# Patient Record
Sex: FEMALE | Race: WHITE | NOT HISPANIC OR LATINO | Employment: FULL TIME | URBAN - METROPOLITAN AREA
[De-identification: names, ages, dates, MRNs, and addresses within clinical notes are randomized per-mention and may not be internally consistent; named-entity substitution may affect disease eponyms.]

---

## 2017-01-16 DIAGNOSIS — I10 ESSENTIAL HYPERTENSION: ICD-10-CM

## 2017-01-16 RX ORDER — HYDROCHLOROTHIAZIDE 25 MG/1
25 TABLET ORAL DAILY
Qty: 90 TABLET | Refills: 3 | Status: SHIPPED | OUTPATIENT
Start: 2017-01-16 | End: 2018-01-19 | Stop reason: SDUPTHER

## 2017-01-16 RX ORDER — VALSARTAN 160 MG/1
160 TABLET ORAL DAILY
Qty: 90 TABLET | Refills: 3 | Status: SHIPPED | OUTPATIENT
Start: 2017-01-16 | End: 2017-10-27

## 2017-03-21 ENCOUNTER — OFFICE VISIT (OUTPATIENT)
Dept: INTERNAL MEDICINE | Facility: CLINIC | Age: 47
End: 2017-03-21

## 2017-03-21 VITALS
DIASTOLIC BLOOD PRESSURE: 90 MMHG | WEIGHT: 161 LBS | HEIGHT: 66 IN | BODY MASS INDEX: 25.88 KG/M2 | SYSTOLIC BLOOD PRESSURE: 140 MMHG

## 2017-03-21 DIAGNOSIS — I10 ESSENTIAL HYPERTENSION: Primary | ICD-10-CM

## 2017-03-21 DIAGNOSIS — M76.899 TENDONITIS OF KNEE: ICD-10-CM

## 2017-03-21 PROCEDURE — 99213 OFFICE O/P EST LOW 20 MIN: CPT | Performed by: INTERNAL MEDICINE

## 2017-03-21 PROCEDURE — 73560 X-RAY EXAM OF KNEE 1 OR 2: CPT | Performed by: INTERNAL MEDICINE

## 2017-03-21 RX ORDER — MELOXICAM 15 MG/1
15 TABLET ORAL DAILY
Qty: 30 TABLET | Refills: 3 | Status: SHIPPED | OUTPATIENT
Start: 2017-03-21 | End: 2017-04-18 | Stop reason: SDUPTHER

## 2017-03-21 RX ORDER — NEBIVOLOL 10 MG/1
10 TABLET ORAL DAILY
Qty: 28 TABLET | Refills: 0 | COMMUNITY
Start: 2017-03-21 | End: 2017-10-27

## 2017-03-21 NOTE — PROGRESS NOTES
Subjective   Dasha eD Leon is a 46 y.o. female.     Hypertension   This is a chronic problem. The current episode started more than 1 year ago. Pertinent negatives include no chest pain or palpitations.   Hyperlipidemia   Pertinent negatives include no chest pain.   Knee Pain    The incident occurred more than 1 week ago.        The following portions of the patient's history were reviewed and updated as appropriate: allergies, current medications, past family history, past medical history, past social history, past surgical history and problem list.    Review of Systems   Constitutional:        Generally doing pretty well    HENT: Positive for postnasal drip and rhinorrhea.    Eyes: Negative.    Respiratory: Negative.    Cardiovascular: Negative for chest pain and palpitations.        BP has been elevated 160/94  On Valsartan 160/& HCTZ    Gastrointestinal: Negative.    Genitourinary: Negative.    Musculoskeletal: Arthralgias:  Has been having bilat knee pain since fall last november L wors than RT Has taken NSAIDs which helps some Not ready to see ortho yet.   Neurological: Negative.        Objective   Physical Exam   Constitutional: She appears well-developed.   HENT:   Head: Normocephalic.   Eyes: EOM are normal.   Neck: Neck supple.   Cardiovascular: Normal rate, regular rhythm and normal heart sounds.    Repeat 130/100   Pulmonary/Chest: Effort normal and breath sounds normal.   Musculoskeletal:   Knee exam benign bilat   Vitals reviewed.      Assessment/Plan   Dasha was seen today for hypertension, hyperlipidemia and knee pain.    Diagnoses and all orders for this visit:    Essential hypertension  -     nebivolol (BYSTOLIC) 10 MG tablet; Take 1 tablet by mouth Daily.    Tendonitis of knee  -     XR Knee AP & Lateral  -     meloxicam (MOBIC) 15 MG tablet; Take 1 tablet by mouth Daily.

## 2017-03-22 ENCOUNTER — APPOINTMENT (OUTPATIENT)
Dept: WOMENS IMAGING | Facility: HOSPITAL | Age: 47
End: 2017-03-22

## 2017-03-22 PROCEDURE — 73560 X-RAY EXAM OF KNEE 1 OR 2: CPT | Performed by: RADIOLOGY

## 2017-03-31 DIAGNOSIS — E78.5 HYPERLIPIDEMIA, UNSPECIFIED HYPERLIPIDEMIA TYPE: ICD-10-CM

## 2017-03-31 RX ORDER — ATORVASTATIN CALCIUM 20 MG/1
20 TABLET, FILM COATED ORAL DAILY
Qty: 90 TABLET | Refills: 3 | Status: SHIPPED | OUTPATIENT
Start: 2017-03-31 | End: 2018-04-06 | Stop reason: SDUPTHER

## 2017-04-04 ENCOUNTER — OFFICE VISIT (OUTPATIENT)
Dept: INTERNAL MEDICINE | Facility: CLINIC | Age: 47
End: 2017-04-04

## 2017-04-04 ENCOUNTER — HOSPITAL ENCOUNTER (OUTPATIENT)
Dept: GENERAL RADIOLOGY | Facility: HOSPITAL | Age: 47
Discharge: HOME | End: 2017-04-04
Admitting: NURSE PRACTITIONER

## 2017-04-04 VITALS
OXYGEN SATURATION: 98 % | HEART RATE: 70 BPM | SYSTOLIC BLOOD PRESSURE: 120 MMHG | DIASTOLIC BLOOD PRESSURE: 86 MMHG | WEIGHT: 160 LBS | BODY MASS INDEX: 25.82 KG/M2

## 2017-04-04 DIAGNOSIS — M54.41 ACUTE RIGHT-SIDED LOW BACK PAIN WITH RIGHT-SIDED SCIATICA: Primary | ICD-10-CM

## 2017-04-04 PROCEDURE — 72110 X-RAY EXAM L-2 SPINE 4/>VWS: CPT

## 2017-04-04 PROCEDURE — 99213 OFFICE O/P EST LOW 20 MIN: CPT | Performed by: NURSE PRACTITIONER

## 2017-04-04 RX ORDER — HYDROCODONE BITARTRATE AND ACETAMINOPHEN 5; 325 MG/1; MG/1
1 TABLET ORAL EVERY 8 HOURS PRN
Qty: 30 TABLET | Refills: 0 | Status: SHIPPED | OUTPATIENT
Start: 2017-04-04 | End: 2017-07-21 | Stop reason: SDUPTHER

## 2017-04-04 RX ORDER — METHYLPREDNISOLONE 4 MG/1
TABLET ORAL
Qty: 21 TABLET | Refills: 0 | Status: SHIPPED | OUTPATIENT
Start: 2017-04-04 | End: 2017-04-18

## 2017-04-04 RX ORDER — CYCLOBENZAPRINE HCL 10 MG
1 TABLET ORAL AS NEEDED
Refills: 0 | COMMUNITY
Start: 2017-04-03 | End: 2017-08-04 | Stop reason: SDUPTHER

## 2017-04-04 NOTE — PATIENT INSTRUCTIONS
Back Pain, Adult  Back pain is very common in adults. The cause of back pain is rarely dangerous and the pain often gets better over time. The cause of your back pain may not be known. Some common causes of back pain include:  · Strain of the muscles or ligaments supporting the spine.  · Wear and tear (degeneration) of the spinal disks.  · Arthritis.  · Direct injury to the back.  For many people, back pain may return. Since back pain is rarely dangerous, most people can learn to manage this condition on their own.  HOME CARE INSTRUCTIONS  Watch your back pain for any changes. The following actions may help to lessen any discomfort you are feeling:  · Remain active. It is stressful on your back to sit or  one place for long periods of time. Do not sit, drive, or  one place for more than 30 minutes at a time. Take short walks on even surfaces as soon as you are able. Try to increase the length of time you walk each day.  · Exercise regularly as directed by your health care provider. Exercise helps your back heal faster. It also helps avoid future injury by keeping your muscles strong and flexible.  · Do not stay in bed. Resting more than 1-2 days can delay your recovery.  · Pay attention to your body when you bend and lift. The most comfortable positions are those that put less stress on your recovering back. Always use proper lifting techniques, including:    Bending your knees.    Keeping the load close to your body.    Avoiding twisting.  · Find a comfortable position to sleep. Use a firm mattress and lie on your side with your knees slightly bent. If you lie on your back, put a pillow under your knees.  · Avoid feeling anxious or stressed. Stress increases muscle tension and can worsen back pain. It is important to recognize when you are anxious or stressed and learn ways to manage it, such as with exercise.  · Take medicines only as directed by your health care provider. Over-the-counter  medicines to reduce pain and inflammation are often the most helpful. Your health care provider may prescribe muscle relaxant drugs. These medicines help dull your pain so you can more quickly return to your normal activities and healthy exercise.  · Apply ice to the injured area:    Put ice in a plastic bag.    Place a towel between your skin and the bag.    Leave the ice on for 20 minutes, 2-3 times a day for the first 2-3 days. After that, ice and heat may be alternated to reduce pain and spasms.  · Maintain a healthy weight. Excess weight puts extra stress on your back and makes it difficult to maintain good posture.  SEEK MEDICAL CARE IF:  · You have pain that is not relieved with rest or medicine.  · You have increasing pain going down into the legs or buttocks.  · You have pain that does not improve in one week.  · You have night pain.  · You lose weight.  · You have a fever or chills.  SEEK IMMEDIATE MEDICAL CARE IF:   · You develop new bowel or bladder control problems.  · You have unusual weakness or numbness in your arms or legs.  · You develop nausea or vomiting.  · You develop abdominal pain.  · You feel faint.     This information is not intended to replace advice given to you by your health care provider. Make sure you discuss any questions you have with your health care provider.     Document Released: 12/18/2006 Document Revised: 01/08/2016 Document Reviewed: 04/21/2015  [employer] Interactive Patient Education ©2016 [employer]

## 2017-04-04 NOTE — PROGRESS NOTES
Subjective   Dasha De Leon is a 46 y.o. female.     HPI Comments: No history of kidney stones. She reports she was lifting groceries ( to include 30 lb cat litter bag) this weekend shortly started afterwards.      Back Pain   This is a new problem. Episode onset: 4 days ago  The problem occurs constantly. The problem has been gradually worsening since onset. The pain is present in the lumbar spine. The quality of the pain is described as shooting and stabbing. The pain radiates to the right thigh. The pain is at a severity of 6/10. The pain is moderate. The pain is worse during the night. The symptoms are aggravated by standing, sitting and coughing. Stiffness is present at night. Associated symptoms include numbness (right leg, anterior and posterior ). Pertinent negatives include no abdominal pain, bladder incontinence, bowel incontinence, chest pain, dysuria or fever. She has tried muscle relaxant, ice, heat and NSAIDs (meloxicam, hydrocodone (old prescription) ) for the symptoms. The treatment provided moderate relief.        The following portions of the patient's history were reviewed and updated as appropriate: allergies, current medications and problem list.    Review of Systems   Constitutional: Negative for activity change, appetite change, fatigue and fever.   Respiratory: Negative for cough, shortness of breath and wheezing.    Cardiovascular: Negative for chest pain, palpitations and leg swelling.   Gastrointestinal: Negative for abdominal pain and bowel incontinence.   Genitourinary: Negative for bladder incontinence and dysuria.   Musculoskeletal: Positive for back pain (right side, radiating down right leg to tow ).   Neurological: Positive for numbness (right leg, anterior and posterior ).       Objective   Physical Exam   Constitutional: She is oriented to person, place, and time. She appears well-developed and well-nourished.   HENT:   Head: Normocephalic.   Nose: Nose normal.   Cardiovascular:  Regular rhythm and normal heart sounds.  Exam reveals no S3 and no S4.    No murmur heard.  Pulmonary/Chest: Effort normal and breath sounds normal. She has no decreased breath sounds. She has no wheezes. She has no rhonchi. She has no rales.   Musculoskeletal: She exhibits no edema.        Lumbar back: She exhibits decreased range of motion, tenderness, pain and spasm. She exhibits no swelling.        Back:    Unable to perform due to pain    Neurological: She is alert and oriented to person, place, and time. Gait normal.   Reflex Scores:       Patellar reflexes are 2+ on the right side and 2+ on the left side.  Skin: Skin is warm and dry.   Psychiatric: She has a normal mood and affect.       Assessment/Plan   Dasha was seen today for back pain.    Diagnoses and all orders for this visit:    Acute right-sided low back pain with right-sided sciatica  Comments:  continue ice and/or heat, back stretches (handout provided); she will hold on PT   Orders:  -     XR Spine Lumbar 4+ View  -     HYDROcodone-acetaminophen (NORCO) 5-325 MG per tablet; Take 1 tablet by mouth Every 8 (Eight) Hours As Needed for Severe Pain (7-10).  -     MethylPREDNISolone (MEDROL) 4 MG tablet; follow package directions      She would like to hold on PT therapy at this time. She has been told to hold on meloxicam while taking oral prednisone.  Controlled substance agreement form obtained.  ORI query complete. Treatment plan to include limited course of prescribed controlled substance. Risks including addiction, benefits, and alternatives presented to patient.

## 2017-04-18 ENCOUNTER — OFFICE VISIT (OUTPATIENT)
Dept: INTERNAL MEDICINE | Facility: CLINIC | Age: 47
End: 2017-04-18

## 2017-04-18 VITALS
HEIGHT: 66 IN | DIASTOLIC BLOOD PRESSURE: 90 MMHG | SYSTOLIC BLOOD PRESSURE: 110 MMHG | BODY MASS INDEX: 26.2 KG/M2 | HEART RATE: 75 BPM | WEIGHT: 163 LBS | OXYGEN SATURATION: 98 %

## 2017-04-18 DIAGNOSIS — J30.1 SEASONAL ALLERGIC RHINITIS DUE TO POLLEN: ICD-10-CM

## 2017-04-18 DIAGNOSIS — S39.012D BACK STRAIN, SUBSEQUENT ENCOUNTER: ICD-10-CM

## 2017-04-18 DIAGNOSIS — I10 ESSENTIAL HYPERTENSION: Primary | ICD-10-CM

## 2017-04-18 DIAGNOSIS — N95.1 PERI-MENOPAUSE: ICD-10-CM

## 2017-04-18 DIAGNOSIS — M76.899 TENDONITIS OF KNEE: ICD-10-CM

## 2017-04-18 DIAGNOSIS — E78.2 MIXED HYPERLIPIDEMIA: ICD-10-CM

## 2017-04-18 LAB
ALBUMIN SERPL-MCNC: 3.94 G/DL (ref 3.4–4.6)
ALBUMIN/GLOB SERPL: 1.2 G/DL
ALP SERPL-CCNC: 60 U/L (ref 46–116)
ALT SERPL W P-5'-P-CCNC: 26 U/L (ref 14–59)
ANION GAP SERPL CALCULATED.3IONS-SCNC: 9 MMOL/L
AST SERPL-CCNC: 24 U/L (ref 7–37)
BASOPHILS # BLD AUTO: 0.03 10*3/MM3 (ref 0–0.2)
BASOPHILS NFR BLD AUTO: 0.3 % (ref 0–2)
BILIRUB SERPL-MCNC: 0.3 MG/DL (ref 0.2–1)
BUN BLD-MCNC: 22 MG/DL (ref 6–22)
BUN/CREAT SERPL: 29.7 (ref 7–25)
CALCIUM SPEC-SCNC: 8.8 MG/DL (ref 8.6–10.5)
CHLORIDE SERPL-SCNC: 103 MMOL/L (ref 95–107)
CHOLEST SERPL-MCNC: 214 MG/DL (ref 0–200)
CO2 SERPL-SCNC: 29 MMOL/L (ref 23–32)
CREAT BLD-MCNC: 0.74 MG/DL (ref 0.55–1.02)
DEPRECATED RDW RBC AUTO: 40.7 FL (ref 37–54)
EOSINOPHIL # BLD AUTO: 0.25 10*3/MM3 (ref 0–0.7)
EOSINOPHIL NFR BLD AUTO: 2.6 % (ref 0–5)
ERYTHROCYTE [DISTWIDTH] IN BLOOD BY AUTOMATED COUNT: 12.3 % (ref 11.5–15)
GFR SERPL CREATININE-BSD FRML MDRD: 84 ML/MIN/1.73
GLOBULIN UR ELPH-MCNC: 3.4 GM/DL
GLUCOSE BLD-MCNC: 91 MG/DL (ref 70–100)
HCT VFR BLD AUTO: 39.7 % (ref 34.1–44.9)
HDLC SERPL-MCNC: 56 MG/DL (ref 40–81)
HGB BLD-MCNC: 12.7 G/DL (ref 11.2–15.7)
LDLC SERPL CALC-MCNC: 135 MG/DL (ref 0–100)
LDLC/HDLC SERPL: 2.42 {RATIO}
LYMPHOCYTES # BLD AUTO: 1.92 10*3/MM3 (ref 0.8–7)
LYMPHOCYTES NFR BLD AUTO: 20.3 % (ref 10–60)
MCH RBC QN AUTO: 29.4 PG (ref 26–34)
MCHC RBC AUTO-ENTMCNC: 32 G/DL (ref 31–37)
MCV RBC AUTO: 91.9 FL (ref 80–100)
MONOCYTES # BLD AUTO: 1.2 10*3/MM3 (ref 0–1)
MONOCYTES NFR BLD AUTO: 12.7 % (ref 0–13)
NEUTROPHILS # BLD AUTO: 6.06 10*3/MM3 (ref 1–11)
NEUTROPHILS NFR BLD AUTO: 64.1 % (ref 30–85)
PLATELET # BLD AUTO: 290 10*3/MM3 (ref 150–450)
PMV BLD AUTO: 10 FL (ref 6–12)
POTASSIUM BLD-SCNC: 4.4 MMOL/L (ref 3.3–5.3)
PROT SERPL-MCNC: 7.3 G/DL (ref 6.3–8.4)
RBC # BLD AUTO: 4.32 10*6/MM3 (ref 3.93–5.22)
SODIUM BLD-SCNC: 141 MMOL/L (ref 136–145)
T4 FREE SERPL-MCNC: 0.89 NG/DL (ref 0.93–1.7)
TRIGL SERPL-MCNC: 113 MG/DL (ref 0–150)
TSH SERPL DL<=0.05 MIU/L-ACNC: 1.15 MIU/ML (ref 0.4–4.2)
VLDLC SERPL-MCNC: 22.6 MG/DL
WBC NRBC COR # BLD: 9.46 10*3/MM3 (ref 5–10)

## 2017-04-18 PROCEDURE — 99214 OFFICE O/P EST MOD 30 MIN: CPT | Performed by: INTERNAL MEDICINE

## 2017-04-18 PROCEDURE — 36415 COLL VENOUS BLD VENIPUNCTURE: CPT | Performed by: INTERNAL MEDICINE

## 2017-04-18 PROCEDURE — 80061 LIPID PANEL: CPT | Performed by: INTERNAL MEDICINE

## 2017-04-18 PROCEDURE — 85025 COMPLETE CBC W/AUTO DIFF WBC: CPT | Performed by: INTERNAL MEDICINE

## 2017-04-18 PROCEDURE — 84443 ASSAY THYROID STIM HORMONE: CPT | Performed by: INTERNAL MEDICINE

## 2017-04-18 PROCEDURE — 80053 COMPREHEN METABOLIC PANEL: CPT | Performed by: INTERNAL MEDICINE

## 2017-04-18 RX ORDER — MELOXICAM 15 MG/1
15 TABLET ORAL DAILY
Qty: 90 TABLET | Refills: 3 | Status: SHIPPED | OUTPATIENT
Start: 2017-04-18 | End: 2018-01-19 | Stop reason: SDUPTHER

## 2017-04-18 NOTE — PROGRESS NOTES
Subjective   Dasha De Leon is a 46 y.o. female.     Hypertension   This is a chronic problem. The current episode started more than 1 year ago.   Back Pain   This is a chronic problem. The current episode started 1 to 4 weeks ago.        The following portions of the patient's history were reviewed and updated as appropriate: allergies, current medications, past family history, past medical history, past social history, past surgical history and problem list.    Review of Systems   Constitutional:        Doing fairly well   HENT: Positive for postnasal drip (Allergies are driving her crazy wants to see allergist).    Respiratory: Positive for choking (From her allergies).    Cardiovascular:        BP checks have been better since starting beta blocker    Musculoskeletal: Positive for arthralgias ( Knees are doing better on meloxicam) and back pain ( Still having some back pain from back injury a few weeks ago ).       Objective   Physical Exam   Constitutional: She appears well-developed.   HENT:   Head: Normocephalic.   Eyes: EOM are normal.   Neck: Neck supple.   Cardiovascular: Normal rate, regular rhythm and normal heart sounds.    Repeat 130/80   Pulmonary/Chest: Effort normal and breath sounds normal.   Musculoskeletal: Normal range of motion.   Neurological: She is alert.   Vitals reviewed.      Assessment/Plan   Dasha was seen today for hypertension and back pain.    Diagnoses and all orders for this visit:    Essential hypertension  -     CBC Auto Differential; Future  -     Comprehensive Metabolic Panel; Future  -     TSH; Future  -     T4, Free; Future    Tendonitis of knee  -     meloxicam (MOBIC) 15 MG tablet; Take 1 tablet by mouth Daily.    Seasonal allergic rhinitis due to pollen  -     Ambulatory Referral to Allergy    Back strain, subsequent encounter    Mixed hyperlipidemia  -     Lipid Panel; Future    Marni-menopause  -     FSH & LH; Future

## 2017-04-19 LAB
FSH SERPL-ACNC: 17 MIU/ML
LH SERPL-ACNC: 14.2 MIU/ML

## 2017-07-21 ENCOUNTER — OFFICE VISIT (OUTPATIENT)
Dept: INTERNAL MEDICINE | Facility: CLINIC | Age: 47
End: 2017-07-21

## 2017-07-21 VITALS
BODY MASS INDEX: 27 KG/M2 | DIASTOLIC BLOOD PRESSURE: 86 MMHG | HEIGHT: 66 IN | WEIGHT: 168 LBS | SYSTOLIC BLOOD PRESSURE: 130 MMHG

## 2017-07-21 DIAGNOSIS — I10 ESSENTIAL HYPERTENSION: Primary | ICD-10-CM

## 2017-07-21 DIAGNOSIS — J45.20 MILD INTERMITTENT ASTHMA WITHOUT COMPLICATION: ICD-10-CM

## 2017-07-21 DIAGNOSIS — M54.41 ACUTE RIGHT-SIDED LOW BACK PAIN WITH RIGHT-SIDED SCIATICA: ICD-10-CM

## 2017-07-21 PROCEDURE — 99213 OFFICE O/P EST LOW 20 MIN: CPT | Performed by: INTERNAL MEDICINE

## 2017-07-21 RX ORDER — HYDROCODONE BITARTRATE AND ACETAMINOPHEN 5; 325 MG/1; MG/1
1 TABLET ORAL EVERY 8 HOURS PRN
Qty: 30 TABLET | Refills: 0 | Status: SHIPPED | OUTPATIENT
Start: 2017-07-21 | End: 2018-11-05 | Stop reason: SDUPTHER

## 2017-07-21 RX ORDER — LEVOCETIRIZINE DIHYDROCHLORIDE 5 MG/1
TABLET, FILM COATED ORAL
Refills: 11 | COMMUNITY
Start: 2017-07-08 | End: 2018-01-19 | Stop reason: SDUPTHER

## 2017-07-21 RX ORDER — BUDESONIDE AND FORMOTEROL FUMARATE DIHYDRATE 160; 4.5 UG/1; UG/1
2 AEROSOL RESPIRATORY (INHALATION)
Qty: 1 INHALER | Refills: 3 | Status: SHIPPED | OUTPATIENT
Start: 2017-07-21 | End: 2017-10-27

## 2017-07-21 NOTE — PROGRESS NOTES
Subjective   Dasha De Leon is a 46 y.o. female.     Hypertension   This is a chronic problem. The current episode started more than 1 year ago. Pertinent negatives include no chest pain or palpitations.   Hyperlipidemia   This is a chronic problem. The current episode started more than 1 year ago. Pertinent negatives include no chest pain.   Back Pain   The current episode started more than 1 year ago. Pertinent negatives include no chest pain.        The following portions of the patient's history were reviewed and updated as appropriate: allergies, current medications, past family history, past medical history, past social history, past surgical history and problem list.    Review of Systems   Constitutional: Positive for fatigue (Chronically fatigued).        Here for F/U Generally doing pretty well   HENT: Negative.    Eyes: Negative.    Respiratory: Positive for cough (Allergies Saw Dr Hernandez for her allergies Has her on shots & Xyzal Needs refill on inhaler).    Cardiovascular: Negative for chest pain and palpitations.   Gastrointestinal: Negative.    Genitourinary: Negative.    Musculoskeletal: Positive for back pain ( Has been having back pain for a long time worse when she has to stand very long or doing yard work  Sometimes will go down her legs).   Neurological: Negative.        Objective   Physical Exam   Constitutional: She appears well-developed.   HENT:   Head: Normocephalic.   Eyes: EOM are normal.   Neck: Neck supple.   Cardiovascular: Normal rate, regular rhythm and normal heart sounds.    Re peat 130/80   Pulmonary/Chest: Effort normal and breath sounds normal.   Musculoskeletal: Normal range of motion.   Tend  Er over  umbar spine     Vitals reviewed.      Assessment/Plan   Dasha was seen today for hypertension, hyperlipidemia and back pain.    Diagnoses and all orders for this visit:    Essential hypertension    Mild intermittent asthma without complication  -     budesonide-formoterol (SYMBICORT)  160-4.5 MCG/ACT inhaler; Inhale 2 puffs 2 (Two) Times a Day.    Acute right-sided low back pain with right-sided sciatica  Comments:  continue ice and/or heat, back stretches (handout provided); she will hold on PT   Orders:  -     HYDROcodone-acetaminophen (NORCO) 5-325 MG per tablet; Take 1 tablet by mouth Every 8 (Eight) Hours As Needed for Severe Pain  (7-10).

## 2017-08-04 RX ORDER — CYCLOBENZAPRINE HCL 10 MG
10 TABLET ORAL 3 TIMES DAILY PRN
Qty: 30 TABLET | Refills: 1 | Status: SHIPPED | OUTPATIENT
Start: 2017-08-04 | End: 2017-08-04 | Stop reason: SDUPTHER

## 2017-08-04 RX ORDER — CYCLOBENZAPRINE HCL 10 MG
10 TABLET ORAL 3 TIMES DAILY PRN
Qty: 30 TABLET | Refills: 1 | Status: SHIPPED | OUTPATIENT
Start: 2017-08-04 | End: 2018-10-08 | Stop reason: SDUPTHER

## 2017-10-27 ENCOUNTER — OFFICE VISIT (OUTPATIENT)
Dept: INTERNAL MEDICINE | Facility: CLINIC | Age: 47
End: 2017-10-27

## 2017-10-27 VITALS
BODY MASS INDEX: 27.48 KG/M2 | WEIGHT: 171 LBS | SYSTOLIC BLOOD PRESSURE: 130 MMHG | DIASTOLIC BLOOD PRESSURE: 84 MMHG | HEIGHT: 66 IN

## 2017-10-27 DIAGNOSIS — F39 MOOD DISORDER (HCC): ICD-10-CM

## 2017-10-27 DIAGNOSIS — I10 ESSENTIAL HYPERTENSION: Primary | ICD-10-CM

## 2017-10-27 DIAGNOSIS — J30.9 CHRONIC ALLERGIC RHINITIS, UNSPECIFIED SEASONALITY, UNSPECIFIED TRIGGER: ICD-10-CM

## 2017-10-27 PROCEDURE — 99213 OFFICE O/P EST LOW 20 MIN: CPT | Performed by: INTERNAL MEDICINE

## 2017-10-27 RX ORDER — FLUTICASONE PROPIONATE 50 MCG
2 SPRAY, SUSPENSION (ML) NASAL DAILY
COMMUNITY
End: 2018-01-19

## 2017-10-27 RX ORDER — OLMESARTAN MEDOXOMIL AND HYDROCHLOROTHIAZIDE 40/12.5 40; 12.5 MG/1; MG/1
1 TABLET ORAL DAILY
COMMUNITY
End: 2018-04-12 | Stop reason: DRUGHIGH

## 2017-10-27 NOTE — PROGRESS NOTES
Subjective   Dasha De Leon is a 46 y.o. female.     Hypertension   This is a chronic problem. The current episode started more than 1 year ago. Pertinent negatives include no chest pain or palpitations.        The following portions of the patient's history were reviewed and updated as appropriate: allergies, current medications, past family history, past medical history, past social history, past surgical history and problem list.    Review of Systems   Constitutional:        Doing well No new problems   HENT: Positive for rhinorrhea (Allergies have beenwell controlled Taking injection Using xyzal & nasal spray).    Respiratory: Negative for wheezing.    Cardiovascular: Negative for chest pain and palpitations.        BP checks have been good   Gastrointestinal: Negative.    Genitourinary: Negative.    Musculoskeletal: Back pain:  has been under pretyy good control  Doing back exercises.   Neurological: Negative.        Objective   Physical Exam   Constitutional: She is oriented to person, place, and time. She appears well-developed.   HENT:   Head: Normocephalic.   Eyes: EOM are normal.   Neck: Neck supple.   Cardiovascular: Normal rate, regular rhythm and normal heart sounds.    Repeat 140/84   Pulmonary/Chest: Effort normal and breath sounds normal.   Musculoskeletal: Normal range of motion.   Neurological: She is alert and oriented to person, place, and time.   Skin: Skin is warm and dry.   Vitals reviewed.      Assessment/Plan   Dasha was seen today for hypertension.    Diagnoses and all orders for this visit:    Essential hypertension  -     CBC Auto Differential; Future  -     Comprehensive Metabolic Panel; Future  -     PSA Screen; Future  -     T4, Free; Future    Chronic allergic rhinitis, unspecified seasonality, unspecified trigger    Mood disorder

## 2017-10-30 RX ORDER — AZITHROMYCIN 250 MG/1
TABLET, FILM COATED ORAL
Qty: 6 TABLET | Refills: 2 | Status: SHIPPED | OUTPATIENT
Start: 2017-10-30 | End: 2018-02-23

## 2017-10-31 ENCOUNTER — TELEPHONE (OUTPATIENT)
Dept: INTERNAL MEDICINE | Facility: CLINIC | Age: 47
End: 2017-10-31

## 2017-10-31 NOTE — TELEPHONE ENCOUNTER
----- Message from Dasha De Leon sent at 10/30/2017  1:35 PM EDT -----  Regarding: Visit Follow-Up Question  Contact: [phone]  We discussed getting lab work done when I was in on Friday. I noticed that you ordered a PSA on me, that obviously had to be an error since I'm female. I'm not sure if you were going for a TSH when you ordered that, I know Epic sometimes has a mind of it's own. I just wanted to bring that to your attention. Have a great day.

## 2017-11-03 ENCOUNTER — LAB (OUTPATIENT)
Dept: INTERNAL MEDICINE | Facility: CLINIC | Age: 47
End: 2017-11-03

## 2017-11-03 DIAGNOSIS — I10 ESSENTIAL HYPERTENSION: ICD-10-CM

## 2017-11-03 LAB
ALBUMIN SERPL-MCNC: 4.5 G/DL (ref 3.5–5.2)
ALBUMIN/GLOB SERPL: 1.5 G/DL
ALP SERPL-CCNC: 62 U/L (ref 39–117)
ALT SERPL W P-5'-P-CCNC: 18 U/L (ref 1–33)
ANION GAP SERPL CALCULATED.3IONS-SCNC: 14.2 MMOL/L
AST SERPL-CCNC: 21 U/L (ref 1–32)
BASOPHILS # BLD AUTO: 0.04 10*3/MM3 (ref 0–0.2)
BASOPHILS NFR BLD AUTO: 0.4 % (ref 0–2)
BILIRUB SERPL-MCNC: 0.2 MG/DL (ref 0.1–1.2)
BUN BLD-MCNC: 19 MG/DL (ref 6–20)
BUN/CREAT SERPL: 23.2 (ref 7–25)
CALCIUM SPEC-SCNC: 9.1 MG/DL (ref 8.6–10.5)
CHLORIDE SERPL-SCNC: 99 MMOL/L (ref 98–107)
CO2 SERPL-SCNC: 24.8 MMOL/L (ref 22–29)
CREAT BLD-MCNC: 0.82 MG/DL (ref 0.57–1)
DEPRECATED RDW RBC AUTO: 41.3 FL (ref 37–54)
EOSINOPHIL # BLD AUTO: 0.2 10*3/MM3 (ref 0–0.7)
EOSINOPHIL NFR BLD AUTO: 2.1 % (ref 0–5)
ERYTHROCYTE [DISTWIDTH] IN BLOOD BY AUTOMATED COUNT: 12.5 % (ref 11.5–15)
GFR SERPL CREATININE-BSD FRML MDRD: 75 ML/MIN/1.73
GLOBULIN UR ELPH-MCNC: 3.1 GM/DL
GLUCOSE BLD-MCNC: 90 MG/DL (ref 65–99)
HCT VFR BLD AUTO: 37 % (ref 34.1–44.9)
HGB BLD-MCNC: 11.9 G/DL (ref 11.2–15.7)
LYMPHOCYTES # BLD AUTO: 2.29 10*3/MM3 (ref 0.8–7)
LYMPHOCYTES NFR BLD AUTO: 23.8 % (ref 10–60)
MCH RBC QN AUTO: 30 PG (ref 26–34)
MCHC RBC AUTO-ENTMCNC: 32.2 G/DL (ref 31–37)
MCV RBC AUTO: 93.2 FL (ref 80–100)
MONOCYTES # BLD AUTO: 1.2 10*3/MM3 (ref 0–1)
MONOCYTES NFR BLD AUTO: 12.5 % (ref 0–13)
NEUTROPHILS # BLD AUTO: 5.89 10*3/MM3 (ref 1–11)
NEUTROPHILS NFR BLD AUTO: 61.2 % (ref 30–85)
PLATELET # BLD AUTO: 300 10*3/MM3 (ref 150–450)
PMV BLD AUTO: 9.6 FL (ref 6–12)
POTASSIUM BLD-SCNC: 3.8 MMOL/L (ref 3.5–5.2)
PROT SERPL-MCNC: 7.6 G/DL (ref 6–8.5)
RBC # BLD AUTO: 3.97 10*6/MM3 (ref 3.93–5.22)
SODIUM BLD-SCNC: 138 MMOL/L (ref 136–145)
T4 FREE SERPL-MCNC: 0.85 NG/DL (ref 0.93–1.7)
TSH SERPL-ACNC: 1.86 MIU/ML (ref 0.27–4.2)
WBC NRBC COR # BLD: 9.62 10*3/MM3 (ref 5–10)

## 2017-11-03 PROCEDURE — 85025 COMPLETE CBC W/AUTO DIFF WBC: CPT | Performed by: INTERNAL MEDICINE

## 2017-11-03 PROCEDURE — 80053 COMPREHEN METABOLIC PANEL: CPT | Performed by: INTERNAL MEDICINE

## 2017-12-21 RX ORDER — ESCITALOPRAM OXALATE 10 MG/1
10 TABLET ORAL DAILY
Qty: 30 TABLET | Refills: 0 | Status: SHIPPED | OUTPATIENT
Start: 2017-12-21 | End: 2018-01-19 | Stop reason: SDUPTHER

## 2018-01-19 DIAGNOSIS — M76.899 TENDONITIS OF KNEE: ICD-10-CM

## 2018-01-19 RX ORDER — ESCITALOPRAM OXALATE 10 MG/1
10 TABLET ORAL DAILY
Qty: 90 TABLET | Refills: 3 | Status: SHIPPED | OUTPATIENT
Start: 2018-01-19 | End: 2018-04-12 | Stop reason: SDUPTHER

## 2018-01-19 RX ORDER — LEVOCETIRIZINE DIHYDROCHLORIDE 5 MG/1
5 TABLET, FILM COATED ORAL DAILY
Qty: 90 TABLET | Refills: 3 | Status: SHIPPED | OUTPATIENT
Start: 2018-01-19 | End: 2019-01-13 | Stop reason: SDUPTHER

## 2018-01-19 RX ORDER — HYDROCHLOROTHIAZIDE 25 MG/1
25 TABLET ORAL DAILY
Qty: 90 TABLET | Refills: 3 | Status: SHIPPED | OUTPATIENT
Start: 2018-01-19 | End: 2018-04-12 | Stop reason: SDUPTHER

## 2018-01-19 RX ORDER — FLUTICASONE PROPIONATE 50 MCG
2 SPRAY, SUSPENSION (ML) NASAL DAILY
Qty: 1 BOTTLE | Refills: 3 | Status: SHIPPED | OUTPATIENT
Start: 2018-01-19 | End: 2018-07-31

## 2018-01-19 RX ORDER — MELOXICAM 15 MG/1
15 TABLET ORAL DAILY
Qty: 90 TABLET | Refills: 3 | Status: SHIPPED | OUTPATIENT
Start: 2018-01-19 | End: 2019-01-14 | Stop reason: SDUPTHER

## 2018-02-23 ENCOUNTER — OFFICE VISIT (OUTPATIENT)
Dept: OBSTETRICS AND GYNECOLOGY | Age: 48
End: 2018-02-23

## 2018-02-23 VITALS
WEIGHT: 170 LBS | SYSTOLIC BLOOD PRESSURE: 124 MMHG | DIASTOLIC BLOOD PRESSURE: 80 MMHG | BODY MASS INDEX: 27.32 KG/M2 | HEIGHT: 66 IN

## 2018-02-23 DIAGNOSIS — Z11.51 SPECIAL SCREENING EXAMINATION FOR HUMAN PAPILLOMAVIRUS (HPV): ICD-10-CM

## 2018-02-23 DIAGNOSIS — Z12.4 ROUTINE CERVICAL SMEAR: ICD-10-CM

## 2018-02-23 DIAGNOSIS — Z01.419 ENCOUNTER FOR GYNECOLOGICAL EXAMINATION WITHOUT ABNORMAL FINDING: ICD-10-CM

## 2018-02-23 DIAGNOSIS — Z97.5 IUD (INTRAUTERINE DEVICE) IN PLACE: Primary | ICD-10-CM

## 2018-02-23 PROCEDURE — 99396 PREV VISIT EST AGE 40-64: CPT | Performed by: OBSTETRICS & GYNECOLOGY

## 2018-02-23 NOTE — PROGRESS NOTES
"Subjective     Chief Complaint   Patient presents with   • Gynecologic Exam     AC       History of Present Illness    Dasha De Leon is a 47 y.o.  who presents for annual exam. Mood better on lexapro.  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  She is not having time to exercise.  Her menses are rare, lasting 0-3 days, dysmenorrhea none   Obstetric History:  OB History      Para Term  AB Living    0 0 0 0 0 0    SAB TAB Ectopic Multiple Live Births    0 0 0 0          Menstrual History:     No LMP recorded. Patient has had an implant.         Current contraception: IUD 2012 - pt is not SA and wants to keep IUD   History of abnormal Pap smear: yes - cryo 97  Received Gardasil immunization: no  Perform regular self breast exam: no  Family history of uterine or ovarian cancer: yes - uterine mat aunt 60   Family History of colon cancer: yes - pat uncle 58  Family history of breast cancer: no    Mammogram: done today.  Colonoscopy: not indicated. Start 48  DEXA: not indicated.    Exercise: not active  Calcium/Vitamin D: adequate intake    The following portions of the patient's history were reviewed and updated as appropriate: allergies, current medications, past family history, past medical history, past social history, past surgical history and problem list.    Review of Systems    Review of Systems   Constitutional: Negative for fatigue.   Respiratory: Negative for shortness of breath.    Gastrointestinal: Negative for abdominal pain.   Genitourinary: Negative for dysuria.   Neurological: Negative for headaches.   Psychiatric/Behavioral: Negative for dysphoric mood.         Objective   Physical Exam    /80  Ht 167.6 cm (66\")  Wt 77.1 kg (170 lb)  BMI 27.44 kg/m2    General:   alert, appears stated age and cooperative   Neck: thyroid normal to palpation   Heart: regular rate and rhythm   Lungs: clear to auscultation bilaterally   Abdomen: soft, non-tender, without " masses or organomegaly   Breast: inspection negative, no nipple discharge or bleeding, no masses or nodularity palpable   Vulva: normal, Bartholin's, Urethra, McNeil's normal   Vagina: normal mucosa, normal discharge   Cervix: no cervical motion tenderness   Uterus: mobile, non-tender, normal shape and consistency   Adnexa: no mass, fullness, tenderness   Rectal: not indicated     Assessment/Plan   Dasha was seen today for gynecologic exam.    Diagnoses and all orders for this visit:    IUD (intrauterine device) in place    Encounter for gynecological examination without abnormal finding  -     IGP, Aptima HPV, Rfx 16 / 18,45    Routine cervical smear  -     IGP, Aptima HPV, Rfx 16 / 18,45    Special screening examination for human papillomavirus (HPV)  -     IGP, Aptima HPV, Rfx 16 / 18,45    Patient will continue on her Lexapro.  I encouraged exercise and decrease alcohol consumption.  All questions answered.  Breast self exam technique reviewed and patient encouraged to perform self-exam monthly.  Discussed healthy lifestyle modifications.  Recommended 30 minutes of aerobic exercise five times per week.  Discussed calcium needs to prevent osteoporosis.

## 2018-02-28 ENCOUNTER — TELEPHONE (OUTPATIENT)
Dept: OBSTETRICS AND GYNECOLOGY | Age: 48
End: 2018-02-28

## 2018-02-28 LAB
CYTOLOGIST CVX/VAG CYTO: NORMAL
CYTOLOGY CVX/VAG DOC THIN PREP: NORMAL
DX ICD CODE: NORMAL
DX ICD CODE: NORMAL
HIV 1 & 2 AB SER-IMP: NORMAL
HPV I/H RISK 4 DNA CVX QL PROBE+SIG AMP: NEGATIVE
OTHER STN SPEC: NORMAL
PATH REPORT.FINAL DX SPEC: NORMAL
STAT OF ADQ CVX/VAG CYTO-IMP: NORMAL

## 2018-03-02 ENCOUNTER — OFFICE VISIT (OUTPATIENT)
Dept: INTERNAL MEDICINE | Facility: CLINIC | Age: 48
End: 2018-03-02

## 2018-03-02 VITALS
HEIGHT: 66 IN | SYSTOLIC BLOOD PRESSURE: 130 MMHG | DIASTOLIC BLOOD PRESSURE: 90 MMHG | BODY MASS INDEX: 27.32 KG/M2 | WEIGHT: 170 LBS

## 2018-03-02 DIAGNOSIS — R00.2 HEART PALPITATIONS: ICD-10-CM

## 2018-03-02 DIAGNOSIS — J30.9 ACUTE ALLERGIC RHINITIS, UNSPECIFIED SEASONALITY, UNSPECIFIED TRIGGER: ICD-10-CM

## 2018-03-02 DIAGNOSIS — I10 ESSENTIAL HYPERTENSION: Primary | ICD-10-CM

## 2018-03-02 PROCEDURE — 99214 OFFICE O/P EST MOD 30 MIN: CPT | Performed by: INTERNAL MEDICINE

## 2018-03-02 NOTE — PROGRESS NOTES
Subjective   Dasha De Leon is a 47 y.o. female.     Hypertension   This is a chronic problem. The current episode started more than 1 year ago. Associated symptoms include palpitations (Had episode of tachypalpitations @ work yesterday Felt weak like she would pass out & was dizzy as well Lasted about 45 minutes Had EKG & rate was 135BPM).        The following portions of the patient's history were reviewed and updated as appropriate: allergies, current medications, past family history, past medical history, past social history, past surgical history and problem list.    Review of Systems   Constitutional:        Here for F/U    Cardiovascular: Positive for palpitations (Had episode of tachypalpitations @ work yesterday Felt weak like she would pass out & was dizzy as well Lasted about 45 minutes Had EKG & rate was 135BPM).   Musculoskeletal: Positive for back pain (Still has issues W/ back pain).       Objective   Physical Exam   Constitutional: She is oriented to person, place, and time. She appears well-developed.   HENT:   Head: Normocephalic.   Eyes: EOM are normal.   Neck: Neck supple.   Cardiovascular: Normal rate, regular rhythm and normal heart sounds.    Repeat 120/86   Pulmonary/Chest: Breath sounds normal.   Musculoskeletal: Normal range of motion.   Neurological: She is alert and oriented to person, place, and time.   Skin: Skin is warm and dry.   Vitals reviewed.      Assessment/Plan   Dasha was seen today for irregular heart beat.    Diagnoses and all orders for this visit:    Essential hypertension  -     CBC Auto Differential; Future  -     Comprehensive Metabolic Panel; Future    Acute allergic rhinitis, unspecified seasonality, unspecified trigger    Heart palpitations  -     TSH; Future  -     T3, Free; Future  -     T4, Free; Future

## 2018-03-05 ENCOUNTER — LAB (OUTPATIENT)
Dept: INTERNAL MEDICINE | Facility: CLINIC | Age: 48
End: 2018-03-05

## 2018-03-05 DIAGNOSIS — R00.2 HEART PALPITATIONS: ICD-10-CM

## 2018-03-05 DIAGNOSIS — I10 ESSENTIAL HYPERTENSION: ICD-10-CM

## 2018-03-05 LAB
ALBUMIN SERPL-MCNC: 4.6 G/DL (ref 3.5–5.2)
ALBUMIN/GLOB SERPL: 1.6 G/DL
ALP SERPL-CCNC: 67 U/L (ref 39–117)
ALT SERPL W P-5'-P-CCNC: 21 U/L (ref 1–33)
ANION GAP SERPL CALCULATED.3IONS-SCNC: 12.8 MMOL/L
AST SERPL-CCNC: 22 U/L (ref 1–32)
BASOPHILS # BLD AUTO: 0.03 10*3/MM3 (ref 0–0.2)
BASOPHILS NFR BLD AUTO: 0.3 % (ref 0–2)
BILIRUB SERPL-MCNC: 0.2 MG/DL (ref 0.1–1.2)
BUN BLD-MCNC: 20 MG/DL (ref 6–20)
BUN/CREAT SERPL: 22.7 (ref 7–25)
CALCIUM SPEC-SCNC: 9.5 MG/DL (ref 8.6–10.5)
CHLORIDE SERPL-SCNC: 100 MMOL/L (ref 98–107)
CO2 SERPL-SCNC: 26.2 MMOL/L (ref 22–29)
CREAT BLD-MCNC: 0.88 MG/DL (ref 0.57–1)
DEPRECATED RDW RBC AUTO: 42.9 FL (ref 37–54)
EOSINOPHIL # BLD AUTO: 0.17 10*3/MM3 (ref 0–0.7)
EOSINOPHIL NFR BLD AUTO: 1.8 % (ref 0–5)
ERYTHROCYTE [DISTWIDTH] IN BLOOD BY AUTOMATED COUNT: 13 % (ref 11.5–15)
GFR SERPL CREATININE-BSD FRML MDRD: 69 ML/MIN/1.73
GLOBULIN UR ELPH-MCNC: 2.9 GM/DL
GLUCOSE BLD-MCNC: 94 MG/DL (ref 65–99)
HCT VFR BLD AUTO: 38 % (ref 34.1–44.9)
HGB BLD-MCNC: 12.1 G/DL (ref 11.2–15.7)
LYMPHOCYTES # BLD AUTO: 2.25 10*3/MM3 (ref 0.8–7)
LYMPHOCYTES NFR BLD AUTO: 23.3 % (ref 10–60)
MCH RBC QN AUTO: 29.5 PG (ref 26–34)
MCHC RBC AUTO-ENTMCNC: 31.8 G/DL (ref 31–37)
MCV RBC AUTO: 92.7 FL (ref 80–100)
MONOCYTES # BLD AUTO: 1.28 10*3/MM3 (ref 0–1)
MONOCYTES NFR BLD AUTO: 13.3 % (ref 0–13)
NEUTROPHILS # BLD AUTO: 5.93 10*3/MM3 (ref 1–11)
NEUTROPHILS NFR BLD AUTO: 61.3 % (ref 30–85)
PLATELET # BLD AUTO: 331 10*3/MM3 (ref 150–450)
PMV BLD AUTO: 10.3 FL (ref 6–12)
POTASSIUM BLD-SCNC: 4.1 MMOL/L (ref 3.5–5.2)
PROT SERPL-MCNC: 7.5 G/DL (ref 6–8.5)
RBC # BLD AUTO: 4.1 10*6/MM3 (ref 3.93–5.22)
SODIUM BLD-SCNC: 139 MMOL/L (ref 136–145)
T4 FREE SERPL-MCNC: 0.79 NG/DL (ref 0.93–1.7)
TSH SERPL-ACNC: 2.58 MIU/ML (ref 0.27–4.2)
WBC NRBC COR # BLD: 9.66 10*3/MM3 (ref 5–10)

## 2018-03-05 PROCEDURE — 80053 COMPREHEN METABOLIC PANEL: CPT | Performed by: INTERNAL MEDICINE

## 2018-03-05 PROCEDURE — 85025 COMPLETE CBC W/AUTO DIFF WBC: CPT | Performed by: INTERNAL MEDICINE

## 2018-03-06 LAB — T3FREE SERPL-MCNC: 2.9 PG/ML (ref 2–4.4)

## 2018-03-16 NOTE — TELEPHONE ENCOUNTER
Mazin anderson. She declined medication at this time. She said she is not having any symptoms of BV

## 2018-04-06 DIAGNOSIS — E78.5 HYPERLIPIDEMIA, UNSPECIFIED HYPERLIPIDEMIA TYPE: ICD-10-CM

## 2018-04-09 RX ORDER — ATORVASTATIN CALCIUM 20 MG/1
TABLET, FILM COATED ORAL
Qty: 90 TABLET | Refills: 2 | Status: SHIPPED | OUTPATIENT
Start: 2018-04-09 | End: 2019-01-13 | Stop reason: SDUPTHER

## 2018-04-12 ENCOUNTER — TELEPHONE (OUTPATIENT)
Dept: INTERNAL MEDICINE | Facility: CLINIC | Age: 48
End: 2018-04-12

## 2018-04-12 RX ORDER — ESCITALOPRAM OXALATE 10 MG/1
10 TABLET ORAL DAILY
Qty: 90 TABLET | Refills: 1 | Status: SHIPPED | OUTPATIENT
Start: 2018-04-12 | End: 2018-10-17 | Stop reason: SDUPTHER

## 2018-04-12 RX ORDER — OLMESARTAN MEDOXOMIL 20 MG/1
20 TABLET ORAL DAILY
Qty: 90 TABLET | Refills: 1 | Status: SHIPPED | OUTPATIENT
Start: 2018-04-12 | End: 2018-10-17 | Stop reason: SDUPTHER

## 2018-04-12 RX ORDER — HYDROCHLOROTHIAZIDE 25 MG/1
25 TABLET ORAL DAILY
Qty: 90 TABLET | Refills: 1 | Status: SHIPPED | OUTPATIENT
Start: 2018-04-12 | End: 2018-10-17 | Stop reason: SDUPTHER

## 2018-04-12 NOTE — TELEPHONE ENCOUNTER
----- Message from Dasha De Leon sent at 4/12/2018  8:38 AM EDT -----  Regarding: Prescription Question  Contact: [phone]  Can you send in 90 day prescriptions to [facility] for Olmesartan 20 mg, HCTZ 25 mg & Escitalopram 10 mg all once a day. They are covered at a $0 copay through our pharmacy. Thanks.

## 2018-06-15 ENCOUNTER — OFFICE VISIT (OUTPATIENT)
Dept: INTERNAL MEDICINE | Facility: CLINIC | Age: 48
End: 2018-06-15

## 2018-06-15 VITALS
SYSTOLIC BLOOD PRESSURE: 152 MMHG | HEIGHT: 66 IN | WEIGHT: 168 LBS | DIASTOLIC BLOOD PRESSURE: 84 MMHG | BODY MASS INDEX: 27 KG/M2

## 2018-06-15 DIAGNOSIS — E78.2 MIXED HYPERLIPIDEMIA: ICD-10-CM

## 2018-06-15 DIAGNOSIS — M77.8 TENDONITIS OF SHOULDER, RIGHT: ICD-10-CM

## 2018-06-15 DIAGNOSIS — I10 ESSENTIAL HYPERTENSION: Primary | ICD-10-CM

## 2018-06-15 PROCEDURE — 99213 OFFICE O/P EST LOW 20 MIN: CPT | Performed by: INTERNAL MEDICINE

## 2018-06-15 NOTE — PROGRESS NOTES
Subjective   Dasha De Leon is a 47 y.o. female.     Hypertension   This is a chronic problem. The current episode started more than 1 year ago.        The following portions of the patient's history were reviewed and updated as appropriate: allergies, current medications, past family history, past medical history, past social history, past surgical history and problem list.    Review of Systems   Constitutional:        Here for F/U Generally doing well   HENT: Negative.    Respiratory: Negative.    Cardiovascular:        BP checks have been good   Gastrointestinal: Negative.    Genitourinary: Negative.    Musculoskeletal: Positive for arthralgias (Having Rt shoulder pain for about a month Hurts more W/ weather change).   Neurological: Negative.        Objective   Physical Exam   Constitutional: She appears well-developed.   HENT:   Head: Normocephalic.   Eyes: EOM are normal.   Neck: Neck supple.   Cardiovascular: Normal rate, regular rhythm and normal heart sounds.    Repeat 124/80   Pulmonary/Chest: Effort normal and breath sounds normal.   Musculoskeletal: She exhibits tenderness (Tender to palpation anterior aspect Rt shoulder ).       Assessment/Plan   Dasha was seen today for hypertension and shoulder pain.    Diagnoses and all orders for this visit:    Essential hypertension    Tendonitis of shoulder, right    Mixed hyperlipidemia      Resume Mobic  , heat & exercises

## 2018-07-31 ENCOUNTER — OFFICE VISIT (OUTPATIENT)
Dept: INTERNAL MEDICINE | Facility: CLINIC | Age: 48
End: 2018-07-31

## 2018-07-31 VITALS
BODY MASS INDEX: 27.16 KG/M2 | SYSTOLIC BLOOD PRESSURE: 132 MMHG | WEIGHT: 169 LBS | OXYGEN SATURATION: 98 % | HEIGHT: 66 IN | DIASTOLIC BLOOD PRESSURE: 88 MMHG | HEART RATE: 111 BPM

## 2018-07-31 DIAGNOSIS — M77.8 TENDONITIS OF SHOULDER, RIGHT: Primary | ICD-10-CM

## 2018-07-31 DIAGNOSIS — I10 ESSENTIAL HYPERTENSION: ICD-10-CM

## 2018-07-31 DIAGNOSIS — E78.2 MIXED HYPERLIPIDEMIA: ICD-10-CM

## 2018-07-31 LAB
ALBUMIN SERPL-MCNC: 4.9 G/DL (ref 3.5–5.2)
ALBUMIN/GLOB SERPL: 2 G/DL
ALP SERPL-CCNC: 80 U/L (ref 39–117)
ALT SERPL-CCNC: 22 U/L (ref 1–33)
AST SERPL-CCNC: 24 U/L (ref 1–32)
BASOPHILS # BLD AUTO: 0.05 10*3/MM3 (ref 0–0.2)
BASOPHILS NFR BLD AUTO: 0.6 % (ref 0–2)
BILIRUB SERPL-MCNC: 0.4 MG/DL (ref 0.1–1.2)
BUN SERPL-MCNC: 16 MG/DL (ref 6–20)
BUN/CREAT SERPL: 21.3 (ref 7–25)
CALCIUM SERPL-MCNC: 9.9 MG/DL (ref 8.6–10.5)
CHLORIDE SERPL-SCNC: 99 MMOL/L (ref 98–107)
CHOLEST SERPL-MCNC: 207 MG/DL (ref 0–200)
CO2 SERPL-SCNC: 26.6 MMOL/L (ref 22–29)
CREAT SERPL-MCNC: 0.75 MG/DL (ref 0.57–1)
DEPRECATED RDW RBC AUTO: 41.3 FL (ref 37–54)
EOSINOPHIL # BLD AUTO: 0.28 10*3/MM3 (ref 0–0.7)
EOSINOPHIL NFR BLD AUTO: 3.2 % (ref 0–5)
ERYTHROCYTE [DISTWIDTH] IN BLOOD BY AUTOMATED COUNT: 12.6 % (ref 11.5–15)
GLOBULIN SER CALC-MCNC: 2.4 GM/DL
GLUCOSE SERPL-MCNC: 99 MG/DL (ref 65–99)
HCT VFR BLD AUTO: 40.2 % (ref 34.1–44.9)
HDLC SERPL-MCNC: 58 MG/DL (ref 40–60)
HGB BLD-MCNC: 13.1 G/DL (ref 11.2–15.7)
LDLC SERPL CALC-MCNC: 131 MG/DL (ref 0–100)
LYMPHOCYTES # BLD AUTO: 1.73 10*3/MM3 (ref 0.8–7)
LYMPHOCYTES NFR BLD AUTO: 19.7 % (ref 10–60)
MCH RBC QN AUTO: 29.6 PG (ref 26–34)
MCHC RBC AUTO-ENTMCNC: 32.6 G/DL (ref 31–37)
MCV RBC AUTO: 91 FL (ref 80–100)
MONOCYTES # BLD AUTO: 1.04 10*3/MM3 (ref 0–1)
MONOCYTES NFR BLD AUTO: 11.9 % (ref 0–13)
NEUTROPHILS # BLD AUTO: 5.66 10*3/MM3 (ref 1–11)
NEUTROPHILS NFR BLD AUTO: 64.6 % (ref 30–85)
PLATELET # BLD AUTO: 308 10*3/MM3 (ref 150–450)
PMV BLD AUTO: 9.4 FL (ref 6–12)
POTASSIUM SERPL-SCNC: 4.6 MMOL/L (ref 3.5–5.2)
PROT SERPL-MCNC: 7.3 G/DL (ref 6–8.5)
RBC # BLD AUTO: 4.42 10*6/MM3 (ref 3.93–5.22)
SODIUM SERPL-SCNC: 140 MMOL/L (ref 136–145)
T4 FREE SERPL-MCNC: 0.91 NG/DL (ref 0.93–1.7)
TRIGL SERPL-MCNC: 92 MG/DL (ref 0–150)
TSH SERPL-ACNC: 1.93 MIU/ML (ref 0.27–4.2)
VLDLC SERPL-MCNC: 18.4 MG/DL (ref 5–40)
WBC NRBC COR # BLD: 8.76 10*3/MM3 (ref 5–10)

## 2018-07-31 PROCEDURE — 85025 COMPLETE CBC W/AUTO DIFF WBC: CPT | Performed by: INTERNAL MEDICINE

## 2018-07-31 PROCEDURE — 99213 OFFICE O/P EST LOW 20 MIN: CPT | Performed by: INTERNAL MEDICINE

## 2018-07-31 NOTE — PROGRESS NOTES
Subjective   Dasha De Leon is a 47 y.o. female.     Rt shoulder  pain         The following portions of the patient's history were reviewed and updated as appropriate: allergies, current medications, past family history, past medical history, past social history, past surgical history and problem list.    Review of Systems   Constitutional:        Here for F/U    Musculoskeletal: Positive for arthralgias (Shoulder doinf better on Meloxicam).       Objective   Physical Exam   Constitutional: She is oriented to person, place, and time. She appears well-developed.   HENT:   Head: Normocephalic.   Eyes: EOM are normal.   Neck: Neck supple.   Cardiovascular: Normal rate, regular rhythm and normal heart sounds.    Repeat 130/90   Pulmonary/Chest: Effort normal and breath sounds normal.   Musculoskeletal: Normal range of motion. She exhibits tenderness (Minimal tenderness anterior aspect Rt shoulder Nl ROM).   Neurological: She is alert and oriented to person, place, and time.       Assessment/Plan   Dasha was seen today for shoulder pain.    Diagnoses and all orders for this visit:    Tendonitis of shoulder, right    Essential hypertension  -     CBC Auto Differential; Future  -     Comprehensive Metabolic Panel; Future  -     TSH; Future  -     T4, free; Future  -     CBC Auto Differential  -     Comprehensive Metabolic Panel  -     TSH  -     T4, free    Mixed hyperlipidemia  -     Lipid Panel; Future  -     Lipid Panel

## 2018-10-08 RX ORDER — CYCLOBENZAPRINE HCL 10 MG
10 TABLET ORAL 3 TIMES DAILY PRN
Qty: 90 TABLET | Refills: 1 | Status: SHIPPED | OUTPATIENT
Start: 2018-10-08 | End: 2018-12-21 | Stop reason: SDUPTHER

## 2018-10-17 RX ORDER — OLMESARTAN MEDOXOMIL 20 MG/1
20 TABLET ORAL DAILY
Qty: 90 TABLET | Refills: 1 | Status: SHIPPED | OUTPATIENT
Start: 2018-10-17 | End: 2019-04-01 | Stop reason: SDUPTHER

## 2018-10-17 RX ORDER — HYDROCHLOROTHIAZIDE 25 MG/1
25 TABLET ORAL DAILY
Qty: 90 TABLET | Refills: 1 | Status: SHIPPED | OUTPATIENT
Start: 2018-10-17 | End: 2019-04-01 | Stop reason: SDUPTHER

## 2018-10-17 RX ORDER — ESCITALOPRAM OXALATE 10 MG/1
10 TABLET ORAL DAILY
Qty: 90 TABLET | Refills: 1 | Status: SHIPPED | OUTPATIENT
Start: 2018-10-17 | End: 2018-12-13

## 2018-11-05 ENCOUNTER — OFFICE VISIT (OUTPATIENT)
Dept: INTERNAL MEDICINE | Facility: CLINIC | Age: 48
End: 2018-11-05

## 2018-11-05 VITALS
WEIGHT: 168 LBS | HEART RATE: 100 BPM | DIASTOLIC BLOOD PRESSURE: 84 MMHG | HEIGHT: 66 IN | OXYGEN SATURATION: 99 % | BODY MASS INDEX: 27 KG/M2 | SYSTOLIC BLOOD PRESSURE: 124 MMHG

## 2018-11-05 DIAGNOSIS — E78.2 MIXED HYPERLIPIDEMIA: ICD-10-CM

## 2018-11-05 DIAGNOSIS — I10 ESSENTIAL HYPERTENSION: Primary | ICD-10-CM

## 2018-11-05 DIAGNOSIS — E78.5 HYPERLIPIDEMIA, UNSPECIFIED HYPERLIPIDEMIA TYPE: ICD-10-CM

## 2018-11-05 DIAGNOSIS — M77.8 TENDONITIS OF SHOULDER, RIGHT: ICD-10-CM

## 2018-11-05 DIAGNOSIS — F39 MOOD DISORDER (HCC): ICD-10-CM

## 2018-11-05 PROCEDURE — 99214 OFFICE O/P EST MOD 30 MIN: CPT | Performed by: NURSE PRACTITIONER

## 2018-11-05 RX ORDER — HYDROCODONE BITARTRATE AND ACETAMINOPHEN 5; 325 MG/1; MG/1
1 TABLET ORAL EVERY 8 HOURS PRN
Qty: 12 TABLET | Refills: 0 | Status: SHIPPED | OUTPATIENT
Start: 2018-11-05 | End: 2020-02-28

## 2018-11-05 NOTE — PROGRESS NOTES
Ricardo De Leon is a 47 y.o. female.     She checks her blood pressure intermittently. She is not exercising much but has got recent gym membership.       Hypertension   This is a chronic problem. The current episode started more than 1 year ago. Associated symptoms include anxiety and blurred vision (wears glasses, up to date, feb 2018 ). Pertinent negatives include no chest pain, headaches, orthopnea, palpitations, peripheral edema, PND or shortness of breath.        The following portions of the patient's history were reviewed and updated as appropriate: allergies, current medications, past family history, past medical history, past social history, past surgical history and problem list.    Review of Systems   Constitutional: Positive for appetite change. Negative for fever.   Eyes: Positive for blurred vision (wears glasses, up to date, feb 2018 ) and visual disturbance.   Respiratory: Negative for cough and shortness of breath. Apnea: wears glasses     Cardiovascular: Negative for chest pain, palpitations, orthopnea and PND.   Musculoskeletal: Positive for arthralgias (chronic).   Neurological: Negative for dizziness, speech difficulty, numbness and headaches.       Objective   Physical Exam   Constitutional: She is oriented to person, place, and time. She appears well-developed and well-nourished.   HENT:   Head: Normocephalic.   Nose: Nose normal.   Neck: Carotid bruit is not present. No thyroid mass and no thyromegaly present.   Cardiovascular: Regular rhythm and normal heart sounds.  Exam reveals no S3 and no S4.    No murmur heard.  Repeat bp left arm 124/76  No pedal edema    Pulmonary/Chest: Effort normal and breath sounds normal. She has no decreased breath sounds. She has no wheezes. She has no rhonchi. She has no rales.   Musculoskeletal: She exhibits no edema.   Neurological: She is alert and oriented to person, place, and time. Gait normal.   Skin: Skin is warm and dry.   Psychiatric: She  has a normal mood and affect.       Assessment/Plan   Dasha was seen today for hypertension.    Diagnoses and all orders for this visit:    Essential hypertension  -     Comprehensive Metabolic Panel; Future  -     CBC (No Diff); Future  -     TSH Rfx On Abnormal To Free T4; Future    Mixed hyperlipidemia  -     Lipid Panel; Future    Hyperlipidemia, unspecified hyperlipidemia type    Mood disorder (CMS/HCC)  Comments:  stable     Tendonitis of shoulder, right  -     HYDROcodone-acetaminophen (NORCO) 5-325 MG per tablet; Take 1 tablet by mouth Every 8 (Eight) Hours As Needed for Severe Pain .    ORI query complete. Treatment plan to include limited course of prescribedcontrolled substance. Risks including addiction, benefits, and alternatives presented to patient.   She will return for lab work only.

## 2018-11-16 RX ORDER — AZITHROMYCIN 250 MG/1
TABLET, FILM COATED ORAL
Qty: 6 TABLET | Refills: 2 | Status: SHIPPED | OUTPATIENT
Start: 2018-11-16 | End: 2019-05-29

## 2018-12-13 DIAGNOSIS — F39 MOOD DISORDER (HCC): Primary | ICD-10-CM

## 2018-12-13 RX ORDER — ESCITALOPRAM OXALATE 20 MG/1
20 TABLET ORAL DAILY
Qty: 90 TABLET | Refills: 1 | Status: SHIPPED | OUTPATIENT
Start: 2018-12-13 | End: 2019-04-01 | Stop reason: SDUPTHER

## 2018-12-21 RX ORDER — CYCLOBENZAPRINE HCL 10 MG
10 TABLET ORAL 3 TIMES DAILY PRN
Qty: 90 TABLET | Refills: 2 | Status: SHIPPED | OUTPATIENT
Start: 2018-12-21 | End: 2019-04-01 | Stop reason: SDUPTHER

## 2019-01-03 ENCOUNTER — LAB (OUTPATIENT)
Dept: LAB | Facility: HOSPITAL | Age: 49
End: 2019-01-03

## 2019-01-03 DIAGNOSIS — I10 ESSENTIAL HYPERTENSION: ICD-10-CM

## 2019-01-03 DIAGNOSIS — E78.2 MIXED HYPERLIPIDEMIA: ICD-10-CM

## 2019-01-03 LAB
ALBUMIN SERPL-MCNC: 4.7 G/DL (ref 3.5–5.2)
ALBUMIN/GLOB SERPL: 1.6 G/DL
ALP SERPL-CCNC: 78 U/L (ref 39–117)
ALT SERPL W P-5'-P-CCNC: 22 U/L (ref 1–33)
ANION GAP SERPL CALCULATED.3IONS-SCNC: 11.5 MMOL/L
AST SERPL-CCNC: 24 U/L (ref 1–32)
BILIRUB SERPL-MCNC: 0.3 MG/DL (ref 0.1–1.2)
BUN BLD-MCNC: 13 MG/DL (ref 6–20)
BUN/CREAT SERPL: 16.5 (ref 7–25)
CALCIUM SPEC-SCNC: 9.8 MG/DL (ref 8.6–10.5)
CHLORIDE SERPL-SCNC: 101 MMOL/L (ref 98–107)
CHOLEST SERPL-MCNC: 215 MG/DL (ref 0–200)
CO2 SERPL-SCNC: 28.5 MMOL/L (ref 22–29)
CREAT BLD-MCNC: 0.79 MG/DL (ref 0.57–1)
DEPRECATED RDW RBC AUTO: 42.3 FL (ref 37–54)
ERYTHROCYTE [DISTWIDTH] IN BLOOD BY AUTOMATED COUNT: 12.9 % (ref 11.7–13)
GFR SERPL CREATININE-BSD FRML MDRD: 78 ML/MIN/1.73
GLOBULIN UR ELPH-MCNC: 2.9 GM/DL
GLUCOSE BLD-MCNC: 105 MG/DL (ref 65–99)
HCT VFR BLD AUTO: 40.3 % (ref 35.6–45.5)
HDLC SERPL-MCNC: 58 MG/DL (ref 40–60)
HGB BLD-MCNC: 13.1 G/DL (ref 11.9–15.5)
LDLC SERPL CALC-MCNC: 143 MG/DL (ref 0–100)
LDLC/HDLC SERPL: 2.46 {RATIO}
MCH RBC QN AUTO: 29.2 PG (ref 26.9–32)
MCHC RBC AUTO-ENTMCNC: 32.5 G/DL (ref 32.4–36.3)
MCV RBC AUTO: 89.8 FL (ref 80.5–98.2)
PLATELET # BLD AUTO: 328 10*3/MM3 (ref 140–500)
PMV BLD AUTO: 9.9 FL (ref 6–12)
POTASSIUM BLD-SCNC: 3.7 MMOL/L (ref 3.5–5.2)
PROT SERPL-MCNC: 7.6 G/DL (ref 6–8.5)
RBC # BLD AUTO: 4.49 10*6/MM3 (ref 3.9–5.2)
SODIUM BLD-SCNC: 141 MMOL/L (ref 136–145)
TRIGL SERPL-MCNC: 71 MG/DL (ref 0–150)
TSH SERPL DL<=0.05 MIU/L-ACNC: 1.21 MIU/ML (ref 0.27–4.2)
VLDLC SERPL-MCNC: 14.2 MG/DL (ref 5–40)
WBC NRBC COR # BLD: 7.86 10*3/MM3 (ref 4.5–10.7)

## 2019-01-03 PROCEDURE — 80061 LIPID PANEL: CPT

## 2019-01-03 PROCEDURE — 84443 ASSAY THYROID STIM HORMONE: CPT

## 2019-01-03 PROCEDURE — 36415 COLL VENOUS BLD VENIPUNCTURE: CPT

## 2019-01-03 PROCEDURE — 80053 COMPREHEN METABOLIC PANEL: CPT

## 2019-01-03 PROCEDURE — 85027 COMPLETE CBC AUTOMATED: CPT

## 2019-01-13 DIAGNOSIS — E78.5 HYPERLIPIDEMIA, UNSPECIFIED HYPERLIPIDEMIA TYPE: ICD-10-CM

## 2019-01-14 DIAGNOSIS — M76.899 TENDONITIS OF KNEE: ICD-10-CM

## 2019-01-14 RX ORDER — ATORVASTATIN CALCIUM 20 MG/1
TABLET, FILM COATED ORAL
Qty: 90 TABLET | Refills: 1 | Status: SHIPPED | OUTPATIENT
Start: 2019-01-14 | End: 2019-07-17 | Stop reason: SDUPTHER

## 2019-01-14 RX ORDER — MELOXICAM 15 MG/1
15 TABLET ORAL DAILY
Qty: 90 TABLET | Refills: 1 | Status: SHIPPED | OUTPATIENT
Start: 2019-01-14 | End: 2019-04-01 | Stop reason: SDUPTHER

## 2019-01-14 RX ORDER — LEVOCETIRIZINE DIHYDROCHLORIDE 5 MG/1
TABLET, FILM COATED ORAL
Qty: 90 TABLET | Refills: 1 | Status: SHIPPED | OUTPATIENT
Start: 2019-01-14 | End: 2019-04-01 | Stop reason: SDUPTHER

## 2019-04-01 DIAGNOSIS — F39 MOOD DISORDER (HCC): ICD-10-CM

## 2019-04-01 DIAGNOSIS — M76.899 TENDONITIS OF KNEE: ICD-10-CM

## 2019-04-01 RX ORDER — OLMESARTAN MEDOXOMIL 20 MG/1
20 TABLET ORAL DAILY
Qty: 90 TABLET | Refills: 1 | Status: SHIPPED | OUTPATIENT
Start: 2019-04-01 | End: 2019-07-10 | Stop reason: SDUPTHER

## 2019-04-01 RX ORDER — LEVOCETIRIZINE DIHYDROCHLORIDE 5 MG/1
5 TABLET, FILM COATED ORAL DAILY
Qty: 90 TABLET | Refills: 1 | Status: SHIPPED | OUTPATIENT
Start: 2019-04-01 | End: 2019-10-29 | Stop reason: SDUPTHER

## 2019-04-01 RX ORDER — MELOXICAM 15 MG/1
15 TABLET ORAL DAILY
Qty: 90 TABLET | Refills: 1 | Status: SHIPPED | OUTPATIENT
Start: 2019-04-01 | End: 2019-10-29 | Stop reason: SDUPTHER

## 2019-04-01 RX ORDER — ESCITALOPRAM OXALATE 20 MG/1
20 TABLET ORAL DAILY
Qty: 90 TABLET | Refills: 1 | Status: SHIPPED | OUTPATIENT
Start: 2019-04-01 | End: 2019-07-10 | Stop reason: SDUPTHER

## 2019-04-01 RX ORDER — CYCLOBENZAPRINE HCL 10 MG
10 TABLET ORAL 3 TIMES DAILY PRN
Qty: 90 TABLET | Refills: 2 | Status: SHIPPED | OUTPATIENT
Start: 2019-04-01 | End: 2020-06-04 | Stop reason: SDUPTHER

## 2019-04-01 RX ORDER — HYDROCHLOROTHIAZIDE 25 MG/1
25 TABLET ORAL DAILY
Qty: 90 TABLET | Refills: 1 | Status: SHIPPED | OUTPATIENT
Start: 2019-04-01 | End: 2019-07-10 | Stop reason: SDUPTHER

## 2019-05-29 ENCOUNTER — OFFICE VISIT (OUTPATIENT)
Dept: INTERNAL MEDICINE | Facility: CLINIC | Age: 49
End: 2019-05-29

## 2019-05-29 VITALS
HEIGHT: 66 IN | BODY MASS INDEX: 28.93 KG/M2 | WEIGHT: 180 LBS | OXYGEN SATURATION: 99 % | SYSTOLIC BLOOD PRESSURE: 140 MMHG | DIASTOLIC BLOOD PRESSURE: 90 MMHG | HEART RATE: 82 BPM

## 2019-05-29 DIAGNOSIS — E78.5 HYPERLIPIDEMIA, UNSPECIFIED HYPERLIPIDEMIA TYPE: ICD-10-CM

## 2019-05-29 DIAGNOSIS — F39 MOOD DISORDER (HCC): ICD-10-CM

## 2019-05-29 DIAGNOSIS — I10 ESSENTIAL HYPERTENSION: Primary | ICD-10-CM

## 2019-05-29 DIAGNOSIS — M25.561 ACUTE PAIN OF RIGHT KNEE: ICD-10-CM

## 2019-05-29 PROCEDURE — 99214 OFFICE O/P EST MOD 30 MIN: CPT | Performed by: NURSE PRACTITIONER

## 2019-05-29 NOTE — PROGRESS NOTES
Subjective   Dasha De Leon is a 48 y.o. female.     She was helping her mother with house work and aggravated her right knee about one month ago. She then laid some dale and it seemed to aggravated. She monitors blood pressure periodically. She is not exercising much. She has a lot of stressors with new job location and responsibilities.       Hypertension   This is a chronic problem. The current episode started more than 1 year ago. Pertinent negatives include no anxiety, blurred vision, chest pain, headaches, neck pain, orthopnea, palpitations, peripheral edema, PND or shortness of breath. Current antihypertension treatment includes diuretics and angiotensin blockers. The current treatment provides significant improvement.   Knee Pain    The incident occurred more than 1 week ago. There was no injury mechanism. The pain is present in the right knee. Quality: intermittent sharp with extension knee  The pain is at a severity of 4/10 (worst 6/10). The pain is mild. The pain has been fluctuating since onset. Pertinent negatives include no inability to bear weight, loss of motion, numbness or tingling. Associated symptoms comments: Swelling . Treatments tried: meloxicam, ice, rest, elevation  The treatment provided moderate relief.        The following portions of the patient's history were reviewed and updated as appropriate: allergies, current medications, past family history, past medical history, past social history, past surgical history and problem list.    Review of Systems   Constitutional: Negative for activity change, appetite change, fatigue and fever.   Eyes: Negative for blurred vision.   Respiratory: Negative for cough, shortness of breath and wheezing.    Cardiovascular: Negative for chest pain, palpitations, orthopnea, leg swelling and PND.   Musculoskeletal: Positive for arthralgias (right knee ) and joint swelling (right knee ). Negative for neck pain.   Neurological: Negative for tingling, numbness  and headaches.   Psychiatric/Behavioral: The patient is nervous/anxious (increased work stressors).        Objective   Physical Exam   Constitutional: She is oriented to person, place, and time. She appears well-developed and well-nourished.   HENT:   Head: Normocephalic.   Nose: Nose normal.   Neck: Carotid bruit is not present. No thyroid mass and no thyromegaly present.   Cardiovascular: Regular rhythm and normal heart sounds. Exam reveals no S3 and no S4.   No murmur heard.  Repeat bp left arm 112/68  No pedal edema    Pulmonary/Chest: Effort normal and breath sounds normal. She has no decreased breath sounds. She has no wheezes. She has no rhonchi. She has no rales.   Musculoskeletal: She exhibits no edema.        Right knee: She exhibits normal range of motion, no swelling, no ecchymosis and no erythema. Tenderness found. Medial joint line tenderness noted. No lateral joint line tenderness noted.        Left knee: She exhibits normal range of motion, no swelling and no effusion. No tenderness found. No medial joint line and no lateral joint line tenderness noted.   Neurological: She is alert and oriented to person, place, and time. Gait normal.   Skin: Skin is warm and dry.   Psychiatric: She has a normal mood and affect. Her speech is normal and behavior is normal. Judgment and thought content normal. Cognition and memory are normal.       Assessment/Plan   Dasha was seen today for hypertension and knee pain.    Diagnoses and all orders for this visit:    Essential hypertension  Comments:  controlled   Orders:  -     Comprehensive Metabolic Panel; Future  -     TSH Rfx On Abnormal To Free T4; Future  -     CBC No Differential; Future    Hyperlipidemia, unspecified hyperlipidemia type  Comments:  stable; tolerating statin therapy  Orders:  -     Lipid Panel; Future    Mood disorder (CMS/[facility])  Comments:  stable with lexapro     Acute pain of right knee  Comments:  will refer to ortho; ? steroid injection; use  ice intermittently; apply knee brace  Orders:  -     Ambulatory Referral to Orthopedic Surgery    We discussed need for weight loss. She will get labs at hospital.

## 2019-06-14 ENCOUNTER — LAB (OUTPATIENT)
Dept: LAB | Facility: HOSPITAL | Age: 49
End: 2019-06-14

## 2019-06-14 DIAGNOSIS — I10 ESSENTIAL HYPERTENSION: ICD-10-CM

## 2019-06-14 DIAGNOSIS — E78.5 HYPERLIPIDEMIA, UNSPECIFIED HYPERLIPIDEMIA TYPE: ICD-10-CM

## 2019-06-14 LAB
ALBUMIN SERPL-MCNC: 4.5 G/DL (ref 3.5–5.2)
ALBUMIN/GLOB SERPL: 1.7 G/DL
ALP SERPL-CCNC: 66 U/L (ref 39–117)
ALT SERPL W P-5'-P-CCNC: 29 U/L (ref 1–33)
ANION GAP SERPL CALCULATED.3IONS-SCNC: 12.7 MMOL/L
AST SERPL-CCNC: 26 U/L (ref 1–32)
BILIRUB SERPL-MCNC: 0.3 MG/DL (ref 0.2–1.2)
BUN BLD-MCNC: 16 MG/DL (ref 6–20)
BUN/CREAT SERPL: 22.5 (ref 7–25)
CALCIUM SPEC-SCNC: 9.7 MG/DL (ref 8.6–10.5)
CHLORIDE SERPL-SCNC: 98 MMOL/L (ref 98–107)
CHOLEST SERPL-MCNC: 193 MG/DL (ref 0–200)
CO2 SERPL-SCNC: 28.3 MMOL/L (ref 22–29)
CREAT BLD-MCNC: 0.71 MG/DL (ref 0.57–1)
DEPRECATED RDW RBC AUTO: 42.9 FL (ref 37–54)
ERYTHROCYTE [DISTWIDTH] IN BLOOD BY AUTOMATED COUNT: 12.7 % (ref 12.3–15.4)
GFR SERPL CREATININE-BSD FRML MDRD: 88 ML/MIN/1.73
GLOBULIN UR ELPH-MCNC: 2.7 GM/DL
GLUCOSE BLD-MCNC: 106 MG/DL (ref 65–99)
HCT VFR BLD AUTO: 40.6 % (ref 34–46.6)
HDLC SERPL-MCNC: 49 MG/DL (ref 40–60)
HGB BLD-MCNC: 12.9 G/DL (ref 12–15.9)
LDLC SERPL CALC-MCNC: 128 MG/DL (ref 0–100)
LDLC/HDLC SERPL: 2.61 {RATIO}
MCH RBC QN AUTO: 29.3 PG (ref 26.6–33)
MCHC RBC AUTO-ENTMCNC: 31.8 G/DL (ref 31.5–35.7)
MCV RBC AUTO: 92.3 FL (ref 79–97)
PLATELET # BLD AUTO: 306 10*3/MM3 (ref 140–450)
PMV BLD AUTO: 10.1 FL (ref 6–12)
POTASSIUM BLD-SCNC: 4.1 MMOL/L (ref 3.5–5.2)
PROT SERPL-MCNC: 7.2 G/DL (ref 6–8.5)
RBC # BLD AUTO: 4.4 10*6/MM3 (ref 3.77–5.28)
SODIUM BLD-SCNC: 139 MMOL/L (ref 136–145)
TRIGL SERPL-MCNC: 81 MG/DL (ref 0–150)
TSH SERPL DL<=0.05 MIU/L-ACNC: 1.8 MIU/ML (ref 0.27–4.2)
VLDLC SERPL-MCNC: 16.2 MG/DL (ref 5–40)
WBC NRBC COR # BLD: 8.51 10*3/MM3 (ref 3.4–10.8)

## 2019-06-14 PROCEDURE — 80061 LIPID PANEL: CPT

## 2019-06-14 PROCEDURE — 84443 ASSAY THYROID STIM HORMONE: CPT

## 2019-06-14 PROCEDURE — 80053 COMPREHEN METABOLIC PANEL: CPT

## 2019-06-14 PROCEDURE — 85027 COMPLETE CBC AUTOMATED: CPT

## 2019-06-14 PROCEDURE — 36415 COLL VENOUS BLD VENIPUNCTURE: CPT

## 2019-07-10 ENCOUNTER — OFFICE VISIT (OUTPATIENT)
Dept: ORTHOPEDIC SURGERY | Facility: CLINIC | Age: 49
End: 2019-07-10

## 2019-07-10 VITALS — HEIGHT: 66 IN | BODY MASS INDEX: 28.93 KG/M2 | WEIGHT: 180 LBS | TEMPERATURE: 98.1 F

## 2019-07-10 DIAGNOSIS — M25.561 ACUTE PAIN OF RIGHT KNEE: Primary | ICD-10-CM

## 2019-07-10 DIAGNOSIS — F39 MOOD DISORDER (HCC): ICD-10-CM

## 2019-07-10 PROCEDURE — 99243 OFF/OP CNSLTJ NEW/EST LOW 30: CPT | Performed by: ORTHOPAEDIC SURGERY

## 2019-07-10 PROCEDURE — 73562 X-RAY EXAM OF KNEE 3: CPT | Performed by: ORTHOPAEDIC SURGERY

## 2019-07-10 PROCEDURE — 20610 DRAIN/INJ JOINT/BURSA W/O US: CPT | Performed by: ORTHOPAEDIC SURGERY

## 2019-07-10 RX ORDER — ESCITALOPRAM OXALATE 20 MG/1
20 TABLET ORAL DAILY
Qty: 90 TABLET | Refills: 1 | Status: SHIPPED | OUTPATIENT
Start: 2019-07-10 | End: 2020-01-08 | Stop reason: SDUPTHER

## 2019-07-10 RX ORDER — METHYLPREDNISOLONE ACETATE 80 MG/ML
80 INJECTION, SUSPENSION INTRA-ARTICULAR; INTRALESIONAL; INTRAMUSCULAR; SOFT TISSUE
Status: COMPLETED | OUTPATIENT
Start: 2019-07-10 | End: 2019-07-10

## 2019-07-10 RX ORDER — HYDROCHLOROTHIAZIDE 25 MG/1
25 TABLET ORAL DAILY
Qty: 90 TABLET | Refills: 1 | Status: SHIPPED | OUTPATIENT
Start: 2019-07-10 | End: 2020-01-08 | Stop reason: SDUPTHER

## 2019-07-10 RX ORDER — OLMESARTAN MEDOXOMIL 40 MG/1
20 TABLET ORAL DAILY
Qty: 45 TABLET | Refills: 1 | Status: SHIPPED | OUTPATIENT
Start: 2019-07-10 | End: 2020-01-08 | Stop reason: SDUPTHER

## 2019-07-10 RX ADMIN — METHYLPREDNISOLONE ACETATE 80 MG: 80 INJECTION, SUSPENSION INTRA-ARTICULAR; INTRALESIONAL; INTRAMUSCULAR; SOFT TISSUE at 15:45

## 2019-07-10 NOTE — PROGRESS NOTES
"Answers for HPI/ROS submitted by the patient on 7/10/2019   Lower extremity pain  Incident occurred: more than 1 week ago  Incident location: at home  Injury mechanism: unknown  Pain location: right knee  Pain quality: aching, burning, cramping  Pain - numeric: 4/10  Pain course: fluctuating  tingling: Yes  inability to bear weight: Yes  loss of motion: Yes  loss of sensation: No  muscle weakness: Yes  Foreign body present: no foreign bodies  Aggravated by: weight bearing  Patient Name: Dasha De Leon   YOB: 1970  Referring Primary Care Physician: Jamar Kramer MD  BMI: Body mass index is 29.07 kg/m².    Chief Complaint:    Chief Complaint   Patient presents with   • Right Knee - Establish Care, Pain        HPI:     Dasha De Leon is a 48 y.o. female who presents today for evaluation of   Chief Complaint   Patient presents with   • Right Knee - Establish Care, Pain   .  Patient is seen today complaining of right knee pain.  She works in [employer] office.  They were in transition moving to [facility] and she had 3 weeks off when she helped her mother do \"spring cleaning things washing baseboards etc. she developed stiffness burning aching wrapping in her right knee.  Also felt stiff bothers her with prolonged weightbearing got better with rest as it pops as well she is been taken some Mobic    This problem is new to this examiner.     Subjective   Medications:   Home Medications:  Current Outpatient Medications on File Prior to Visit   Medication Sig   • ALLERGY SERUM INJECTION Inject  under the skin 1 (One) Time.   • atorvastatin (LIPITOR) 20 MG tablet TAKE 1 TABLET BY MOUTH ONE TIME A DAY    • cyclobenzaprine (FLEXERIL) 10 MG tablet Take 1 tablet by mouth 3 (Three) Times a Day As Needed (1 TABLET TID PRN).   • HYDROcodone-acetaminophen (NORCO) 5-325 MG per tablet Take 1 tablet by mouth Every 8 (Eight) Hours As Needed for Severe Pain .   • levocetirizine (XYZAL) 5 MG tablet Take 1 tablet by mouth " Daily.   • levonorgestrel (MIRENA, 52 MG,) 20 MCG/24HR IUD 1 each by Intrauterine route 1 (one) time.   • meloxicam (MOBIC) 15 MG tablet Take 1 tablet by mouth Daily.   • triamcinolone (KENALOG) 0.1 % cream Apply a thin film to dry area on scalp once or twice daily as needed.   • [DISCONTINUED] escitalopram (LEXAPRO) 20 MG tablet Take 1 tablet by mouth Daily.   • [DISCONTINUED] hydrochlorothiazide (HYDRODIURIL) 25 MG tablet Take 1 tablet by mouth Daily.   • [DISCONTINUED] olmesartan (BENICAR) 20 MG tablet Take 1 tablet by mouth Daily.     No current facility-administered medications on file prior to visit.      Current Medications:  Scheduled Meds:  Continuous Infusions:  No current facility-administered medications for this visit.   PRN Meds:.    I have reviewed the patient's medical history in detail and updated the computerized patient record.  Review and summarization of old records includes:    Past Medical History:   Diagnosis Date   • Abnormal Pap smear of cervix    • Bursitis    • CTS (carpal tunnel syndrome)     currently resolved   • H/O colposcopy with cervical biopsy     unknown pap result, biopsy was neg per pt, 2013 [facility]   • HPV (human papilloma virus) infection    • Hyperlipemia    • Hypertension    • Osteoarthritis    • Scoliosis    • Seasonal allergies         Past Surgical History:   Procedure Laterality Date   • AUGMENTATION MAMMAPLASTY     • BREAST AUGMENTATION     • CARPAL TUNNEL RELEASE     • CRYOTHERAPY         • HAND SURGERY      Pisiformectomy   • MAMMO BILATERAL     • PAP SMEAR     • SHOULDER ARTHROSCOPY  ,   • SHOULDER SURGERY     • WRIST SURGERY          Social History     Occupational History   • Occupation: MA   Tobacco Use   • Smoking status: Former Smoker     Packs/day: 1.00     Years: 20.00     Pack years: 20.00     Types: Cigarettes     Last attempt to quit: 2006     Years since quittin.5   • Smokeless tobacco: Never Used   Substance and  Sexual Activity   • Alcohol use: Yes     Alcohol/week: 4.2 oz     Types: 7 Glasses of wine per week     Comment: OCC   • Drug use: No   • Sexual activity: Not Currently     Birth control/protection: IUD    Social History     Social History Narrative   • Not on file        Family History   Problem Relation Age of Onset   • Hypertension Mother    • Hyperlipidemia Mother    • Diverticulitis Mother    • Pancreatitis Mother    • Kidney disease Father    • Skin cancer Father    • Hypertension Father    • Hyperlipidemia Father    • Stroke Father    • Atrial fibrillation Father    • Depression Brother    • Liver disease Maternal Grandmother    • Heart disease Maternal Grandfather    • Cancer Maternal Grandfather    • Heart attack Paternal Grandmother    • No Known Problems Paternal Grandfather    • Lung cancer Maternal Uncle    • Heart attack Maternal Uncle    • Uterine cancer Maternal Aunt 58   • Heart disease Maternal Aunt    • Colon cancer Paternal Uncle 65   • No Known Problems Sister    • No Known Problems Daughter    • No Known Problems Son    • No Known Problems Paternal Aunt    • BRCA 1/2 Neg Hx    • Breast cancer Neg Hx    • Endometrial cancer Neg Hx    • Ovarian cancer Neg Hx        ROS: 14 point review of systems was performed and all other systems were reviewed and are negative except for documented findings in HPI and today's encounter.     Allergies:   Allergies   Allergen Reactions   • Penicillins      Constitutional:  Denies fever, shaking or chills   Eyes:  Denies change in visual acuity   HENT:  Denies nasal congestion or sore throat   Respiratory:  Denies cough or shortness of breath   Cardiovascular:  Denies chest pain or severe LE edema   GI:  Denies abdominal pain, nausea, vomiting, bloody stools or diarrhea   Musculoskeletal:  Numbness, tingling, pain, or loss of motor function only as noted above in history of present illness.  : Denies painful urination or hematuria  Integument:  Denies rash,  "lesion or ulceration   Neurologic:  Denies headache or focal weakness  Endocrine:  Denies lymphadenopathy  Psych:  Denies confusion or change in mental status   Hem:  Denies active bleeding    OBJECTIVE:  Physical Exam:   Temp 98.1 °F (36.7 °C) (Temporal)   Ht 167.6 cm (65.98\")   Wt 81.6 kg (180 lb)   BMI 29.07 kg/m²     General Appearance:    Alert, cooperative, in no acute distress                  Eyes: conjunctiva clear  ENT: external ears and nose atraumatic  CV: no peripheral edema  Resp: normal respiratory effort  Skin: no rashes or wounds; normal turgor  Psych: mood and affect appropriate  Lymph: no nodes appreciated  Neuro: gross sensation intact  Vascular:  Palpable peripheral pulse in noted extremity  Musculoskeletal Extremities: Knee has medial joint line tenderness Sheba's is negative today and she has good stability she does have some synovitis and Baker's cyst is noted not give a history of locking    Radiology:     AP lateral 40 degree PA right knee taken the office today shows evidence of moderate arthritic change.  There are no comparison views available  Assessment:     ICD-10-CM ICD-9-CM   1. Acute pain of right knee M25.561 719.46        Large Joint Arthrocentesis: R knee  Date/Time: 7/10/2019 3:45 PM  Consent given by: patient  Site marked: site marked  Timeout: Immediately prior to procedure a time out was called to verify the correct patient, procedure, equipment, support staff and site/side marked as required   Supporting Documentation  Indications: pain and joint swelling   Procedure Details  Location: knee - R knee  Preparation: Patient was prepped and draped in the usual sterile fashion  Needle size: 22 G  Approach: anterolateral  Medications administered: 80 mg methylPREDNISolone acetate 80 MG/ML; 4 mL lidocaine (cardiac)  Patient tolerance: patient tolerated the procedure well with no immediate complications             Plan: Biomechanics of pertinent body area discussed.  Risks, " benefits, alternatives, comparisons, and complications of accepted medicines, injections, recommendations, surgical procedures, and therapies explained and education provided in laymen's terms. Natural history and expected course of this patient's diagnosis discussed along with evaluation of therapies. Questions answered. When appropriate I also discussed proper use of cane, walker, trekking poles.   BMI:  The concept of BMI body mass index and its importance and implications discussed.  BMI suggested to be < 40 or as low as possible. Lifestyle measures for weight loss and how this affects orthopedic condition.  EXERCISES:  Advice on benefits of, and types of regular/moderate exercise including biomechanical forces involved as it pertains to this complaint.  MEDICATIONS:  Prescription, OTC and Monitoring of Medications per orders to address ortho complaints; Evaluation and discussion of safety, precautions, side effects, and warnings given especially of long term NSAID or steroid therapy.    RICE: Rest, ice, compression, and elevation therapy, Cryotherapy/brachy therapy, and or OTC linaments as indicated with instructions.   Cortisone Injection. See procedure note.ahead and inject her knee and see if this gets a better by treating the inflammation symptoms.  She is Narciso taken some Mobic and she can do icing etc.  If she does not get better she should call to get an MRI to make sure she does not have underlying meniscal pathology.  I could not clearly demonstrated on exam today however with the way her plain x-ray looks and the symptoms she is having this may be very well with causing it.      7/10/2019    Much of this encounter note is an electronic transcription/translation of spoken language to printed text. The electronic translation of spoken language may permit erroneous, or at times, nonsensical words or phrases to be inadvertently transcribed; Although I have reviewed the note for such errors, some may still  exist

## 2019-07-17 DIAGNOSIS — E78.5 HYPERLIPIDEMIA, UNSPECIFIED HYPERLIPIDEMIA TYPE: ICD-10-CM

## 2019-07-17 RX ORDER — ATORVASTATIN CALCIUM 20 MG/1
20 TABLET, FILM COATED ORAL DAILY
Qty: 90 TABLET | Refills: 1 | Status: SHIPPED | OUTPATIENT
Start: 2019-07-17 | End: 2020-01-17 | Stop reason: SDUPTHER

## 2019-08-01 ENCOUNTER — APPOINTMENT (OUTPATIENT)
Dept: WOMENS IMAGING | Facility: HOSPITAL | Age: 49
End: 2019-08-01

## 2019-08-01 ENCOUNTER — OFFICE VISIT (OUTPATIENT)
Dept: OBSTETRICS AND GYNECOLOGY | Age: 49
End: 2019-08-01

## 2019-08-01 ENCOUNTER — PROCEDURE VISIT (OUTPATIENT)
Dept: OBSTETRICS AND GYNECOLOGY | Age: 49
End: 2019-08-01

## 2019-08-01 VITALS
HEIGHT: 66 IN | WEIGHT: 174 LBS | BODY MASS INDEX: 27.97 KG/M2 | SYSTOLIC BLOOD PRESSURE: 122 MMHG | DIASTOLIC BLOOD PRESSURE: 80 MMHG

## 2019-08-01 DIAGNOSIS — Z01.419 ENCOUNTER FOR GYNECOLOGICAL EXAMINATION WITHOUT ABNORMAL FINDING: Primary | ICD-10-CM

## 2019-08-01 DIAGNOSIS — Z11.51 SPECIAL SCREENING EXAMINATION FOR HUMAN PAPILLOMAVIRUS (HPV): ICD-10-CM

## 2019-08-01 DIAGNOSIS — Z12.31 VISIT FOR SCREENING MAMMOGRAM: Primary | ICD-10-CM

## 2019-08-01 DIAGNOSIS — Z12.4 ROUTINE CERVICAL SMEAR: ICD-10-CM

## 2019-08-01 PROCEDURE — 77067 SCR MAMMO BI INCL CAD: CPT | Performed by: RADIOLOGY

## 2019-08-01 PROCEDURE — 77067 SCR MAMMO BI INCL CAD: CPT | Performed by: OBSTETRICS & GYNECOLOGY

## 2019-08-01 PROCEDURE — 99396 PREV VISIT EST AGE 40-64: CPT | Performed by: OBSTETRICS & GYNECOLOGY

## 2019-08-01 NOTE — PROGRESS NOTES
"Subjective     Chief Complaint   Patient presents with   • Gynecologic Exam     AC       History of Present Illness    Dasha De Leon is a 48 y.o.  who presents for annual exam.  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  She has had an IUD for many years.  She would like to keep it.  She has rare bleeding.  Her menses are rare, lasting 0-3 days, dysmenorrhea none   Obstetric History:  OB History      Para Term  AB Living    0 0 0 0 0 0    SAB TAB Ectopic Molar Multiple Live Births    0 0 0   0           Menstrual History:     No LMP recorded. Patient has had an implant.         Current contraception: IUD 2012  History of abnormal Pap smear: Cryotherapy   Received Gardasil immunization: no  Perform regular self breast exam: yes  Family history of uterine or ovarian cancer: mat aunt 58   Family History of colon cancer: yes - pat uncle 60   Family history of breast cancer: no    Mammogram: done today.  Colonoscopy: recommended.  DEXA: not indicated.    Exercise: not active bakers cyst on knee  Calcium/Vitamin D: adequate intake    The following portions of the patient's history were reviewed and updated as appropriate: allergies, current medications, past family history, past medical history, past social history, past surgical history and problem list.    Review of Systems    Review of Systems   Constitutional: Negative for fatigue.   Respiratory: Negative for shortness of breath.    Gastrointestinal: Negative for abdominal pain.   Genitourinary: Negative for dysuria.   Neurological: Negative for headaches.   Psychiatric/Behavioral: Negative for dysphoric mood.         Objective   Physical Exam    /80   Ht 167.6 cm (66\")   Wt 78.9 kg (174 lb)   BMI 28.08 kg/m²     General:   alert, appears stated age and cooperative   Neck: no adenopathy and thyroid normal to palpation   Heart: regular rate and rhythm, S1, S2 normal, no murmur, click, rub or gallop   Lungs: " clear to auscultation bilaterally   Abdomen: soft, non-tender, without masses or organomegaly   Breast: inspection negative, no nipple discharge or bleeding, no masses or nodularity palpable   Vulva: normal   Vagina: normal mucosa, normal discharge   Cervix: no cervical motion tenderness and no lesions   Uterus: mobile, non-tender, normal shape and consistency   Adnexa: no mass, fullness, tenderness   Rectal: normal rectal, no masses     Assessment/Plan   Dasha was seen today for gynecologic exam.    Diagnoses and all orders for this visit:    Encounter for gynecological examination without abnormal finding    Other orders  -     Ambulatory Referral For Screening Colonoscopy        All questions answered.  Breast self exam technique reviewed and patient encouraged to perform self-exam monthly.  Discussed healthy lifestyle modifications.  Recommended 30 minutes of aerobic exercise five times per week.  Discussed calcium needs to prevent osteoporosis.

## 2019-08-06 ENCOUNTER — TELEPHONE (OUTPATIENT)
Dept: OBSTETRICS AND GYNECOLOGY | Age: 49
End: 2019-08-06

## 2019-08-06 LAB
CYTOLOGIST CVX/VAG CYTO: NORMAL
CYTOLOGY CVX/VAG DOC CYTO: NORMAL
CYTOLOGY CVX/VAG DOC THIN PREP: NORMAL
DX ICD CODE: NORMAL
HIV 1 & 2 AB SER-IMP: NORMAL
HPV I/H RISK 4 DNA CVX QL PROBE+SIG AMP: NEGATIVE
OTHER STN SPEC: NORMAL
STAT OF ADQ CVX/VAG CYTO-IMP: NORMAL

## 2019-08-06 NOTE — TELEPHONE ENCOUNTER
----- Message from Darrick Dowling MD sent at 8/6/2019  8:47 AM EDT -----  Please notify pap is normal.

## 2019-10-29 ENCOUNTER — OFFICE VISIT (OUTPATIENT)
Dept: INTERNAL MEDICINE | Facility: CLINIC | Age: 49
End: 2019-10-29

## 2019-10-29 VITALS
DIASTOLIC BLOOD PRESSURE: 86 MMHG | WEIGHT: 178 LBS | HEIGHT: 66 IN | BODY MASS INDEX: 28.61 KG/M2 | SYSTOLIC BLOOD PRESSURE: 118 MMHG

## 2019-10-29 DIAGNOSIS — E78.5 HYPERLIPIDEMIA, UNSPECIFIED HYPERLIPIDEMIA TYPE: ICD-10-CM

## 2019-10-29 DIAGNOSIS — M76.899 TENDONITIS OF KNEE: ICD-10-CM

## 2019-10-29 DIAGNOSIS — R73.01 ELEVATED FASTING GLUCOSE: ICD-10-CM

## 2019-10-29 DIAGNOSIS — J30.9 ALLERGIC RHINITIS, UNSPECIFIED SEASONALITY, UNSPECIFIED TRIGGER: ICD-10-CM

## 2019-10-29 DIAGNOSIS — I10 ESSENTIAL HYPERTENSION: Primary | ICD-10-CM

## 2019-10-29 LAB — HBA1C MFR BLD: 5.9 % (ref 4.8–5.6)

## 2019-10-29 PROCEDURE — 99213 OFFICE O/P EST LOW 20 MIN: CPT | Performed by: NURSE PRACTITIONER

## 2019-10-29 RX ORDER — MELOXICAM 15 MG/1
15 TABLET ORAL DAILY
Qty: 90 TABLET | Refills: 1 | Status: SHIPPED | OUTPATIENT
Start: 2019-10-29 | End: 2020-06-04 | Stop reason: SDUPTHER

## 2019-10-29 RX ORDER — LEVOCETIRIZINE DIHYDROCHLORIDE 5 MG/1
5 TABLET, FILM COATED ORAL DAILY
Qty: 90 TABLET | Refills: 1 | Status: SHIPPED | OUTPATIENT
Start: 2019-10-29 | End: 2019-10-29 | Stop reason: SDUPTHER

## 2019-10-29 RX ORDER — MELOXICAM 15 MG/1
15 TABLET ORAL DAILY
Qty: 90 TABLET | Refills: 1 | Status: SHIPPED | OUTPATIENT
Start: 2019-10-29 | End: 2019-10-29 | Stop reason: SDUPTHER

## 2019-10-29 RX ORDER — LEVOCETIRIZINE DIHYDROCHLORIDE 5 MG/1
5 TABLET, FILM COATED ORAL DAILY
Qty: 90 TABLET | Refills: 1 | Status: SHIPPED | OUTPATIENT
Start: 2019-10-29 | End: 2020-06-04 | Stop reason: SDUPTHER

## 2019-10-29 NOTE — PROGRESS NOTES
Subjective   Dasha De Leon is a 48 y.o. female.     She presents for 4 month f/u for HTN, HLD, and R knee pain. She reports resolution of R knee pain after seeing ortho and having a knee injection. She feels well overall today with no complaints.       Hypertension   This is a chronic problem. The current episode started more than 1 year ago. The problem is unchanged. The problem is controlled. Pertinent negatives include no anxiety, blurred vision, chest pain, malaise/fatigue, palpitations, peripheral edema or shortness of breath. Current antihypertension treatment includes angiotensin blockers and diuretics. The current treatment provides moderate improvement. There is no history of CAD/MI, heart failure or retinopathy.   Hyperlipidemia   This is a chronic problem. The current episode started more than 1 year ago. The problem is controlled. Pertinent negatives include no chest pain, focal sensory loss, focal weakness, leg pain, myalgias or shortness of breath. Current antihyperlipidemic treatment includes statins. Risk factors for coronary artery disease include dyslipidemia and hypertension.        The following portions of the patient's history were reviewed and updated as appropriate: allergies, current medications, past family history, past medical history, past social history, past surgical history and problem list.    Review of Systems   Constitutional: Negative for fatigue and malaise/fatigue.   Eyes: Negative for blurred vision.   Respiratory: Negative for shortness of breath.    Cardiovascular: Negative for chest pain and palpitations.   Musculoskeletal: Negative for myalgias.   Neurological: Negative for focal weakness.   Psychiatric/Behavioral: Negative for dysphoric mood and sleep disturbance. The patient is not nervous/anxious.        Objective   Physical Exam   Constitutional: She appears well-developed and well-nourished.   HENT:   Head: Normocephalic and atraumatic.   Cardiovascular: Normal rate,  regular rhythm and normal heart sounds.   No murmur heard.  Pulmonary/Chest: Effort normal and breath sounds normal. No respiratory distress.   Musculoskeletal: Normal range of motion.   Neurological: She is alert.   Skin: Skin is warm and dry.   Psychiatric: She has a normal mood and affect. Her behavior is normal. Judgment and thought content normal.   Vitals reviewed.      Assessment/Plan   Dasha was seen today for hyperlipidemia, hypertension and knee pain.    Diagnoses and all orders for this visit:    Essential hypertension  Comments:  well controlled, continue same medications    Tendonitis of knee  -     meloxicam (MOBIC) 15 MG tablet; Take 1 tablet by mouth Daily.    Allergic rhinitis, unspecified seasonality, unspecified trigger  -     levocetirizine (XYZAL) 5 MG tablet; Take 1 tablet by mouth Daily.    Elevated fasting glucose  -     Hemoglobin A1c    Hyperlipidemia, unspecified hyperlipidemia type  Comments:  tolerating statin well, continue same dose, watch diet and increase exercise     Refill requested for xyzal and mobic.    Labs from 4 months ago were normal or stable, no need for recheck at this appt but will check A1c due to elevated fasting glucose at last visit.    Encouraged healthy diet, regular exercise, maintenance of healthy weight, good sleep habits, avoidance of tobacco products and excessive alcohol. Limit salt intake even though BP was great today.    F/u in 4 months.

## 2019-11-25 RX ORDER — CYCLOBENZAPRINE HCL 10 MG
TABLET ORAL
Qty: 90 TABLET | Refills: 1 | Status: SHIPPED | OUTPATIENT
Start: 2019-11-25 | End: 2020-02-28

## 2020-01-08 DIAGNOSIS — F39 MOOD DISORDER (HCC): ICD-10-CM

## 2020-01-08 RX ORDER — HYDROCHLOROTHIAZIDE 25 MG/1
25 TABLET ORAL DAILY
Qty: 90 TABLET | Refills: 1 | Status: SHIPPED | OUTPATIENT
Start: 2020-01-08 | End: 2020-11-12 | Stop reason: SDUPTHER

## 2020-01-08 RX ORDER — OLMESARTAN MEDOXOMIL 40 MG/1
20 TABLET ORAL DAILY
Qty: 45 TABLET | Refills: 1 | Status: SHIPPED | OUTPATIENT
Start: 2020-01-08 | End: 2020-11-12 | Stop reason: SDUPTHER

## 2020-01-08 RX ORDER — ESCITALOPRAM OXALATE 20 MG/1
20 TABLET ORAL DAILY
Qty: 90 TABLET | Refills: 1 | Status: SHIPPED | OUTPATIENT
Start: 2020-01-08 | End: 2020-05-11 | Stop reason: SDUPTHER

## 2020-01-17 DIAGNOSIS — E78.5 HYPERLIPIDEMIA, UNSPECIFIED HYPERLIPIDEMIA TYPE: ICD-10-CM

## 2020-01-17 RX ORDER — ATORVASTATIN CALCIUM 20 MG/1
20 TABLET, FILM COATED ORAL DAILY
Qty: 90 TABLET | Refills: 1 | Status: SHIPPED | OUTPATIENT
Start: 2020-01-17 | End: 2020-03-04

## 2020-02-28 ENCOUNTER — OFFICE VISIT (OUTPATIENT)
Dept: INTERNAL MEDICINE | Facility: CLINIC | Age: 50
End: 2020-02-28

## 2020-02-28 VITALS
WEIGHT: 175.6 LBS | DIASTOLIC BLOOD PRESSURE: 90 MMHG | BODY MASS INDEX: 28.22 KG/M2 | HEIGHT: 66 IN | SYSTOLIC BLOOD PRESSURE: 142 MMHG

## 2020-02-28 DIAGNOSIS — M54.42 CHRONIC BILATERAL LOW BACK PAIN WITH BILATERAL SCIATICA: ICD-10-CM

## 2020-02-28 DIAGNOSIS — I10 ESSENTIAL HYPERTENSION: Primary | ICD-10-CM

## 2020-02-28 DIAGNOSIS — E78.5 HYPERLIPIDEMIA, UNSPECIFIED HYPERLIPIDEMIA TYPE: ICD-10-CM

## 2020-02-28 DIAGNOSIS — F39 MOOD DISORDER (HCC): ICD-10-CM

## 2020-02-28 DIAGNOSIS — M54.41 CHRONIC BILATERAL LOW BACK PAIN WITH BILATERAL SCIATICA: ICD-10-CM

## 2020-02-28 DIAGNOSIS — G89.29 CHRONIC BILATERAL LOW BACK PAIN WITH BILATERAL SCIATICA: ICD-10-CM

## 2020-02-28 PROBLEM — M25.561 KNEE PAIN, RIGHT: Status: ACTIVE | Noted: 2019-04-24

## 2020-02-28 LAB
ALBUMIN SERPL-MCNC: 4.9 G/DL (ref 3.5–5.2)
ALBUMIN/GLOB SERPL: 2 G/DL
ALP SERPL-CCNC: 78 U/L (ref 39–117)
ALT SERPL-CCNC: 26 U/L (ref 1–33)
AST SERPL-CCNC: 22 U/L (ref 1–32)
BASOPHILS # BLD AUTO: 0.07 10*3/MM3 (ref 0–0.2)
BASOPHILS NFR BLD AUTO: 0.7 % (ref 0–1.5)
BILIRUB SERPL-MCNC: 0.3 MG/DL (ref 0.2–1.2)
BUN SERPL-MCNC: 14 MG/DL (ref 6–20)
BUN/CREAT SERPL: 18.9 (ref 7–25)
CALCIUM SERPL-MCNC: 9.9 MG/DL (ref 8.6–10.5)
CHLORIDE SERPL-SCNC: 101 MMOL/L (ref 98–107)
CHOLEST SERPL-MCNC: 209 MG/DL (ref 0–200)
CO2 SERPL-SCNC: 27.1 MMOL/L (ref 22–29)
CREAT SERPL-MCNC: 0.74 MG/DL (ref 0.57–1)
EOSINOPHIL # BLD AUTO: 0.28 10*3/MM3 (ref 0–0.4)
EOSINOPHIL NFR BLD AUTO: 2.9 % (ref 0.3–6.2)
ERYTHROCYTE [DISTWIDTH] IN BLOOD BY AUTOMATED COUNT: 12.5 % (ref 12.3–15.4)
GLOBULIN SER CALC-MCNC: 2.5 GM/DL
GLUCOSE SERPL-MCNC: 103 MG/DL (ref 65–99)
HCT VFR BLD AUTO: 40.1 % (ref 34–46.6)
HDLC SERPL-MCNC: 47 MG/DL (ref 40–60)
HGB BLD-MCNC: 13.1 G/DL (ref 12–15.9)
IMM GRANULOCYTES # BLD AUTO: 0.09 10*3/MM3 (ref 0–0.05)
IMM GRANULOCYTES NFR BLD AUTO: 0.9 % (ref 0–0.5)
LDLC SERPL CALC-MCNC: 144 MG/DL (ref 0–100)
LYMPHOCYTES # BLD AUTO: 1.88 10*3/MM3 (ref 0.7–3.1)
LYMPHOCYTES NFR BLD AUTO: 19.8 % (ref 19.6–45.3)
MCH RBC QN AUTO: 29.1 PG (ref 26.6–33)
MCHC RBC AUTO-ENTMCNC: 32.7 G/DL (ref 31.5–35.7)
MCV RBC AUTO: 89.1 FL (ref 79–97)
MONOCYTES # BLD AUTO: 1.03 10*3/MM3 (ref 0.1–0.9)
MONOCYTES NFR BLD AUTO: 10.8 % (ref 5–12)
NEUTROPHILS # BLD AUTO: 6.16 10*3/MM3 (ref 1.7–7)
NEUTROPHILS NFR BLD AUTO: 64.9 % (ref 42.7–76)
NRBC BLD AUTO-RTO: 0 /100 WBC (ref 0–0.2)
PLATELET # BLD AUTO: 352 10*3/MM3 (ref 140–450)
POTASSIUM SERPL-SCNC: 5.5 MMOL/L (ref 3.5–5.2)
PROT SERPL-MCNC: 7.4 G/DL (ref 6–8.5)
RBC # BLD AUTO: 4.5 10*6/MM3 (ref 3.77–5.28)
SODIUM SERPL-SCNC: 141 MMOL/L (ref 136–145)
TRIGL SERPL-MCNC: 91 MG/DL (ref 0–150)
TSH SERPL DL<=0.005 MIU/L-ACNC: 1.36 UIU/ML (ref 0.27–4.2)
VLDLC SERPL CALC-MCNC: 18.2 MG/DL
WBC # BLD AUTO: 9.51 10*3/MM3 (ref 3.4–10.8)

## 2020-02-28 PROCEDURE — 99214 OFFICE O/P EST MOD 30 MIN: CPT | Performed by: NURSE PRACTITIONER

## 2020-02-28 RX ORDER — HYDROCODONE BITARTRATE AND ACETAMINOPHEN 5; 325 MG/1; MG/1
1 TABLET ORAL EVERY 6 HOURS PRN
Qty: 12 TABLET | Refills: 0 | Status: SHIPPED | OUTPATIENT
Start: 2020-02-28 | End: 2020-03-02

## 2020-03-03 DIAGNOSIS — E87.5 HYPERKALEMIA: Primary | ICD-10-CM

## 2020-03-04 DIAGNOSIS — E78.5 HYPERLIPIDEMIA, UNSPECIFIED HYPERLIPIDEMIA TYPE: ICD-10-CM

## 2020-03-04 DIAGNOSIS — E87.5 HYPERKALEMIA: Primary | ICD-10-CM

## 2020-03-04 RX ORDER — ATORVASTATIN CALCIUM 40 MG/1
40 TABLET, FILM COATED ORAL DAILY
Qty: 90 TABLET | Refills: 1 | Status: SHIPPED | OUTPATIENT
Start: 2020-03-04 | End: 2020-03-06 | Stop reason: SDUPTHER

## 2020-03-06 ENCOUNTER — LAB (OUTPATIENT)
Dept: LAB | Facility: HOSPITAL | Age: 50
End: 2020-03-06

## 2020-03-06 DIAGNOSIS — E78.5 HYPERLIPIDEMIA, UNSPECIFIED HYPERLIPIDEMIA TYPE: ICD-10-CM

## 2020-03-06 DIAGNOSIS — E87.5 HYPERKALEMIA: ICD-10-CM

## 2020-03-06 LAB
ANION GAP SERPL CALCULATED.3IONS-SCNC: 14.8 MMOL/L (ref 5–15)
BUN BLD-MCNC: 13 MG/DL (ref 6–20)
BUN/CREAT SERPL: 18.1 (ref 7–25)
CALCIUM SPEC-SCNC: 9.7 MG/DL (ref 8.6–10.5)
CHLORIDE SERPL-SCNC: 100 MMOL/L (ref 98–107)
CO2 SERPL-SCNC: 25.2 MMOL/L (ref 22–29)
CREAT BLD-MCNC: 0.72 MG/DL (ref 0.57–1)
GFR SERPL CREATININE-BSD FRML MDRD: 86 ML/MIN/1.73
GLUCOSE BLD-MCNC: 109 MG/DL (ref 65–99)
POTASSIUM BLD-SCNC: 4 MMOL/L (ref 3.5–5.2)
SODIUM BLD-SCNC: 140 MMOL/L (ref 136–145)

## 2020-03-06 PROCEDURE — 36415 COLL VENOUS BLD VENIPUNCTURE: CPT

## 2020-03-06 PROCEDURE — 80048 BASIC METABOLIC PNL TOTAL CA: CPT

## 2020-03-06 RX ORDER — ATORVASTATIN CALCIUM 40 MG/1
40 TABLET, FILM COATED ORAL DAILY
Qty: 90 TABLET | Refills: 1 | Status: SHIPPED | OUTPATIENT
Start: 2020-03-06 | End: 2020-09-30 | Stop reason: SDUPTHER

## 2020-03-08 ENCOUNTER — HOSPITAL ENCOUNTER (OUTPATIENT)
Dept: MRI IMAGING | Facility: HOSPITAL | Age: 50
Discharge: HOME | End: 2020-03-08
Admitting: NURSE PRACTITIONER

## 2020-03-08 DIAGNOSIS — M54.42 CHRONIC BILATERAL LOW BACK PAIN WITH BILATERAL SCIATICA: ICD-10-CM

## 2020-03-08 DIAGNOSIS — G89.29 CHRONIC BILATERAL LOW BACK PAIN WITH BILATERAL SCIATICA: ICD-10-CM

## 2020-03-08 DIAGNOSIS — M54.41 CHRONIC BILATERAL LOW BACK PAIN WITH BILATERAL SCIATICA: ICD-10-CM

## 2020-03-08 PROCEDURE — 72148 MRI LUMBAR SPINE W/O DYE: CPT

## 2020-03-10 DIAGNOSIS — M48.07 NEURAL FORAMINAL STENOSIS OF LUMBOSACRAL SPINE: Primary | ICD-10-CM

## 2020-05-11 DIAGNOSIS — F39 MOOD DISORDER (HCC): ICD-10-CM

## 2020-05-11 RX ORDER — ESCITALOPRAM OXALATE 20 MG/1
20 TABLET ORAL DAILY
Qty: 90 TABLET | Refills: 1 | Status: SHIPPED | OUTPATIENT
Start: 2020-05-11 | End: 2020-11-12 | Stop reason: SDUPTHER

## 2020-05-27 NOTE — PROGRESS NOTES
Subjective   History of Present Illness: Dasha De Leon is a 49 y.o. female is being seen for consultation today at the request of CARITO Camarena for back pain that radiates into bilateral lower extremity's with numbness and tingling.   She has not had any conservative treatment besides MDP, Mobic and Flexeril.    Patient is wearing a mask in our office today.    History of Present Illness    This patient has been having pain in her back with radiation into her legs.  This is been going on for a couple of years but getting steadily worse.  She had been doing massage therapy which was helping but only at the time.  She has taken some anti-inflammatory medications and had to stop massage therapy 2 months ago and the pain is gotten a lot worse.  It is located primarily on the left side and radiates into the left buttock down the posterior lateral thigh posterior lateral calf and into the lateral aspect of the left foot.  She has similar symptoms on the right side but it is much worse on the left.  She has no difficulty with bowel bladder control or other associated symptoms.  Any kind of activity makes the symptoms worse.    The following portions of the patient's history were reviewed and updated as appropriate: allergies, current medications, past family history, past medical history, past social history, past surgical history and problem list.    Review of Systems   Constitutional: Negative for fever.   HENT: Negative.    Eyes: Negative.    Respiratory: Negative for chest tightness.    Cardiovascular: Negative for chest pain.   Gastrointestinal: Negative.    Endocrine: Negative.    Genitourinary: Positive for pelvic pain. Negative for dysuria.   Musculoskeletal: Positive for back pain.   Skin: Negative.    Allergic/Immunologic: Negative.    Neurological: Positive for weakness and numbness. Negative for headaches.   Hematological: Negative.    Psychiatric/Behavioral: Negative.    All other systems reviewed and  "are negative.      Objective     Vitals:    05/28/20 1059   BP: 123/81   Pulse: 110   Temp: 97.2 °F (36.2 °C)   Weight: 79.4 kg (175 lb)   Height: 168.9 cm (66.5\")     Body mass index is 27.82 kg/m².      Physical Exam   Constitutional: She is oriented to person, place, and time. She appears well-developed and well-nourished.   HENT:   Head: Normocephalic and atraumatic.   Eyes: Pupils are equal, round, and reactive to light. Conjunctivae and EOM are normal.   Fundoscopic exam:       The right eye shows no papilledema. The right eye shows venous pulsations.        The left eye shows no papilledema. The left eye shows venous pulsations.   Neck: Carotid bruit is not present.   Neurological: She is oriented to person, place, and time. She has a normal Finger-Nose-Finger Test and a normal Heel to Shin Test. Gait normal.   Reflex Scores:       Tricep reflexes are 2+ on the right side and 2+ on the left side.       Bicep reflexes are 2+ on the right side and 2+ on the left side.       Brachioradialis reflexes are 2+ on the right side and 2+ on the left side.       Patellar reflexes are 2+ on the right side and 2+ on the left side.       Achilles reflexes are 2+ on the right side and 2+ on the left side.  Psychiatric: Her speech is normal.     Neurologic Exam     Mental Status   Oriented to person, place, and time.   Registration of memory: Good recent and remote memory.   Attention: normal. Concentration: normal.   Speech: speech is normal   Level of consciousness: alert  Knowledge: consistent with education.     Cranial Nerves     CN II   Visual fields full to confrontation.   Visual acuity: normal    CN III, IV, VI   Pupils are equal, round, and reactive to light.  Extraocular motions are normal.     CN V   Facial sensation intact.   Right corneal reflex: normal  Left corneal reflex: normal    CN VII   Facial expression full, symmetric.   Right facial weakness: none  Left facial weakness: none    CN VIII   Hearing: " intact    CN IX, X   Palate: symmetric    CN XI   Right sternocleidomastoid strength: normal  Left sternocleidomastoid strength: normal    CN XII   Tongue: not atrophic  Tongue deviation: none    Motor Exam   Muscle bulk: normal  Right arm tone: normal  Left arm tone: normal  Right leg tone: normal  Left leg tone: normal    Strength   Strength 5/5 except as noted.     Sensory Exam   Light touch normal.     Gait, Coordination, and Reflexes     Gait  Gait: normal    Coordination   Finger to nose coordination: normal  Heel to shin coordination: normal    Reflexes   Right brachioradialis: 2+  Left brachioradialis: 2+  Right biceps: 2+  Left biceps: 2+  Right triceps: 2+  Left triceps: 2+  Right patellar: 2+  Left patellar: 2+  Right achilles: 2+  Left achilles: 2+  Right : 2+  Left : 2+          Assessment/Plan   Independent Review of Radiographic Studies:      I personally reviewed the images from the following studies.    I reviewed her MRI of the lumbar spine.  This does show fairly severe lateral recess stenosis on the left side at L3-4 and some at L4-5.  There is a little foraminal stenosis bilaterally at all of the other levels.    Medical Decision Making:      I told the patient there is nothing terribly severe on her MRI.  Consequently I would recommend trying a bit more nonsurgical treatment before we got too aggressive.  She is fine with that.  I recommended some lumbar epidural blocks and some lumbar physical therapy.  I will have her call me when that has been done and if she is no better we will get her back in the office to discuss a myelogram.  Diagnoses and all orders for this visit:    Lumbar radiculopathy  -     Ambulatory Referral to Physical Therapy  -     Epidural Block      Return for Recheck and call after treatment or consultation.

## 2020-05-28 ENCOUNTER — OFFICE VISIT (OUTPATIENT)
Dept: NEUROSURGERY | Facility: CLINIC | Age: 50
End: 2020-05-28

## 2020-05-28 VITALS
TEMPERATURE: 97.2 F | BODY MASS INDEX: 27.47 KG/M2 | SYSTOLIC BLOOD PRESSURE: 123 MMHG | WEIGHT: 175 LBS | HEART RATE: 110 BPM | HEIGHT: 67 IN | DIASTOLIC BLOOD PRESSURE: 81 MMHG

## 2020-05-28 DIAGNOSIS — M54.16 LUMBAR RADICULOPATHY: Primary | ICD-10-CM

## 2020-05-28 PROCEDURE — 99243 OFF/OP CNSLTJ NEW/EST LOW 30: CPT | Performed by: NEUROLOGICAL SURGERY

## 2020-06-04 DIAGNOSIS — M76.899 TENDONITIS OF KNEE: ICD-10-CM

## 2020-06-04 DIAGNOSIS — J30.9 ALLERGIC RHINITIS, UNSPECIFIED SEASONALITY, UNSPECIFIED TRIGGER: ICD-10-CM

## 2020-06-04 RX ORDER — MELOXICAM 15 MG/1
15 TABLET ORAL DAILY
Qty: 90 TABLET | Refills: 1 | Status: SHIPPED | OUTPATIENT
Start: 2020-06-04 | End: 2020-12-16 | Stop reason: SDUPTHER

## 2020-06-04 RX ORDER — LEVOCETIRIZINE DIHYDROCHLORIDE 5 MG/1
5 TABLET, FILM COATED ORAL DAILY
Qty: 90 TABLET | Refills: 1 | Status: SHIPPED | OUTPATIENT
Start: 2020-06-04 | End: 2021-01-12 | Stop reason: SDUPTHER

## 2020-06-04 RX ORDER — CYCLOBENZAPRINE HCL 10 MG
10 TABLET ORAL 3 TIMES DAILY PRN
Qty: 90 TABLET | Refills: 2 | Status: SHIPPED | OUTPATIENT
Start: 2020-06-04 | End: 2020-09-30 | Stop reason: SDUPTHER

## 2020-07-02 ENCOUNTER — ANESTHESIA (OUTPATIENT)
Dept: PAIN MEDICINE | Facility: HOSPITAL | Age: 50
End: 2020-07-02

## 2020-07-02 ENCOUNTER — HOSPITAL ENCOUNTER (OUTPATIENT)
Dept: GENERAL RADIOLOGY | Facility: HOSPITAL | Age: 50
Discharge: HOME | End: 2020-07-02

## 2020-07-02 ENCOUNTER — HOSPITAL ENCOUNTER (OUTPATIENT)
Dept: PAIN MEDICINE | Facility: HOSPITAL | Age: 50
Discharge: HOME | End: 2020-07-02
Admitting: ANESTHESIOLOGY

## 2020-07-02 ENCOUNTER — ANESTHESIA EVENT (OUTPATIENT)
Dept: PAIN MEDICINE | Facility: HOSPITAL | Age: 50
End: 2020-07-02

## 2020-07-02 VITALS
TEMPERATURE: 97.8 F | SYSTOLIC BLOOD PRESSURE: 113 MMHG | DIASTOLIC BLOOD PRESSURE: 63 MMHG | HEIGHT: 66 IN | HEART RATE: 84 BPM | OXYGEN SATURATION: 95 % | WEIGHT: 172 LBS | BODY MASS INDEX: 27.64 KG/M2 | RESPIRATION RATE: 16 BRPM

## 2020-07-02 DIAGNOSIS — M54.16 LUMBAR RADICULOPATHY: ICD-10-CM

## 2020-07-02 DIAGNOSIS — R52 PAIN: ICD-10-CM

## 2020-07-02 PROCEDURE — 25010000002 METHYLPREDNISOLONE PER 80 MG: Performed by: ANESTHESIOLOGY

## 2020-07-02 PROCEDURE — C1755 CATHETER, INTRASPINAL: HCPCS

## 2020-07-02 PROCEDURE — 77003 FLUOROGUIDE FOR SPINE INJECT: CPT

## 2020-07-02 PROCEDURE — 25010000002 MIDAZOLAM PER 1 MG: Performed by: ANESTHESIOLOGY

## 2020-07-02 RX ORDER — LIDOCAINE HYDROCHLORIDE 10 MG/ML
0.5 INJECTION, SOLUTION INFILTRATION; PERINEURAL ONCE AS NEEDED
Status: DISCONTINUED | OUTPATIENT
Start: 2020-07-02 | End: 2020-07-03 | Stop reason: HOSPADM

## 2020-07-02 RX ORDER — MIDAZOLAM HYDROCHLORIDE 1 MG/ML
1 INJECTION INTRAMUSCULAR; INTRAVENOUS AS NEEDED
Status: DISCONTINUED | OUTPATIENT
Start: 2020-07-02 | End: 2020-07-03 | Stop reason: HOSPADM

## 2020-07-02 RX ORDER — SODIUM CHLORIDE 0.9 % (FLUSH) 0.9 %
3-10 SYRINGE (ML) INJECTION AS NEEDED
Status: DISCONTINUED | OUTPATIENT
Start: 2020-07-02 | End: 2020-07-03 | Stop reason: HOSPADM

## 2020-07-02 RX ORDER — SODIUM CHLORIDE 0.9 % (FLUSH) 0.9 %
1-10 SYRINGE (ML) INJECTION AS NEEDED
Status: DISCONTINUED | OUTPATIENT
Start: 2020-07-02 | End: 2020-07-03 | Stop reason: HOSPADM

## 2020-07-02 RX ORDER — SODIUM CHLORIDE 0.9 % (FLUSH) 0.9 %
3 SYRINGE (ML) INJECTION EVERY 12 HOURS SCHEDULED
Status: DISCONTINUED | OUTPATIENT
Start: 2020-07-02 | End: 2020-07-03 | Stop reason: HOSPADM

## 2020-07-02 RX ORDER — METHYLPREDNISOLONE ACETATE 80 MG/ML
80 INJECTION, SUSPENSION INTRA-ARTICULAR; INTRALESIONAL; INTRAMUSCULAR; SOFT TISSUE ONCE
Status: COMPLETED | OUTPATIENT
Start: 2020-07-02 | End: 2020-07-02

## 2020-07-02 RX ORDER — FENTANYL CITRATE 50 UG/ML
50 INJECTION, SOLUTION INTRAMUSCULAR; INTRAVENOUS AS NEEDED
Status: DISCONTINUED | OUTPATIENT
Start: 2020-07-02 | End: 2020-07-03 | Stop reason: HOSPADM

## 2020-07-02 RX ORDER — LIDOCAINE HYDROCHLORIDE 10 MG/ML
1 INJECTION, SOLUTION INFILTRATION; PERINEURAL ONCE AS NEEDED
Status: DISCONTINUED | OUTPATIENT
Start: 2020-07-02 | End: 2020-07-03 | Stop reason: HOSPADM

## 2020-07-02 RX ADMIN — MIDAZOLAM 2 MG: 1 INJECTION INTRAMUSCULAR; INTRAVENOUS at 09:16

## 2020-07-02 RX ADMIN — METHYLPREDNISOLONE ACETATE 80 MG: 80 INJECTION, SUSPENSION INTRA-ARTICULAR; INTRALESIONAL; INTRAMUSCULAR; SOFT TISSUE at 09:18

## 2020-07-02 NOTE — ANESTHESIA PROCEDURE NOTES
PAIN Epidural block      Patient reassessed immediately prior to procedure    Patient location during procedure: pain clinic  Start Time: 7/2/2020 9:12 AM  Stop Time: 7/2/2020 9:28 AM  Indication:procedure for pain  Performed By  Anesthesiologist: Prince Vidal MD  Preanesthetic Checklist  Completed: patient identified and risks and benefits discussed  Additional Notes  Diagnosis:     Post-Op Diagnosis Codes:     * Displacement of lumbar disc with radiculopathy (M51.16)    Sedation: Versed 2 mg    A lumbar epidural steroid injection with fluoroscopic guidance was performed.  Under fluoroscopic guidance, the epidural space was identified and accessed, confirmed by loss of resistance to saline.  The above medications were injected uneventfully.    Prep:  Pt Position:prone  Sterile Tech:cap, gloves, mask and sterile barrier  Prep:chlorhexidine gluconate and isopropyl alcohol  Monitoring:blood pressure monitoring, continuous pulse oximetry and EKG  Procedure:Sedation: yes     Approach:left paramedian  Guidance: fluoroscopy  Location:lumbar  Level:4-5  Needle Type:Tuohy  Needle Gauge:20  Aspiration:negative  Medications:  Depomedrol:80  Preservative Free Saline:1mL    Post Assessment:  Pt Tolerance:patient tolerated the procedure well with no apparent complications  Complications:no

## 2020-07-02 NOTE — H&P
CHIEF COMPLAINT: Back and bilateral lower extremity pain      HISTORY OF PRESENT ILLNESS:  She has had over 4 years of complaints of low lumbar back pain which radiates laterally down the bilateral lower extremities to the foot.  Between the 3 and 7 out of 10 on the pain scale.  Intermittent timing, occurring when sitting standing straining or twisting.  Relieved when she lays down.  Aching sharp burning stabbing in nature.  Relieved by massage rest and pain medications.  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  For over 6 weeks she is tried rest ice heat Flexeril hydrocodone meloxicam without relief.  Her MRI shows disc displacement    PAST MEDICAL HISTORY:  Current Outpatient Medications on File Prior to Encounter   Medication Sig Dispense Refill   • ALLERGY SERUM INJECTION Inject  under the skin 1 (One) Time.     • atorvastatin (LIPITOR) 40 MG tablet Take 1 tablet by mouth Daily. 90 tablet 1   • cyclobenzaprine (FLEXERIL) 10 MG tablet Take 1 tablet by mouth 3 (Three) Times a Day As Needed (1 TABLET TID PRN). 90 tablet 2   • escitalopram (LEXAPRO) 20 MG tablet Take 1 tablet by mouth Daily. 90 tablet 1   • hydroCHLOROthiazide (HYDRODIURIL) 25 MG tablet Take 1 tablet by mouth Daily. 90 tablet 1   • levocetirizine (XYZAL) 5 MG tablet Take 1 tablet by mouth Daily. 90 tablet 1   • levonorgestrel (MIRENA, 52 MG,) 20 MCG/24HR IUD 1 each by Intrauterine route 1 (one) time.     • meloxicam (MOBIC) 15 MG tablet Take 1 tablet by mouth Daily. 90 tablet 1   • olmesartan (BENICAR) 40 MG tablet Take 0.5 tablets by mouth Daily. 45 tablet 1   • triamcinolone (KENALOG) 0.1 % cream Apply a thin film to dry area on scalp once or twice daily as needed. 15 g 5     No current facility-administered medications on file prior to encounter.        Past Medical History:   Diagnosis Date   • Abnormal Pap smear of cervix    • Baker's cyst    • Bursitis    • CTS (carpal tunnel syndrome)     currently resolved   • H/O  colposcopy with cervical biopsy     unknown pap result, biopsy was neg per pt, 2013 [facility]   • HPV (human papilloma virus) infection    • Hyperlipemia    • Hypertension    • Osteoarthritis    • Scoliosis    • Seasonal allergies          SOCIAL HISTORY:  No tobacco    REVIEW OF SYSTEMS:  No hematologic infectious or constitutional symptoms  Other review of systems non-contributory  Negative screen for SANDEEP      PHYSICAL EXAM:  There were no vitals taken for this visit.  Well-developed well-nourished no acute distress  Extra ocular movements intact  Mallampati class 2 airway  Cardiac:  Regular rate and rhythm  Lungs:  Clear to auscultation bilaterally with good effort  Alert and oriented ×3  Deep tendon reflexes normal in the bilateral patella  Negative straight leg raise bilaterally  5 out of 5 strength bilateral upper and lower extremities  Lumbar spine without obvious deformities ecchymoses  Lumbar spine nontender to palpation    DIAGNOSIS:  Post-Op Diagnosis Codes:     * Displacement of lumbar disc with radiculopathy [M51.16]    PLAN:  1.  Lumbar 4 epidural steroid injections, up to 3, spaced 1-2 weeks apart.  If pain control is acceptable after 1 or 2 injections, it was discussed with the patient that they may return for the subsequent injections if and when their pain returns.  The risks were discussed with the patient including failure of relief, worsening pain, Headache (post dural puncture headache), bleeding (epidural hematoma) and infection (epidural abscess or skin infection).  2.  Physical therapy exercises at home as prescribed by physical therapy or from the pain clinic handout (given to the patient).  Continuation of these exercises every day, or multiple times per week, even when the patient has good pain relief, was stressed to the patient as a preventative measure to decrease the frequency and severity of future pain episodes.  3.  Continue pain medicines as already prescribed.  If patient not  currently taking any, it is recommended to begin Acetaminophen 1000 mg po q 8 hours.  If other medicines containing Acetaminophen are currently prescribed, maintain daily dose at 3000 mg.    4.  If they can tolerate NSAIDS, it is recommended to take Ibuprofen 600 mg po q 6 hours for 7 days during pain exacerbations.  Alternatively, they may substitute an NSAID of their choice (e.g. Aleve).  This may be taken at the same time as Acetaminophen.  5.  Heat and ice to the affected area as tolerated for pain control.  It was discussed that heating pads can cause burns.  6.  Daily low impact exercise such as walking or water exercise was recommended to maintain overall health and aid in weight control.   7.  Follow up as needed for subsequent injections.  8.  Patient was counseled to abstain from tobacco products.

## 2020-07-17 ENCOUNTER — OFFICE VISIT (OUTPATIENT)
Dept: INTERNAL MEDICINE | Facility: CLINIC | Age: 50
End: 2020-07-17

## 2020-07-17 VITALS
OXYGEN SATURATION: 99 % | SYSTOLIC BLOOD PRESSURE: 138 MMHG | WEIGHT: 172 LBS | HEART RATE: 104 BPM | HEIGHT: 66 IN | DIASTOLIC BLOOD PRESSURE: 88 MMHG | BODY MASS INDEX: 27.64 KG/M2

## 2020-07-17 DIAGNOSIS — Z00.00 ANNUAL PHYSICAL EXAM: Primary | ICD-10-CM

## 2020-07-17 DIAGNOSIS — Z11.59 SCREENING FOR VIRAL DISEASE: ICD-10-CM

## 2020-07-17 DIAGNOSIS — G89.29 CHRONIC BILATERAL LOW BACK PAIN WITH BILATERAL SCIATICA: ICD-10-CM

## 2020-07-17 DIAGNOSIS — M54.42 CHRONIC BILATERAL LOW BACK PAIN WITH BILATERAL SCIATICA: ICD-10-CM

## 2020-07-17 DIAGNOSIS — M54.41 CHRONIC BILATERAL LOW BACK PAIN WITH BILATERAL SCIATICA: ICD-10-CM

## 2020-07-17 DIAGNOSIS — Z12.11 COLON CANCER SCREENING: ICD-10-CM

## 2020-07-17 PROCEDURE — 99396 PREV VISIT EST AGE 40-64: CPT | Performed by: NURSE PRACTITIONER

## 2020-07-17 RX ORDER — HYDROCODONE BITARTRATE AND ACETAMINOPHEN 5; 325 MG/1; MG/1
1 TABLET ORAL EVERY 6 HOURS PRN
Qty: 12 TABLET | Refills: 0 | Status: SHIPPED | OUTPATIENT
Start: 2020-07-17 | End: 2021-01-26 | Stop reason: SDUPTHER

## 2020-07-17 NOTE — PROGRESS NOTES
Subjective   Dasha De Leon is a 49 y.o. female.     .Well Adult Physical   Patient here for a comprehensive physical exam.The patient reports problems - back pain.          History:  LMP: No LMP recorded. Patient has had an implant.  Last pap date: 8/1/2019  Abnormal pap? no      She is working full time. She is not exercising much. Her diet has improved.She is up to date with dental and vision exam.  She is scheduled for pap/mammogram on 8/7/2020.     She reports not much change in chronic low back pain with radiation intermittently down bilateral legs. She saw Dr Botello 5/28/2020 and had epidural 7/2/2020 without much relief. She continues with daily stretching, daily meloxicam and intermittent hydrocodone. She will schedule follow up with neurosurgery.        The following portions of the patient's history were reviewed and updated as appropriate: allergies, current medications, past family history, past medical history, past social history, past surgical history and problem list.    Review of Systems   Constitutional: Negative for appetite change, chills, fatigue and fever.   HENT: Positive for postnasal drip. Negative for congestion, ear pain, hearing loss, mouth sores, nosebleeds, rhinorrhea, sinus pressure, sneezing, sore throat, tinnitus, trouble swallowing and voice change.    Eyes: Negative for visual disturbance.   Respiratory: Positive for cough. Negative for chest tightness, shortness of breath and wheezing (r/t allergies).    Cardiovascular: Negative for chest pain, palpitations and leg swelling.   Gastrointestinal: Negative for abdominal pain, anal bleeding, blood in stool, constipation, diarrhea, nausea and vomiting.   Endocrine: Negative for cold intolerance, heat intolerance, polydipsia, polyphagia and polyuria.   Genitourinary: Negative for dysuria, frequency, hematuria and urgency.        Incontinence-chronic    Musculoskeletal: Positive for back pain (chronic, worst on left side, but both right  side ). Negative for arthralgias, gait problem, joint swelling, myalgias, neck pain and neck stiffness.   Skin: Negative for color change and rash.        NEGATIVE BREAST MASS, BREAST PAIN, NIPPLE DISCHARGE, SKIN CHANGES, OR LUMP IN ARMPIT   Allergic/Immunologic: Positive for environmental allergies.   Neurological: Positive for numbness (bilateral legs to bilateral toes ). Negative for dizziness, tremors, seizures, syncope, speech difficulty, weakness and headaches.   Hematological: Negative for adenopathy. Does not bruise/bleed easily.   Psychiatric/Behavioral: Negative for behavioral problems, confusion, decreased concentration, sleep disturbance and suicidal ideas. The patient is not nervous/anxious.        Objective   Physical Exam   Constitutional: She appears well-developed and well-nourished.   HENT:   Right Ear: Hearing, tympanic membrane, external ear and ear canal normal.   Left Ear: Hearing, tympanic membrane, external ear and ear canal normal.   Nose: Right sinus exhibits no maxillary sinus tenderness and no frontal sinus tenderness. Left sinus exhibits no maxillary sinus tenderness and no frontal sinus tenderness.   Eyes: Pupils are equal, round, and reactive to light. Conjunctivae, EOM and lids are normal.   Neck: Trachea normal. Neck supple. No JVD present. Carotid bruit is not present. No tracheal deviation present. No thyroid mass and no thyromegaly present.   Cardiovascular: Normal rate, regular rhythm, S1 normal and S2 normal. Exam reveals no gallop and no friction rub.   No murmur heard.  Pulses:       Carotid pulses are 2+ on the right side, and 2+ on the left side.       Radial pulses are 2+ on the right side, and 2+ on the left side.        Dorsalis pedis pulses are 2+ on the right side, and 2+ on the left side.        Posterior tibial pulses are 2+ on the right side, and 2+ on the left side.   Repeat bp left arm 122/78  No pedal edema    Pulmonary/Chest: Effort normal and breath sounds  normal. Chest wall is not dull to percussion. Right breast exhibits no inverted nipple, no mass, no nipple discharge, no skin change and no tenderness. Left breast exhibits no inverted nipple, no mass, no nipple discharge, no skin change and no tenderness.   Abdominal: Soft. Normal aorta and bowel sounds are normal. She exhibits no abdominal bruit. There is no hepatosplenomegaly. There is no tenderness. There is no rebound and no guarding. No hernia.   Genitourinary: Rectum normal, vagina normal and uterus normal. Rectal exam shows guaiac negative stool. No labial fusion. There is no rash, tenderness, lesion or injury on the right labia. There is no rash, tenderness, lesion or injury on the left labia. Cervix exhibits no discharge. Right adnexum displays no mass, no tenderness and no fullness. Left adnexum displays no mass, no tenderness and no fullness.   Musculoskeletal: Normal range of motion. She exhibits no edema.   Lymphadenopathy:     She has no cervical adenopathy.     She has no axillary adenopathy.        Right: No supraclavicular adenopathy present.        Left: No supraclavicular adenopathy present.   Neurological: She is alert. She has normal strength. No cranial nerve deficit or sensory deficit. She displays a negative Romberg sign.   Reflex Scores:       Patellar reflexes are 2+ on the right side and 2+ on the left side.  Skin: Skin is warm and dry.   Psychiatric: She has a normal mood and affect. Her speech is normal and behavior is normal. Judgment and thought content normal. Cognition and memory are normal.   Nursing note and vitals reviewed.      Assessment/Plan   Dasha was seen today for annual exam.    Diagnoses and all orders for this visit:    Annual physical exam  -     Comprehensive Metabolic Panel; Future  -     Lipid Panel; Future  -     TSH Rfx On Abnormal To Free T4; Future  -     Urinalysis With Microscopic If Indicated (No Culture) - Urine, Clean Catch; Future  -     CBC & Differential;  Future    Colon cancer screening  -     Ambulatory Referral to General Surgery    Screening for viral disease  -     Hepatitis C Antibody; Future    Chronic bilateral low back pain with bilateral sciatica  Comments:  due to follow up with Dr Botello  Orders:  -     HYDROcodone-acetaminophen (NORCO) 5-325 MG per tablet; Take 1 tablet by mouth Every 6 (Six) Hours As Needed for Severe Pain .    ORI query complete. Treatment plan to include limited course of prescribed controlled substance. Risks including addiction, benefits, and alternatives presented to patient.     She will have labs at [facility].

## 2020-08-07 ENCOUNTER — OFFICE VISIT (OUTPATIENT)
Dept: OBSTETRICS AND GYNECOLOGY | Age: 50
End: 2020-08-07

## 2020-08-07 ENCOUNTER — PROCEDURE VISIT (OUTPATIENT)
Dept: OBSTETRICS AND GYNECOLOGY | Age: 50
End: 2020-08-07

## 2020-08-07 ENCOUNTER — APPOINTMENT (OUTPATIENT)
Dept: WOMENS IMAGING | Facility: HOSPITAL | Age: 50
End: 2020-08-07

## 2020-08-07 VITALS
SYSTOLIC BLOOD PRESSURE: 108 MMHG | WEIGHT: 174 LBS | HEIGHT: 66 IN | DIASTOLIC BLOOD PRESSURE: 78 MMHG | BODY MASS INDEX: 27.97 KG/M2

## 2020-08-07 DIAGNOSIS — Z12.4 SCREENING FOR MALIGNANT NEOPLASM OF THE CERVIX: ICD-10-CM

## 2020-08-07 DIAGNOSIS — Z12.31 VISIT FOR SCREENING MAMMOGRAM: Primary | ICD-10-CM

## 2020-08-07 DIAGNOSIS — Z11.51 SPECIAL SCREENING EXAMINATION FOR HUMAN PAPILLOMAVIRUS (HPV): ICD-10-CM

## 2020-08-07 DIAGNOSIS — Z01.419 ENCOUNTER FOR GYNECOLOGICAL EXAMINATION WITHOUT ABNORMAL FINDING: Primary | ICD-10-CM

## 2020-08-07 PROCEDURE — 77067 SCR MAMMO BI INCL CAD: CPT | Performed by: RADIOLOGY

## 2020-08-07 PROCEDURE — 99396 PREV VISIT EST AGE 40-64: CPT | Performed by: OBSTETRICS & GYNECOLOGY

## 2020-08-07 PROCEDURE — 77067 SCR MAMMO BI INCL CAD: CPT | Performed by: OBSTETRICS & GYNECOLOGY

## 2020-08-07 NOTE — PROGRESS NOTES
Subjective     Chief Complaint   Patient presents with   • Gynecologic Exam     AC. Last pap 19 normal. Pt had MG today.        History of Present Illness    Dasha De Leon is a 49 y.o.  who presents for annual exam.  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  She has an IUD which she has had for 8 years.  Even though it is  she thinks it is still helping with her cycles and declines removal.  Patient does have some hot flashes.  Patient's main complaint is of some lumbar bulging disc which she is getting epidurals for.  They are causing her a lot of pain.  She is too tired after work to exercise.  Her menses are rare, lasting 0-3 days, dysmenorrhea none     Obstetric History:  OB History        0    Para   0    Term   0       0    AB   0    Living   0       SAB   0    TAB   0    Ectopic   0    Molar        Multiple   0    Live Births                   Menstrual History:     No LMP recorded. Patient has had an implant.         Current contraception: abstinence   History of abnormal Pap smear: yes - cryo 90   Received Gardasil immunization: no  Perform regular self breast exam : yes  Family history of uterine or ovarian cancer: yes - uterine 58   Family History of colon cancer: yes - pat uncle 60  Family history of breast cancer: no    Mammogram: done today.  Colonoscopy: recommended.  DEXA: not indicated.    Exercise: no   Calcium/Vitamin D: inadequate intake    The following portions of the patient's history were reviewed and updated as appropriate: allergies, current medications, past family history, past medical history, past social history, past surgical history and problem list.    Review of Systems    Review of Systems   Constitutional: Negative for fatigue.   Respiratory: Negative for shortness of breath.    Gastrointestinal: Negative for abdominal pain.   Genitourinary: Negative for dysuria.   Neurological: Negative for headaches.   Psychiatric/Behavioral:  "Negative for dysphoric mood.         Objective   Physical Exam    /78   Ht 167.6 cm (66\")   Wt 78.9 kg (174 lb)   Breastfeeding No   BMI 28.08 kg/m²     General:   alert, appears stated age and cooperative     Neck: thyroid normal to palpation   Heart: regular rate and rhythm   Lungs: clear to auscultation bilaterally   Abdomen: soft, non-tender, without masses or organomegaly   Breast: inspection negative, no nipple discharge or bleeding, no masses or nodularity palpable   Vulva: normal, Bartholin's, Urethra, [place]'s normal   Vagina: normal mucosa, normal discharge   Cervix: no cervical motion tenderness and no lesions   Uterus: non-tender, normal shape and consistency   Adnexa: no mass, fullness, tenderness   Rectal: not indicated     Assessment/Plan   Dasha was seen today for gynecologic exam.    Diagnoses and all orders for this visit:    Encounter for gynecological examination without abnormal finding  -     IGP, Aptima HPV, Rfx 16 / 18,45    Screening for malignant neoplasm of the cervix  -     IGP, Aptima HPV, Rfx 16 / 18,45    Special screening examination for human papillomavirus (HPV)  -     IGP, Aptima HPV, Rfx 16 / 18,45      We discussed the IUD.  I do think the patient could have it removed but she is hesitant to do that.  We did discuss if she has had full year of no periods or is reached age of 51 or 52 would recommend removal at that time.    Patient is having some back pain with bulging disc.  I recommend she see the orthopedic physical therapist.  Name was given.  Also recommend yoga stretching and exercise.  All questions answered.  Breast self exam technique reviewed and patient encouraged to perform self-exam monthly.  Discussed healthy lifestyle modifications.  Recommended 30 minutes of aerobic exercise five times per week.  Discussed calcium needs to prevent osteoporosis.                 "

## 2020-08-12 LAB
CYTOLOGIST CVX/VAG CYTO: NORMAL
CYTOLOGY CVX/VAG DOC CYTO: NORMAL
CYTOLOGY CVX/VAG DOC THIN PREP: NORMAL
DX ICD CODE: NORMAL
HIV 1 & 2 AB SER-IMP: NORMAL
HPV I/H RISK 4 DNA CVX QL PROBE+SIG AMP: NEGATIVE
Lab: NORMAL
OTHER STN SPEC: NORMAL
STAT OF ADQ CVX/VAG CYTO-IMP: NORMAL

## 2020-08-13 ENCOUNTER — TELEPHONE (OUTPATIENT)
Dept: OBSTETRICS AND GYNECOLOGY | Age: 50
End: 2020-08-13

## 2020-08-13 NOTE — TELEPHONE ENCOUNTER
----- Message from Darrick Dowling MD sent at 8/12/2020  4:04 PM EDT -----  Please notify pap is normal.

## 2020-08-17 ENCOUNTER — ANESTHESIA (OUTPATIENT)
Dept: PAIN MEDICINE | Facility: HOSPITAL | Age: 50
End: 2020-08-17

## 2020-08-17 ENCOUNTER — HOSPITAL ENCOUNTER (OUTPATIENT)
Dept: GENERAL RADIOLOGY | Facility: HOSPITAL | Age: 50
Discharge: HOME | End: 2020-08-17

## 2020-08-17 ENCOUNTER — LAB (OUTPATIENT)
Dept: LAB | Facility: HOSPITAL | Age: 50
End: 2020-08-17

## 2020-08-17 ENCOUNTER — HOSPITAL ENCOUNTER (OUTPATIENT)
Dept: PAIN MEDICINE | Facility: HOSPITAL | Age: 50
Discharge: HOME | End: 2020-08-17
Admitting: ANESTHESIOLOGY

## 2020-08-17 ENCOUNTER — ANESTHESIA EVENT (OUTPATIENT)
Dept: PAIN MEDICINE | Facility: HOSPITAL | Age: 50
End: 2020-08-17

## 2020-08-17 VITALS
TEMPERATURE: 98 F | SYSTOLIC BLOOD PRESSURE: 127 MMHG | DIASTOLIC BLOOD PRESSURE: 71 MMHG | HEART RATE: 98 BPM | OXYGEN SATURATION: 97 % | RESPIRATION RATE: 16 BRPM

## 2020-08-17 DIAGNOSIS — Z00.00 ANNUAL PHYSICAL EXAM: ICD-10-CM

## 2020-08-17 DIAGNOSIS — M51.26 DISPLACEMENT OF LUMBAR INTERVERTEBRAL DISC WITHOUT MYELOPATHY: Primary | ICD-10-CM

## 2020-08-17 DIAGNOSIS — Z11.59 SCREENING FOR VIRAL DISEASE: ICD-10-CM

## 2020-08-17 DIAGNOSIS — R52 PAIN: ICD-10-CM

## 2020-08-17 LAB
ALBUMIN SERPL-MCNC: 4.7 G/DL (ref 3.5–5.2)
ALBUMIN/GLOB SERPL: 1.7 G/DL
ALP SERPL-CCNC: 77 U/L (ref 39–117)
ALT SERPL W P-5'-P-CCNC: 36 U/L (ref 1–33)
ANION GAP SERPL CALCULATED.3IONS-SCNC: 10.1 MMOL/L (ref 5–15)
AST SERPL-CCNC: 33 U/L (ref 1–32)
BACTERIA UR QL AUTO: ABNORMAL /HPF
BASOPHILS # BLD AUTO: 0.04 10*3/MM3 (ref 0–0.2)
BASOPHILS NFR BLD AUTO: 0.5 % (ref 0–1.5)
BILIRUB SERPL-MCNC: 0.4 MG/DL (ref 0–1.2)
BILIRUB UR QL STRIP: NEGATIVE
BUN SERPL-MCNC: 11 MG/DL (ref 6–20)
BUN/CREAT SERPL: 14.3 (ref 7–25)
CALCIUM SPEC-SCNC: 9.8 MG/DL (ref 8.6–10.5)
CHLORIDE SERPL-SCNC: 99 MMOL/L (ref 98–107)
CHOLEST SERPL-MCNC: 185 MG/DL (ref 0–200)
CLARITY UR: CLEAR
CO2 SERPL-SCNC: 27.9 MMOL/L (ref 22–29)
COLOR UR: YELLOW
CREAT SERPL-MCNC: 0.77 MG/DL (ref 0.57–1)
DEPRECATED RDW RBC AUTO: 40.3 FL (ref 37–54)
EOSINOPHIL # BLD AUTO: 0.16 10*3/MM3 (ref 0–0.4)
EOSINOPHIL NFR BLD AUTO: 1.8 % (ref 0.3–6.2)
ERYTHROCYTE [DISTWIDTH] IN BLOOD BY AUTOMATED COUNT: 12.8 % (ref 12.3–15.4)
GFR SERPL CREATININE-BSD FRML MDRD: 80 ML/MIN/1.73
GLOBULIN UR ELPH-MCNC: 2.7 GM/DL
GLUCOSE SERPL-MCNC: 97 MG/DL (ref 65–99)
GLUCOSE UR STRIP-MCNC: NEGATIVE MG/DL
HCT VFR BLD AUTO: 38 % (ref 34–46.6)
HCV AB SER DONR QL: NORMAL
HDLC SERPL-MCNC: 53 MG/DL (ref 40–60)
HGB BLD-MCNC: 12.7 G/DL (ref 12–15.9)
HGB UR QL STRIP.AUTO: NEGATIVE
HYALINE CASTS UR QL AUTO: ABNORMAL /LPF
IMM GRANULOCYTES # BLD AUTO: 0.08 10*3/MM3 (ref 0–0.05)
IMM GRANULOCYTES NFR BLD AUTO: 0.9 % (ref 0–0.5)
KETONES UR QL STRIP: NEGATIVE
LDLC SERPL CALC-MCNC: 115 MG/DL (ref 0–100)
LDLC/HDLC SERPL: 2.17 {RATIO}
LEUKOCYTE ESTERASE UR QL STRIP.AUTO: ABNORMAL
LYMPHOCYTES # BLD AUTO: 1.99 10*3/MM3 (ref 0.7–3.1)
LYMPHOCYTES NFR BLD AUTO: 23 % (ref 19.6–45.3)
MCH RBC QN AUTO: 29.2 PG (ref 26.6–33)
MCHC RBC AUTO-ENTMCNC: 33.4 G/DL (ref 31.5–35.7)
MCV RBC AUTO: 87.4 FL (ref 79–97)
MONOCYTES # BLD AUTO: 0.92 10*3/MM3 (ref 0.1–0.9)
MONOCYTES NFR BLD AUTO: 10.6 % (ref 5–12)
NEUTROPHILS NFR BLD AUTO: 5.47 10*3/MM3 (ref 1.7–7)
NEUTROPHILS NFR BLD AUTO: 63.2 % (ref 42.7–76)
NITRITE UR QL STRIP: NEGATIVE
NRBC BLD AUTO-RTO: 0 /100 WBC (ref 0–0.2)
PH UR STRIP.AUTO: 5.5 [PH] (ref 5–8)
PLATELET # BLD AUTO: 330 10*3/MM3 (ref 140–450)
PMV BLD AUTO: 9.7 FL (ref 6–12)
POTASSIUM SERPL-SCNC: 4 MMOL/L (ref 3.5–5.2)
PROT SERPL-MCNC: 7.4 G/DL (ref 6–8.5)
PROT UR QL STRIP: NEGATIVE
RBC # BLD AUTO: 4.35 10*6/MM3 (ref 3.77–5.28)
RBC # UR: ABNORMAL /HPF
REF LAB TEST METHOD: ABNORMAL
SODIUM SERPL-SCNC: 137 MMOL/L (ref 136–145)
SP GR UR STRIP: 1.01 (ref 1–1.03)
SQUAMOUS #/AREA URNS HPF: ABNORMAL /HPF
TRIGL SERPL-MCNC: 86 MG/DL (ref 0–150)
TSH SERPL DL<=0.05 MIU/L-ACNC: 2.05 UIU/ML (ref 0.27–4.2)
UROBILINOGEN UR QL STRIP: ABNORMAL
VLDLC SERPL-MCNC: 17.2 MG/DL (ref 5–40)
WBC # BLD AUTO: 8.66 10*3/MM3 (ref 3.4–10.8)
WBC UR QL AUTO: ABNORMAL /HPF

## 2020-08-17 PROCEDURE — 81001 URINALYSIS AUTO W/SCOPE: CPT

## 2020-08-17 PROCEDURE — 86803 HEPATITIS C AB TEST: CPT

## 2020-08-17 PROCEDURE — 80053 COMPREHEN METABOLIC PANEL: CPT

## 2020-08-17 PROCEDURE — 77003 FLUOROGUIDE FOR SPINE INJECT: CPT

## 2020-08-17 PROCEDURE — 84443 ASSAY THYROID STIM HORMONE: CPT

## 2020-08-17 PROCEDURE — 36415 COLL VENOUS BLD VENIPUNCTURE: CPT

## 2020-08-17 PROCEDURE — C1755 CATHETER, INTRASPINAL: HCPCS

## 2020-08-17 PROCEDURE — 25010000002 METHYLPREDNISOLONE PER 80 MG: Performed by: ANESTHESIOLOGY

## 2020-08-17 PROCEDURE — 80061 LIPID PANEL: CPT

## 2020-08-17 PROCEDURE — 85025 COMPLETE CBC W/AUTO DIFF WBC: CPT

## 2020-08-17 RX ORDER — MIDAZOLAM HYDROCHLORIDE 1 MG/ML
1 INJECTION INTRAMUSCULAR; INTRAVENOUS
Status: DISCONTINUED | OUTPATIENT
Start: 2020-08-17 | End: 2020-08-18 | Stop reason: HOSPADM

## 2020-08-17 RX ORDER — METHYLPREDNISOLONE ACETATE 80 MG/ML
80 INJECTION, SUSPENSION INTRA-ARTICULAR; INTRALESIONAL; INTRAMUSCULAR; SOFT TISSUE ONCE
Status: COMPLETED | OUTPATIENT
Start: 2020-08-17 | End: 2020-08-17

## 2020-08-17 RX ORDER — FENTANYL CITRATE 50 UG/ML
50 INJECTION, SOLUTION INTRAMUSCULAR; INTRAVENOUS AS NEEDED
Status: DISCONTINUED | OUTPATIENT
Start: 2020-08-17 | End: 2020-08-18 | Stop reason: HOSPADM

## 2020-08-17 RX ORDER — LIDOCAINE HYDROCHLORIDE 10 MG/ML
1 INJECTION, SOLUTION INFILTRATION; PERINEURAL ONCE
Status: DISCONTINUED | OUTPATIENT
Start: 2020-08-17 | End: 2020-08-18 | Stop reason: HOSPADM

## 2020-08-17 RX ADMIN — METHYLPREDNISOLONE ACETATE 80 MG: 80 INJECTION, SUSPENSION INTRA-ARTICULAR; INTRALESIONAL; INTRAMUSCULAR; SOFT TISSUE at 14:12

## 2020-08-17 NOTE — H&P
INTERVAL HISTORY:    The patient returns for another Lumbar epidural steroid injection 2 today.  They have received 20 % improvement since their last injection with a pain level of 2-5  /10 at its worst recently.    Conservative measures tried in the interim. Daily activities are still affected by the pain.    Radiology reports and/or previous notes have been reviewed and are consistent with their diagnosis of Post-Op Diagnosis Codes:     * Lumbar disc displacement without myelopathy [M51.26]    Alert and oriented  MP - 2  Lungs - clear  CV - rrr    Diagnosis:  Post-Op Diagnosis Codes:     * Lumbar disc displacement without myelopathy [M51.26]      Plan:  Lumbar epidural steroid injection under fluoroscopic guidance        Target : L4-5    I have encouraged them to continue:  1.  Physical therapy exercises at home as prescribed by physical therapy or from the pain clinic handout (given to the patient).  Continuation of these exercises every day, or multiple times per week, even when the patient has good pain relief, was stressed to the patient as a preventative measure to decrease the frequency and severity of future pain episodes.  2.  Continue pain medicines as already prescribed.  If patient not currently taking any, it is recommended to begin Acetaminophen 1000 mg po q 8 hours.  If other medicines containing Acetaminophen are currently prescribed, maintain daily dose at 3000mg.    3.  If they can tolerate NSAIDS, it is recommended to take Ibuprofen 600 mg po q 6 hours for 7 days during pain exacerbations.   Alternatively, they may substitute an NSAID of their choice (e.g. Aleve)  4.  Heat and ice to the affected area as tolerated for pain control.  It was discussed that heating pads can cause burns.  5.  Low impact exercise such as walking or water exercise was recommended to maintain overall health and aid in weight control.   6.  Follow up as needed for subsequent injections.  7.  Patient was counseled to  abstain from tobacco products.

## 2020-08-17 NOTE — ANESTHESIA PROCEDURE NOTES
PAIN Epidural block    Pre-sedation assessment completed: 8/17/2020 2:07 PM    Patient reassessed immediately prior to procedure    Patient location during procedure: pain clinic  Start Time: 8/17/2020 2:07 PM  Stop Time: 8/17/2020 2:14 PM  Indication:at surgeon's request and procedure for pain  Performed By  Anesthesiologist: Remi Do MD  Preanesthetic Checklist  Completed: patient identified, site marked, surgical consent, pre-op evaluation, timeout performed, IV checked, risks and benefits discussed and monitors and equipment checked  Additional Notes  Dx:  Post-Op Diagnosis Codes:     * Lumbar disc displacement without myelopathy (M51.26)  80 mg depomedrol in epid    Plan : return to clinic as needed  Prep:  Pt Position:prone (prone)  Sterile Tech:cap, gloves, mask and sterile barrier  Prep:chlorhexidine gluconate and isopropyl alcohol  Monitoring:blood pressure monitoring, EKG and continuous pulse oximetry  Procedure:Sedation: no     Approach:left paramedian  Guidance: fluoroscopy and c arm pa and lat and loss of resistance  Location:lumbar  Level:L5-S1 (interlaminar)  Needle Type:en-Gaugepamela  Needle Gauge:20  Aspiration:negative  Medications:  Depomedrol:80 mg  Preservative Free Saline:3mL    Post Assessment:  Post-procedure: bandaid.  Pt Tolerance:patient tolerated the procedure well with no apparent complications  Complications:no

## 2020-09-30 ENCOUNTER — PATIENT MESSAGE (OUTPATIENT)
Dept: INTERNAL MEDICINE | Facility: CLINIC | Age: 50
End: 2020-09-30

## 2020-09-30 DIAGNOSIS — E78.5 HYPERLIPIDEMIA, UNSPECIFIED HYPERLIPIDEMIA TYPE: ICD-10-CM

## 2020-09-30 DIAGNOSIS — M62.838 MUSCLE SPASM: Primary | ICD-10-CM

## 2020-09-30 RX ORDER — ATORVASTATIN CALCIUM 40 MG/1
40 TABLET, FILM COATED ORAL DAILY
Qty: 90 TABLET | Refills: 0 | Status: SHIPPED | OUTPATIENT
Start: 2020-09-30 | End: 2020-12-16 | Stop reason: SDUPTHER

## 2020-09-30 RX ORDER — CYCLOBENZAPRINE HCL 10 MG
10 TABLET ORAL 3 TIMES DAILY PRN
Qty: 90 TABLET | Refills: 1 | Status: SHIPPED | OUTPATIENT
Start: 2020-09-30 | End: 2020-11-12 | Stop reason: SDUPTHER

## 2020-09-30 NOTE — TELEPHONE ENCOUNTER
From: Dasha De Leon  To: CARITO Camarena  Sent: 9/30/2020 9:45 AM EDT  Subject: Prescription Question    Can I get rxs sent in for Atorvastatin and cyclobenzaprine 90 days to the [facility] pharmacy please.

## 2020-11-05 ENCOUNTER — FLU SHOT (OUTPATIENT)
Dept: FAMILY MEDICINE CLINIC | Facility: CLINIC | Age: 50
End: 2020-11-05

## 2020-11-05 DIAGNOSIS — Z23 NEED FOR INFLUENZA VACCINATION: ICD-10-CM

## 2020-11-05 PROCEDURE — 90471 IMMUNIZATION ADMIN: CPT | Performed by: NURSE PRACTITIONER

## 2020-11-05 PROCEDURE — 90686 IIV4 VACC NO PRSV 0.5 ML IM: CPT | Performed by: NURSE PRACTITIONER

## 2020-11-12 DIAGNOSIS — F39 MOOD DISORDER (HCC): ICD-10-CM

## 2020-11-12 DIAGNOSIS — M62.838 MUSCLE SPASM: ICD-10-CM

## 2020-11-12 RX ORDER — CYCLOBENZAPRINE HCL 10 MG
10 TABLET ORAL 3 TIMES DAILY PRN
Qty: 90 TABLET | Refills: 0 | Status: SHIPPED | OUTPATIENT
Start: 2020-11-12 | End: 2021-02-12 | Stop reason: SDUPTHER

## 2020-11-12 RX ORDER — OLMESARTAN MEDOXOMIL 40 MG/1
20 TABLET ORAL DAILY
Qty: 45 TABLET | Refills: 0 | Status: SHIPPED | OUTPATIENT
Start: 2020-11-12 | End: 2021-02-12 | Stop reason: SDUPTHER

## 2020-11-12 RX ORDER — HYDROCHLOROTHIAZIDE 25 MG/1
25 TABLET ORAL DAILY
Qty: 90 TABLET | Refills: 0 | Status: SHIPPED | OUTPATIENT
Start: 2020-11-12 | End: 2021-02-12 | Stop reason: SDUPTHER

## 2020-11-12 RX ORDER — ESCITALOPRAM OXALATE 20 MG/1
20 TABLET ORAL DAILY
Qty: 90 TABLET | Refills: 0 | Status: SHIPPED | OUTPATIENT
Start: 2020-11-12 | End: 2021-02-12 | Stop reason: SDUPTHER

## 2020-12-15 ENCOUNTER — PATIENT MESSAGE (OUTPATIENT)
Dept: INTERNAL MEDICINE | Facility: CLINIC | Age: 50
End: 2020-12-15

## 2020-12-15 DIAGNOSIS — E78.5 HYPERLIPIDEMIA, UNSPECIFIED HYPERLIPIDEMIA TYPE: ICD-10-CM

## 2020-12-15 DIAGNOSIS — M76.899 TENDONITIS OF KNEE: ICD-10-CM

## 2020-12-16 RX ORDER — ATORVASTATIN CALCIUM 40 MG/1
40 TABLET, FILM COATED ORAL DAILY
Qty: 90 TABLET | Refills: 0 | Status: SHIPPED | OUTPATIENT
Start: 2020-12-16 | End: 2021-03-30 | Stop reason: SDUPTHER

## 2020-12-16 RX ORDER — MELOXICAM 15 MG/1
15 TABLET ORAL DAILY
Qty: 90 TABLET | Refills: 0 | Status: SHIPPED | OUTPATIENT
Start: 2020-12-16 | End: 2021-03-22 | Stop reason: SDUPTHER

## 2020-12-16 NOTE — TELEPHONE ENCOUNTER
From: Dasha De Leon  To: Kyara CARITO Henderson  Sent: 12/15/2020 9:54 PM EST  Subject: Prescription Question    I am needing a refill on my Meloxicam & Atorvastatin 90 days to [facility] pharmacy. I also need to reschedule my appointment from November. I was diagnosed with COVID the day before my appt so I had to cancel. I can wait until Kyara gets back from maternity leave if that is acceptable. I would prefer a Friday earliest appt possible. Thanks

## 2021-01-12 DIAGNOSIS — R21 RASH: Primary | ICD-10-CM

## 2021-01-12 DIAGNOSIS — J30.9 ALLERGIC RHINITIS, UNSPECIFIED SEASONALITY, UNSPECIFIED TRIGGER: ICD-10-CM

## 2021-01-12 RX ORDER — PREDNISONE 1 MG/1
TABLET ORAL
Qty: 15 TABLET | Refills: 0 | Status: SHIPPED | OUTPATIENT
Start: 2021-01-12 | End: 2021-01-26

## 2021-01-12 RX ORDER — LEVOCETIRIZINE DIHYDROCHLORIDE 5 MG/1
5 TABLET, FILM COATED ORAL DAILY
Qty: 90 TABLET | Refills: 1 | Status: SHIPPED | OUTPATIENT
Start: 2021-01-12 | End: 2021-07-20 | Stop reason: SDUPTHER

## 2021-01-12 NOTE — TELEPHONE ENCOUNTER
----- Message from Dasha De Leon sent at 1/12/2021  9:26 AM EST -----  Regarding: Prescription Question  Contact: [phone]  I need a refill on my generic Xyzal 90 days sent to [facility] [place]. Thank you in advance

## 2021-01-26 ENCOUNTER — OFFICE VISIT (OUTPATIENT)
Dept: INTERNAL MEDICINE | Facility: CLINIC | Age: 51
End: 2021-01-26

## 2021-01-26 VITALS
OXYGEN SATURATION: 100 % | DIASTOLIC BLOOD PRESSURE: 84 MMHG | HEART RATE: 108 BPM | TEMPERATURE: 98 F | BODY MASS INDEX: 28.88 KG/M2 | SYSTOLIC BLOOD PRESSURE: 138 MMHG | WEIGHT: 184 LBS | HEIGHT: 67 IN

## 2021-01-26 DIAGNOSIS — G89.29 CHRONIC BILATERAL LOW BACK PAIN WITH BILATERAL SCIATICA: ICD-10-CM

## 2021-01-26 DIAGNOSIS — M54.41 CHRONIC BILATERAL LOW BACK PAIN WITH BILATERAL SCIATICA: ICD-10-CM

## 2021-01-26 DIAGNOSIS — M54.42 CHRONIC BILATERAL LOW BACK PAIN WITH BILATERAL SCIATICA: ICD-10-CM

## 2021-01-26 DIAGNOSIS — R21 RASH: Primary | ICD-10-CM

## 2021-01-26 PROCEDURE — 99213 OFFICE O/P EST LOW 20 MIN: CPT | Performed by: NURSE PRACTITIONER

## 2021-01-26 PROCEDURE — 96372 THER/PROPH/DIAG INJ SC/IM: CPT | Performed by: NURSE PRACTITIONER

## 2021-01-26 RX ORDER — TRIAMCINOLONE ACETONIDE 40 MG/ML
40 INJECTION, SUSPENSION INTRA-ARTICULAR; INTRAMUSCULAR ONCE
Status: DISCONTINUED | OUTPATIENT
Start: 2021-01-26 | End: 2021-01-26

## 2021-01-26 RX ORDER — HYDROCODONE BITARTRATE AND ACETAMINOPHEN 5; 325 MG/1; MG/1
1 TABLET ORAL EVERY 6 HOURS PRN
Qty: 12 TABLET | Refills: 0 | OUTPATIENT
Start: 2021-01-26 | End: 2021-04-07

## 2021-01-26 RX ORDER — TRIAMCINOLONE ACETONIDE 40 MG/ML
40 INJECTION, SUSPENSION INTRA-ARTICULAR; INTRAMUSCULAR ONCE
Status: COMPLETED | OUTPATIENT
Start: 2021-01-26 | End: 2021-01-26

## 2021-01-26 RX ORDER — HYDROXYZINE PAMOATE 25 MG/1
25 CAPSULE ORAL 3 TIMES DAILY PRN
Qty: 30 CAPSULE | Refills: 0 | Status: SHIPPED | OUTPATIENT
Start: 2021-01-26 | End: 2021-05-25

## 2021-01-26 RX ADMIN — TRIAMCINOLONE ACETONIDE 40 MG: 40 INJECTION, SUSPENSION INTRA-ARTICULAR; INTRAMUSCULAR at 15:40

## 2021-01-26 NOTE — PROGRESS NOTES
Subjective   Dasha De Leon is a 50 y.o. female.     She reports she started with rash to right lower anterior leg. It has spread to left lower leg. She has not seen a specialist. She did take five days of steroids about two weeks ago (was prescribed by doctor at her office). It helped. She has not seen allergist.  She denies any changes in medication, fragrance, diet or detergents. No new pets, plants.      Rash  This is a new problem. The current episode started more than 1 month ago. The problem has been gradually worsening since onset. Location: right leg, left leg, hands, buttock,  The rash is characterized by redness and itchiness. She was exposed to nothing. Associated symptoms include rhinorrhea (improved ). Pertinent negatives include no cough, diarrhea, fatigue, fever or shortness of breath. Treatments tried: kenalog, clobetasol, oral prednisone, hot  The treatment provided mild relief.        The following portions of the patient's history were reviewed and updated as appropriate: allergies, current medications, past family history, past medical history, past social history, past surgical history and problem list.    Review of Systems   Constitutional: Negative for activity change, appetite change, fatigue and fever.   HENT: Positive for rhinorrhea (improved ).    Respiratory: Negative for cough, shortness of breath and wheezing.    Cardiovascular: Negative for chest pain, palpitations and leg swelling.   Gastrointestinal: Negative for diarrhea.   Musculoskeletal: Positive for back pain (chronic low back ).   Skin: Positive for rash.       Objective   Physical Exam  Constitutional:       Appearance: She is well-developed.   HENT:      Head: Normocephalic.      Nose: Nose normal.   Neck:      Thyroid: No thyroid mass or thyromegaly.      Vascular: No carotid bruit.   Cardiovascular:      Rate and Rhythm: Regular rhythm.      Heart sounds: Normal heart sounds. No murmur. No S3 or S4 sounds.    Pulmonary:       Effort: Pulmonary effort is normal.      Breath sounds: Normal breath sounds. No decreased breath sounds, wheezing, rhonchi or rales.   Skin:     General: Skin is warm and dry.      Findings: Rash present. Rash is macular.      Comments: Erythema macular rash to bilateral forearms/hands; left and right upper buttock and bilateral lower anterior legs, no pus noted  Some scabs noted    Neurological:      Mental Status: She is alert and oriented to person, place, and time.      Gait: Gait normal.         Assessment/Plan   Diagnoses and all orders for this visit:    1. Rash (Primary)  -     hydrOXYzine pamoate (Vistaril) 25 MG capsule; Take 1 capsule by mouth 3 (Three) Times a Day As Needed for Itching.  Dispense: 30 capsule; Refill: 0  -     TSH  -     Sedimentation Rate  -     CBC & Differential  -     triamcinolone acetonide (KENALOG-40) injection 40 mg  -     Discontinue: triamcinolone acetonide (KENALOG-40) injection 40 mg    2. Chronic bilateral low back pain with bilateral sciatica  Comments:  due to follow up with Dr Botello  Orders:  -     HYDROcodone-acetaminophen (NORCO) 5-325 MG per tablet; Take 1 tablet by mouth Every 6 (Six) Hours As Needed for Severe Pain .  Dispense: 12 tablet; Refill: 0    Rash: unclear of etiology; will start kenalog and hydroxizine; will check labs; if no improvement she will schedule with allergist for further evaluation   Chronic back pain: she is taking hydrocodone sparingly. She had epidural 8/2020; yayo up to date.

## 2021-01-27 LAB
BASOPHILS # BLD AUTO: 0.05 10*3/MM3 (ref 0–0.2)
BASOPHILS NFR BLD AUTO: 0.6 % (ref 0–1.5)
EOSINOPHIL # BLD AUTO: 0.25 10*3/MM3 (ref 0–0.4)
EOSINOPHIL NFR BLD AUTO: 2.9 % (ref 0.3–6.2)
ERYTHROCYTE [DISTWIDTH] IN BLOOD BY AUTOMATED COUNT: 12 % (ref 12.3–15.4)
ERYTHROCYTE [SEDIMENTATION RATE] IN BLOOD BY WESTERGREN METHOD: 8 MM/HR (ref 0–20)
HCT VFR BLD AUTO: 38.5 % (ref 34–46.6)
HGB BLD-MCNC: 12.2 G/DL (ref 12–15.9)
IMM GRANULOCYTES # BLD AUTO: 0.07 10*3/MM3 (ref 0–0.05)
IMM GRANULOCYTES NFR BLD AUTO: 0.8 % (ref 0–0.5)
LYMPHOCYTES # BLD AUTO: 1.88 10*3/MM3 (ref 0.7–3.1)
LYMPHOCYTES NFR BLD AUTO: 21.5 % (ref 19.6–45.3)
MCH RBC QN AUTO: 28 PG (ref 26.6–33)
MCHC RBC AUTO-ENTMCNC: 31.7 G/DL (ref 31.5–35.7)
MCV RBC AUTO: 88.3 FL (ref 79–97)
MONOCYTES # BLD AUTO: 0.97 10*3/MM3 (ref 0.1–0.9)
MONOCYTES NFR BLD AUTO: 11.1 % (ref 5–12)
NEUTROPHILS # BLD AUTO: 5.53 10*3/MM3 (ref 1.7–7)
NEUTROPHILS NFR BLD AUTO: 63.1 % (ref 42.7–76)
NRBC BLD AUTO-RTO: 0 /100 WBC (ref 0–0.2)
PLATELET # BLD AUTO: 364 10*3/MM3 (ref 140–450)
RBC # BLD AUTO: 4.36 10*6/MM3 (ref 3.77–5.28)
TSH SERPL DL<=0.005 MIU/L-ACNC: 2.09 UIU/ML (ref 0.27–4.2)
WBC # BLD AUTO: 8.75 10*3/MM3 (ref 3.4–10.8)

## 2021-02-12 DIAGNOSIS — F39 MOOD DISORDER (HCC): ICD-10-CM

## 2021-02-12 DIAGNOSIS — I10 ESSENTIAL HYPERTENSION: Primary | ICD-10-CM

## 2021-02-12 DIAGNOSIS — M62.838 MUSCLE SPASM: ICD-10-CM

## 2021-02-12 NOTE — TELEPHONE ENCOUNTER
----- Message from Dasha De Leon sent at 2/12/2021 11:52 AM EST -----  Regarding: Prescription Question  Contact: [phone]  I need 90 days rxs sent to [facility] pharmacy for cyclobenzaprine, escitalopram, hctz & olmesartan. Thank you, have a great weekend!

## 2021-02-15 ENCOUNTER — APPOINTMENT (OUTPATIENT)
Dept: VACCINE CLINIC | Facility: HOSPITAL | Age: 51
End: 2021-02-15

## 2021-02-16 RX ORDER — ESCITALOPRAM OXALATE 20 MG/1
20 TABLET ORAL DAILY
Qty: 90 TABLET | Refills: 1 | Status: SHIPPED | OUTPATIENT
Start: 2021-02-16 | End: 2021-07-20 | Stop reason: SDUPTHER

## 2021-02-16 RX ORDER — OLMESARTAN MEDOXOMIL 40 MG/1
20 TABLET ORAL DAILY
Qty: 45 TABLET | Refills: 1 | Status: SHIPPED | OUTPATIENT
Start: 2021-02-16 | End: 2021-07-20 | Stop reason: SDUPTHER

## 2021-02-16 RX ORDER — HYDROCHLOROTHIAZIDE 25 MG/1
25 TABLET ORAL DAILY
Qty: 90 TABLET | Refills: 1 | Status: SHIPPED | OUTPATIENT
Start: 2021-02-16 | End: 2021-07-20 | Stop reason: SDUPTHER

## 2021-02-16 RX ORDER — CYCLOBENZAPRINE HCL 10 MG
10 TABLET ORAL 3 TIMES DAILY PRN
Qty: 90 TABLET | Refills: 1 | Status: SHIPPED | OUTPATIENT
Start: 2021-02-16 | End: 2021-07-20 | Stop reason: SDUPTHER

## 2021-02-23 ENCOUNTER — IMMUNIZATION (OUTPATIENT)
Dept: VACCINE CLINIC | Facility: HOSPITAL | Age: 51
End: 2021-02-23

## 2021-02-23 PROCEDURE — 91300 HC SARSCOV02 VAC 30MCG/0.3ML IM: CPT | Performed by: INTERNAL MEDICINE

## 2021-02-23 PROCEDURE — 0001A: CPT | Performed by: INTERNAL MEDICINE

## 2021-03-16 ENCOUNTER — IMMUNIZATION (OUTPATIENT)
Dept: VACCINE CLINIC | Facility: HOSPITAL | Age: 51
End: 2021-03-16

## 2021-03-16 PROCEDURE — 91300 HC SARSCOV02 VAC 30MCG/0.3ML IM: CPT | Performed by: INTERNAL MEDICINE

## 2021-03-16 PROCEDURE — 0002A: CPT | Performed by: INTERNAL MEDICINE

## 2021-03-22 DIAGNOSIS — M76.899 TENDONITIS OF KNEE: ICD-10-CM

## 2021-03-22 RX ORDER — MELOXICAM 15 MG/1
15 TABLET ORAL DAILY
Qty: 90 TABLET | Refills: 0 | Status: SHIPPED | OUTPATIENT
Start: 2021-03-22 | End: 2021-06-29 | Stop reason: SDUPTHER

## 2021-03-30 DIAGNOSIS — E78.5 HYPERLIPIDEMIA, UNSPECIFIED HYPERLIPIDEMIA TYPE: ICD-10-CM

## 2021-03-30 RX ORDER — ATORVASTATIN CALCIUM 40 MG/1
40 TABLET, FILM COATED ORAL DAILY
Qty: 90 TABLET | Refills: 0 | Status: SHIPPED | OUTPATIENT
Start: 2021-03-30 | End: 2021-06-29 | Stop reason: SDUPTHER

## 2021-04-07 ENCOUNTER — APPOINTMENT (OUTPATIENT)
Dept: CT IMAGING | Facility: HOSPITAL | Age: 51
End: 2021-04-07

## 2021-04-07 ENCOUNTER — HOSPITAL ENCOUNTER (EMERGENCY)
Facility: HOSPITAL | Age: 51
Discharge: HOME | End: 2021-04-07
Attending: EMERGENCY MEDICINE | Admitting: EMERGENCY MEDICINE

## 2021-04-07 VITALS
BODY MASS INDEX: 28.61 KG/M2 | WEIGHT: 178 LBS | DIASTOLIC BLOOD PRESSURE: 88 MMHG | HEIGHT: 66 IN | RESPIRATION RATE: 16 BRPM | SYSTOLIC BLOOD PRESSURE: 144 MMHG | TEMPERATURE: 96.2 F | HEART RATE: 86 BPM | OXYGEN SATURATION: 99 %

## 2021-04-07 DIAGNOSIS — R10.9 ACUTE LEFT FLANK PAIN: ICD-10-CM

## 2021-04-07 DIAGNOSIS — N39.0 ACUTE UTI: Primary | ICD-10-CM

## 2021-04-07 LAB
ALBUMIN SERPL-MCNC: 4.3 G/DL (ref 3.5–5.2)
ALBUMIN/GLOB SERPL: 1.5 G/DL
ALP SERPL-CCNC: 80 U/L (ref 39–117)
ALT SERPL W P-5'-P-CCNC: 22 U/L (ref 1–33)
ANION GAP SERPL CALCULATED.3IONS-SCNC: 9.3 MMOL/L (ref 5–15)
AST SERPL-CCNC: 25 U/L (ref 1–32)
BACTERIA UR QL AUTO: ABNORMAL /HPF
BASOPHILS # BLD AUTO: 0.05 10*3/MM3 (ref 0–0.2)
BASOPHILS NFR BLD AUTO: 0.5 % (ref 0–1.5)
BILIRUB SERPL-MCNC: 0.2 MG/DL (ref 0–1.2)
BILIRUB UR QL STRIP: NEGATIVE
BUN SERPL-MCNC: 15 MG/DL (ref 6–20)
BUN/CREAT SERPL: 16.7 (ref 7–25)
CALCIUM SPEC-SCNC: 9.6 MG/DL (ref 8.6–10.5)
CHLORIDE SERPL-SCNC: 101 MMOL/L (ref 98–107)
CLARITY UR: ABNORMAL
CO2 SERPL-SCNC: 27.7 MMOL/L (ref 22–29)
COLOR UR: YELLOW
CREAT SERPL-MCNC: 0.9 MG/DL (ref 0.57–1)
DEPRECATED RDW RBC AUTO: 41.8 FL (ref 37–54)
EOSINOPHIL # BLD AUTO: 0.14 10*3/MM3 (ref 0–0.4)
EOSINOPHIL NFR BLD AUTO: 1.5 % (ref 0.3–6.2)
ERYTHROCYTE [DISTWIDTH] IN BLOOD BY AUTOMATED COUNT: 13.1 % (ref 12.3–15.4)
GFR SERPL CREATININE-BSD FRML MDRD: 66 ML/MIN/1.73
GLOBULIN UR ELPH-MCNC: 2.9 GM/DL
GLUCOSE SERPL-MCNC: 94 MG/DL (ref 65–99)
GLUCOSE UR STRIP-MCNC: NEGATIVE MG/DL
HCG INTACT+B SERPL-ACNC: 9.04 MIU/ML
HCG SERPL QL: POSITIVE
HCT VFR BLD AUTO: 38 % (ref 34–46.6)
HGB BLD-MCNC: 12.2 G/DL (ref 12–15.9)
HGB UR QL STRIP.AUTO: NEGATIVE
HOLD SPECIMEN: NORMAL
HYALINE CASTS UR QL AUTO: ABNORMAL /LPF
IMM GRANULOCYTES # BLD AUTO: 0.15 10*3/MM3 (ref 0–0.05)
IMM GRANULOCYTES NFR BLD AUTO: 1.6 % (ref 0–0.5)
KETONES UR QL STRIP: NEGATIVE
LEUKOCYTE ESTERASE UR QL STRIP.AUTO: ABNORMAL
LIPASE SERPL-CCNC: 26 U/L (ref 13–60)
LYMPHOCYTES # BLD AUTO: 1.89 10*3/MM3 (ref 0.7–3.1)
LYMPHOCYTES NFR BLD AUTO: 20.1 % (ref 19.6–45.3)
MCH RBC QN AUTO: 28.3 PG (ref 26.6–33)
MCHC RBC AUTO-ENTMCNC: 32.1 G/DL (ref 31.5–35.7)
MCV RBC AUTO: 88.2 FL (ref 79–97)
MONOCYTES # BLD AUTO: 1.02 10*3/MM3 (ref 0.1–0.9)
MONOCYTES NFR BLD AUTO: 10.8 % (ref 5–12)
NEUTROPHILS NFR BLD AUTO: 6.16 10*3/MM3 (ref 1.7–7)
NEUTROPHILS NFR BLD AUTO: 65.5 % (ref 42.7–76)
NITRITE UR QL STRIP: POSITIVE
NRBC BLD AUTO-RTO: 0 /100 WBC (ref 0–0.2)
PH UR STRIP.AUTO: 6 [PH] (ref 5–8)
PLATELET # BLD AUTO: 332 10*3/MM3 (ref 140–450)
PMV BLD AUTO: 9.5 FL (ref 6–12)
POTASSIUM SERPL-SCNC: 3.9 MMOL/L (ref 3.5–5.2)
PROT SERPL-MCNC: 7.2 G/DL (ref 6–8.5)
PROT UR QL STRIP: NEGATIVE
RBC # BLD AUTO: 4.31 10*6/MM3 (ref 3.77–5.28)
RBC # UR: ABNORMAL /HPF
REF LAB TEST METHOD: ABNORMAL
SODIUM SERPL-SCNC: 138 MMOL/L (ref 136–145)
SP GR UR STRIP: 1.01 (ref 1–1.03)
SQUAMOUS #/AREA URNS HPF: ABNORMAL /HPF
UROBILINOGEN UR QL STRIP: ABNORMAL
WBC # BLD AUTO: 9.41 10*3/MM3 (ref 3.4–10.8)
WBC UR QL AUTO: ABNORMAL /HPF
WHOLE BLOOD HOLD SPECIMEN: NORMAL
WHOLE BLOOD HOLD SPECIMEN: NORMAL

## 2021-04-07 PROCEDURE — 25010000002 ONDANSETRON PER 1 MG: Performed by: EMERGENCY MEDICINE

## 2021-04-07 PROCEDURE — 85025 COMPLETE CBC W/AUTO DIFF WBC: CPT | Performed by: EMERGENCY MEDICINE

## 2021-04-07 PROCEDURE — 96374 THER/PROPH/DIAG INJ IV PUSH: CPT

## 2021-04-07 PROCEDURE — 25010000002 MORPHINE PER 10 MG: Performed by: EMERGENCY MEDICINE

## 2021-04-07 PROCEDURE — 96375 TX/PRO/DX INJ NEW DRUG ADDON: CPT

## 2021-04-07 PROCEDURE — 25010000002 KETOROLAC TROMETHAMINE PER 15 MG: Performed by: EMERGENCY MEDICINE

## 2021-04-07 PROCEDURE — 83690 ASSAY OF LIPASE: CPT | Performed by: EMERGENCY MEDICINE

## 2021-04-07 PROCEDURE — 81001 URINALYSIS AUTO W/SCOPE: CPT | Performed by: EMERGENCY MEDICINE

## 2021-04-07 PROCEDURE — 84702 CHORIONIC GONADOTROPIN TEST: CPT | Performed by: EMERGENCY MEDICINE

## 2021-04-07 PROCEDURE — 80053 COMPREHEN METABOLIC PANEL: CPT | Performed by: EMERGENCY MEDICINE

## 2021-04-07 PROCEDURE — 36415 COLL VENOUS BLD VENIPUNCTURE: CPT

## 2021-04-07 PROCEDURE — 74176 CT ABD & PELVIS W/O CONTRAST: CPT

## 2021-04-07 PROCEDURE — 84703 CHORIONIC GONADOTROPIN ASSAY: CPT | Performed by: EMERGENCY MEDICINE

## 2021-04-07 PROCEDURE — 99283 EMERGENCY DEPT VISIT LOW MDM: CPT

## 2021-04-07 RX ORDER — MORPHINE SULFATE 2 MG/ML
4 INJECTION, SOLUTION INTRAMUSCULAR; INTRAVENOUS ONCE
Status: COMPLETED | OUTPATIENT
Start: 2021-04-07 | End: 2021-04-07

## 2021-04-07 RX ORDER — ONDANSETRON 2 MG/ML
4 INJECTION INTRAMUSCULAR; INTRAVENOUS ONCE
Status: COMPLETED | OUTPATIENT
Start: 2021-04-07 | End: 2021-04-07

## 2021-04-07 RX ORDER — SODIUM CHLORIDE 0.9 % (FLUSH) 0.9 %
10 SYRINGE (ML) INJECTION AS NEEDED
Status: DISCONTINUED | OUTPATIENT
Start: 2021-04-07 | End: 2021-04-07 | Stop reason: HOSPADM

## 2021-04-07 RX ORDER — NITROFURANTOIN 25; 75 MG/1; MG/1
100 CAPSULE ORAL ONCE
Status: COMPLETED | OUTPATIENT
Start: 2021-04-07 | End: 2021-04-07

## 2021-04-07 RX ORDER — HYDROCODONE BITARTRATE AND ACETAMINOPHEN 5; 325 MG/1; MG/1
1 TABLET ORAL EVERY 6 HOURS PRN
Qty: 8 TABLET | Refills: 0 | Status: SHIPPED | OUTPATIENT
Start: 2021-04-07 | End: 2021-05-25

## 2021-04-07 RX ORDER — NITROFURANTOIN 25; 75 MG/1; MG/1
100 CAPSULE ORAL 2 TIMES DAILY
Qty: 14 CAPSULE | Refills: 0 | Status: SHIPPED | OUTPATIENT
Start: 2021-04-07 | End: 2021-05-25

## 2021-04-07 RX ORDER — KETOROLAC TROMETHAMINE 15 MG/ML
15 INJECTION, SOLUTION INTRAMUSCULAR; INTRAVENOUS ONCE
Status: COMPLETED | OUTPATIENT
Start: 2021-04-07 | End: 2021-04-07

## 2021-04-07 RX ADMIN — KETOROLAC TROMETHAMINE 15 MG: 15 INJECTION, SOLUTION INTRAMUSCULAR; INTRAVENOUS at 13:16

## 2021-04-07 RX ADMIN — NITROFURANTOIN MONOHYDRATE/MACROCRYSTALLINE 100 MG: 25; 75 CAPSULE ORAL at 15:12

## 2021-04-07 RX ADMIN — ONDANSETRON 4 MG: 2 INJECTION INTRAMUSCULAR; INTRAVENOUS at 15:32

## 2021-04-07 RX ADMIN — MORPHINE SULFATE 4 MG: 2 INJECTION, SOLUTION INTRAMUSCULAR; INTRAVENOUS at 15:31

## 2021-04-07 NOTE — DISCHARGE INSTRUCTIONS
Take medications as prescribed.  Drink plenty of fluids.  Return to the emergency department for worsening pain, vomiting, fever, difficulty urinating, or other concern.  Follow-up with your primary care provider in the next several days if symptoms are not improving.

## 2021-04-07 NOTE — ED TRIAGE NOTES
Left flank pain started last night.  No urinary symptoms    Patient was placed in face mask during first look triage.  Patient was wearing a face mask throughout encounter.  I wore personal protective equipment throughout the encounter.  Hand hygiene was performed before and after patient encounter.

## 2021-04-08 ENCOUNTER — EPISODE CHANGES (OUTPATIENT)
Dept: CASE MANAGEMENT | Facility: OTHER | Age: 51
End: 2021-04-08

## 2021-05-10 RX ORDER — PREDNISONE 1 MG/1
TABLET ORAL
Qty: 15 TABLET | Refills: 1 | Status: SHIPPED | OUTPATIENT
Start: 2021-05-10 | End: 2021-05-20

## 2021-05-25 ENCOUNTER — OFFICE VISIT (OUTPATIENT)
Dept: INTERNAL MEDICINE | Facility: CLINIC | Age: 51
End: 2021-05-25

## 2021-05-25 DIAGNOSIS — L30.9 ECZEMA, UNSPECIFIED TYPE: ICD-10-CM

## 2021-05-25 DIAGNOSIS — I10 ESSENTIAL HYPERTENSION: ICD-10-CM

## 2021-05-25 DIAGNOSIS — F39 MOOD DISORDER (HCC): Primary | ICD-10-CM

## 2021-05-25 PROCEDURE — 99214 OFFICE O/P EST MOD 30 MIN: CPT | Performed by: PHYSICIAN ASSISTANT

## 2021-05-25 RX ORDER — BUPROPION HYDROCHLORIDE 150 MG/1
150 TABLET ORAL DAILY
Qty: 90 TABLET | Refills: 1 | Status: SHIPPED | OUTPATIENT
Start: 2021-05-25 | End: 2021-07-09

## 2021-05-25 RX ORDER — TRIAMCINOLONE ACETONIDE 5 MG/G
CREAM TOPICAL 3 TIMES DAILY
Qty: 15 G | Refills: 5 | Status: SHIPPED | OUTPATIENT
Start: 2021-05-25 | End: 2022-04-18 | Stop reason: SDUPTHER

## 2021-05-25 NOTE — PROGRESS NOTES
Subjective   Chief Complaint   Patient presents with   • Rash     NP     History of Present Illness      Pt is here today to establish care as a new pt. She has a hx of chronic low back pain, HLD, HTN, allergies rhinitis and anxiety. She sees GYN, has Mirena. She has had increased stress at work and does not feel her Lexapro alone is adequate any further. She denies CP, SOA and palpitations. She does have a rash that is recurrent on her lower extremities. She has been treated with a few medrol dose paks but keeps returning.     Patient Active Problem List   Diagnosis   • IUD (intrauterine device) in place   • Essential hypertension   • Mood disorder (CMS/[facility])   • Allergic rhinitis   • Hyperlipidemia   • Knee pain, right   • Lumbar radiculopathy       Allergies   Allergen Reactions   • Penicillins Anaphylaxis   • Lisinopril Cough       Current Outpatient Medications on File Prior to Visit   Medication Sig Dispense Refill   • ALLERGY SERUM INJECTION Inject  under the skin 1 (One) Time.     • atorvastatin (LIPITOR) 40 MG tablet Take 1 tablet by mouth Daily. 90 tablet 0   • cyclobenzaprine (FLEXERIL) 10 MG tablet Take 1 tablet by mouth 3 (Three) Times a Day As Needed for Muscle Spasms. 90 tablet 1   • escitalopram (LEXAPRO) 20 MG tablet Take 1 tablet by mouth Daily. 90 tablet 1   • hydroCHLOROthiazide (HYDRODIURIL) 25 MG tablet Take 1 tablet by mouth Daily. 90 tablet 1   • levocetirizine (XYZAL) 5 MG tablet Take 1 tablet by mouth Daily. 90 tablet 1   • levonorgestrel (MIRENA, 52 MG,) 20 MCG/24HR IUD 1 each by Intrauterine route 1 (one) time.     • meloxicam (MOBIC) 15 MG tablet Take 1 tablet by mouth Daily. 90 tablet 0   • olmesartan (BENICAR) 40 MG tablet Take 0.5 tablets by mouth Daily. 45 tablet 1   • triamcinolone (KENALOG) 0.1 % cream Apply a thin film to dry area on scalp once or twice daily as needed. 15 g 5     No current facility-administered medications on file prior to visit.       Past Medical History:    Diagnosis Date   • Abnormal Pap smear of cervix    • Baker's cyst    • Bulging lumbar disc    • Bursitis    • CTS (carpal tunnel syndrome)     currently resolved   • H/O colposcopy with cervical biopsy     unknown pap result, biopsy was neg per pt, 2013 [facility]   • HPV (human papilloma virus) infection    • Hyperlipemia    • Hypertension    • Low back pain    • Osteoarthritis    • Scoliosis    • Seasonal allergies        Family History   Problem Relation Age of Onset   • Hypertension Mother    • Hyperlipidemia Mother    • Diverticulitis Mother    • Pancreatitis Mother    • Kidney disease Father    • Skin cancer Father    • Hypertension Father    • Hyperlipidemia Father    • Stroke Father    • Atrial fibrillation Father    • Transient ischemic attack Father    • Depression Brother    • Liver disease Maternal Grandmother    • Heart disease Maternal Grandfather    • Cancer Maternal Grandfather    • Heart attack Paternal Grandmother    • No Known Problems Paternal Grandfather    • Lung cancer Maternal Uncle    • Heart attack Maternal Uncle    • Uterine cancer Maternal Aunt 58   • Heart disease Maternal Aunt    • Colon cancer Paternal Uncle 65   • No Known Problems Sister    • No Known Problems Daughter    • No Known Problems Son    • No Known Problems Paternal Aunt    • BRCA 1/2 Neg Hx    • Breast cancer Neg Hx    • Endometrial cancer Neg Hx    • Ovarian cancer Neg Hx        Social History     Socioeconomic History   • Marital status: Single     Spouse name: Not on file   • Number of children: Not on file   • Years of education: Not on file   • Highest education level: Not on file   Tobacco Use   • Smoking status: Former Smoker     Packs/day: 1.00     Years: 20.00     Pack years: 20.00     Types: Cigarettes     Quit date: 2006     Years since quitting: 15.4   • Smokeless tobacco: Never Used   Vaping Use   • Vaping Use: Never used   Substance and Sexual Activity   • Alcohol use: Yes     Alcohol/week: 7.0 standard  "drinks     Types: 7 Glasses of wine per week     Comment: OCC   • Drug use: No   • Sexual activity: Not Currently     Birth control/protection: I.U.D.       Past Surgical History:   Procedure Laterality Date   • AUGMENTATION MAMMAPLASTY     • BREAST AUGMENTATION     • CARPAL TUNNEL RELEASE  2004   • CRYOTHERAPY      1997   • EPIDURAL BLOCK     • HAND SURGERY  2004    Pisiformectomy   • MAMMO BILATERAL  2014   • PAP SMEAR  20116   • SHOULDER ARTHROSCOPY  2002,2003   • SHOULDER SURGERY     • WRIST SURGERY           The following portions of the patient's history were reviewed and updated as appropriate: problem list, allergies, current medications, past medical history, past family history, past social history and past surgical history.    Review of Systems   Cardiovascular: Negative for chest pain and dyspnea on exertion.   Skin: Positive for rash.   Psychiatric/Behavioral: The patient is nervous/anxious.        Immunization History   Administered Date(s) Administered   • COVID-19 (PFIZER) 02/23/2021, 03/16/2021   • Flu Vaccine Quad PF >18YRS 10/29/2018, 10/28/2019   • Flulaval/Fluarix/Fluzone Quad 11/05/2020       Objective   Vitals:    05/25/21 1418 05/28/21 0950   BP:  122/74   Temp: 98.1 °F (36.7 °C)    Weight: 83 kg (183 lb)    Height: 167.6 cm (66\")      Body mass index is 29.54 kg/m².  Physical Exam  Vitals reviewed.   Constitutional:       Appearance: She is well-developed.   HENT:      Head: Normocephalic and atraumatic.   Cardiovascular:      Rate and Rhythm: Normal rate and regular rhythm.      Heart sounds: Normal heart sounds, S1 normal and S2 normal.   Pulmonary:      Effort: Pulmonary effort is normal.      Breath sounds: Normal breath sounds.   Skin:     General: Skin is warm.      Comments: Scattered erythematous macular lesions on anterior lower extremities. Skin is dry   Neurological:      General: No focal deficit present.      Mental Status: She is alert and oriented to person, place, and time. "   Psychiatric:         Mood and Affect: Mood normal.         Behavior: Behavior normal.         Thought Content: Thought content normal.         Judgment: Judgment normal.       Assessment/Plan   Diagnoses and all orders for this visit:    1. Mood disorder (CMS/HCC) (Primary)  -     buPROPion XL (Wellbutrin XL) 150 MG 24 hr tablet; Take 1 tablet by mouth Daily.  Dispense: 90 tablet; Refill: 1    2. Eczema, unspecified type  -     triamcinolone (KENALOG) 0.5 % cream; Apply  topically to the appropriate area as directed 3 (Three) Times a Day.  Dispense: 15 g; Refill: 5    3. Essential hypertension    Use triamcinolone cream for her eczema. Add Wellbutrin to her Lexapro 20 mg daily for anxiety and fup in 6 weeks.     BP is well controlled.     Return in about 6 weeks (around 7/6/2021).

## 2021-05-28 VITALS
BODY MASS INDEX: 29.41 KG/M2 | SYSTOLIC BLOOD PRESSURE: 122 MMHG | TEMPERATURE: 98.1 F | WEIGHT: 183 LBS | DIASTOLIC BLOOD PRESSURE: 74 MMHG | HEIGHT: 66 IN

## 2021-06-23 NOTE — ED PROVIDER NOTES
EMERGENCY DEPARTMENT ENCOUNTER    Room Number:  40/40  Date of encounter:  4/7/2021  PCP: Kyara Henderson APRN  Historian: Patient     I used full protective equipment while examining this patient.  This includes face mask, gloves and protective eyewear.  I washed my hands before entering the room and immediately upon leaving the room.  Patient was wearing a surgical mask.      HPI:  Chief Complaint: Left flank pain  A complete HPI/ROS/PMH/PSH/SH/FH are unobtainable due to: None    Context: Dasha De Leon is a 50 y.o. female who presents to the ED c/o sudden onset of left flank pain.  Pain began last night.  Pain is constant but is waxing and waning.  It is worse with movement.  Pain is described as sharp.  It radiates into the left mid abdomen.  Pain is 8/10 at worst.  Pain is currently 2/10.  Denies nausea, vomiting, fever, chills, chest pain, dysuria, or hematuria.  LMP was approximately 1 year ago.  Denies history of kidney stones.  She has a history of chronic low back pain but states this pain is different.      PAST MEDICAL HISTORY  Active Ambulatory Problems     Diagnosis Date Noted   • IUD (intrauterine device) in place 09/22/2016   • Essential hypertension 12/27/2016   • Mood disorder (CMS/[facility]) 12/27/2016   • Allergic rhinitis 12/27/2016   • Hyperlipidemia 10/29/2019   • Knee pain, right 04/24/2019   • Lumbar radiculopathy 05/28/2020     Resolved Ambulatory Problems     Diagnosis Date Noted   • No Resolved Ambulatory Problems     Past Medical History:   Diagnosis Date   • Abnormal Pap smear of cervix    • Baker's cyst    • Bulging lumbar disc    • Bursitis    • CTS (carpal tunnel syndrome)    • H/O colposcopy with cervical biopsy    • HPV (human papilloma virus) infection    • Hyperlipemia    • Hypertension    • Low back pain    • Osteoarthritis    • Scoliosis    • Seasonal allergies          PAST SURGICAL HISTORY  Past Surgical History:   Procedure Laterality Date   • AUGMENTATION MAMMAPLASTY     •  His symptoms are controlled on omeprazole 40 mg daily    Advised to watch diet and GE reflux precautions BREAST AUGMENTATION     • CARPAL TUNNEL RELEASE  2004   • CRYOTHERAPY      1997   • EPIDURAL BLOCK     • HAND SURGERY  2004    Pisiformectomy   • MAMMO BILATERAL  2014   • PAP SMEAR  20116   • SHOULDER ARTHROSCOPY  2002,2003   • SHOULDER SURGERY     • WRIST SURGERY           FAMILY HISTORY  Family History   Problem Relation Age of Onset   • Hypertension Mother    • Hyperlipidemia Mother    • Diverticulitis Mother    • Pancreatitis Mother    • Kidney disease Father    • Skin cancer Father    • Hypertension Father    • Hyperlipidemia Father    • Stroke Father    • Atrial fibrillation Father    • Transient ischemic attack Father    • Depression Brother    • Liver disease Maternal Grandmother    • Heart disease Maternal Grandfather    • Cancer Maternal Grandfather    • Heart attack Paternal Grandmother    • No Known Problems Paternal Grandfather    • Lung cancer Maternal Uncle    • Heart attack Maternal Uncle    • Uterine cancer Maternal Aunt 58   • Heart disease Maternal Aunt    • Colon cancer Paternal Uncle 65   • No Known Problems Sister    • No Known Problems Daughter    • No Known Problems Son    • No Known Problems Paternal Aunt    • BRCA 1/2 Neg Hx    • Breast cancer Neg Hx    • Endometrial cancer Neg Hx    • Ovarian cancer Neg Hx          SOCIAL HISTORY  Social History     Socioeconomic History   • Marital status: Single     Spouse name: Not on file   • Number of children: Not on file   • Years of education: Not on file   • Highest education level: Not on file   Tobacco Use   • Smoking status: Former Smoker     Packs/day: 1.00     Years: 20.00     Pack years: 20.00     Types: Cigarettes     Quit date: 2006     Years since quitting: 15.2   • Smokeless tobacco: Never Used   Substance and Sexual Activity   • Alcohol use: Yes     Alcohol/week: 7.0 standard drinks     Types: 7 Glasses of wine per week     Comment: OCC   • Drug use: No   • Sexual activity: Not Currently     Birth control/protection: I.U.D.          ALLERGIES  Penicillins       REVIEW OF SYSTEMS  Review of Systems      All systems have been reviewed and are negative except as as discussed in the HPI    PHYSICAL EXAM    I have reviewed the triage vital signs and nursing notes.    ED Triage Vitals   Temp Heart Rate Resp BP SpO2   04/07/21 1138 04/07/21 1138 04/07/21 1138 04/07/21 1228 04/07/21 1138   96.2 °F (35.7 °C) 114 16 140/88 95 %      Temp src Heart Rate Source Patient Position BP Location FiO2 (%)   04/07/21 1138 04/07/21 1138 -- -- --   Tympanic Monitor          Physical Exam  GENERAL: Awake, alert, resting comfortably  HENT: NCAT, nares patent, moist mucous membranes  NECK: supple  EYES: Extraocular muscles intact, no scleral icterus  CV: regular rhythm, regular rate, equal radial pulses bilaterally RESPIRATORY: normal effort, clear to auscultation bilaterally  ABDOMEN: soft, nontender, mild left mid abdominal tenderness without rebound or guarding, no CVA tenderness  MUSCULOSKELETAL: Extremities are nontender and without obvious deformity.  There is normal range of motion in all extremities.  No T or L-spine tenderness  NEURO: Strength, sensation, and coordination are grossly intact.  Speech and mentation are unremarkable.  No facial droop.  SKIN: warm, dry, no rash  PSYCH: Normal mood and affect      LAB RESULTS  Recent Results (from the past 24 hour(s))   Comprehensive Metabolic Panel    Collection Time: 04/07/21 12:23 PM    Specimen: Blood   Result Value Ref Range    Glucose 94 65 - 99 mg/dL    BUN 15 6 - 20 mg/dL    Creatinine 0.90 0.57 - 1.00 mg/dL    Sodium 138 136 - 145 mmol/L    Potassium 3.9 3.5 - 5.2 mmol/L    Chloride 101 98 - 107 mmol/L    CO2 27.7 22.0 - 29.0 mmol/L    Calcium 9.6 8.6 - 10.5 mg/dL    Total Protein 7.2 6.0 - 8.5 g/dL    Albumin 4.30 3.50 - 5.20 g/dL    ALT (SGPT) 22 1 - 33 U/L    AST (SGOT) 25 1 - 32 U/L    Alkaline Phosphatase 80 39 - 117 U/L    Total Bilirubin 0.2 0.0 - 1.2 mg/dL    eGFR Non African Amer 66 >60  mL/min/1.73    Globulin 2.9 gm/dL    A/G Ratio 1.5 g/dL    BUN/Creatinine Ratio 16.7 7.0 - 25.0    Anion Gap 9.3 5.0 - 15.0 mmol/L   Lipase    Collection Time: 04/07/21 12:23 PM    Specimen: Blood   Result Value Ref Range    Lipase 26 13 - 60 U/L   hCG, Serum, Qualitative    Collection Time: 04/07/21 12:23 PM    Specimen: Blood   Result Value Ref Range    HCG Qualitative Positive (A) Negative   Light Blue Top    Collection Time: 04/07/21 12:23 PM   Result Value Ref Range    Extra Tube hold for add-on    Green Top (Gel)    Collection Time: 04/07/21 12:23 PM   Result Value Ref Range    Extra Tube Hold for add-ons.    Lavender Top    Collection Time: 04/07/21 12:23 PM   Result Value Ref Range    Extra Tube hold for add-on    CBC Auto Differential    Collection Time: 04/07/21 12:23 PM    Specimen: Blood   Result Value Ref Range    WBC 9.41 3.40 - 10.80 10*3/mm3    RBC 4.31 3.77 - 5.28 10*6/mm3    Hemoglobin 12.2 12.0 - 15.9 g/dL    Hematocrit 38.0 34.0 - 46.6 %    MCV 88.2 79.0 - 97.0 fL    MCH 28.3 26.6 - 33.0 pg    MCHC 32.1 31.5 - 35.7 g/dL    RDW 13.1 12.3 - 15.4 %    RDW-SD 41.8 37.0 - 54.0 fl    MPV 9.5 6.0 - 12.0 fL    Platelets 332 140 - 450 10*3/mm3    Neutrophil % 65.5 42.7 - 76.0 %    Lymphocyte % 20.1 19.6 - 45.3 %    Monocyte % 10.8 5.0 - 12.0 %    Eosinophil % 1.5 0.3 - 6.2 %    Basophil % 0.5 0.0 - 1.5 %    Immature Grans % 1.6 (H) 0.0 - 0.5 %    Neutrophils, Absolute 6.16 1.70 - 7.00 10*3/mm3    Lymphocytes, Absolute 1.89 0.70 - 3.10 10*3/mm3    Monocytes, Absolute 1.02 (H) 0.10 - 0.90 10*3/mm3    Eosinophils, Absolute 0.14 0.00 - 0.40 10*3/mm3    Basophils, Absolute 0.05 0.00 - 0.20 10*3/mm3    Immature Grans, Absolute 0.15 (H) 0.00 - 0.05 10*3/mm3    nRBC 0.0 0.0 - 0.2 /100 WBC   hCG, Quantitative, Pregnancy    Collection Time: 04/07/21 12:23 PM    Specimen: Blood   Result Value Ref Range    HCG Quantitative 9.04 mIU/mL   Urinalysis With Microscopic If Indicated (No Culture) - Urine, Clean Catch     Collection Time: 04/07/21  2:13 PM    Specimen: Urine, Clean Catch   Result Value Ref Range    Color, UA Yellow Yellow, Straw    Appearance, UA Cloudy (A) Clear    pH, UA 6.0 5.0 - 8.0    Specific Gravity, UA 1.010 1.005 - 1.030    Glucose, UA Negative Negative    Ketones, UA Negative Negative    Bilirubin, UA Negative Negative    Blood, UA Negative Negative    Protein, UA Negative Negative    Leuk Esterase, UA Moderate (2+) (A) Negative    Nitrite, UA Positive (A) Negative    Urobilinogen, UA 0.2 E.U./dL 0.2 - 1.0 E.U./dL   Urinalysis, Microscopic Only - Urine, Clean Catch    Collection Time: 04/07/21  2:13 PM    Specimen: Urine, Clean Catch   Result Value Ref Range    RBC, UA 0-2 None Seen, 0-2 /HPF    WBC, UA 31-50 (A) None Seen, 0-2 /HPF    Bacteria, UA 4+ (A) None Seen /HPF    Squamous Epithelial Cells, UA 3-6 (A) None Seen, 0-2 /HPF    Hyaline Casts, UA 7-12 None Seen /LPF    Methodology Automated Microscopy        Ordered the above labs and independently reviewed the results.      RADIOLOGY  CT Abdomen Pelvis Without Contrast    Result Date: 4/7/2021  CT ABDOMEN AND PELVIS WITHOUT CONTRAST  Radiation dose reduction techniques were utilized, including automated exposure control and exposure modulation based on body size.  CLINICAL INFORMATION: Left flank pain.  FINDINGS: The kidneys are symmetric in size and shape. No calculus or obstructive uropathy, the ureters are normal in course and caliber to the urinary bladder which is typical in appearance. No bladder filling defect.  IUD is demonstrated within the uterus. No uterine enlargement, both ovaries are symmetric and normal.  There is diverticulosis of the colon without diverticulitis. The small bowel is normal. Tiny appendicoliths demonstrated within an otherwise normal appendix. No indication of appendicitis. Atherosclerotic calcification of the aorta, diameter within normal limits. The liver, gallbladder, pancreas, spleen, adrenal glands and stomach are  within normal limits. No free air nor free intraperitoneal fluid nor adenopathy seen. Limited images of the lung bases demonstrate bilateral breast implants in position.  CONCLUSION: 1. Kidneys within normal limits allowing for the lack of intravenous contrast, no calculus or obstructive uropathy. The bladder is normal. 2. IUD in satisfactory position. 3. Diverticulosis of the colon, no indication of acute diverticulitis. 4. Appendicoliths within an otherwise normal-appearing appendix.  Findings of this report called to Dr. Mancia in the emergency room at the time of completion 2:15 PM.   This report was finalized on 4/7/2021 2:37 PM by Dr. Joni Miranda M.D.        I ordered the above noted radiological studies. Reviewed by me and discussed with radiologist.  See dictation for official radiology interpretation.      PROCEDURES  Procedures      MEDICATIONS GIVEN IN ER    Medications   sodium chloride 0.9 % flush 10 mL (has no administration in time range)   sodium chloride 0.9 % flush 10 mL (has no administration in time range)   ketorolac (TORADOL) injection 15 mg (15 mg Intravenous Given 4/7/21 1316)   nitrofurantoin (macrocrystal-monohydrate) (MACROBID) capsule 100 mg (100 mg Oral Given 4/7/21 1512)   morphine injection 4 mg (4 mg Intravenous Given 4/7/21 1531)   ondansetron (ZOFRAN) injection 4 mg (4 mg Intravenous Given 4/7/21 1532)         PROGRESS, DATA ANALYSIS, CONSULTS, AND MEDICAL DECISION MAKING    All labs have been independently reviewed by me.  All radiology studies have been reviewed by me and discussed with radiologist dictating the report.   EKG's independently viewed and interpreted by me.  I have reviewed the nurse's notes, vital signs, past medical history, and medication list.  Discussion below represents my analysis of pertinent findings related to patient's condition, differential diagnosis, treatment plan and final disposition.    Differential diagnosis includes but is not limited to:  -  hepatobiliary pathology such as cholecystitis, cholangitis, and symptomatic cholelithiasis  - Pancreatitis  - Dyspepsia  - Small bowel obstruction  - Appendicitis  - Diverticulitis  - UTI including pyelonephritis  - Ureteral stone  - Zoster  - Colitis, including infectious and ischemic  - Atypical ACS        ED Course as of Apr 07 1537 Wed Apr 07, 2021   1245 Old records reviewed.  Patient was last seen here in the ED in November 2016 after falling.    [WH]   1414 Results of the CT abdomen/pelvis discussed with Dr. Miranda.  Images independently viewed by me.  CT is negative acute.    [WH]   1456 Test results discussed with the patient.  She states her pain is unchanged.  Work-up is consistent with UTI.  Patient will be started on antibiotics.  Return precautions were discussed.    [WH]   1536 Patient presented to the ED complaining of sudden onset of left flank pain.  UA was consistent with a urinary tract infection.  CT scan was unremarkable.  Patient was given IV medications.  She will be discharged with prescriptions for Macrobid and a 2-day supply of [place].    [WH]      ED Course User Index  [WH] Salinas Mancia MD       AS OF 15:37 EDT VITALS:    BP - 143/83  HR - 89  TEMP - 96.2 °F (35.7 °C) (Tympanic)  O2 SATS - 99%      DIAGNOSIS  Final diagnoses:   Acute UTI   Acute left flank pain  Left-sided abdominal pain         DISPOSITION  Discharge    DISCHARGE    Patient discharged in stable condition.    Reviewed implications of results, diagnosis, meds, responsibility to follow up, warning signs and symptoms of possible worsening, potential complications and reasons to return to ER, including worsening pain, nausea, vomiting, fever, chills, trouble urinating, or other concern.    Patient/Family voiced understanding of above instructions.    Discussed plan for discharge, as there is no emergent indication for admission. Patient referred to primary care provider for BP management due to today's BP. Pt/family  is agreeable and understands need for follow up and repeat testing.  Pt is aware that discharge does not mean that nothing is wrong but it indicates no emergency is present that requires admission and they must continue care with follow-up as given below or physician of their choice.     FOLLOW-UP  Kyara Henderson, [facility]  [address] [address]  [phone]    Schedule an appointment as soon as possible for a visit   If symptoms persist         Medication List      New Prescriptions    nitrofurantoin (macrocrystal-monohydrate) 100 MG capsule  Commonly known as: MACROBID  Take 1 capsule by mouth 2 (Two) Times a Day.        Changed    HYDROcodone-acetaminophen 5-325 MG per tablet  Commonly known as: NORCO  Take 1 tablet by mouth Every 6 (Six) Hours As Needed for Moderate Pain .  What changed: reasons to take this           Where to Get Your Medications      These medications were sent to [facility]  [address] [address]    Hours: 7:00AM-6PM Mon-Fri Phone: [phone]   · HYDROcodone-acetaminophen 5-325 MG per tablet  · nitrofurantoin (macrocrystal-monohydrate) 100 MG capsule           Dictated utilizing Dragon dictation:  Much of this encounter note is an electronic transcription/translation of spoken language to printed text. The electronic translation of spoken language may permit erroneous, or at times, nonsensical words or phrases to be inadvertently transcribed; Although I have reviewed the note for such errors, some may still exist.     Salinas Mancia MD  04/07/21 4148

## 2021-06-29 DIAGNOSIS — E78.5 HYPERLIPIDEMIA, UNSPECIFIED HYPERLIPIDEMIA TYPE: ICD-10-CM

## 2021-06-29 DIAGNOSIS — M76.899 TENDONITIS OF KNEE: ICD-10-CM

## 2021-06-29 RX ORDER — MELOXICAM 15 MG/1
15 TABLET ORAL DAILY
Qty: 90 TABLET | Refills: 0 | Status: SHIPPED | OUTPATIENT
Start: 2021-06-29 | End: 2021-09-28 | Stop reason: SDUPTHER

## 2021-06-29 RX ORDER — ATORVASTATIN CALCIUM 40 MG/1
40 TABLET, FILM COATED ORAL DAILY
Qty: 90 TABLET | Refills: 0 | Status: SHIPPED | OUTPATIENT
Start: 2021-06-29 | End: 2021-09-28 | Stop reason: SDUPTHER

## 2021-07-09 ENCOUNTER — OFFICE VISIT (OUTPATIENT)
Dept: INTERNAL MEDICINE | Facility: CLINIC | Age: 51
End: 2021-07-09

## 2021-07-09 VITALS
BODY MASS INDEX: 29.18 KG/M2 | DIASTOLIC BLOOD PRESSURE: 70 MMHG | HEIGHT: 66 IN | HEART RATE: 85 BPM | TEMPERATURE: 97.3 F | SYSTOLIC BLOOD PRESSURE: 116 MMHG | OXYGEN SATURATION: 100 % | WEIGHT: 181.6 LBS | RESPIRATION RATE: 16 BRPM

## 2021-07-09 DIAGNOSIS — Z00.00 HEALTHCARE MAINTENANCE: ICD-10-CM

## 2021-07-09 DIAGNOSIS — F39 MOOD DISORDER (HCC): Primary | ICD-10-CM

## 2021-07-09 PROCEDURE — 99213 OFFICE O/P EST LOW 20 MIN: CPT | Performed by: PHYSICIAN ASSISTANT

## 2021-07-09 RX ORDER — BUPROPION HYDROCHLORIDE 300 MG/1
300 TABLET ORAL DAILY
Qty: 90 TABLET | Refills: 1 | Status: SHIPPED | OUTPATIENT
Start: 2021-07-09 | End: 2022-01-19 | Stop reason: SDUPTHER

## 2021-07-09 NOTE — PROGRESS NOTES
Subjective   Chief Complaint   Patient presents with   • mood disorder     6 week f/u        History of Present Illness   Pt is here today for fup on anxiety. Wellbutrin has helped a little, no major side effects. Would like to increase the dose. Rash has resolved with use of triamcinolone cream.      Patient Active Problem List   Diagnosis   • IUD (intrauterine device) in place   • Essential hypertension   • Mood disorder (CMS/HCC)   • Allergic rhinitis   • Hyperlipidemia   • Knee pain, right   • Lumbar radiculopathy       Allergies   Allergen Reactions   • Penicillins Anaphylaxis   • Lisinopril Cough       Current Outpatient Medications on File Prior to Visit   Medication Sig Dispense Refill   • ALLERGY SERUM INJECTION Inject  under the skin 1 (One) Time.     • atorvastatin (LIPITOR) 40 MG tablet Take 1 tablet by mouth Daily. 90 tablet 0   • cyclobenzaprine (FLEXERIL) 10 MG tablet Take 1 tablet by mouth 3 (Three) Times a Day As Needed for Muscle Spasms. 90 tablet 1   • escitalopram (LEXAPRO) 20 MG tablet Take 1 tablet by mouth Daily. 90 tablet 1   • hydroCHLOROthiazide (HYDRODIURIL) 25 MG tablet Take 1 tablet by mouth Daily. 90 tablet 1   • levocetirizine (XYZAL) 5 MG tablet Take 1 tablet by mouth Daily. 90 tablet 1   • levonorgestrel (MIRENA, 52 MG,) 20 MCG/24HR IUD 1 each by Intrauterine route 1 (one) time.     • meloxicam (MOBIC) 15 MG tablet Take 1 tablet by mouth Daily. 90 tablet 0   • olmesartan (BENICAR) 40 MG tablet Take 0.5 tablets by mouth Daily. 45 tablet 1   • triamcinolone (KENALOG) 0.1 % cream Apply a thin film to dry area on scalp once or twice daily as needed. 15 g 5   • triamcinolone (KENALOG) 0.5 % cream Apply  topically to the appropriate area as directed 3 (Three) Times a Day. 15 g 5   • [DISCONTINUED] buPROPion XL (Wellbutrin XL) 150 MG 24 hr tablet Take 1 tablet by mouth Daily. 90 tablet 1     No current facility-administered medications on file prior to visit.       Past Medical History:    Diagnosis Date   • Abnormal Pap smear of cervix    • Baker's cyst    • Bulging lumbar disc    • Bursitis    • CTS (carpal tunnel syndrome)     currently resolved   • H/O colposcopy with cervical biopsy     unknown pap result, biopsy was neg per pt, 2013 [facility]   • HPV (human papilloma virus) infection    • Hyperlipemia    • Hypertension    • Low back pain    • Osteoarthritis    • Scoliosis    • Seasonal allergies        Family History   Problem Relation Age of Onset   • Hypertension Mother    • Hyperlipidemia Mother    • Diverticulitis Mother    • Pancreatitis Mother    • Kidney disease Father    • Skin cancer Father    • Hypertension Father    • Hyperlipidemia Father    • Stroke Father    • Atrial fibrillation Father    • Transient ischemic attack Father    • Depression Brother    • Liver disease Maternal Grandmother    • Heart disease Maternal Grandfather    • Cancer Maternal Grandfather    • Heart attack Paternal Grandmother    • No Known Problems Paternal Grandfather    • Lung cancer Maternal Uncle    • Heart attack Maternal Uncle    • Uterine cancer Maternal Aunt 58   • Heart disease Maternal Aunt    • Colon cancer Paternal Uncle 65   • No Known Problems Sister    • No Known Problems Daughter    • No Known Problems Son    • No Known Problems Paternal Aunt    • BRCA 1/2 Neg Hx    • Breast cancer Neg Hx    • Endometrial cancer Neg Hx    • Ovarian cancer Neg Hx        Social History     Socioeconomic History   • Marital status: Single     Spouse name: Not on file   • Number of children: Not on file   • Years of education: Not on file   • Highest education level: Not on file   Tobacco Use   • Smoking status: Former Smoker     Packs/day: 1.00     Years: 20.00     Pack years: 20.00     Types: Cigarettes     Quit date: 2006     Years since quitting: 15.5   • Smokeless tobacco: Never Used   Vaping Use   • Vaping Use: Never used   Substance and Sexual Activity   • Alcohol use: Yes     Alcohol/week: 7.0 standard  "drinks     Types: 7 Glasses of wine per week     Comment: OCC   • Drug use: No   • Sexual activity: Not Currently     Birth control/protection: I.U.D.       Past Surgical History:   Procedure Laterality Date   • AUGMENTATION MAMMAPLASTY     • BREAST AUGMENTATION     • CARPAL TUNNEL RELEASE  2004   • CRYOTHERAPY      1997   • EPIDURAL BLOCK     • HAND SURGERY  2004    Pisiformectomy   • MAMMO BILATERAL  2014   • PAP SMEAR  20116   • SHOULDER ARTHROSCOPY  2002,2003   • SHOULDER SURGERY     • WRIST SURGERY           The following portions of the patient's history were reviewed and updated as appropriate: problem list, allergies, current medications, past medical history, past family history, past social history and past surgical history.    Review of Systems   Skin: Negative for rash.   Psychiatric/Behavioral: The patient is nervous/anxious.        Immunization History   Administered Date(s) Administered   • COVID-19 (PFIZER) 02/23/2021, 03/16/2021   • Flu Vaccine Quad PF >18YRS 10/29/2018, 10/28/2019   • Flulaval/Fluarix/Fluzone Quad 11/05/2020       Objective   Vitals:    07/09/21 0907 07/09/21 0927 07/09/21 0929   BP:   116/70   Pulse: (!) 126 85    Resp: 16     Temp: 97.3 °F (36.3 °C)     TempSrc: Infrared     SpO2: 100%     Weight: 82.4 kg (181 lb 9.6 oz)     Height: 167.6 cm (65.98\")       Body mass index is 29.33 kg/m².  Physical Exam  Vitals reviewed.   Constitutional:       Appearance: She is well-developed.   HENT:      Head: Normocephalic and atraumatic.   Cardiovascular:      Rate and Rhythm: Normal rate and regular rhythm.      Heart sounds: Normal heart sounds, S1 normal and S2 normal.   Skin:     General: Skin is warm.   Neurological:      Mental Status: She is alert.   Psychiatric:         Behavior: Behavior normal.           Assessment/Plan   Diagnoses and all orders for this visit:    1. Mood disorder (CMS/HCC) (Primary)  -     buPROPion XL (Wellbutrin XL) 300 MG 24 hr tablet; Take 1 tablet by mouth " Daily.  Dispense: 90 tablet; Refill: 1    Increase Wellbutrin from 150 mg to 300 mg daily. FUP in 3 mo for annual with lab prior.     Return in about 3 months (around 10/9/2021) for Annual physical, Lab Appt Before FUP.

## 2021-07-20 DIAGNOSIS — I10 ESSENTIAL HYPERTENSION: ICD-10-CM

## 2021-07-20 DIAGNOSIS — J30.9 ALLERGIC RHINITIS, UNSPECIFIED SEASONALITY, UNSPECIFIED TRIGGER: ICD-10-CM

## 2021-07-20 DIAGNOSIS — M62.838 MUSCLE SPASM: ICD-10-CM

## 2021-07-20 DIAGNOSIS — F39 MOOD DISORDER (HCC): ICD-10-CM

## 2021-07-20 RX ORDER — CYCLOBENZAPRINE HCL 10 MG
10 TABLET ORAL 3 TIMES DAILY PRN
Qty: 90 TABLET | Refills: 1 | Status: SHIPPED | OUTPATIENT
Start: 2021-07-20 | End: 2022-01-19 | Stop reason: SDUPTHER

## 2021-07-20 RX ORDER — ESCITALOPRAM OXALATE 20 MG/1
20 TABLET ORAL DAILY
Qty: 90 TABLET | Refills: 1 | Status: SHIPPED | OUTPATIENT
Start: 2021-07-20 | End: 2022-01-19 | Stop reason: SDUPTHER

## 2021-07-20 RX ORDER — OLMESARTAN MEDOXOMIL 40 MG/1
20 TABLET ORAL DAILY
Qty: 45 TABLET | Refills: 1 | Status: SHIPPED | OUTPATIENT
Start: 2021-07-20 | End: 2022-01-19 | Stop reason: SDUPTHER

## 2021-07-20 RX ORDER — HYDROCHLOROTHIAZIDE 25 MG/1
25 TABLET ORAL DAILY
Qty: 90 TABLET | Refills: 1 | Status: SHIPPED | OUTPATIENT
Start: 2021-07-20 | End: 2022-01-19 | Stop reason: SDUPTHER

## 2021-07-20 RX ORDER — LEVOCETIRIZINE DIHYDROCHLORIDE 5 MG/1
5 TABLET, FILM COATED ORAL DAILY
Qty: 90 TABLET | Refills: 1 | Status: SHIPPED | OUTPATIENT
Start: 2021-07-20 | End: 2022-01-27 | Stop reason: SDUPTHER

## 2021-09-09 ENCOUNTER — LAB (OUTPATIENT)
Dept: FAMILY MEDICINE CLINIC | Facility: CLINIC | Age: 51
End: 2021-09-09

## 2021-09-09 DIAGNOSIS — Z00.00 HEALTHCARE MAINTENANCE: ICD-10-CM

## 2021-09-09 LAB
25(OH)D3+25(OH)D2 SERPL-MCNC: 24.3 NG/ML (ref 30–100)
ALBUMIN SERPL-MCNC: 4.9 G/DL (ref 3.5–5.2)
ALBUMIN/GLOB SERPL: 2.2 G/DL
ALP SERPL-CCNC: 85 U/L (ref 39–117)
ALT SERPL-CCNC: 32 U/L (ref 1–33)
AST SERPL-CCNC: 29 U/L (ref 1–32)
BILIRUB SERPL-MCNC: 0.2 MG/DL (ref 0–1.2)
BUN SERPL-MCNC: 15 MG/DL (ref 6–20)
BUN/CREAT SERPL: 21.1 (ref 7–25)
CALCIUM SERPL-MCNC: 9.7 MG/DL (ref 8.6–10.5)
CHLORIDE SERPL-SCNC: 100 MMOL/L (ref 98–107)
CHOLEST SERPL-MCNC: 193 MG/DL (ref 0–200)
CHOLEST/HDLC SERPL: 3.94 {RATIO}
CO2 SERPL-SCNC: 25.2 MMOL/L (ref 22–29)
CREAT SERPL-MCNC: 0.71 MG/DL (ref 0.57–1)
GLOBULIN SER CALC-MCNC: 2.2 GM/DL
GLUCOSE SERPL-MCNC: 99 MG/DL (ref 65–99)
HDLC SERPL-MCNC: 49 MG/DL (ref 40–60)
LDLC SERPL CALC-MCNC: 125 MG/DL (ref 0–100)
POTASSIUM SERPL-SCNC: 4.4 MMOL/L (ref 3.5–5.2)
PROT SERPL-MCNC: 7.1 G/DL (ref 6–8.5)
SODIUM SERPL-SCNC: 138 MMOL/L (ref 136–145)
TRIGL SERPL-MCNC: 104 MG/DL (ref 0–150)
VLDLC SERPL CALC-MCNC: 19 MG/DL (ref 5–40)

## 2021-09-13 ENCOUNTER — OFFICE VISIT (OUTPATIENT)
Dept: INTERNAL MEDICINE | Facility: CLINIC | Age: 51
End: 2021-09-13

## 2021-09-13 VITALS
WEIGHT: 180 LBS | SYSTOLIC BLOOD PRESSURE: 116 MMHG | DIASTOLIC BLOOD PRESSURE: 68 MMHG | HEIGHT: 66 IN | TEMPERATURE: 97.3 F | BODY MASS INDEX: 28.93 KG/M2

## 2021-09-13 DIAGNOSIS — I10 ESSENTIAL HYPERTENSION: ICD-10-CM

## 2021-09-13 DIAGNOSIS — Z00.00 HEALTHCARE MAINTENANCE: ICD-10-CM

## 2021-09-13 DIAGNOSIS — E78.5 HYPERLIPIDEMIA, UNSPECIFIED HYPERLIPIDEMIA TYPE: ICD-10-CM

## 2021-09-13 DIAGNOSIS — Z12.11 ENCOUNTER FOR SCREENING COLONOSCOPY: Primary | ICD-10-CM

## 2021-09-13 DIAGNOSIS — E55.9 VITAMIN D DEFICIENCY: ICD-10-CM

## 2021-09-13 PROCEDURE — 90715 TDAP VACCINE 7 YRS/> IM: CPT | Performed by: PHYSICIAN ASSISTANT

## 2021-09-13 PROCEDURE — 99396 PREV VISIT EST AGE 40-64: CPT | Performed by: PHYSICIAN ASSISTANT

## 2021-09-13 PROCEDURE — 90471 IMMUNIZATION ADMIN: CPT | Performed by: PHYSICIAN ASSISTANT

## 2021-09-13 RX ORDER — HYDROCODONE BITARTRATE AND ACETAMINOPHEN 5; 325 MG/1; MG/1
1 TABLET ORAL EVERY 6 HOURS PRN
COMMUNITY

## 2021-09-13 NOTE — PROGRESS NOTES
Subjective   Chief Complaint   Patient presents with   • Annual Exam       History of Present Illness     Pt is here today for CPE. Anxiety is doing ok, has quite a bit of stress at work. Has no CP, SOA or palpitations. Sleep is good. Has not yet had a screening colonoscopy. Annual with GYN in the fall.      Patient Active Problem List   Diagnosis   • IUD (intrauterine device) in place   • Essential hypertension   • Mood disorder (CMS/HCC)   • Allergic rhinitis   • Hyperlipidemia   • Knee pain, right   • Lumbar radiculopathy   • Vitamin D deficiency       Allergies   Allergen Reactions   • Penicillins Anaphylaxis   • Lisinopril Cough       Current Outpatient Medications on File Prior to Visit   Medication Sig Dispense Refill   • ALLERGY SERUM INJECTION Inject  under the skin 1 (One) Time.     • atorvastatin (LIPITOR) 40 MG tablet Take 1 tablet by mouth Daily. 90 tablet 0   • buPROPion XL (Wellbutrin XL) 300 MG 24 hr tablet Take 1 tablet by mouth Daily. 90 tablet 1   • cyclobenzaprine (FLEXERIL) 10 MG tablet Take 1 tablet by mouth 3 (Three) Times a Day As Needed for Muscle Spasms. 90 tablet 1   • escitalopram (LEXAPRO) 20 MG tablet Take 1 tablet by mouth Daily. 90 tablet 1   • hydroCHLOROthiazide (HYDRODIURIL) 25 MG tablet Take 1 tablet by mouth Daily. 90 tablet 1   • HYDROcodone-acetaminophen (NORCO) 5-325 MG per tablet Take 1 tablet by mouth Every 6 (Six) Hours As Needed.     • levocetirizine (XYZAL) 5 MG tablet Take 1 tablet by mouth Daily. 90 tablet 1   • levonorgestrel (MIRENA, 52 MG,) 20 MCG/24HR IUD 1 each by Intrauterine route 1 (one) time.     • meloxicam (MOBIC) 15 MG tablet Take 1 tablet by mouth Daily. 90 tablet 0   • olmesartan (BENICAR) 40 MG tablet Take 0.5 tablets by mouth Daily. 45 tablet 1   • triamcinolone (KENALOG) 0.1 % cream Apply a thin film to dry area on scalp once or twice daily as needed. 15 g 5   • triamcinolone (KENALOG) 0.5 % cream Apply  topically to the appropriate area as directed 3  (Three) Times a Day. 15 g 5     No current facility-administered medications on file prior to visit.       Past Medical History:   Diagnosis Date   • Abnormal Pap smear of cervix    • Baker's cyst    • Bulging lumbar disc    • Bursitis    • CTS (carpal tunnel syndrome)     currently resolved   • H/O colposcopy with cervical biopsy     unknown pap result, biopsy was neg per pt, 2013 [facility]   • HPV (human papilloma virus) infection    • Hyperlipemia    • Hypertension    • Low back pain    • Osteoarthritis    • Scoliosis    • Seasonal allergies        Family History   Problem Relation Age of Onset   • Hypertension Mother    • Hyperlipidemia Mother    • Diverticulitis Mother    • Pancreatitis Mother    • Kidney disease Father    • Skin cancer Father    • Hypertension Father    • Hyperlipidemia Father    • Stroke Father    • Atrial fibrillation Father    • Transient ischemic attack Father    • Depression Brother    • Liver disease Maternal Grandmother    • Heart disease Maternal Grandfather    • Cancer Maternal Grandfather    • Heart attack Paternal Grandmother    • No Known Problems Paternal Grandfather    • Lung cancer Maternal Uncle    • Heart attack Maternal Uncle    • Uterine cancer Maternal Aunt 58   • Heart disease Maternal Aunt    • Colon cancer Paternal Uncle 65   • No Known Problems Sister    • No Known Problems Daughter    • No Known Problems Son    • No Known Problems Paternal Aunt    • BRCA 1/2 Neg Hx    • Breast cancer Neg Hx    • Endometrial cancer Neg Hx    • Ovarian cancer Neg Hx        Social History     Socioeconomic History   • Marital status: Single     Spouse name: Not on file   • Number of children: Not on file   • Years of education: Not on file   • Highest education level: Not on file   Tobacco Use   • Smoking status: Former Smoker     Packs/day: 1.00     Years: 20.00     Pack years: 20.00     Types: Cigarettes     Quit date: 2006     Years since quitting: 15.7   • Smokeless tobacco: Never  Used   Vaping Use   • Vaping Use: Never used   Substance and Sexual Activity   • Alcohol use: Yes     Alcohol/week: 7.0 standard drinks     Types: 7 Glasses of wine per week     Comment: OCC   • Drug use: No   • Sexual activity: Not Currently     Birth control/protection: I.U.D.       Past Surgical History:   Procedure Laterality Date   • AUGMENTATION MAMMAPLASTY     • BREAST AUGMENTATION     • CARPAL TUNNEL RELEASE  2004   • CRYOTHERAPY      1997   • EPIDURAL BLOCK     • HAND SURGERY  2004    Pisiformectomy   • MAMMO BILATERAL  2014   • PAP SMEAR  20116   • SHOULDER ARTHROSCOPY  2002,2003   • SHOULDER SURGERY     • WRIST SURGERY           The following portions of the patient's history were reviewed and updated as appropriate: problem list, allergies, current medications, past medical history, past family history, past social history and past surgical history.    Review of Systems   Constitutional: Negative for chills, decreased appetite, fever, weight gain and weight loss.   HENT: Negative for congestion, ear pain, hearing loss, hoarse voice and sore throat.    Eyes: Negative for blurred vision and double vision.   Cardiovascular: Negative for chest pain, dyspnea on exertion, irregular heartbeat, leg swelling and palpitations.   Respiratory: Negative for cough, shortness of breath, snoring and wheezing.    Endocrine: Negative for polydipsia and polyuria.   Skin: Negative for rash and suspicious lesions.   Musculoskeletal: Negative for joint pain, joint swelling, muscle cramps and stiffness.   Gastrointestinal: Negative for abdominal pain, change in bowel habit, constipation, diarrhea, heartburn, hematochezia, melena, nausea and vomiting.   Genitourinary: Negative for bladder incontinence, non-menstrual bleeding and pelvic pain.   Neurological: Negative for dizziness, headaches, light-headedness, numbness, paresthesias and tremors.   Psychiatric/Behavioral: Negative for depression, memory loss and suicidal ideas.  "The patient is nervous/anxious. The patient does not have insomnia.        Immunization History   Administered Date(s) Administered   • COVID-19 (PFIZER) 02/23/2021, 03/16/2021   • Flu Vaccine Quad PF >18YRS 10/29/2018, 10/28/2019   • FluLaval >6 Months 11/05/2020   • Tdap 09/13/2021       Objective   Vitals:    09/13/21 1456 09/13/21 1518   BP:  116/68   Temp: 97.3 °F (36.3 °C)    Weight: 81.6 kg (180 lb)    Height: 167.6 cm (65.98\")      Body mass index is 29.07 kg/m².  Physical Exam  Vitals reviewed.   Constitutional:       Appearance: She is well-developed.   HENT:      Head: Normocephalic and atraumatic.      Mouth/Throat:      Mouth: Mucous membranes are moist.      Pharynx: Oropharynx is clear.   Eyes:      Extraocular Movements: Extraocular movements intact.      Conjunctiva/sclera: Conjunctivae normal.      Pupils: Pupils are equal, round, and reactive to light.   Neck:      Thyroid: No thyromegaly.      Vascular: No carotid bruit.   Cardiovascular:      Rate and Rhythm: Normal rate and regular rhythm.      Heart sounds: Normal heart sounds. No murmur heard.     Pulmonary:      Effort: Pulmonary effort is normal.      Breath sounds: Normal breath sounds.   Abdominal:      General: There is no distension.      Palpations: Abdomen is soft. There is no mass.      Tenderness: There is no abdominal tenderness. There is no rebound.   Musculoskeletal:      Cervical back: Neck supple.   Lymphadenopathy:      Cervical: No cervical adenopathy.   Skin:     General: Skin is warm.   Neurological:      Mental Status: She is alert.   Psychiatric:         Behavior: Behavior normal.           Assessment/Plan   Diagnoses and all orders for this visit:    1. Encounter for screening colonoscopy (Primary)  -     Ambulatory Referral to Gastroenterology    2. Essential hypertension  Comments:  Controlled  Orders:  -     Comprehensive Metabolic Panel; Future    3. Hyperlipidemia, unspecified hyperlipidemia " type  Comments:  Controlled    4. Healthcare maintenance  Comments:  Referral for screening cscope. Updated tdap today. Sees GYN    5. Vitamin D deficiency  Comments:  Start D3, 2000 IU daily    Other orders  -     Tdap Vaccine Greater Than or Equal To 6yo IM    Recommended healthy diet, and 150 min of aerobic exercise per week      Return in about 6 months (around 3/13/2022) for Lab Appt Before FUP.

## 2021-09-28 DIAGNOSIS — E78.5 HYPERLIPIDEMIA, UNSPECIFIED HYPERLIPIDEMIA TYPE: ICD-10-CM

## 2021-09-28 DIAGNOSIS — M76.899 TENDONITIS OF KNEE: ICD-10-CM

## 2021-09-28 RX ORDER — MELOXICAM 15 MG/1
15 TABLET ORAL DAILY
Qty: 90 TABLET | Refills: 0 | Status: SHIPPED | OUTPATIENT
Start: 2021-09-28 | End: 2021-12-27 | Stop reason: SDUPTHER

## 2021-09-28 RX ORDER — ATORVASTATIN CALCIUM 40 MG/1
40 TABLET, FILM COATED ORAL DAILY
Qty: 90 TABLET | Refills: 0 | Status: SHIPPED | OUTPATIENT
Start: 2021-09-28 | End: 2021-12-27 | Stop reason: SDUPTHER

## 2021-10-28 ENCOUNTER — FLU SHOT (OUTPATIENT)
Dept: FAMILY MEDICINE CLINIC | Facility: CLINIC | Age: 51
End: 2021-10-28

## 2021-10-28 DIAGNOSIS — Z23 NEED FOR INFLUENZA VACCINATION: Primary | ICD-10-CM

## 2021-10-28 PROCEDURE — 90471 IMMUNIZATION ADMIN: CPT | Performed by: INTERNAL MEDICINE

## 2021-10-28 PROCEDURE — 90686 IIV4 VACC NO PRSV 0.5 ML IM: CPT | Performed by: INTERNAL MEDICINE

## 2021-11-18 ENCOUNTER — APPOINTMENT (OUTPATIENT)
Dept: WOMENS IMAGING | Facility: HOSPITAL | Age: 51
End: 2021-11-18

## 2021-11-18 ENCOUNTER — PROCEDURE VISIT (OUTPATIENT)
Dept: OBSTETRICS AND GYNECOLOGY | Age: 51
End: 2021-11-18

## 2021-11-18 ENCOUNTER — OFFICE VISIT (OUTPATIENT)
Dept: OBSTETRICS AND GYNECOLOGY | Age: 51
End: 2021-11-18

## 2021-11-18 VITALS
HEIGHT: 66 IN | SYSTOLIC BLOOD PRESSURE: 120 MMHG | BODY MASS INDEX: 27.16 KG/M2 | WEIGHT: 169 LBS | DIASTOLIC BLOOD PRESSURE: 78 MMHG

## 2021-11-18 DIAGNOSIS — Z01.419 ENCOUNTER FOR GYNECOLOGICAL EXAMINATION WITHOUT ABNORMAL FINDING: Primary | ICD-10-CM

## 2021-11-18 DIAGNOSIS — Z12.11 COLON CANCER SCREENING: ICD-10-CM

## 2021-11-18 DIAGNOSIS — Z12.31 VISIT FOR SCREENING MAMMOGRAM: Primary | ICD-10-CM

## 2021-11-18 DIAGNOSIS — Z97.5 IUD (INTRAUTERINE DEVICE) IN PLACE: ICD-10-CM

## 2021-11-18 PROCEDURE — 99396 PREV VISIT EST AGE 40-64: CPT | Performed by: OBSTETRICS & GYNECOLOGY

## 2021-11-18 PROCEDURE — 77067 SCR MAMMO BI INCL CAD: CPT | Performed by: OBSTETRICS & GYNECOLOGY

## 2021-11-18 PROCEDURE — 77067 SCR MAMMO BI INCL CAD: CPT | Performed by: RADIOLOGY

## 2021-11-18 PROCEDURE — 77063 BREAST TOMOSYNTHESIS BI: CPT | Performed by: OBSTETRICS & GYNECOLOGY

## 2021-11-18 PROCEDURE — 77063 BREAST TOMOSYNTHESIS BI: CPT | Performed by: RADIOLOGY

## 2021-11-18 RX ORDER — ERGOCALCIFEROL 1.25 MG/1
50000 CAPSULE ORAL WEEKLY
COMMUNITY
End: 2022-03-18

## 2021-11-18 NOTE — PROGRESS NOTES
Subjective     Chief Complaint   Patient presents with   • Gynecologic Exam     AC. Pt had MG today.       History of Present Illness    Dasha De Leon is a 50 y.o.  who presents for annual exam.  Patient has not had periods since .  She does have an IUD she has had for 9 years.  It is a Mirena IUD.  She would like it removed today.  She has been exercising and dieting and has lost 13 pounds.  She has no vaginal bleeding or pelvic pain.  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  She does have a lot of stress but her Lexapro and Wellbutrin are controlling that well.   Obstetric History:  OB History        0    Para   0    Term   0       0    AB   0    Living   0       SAB   0    IAB   0    Ectopic   0    Molar        Multiple   0    Live Births                   Menstrual History:     No LMP recorded (lmp unknown). Patient has had an implant.         Current contraception: abstinence  History of abnormal Pap smear: in  cyro   Received Gardasil immunization: no  Perform regular self breast exam : yes  Family history of uterine or ovarian cancer: yes - MA uterine 58   Family History of colon cancer: PU 65  Family history of breast cancer: no    Mammogram: done today.  Colonoscopy: recommended.  DEXA: not indicated.    Exercise: exercises 3 times a week 30 mincardio and weight s  Calcium/Vitamin D: adequate intake    The following portions of the patient's history were reviewed and updated as appropriate: allergies, current medications, past family history, past medical history, past social history, past surgical history and problem list.    Review of Systems    Review of Systems   Constitutional: Negative for fatigue.   Respiratory: Negative for shortness of breath.    Gastrointestinal: Negative for abdominal pain.   Genitourinary: Negative for dysuria.   Neurological: Negative for headaches.   Psychiatric/Behavioral: Negative for dysphoric mood.     Objective   Physical  "Exam    /78   Ht 167.6 cm (66\")   Wt 76.7 kg (169 lb)   LMP  (LMP Unknown)   Breastfeeding No   BMI 27.28 kg/m²     General:   alert, appears stated age and cooperative   Neck: thyroid normal to palpation   Heart: regular rate and rhythm   Lungs: clear to auscultation bilaterally   Abdomen: soft, non-tender, without masses or organomegaly   Breast: inspection negative, no nipple discharge or bleeding, no masses or nodularity palpable   Vulva: normal, Bartholin's, Urethra, [place]'s normal   Vagina: normal mucosa, normal discharge   Cervix: no cervical motion tenderness and no lesions   Uterus: non-tender, normal shape and consistency   Adnexa: no mass, fullness, tenderness   Rectal: normal rectal, no masses   Procedure note-after verbal informed consent attempted to remove the IUD by grasping the strings with a ring forcep.  IUD did not come out and patient felt pain and requested the procedure to stop.  After stopping the patient is not having any pain.    Assessment/Plan   Diagnoses and all orders for this visit:    1. Encounter for gynecological examination without abnormal finding (Primary)    2. Colon cancer screening  -     Ambulatory Referral For Screening Colonoscopy    3. IUD (intrauterine device) in place    IUD feels like it may be embedded.  We discussed options of leaving the IUD in, removal with numbing and dilating the cervix here in the office orders removal in the operating room.  Patient requests return for removal here in the office.  We will place lidocaine shots in the cervix I will dilate the cervix and we will have instruments to remove if needed.  If unsuccessful will plan for hysteroscopy.    Colonoscopy is ordered.    Pap is up-to-date    All questions answered.  Breast self exam technique reviewed and patient encouraged to perform self-exam monthly.  Discussed healthy lifestyle modifications.  Recommended 30 minutes of aerobic exercise five times per week.  Discussed calcium needs " to prevent osteoporosis.

## 2021-12-27 DIAGNOSIS — M76.899 TENDONITIS OF KNEE: ICD-10-CM

## 2021-12-27 DIAGNOSIS — E78.5 HYPERLIPIDEMIA, UNSPECIFIED HYPERLIPIDEMIA TYPE: ICD-10-CM

## 2021-12-27 DIAGNOSIS — F39 MOOD DISORDER (HCC): ICD-10-CM

## 2021-12-27 RX ORDER — ATORVASTATIN CALCIUM 40 MG/1
40 TABLET, FILM COATED ORAL DAILY
Qty: 90 TABLET | Refills: 1 | Status: SHIPPED | OUTPATIENT
Start: 2021-12-27 | End: 2022-07-16 | Stop reason: SDUPTHER

## 2021-12-27 RX ORDER — MELOXICAM 15 MG/1
15 TABLET ORAL DAILY
Qty: 90 TABLET | Refills: 1 | Status: SHIPPED | OUTPATIENT
Start: 2021-12-27 | End: 2022-07-16 | Stop reason: SDUPTHER

## 2022-01-19 DIAGNOSIS — I10 ESSENTIAL HYPERTENSION: ICD-10-CM

## 2022-01-19 DIAGNOSIS — M62.838 MUSCLE SPASM: ICD-10-CM

## 2022-01-19 DIAGNOSIS — F39 MOOD DISORDER: ICD-10-CM

## 2022-01-19 RX ORDER — ESCITALOPRAM OXALATE 20 MG/1
20 TABLET ORAL DAILY
Qty: 90 TABLET | Refills: 1 | Status: SHIPPED | OUTPATIENT
Start: 2022-01-19 | End: 2022-07-16 | Stop reason: SDUPTHER

## 2022-01-19 RX ORDER — OLMESARTAN MEDOXOMIL 40 MG/1
20 TABLET ORAL DAILY
Qty: 45 TABLET | Refills: 1 | Status: SHIPPED | OUTPATIENT
Start: 2022-01-19 | End: 2022-10-18 | Stop reason: SDUPTHER

## 2022-01-19 RX ORDER — BUPROPION HYDROCHLORIDE 300 MG/1
300 TABLET ORAL DAILY
Qty: 90 TABLET | Refills: 1 | Status: SHIPPED | OUTPATIENT
Start: 2022-01-19 | End: 2022-07-16 | Stop reason: SDUPTHER

## 2022-01-19 RX ORDER — CYCLOBENZAPRINE HCL 10 MG
10 TABLET ORAL 3 TIMES DAILY PRN
Qty: 90 TABLET | Refills: 1 | Status: SHIPPED | OUTPATIENT
Start: 2022-01-19 | End: 2022-07-16 | Stop reason: SDUPTHER

## 2022-01-19 RX ORDER — HYDROCHLOROTHIAZIDE 25 MG/1
25 TABLET ORAL DAILY
Qty: 90 TABLET | Refills: 1 | Status: SHIPPED | OUTPATIENT
Start: 2022-01-19 | End: 2022-07-16 | Stop reason: SDUPTHER

## 2022-01-27 DIAGNOSIS — J30.9 ALLERGIC RHINITIS, UNSPECIFIED SEASONALITY, UNSPECIFIED TRIGGER: ICD-10-CM

## 2022-01-27 RX ORDER — LEVOCETIRIZINE DIHYDROCHLORIDE 5 MG/1
5 TABLET, FILM COATED ORAL DAILY
Qty: 90 TABLET | Refills: 1 | Status: SHIPPED | OUTPATIENT
Start: 2022-01-27 | End: 2022-05-03 | Stop reason: SDUPTHER

## 2022-03-09 DIAGNOSIS — I10 ESSENTIAL HYPERTENSION: Primary | ICD-10-CM

## 2022-03-18 ENCOUNTER — OFFICE VISIT (OUTPATIENT)
Dept: INTERNAL MEDICINE | Facility: CLINIC | Age: 52
End: 2022-03-18

## 2022-03-18 VITALS
SYSTOLIC BLOOD PRESSURE: 122 MMHG | WEIGHT: 154.2 LBS | DIASTOLIC BLOOD PRESSURE: 74 MMHG | HEIGHT: 66 IN | BODY MASS INDEX: 24.78 KG/M2 | TEMPERATURE: 97.1 F

## 2022-03-18 DIAGNOSIS — E78.5 HYPERLIPIDEMIA, UNSPECIFIED HYPERLIPIDEMIA TYPE: ICD-10-CM

## 2022-03-18 DIAGNOSIS — I10 ESSENTIAL HYPERTENSION: ICD-10-CM

## 2022-03-18 DIAGNOSIS — E55.9 VITAMIN D DEFICIENCY: Primary | ICD-10-CM

## 2022-03-18 DIAGNOSIS — F39 MOOD DISORDER: ICD-10-CM

## 2022-03-18 DIAGNOSIS — Z00.00 HEALTHCARE MAINTENANCE: ICD-10-CM

## 2022-03-18 PROCEDURE — 99214 OFFICE O/P EST MOD 30 MIN: CPT | Performed by: PHYSICIAN ASSISTANT

## 2022-03-18 RX ORDER — DIPHENOXYLATE HYDROCHLORIDE AND ATROPINE SULFATE 2.5; .025 MG/1; MG/1
TABLET ORAL DAILY
COMMUNITY

## 2022-03-18 NOTE — PROGRESS NOTES
Subjective   Chief Complaint   Patient presents with   • Hypertension       History of Present Illness     She has lost 28 lbs doing a KETO diet. Has no dizziness or lightheadedness. Joint pain and back pain is better. Anxiety sx are okay, work has continued to be really stressful.      Patient Active Problem List   Diagnosis   • IUD (intrauterine device) in place   • Essential hypertension   • Mood disorder (HCC)   • Allergic rhinitis   • Hyperlipidemia   • Knee pain, right   • Lumbar radiculopathy   • Vitamin D deficiency       Allergies   Allergen Reactions   • Penicillins Anaphylaxis   • Lisinopril Cough       Current Outpatient Medications on File Prior to Visit   Medication Sig Dispense Refill   • atorvastatin (LIPITOR) 40 MG tablet Take 1 tablet by mouth Daily. 90 tablet 1   • buPROPion XL (Wellbutrin XL) 300 MG 24 hr tablet Take 1 tablet by mouth Daily. 90 tablet 1   • Cholecalciferol (Vitamin D) 50 MCG (2000 UT) tablet Take 10,000 Units by mouth Daily.     • cyclobenzaprine (FLEXERIL) 10 MG tablet Take 1 tablet by mouth 3 (Three) Times a Day As Needed for Muscle Spasms. 90 tablet 1   • escitalopram (LEXAPRO) 20 MG tablet Take 1 tablet by mouth Daily. 90 tablet 1   • hydroCHLOROthiazide (HYDRODIURIL) 25 MG tablet Take 1 tablet by mouth Daily. 90 tablet 1   • HYDROcodone-acetaminophen (NORCO) 5-325 MG per tablet Take 1 tablet by mouth Every 6 (Six) Hours As Needed.     • levocetirizine (XYZAL) 5 MG tablet Take 1 tablet by mouth Daily. 90 tablet 1   • levonorgestrel (MIRENA) 20 MCG/24HR IUD 1 each by Intrauterine route 1 (one) time.     • meloxicam (MOBIC) 15 MG tablet Take 1 tablet by mouth Daily. 90 tablet 1   • multivitamin (MULTI-VITAMIN DAILY PO) Take  by mouth Daily.     • olmesartan (BENICAR) 40 MG tablet Take 0.5 tablets by mouth Daily. 45 tablet 1   • triamcinolone (KENALOG) 0.5 % cream Apply  topically to the appropriate area as directed 3 (Three) Times a Day. 15 g 5   • [DISCONTINUED] vitamin D  (ERGOCALCIFEROL) 1.25 MG (88687 UT) capsule capsule Take 50,000 Units by mouth 1 (One) Time Per Week.       No current facility-administered medications on file prior to visit.       Past Medical History:   Diagnosis Date   • Abnormal Pap smear of cervix    • Baker's cyst    • Bulging lumbar disc    • Bursitis    • CTS (carpal tunnel syndrome)     currently resolved   • H/O colposcopy with cervical biopsy     unknown pap result, biopsy was neg per 2013 [facility]   • HPV (human papilloma virus) infection    • Hyperlipemia    • Hypertension    • Low back pain    • Osteoarthritis    • Scoliosis    • Seasonal allergies        Family History   Problem Relation Age of Onset   • Hypertension Mother    • Hyperlipidemia Mother    • Diverticulitis Mother    • Pancreatitis Mother    • Kidney disease Father    • Skin cancer Father    • Hypertension Father    • Hyperlipidemia Father    • Stroke Father    • Atrial fibrillation Father    • Transient ischemic attack Father    • Depression Brother    • Liver disease Maternal Grandmother    • Heart disease Maternal Grandfather    • Cancer Maternal Grandfather    • Heart attack Paternal Grandmother    • No Known Problems Paternal Grandfather    • Lung cancer Maternal Uncle    • Heart attack Maternal Uncle    • Uterine cancer Maternal Aunt 58   • Heart disease Maternal Aunt    • Colon cancer Paternal Uncle 65   • No Known Problems Sister    • No Known Problems Daughter    • No Known Problems Son    • No Known Problems Paternal Aunt    • BRCA 1/2 Neg Hx    • Breast cancer Neg Hx    • Endometrial cancer Neg Hx    • Ovarian cancer Neg Hx        Social History     Socioeconomic History   • Marital status: Single   Tobacco Use   • Smoking status: Former Smoker     Packs/day: 1.00     Years: 20.00     Pack years: 20.00     Types: Cigarettes     Quit date:      Years since quittin.2   • Smokeless tobacco: Never Used   Vaping Use   • Vaping Use: Never used   Substance and Sexual  "Activity   • Alcohol use: Yes     Alcohol/week: 7.0 standard drinks     Types: 7 Glasses of wine per week     Comment: OCC   • Drug use: No   • Sexual activity: Not Currently     Birth control/protection: I.U.D.       Past Surgical History:   Procedure Laterality Date   • AUGMENTATION MAMMAPLASTY     • BREAST AUGMENTATION     • CARPAL TUNNEL RELEASE  2004   • CRYOTHERAPY      1997   • EPIDURAL BLOCK     • HAND SURGERY  2004    Pisiformectomy   • MAMMO BILATERAL  2014   • PAP SMEAR  20116   • SHOULDER ARTHROSCOPY  2002,2003   • SHOULDER SURGERY     • WRIST SURGERY           The following portions of the patient's history were reviewed and updated as appropriate: problem list, allergies, current medications, past medical history, past family history, past social history and past surgical history.    Review of Systems   Constitutional: Positive for weight loss.   Cardiovascular: Negative for chest pain.   Musculoskeletal: Positive for arthritis.   Neurological: Negative for dizziness and light-headedness.       Immunization History   Administered Date(s) Administered   • COVID-19 (PFIZER) PURPLE CAP 02/23/2021, 03/16/2021   • Flu Vaccine Quad PF >18YRS 10/29/2018, 10/28/2019   • FluLaval/Fluarix/Fluzone >6 11/05/2020, 10/28/2021   • Tdap 09/13/2021       Objective   Vitals:    03/18/22 0839 03/18/22 0852   BP:  122/74   Temp: 97.1 °F (36.2 °C)    Weight: 69.9 kg (154 lb 3.2 oz)    Height: 167.6 cm (65.98\")      Body mass index is 24.9 kg/m².  Physical Exam  Vitals reviewed.   Constitutional:       Appearance: She is well-developed.   HENT:      Head: Normocephalic and atraumatic.   Cardiovascular:      Rate and Rhythm: Normal rate and regular rhythm.      Heart sounds: Normal heart sounds, S1 normal and S2 normal.   Pulmonary:      Effort: Pulmonary effort is normal.      Breath sounds: Normal breath sounds.   Skin:     General: Skin is warm.   Neurological:      Mental Status: She is alert.   Psychiatric:         " Behavior: Behavior normal.           Assessment/Plan   Diagnoses and all orders for this visit:    1. Vitamin D deficiency (Primary)    2. Mood disorder (HCC)    3. Hyperlipidemia, unspecified hyperlipidemia type    4. Essential hypertension    BP is well controlled, she has done great with weight loss. Will get labs today.     Return in about 6 months (around 9/18/2022) for Lab Appt Before FUP, Lab Today, Annual physical.

## 2022-03-21 DIAGNOSIS — R79.89 ABNORMAL LFTS: Primary | ICD-10-CM

## 2022-04-18 DIAGNOSIS — L50.9 URTICARIA: Primary | ICD-10-CM

## 2022-04-18 DIAGNOSIS — L30.9 ECZEMA, UNSPECIFIED TYPE: ICD-10-CM

## 2022-04-18 RX ORDER — TRIAMCINOLONE ACETONIDE 5 MG/G
CREAM TOPICAL 3 TIMES DAILY
Qty: 454 G | Refills: 1 | Status: SHIPPED | OUTPATIENT
Start: 2022-04-18

## 2022-04-18 RX ORDER — PREDNISONE 10 MG/1
TABLET ORAL
Qty: 9 TABLET | Refills: 0 | Status: SHIPPED | OUTPATIENT
Start: 2022-04-18 | End: 2022-04-24

## 2022-04-26 ENCOUNTER — OFFICE VISIT (OUTPATIENT)
Dept: INTERNAL MEDICINE | Facility: CLINIC | Age: 52
End: 2022-04-26

## 2022-04-26 VITALS — BODY MASS INDEX: 24.27 KG/M2 | WEIGHT: 151 LBS | HEIGHT: 66 IN | TEMPERATURE: 97.2 F

## 2022-04-26 DIAGNOSIS — L50.9 URTICARIA: Primary | ICD-10-CM

## 2022-04-26 PROCEDURE — 99213 OFFICE O/P EST LOW 20 MIN: CPT | Performed by: NURSE PRACTITIONER

## 2022-04-26 RX ORDER — PREDNISONE 10 MG/1
TABLET ORAL
Qty: 11 TABLET | Refills: 0 | Status: SHIPPED | OUTPATIENT
Start: 2022-04-26 | End: 2022-10-28

## 2022-04-26 NOTE — PROGRESS NOTES
"Subjective   Chief Complaint   Patient presents with   • Rash       History of Present Illness   51 y.o. female presents with cc rash ongoing times 2.5 weeks; followed by PCP Ariana Coffman.     Reported breaking out in hives on her arms and legs for a week on 04/17/2022. Triamcinolone cream refill and advised to use tid and and Prednisone (10 mg bid x3d then 10 mg daily for 3d) taper prescribed.     Completed Prednisone taper a couple of days ago and lesions improving but not resolved. Prednisone helped hives some--not \"as red.\" Burn more than itch. This morning at work they were actually worse. Hives on arms resolved from this morning. Still has several though on her left knee with a few scattered on her right leg.  Taking Pepcid 20 mg once daily as well as Xyzal and Claritin.     Again denies changes in personal hygiene products, detergents or medications. She has cats. No outdoor exposures.     She has brought in pictures from earlier this morning and last week.      Patient Active Problem List   Diagnosis   • IUD (intrauterine device) in place   • Essential hypertension   • Mood disorder (HCC)   • Allergic rhinitis   • Hyperlipidemia   • Knee pain, right   • Lumbar radiculopathy   • Vitamin D deficiency       Allergies   Allergen Reactions   • Penicillins Anaphylaxis   • Lisinopril Cough       Current Outpatient Medications on File Prior to Visit   Medication Sig Dispense Refill   • atorvastatin (LIPITOR) 40 MG tablet Take 1 tablet by mouth Daily. 90 tablet 1   • buPROPion XL (Wellbutrin XL) 300 MG 24 hr tablet Take 1 tablet by mouth Daily. 90 tablet 1   • cyclobenzaprine (FLEXERIL) 10 MG tablet Take 1 tablet by mouth 3 (Three) Times a Day As Needed for Muscle Spasms. 90 tablet 1   • escitalopram (LEXAPRO) 20 MG tablet Take 1 tablet by mouth Daily. 90 tablet 1   • hydroCHLOROthiazide (HYDRODIURIL) 25 MG tablet Take 1 tablet by mouth Daily. 90 tablet 1   • HYDROcodone-acetaminophen (NORCO) 5-325 MG per tablet Take 1 " tablet by mouth Every 6 (Six) Hours As Needed.     • levocetirizine (XYZAL) 5 MG tablet Take 1 tablet by mouth Daily. 90 tablet 1   • levonorgestrel (MIRENA) 20 MCG/24HR IUD 1 each by Intrauterine route 1 (one) time.     • meloxicam (MOBIC) 15 MG tablet Take 1 tablet by mouth Daily. 90 tablet 1   • multivitamin (THERAGRAN) tablet tablet Take  by mouth Daily.     • olmesartan (BENICAR) 40 MG tablet Take 0.5 tablets by mouth Daily. 45 tablet 1   • triamcinolone (KENALOG) 0.5 % cream Apply  topically to the appropriate area as directed 3 (Three) Times a Day. 454 g 1   • vitamin D3 125 MCG (5000 UT) capsule capsule Take 50,000 Units by mouth Daily.       No current facility-administered medications on file prior to visit.       Past Medical History:   Diagnosis Date   • Abnormal Pap smear of cervix    • Baker's cyst    • Bulging lumbar disc    • Bursitis    • CTS (carpal tunnel syndrome)     currently resolved   • H/O colposcopy with cervical biopsy     unknown pap result, biopsy was neg per pt, 2013 [facility]   • HPV (human papilloma virus) infection    • Hyperlipemia    • Hypertension    • Low back pain    • Osteoarthritis    • Scoliosis    • Seasonal allergies        Family History   Problem Relation Age of Onset   • Hypertension Mother    • Hyperlipidemia Mother    • Diverticulitis Mother    • Pancreatitis Mother    • Kidney disease Father    • Skin cancer Father    • Hypertension Father    • Hyperlipidemia Father    • Stroke Father    • Atrial fibrillation Father    • Transient ischemic attack Father    • Depression Brother    • Liver disease Maternal Grandmother    • Heart disease Maternal Grandfather    • Cancer Maternal Grandfather    • Heart attack Paternal Grandmother    • No Known Problems Paternal Grandfather    • Lung cancer Maternal Uncle    • Heart attack Maternal Uncle    • Uterine cancer Maternal Aunt 58   • Heart disease Maternal Aunt    • Colon cancer Paternal Uncle 65   • No Known Problems Sister   "  • No Known Problems Daughter    • No Known Problems Son    • No Known Problems Paternal Aunt    • BRCA 1/2 Neg Hx    • Breast cancer Neg Hx    • Endometrial cancer Neg Hx    • Ovarian cancer Neg Hx        Social History     Socioeconomic History   • Marital status: Single   Tobacco Use   • Smoking status: Former Smoker     Packs/day: 1.00     Years: 20.00     Pack years: 20.00     Types: Cigarettes     Quit date:      Years since quittin.3   • Smokeless tobacco: Never Used   Vaping Use   • Vaping Use: Never used   Substance and Sexual Activity   • Alcohol use: Yes     Alcohol/week: 7.0 standard drinks     Types: 7 Glasses of wine per week     Comment: OCC   • Drug use: No   • Sexual activity: Not Currently     Birth control/protection: I.U.D.       Past Surgical History:   Procedure Laterality Date   • AUGMENTATION MAMMAPLASTY     • BREAST AUGMENTATION     • CARPAL TUNNEL RELEASE     • CRYOTHERAPY         • EPIDURAL BLOCK     • HAND SURGERY      Pisiformectomy   • MAMMO BILATERAL     • PAP SMEAR     • SHOULDER ARTHROSCOPY  ,   • SHOULDER SURGERY     • WRIST SURGERY         The following portions of the patient's history were reviewed and updated as appropriate: problem list, allergies, current medications, past medical history and past social history.    Review of Systems    Immunization History   Administered Date(s) Administered   • COVID-19 (PFIZER) PURPLE CAP 2021, 2021   • FluLaval/Fluarix/Fluzone >6 2020, 10/28/2021   • Fluzone Split Quad (Multi-dose) 10/29/2018, 10/28/2019   • Tdap 2021       Objective   Vitals:    22 1332   Temp: 97.2 °F (36.2 °C)   Weight: 68.5 kg (151 lb)   Height: 167.6 cm (66\")     Body mass index is 24.37 kg/m².  Physical Exam  Constitutional:       Appearance: Normal appearance.   HENT:      Head: Normocephalic and atraumatic.   Pulmonary:      Effort: Pulmonary effort is normal.   Skin:     General: Skin is warm and " dry.      Comments: Lesions on arms resolved. Remainder scattered on right leg and left knee are pink maculopapular lesions with mild underlying swelling. Pictures reviewed from earlier this morning are urticaria diffusely present on arms and legs.    Neurological:      Mental Status: She is alert.   Psychiatric:         Mood and Affect: Mood normal.         Behavior: Behavior normal.         Procedures    Assessment/Plan   Diagnoses and all orders for this visit:    1. Urticaria (Primary)  Comments:  Lesions on exam today look like atopic dermatitis but pictures she has brought in look like urticaria. Predinose taper as below. Scheduled with her allergist Dr. Tai next week. Pepcid 20 mg bid and daily Xyzal advised (unable to tolerate bid dosing).   Orders:  -     predniSONE (DELTASONE) 10 MG tablet; Take 1 tablet by mouth four times daily for 1 day, then 1 tablet three times daily for 1 day, then 1 tablet twice daily for 1 day, then 1 tablet once a day for 1 day, then one-half tablet daily for 2 days  Dispense: 11 tablet; Refill: 0    Return for Next scheduled follow up.

## 2022-05-25 DIAGNOSIS — F41.9 ANXIETY: Primary | ICD-10-CM

## 2022-05-25 RX ORDER — ALPRAZOLAM 0.25 MG/1
0.25 TABLET ORAL 2 TIMES DAILY PRN
Qty: 30 TABLET | Refills: 0 | Status: SHIPPED | OUTPATIENT
Start: 2022-05-25 | End: 2022-07-16 | Stop reason: SDUPTHER

## 2022-07-16 DIAGNOSIS — M76.899 TENDONITIS OF KNEE: ICD-10-CM

## 2022-07-16 DIAGNOSIS — E78.5 HYPERLIPIDEMIA, UNSPECIFIED HYPERLIPIDEMIA TYPE: ICD-10-CM

## 2022-07-16 DIAGNOSIS — M62.838 MUSCLE SPASM: ICD-10-CM

## 2022-07-16 DIAGNOSIS — I10 ESSENTIAL HYPERTENSION: ICD-10-CM

## 2022-07-16 DIAGNOSIS — F41.9 ANXIETY: ICD-10-CM

## 2022-07-16 DIAGNOSIS — F39 MOOD DISORDER: ICD-10-CM

## 2022-07-18 RX ORDER — CYCLOBENZAPRINE HCL 10 MG
10 TABLET ORAL 3 TIMES DAILY PRN
Qty: 90 TABLET | Refills: 1 | Status: SHIPPED | OUTPATIENT
Start: 2022-07-18 | End: 2022-10-18 | Stop reason: SDUPTHER

## 2022-07-18 RX ORDER — ESCITALOPRAM OXALATE 20 MG/1
20 TABLET ORAL DAILY
Qty: 90 TABLET | Refills: 1 | Status: SHIPPED | OUTPATIENT
Start: 2022-07-18 | End: 2023-01-17 | Stop reason: SDUPTHER

## 2022-07-18 RX ORDER — ALPRAZOLAM 0.25 MG/1
0.25 TABLET ORAL 2 TIMES DAILY PRN
Qty: 30 TABLET | Refills: 0 | Status: SHIPPED | OUTPATIENT
Start: 2022-07-18 | End: 2022-08-17 | Stop reason: SDUPTHER

## 2022-07-18 RX ORDER — MELOXICAM 15 MG/1
15 TABLET ORAL DAILY
Qty: 90 TABLET | Refills: 1 | Status: SHIPPED | OUTPATIENT
Start: 2022-07-18 | End: 2023-01-17 | Stop reason: SDUPTHER

## 2022-07-18 RX ORDER — HYDROCHLOROTHIAZIDE 25 MG/1
25 TABLET ORAL DAILY
Qty: 90 TABLET | Refills: 1 | Status: SHIPPED | OUTPATIENT
Start: 2022-07-18 | End: 2023-01-17 | Stop reason: SDUPTHER

## 2022-07-18 RX ORDER — BUPROPION HYDROCHLORIDE 300 MG/1
300 TABLET ORAL DAILY
Qty: 90 TABLET | Refills: 1 | Status: SHIPPED | OUTPATIENT
Start: 2022-07-18 | End: 2023-01-17 | Stop reason: SDUPTHER

## 2022-07-18 RX ORDER — ATORVASTATIN CALCIUM 40 MG/1
40 TABLET, FILM COATED ORAL DAILY
Qty: 90 TABLET | Refills: 1 | Status: SHIPPED | OUTPATIENT
Start: 2022-07-18 | End: 2023-01-17 | Stop reason: SDUPTHER

## 2022-08-17 DIAGNOSIS — F41.9 ANXIETY: ICD-10-CM

## 2022-08-17 RX ORDER — ALPRAZOLAM 0.25 MG/1
0.25 TABLET ORAL 2 TIMES DAILY PRN
Qty: 30 TABLET | Refills: 0 | Status: CANCELLED | OUTPATIENT
Start: 2022-08-17

## 2022-08-18 DIAGNOSIS — F41.9 ANXIETY: ICD-10-CM

## 2022-08-19 RX ORDER — ALPRAZOLAM 0.25 MG/1
0.25 TABLET ORAL 2 TIMES DAILY PRN
Qty: 30 TABLET | Refills: 0 | Status: SHIPPED | OUTPATIENT
Start: 2022-08-19 | End: 2022-10-18 | Stop reason: SDUPTHER

## 2022-10-18 DIAGNOSIS — F41.9 ANXIETY: ICD-10-CM

## 2022-10-18 DIAGNOSIS — I10 ESSENTIAL HYPERTENSION: ICD-10-CM

## 2022-10-18 DIAGNOSIS — M62.838 MUSCLE SPASM: ICD-10-CM

## 2022-10-18 RX ORDER — ALPRAZOLAM 0.25 MG/1
0.25 TABLET ORAL 2 TIMES DAILY PRN
Qty: 30 TABLET | Refills: 0 | Status: CANCELLED | OUTPATIENT
Start: 2022-10-18

## 2022-10-18 RX ORDER — OLMESARTAN MEDOXOMIL 40 MG/1
20 TABLET ORAL DAILY
Qty: 45 TABLET | Refills: 1 | Status: SHIPPED | OUTPATIENT
Start: 2022-10-18

## 2022-10-18 RX ORDER — CYCLOBENZAPRINE HCL 10 MG
10 TABLET ORAL 3 TIMES DAILY PRN
Qty: 90 TABLET | Refills: 1 | Status: SHIPPED | OUTPATIENT
Start: 2022-10-18

## 2022-10-19 DIAGNOSIS — F41.9 ANXIETY: ICD-10-CM

## 2022-10-19 RX ORDER — ALPRAZOLAM 0.25 MG/1
0.25 TABLET ORAL 2 TIMES DAILY PRN
Qty: 30 TABLET | Refills: 0 | Status: SHIPPED | OUTPATIENT
Start: 2022-10-19 | End: 2023-01-17 | Stop reason: SDUPTHER

## 2022-10-28 ENCOUNTER — OFFICE VISIT (OUTPATIENT)
Dept: INTERNAL MEDICINE | Facility: CLINIC | Age: 52
End: 2022-10-28

## 2022-10-28 VITALS
BODY MASS INDEX: 23.95 KG/M2 | DIASTOLIC BLOOD PRESSURE: 80 MMHG | HEIGHT: 66 IN | SYSTOLIC BLOOD PRESSURE: 130 MMHG | TEMPERATURE: 97.6 F | WEIGHT: 149 LBS

## 2022-10-28 DIAGNOSIS — E55.9 VITAMIN D DEFICIENCY: ICD-10-CM

## 2022-10-28 DIAGNOSIS — I10 ESSENTIAL HYPERTENSION: ICD-10-CM

## 2022-10-28 DIAGNOSIS — M19.90 ARTHRITIS: Primary | ICD-10-CM

## 2022-10-28 DIAGNOSIS — F39 MOOD DISORDER: ICD-10-CM

## 2022-10-28 DIAGNOSIS — Z79.899 HIGH RISK MEDICATION USE: ICD-10-CM

## 2022-10-28 DIAGNOSIS — E78.5 HYPERLIPIDEMIA, UNSPECIFIED HYPERLIPIDEMIA TYPE: ICD-10-CM

## 2022-10-28 PROCEDURE — 99214 OFFICE O/P EST MOD 30 MIN: CPT | Performed by: PHYSICIAN ASSISTANT

## 2022-10-28 NOTE — PROGRESS NOTES
Subjective   Chief Complaint   Patient presents with   • Hypertension   • Hyperlipidemia       History of Present Illness     Pt is here today for fup on her HTN, HLD and anxiety. Has maintained her weight loss. Joint pain and back pain are stable. Anxiety is well controlled. Taking Xanax 2-3 times per week, does not feel it needs to be increased at this time. Has no CP or SOA. Having quite a bit of joint pain in her thumbs bilaterally.      Patient Active Problem List   Diagnosis   • IUD (intrauterine device) in place   • Essential hypertension   • Mood disorder (HCC)   • Allergic rhinitis   • Hyperlipidemia   • Knee pain, right   • Lumbar radiculopathy   • Vitamin D deficiency       Allergies   Allergen Reactions   • Penicillins Anaphylaxis   • Lisinopril Cough       Current Outpatient Medications on File Prior to Visit   Medication Sig Dispense Refill   • ALPRAZolam (Xanax) 0.25 MG tablet Take 1 tablet by mouth 2 (Two) Times a Day As Needed for Anxiety. 30 tablet 0   • atorvastatin (LIPITOR) 40 MG tablet Take 1 tablet by mouth Daily. 90 tablet 1   • buPROPion XL (Wellbutrin XL) 300 MG 24 hr tablet Take 1 tablet by mouth Daily. 90 tablet 1   • cyclobenzaprine (FLEXERIL) 10 MG tablet Take 1 tablet by mouth 3 (Three) Times a Day As Needed for Muscle Spasms. 90 tablet 1   • escitalopram (LEXAPRO) 20 MG tablet Take 1 tablet by mouth Daily. 90 tablet 1   • famotidine (PEPCID) 20 MG tablet Take 1 tablet by mouth 2 (Two) Times a Day. 60 tablet 11   • hydroCHLOROthiazide (HYDRODIURIL) 25 MG tablet Take 1 tablet by mouth Daily. 90 tablet 1   • HYDROcodone-acetaminophen (NORCO) 5-325 MG per tablet Take 1 tablet by mouth Every 6 (Six) Hours As Needed.     • levocetirizine (XYZAL) 5 MG tablet Take one tablet by mouth twice daily for urticaria. 60 tablet 11   • levonorgestrel (MIRENA) 20 MCG/24HR IUD 1 each by Intrauterine route 1 (one) time.     • meloxicam (MOBIC) 15 MG tablet Take 1 tablet by mouth Daily. 90 tablet 1   •  multivitamin (THERAGRAN) tablet tablet Take  by mouth Daily.     • olmesartan (BENICAR) 40 MG tablet Take 0.5 tablets by mouth Daily. 45 tablet 1   • triamcinolone (KENALOG) 0.5 % cream Apply  topically to the appropriate area as directed 3 (Three) Times a Day. 454 g 1   • vitamin D3 125 MCG (5000 UT) capsule capsule Take 50,000 Units by mouth Daily.       No current facility-administered medications on file prior to visit.       Past Medical History:   Diagnosis Date   • Abnormal Pap smear of cervix    • Baker's cyst    • Bulging lumbar disc    • Bursitis    • CTS (carpal tunnel syndrome)     currently resolved   • H/O colposcopy with cervical biopsy     unknown pap result, biopsy was neg per pt, 2013 [facility]   • HPV (human papilloma virus) infection    • Hyperlipemia    • Hypertension    • Low back pain    • Osteoarthritis    • Scoliosis    • Seasonal allergies        Family History   Problem Relation Age of Onset   • Hypertension Mother    • Hyperlipidemia Mother    • Diverticulitis Mother    • Pancreatitis Mother    • Kidney disease Father    • Skin cancer Father    • Hypertension Father    • Hyperlipidemia Father    • Stroke Father    • Atrial fibrillation Father    • Transient ischemic attack Father    • Depression Brother    • Liver disease Maternal Grandmother    • Heart disease Maternal Grandfather    • Cancer Maternal Grandfather    • Heart attack Paternal Grandmother    • No Known Problems Paternal Grandfather    • Lung cancer Maternal Uncle    • Heart attack Maternal Uncle    • Uterine cancer Maternal Aunt 58   • Heart disease Maternal Aunt    • Colon cancer Paternal Uncle 65   • No Known Problems Sister    • No Known Problems Daughter    • No Known Problems Son    • No Known Problems Paternal Aunt    • BRCA 1/2 Neg Hx    • Breast cancer Neg Hx    • Endometrial cancer Neg Hx    • Ovarian cancer Neg Hx        Social History     Socioeconomic History   • Marital status: Single   Tobacco Use   • Smoking  "status: Former     Packs/day: 1.00     Years: 20.00     Pack years: 20.00     Types: Cigarettes     Quit date:      Years since quittin.8   • Smokeless tobacco: Never   Vaping Use   • Vaping Use: Never used   Substance and Sexual Activity   • Alcohol use: Yes     Alcohol/week: 7.0 standard drinks     Types: 7 Glasses of wine per week     Comment: OCC   • Drug use: No   • Sexual activity: Not Currently     Birth control/protection: I.U.D.       Past Surgical History:   Procedure Laterality Date   • AUGMENTATION MAMMAPLASTY     • BREAST AUGMENTATION     • CARPAL TUNNEL RELEASE     • CRYOTHERAPY         • EPIDURAL BLOCK     • HAND SURGERY      Pisiformectomy   • MAMMO BILATERAL     • PAP SMEAR     • SHOULDER ARTHROSCOPY  ,   • SHOULDER SURGERY     • WRIST SURGERY           The following portions of the patient's history were reviewed and updated as appropriate: problem list, allergies, current medications, past medical history, past family history, past social history and past surgical history.    ROS   See hpi    Immunization History   Administered Date(s) Administered   • COVID-19 (PFIZER) PURPLE CAP 2021, 2021   • FluLaval/Fluzone >6mos 2020, 10/28/2021   • Fluzone Quad >6mos (Multi-dose) 10/29/2018, 10/28/2019   • Tdap 2021       Objective   Vitals:    10/28/22 1602 10/28/22 1633   BP:  130/80   Temp: 97.6 °F (36.4 °C)    Weight: 67.6 kg (149 lb)    Height: 167.6 cm (65.98\")      Body mass index is 24.06 kg/m².  Physical Exam  Vitals reviewed.   Constitutional:       Appearance: She is well-developed.   HENT:      Head: Normocephalic and atraumatic.   Cardiovascular:      Rate and Rhythm: Normal rate and regular rhythm.      Heart sounds: Normal heart sounds, S1 normal and S2 normal.   Pulmonary:      Effort: Pulmonary effort is normal.      Breath sounds: Normal breath sounds.   Skin:     General: Skin is warm.   Neurological:      Mental Status: She is " alert.   Psychiatric:         Behavior: Behavior normal.           Assessment & Plan   Diagnoses and all orders for this visit:    1. Arthritis (Primary)  -     Ambulatory Referral to Hand Surgery    2. Essential hypertension  Comments:  BP controlled  Orders:  -     Comprehensive Metabolic Panel; Future    3. Mood disorder (HCC)  Comments:  Anxiety sx are stable. Appropriate use of Xanax. Estrada reviewed. Drug contract updated as well.     4. Hyperlipidemia, unspecified hyperlipidemia type  Comments:  Will get fasting labs next week  Orders:  -     Lipid Panel With / Chol / HDL Ratio; Future    5. High risk medication use  -     Compliance Drug Analysis, Ur - Urine, Clean Catch; Future    6. Vitamin D deficiency  -     Vitamin D,25-Hydroxy; Future        Return in about 6 months (around 4/28/2023) for Annual physical, Lab Appt Before FUP.

## 2022-12-01 ENCOUNTER — LAB (OUTPATIENT)
Dept: LAB | Facility: HOSPITAL | Age: 52
End: 2022-12-01

## 2022-12-01 DIAGNOSIS — E55.9 VITAMIN D DEFICIENCY: ICD-10-CM

## 2022-12-01 PROCEDURE — 80053 COMPREHEN METABOLIC PANEL: CPT | Performed by: PHYSICIAN ASSISTANT

## 2022-12-01 PROCEDURE — 82306 VITAMIN D 25 HYDROXY: CPT | Performed by: PHYSICIAN ASSISTANT

## 2022-12-01 PROCEDURE — 80061 LIPID PANEL: CPT | Performed by: PHYSICIAN ASSISTANT

## 2023-01-13 ENCOUNTER — OFFICE VISIT (OUTPATIENT)
Dept: INTERNAL MEDICINE | Facility: CLINIC | Age: 53
End: 2023-01-13
Payer: COMMERCIAL

## 2023-01-13 VITALS
HEIGHT: 66 IN | SYSTOLIC BLOOD PRESSURE: 122 MMHG | WEIGHT: 148 LBS | BODY MASS INDEX: 23.78 KG/M2 | DIASTOLIC BLOOD PRESSURE: 70 MMHG | TEMPERATURE: 97.5 F

## 2023-01-13 DIAGNOSIS — M54.32 LEFT SIDED SCIATICA: Primary | ICD-10-CM

## 2023-01-13 PROCEDURE — 99213 OFFICE O/P EST LOW 20 MIN: CPT | Performed by: PHYSICIAN ASSISTANT

## 2023-01-13 RX ORDER — PREDNISONE 10 MG/1
TABLET ORAL
Qty: 9 TABLET | Refills: 0 | Status: SHIPPED | OUTPATIENT
Start: 2023-01-13 | End: 2023-01-19

## 2023-01-13 NOTE — PROGRESS NOTES
Subjective   Chief Complaint   Patient presents with   • Hip Pain       History of Present Illness     Pt has had worsening sciatica on the left for a few weeks, is contact. Unable to  one position without worsening sx. Has radiating pain into her lower left leg. No weakness. No loss of bowel or bladder function.      Patient Active Problem List   Diagnosis   • IUD (intrauterine device) in place   • Essential hypertension   • Mood disorder (HCC)   • Allergic rhinitis   • Hyperlipidemia   • Knee pain, right   • Lumbar radiculopathy   • Vitamin D deficiency       Allergies   Allergen Reactions   • Penicillins Anaphylaxis   • Lisinopril Cough       Current Outpatient Medications on File Prior to Visit   Medication Sig Dispense Refill   • ALPRAZolam (Xanax) 0.25 MG tablet Take 1 tablet by mouth 2 (Two) Times a Day As Needed for Anxiety. 30 tablet 0   • atorvastatin (LIPITOR) 40 MG tablet Take 1 tablet by mouth Daily. 90 tablet 1   • buPROPion XL (Wellbutrin XL) 300 MG 24 hr tablet Take 1 tablet by mouth Daily. 90 tablet 1   • cyclobenzaprine (FLEXERIL) 10 MG tablet Take 1 tablet by mouth 3 (Three) Times a Day As Needed for Muscle Spasms. 90 tablet 1   • escitalopram (LEXAPRO) 20 MG tablet Take 1 tablet by mouth Daily. 90 tablet 1   • famotidine (PEPCID) 20 MG tablet Take 1 tablet by mouth 2 (Two) Times a Day. 60 tablet 11   • hydroCHLOROthiazide (HYDRODIURIL) 25 MG tablet Take 1 tablet by mouth Daily. 90 tablet 1   • HYDROcodone-acetaminophen (NORCO) 5-325 MG per tablet Take 1 tablet by mouth Every 6 (Six) Hours As Needed.     • levocetirizine (XYZAL) 5 MG tablet Take one tablet by mouth twice daily for urticaria. 60 tablet 11   • levonorgestrel (MIRENA) 20 MCG/24HR IUD 1 each by Intrauterine route 1 (one) time.     • meloxicam (MOBIC) 15 MG tablet Take 1 tablet by mouth Daily. 90 tablet 1   • multivitamin (THERAGRAN) tablet tablet Take  by mouth Daily.     • olmesartan (BENICAR) 40 MG tablet Take 0.5 tablets by  mouth Daily. 45 tablet 1   • triamcinolone (KENALOG) 0.5 % cream Apply  topically to the appropriate area as directed 3 (Three) Times a Day. 454 g 1   • vitamin D3 125 MCG (5000 UT) capsule capsule Take 50,000 Units by mouth Daily.       No current facility-administered medications on file prior to visit.       Past Medical History:   Diagnosis Date   • Abnormal Pap smear of cervix    • Baker's cyst    • Bulging lumbar disc    • Bursitis    • CTS (carpal tunnel syndrome)     currently resolved   • H/O colposcopy with cervical biopsy     unknown pap result, biopsy was neg per pt, 2013 [facility]   • HPV (human papilloma virus) infection    • Hyperlipemia    • Hypertension    • Low back pain    • Osteoarthritis    • Scoliosis    • Seasonal allergies        Family History   Problem Relation Age of Onset   • Hypertension Mother    • Hyperlipidemia Mother    • Diverticulitis Mother    • Pancreatitis Mother    • Kidney disease Father    • Skin cancer Father    • Hypertension Father    • Hyperlipidemia Father    • Stroke Father    • Atrial fibrillation Father    • Transient ischemic attack Father    • Depression Brother    • Liver disease Maternal Grandmother    • Heart disease Maternal Grandfather    • Cancer Maternal Grandfather    • Heart attack Paternal Grandmother    • No Known Problems Paternal Grandfather    • Lung cancer Maternal Uncle    • Heart attack Maternal Uncle    • Uterine cancer Maternal Aunt 58   • Heart disease Maternal Aunt    • Colon cancer Paternal Uncle 65   • No Known Problems Sister    • No Known Problems Daughter    • No Known Problems Son    • No Known Problems Paternal Aunt    • BRCA 1/2 Neg Hx    • Breast cancer Neg Hx    • Endometrial cancer Neg Hx    • Ovarian cancer Neg Hx        Social History     Socioeconomic History   • Marital status: Single   Tobacco Use   • Smoking status: Former     Packs/day: 1.00     Years: 20.00     Pack years: 20.00     Types: Cigarettes     Quit date: 2006      "Years since quittin.0   • Smokeless tobacco: Never   Vaping Use   • Vaping Use: Never used   Substance and Sexual Activity   • Alcohol use: Yes     Alcohol/week: 7.0 standard drinks     Types: 7 Glasses of wine per week     Comment: OCC   • Drug use: No   • Sexual activity: Not Currently     Birth control/protection: I.U.D.       Past Surgical History:   Procedure Laterality Date   • AUGMENTATION MAMMAPLASTY     • BREAST AUGMENTATION     • CARPAL TUNNEL RELEASE     • CRYOTHERAPY         • EPIDURAL BLOCK     • HAND SURGERY      Pisiformectomy   • MAMMO BILATERAL     • PAP SMEAR     • SHOULDER ARTHROSCOPY  ,   • SHOULDER SURGERY     • WRIST SURGERY           The following portions of the patient's history were reviewed and updated as appropriate: problem list, allergies, current medications, past medical history, past family history, past social history and past surgical history.    ROS     See HPI    Immunization History   Administered Date(s) Administered   • COVID-19 (PFIZER) PURPLE CAP 2021, 2021   • FluLaval/Fluzone >6mos 2020, 10/28/2021   • Fluzone Quad >6mos (Multi-dose) 10/29/2018, 10/28/2019   • Tdap 2021       Objective   Vitals:    23 1036 23 1049   BP:  122/70   Temp: 97.5 °F (36.4 °C)    Weight: 67.1 kg (148 lb)    Height: 167.6 cm (65.98\")      Body mass index is 23.9 kg/m².  Physical Exam  Vitals reviewed.   Constitutional:       Appearance: Normal appearance.   HENT:      Head: Normocephalic and atraumatic.   Neurological:      Mental Status: She is alert.      Motor: No weakness.      Deep Tendon Reflexes: Reflexes normal.         Assessment & Plan   Diagnoses and all orders for this visit:    1. Left sided sciatica (Primary)  -     predniSONE (DELTASONE) 10 MG tablet; Take 2 tablets by mouth Daily for 3 days, THEN 1 tablet Daily for 3 days.  Dispense: 9 tablet; Refill: 0             "

## 2023-01-17 DIAGNOSIS — E78.5 HYPERLIPIDEMIA, UNSPECIFIED HYPERLIPIDEMIA TYPE: ICD-10-CM

## 2023-01-17 DIAGNOSIS — F39 MOOD DISORDER: ICD-10-CM

## 2023-01-17 DIAGNOSIS — M76.899 TENDONITIS OF KNEE: ICD-10-CM

## 2023-01-17 DIAGNOSIS — F41.9 ANXIETY: ICD-10-CM

## 2023-01-17 DIAGNOSIS — I10 ESSENTIAL HYPERTENSION: ICD-10-CM

## 2023-01-17 RX ORDER — BUPROPION HYDROCHLORIDE 300 MG/1
300 TABLET ORAL DAILY
Qty: 90 TABLET | Refills: 1 | Status: SHIPPED | OUTPATIENT
Start: 2023-01-17

## 2023-01-17 RX ORDER — MELOXICAM 15 MG/1
15 TABLET ORAL DAILY
Qty: 90 TABLET | Refills: 1 | Status: SHIPPED | OUTPATIENT
Start: 2023-01-17

## 2023-01-17 RX ORDER — HYDROCHLOROTHIAZIDE 25 MG/1
25 TABLET ORAL DAILY
Qty: 90 TABLET | Refills: 1 | Status: SHIPPED | OUTPATIENT
Start: 2023-01-17

## 2023-01-17 RX ORDER — ESCITALOPRAM OXALATE 20 MG/1
20 TABLET ORAL DAILY
Qty: 90 TABLET | Refills: 1 | Status: SHIPPED | OUTPATIENT
Start: 2023-01-17

## 2023-01-17 RX ORDER — ALPRAZOLAM 0.25 MG/1
0.25 TABLET ORAL 2 TIMES DAILY PRN
Qty: 30 TABLET | Refills: 0 | Status: CANCELLED | OUTPATIENT
Start: 2023-01-17

## 2023-01-17 RX ORDER — ATORVASTATIN CALCIUM 40 MG/1
40 TABLET, FILM COATED ORAL DAILY
Qty: 90 TABLET | Refills: 1 | Status: SHIPPED | OUTPATIENT
Start: 2023-01-17

## 2023-01-18 DIAGNOSIS — F41.9 ANXIETY: ICD-10-CM

## 2023-01-18 RX ORDER — ALPRAZOLAM 0.25 MG/1
0.25 TABLET ORAL 2 TIMES DAILY PRN
Qty: 30 TABLET | Refills: 0 | Status: SHIPPED | OUTPATIENT
Start: 2023-01-18

## 2023-04-17 DIAGNOSIS — I10 ESSENTIAL HYPERTENSION: ICD-10-CM

## 2023-04-17 DIAGNOSIS — F41.9 ANXIETY: ICD-10-CM

## 2023-04-17 DIAGNOSIS — M62.838 MUSCLE SPASM: ICD-10-CM

## 2023-04-17 RX ORDER — OLMESARTAN MEDOXOMIL 40 MG/1
20 TABLET ORAL DAILY
Qty: 45 TABLET | Refills: 1 | Status: CANCELLED | OUTPATIENT
Start: 2023-04-17

## 2023-04-17 RX ORDER — CYCLOBENZAPRINE HCL 10 MG
10 TABLET ORAL 3 TIMES DAILY PRN
Qty: 90 TABLET | Refills: 1 | Status: CANCELLED | OUTPATIENT
Start: 2023-04-17

## 2023-04-17 RX ORDER — ALPRAZOLAM 0.25 MG/1
0.25 TABLET ORAL 2 TIMES DAILY PRN
Qty: 30 TABLET | Refills: 0 | Status: CANCELLED | OUTPATIENT
Start: 2023-04-17

## 2023-04-18 DIAGNOSIS — F41.9 ANXIETY: ICD-10-CM

## 2023-04-18 DIAGNOSIS — M62.838 MUSCLE SPASM: ICD-10-CM

## 2023-04-18 DIAGNOSIS — I10 ESSENTIAL HYPERTENSION: ICD-10-CM

## 2023-04-18 RX ORDER — CYCLOBENZAPRINE HCL 10 MG
10 TABLET ORAL 3 TIMES DAILY PRN
Qty: 90 TABLET | Refills: 1 | Status: SHIPPED | OUTPATIENT
Start: 2023-04-18

## 2023-04-18 RX ORDER — OLMESARTAN MEDOXOMIL 40 MG/1
20 TABLET ORAL DAILY
Qty: 45 TABLET | Refills: 1 | Status: SHIPPED | OUTPATIENT
Start: 2023-04-18

## 2023-04-18 RX ORDER — ALPRAZOLAM 0.25 MG/1
0.25 TABLET ORAL 2 TIMES DAILY PRN
Qty: 30 TABLET | Refills: 0 | Status: SHIPPED | OUTPATIENT
Start: 2023-04-18

## 2023-04-19 ENCOUNTER — PROCEDURE VISIT (OUTPATIENT)
Dept: OBSTETRICS AND GYNECOLOGY | Age: 53
End: 2023-04-19
Payer: COMMERCIAL

## 2023-04-19 ENCOUNTER — OFFICE VISIT (OUTPATIENT)
Dept: OBSTETRICS AND GYNECOLOGY | Age: 53
End: 2023-04-19
Payer: COMMERCIAL

## 2023-04-19 VITALS
WEIGHT: 149 LBS | HEIGHT: 66 IN | SYSTOLIC BLOOD PRESSURE: 124 MMHG | DIASTOLIC BLOOD PRESSURE: 72 MMHG | BODY MASS INDEX: 23.95 KG/M2

## 2023-04-19 DIAGNOSIS — Z78.0 POST-MENOPAUSAL: ICD-10-CM

## 2023-04-19 DIAGNOSIS — Z12.31 VISIT FOR SCREENING MAMMOGRAM: Primary | ICD-10-CM

## 2023-04-19 DIAGNOSIS — Z11.51 SCREENING FOR HPV (HUMAN PAPILLOMAVIRUS): ICD-10-CM

## 2023-04-19 DIAGNOSIS — Z97.5 IUD (INTRAUTERINE DEVICE) IN PLACE: ICD-10-CM

## 2023-04-19 DIAGNOSIS — Z12.4 ENCOUNTER FOR PAPANICOLAOU SMEAR FOR CERVICAL CANCER SCREENING: ICD-10-CM

## 2023-04-19 DIAGNOSIS — Z12.11 COLON CANCER SCREENING: ICD-10-CM

## 2023-04-19 DIAGNOSIS — Z01.419 WELL WOMAN EXAM WITH ROUTINE GYNECOLOGICAL EXAM: Primary | ICD-10-CM

## 2023-04-19 NOTE — PROGRESS NOTES
"Subjective     Chief Complaint   Patient presents with   • Gynecologic Exam     annual exam last pap 2020 neg/neg, m/g done after appointment, [facility] never       History of Present Illness    Dasha De Leon is a 52 y.o.  who presents for annual exam.      She is doing well    Has lost 30 lbs exercising and eating right    Is taking calcium supplement    Sees IKER Patel, for routine checks  She manages her medicines    Remote h/o abnormal pap  Due for one today    Needs CSC  Has had HM labs  Mammogram today    Obstetric History:  OB History        0    Para   0    Term   0       0    AB   0    Living   0       SAB   0    IAB   0    Ectopic   0    Molar        Multiple   0    Live Births                   Menstrual History:     No LMP recorded (lmp unknown). Patient has had an implant.         Current contraception: post menopausal status  History of abnormal Pap smear: yes - years ago  Received Gardasil immunization: no  Perform regular self breast exam: yes - occl  Family history of uterine or ovarian cancer: no  Family History of colon cancer: no  Family history of breast cancer: no    Mammogram: done today.  Colonoscopy: recommended.  DEXA: not indicated.    Exercise: moderately active  Calcium/Vitamin D: adequate intake and uses supplement    The following portions of the patient's history were reviewed and updated as appropriate: allergies, current medications, past family history, past medical history, past social history, past surgical history and problem list.    Review of Systems   All other systems reviewed and are negative.      Review of Systems   Constitutional: Negative for fatigue.   Respiratory: Negative for shortness of breath.    Gastrointestinal: Negative for abdominal pain.   Genitourinary: Negative for dysuria.   Neurological: Negative for headaches.   Psychiatric/Behavioral: Negative for dysphoric mood.         Objective   Physical Exam    /72   Ht 167.6 cm (66\")  "  Wt 67.6 kg (149 lb)   LMP  (LMP Unknown)   BMI 24.05 kg/m²   General:   alert, comfortable and no distress   Heart: regular rate and rhythm   Lungs: clear to auscultation bilaterally   Breast: normal appearance, no masses or tenderness, Inspection negative, No nipple retraction or dimpling, No nipple discharge or bleeding, No axillary or supraclavicular adenopathy, Normal to palpation without dominant masses   Neck: no adenopathy and no carotid bruit   Abdomen: normal findings: soft, non-tender   CVA: Not performed today   Pelvis: External genitalia: normal general appearance  Vaginal: normal mucosa without prolapse or lesions  Cervix: normal appearance, thin prep PAP obtained and IUD string visualized  Adnexa: normal bimanual exam  Uterus: normal single, nontender   Extremities: Not performed today   Neurologic: negative   Psychiatric: Normal affect, judgement, and mood     Assessment & Plan   Diagnoses and all orders for this visit:    1. Well woman exam with routine gynecological exam (Primary)    2. IUD (intrauterine device) in place    3. Post-menopausal    4. Encounter for Papanicolaou smear for cervical cancer screening  -     IGP, Aptima HPV, Rfx 16 / 18,45    5. Screening for HPV (human papillomavirus)  -     IGP, Aptima HPV, Rfx 16 / 18,45    6. Colon cancer screening  -     Ambulatory Referral For Screening Colonoscopy        All questions answered.  Breast self exam technique reviewed and patient encouraged to perform self-exam monthly.  Discussed healthy lifestyle modifications.  Recommended 30 minutes of aerobic exercise five times per week.  Discussed calcium needs to prevent osteoporosis.      Pap done   iud still in place, declines to have it removed. Aware of options for removal  Mammogram today  Referral for CSC

## 2023-04-25 LAB
CYTOLOGIST CVX/VAG CYTO: ABNORMAL
CYTOLOGY CVX/VAG DOC CYTO: ABNORMAL
CYTOLOGY CVX/VAG DOC THIN PREP: ABNORMAL
DX ICD CODE: ABNORMAL
DX ICD CODE: ABNORMAL
HIV 1 & 2 AB SER-IMP: ABNORMAL
HPV GENOTYPE REFLEX: ABNORMAL
HPV I/H RISK 4 DNA CVX QL PROBE+SIG AMP: NEGATIVE
OTHER STN SPEC: ABNORMAL
PATHOLOGIST CVX/VAG CYTO: ABNORMAL
RECOM F/U CVX/VAG CYTO: ABNORMAL
STAT OF ADQ CVX/VAG CYTO-IMP: ABNORMAL

## 2023-07-20 DIAGNOSIS — M76.899 TENDONITIS OF KNEE: ICD-10-CM

## 2023-07-20 DIAGNOSIS — F41.9 ANXIETY: ICD-10-CM

## 2023-07-24 DIAGNOSIS — M76.899 TENDONITIS OF KNEE: ICD-10-CM

## 2023-07-24 DIAGNOSIS — F41.9 ANXIETY: ICD-10-CM

## 2023-07-24 RX ORDER — MELOXICAM 15 MG/1
15 TABLET ORAL DAILY
Qty: 90 TABLET | Refills: 1 | OUTPATIENT
Start: 2023-07-24

## 2023-07-24 RX ORDER — ALPRAZOLAM 0.25 MG/1
0.25 TABLET ORAL 2 TIMES DAILY PRN
Qty: 30 TABLET | Refills: 0 | OUTPATIENT
Start: 2023-07-24

## 2023-07-24 RX ORDER — ALPRAZOLAM 0.25 MG/1
0.25 TABLET ORAL 2 TIMES DAILY PRN
Qty: 30 TABLET | Refills: 0 | Status: SHIPPED | OUTPATIENT
Start: 2023-07-24

## 2023-07-24 RX ORDER — MELOXICAM 15 MG/1
15 TABLET ORAL DAILY
Qty: 90 TABLET | Refills: 1 | Status: SHIPPED | OUTPATIENT
Start: 2023-07-24

## 2023-08-06 ENCOUNTER — HOSPITAL ENCOUNTER (OUTPATIENT)
Dept: MRI IMAGING | Facility: HOSPITAL | Age: 53
Discharge: HOME | End: 2023-08-06
Admitting: PHYSICIAN ASSISTANT
Payer: COMMERCIAL

## 2023-08-06 DIAGNOSIS — M51.26 LUMBAR HERNIATED DISC: ICD-10-CM

## 2023-08-06 PROCEDURE — 72148 MRI LUMBAR SPINE W/O DYE: CPT

## 2023-08-11 DIAGNOSIS — M51.26 LUMBAR HERNIATED DISC: Primary | ICD-10-CM

## 2023-09-19 ENCOUNTER — PATIENT ROUNDING (BHMG ONLY) (OUTPATIENT)
Dept: NEUROSURGERY | Facility: CLINIC | Age: 53
End: 2023-09-19

## 2023-09-20 NOTE — H&P (VIEW-ONLY)
"Subjective   Patient ID: Dasha De Leon is a 52 y.o. female is being seen for consultation today at the request of Ariana Coffman PA-C for lumbar herniated disc. Patient had a MRI L-spine on 08/06/2023 @ [facility]    Treatment: No recent treatment    Today patient states that she has low back pain that radiates to the L buttock down to her L leg, along with burning, and N/T in the L leg. Patient denies B/B incontinence    History of Present Illness    This patient was here about 3 years ago.  At that time she was having pain in her back with radiation into her left leg.  She had a couple of epidural blocks at that time which did not help.  Subsequent to that the pain is gotten worse over the last 6 months or so to the point where there is pain now every day.  He used to be only flareup every once in a while.  The pain is located in the lower part of the back.  It radiates down the posterior lateral thigh posterior lateral calf and into the outer aspect of her left foot.  She has no difficulty with bowel bladder control or other associated symptoms.  She has been treated with the epidural blocks but no other treatment so far.    The following portions of the patient's history were reviewed and updated as appropriate: allergies, current medications, past family history, past medical history, past social history, past surgical history, and problem list.    Review of Systems   Constitutional:  Negative for chills and fever.   HENT:  Negative for congestion.    Genitourinary:  Negative for difficulty urinating and dysuria.   Musculoskeletal:  Positive for back pain, gait problem and myalgias.   Neurological:  Positive for weakness and numbness.     I reviewed the review of systems listed by the patient and discussed by my MA    Objective     Vitals:    09/21/23 1004   BP: 119/77   Cuff Size: Adult   Pulse: 117   Temp: 96.9 øF (36.1 øC)   SpO2: 97%   Weight: 68.9 kg (152 lb)   Height: 167.6 cm (65.98\")     Body mass index " is 24.55 kg/mý.    Tobacco Use: Medium Risk    Smoking Tobacco Use: Former    Smokeless Tobacco Use: Never    Passive Exposure: Never          Physical Exam  Constitutional:       Appearance: She is well-developed.   HENT:      Head: Normocephalic and atraumatic.   Eyes:      Extraocular Movements: EOM normal.      Conjunctiva/sclera: Conjunctivae normal.      Pupils: Pupils are equal, round, and reactive to light.   Neck:      Vascular: No carotid bruit.   Neurological:      Mental Status: She is oriented to person, place, and time.      Coordination: Finger-Nose-Finger Test and Heel to Shin Test normal.      Gait: Gait is intact.      Deep Tendon Reflexes:      Reflex Scores:       Tricep reflexes are 2+ on the right side and 2+ on the left side.       Bicep reflexes are 2+ on the right side and 2+ on the left side.       Brachioradialis reflexes are 2+ on the right side and 2+ on the left side.       Patellar reflexes are 2+ on the right side and 2+ on the left side.       Achilles reflexes are 2+ on the right side and 0 on the left side.  Psychiatric:         Speech: Speech normal.     Neurologic Exam     Mental Status   Oriented to person, place, and time.   Registration of memory: Good recent and remote memory.   Attention: normal. Concentration: normal.   Speech: speech is normal   Level of consciousness: alert  Knowledge: consistent with education.     Cranial Nerves     CN II   Visual fields full to confrontation.   Visual acuity: normal    CN III, IV, VI   Pupils are equal, round, and reactive to light.  Extraocular motions are normal.     CN V   Facial sensation intact.   Right corneal reflex: normal  Left corneal reflex: normal    CN VII   Facial expression full, symmetric.   Right facial weakness: none  Left facial weakness: none    CN VIII   Hearing: intact    CN IX, X   Palate: symmetric    CN XI   Right sternocleidomastoid strength: normal  Left sternocleidomastoid strength: normal    CN XII    Tongue: not atrophic  Tongue deviation: none    Motor Exam   Muscle bulk: normal  Right arm tone: normal  Left arm tone: normal  Right leg tone: normal  Left leg tone: normal    Strength   Strength 5/5 except as noted.   Left gastroc: 4/5    Sensory Exam   Light touch normal.     Gait, Coordination, and Reflexes     Gait  Gait: normal    Coordination   Finger to nose coordination: normal  Heel to shin coordination: normal    Reflexes   Right brachioradialis: 2+  Left brachioradialis: 2+  Right biceps: 2+  Left biceps: 2+  Right triceps: 2+  Left triceps: 2+  Right patellar: 2+  Left patellar: 2+  Right achilles: 2+  Left achilles: 0  Right : 2+  Left : 2+        Assessment & Plan   Independent Review of Radiographic Studies:      I personally reviewed the images from the following studies.    I reviewed an MRI of the lumbar spine done on 6 August of this year.  This shows some disc bulging on the sagittal images at L2-3 and a grade 1 spondylolisthesis of L4 on L5.  On the axial images L1-2 is fairly open.  L2-3 does show some disc bulging both centrally and into the neuroforamina which could be causing some compression of the nerves.  L3-4 shows some left-sided far lateral disc bulging.  L4-5 shows bilateral foraminal stenosis as well as lateral recess and central stenosis and L5-S1 mostly looks okay.    Medical Decision Making:      I told the patient about the imaging.  I told her that I thought her problem was coming from the L4-5 stenosis.  She does have a neurologic deficit although not severe.  I recommended that we do a lumbar myelogram.  I told the patient what a myelogram involves.  I explained that there is a 50% chance of developing a bad headache and nausea as a result of the test.  I explained that there is also a very small chance of infection, seizures, and bleeding.  I explained how we would treat a post myelogram headache including bedrest, caffeinated fluids, steroids, and blood patch.   The patient does ask to proceed.    Diagnoses and all orders for this visit:    1. Lumbar radiculopathy (Primary)  -     Obtain Informed Consent; Standing  -     IR Myelogram Lumbar Spine; Future  -     CT Lumbar Spine With Intrathecal Contrast; Future  -     XR Spine Lumbar Complete With Flex & Ext; Future  -     No Lab Testing Needed; Standing      Return for After radiology test.

## 2023-09-20 NOTE — PROGRESS NOTES
"Subjective   Patient ID: Dasha De Leon is a 52 y.o. female is being seen for consultation today at the request of Ariana Coffman PA-C for lumbar herniated disc. Patient had a MRI L-spine on 08/06/2023 @ [facility]    Treatment: No recent treatment    Today patient states that she has low back pain that radiates to the L buttock down to her L leg, along with burning, and N/T in the L leg. Patient denies B/B incontinence    History of Present Illness    This patient was here about 3 years ago.  At that time she was having pain in her back with radiation into her left leg.  She had a couple of epidural blocks at that time which did not help.  Subsequent to that the pain is gotten worse over the last 6 months or so to the point where there is pain now every day.  He used to be only flareup every once in a while.  The pain is located in the lower part of the back.  It radiates down the posterior lateral thigh posterior lateral calf and into the outer aspect of her left foot.  She has no difficulty with bowel bladder control or other associated symptoms.  She has been treated with the epidural blocks but no other treatment so far.    The following portions of the patient's history were reviewed and updated as appropriate: allergies, current medications, past family history, past medical history, past social history, past surgical history, and problem list.    Review of Systems   Constitutional:  Negative for chills and fever.   HENT:  Negative for congestion.    Genitourinary:  Negative for difficulty urinating and dysuria.   Musculoskeletal:  Positive for back pain, gait problem and myalgias.   Neurological:  Positive for weakness and numbness.     I reviewed the review of systems listed by the patient and discussed by my MA    Objective     Vitals:    09/21/23 1004   BP: 119/77   Cuff Size: Adult   Pulse: 117   Temp: 96.9 °F (36.1 °C)   SpO2: 97%   Weight: 68.9 kg (152 lb)   Height: 167.6 cm (65.98\")     Body mass index " is 24.55 kg/m².    Tobacco Use: Medium Risk    Smoking Tobacco Use: Former    Smokeless Tobacco Use: Never    Passive Exposure: Never          Physical Exam  Constitutional:       Appearance: She is well-developed.   HENT:      Head: Normocephalic and atraumatic.   Eyes:      Extraocular Movements: EOM normal.      Conjunctiva/sclera: Conjunctivae normal.      Pupils: Pupils are equal, round, and reactive to light.   Neck:      Vascular: No carotid bruit.   Neurological:      Mental Status: She is oriented to person, place, and time.      Coordination: Finger-Nose-Finger Test and Heel to Shin Test normal.      Gait: Gait is intact.      Deep Tendon Reflexes:      Reflex Scores:       Tricep reflexes are 2+ on the right side and 2+ on the left side.       Bicep reflexes are 2+ on the right side and 2+ on the left side.       Brachioradialis reflexes are 2+ on the right side and 2+ on the left side.       Patellar reflexes are 2+ on the right side and 2+ on the left side.       Achilles reflexes are 2+ on the right side and 0 on the left side.  Psychiatric:         Speech: Speech normal.     Neurologic Exam     Mental Status   Oriented to person, place, and time.   Registration of memory: Good recent and remote memory.   Attention: normal. Concentration: normal.   Speech: speech is normal   Level of consciousness: alert  Knowledge: consistent with education.     Cranial Nerves     CN II   Visual fields full to confrontation.   Visual acuity: normal    CN III, IV, VI   Pupils are equal, round, and reactive to light.  Extraocular motions are normal.     CN V   Facial sensation intact.   Right corneal reflex: normal  Left corneal reflex: normal    CN VII   Facial expression full, symmetric.   Right facial weakness: none  Left facial weakness: none    CN VIII   Hearing: intact    CN IX, X   Palate: symmetric    CN XI   Right sternocleidomastoid strength: normal  Left sternocleidomastoid strength: normal    CN XII    Tongue: not atrophic  Tongue deviation: none    Motor Exam   Muscle bulk: normal  Right arm tone: normal  Left arm tone: normal  Right leg tone: normal  Left leg tone: normal    Strength   Strength 5/5 except as noted.   Left gastroc: 4/5    Sensory Exam   Light touch normal.     Gait, Coordination, and Reflexes     Gait  Gait: normal    Coordination   Finger to nose coordination: normal  Heel to shin coordination: normal    Reflexes   Right brachioradialis: 2+  Left brachioradialis: 2+  Right biceps: 2+  Left biceps: 2+  Right triceps: 2+  Left triceps: 2+  Right patellar: 2+  Left patellar: 2+  Right achilles: 2+  Left achilles: 0  Right : 2+  Left : 2+        Assessment & Plan   Independent Review of Radiographic Studies:      I personally reviewed the images from the following studies.    I reviewed an MRI of the lumbar spine done on 6 August of this year.  This shows some disc bulging on the sagittal images at L2-3 and a grade 1 spondylolisthesis of L4 on L5.  On the axial images L1-2 is fairly open.  L2-3 does show some disc bulging both centrally and into the neuroforamina which could be causing some compression of the nerves.  L3-4 shows some left-sided far lateral disc bulging.  L4-5 shows bilateral foraminal stenosis as well as lateral recess and central stenosis and L5-S1 mostly looks okay.    Medical Decision Making:      I told the patient about the imaging.  I told her that I thought her problem was coming from the L4-5 stenosis.  She does have a neurologic deficit although not severe.  I recommended that we do a lumbar myelogram.  I told the patient what a myelogram involves.  I explained that there is a 50% chance of developing a bad headache and nausea as a result of the test.  I explained that there is also a very small chance of infection, seizures, and bleeding.  I explained how we would treat a post myelogram headache including bedrest, caffeinated fluids, steroids, and blood patch.   The patient does ask to proceed.    Diagnoses and all orders for this visit:    1. Lumbar radiculopathy (Primary)  -     Obtain Informed Consent; Standing  -     IR Myelogram Lumbar Spine; Future  -     CT Lumbar Spine With Intrathecal Contrast; Future  -     XR Spine Lumbar Complete With Flex & Ext; Future  -     No Lab Testing Needed; Standing      Return for After radiology test.

## 2023-09-21 ENCOUNTER — OFFICE VISIT (OUTPATIENT)
Dept: NEUROSURGERY | Facility: CLINIC | Age: 53
End: 2023-09-21
Payer: COMMERCIAL

## 2023-09-21 VITALS
DIASTOLIC BLOOD PRESSURE: 77 MMHG | HEART RATE: 117 BPM | HEIGHT: 66 IN | BODY MASS INDEX: 24.43 KG/M2 | OXYGEN SATURATION: 97 % | WEIGHT: 152 LBS | TEMPERATURE: 96.9 F | SYSTOLIC BLOOD PRESSURE: 119 MMHG

## 2023-09-21 DIAGNOSIS — M54.16 LUMBAR RADICULOPATHY: Primary | ICD-10-CM

## 2023-09-21 PROCEDURE — 99204 OFFICE O/P NEW MOD 45 MIN: CPT | Performed by: NEUROLOGICAL SURGERY

## 2023-10-01 NOTE — PROGRESS NOTES
10/12/23 0001   Pre-Procedure Phone Call   Procedure Time Verified Yes   Arrival Time 0600   Procedure Location Verified Yes   Medical History Reviewed No   NPO Status Reinforced Yes   Ride and Caregiver Arranged Yes   Patient Knows to Bring Current Medications   (No changes in medications since last reviewed. Patient to call office for decadron RX.)   Bring Outside Films Requested   (Dr Botello patient)

## 2023-10-10 RX ORDER — DEXAMETHASONE 4 MG/1
TABLET ORAL
Qty: 2 TABLET | Refills: 0 | Status: SHIPPED | OUTPATIENT
Start: 2023-10-10 | End: 2023-10-12 | Stop reason: HOSPADM

## 2023-10-12 ENCOUNTER — HOSPITAL ENCOUNTER (OUTPATIENT)
Dept: GENERAL RADIOLOGY | Facility: HOSPITAL | Age: 53
Discharge: HOME | End: 2023-10-12
Payer: COMMERCIAL

## 2023-10-12 ENCOUNTER — HOSPITAL ENCOUNTER (OUTPATIENT)
Dept: CT IMAGING | Facility: HOSPITAL | Age: 53
Discharge: HOME | End: 2023-10-12
Payer: COMMERCIAL

## 2023-10-12 VITALS
RESPIRATION RATE: 14 BRPM | WEIGHT: 155 LBS | DIASTOLIC BLOOD PRESSURE: 77 MMHG | HEART RATE: 82 BPM | HEIGHT: 66 IN | TEMPERATURE: 98.4 F | BODY MASS INDEX: 24.91 KG/M2 | SYSTOLIC BLOOD PRESSURE: 127 MMHG | OXYGEN SATURATION: 96 %

## 2023-10-12 DIAGNOSIS — M54.16 LUMBAR RADICULOPATHY: ICD-10-CM

## 2023-10-12 PROCEDURE — 62284 INJECTION FOR MYELOGRAM: CPT | Performed by: NEUROLOGICAL SURGERY

## 2023-10-12 PROCEDURE — 25010000002 LIDOCAINE 1 % SOLUTION: Performed by: NEUROLOGICAL SURGERY

## 2023-10-12 PROCEDURE — 72114 X-RAY EXAM L-S SPINE BENDING: CPT

## 2023-10-12 PROCEDURE — 72132 CT LUMBAR SPINE W/DYE: CPT

## 2023-10-12 PROCEDURE — 25510000001 IOPAMIDOL 41 % SOLUTION: Performed by: NEUROLOGICAL SURGERY

## 2023-10-12 PROCEDURE — 72240 MYELOGRAPHY NECK SPINE: CPT

## 2023-10-12 PROCEDURE — 62304 MYELOGRAPHY LUMBAR INJECTION: CPT

## 2023-10-12 RX ORDER — IOPAMIDOL 408 MG/ML
20 INJECTION, SOLUTION INTRATHECAL
Status: COMPLETED | OUTPATIENT
Start: 2023-10-12 | End: 2023-10-12

## 2023-10-12 RX ORDER — ACETAMINOPHEN 325 MG/1
650 TABLET ORAL EVERY 4 HOURS PRN
Status: DISCONTINUED | OUTPATIENT
Start: 2023-10-12 | End: 2023-10-13 | Stop reason: HOSPADM

## 2023-10-12 RX ORDER — HYDROCODONE BITARTRATE AND ACETAMINOPHEN 5; 325 MG/1; MG/1
1 TABLET ORAL EVERY 4 HOURS PRN
Status: DISCONTINUED | OUTPATIENT
Start: 2023-10-12 | End: 2023-10-13 | Stop reason: HOSPADM

## 2023-10-12 RX ORDER — LIDOCAINE HYDROCHLORIDE 10 MG/ML
10 INJECTION, SOLUTION INFILTRATION; PERINEURAL
Status: COMPLETED | OUTPATIENT
Start: 2023-10-12 | End: 2023-10-12

## 2023-10-12 RX ADMIN — IOPAMIDOL 18 ML: 408 INJECTION, SOLUTION INTRATHECAL at 07:10

## 2023-10-12 RX ADMIN — HYDROCODONE BITARTRATE AND ACETAMINOPHEN 1 TABLET: 5; 325 TABLET ORAL at 08:33

## 2023-10-12 RX ADMIN — LIDOCAINE HYDROCHLORIDE 4 ML: 10 INJECTION, SOLUTION INFILTRATION; PERINEURAL at 07:05

## 2023-10-12 NOTE — POST-PROCEDURE NOTE
Preop diagnosis:  Lumbar radiculopathy  Postop diagnosis:  same  LP at L12  15 cc of 200M Isovue  Complications: none  EBL: 0 cc  Specimens: none

## 2023-10-12 NOTE — DISCHARGE INSTRUCTIONS
EDUCATION /DISCHARGE INSTRUCTIONS:    A myelogram is a special radiology procedure of the spinal cord, spinal nerves and other related structures.  You will be awake during the examination.  An area of your lower back will be cleansed with an antiseptic solution.  The physician will inject a numbing medication in your lower back.  While your back is numb, a needle will be placed in the lower back area.  A small amount of spinal fluid may be withdrawn and sent to the lab if ordered by your physician. While the needle is in the back, an injection of a contrast material (xray dye) will be given through the needle.  The contrast material will allow the physician to see the spinal cord and spinal nerves.  Once injected, the needle will be removed and a band aid will be placed over the injection site.  The table will be tilted during the process to allow the contrast material to flow to particular areas in the spine.  Following the injection and xrays, you will be taken to the CT scanner where more pictures will be taken. After the procedure is finished, the contrast material will be absorbed by your body and eliminated through your kidneys.  The radiologist will study and interpret your myelogram and send the results to your physician.  Procedure risks of a myelogram include, but are not limited to:  *  Bleeding   *  Seizure  *  Infection   *  Headache, possibly severe requiring a blood patch  *  Contrast reaction  *  Nerve or cord injury  *  Paralysis and death    Benefits of the procedure:  *  Best examination for delineating pathology related to spinal cord compression from a disc and/or nerve root compression  Alternatives to the procedure:  MRI - a non invasive procedure requiring intravenous contrast injection.  Cannot be done on patients with certain pacemakers or metal in the body.  MRI risks include possible reaction to the contrast material, movement of metal located in the body.Benefit to MRI:  Non-invasive  and usually painless procedure.    24 hour rest period ends __Friday, October 13 at 10:00 AM__________________.    Important information following your myelogram:  * ACTIVITY:   *  You may sit up in the car to go home.  *  When you get home, remain on bed rest (flat on your back or on your side) for 24 hours. You may place a rolled up towel under your neck for support  * You may get up to the bathroom and sit up to eat and drink then lie back down  * Drink additional fluids for 24 hours after the myelogram.   * Continue to drink additional fluids for the next 2-3 days. Water and caffeinated beverages are encouraged.  * Remain less active for the next two to three days.  * Do not drive for 24 hours following a myelogram.  * You may remove the bandage and shower in the morning.    CALL YOUR PHYSICIAN FOR THE FOLLOWING:  * Pain at the injection site  * Redness, swelling, bruising or drainage at the injection site.  * A fever by mouth of 101.0 or any new symptoms  Headaches are a common side effect after a myelogram.  If you get a headache, you should stay flat in bed and drink plenty of fluids. If the headache persists and does not go away with rest/medication, CALL Dr. Botello at [phone].

## 2023-10-13 ENCOUNTER — TELEPHONE (OUTPATIENT)
Dept: INTERVENTIONAL RADIOLOGY/VASCULAR | Facility: HOSPITAL | Age: 53
End: 2023-10-13
Payer: COMMERCIAL

## 2023-10-19 ENCOUNTER — OFFICE VISIT (OUTPATIENT)
Dept: NEUROSURGERY | Facility: CLINIC | Age: 53
End: 2023-10-19
Payer: COMMERCIAL

## 2023-10-19 DIAGNOSIS — M54.16 LUMBAR RADICULOPATHY: Primary | ICD-10-CM

## 2023-10-19 PROCEDURE — 99214 OFFICE O/P EST MOD 30 MIN: CPT | Performed by: NEUROLOGICAL SURGERY

## 2023-10-19 NOTE — PROGRESS NOTES
Subjective   Patient ID: Dasha De Leon is a 52 y.o. female is here today for follow-up with a new Lumbar Myelogram done on 10/12/2023 for back and leg pain.    Today patient's symptoms are unchanged, patient is having low back pain that radiates to the L buttock down to her L leg along with Burning, N/T    History of Present Illness    This patient returns today.  She continues with pain in the lower part of her back.  Radiates down the posterior lateral thigh and posterolateral aspect of the calf and her left foot.  This is all on the left.  She has had epidural blocks which did not help.    The following portions of the patient's history were reviewed and updated as appropriate: allergies, current medications, past family history, past medical history, past social history, past surgical history, and problem list.    Review of Systems   Constitutional:  Negative for chills and fever.   HENT:  Negative for congestion.    Musculoskeletal:  Positive for back pain and myalgias.   Neurological:  Positive for weakness and numbness.       I reviewed the review of systems listed by the patient and discussed by my MA    Objective     There were no vitals filed for this visit.  There is no height or weight on file to calculate BMI.    Tobacco Use: Medium Risk (10/19/2023)    Patient History     Smoking Tobacco Use: Former     Smokeless Tobacco Use: Never     Passive Exposure: Never          Physical Exam  Neurological:      Mental Status: She is alert and oriented to person, place, and time.       Neurologic Exam     Mental Status   Oriented to person, place, and time.           Assessment & Plan   Independent Review of Radiographic Studies:      I personally reviewed the images from the following studies.    I reviewed her plain films, myelogram, and CT scan myself.  The plain film show lumbar scoliosis.  There is marked degenerative change and a grade 1 spondylolisthesis of L4 on L5.  There is no evidence of abnormal  movement on flexion and extension.  On the myelogram itself there is some stenosis at L2-3 and L3-4.  There is also pretty severe stenosis at L4-5.  L5-S1 actually fills out pretty well.  On the post myelographic CT scan the lower thoracic spine down to L2 looks okay.  At L2-3 there is some disc bulging more on the right than the left causing some central stenosis but only moderate.  L3-4 shows more significant central narrowing and I would call his severe.  The radiologist called and mild.  L4-5 shows a similar amount of narrowing.  L5-S1 mostly looks okay.  She has had previous surgery on the left at L5-S1.    Medical Decision Making:      Long discussion with the patient about her situation.  I told her that from my point of view there is nothing here so severe that we have to do surgery but she is very unlikely to get better without it.  If she wishes to proceed she would require a two-level decompression and fusion.  I told the patient about the risks, complications and expected outcome of the lumbar surgery.  I explained that there was an 80% chance of getting rid of the pain in the leg.  I explained that there would still be back pain after the surgery.  Initially this will be quite severe but will improve over time.  There is a 2 or 3% chance of infection, bleeding, CSF leak, damage to the nerve as a result of surgery, paralysis, as well as anesthetic risk.  There is a 10% chance of recurrent problems.  There is a 10% chance of nonunion or failure of the instrumentation.  We discussed the postoperative hospital and home course.  The patient does ask to think about it and will call if she wishes to proceed.    If she calls she will need to be scheduled for a: Lumbar 3 to lumbar 4 and lumbar 4 to lumbar 5 laminectomy with fusion and instrumentation    Diagnoses and all orders for this visit:    1. Lumbar radiculopathy (Primary)      Return for 2-3 week post op.

## 2023-10-23 DIAGNOSIS — F39 MOOD DISORDER: ICD-10-CM

## 2023-10-23 DIAGNOSIS — M62.838 MUSCLE SPASM: ICD-10-CM

## 2023-10-23 RX ORDER — CYCLOBENZAPRINE HCL 10 MG
10 TABLET ORAL 3 TIMES DAILY PRN
Qty: 90 TABLET | Refills: 1 | Status: SHIPPED | OUTPATIENT
Start: 2023-10-23

## 2023-10-23 RX ORDER — ESCITALOPRAM OXALATE 20 MG/1
20 TABLET ORAL DAILY
Qty: 90 TABLET | Refills: 0 | Status: SHIPPED | OUTPATIENT
Start: 2023-10-23

## 2023-11-06 ENCOUNTER — TELEPHONE (OUTPATIENT)
Dept: NEUROSURGERY | Facility: CLINIC | Age: 53
End: 2023-11-06

## 2023-11-06 NOTE — TELEPHONE ENCOUNTER
PATIENT CALLED IN AND WANTS TO PROCEED WITH SURGERY - ATTEMPTED WT AND WAS TOLD TO SEND T/E    PLEASE CALL PATIENT TO GET SURGERY SCHEDULED.    THANK YOU!

## 2023-11-27 ENCOUNTER — PREP FOR SURGERY (OUTPATIENT)
Dept: OTHER | Facility: HOSPITAL | Age: 53
End: 2023-11-27
Payer: COMMERCIAL

## 2023-11-27 DIAGNOSIS — M54.16 LUMBAR RADICULOPATHY: Primary | ICD-10-CM

## 2023-11-27 RX ORDER — CEFAZOLIN SODIUM 2 G/100ML
2 INJECTION, SOLUTION INTRAVENOUS ONCE
OUTPATIENT
Start: 2023-11-27 | End: 2023-11-27

## 2023-12-12 ENCOUNTER — OFFICE VISIT (OUTPATIENT)
Dept: NEUROSURGERY | Facility: CLINIC | Age: 53
End: 2023-12-12
Payer: COMMERCIAL

## 2023-12-12 ENCOUNTER — OFFICE VISIT (OUTPATIENT)
Dept: INTERNAL MEDICINE | Facility: CLINIC | Age: 53
End: 2023-12-12
Payer: COMMERCIAL

## 2023-12-12 VITALS
SYSTOLIC BLOOD PRESSURE: 126 MMHG | DIASTOLIC BLOOD PRESSURE: 80 MMHG | WEIGHT: 163 LBS | TEMPERATURE: 98.2 F | BODY MASS INDEX: 26.31 KG/M2

## 2023-12-12 DIAGNOSIS — M76.899 TENDONITIS OF KNEE: ICD-10-CM

## 2023-12-12 DIAGNOSIS — I10 ESSENTIAL HYPERTENSION: Primary | ICD-10-CM

## 2023-12-12 DIAGNOSIS — I10 ESSENTIAL HYPERTENSION: ICD-10-CM

## 2023-12-12 DIAGNOSIS — F41.9 ANXIETY: ICD-10-CM

## 2023-12-12 DIAGNOSIS — M54.16 LUMBAR RADICULOPATHY: Primary | ICD-10-CM

## 2023-12-12 DIAGNOSIS — Z00.00 ANNUAL PHYSICAL EXAM: ICD-10-CM

## 2023-12-12 PROCEDURE — 99214 OFFICE O/P EST MOD 30 MIN: CPT | Performed by: NEUROLOGICAL SURGERY

## 2023-12-12 RX ORDER — FAMOTIDINE 20 MG/1
20 TABLET, FILM COATED ORAL 2 TIMES DAILY
Qty: 60 TABLET | Refills: 11 | Status: SHIPPED | OUTPATIENT
Start: 2023-12-12

## 2023-12-12 RX ORDER — LEVOCETIRIZINE DIHYDROCHLORIDE 5 MG/1
TABLET, FILM COATED ORAL
Qty: 60 TABLET | Refills: 11 | Status: SHIPPED | OUTPATIENT
Start: 2023-12-12

## 2023-12-12 RX ORDER — ROSUVASTATIN CALCIUM 40 MG/1
40 TABLET, COATED ORAL DAILY
Qty: 90 TABLET | Refills: 3 | Status: SHIPPED | OUTPATIENT
Start: 2023-12-12

## 2023-12-12 RX ORDER — FUROSEMIDE 20 MG/1
20 TABLET ORAL 2 TIMES DAILY
Qty: 90 TABLET | Refills: 1 | Status: SHIPPED | OUTPATIENT
Start: 2023-12-12

## 2023-12-12 RX ORDER — MELOXICAM 15 MG/1
15 TABLET ORAL DAILY
Qty: 90 TABLET | Refills: 1 | Status: SHIPPED | OUTPATIENT
Start: 2023-12-12

## 2023-12-12 RX ORDER — OLMESARTAN MEDOXOMIL 40 MG/1
20 TABLET ORAL DAILY
Qty: 45 TABLET | Refills: 1 | Status: SHIPPED | OUTPATIENT
Start: 2023-12-12

## 2023-12-12 NOTE — PROGRESS NOTES
Subjective   Patient ID: Dasha De Leon is a 52 y.o. female is here today for PRE-OP follow-up. Patient is considering having a Lumbar 3 to lumbar 4 and lumbar 4 to lumbar 5 laminectomy with fusion and instrumentation on 01/05/2024     Today patient states that her symptoms are unchanged     History of Present Illness    This patient returns today.  She was last here in October.  At that time she was having pain in her lower back.  Radiated down her posterior lateral thigh posterior lateral aspect of her calf and into her foot.  This is all on the left.  She has had epidural blocks and done physical therapy none of which helped.    The following portions of the patient's history were reviewed and updated as appropriate: allergies, current medications, past family history, past medical history, past social history, past surgical history, and problem list.    Review of Systems   Constitutional:  Negative for chills and fever.   HENT:  Negative for congestion.    Genitourinary:  Negative for difficulty urinating and dyspareunia.   Musculoskeletal:  Positive for back pain and myalgias. Negative for gait problem.   Neurological:  Positive for weakness and numbness.       I reviewed the review of systems listed by the patient and discussed by my MA    Objective     There were no vitals filed for this visit.  There is no height or weight on file to calculate BMI.    Tobacco Use: Medium Risk (12/12/2023)    Patient History     Smoking Tobacco Use: Former     Smokeless Tobacco Use: Never     Passive Exposure: Never          Physical Exam  Neurological:      Mental Status: She is alert and oriented to person, place, and time.       Neurologic Exam     Mental Status   Oriented to person, place, and time.           Assessment & Plan   Independent Review of Radiographic Studies:      I personally reviewed the images from the following studies.    I again reviewed her plain films, myelogram, and CT scan myself.  The plain films show  a grade 1 spondylolisthesis of L4 on L5.  On the myelogram there is stenosis at L2-3 and L3-4 but severe stenosis at L4-5.  On the post myelographic CT scan there was just a little bit of bulging of the disc at L2-3 but more significant narrowing at L3-4 and L4-5.  We have previously discussed an L3-L5 laminectomy and fusion.    Medical Decision Making:      I long discussion with patient about situation.  With her pain just being on the left I think there is some opportunity to think about just a left-sided decompression but I told her I am very concerned that since that stenosis is so severe on both sides that leaving the right side alone could cause it to flareup and then we would have to go back and do something further.  I think the better thing to do here is to do L3-4 and L4-5 both.  I told the patient about the risks, complications and expected outcome of the lumbar surgery.  I explained that there was an 80% chance of getting rid of the pain in the leg.  I explained that there would still be back pain after the surgery.  Initially this will be quite severe but will improve over time.  There is a 2 or 3% chance of infection, bleeding, CSF leak, damage to the nerve as a result of surgery, paralysis, as well as anesthetic risk.  There is a 10% chance of recurrent problems.  There is a 10% chance of nonunion or failure of the instrumentation.  We discussed the postoperative hospital and home course.  The patient does ask to proceed.    She will need to be scheduled for a:  Lumbar 3 to lumbar 4 and lumbar 4 to lumbar 5 laminectomy with fusion and instrumentation     Diagnoses and all orders for this visit:    1. Lumbar radiculopathy (Primary)      Return for 2-3 week post op.

## 2023-12-12 NOTE — PROGRESS NOTES
Subjective   Chief Complaint   Patient presents with    Annual Exam     Annual Physical       History of Present Illness     Pt is here today for CPE. She saw Dr. Rees this morning is going to have surgery on January 5th. Anxiety has been a little high with surgery and pain, does need a refill on her Xanax. Has not needed the Ambien for sleep. No Cp or SOA. Would like to switch her HCTZ to Furosemide, does not feel like it is helping. Had some plaques on her MRI.      Patient Active Problem List   Diagnosis    IUD (intrauterine device) in place    Essential hypertension    Mood disorder    Allergic rhinitis    Hyperlipidemia    Knee pain, right    Lumbar radiculopathy    Vitamin D deficiency       Allergies   Allergen Reactions    Penicillins Anaphylaxis    Lisinopril Cough       Current Outpatient Medications on File Prior to Visit   Medication Sig Dispense Refill    ALPRAZolam (Xanax) 0.25 MG tablet Take 1 tablet by mouth 2 (Two) Times a Day As Needed for Anxiety. 30 tablet 0    buPROPion XL (Wellbutrin XL) 300 MG 24 hr tablet Take 1 tablet by mouth Daily. 90 tablet 1    cyclobenzaprine (FLEXERIL) 10 MG tablet Take 1 tablet by mouth 3 (Three) Times a Day As Needed for Muscle Spasms. 90 tablet 1    escitalopram (LEXAPRO) 20 MG tablet Take 1 tablet by mouth Daily. 90 tablet 0    levonorgestrel (MIRENA) 20 MCG/24HR IUD 1 each by Intrauterine route 1 (One) Time.      multivitamin (THERAGRAN) tablet tablet Take  by mouth Daily.      [DISCONTINUED] famotidine (PEPCID) 20 MG tablet Take 1 tablet by mouth 2 (Two) Times a Day. 60 tablet 11    [DISCONTINUED] levocetirizine (XYZAL) 5 MG tablet Take one tablet by mouth twice daily for urticaria. 60 tablet 11    [DISCONTINUED] meloxicam (MOBIC) 15 MG tablet Take 1 tablet by mouth Daily. 90 tablet 1    [DISCONTINUED] olmesartan (BENICAR) 40 MG tablet Take 0.5 tablets by mouth Daily. 45 tablet 1    zolpidem (AMBIEN) 10 MG tablet Take 1 tablet by mouth At Night As Needed for  Sleep. (Patient not taking: Reported on 12/12/2023) 15 tablet 0    [DISCONTINUED] acetaminophen-codeine (TYLENOL with CODEINE #3) 300-30 MG per tablet Take 1 tablet by mouth Every 4 (Four) Hours As Needed for Moderate Pain. (Patient not taking: Reported on 12/12/2023) 12 tablet 0    [DISCONTINUED] atorvastatin (LIPITOR) 40 MG tablet Take 1 tablet by mouth Daily. 90 tablet 0    [DISCONTINUED] hydroCHLOROthiazide (HYDRODIURIL) 25 MG tablet Take 1 tablet by mouth Daily. 90 tablet 0     No current facility-administered medications on file prior to visit.       Past Medical History:   Diagnosis Date    Abnormal Pap smear of cervix     Baker's cyst     Bulging lumbar disc     Bursitis     CTS (carpal tunnel syndrome)     currently resolved    H/O colposcopy with cervical biopsy     unknown pap result, biopsy was neg per pt, 2013 [facility]    HPV (human papilloma virus) infection     Hyperlipemia     Hypertension     Low back pain     Osteoarthritis     Scoliosis     Seasonal allergies        Family History   Problem Relation Age of Onset    Hypertension Mother     Hyperlipidemia Mother     Diverticulitis Mother     Pancreatitis Mother     Kidney disease Father     Skin cancer Father     Hypertension Father     Hyperlipidemia Father     Stroke Father     Atrial fibrillation Father     Transient ischemic attack Father     Depression Brother     Liver disease Maternal Grandmother     Heart disease Maternal Grandfather     Cancer Maternal Grandfather     Heart attack Paternal Grandmother     No Known Problems Paternal Grandfather     Lung cancer Maternal Uncle     Heart attack Maternal Uncle     Uterine cancer Maternal Aunt 58    Heart disease Maternal Aunt     Colon cancer Paternal Uncle 65    No Known Problems Sister     No Known Problems Daughter     No Known Problems Son     No Known Problems Paternal Aunt     BRCA 1/2 Neg Hx     Breast cancer Neg Hx     Endometrial cancer Neg Hx     Ovarian cancer Neg Hx        Social  History     Socioeconomic History    Marital status: Single   Tobacco Use    Smoking status: Former     Packs/day: 1.00     Years: 20.00     Additional pack years: 0.00     Total pack years: 20.00     Types: Cigarettes     Quit date:      Years since quittin.9     Passive exposure: Never    Smokeless tobacco: Never   Vaping Use    Vaping Use: Never used   Substance and Sexual Activity    Alcohol use: Yes     Alcohol/week: 7.0 standard drinks of alcohol     Types: 7 Glasses of wine per week     Comment: OCC    Drug use: No    Sexual activity: Not Currently     Birth control/protection: I.U.D.       Past Surgical History:   Procedure Laterality Date    AUGMENTATION MAMMAPLASTY      BREAST AUGMENTATION      CARPAL TUNNEL RELEASE      CRYOTHERAPY          EPIDURAL BLOCK      HAND SURGERY      Pisiformectomy    MAMMO BILATERAL  2014    PAP SMEAR      SHOULDER ARTHROSCOPY  ,    SHOULDER SURGERY      WRIST SURGERY           The following portions of the patient's history were reviewed and updated as appropriate: problem list, allergies, current medications, past medical history, past family history, past social history, and past surgical history.    ROS    See HPI    Immunization History   Administered Date(s) Administered    COVID-19 (PFIZER) Purple Cap Monovalent 2021, 2021    Fluzone (or Fluarix & Flulaval for VFC) >6mos 2020, 10/28/2021    Fluzone Quad >6mos (Multi-dose) 10/29/2018, 10/28/2019    Tdap 2021       Objective   Vitals:    23 0922   BP: 126/80   Temp: 98.2 °F (36.8 °C)   Weight: 73.9 kg (163 lb)     Body mass index is 26.31 kg/m².  Physical Exam  Vitals and nursing note reviewed.   Constitutional:       Appearance: Normal appearance.   HENT:      Head: Normocephalic and atraumatic.      Nose: Nose normal.   Eyes:      Extraocular Movements: Extraocular movements intact.      Conjunctiva/sclera: Conjunctivae normal.      Pupils: Pupils are  equal, round, and reactive to light.   Neck:      Thyroid: No thyromegaly.      Vascular: No carotid bruit.   Cardiovascular:      Rate and Rhythm: Normal rate and regular rhythm.      Heart sounds: Normal heart sounds. No murmur heard.  Pulmonary:      Effort: Pulmonary effort is normal.      Breath sounds: Normal breath sounds.   Abdominal:      Palpations: There is no hepatomegaly or splenomegaly.   Musculoskeletal:      Cervical back: Neck supple.   Lymphadenopathy:      Cervical: No cervical adenopathy.   Skin:     General: Skin is warm and dry.   Neurological:      General: No focal deficit present.      Mental Status: She is alert and oriented to person, place, and time. Mental status is at baseline.      Gait: Gait normal.   Psychiatric:         Mood and Affect: Mood normal.         Behavior: Behavior normal.         Thought Content: Thought content normal.         Judgment: Judgment normal.           Assessment & Plan   Diagnoses and all orders for this visit:    1. Essential hypertension (Primary)  -     Basic metabolic panel; Future    2. Tendonitis of knee  -     meloxicam (MOBIC) 15 MG tablet; Take 1 tablet by mouth Daily.  Dispense: 90 tablet; Refill: 1    3. Annual physical exam    Other orders  -     furosemide (Lasix) 20 MG tablet; Take 1 tablet by mouth 2 (Two) Times a Day.  Dispense: 90 tablet; Refill: 1  -     rosuvastatin (Crestor) 40 MG tablet; Take 1 tablet by mouth Daily.  Dispense: 90 tablet; Refill: 3  -     levocetirizine (XYZAL) 5 MG tablet; Take one tablet by mouth twice daily for urticaria.  Dispense: 60 tablet; Refill: 11  -     famotidine (PEPCID) 20 MG tablet; Take 1 tablet by mouth 2 (Two) Times a Day.  Dispense: 60 tablet; Refill: 11     Reviewed records. Will switch her Atorvastatin to Crestor due to calcifications and LDL not to goal. HTN controlled, will switch her HCTZ to Furosemide and she will have a BMP next week.     Return in about 6 months (around 6/12/2024) for Lab Appt  Before FUP.

## 2023-12-13 DIAGNOSIS — F41.9 ANXIETY: ICD-10-CM

## 2023-12-13 RX ORDER — ALPRAZOLAM 0.25 MG/1
0.25 TABLET ORAL 2 TIMES DAILY PRN
Qty: 30 TABLET | Refills: 0 | Status: SHIPPED | OUTPATIENT
Start: 2023-12-13

## 2023-12-27 ENCOUNTER — PRE-ADMISSION TESTING (OUTPATIENT)
Dept: PREADMISSION TESTING | Facility: HOSPITAL | Age: 53
End: 2023-12-27
Payer: COMMERCIAL

## 2023-12-27 VITALS
HEART RATE: 120 BPM | WEIGHT: 166.1 LBS | SYSTOLIC BLOOD PRESSURE: 122 MMHG | RESPIRATION RATE: 16 BRPM | TEMPERATURE: 98.4 F | DIASTOLIC BLOOD PRESSURE: 77 MMHG | HEIGHT: 65 IN | BODY MASS INDEX: 27.67 KG/M2 | OXYGEN SATURATION: 97 %

## 2023-12-27 LAB
ANION GAP SERPL CALCULATED.3IONS-SCNC: 12 MMOL/L (ref 5–15)
BUN SERPL-MCNC: 16 MG/DL (ref 6–20)
BUN/CREAT SERPL: 19.8 (ref 7–25)
CALCIUM SPEC-SCNC: 9.4 MG/DL (ref 8.6–10.5)
CHLORIDE SERPL-SCNC: 103 MMOL/L (ref 98–107)
CO2 SERPL-SCNC: 26 MMOL/L (ref 22–29)
CREAT SERPL-MCNC: 0.81 MG/DL (ref 0.57–1)
DEPRECATED RDW RBC AUTO: 37.8 FL (ref 37–54)
EGFRCR SERPLBLD CKD-EPI 2021: 87.5 ML/MIN/1.73
ERYTHROCYTE [DISTWIDTH] IN BLOOD BY AUTOMATED COUNT: 12.1 % (ref 12.3–15.4)
GLUCOSE SERPL-MCNC: 104 MG/DL (ref 65–99)
HCT VFR BLD AUTO: 38.2 % (ref 34–46.6)
HGB BLD-MCNC: 12.3 G/DL (ref 12–15.9)
MCH RBC QN AUTO: 27.7 PG (ref 26.6–33)
MCHC RBC AUTO-ENTMCNC: 32.2 G/DL (ref 31.5–35.7)
MCV RBC AUTO: 86 FL (ref 79–97)
PLATELET # BLD AUTO: 297 10*3/MM3 (ref 140–450)
PMV BLD AUTO: 9.3 FL (ref 6–12)
POTASSIUM SERPL-SCNC: 3.6 MMOL/L (ref 3.5–5.2)
QT INTERVAL: 356 MS
QTC INTERVAL: 429 MS
RBC # BLD AUTO: 4.44 10*6/MM3 (ref 3.77–5.28)
SODIUM SERPL-SCNC: 141 MMOL/L (ref 136–145)
WBC NRBC COR # BLD AUTO: 5.97 10*3/MM3 (ref 3.4–10.8)

## 2023-12-27 PROCEDURE — 93005 ELECTROCARDIOGRAM TRACING: CPT

## 2023-12-27 PROCEDURE — 80048 BASIC METABOLIC PNL TOTAL CA: CPT

## 2023-12-27 PROCEDURE — 36415 COLL VENOUS BLD VENIPUNCTURE: CPT

## 2023-12-27 PROCEDURE — 85027 COMPLETE CBC AUTOMATED: CPT

## 2023-12-27 NOTE — DISCHARGE INSTRUCTIONS
Take the following medications the morning of surgery: ALPRAZOLAM IF NEEDED    ARRIVE TO ENTRANCE C.      If you are on prescription narcotic pain medication to control your pain you may also take that medication the morning of surgery.    General Instructions:  Do not eat solid food after midnight the night before surgery.  CUTOFF TIME IS 6:30 AM.  You may drink clear liquids day of surgery but must stop at least one hour before your hospital arrival time.  It is beneficial for you to have a clear drink that contains carbohydrates the day of surgery.  We suggest a 12 to 20 ounce bottle of Gatorade or Powerade for non-diabetic patients or a 12 to 20 ounce bottle of G2 or Powerade Zero for diabetic patients. (Pediatric patients, are not advised to drink a 12 to 20 ounce carbohydrate drink)    Clear liquids are liquids you can see through.  Nothing red in color.     Plain water                               Sports drinks  Sodas                                   Gelatin (Jell-O)  Fruit juices without pulp such as white grape juice and apple juice  Popsicles that contain no fruit or yogurt  Tea or coffee (no cream or milk added)  Gatorade / Powerade  G2 / Powerade Zero    Patients who avoid smoking, chewing tobacco and alcohol for 4 weeks prior to surgery have a reduced risk of post-operative complications.  Quit smoking as many days before surgery as you can.  Do not smoke, use chewing tobacco or drink alcohol the day of surgery.   If applicable bring your C-PAP/ BI-PAP machine in with you to preop day of surgery.  Bring any papers given to you in the doctor’s office.  Wear clean comfortable clothes.  Do not wear contact lenses, false eyelashes or make-up.  Bring a case for your glasses.   Bring crutches or walker if applicable.  Remove all piercings.  Leave jewelry and any other valuables at home.  Hair extensions with metal clips must be removed prior to surgery.  The Pre-Admission Testing nurse will instruct you to  bring medications if unable to obtain an accurate list in Pre-Admission Testing.        Preventing a Surgical Site Infection:  For 2 to 3 days before surgery, avoid shaving with a razor because the razor can irritate skin and make it easier to develop an infection.    Any areas of open skin can increase the risk of a post-operative wound infection by allowing bacteria to enter and travel throughout the body.  Notify your surgeon if you have any skin wounds / rashes even if it is not near the expected surgical site.  The area will need assessed to determine if surgery should be delayed until it is healed.  The night prior to surgery shower using a fresh bar of anti-bacterial soap (such as Dial) and clean washcloth.  Sleep in a clean bed with clean clothing.  Do not allow pets to sleep with you.  Shower on the morning of surgery using a fresh bar of anti-bacterial soap (such as Dial) and clean washcloth.  Dry with a clean towel and dress in clean clothing.    CHLORHEXIDINE CLOTH INSTRUCTIONS  The morning of surgery follow these instructions using the Chlorhexidine cloths you've been given.  These steps reduce bacteria on the body.  Do not use the cloths near your eyes, ears mouth, genitalia or on open wounds.  Throw the cloths away after use but do not try to flush them down a toilet.      Open and remove one cloth at a time from the package.    Leave the cloth unfolded and begin the bathing.  Massage the skin with the cloths using gentle pressure to remove bacteria.  Do not scrub harshly.   Follow the steps below with one 2% CHG cloth per area (6 total cloths).  One cloth for neck, shoulders and chest.  One cloth for both arms, hands, fingers and underarms (do underarms last).  One cloth for the abdomen followed by groin.  One cloth for right leg and foot including between the toes.  One cloth for left leg and foot including between the toes.  The last cloth is to be used for the back of the neck, back and  buttocks.    Allow the CHG to air dry 3 minutes on the skin which will give it time to work and decrease the chance of irritation.  The skin may feel sticky until it is dry.  Do not rinse with water or any other liquid or you will lose the beneficial effects of the CHG.  If mild skin irritation occurs, do rinse the skin to remove the CHG.  Report this to the nurse at time of admission.  Do not apply lotions, creams, ointments, deodorants or perfumes after using the cloths. Dress in clean clothes before coming to the hospital.  Ask your surgeon if you will be receiving antibiotics prior to surgery.  Make sure you, your family, and all healthcare providers clean their hands with soap and water or an alcohol based hand  before caring for you or your wound.    Day of surgery:  Your arrival time is approximately two hours before your scheduled surgery time.  Upon arrival, a Pre-op nurse and Anesthesiologist will review your health history, obtain vital signs, and answer questions you may have.  The only belongings needed at this time will be a list of your home medications and if applicable your C-PAP/BI-PAP machine.  A Pre-op nurse will start an IV and you may receive medication in preparation for surgery, including something to help you relax.     Please be aware that surgery does come with discomfort.  We want to make every effort to control your discomfort so please discuss any uncontrolled symptoms with your nurse.   Your doctor will most likely have prescribed pain medications.      If you are going home after surgery you will receive individualized written care instructions before being discharged.  A responsible adult must drive you to and from the hospital on the day of your surgery and stay with you for 24 hours.  Discharge prescriptions can be filled by the hospital pharmacy during regular pharmacy hours.  If you are having surgery late in the day/evening your prescription may be e-prescribed to your  pharmacy.  Please verify your pharmacy hours or chose a 24 hour pharmacy to avoid not having access to your prescription because your pharmacy has closed for the day.    If you are staying overnight following surgery, you will be transported to your hospital room following the recovery period.  [facility] has all private rooms.    If you have any questions please call Pre-Admission Testing at [phone].  Deductibles and co-payments are collected on the day of service. Please be prepared to pay the required co-pay, deductible or deposit on the day of service as defined by your plan.    Call your surgeon immediately if you experience any of the following symptoms:  Sore Throat  Shortness of Breath or difficulty breathing  Cough  Chills  Body soreness or muscle pain  Headache  Fever  New loss of taste or smell  Do not arrive for your surgery ill.  Your procedure will need to be rescheduled to another time.  You will need to call your physician before the day of surgery to avoid any unnecessary exposure to hospital staff as well as other patients.

## 2023-12-28 ENCOUNTER — OFFICE VISIT (OUTPATIENT)
Dept: CARDIOLOGY | Facility: CLINIC | Age: 53
End: 2023-12-28
Payer: COMMERCIAL

## 2023-12-28 VITALS
SYSTOLIC BLOOD PRESSURE: 122 MMHG | DIASTOLIC BLOOD PRESSURE: 77 MMHG | HEIGHT: 66 IN | BODY MASS INDEX: 26.52 KG/M2 | HEART RATE: 91 BPM | OXYGEN SATURATION: 97 % | WEIGHT: 165 LBS

## 2023-12-28 DIAGNOSIS — I10 ESSENTIAL HYPERTENSION: ICD-10-CM

## 2023-12-28 DIAGNOSIS — Z01.810 PREOPERATIVE CARDIOVASCULAR EXAMINATION: Primary | ICD-10-CM

## 2023-12-28 DIAGNOSIS — E78.2 MIXED HYPERLIPIDEMIA: ICD-10-CM

## 2023-12-28 NOTE — PROGRESS NOTES
Encounter Date:12/28/2023      Patient ID: Dasha De Leon is a 52 y.o. female.    Chief Complaint   Patient presents with    Cardiac Clearance/New Patient          History of Present Illness    52-year-old with history of hypertension and hyperlipidemia presents as a new patient consult for preop consultation for back surgery.  She had an EKG done for preop workup which showed anteroseptal Q waves.  Compared to her old EKGs she has always had delayed R wave progression in the anterior leads.  No previous cardiac history.  Denies any chest pain or shortness of breath.  Blood pressure is well-controlled.  No history of heart failure, CVA, CKD, diabetes.  Excellent functional capacity.     The following portions of the patient's history were reviewed and updated as appropriate: allergies, current medications, past family history, past medical history, past social history, past surgical history, and problem list.    Review of Systems   Constitutional: Negative for malaise/fatigue.   Cardiovascular:  Negative for chest pain, dyspnea on exertion, leg swelling and palpitations.   Respiratory:  Negative for cough and shortness of breath.    Gastrointestinal:  Negative for abdominal pain, nausea and vomiting.   Neurological:  Positive for numbness. Negative for dizziness, focal weakness, headaches and light-headedness.   All other systems reviewed and are negative.        Current Outpatient Medications:     ALPRAZolam (Xanax) 0.25 MG tablet, Take 1 tablet by mouth 2 (Two) Times a Day As Needed for Anxiety., Disp: 30 tablet, Rfl: 0    buPROPion XL (Wellbutrin XL) 300 MG 24 hr tablet, Take 1 tablet by mouth Daily., Disp: 90 tablet, Rfl: 1    cyclobenzaprine (FLEXERIL) 10 MG tablet, Take 1 tablet by mouth 3 (Three) Times a Day As Needed for Muscle Spasms., Disp: 90 tablet, Rfl: 1    escitalopram (LEXAPRO) 20 MG tablet, Take 1 tablet by mouth Daily., Disp: 90 tablet, Rfl: 0    famotidine (PEPCID) 20 MG tablet, Take 1 tablet by  mouth 2 (Two) Times a Day., Disp: 60 tablet, Rfl: 11    furosemide (Lasix) 20 MG tablet, Take 1 tablet by mouth 2 (Two) Times a Day., Disp: 90 tablet, Rfl: 1    levocetirizine (XYZAL) 5 MG tablet, Take one tablet by mouth twice daily for urticaria., Disp: 60 tablet, Rfl: 11    levonorgestrel (MIRENA) 20 MCG/24HR IUD, 1 each by Intrauterine route 1 (One) Time., Disp: , Rfl:     meloxicam (MOBIC) 15 MG tablet, Take 1 tablet by mouth Daily. (Patient taking differently: Take 1 tablet by mouth Daily. Patient to hold 7 days before surgery), Disp: 90 tablet, Rfl: 1    olmesartan (BENICAR) 40 MG tablet, Take 0.5 tablets by mouth Daily. (Patient taking differently: Take 0.5 tablets by mouth Daily. Hold 24 hours prior to surgery), Disp: 45 tablet, Rfl: 1    rosuvastatin (Crestor) 40 MG tablet, Take 1 tablet by mouth Daily., Disp: 90 tablet, Rfl: 3    Allergies   Allergen Reactions    Penicillins Anaphylaxis    Lisinopril Cough       Family History   Problem Relation Age of Onset    Hypertension Mother     Hyperlipidemia Mother     Diverticulitis Mother     Pancreatitis Mother     Kidney disease Father     Skin cancer Father     Hypertension Father     Hyperlipidemia Father     Stroke Father     Atrial fibrillation Father     Transient ischemic attack Father     Depression Brother     Liver disease Maternal Grandmother     Heart disease Maternal Grandfather     Cancer Maternal Grandfather     Heart attack Paternal Grandmother     No Known Problems Paternal Grandfather     Lung cancer Maternal Uncle     Heart attack Maternal Uncle     Uterine cancer Maternal Aunt 58    Heart disease Maternal Aunt     Colon cancer Paternal Uncle 65    No Known Problems Sister     No Known Problems Daughter     No Known Problems Son     No Known Problems Paternal Aunt     BRCA 1/2 Neg Hx     Breast cancer Neg Hx     Endometrial cancer Neg Hx     Ovarian cancer Neg Hx        Past Surgical History:   Procedure Laterality Date    AUGMENTATION  "MAMMAPLASTY      BREAST AUGMENTATION      CARPAL TUNNEL RELEASE  2004    CRYOTHERAPY      1997    EPIDURAL BLOCK      HAND SURGERY  2004    Pisiformectomy    MAMMO BILATERAL  2014    PAP SMEAR      SHOULDER ARTHROSCOPY  ,    SHOULDER SURGERY      WRIST SURGERY         Past Medical History:   Diagnosis Date    Abnormal Pap smear of cervix     Baker's cyst     Bulging lumbar disc     Bursitis     Depression     GERD (gastroesophageal reflux disease)     H/O colposcopy with cervical biopsy     unknown pap result, biopsy was neg per pt, 2013 [facility]    HPV (human papilloma virus) infection     Hyperlipemia     Hypertension     Low back pain     Lumbar radiculopathy     Osteoarthritis     Scoliosis     Seasonal allergies        Social History     Socioeconomic History    Marital status: Single   Tobacco Use    Smoking status: Former     Packs/day: 1.00     Years: 20.00     Additional pack years: 0.00     Total pack years: 20.00     Types: Cigarettes     Quit date:      Years since quittin.0     Passive exposure: Past    Smokeless tobacco: Never   Vaping Use    Vaping Use: Never used   Substance and Sexual Activity    Alcohol use: Yes     Alcohol/week: 7.0 standard drinks of alcohol     Types: 7 Glasses of wine per week     Comment: weekly    Drug use: No    Sexual activity: Not Currently     Birth control/protection: I.U.D.         Procedures      Objective:       Physical Exam    /77 (BP Location: Left arm, Patient Position: Sitting, Cuff Size: Adult)   Pulse 91   Ht 167.6 cm (66\")   Wt 74.8 kg (165 lb)   SpO2 97%   BMI 26.63 kg/m²   The patient is alert, oriented and in no distress.    Vital signs as noted above.    Head and neck revealed no carotid bruits or jugular venous distension.  No thyromegaly or lymphadenopathy is present.    Lungs clear.  No wheezing.  Breath sounds are normal bilaterally.    Heart normal first and second heart sounds.  No murmur..  No pericardial rub is " present.  No gallop is present.    Abdomen soft and nontender.  No organomegaly is present.    Extremities revealed good peripheral pulses without any pedal edema.    Skin warm and dry.    Musculoskeletal system is grossly normal.    CNS grossly normal.           Diagnosis Plan   1. Preoperative cardiovascular examination        2. Essential hypertension        3. Mixed hyperlipidemia        LAB RESULTS (LAST 7 DAYS)    CBC  Results from last 7 days   Lab Units 12/27/23  0827   WBC 10*3/mm3 5.97   RBC 10*6/mm3 4.44   HEMOGLOBIN g/dL 12.3   HEMATOCRIT % 38.2   MCV fL 86.0   PLATELETS 10*3/mm3 297       BMP  Results from last 7 days   Lab Units 12/27/23  0827   SODIUM mmol/L 141   POTASSIUM mmol/L 3.6   CHLORIDE mmol/L 103   CO2 mmol/L 26.0   BUN mg/dL 16   CREATININE mg/dL 0.81   GLUCOSE mg/dL 104*       CMP   Results from last 7 days   Lab Units 12/27/23  0827   SODIUM mmol/L 141   POTASSIUM mmol/L 3.6   CHLORIDE mmol/L 103   CO2 mmol/L 26.0   BUN mg/dL 16   CREATININE mg/dL 0.81   GLUCOSE mg/dL 104*         BNP        TROPONIN        CoAg        Creatinine Clearance  Estimated Creatinine Clearance: 84 mL/min (by C-G formula based on SCr of 0.81 mg/dL).    ABG        Radiology  No radiology results for the last day         Assessment and Plan       Diagnoses and all orders for this visit:    1. Preoperative cardiovascular examination (Primary)    2. Essential hypertension    3. Mixed hyperlipidemia          Preoperative cardiovascular risk assessment     Francesco Perioperative Risk for Myocardial Infarction or Cardiac Arrest (JULIA):  0.0% Risk of myocardial infarction or cardiac arrest, intraoperatively or up to 30 days post-op     Revised Cardiac Risk Index for Pre-Operative Risk:  3.9% 30-day risk of death, MI, or cardiac arrest     No cardiac history and she has excellent functional capacity with no active cardiac complaints  Proceed with surgery with no further cardiac workup.      Hypertension  Well-controlled  Continue with olmesartan      Hyperlipidemia  Continue with rosuvastatin  Recent lipid panel reviewed    Lily Olivia MD

## 2023-12-29 ENCOUNTER — PATIENT ROUNDING (BHMG ONLY) (OUTPATIENT)
Dept: CARDIOLOGY | Facility: CLINIC | Age: 53
End: 2023-12-29
Payer: COMMERCIAL

## 2023-12-29 NOTE — PROGRESS NOTES
A My-Chart message has been sent to the patient for [place] [facility]

## 2024-01-02 ENCOUNTER — PATIENT OUTREACH (OUTPATIENT)
Dept: CASE MANAGEMENT | Facility: OTHER | Age: 54
End: 2024-01-02
Payer: COMMERCIAL

## 2024-01-02 NOTE — OUTREACH NOTE
Adult Patient Profile  Questions/Answers      Flowsheet Row Most Recent Value   Symptoms/Conditions Managed at Home cardiovascular, musculoskeletal   Barriers to Managing Health none   Cardiovascular Symptoms/Conditions high blood cholesterol, hypertension   Cardiovascular Management Strategies diet modification, medication therapy, routine screening   Cardiovascular Self-Management Outcome 3 (uncertain)   Musculoskeletal Symptoms/Conditions back pain   Musculoskeletal Management Strategies medication therapy   Musculoskeletal Self-Management Outcome 3 (uncertain)   Missed Doses of Prescribed Medications During Past Week no   Taken Prescribed Medications at Different Time or Schedule During Past Week no   Taken More or Less Medication Than Prescribed no   Barriers to Taking Medication as Prescribed none   How to be Addressed Dasha   How Well Do You Speak English? very well   Source of Information patient        Adult Wellness Assessment  Questions/Answers      Flowsheet Row Most Recent Value   How often do you have someone help you read facts about your health? never   How often do you have trouble learning about your health because you don't know what the written words mean? never   How confident are you filling out forms by yourself? always        Send Education  Questions/Answers      Flowsheet Row Most Recent Value   Other Patient Education/Resources  24/7 [facility] Nurse Call Line   24/7 Nurse Call Line Education Method Verbal        AMBULATORY CASE MANAGEMENT NOTE    Name and Relationship of Patient/Support Person: Dsaha De Leon - Self  Self      Education Documentation  pain management, taught by Latonia Burciaga, RN at 1/2/2024  2:11 PM.  Learner: Patient  Readiness: Acceptance  Method: Explanation  Response: Verbalizes Understanding    Blood Pressure Monitoring, taught by Latonia Burciaga, RN at 1/2/2024  2:11 PM.  Learner: Patient  Readiness: Acceptance  Method: Explanation  Response: Verbalizes  Understanding      Patient Outreach    Call to patient regarding ECM and explained program. Patient currently treated for HTN and hyperlipidemia. She states they are well controlled with medications. She is scheduled for Lumbar laminectomy with fusion on Friday of this week, Currently pain has increased d/t  antiinflammatory medications being held prior to surgery. Talked with patient about BP management and sending [place] information for her to review. Will continue to f/u with patient after surgery. No SDOH deficits noted at this time.     SHIV PENA  Ambulatory Case Management    1/2/2024, 14:12 EST

## 2024-01-05 ENCOUNTER — ANESTHESIA (OUTPATIENT)
Dept: PERIOP | Facility: HOSPITAL | Age: 54
End: 2024-01-05
Payer: COMMERCIAL

## 2024-01-05 ENCOUNTER — ANESTHESIA EVENT (OUTPATIENT)
Dept: PERIOP | Facility: HOSPITAL | Age: 54
End: 2024-01-05
Payer: COMMERCIAL

## 2024-01-05 ENCOUNTER — APPOINTMENT (OUTPATIENT)
Dept: GENERAL RADIOLOGY | Facility: HOSPITAL | Age: 54
End: 2024-01-05
Payer: COMMERCIAL

## 2024-01-05 ENCOUNTER — HOSPITAL ENCOUNTER (INPATIENT)
Facility: HOSPITAL | Age: 54
LOS: 3 days | Discharge: HOME | End: 2024-01-08
Attending: NEUROLOGICAL SURGERY | Admitting: NEUROLOGICAL SURGERY
Payer: COMMERCIAL

## 2024-01-05 DIAGNOSIS — M54.16 LUMBAR RADICULOPATHY: ICD-10-CM

## 2024-01-05 DIAGNOSIS — M48.062 SPINAL STENOSIS OF LUMBAR REGION WITH NEUROGENIC CLAUDICATION: Primary | ICD-10-CM

## 2024-01-05 DIAGNOSIS — M62.838 MUSCLE SPASM: ICD-10-CM

## 2024-01-05 PROBLEM — M48.061 LUMBAR SPINAL STENOSIS: Status: ACTIVE | Noted: 2024-01-05

## 2024-01-05 LAB
B-HCG UR QL: NEGATIVE
EXPIRATION DATE: NORMAL
INTERNAL NEGATIVE CONTROL: NEGATIVE
INTERNAL POSITIVE CONTROL: POSITIVE
Lab: NORMAL

## 2024-01-05 PROCEDURE — 25010000002 FENTANYL CITRATE (PF) 50 MCG/ML SOLUTION: Performed by: NURSE ANESTHETIST, CERTIFIED REGISTERED

## 2024-01-05 PROCEDURE — 25010000002 PROPOFOL 200 MG/20ML EMULSION: Performed by: NURSE ANESTHETIST, CERTIFIED REGISTERED

## 2024-01-05 PROCEDURE — 01NB0ZZ RELEASE LUMBAR NERVE, OPEN APPROACH: ICD-10-PCS | Performed by: NEUROLOGICAL SURGERY

## 2024-01-05 PROCEDURE — 81025 URINE PREGNANCY TEST: CPT | Performed by: ANESTHESIOLOGY

## 2024-01-05 PROCEDURE — 0SG10AJ FUSION OF 2 OR MORE LUMBAR VERTEBRAL JOINTS WITH INTERBODY FUSION DEVICE, POSTERIOR APPROACH, ANTERIOR COLUMN, OPEN APPROACH: ICD-10-PCS | Performed by: NEUROLOGICAL SURGERY

## 2024-01-05 PROCEDURE — 22853 INSJ BIOMECHANICAL DEVICE: CPT | Performed by: NEUROLOGICAL SURGERY

## 2024-01-05 PROCEDURE — 25010000002 ONDANSETRON PER 1 MG: Performed by: ANESTHESIOLOGY

## 2024-01-05 PROCEDURE — C1713 ANCHOR/SCREW BN/BN,TIS/BN: HCPCS | Performed by: NEUROLOGICAL SURGERY

## 2024-01-05 PROCEDURE — 25010000002 SODIUM CHLORIDE 0.9 % WITH KCL 20 MEQ 20-0.9 MEQ/L-% SOLUTION: Performed by: NEUROLOGICAL SURGERY

## 2024-01-05 PROCEDURE — 22630 ARTHRD PST TQ 1NTRSPC LUM: CPT | Performed by: NEUROLOGICAL SURGERY

## 2024-01-05 PROCEDURE — 20939 BONE MARROW ASPIR BONE GRFG: CPT | Performed by: NEUROLOGICAL SURGERY

## 2024-01-05 PROCEDURE — 25010000002 HYDROMORPHONE 1 MG/ML SOLUTION: Performed by: ANESTHESIOLOGY

## 2024-01-05 PROCEDURE — 22853 INSJ BIOMECHANICAL DEVICE: CPT

## 2024-01-05 PROCEDURE — 25010000002 VANCOMYCIN 1 G RECONSTITUTED SOLUTION 1 EACH VIAL: Performed by: NEUROLOGICAL SURGERY

## 2024-01-05 PROCEDURE — 07DR3ZZ EXTRACTION OF ILIAC BONE MARROW, PERCUTANEOUS APPROACH: ICD-10-PCS | Performed by: NEUROLOGICAL SURGERY

## 2024-01-05 PROCEDURE — 63053 LAM FACTC/FRMT ARTHRD LUM EA: CPT

## 2024-01-05 PROCEDURE — 25810000003 LACTATED RINGERS PER 1000 ML: Performed by: ANESTHESIOLOGY

## 2024-01-05 PROCEDURE — 25010000002 DROPERIDOL PER 5 MG: Performed by: NURSE ANESTHETIST, CERTIFIED REGISTERED

## 2024-01-05 PROCEDURE — 25010000002 VANCOMYCIN PER 500 MG: Performed by: NEUROLOGICAL SURGERY

## 2024-01-05 PROCEDURE — 25810000003 SODIUM CHLORIDE 0.9 % SOLUTION

## 2024-01-05 PROCEDURE — 63052 LAM FACETC/FRMT ARTHRD LUM 1: CPT | Performed by: NEUROLOGICAL SURGERY

## 2024-01-05 PROCEDURE — 25010000002 HYDROMORPHONE PER 4 MG: Performed by: NURSE ANESTHETIST, CERTIFIED REGISTERED

## 2024-01-05 PROCEDURE — 63053 LAM FACTC/FRMT ARTHRD LUM EA: CPT | Performed by: NEUROLOGICAL SURGERY

## 2024-01-05 PROCEDURE — 25010000002 MIDAZOLAM PER 1 MG: Performed by: ANESTHESIOLOGY

## 2024-01-05 PROCEDURE — 0ST20ZZ RESECTION OF LUMBAR VERTEBRAL DISC, OPEN APPROACH: ICD-10-PCS | Performed by: NEUROLOGICAL SURGERY

## 2024-01-05 PROCEDURE — 25010000002 DEXAMETHASONE PER 1 MG: Performed by: NURSE ANESTHETIST, CERTIFIED REGISTERED

## 2024-01-05 PROCEDURE — 63052 LAM FACETC/FRMT ARTHRD LUM 1: CPT

## 2024-01-05 PROCEDURE — 76000 FLUOROSCOPY <1 HR PHYS/QHP: CPT

## 2024-01-05 PROCEDURE — 22842 INSERT SPINE FIXATION DEVICE: CPT | Performed by: NEUROLOGICAL SURGERY

## 2024-01-05 PROCEDURE — 22632 ARTHRD PST TQ 1NTRSPC LM EA: CPT

## 2024-01-05 PROCEDURE — 25010000002 MORPHINE PER 10 MG: Performed by: NEUROLOGICAL SURGERY

## 2024-01-05 PROCEDURE — 22632 ARTHRD PST TQ 1NTRSPC LM EA: CPT | Performed by: NEUROLOGICAL SURGERY

## 2024-01-05 PROCEDURE — 61783 SCAN PROC SPINAL: CPT | Performed by: NEUROLOGICAL SURGERY

## 2024-01-05 PROCEDURE — 22630 ARTHRD PST TQ 1NTRSPC LUM: CPT

## 2024-01-05 PROCEDURE — 22842 INSERT SPINE FIXATION DEVICE: CPT

## 2024-01-05 PROCEDURE — 25010000002 METHOCARBAMOL 1000 MG/10ML SOLUTION: Performed by: NEUROLOGICAL SURGERY

## 2024-01-05 PROCEDURE — 25010000002 SUGAMMADEX 200 MG/2ML SOLUTION: Performed by: ANESTHESIOLOGY

## 2024-01-05 DEVICE — SET SCREW 5440030 4.75 TI NS BRK OFF
Type: IMPLANTABLE DEVICE | Site: SPINE LUMBAR | Status: FUNCTIONAL
Brand: CD HORIZON® SPINAL SYSTEM

## 2024-01-05 DEVICE — SPACER 6068093 CATALYFT PL SHORT 9MM
Type: IMPLANTABLE DEVICE | Site: SPINE LUMBAR | Status: FUNCTIONAL
Brand: CATALYFT PL EXPANDABLE INTERBODY SYSTEM

## 2024-01-05 DEVICE — HEMOST ABS SURGIFOAM SZ100 8X12 10MM: Type: IMPLANTABLE DEVICE | Site: SPINE LUMBAR | Status: FUNCTIONAL

## 2024-01-05 DEVICE — SSC BONE WAX
Type: IMPLANTABLE DEVICE | Site: SPINE LUMBAR | Status: FUNCTIONAL
Brand: SSC BONE WAX

## 2024-01-05 DEVICE — PUTTY DBM GRAFTON 6CC: Type: IMPLANTABLE DEVICE | Site: SPINE LUMBAR | Status: FUNCTIONAL

## 2024-01-05 DEVICE — FLOSEAL WITH RECOTHROM - 5ML
Type: IMPLANTABLE DEVICE | Site: SPINE LUMBAR | Status: FUNCTIONAL
Brand: FLOSEAL HEMOSTATIC MATRIX

## 2024-01-05 RX ORDER — EPHEDRINE SULFATE 50 MG/ML
5 INJECTION, SOLUTION INTRAVENOUS ONCE AS NEEDED
Status: DISCONTINUED | OUTPATIENT
Start: 2024-01-05 | End: 2024-01-05 | Stop reason: HOSPADM

## 2024-01-05 RX ORDER — FLUMAZENIL 0.1 MG/ML
0.2 INJECTION INTRAVENOUS AS NEEDED
Status: DISCONTINUED | OUTPATIENT
Start: 2024-01-05 | End: 2024-01-05 | Stop reason: HOSPADM

## 2024-01-05 RX ORDER — LIDOCAINE HYDROCHLORIDE 20 MG/ML
INJECTION, SOLUTION INFILTRATION; PERINEURAL AS NEEDED
Status: DISCONTINUED | OUTPATIENT
Start: 2024-01-05 | End: 2024-01-05 | Stop reason: SURG

## 2024-01-05 RX ORDER — ROCURONIUM BROMIDE 10 MG/ML
INJECTION, SOLUTION INTRAVENOUS AS NEEDED
Status: DISCONTINUED | OUTPATIENT
Start: 2024-01-05 | End: 2024-01-05 | Stop reason: SURG

## 2024-01-05 RX ORDER — DROPERIDOL 2.5 MG/ML
0.62 INJECTION, SOLUTION INTRAMUSCULAR; INTRAVENOUS
Status: DISCONTINUED | OUTPATIENT
Start: 2024-01-05 | End: 2024-01-05 | Stop reason: HOSPADM

## 2024-01-05 RX ORDER — SODIUM CHLORIDE 0.9 % (FLUSH) 0.9 %
3 SYRINGE (ML) INJECTION EVERY 12 HOURS SCHEDULED
Status: DISCONTINUED | OUTPATIENT
Start: 2024-01-05 | End: 2024-01-05 | Stop reason: HOSPADM

## 2024-01-05 RX ORDER — ONDANSETRON 2 MG/ML
4 INJECTION INTRAMUSCULAR; INTRAVENOUS EVERY 6 HOURS PRN
Status: DISCONTINUED | OUTPATIENT
Start: 2024-01-05 | End: 2024-01-08 | Stop reason: HOSPADM

## 2024-01-05 RX ORDER — SODIUM CHLORIDE 0.9 % (FLUSH) 0.9 %
10 SYRINGE (ML) INJECTION AS NEEDED
Status: DISCONTINUED | OUTPATIENT
Start: 2024-01-05 | End: 2024-01-08 | Stop reason: HOSPADM

## 2024-01-05 RX ORDER — FENTANYL CITRATE 50 UG/ML
INJECTION, SOLUTION INTRAMUSCULAR; INTRAVENOUS AS NEEDED
Status: DISCONTINUED | OUTPATIENT
Start: 2024-01-05 | End: 2024-01-05 | Stop reason: SURG

## 2024-01-05 RX ORDER — SODIUM CHLORIDE 0.9 % (FLUSH) 0.9 %
3-10 SYRINGE (ML) INJECTION AS NEEDED
Status: DISCONTINUED | OUTPATIENT
Start: 2024-01-05 | End: 2024-01-05 | Stop reason: HOSPADM

## 2024-01-05 RX ORDER — CYCLOBENZAPRINE HCL 10 MG
10 TABLET ORAL 3 TIMES DAILY PRN
Status: DISCONTINUED | OUTPATIENT
Start: 2024-01-05 | End: 2024-01-08 | Stop reason: HOSPADM

## 2024-01-05 RX ORDER — PHENYLEPHRINE HCL IN 0.9% NACL 1 MG/10 ML
SYRINGE (ML) INTRAVENOUS AS NEEDED
Status: DISCONTINUED | OUTPATIENT
Start: 2024-01-05 | End: 2024-01-05 | Stop reason: SURG

## 2024-01-05 RX ORDER — HYDROCODONE BITARTRATE AND ACETAMINOPHEN 5; 325 MG/1; MG/1
1 TABLET ORAL EVERY 4 HOURS PRN
Status: DISCONTINUED | OUTPATIENT
Start: 2024-01-05 | End: 2024-01-08 | Stop reason: HOSPADM

## 2024-01-05 RX ORDER — DROPERIDOL 2.5 MG/ML
INJECTION, SOLUTION INTRAMUSCULAR; INTRAVENOUS AS NEEDED
Status: DISCONTINUED | OUTPATIENT
Start: 2024-01-05 | End: 2024-01-05 | Stop reason: SURG

## 2024-01-05 RX ORDER — FENTANYL CITRATE 50 UG/ML
50 INJECTION, SOLUTION INTRAMUSCULAR; INTRAVENOUS
Status: DISCONTINUED | OUTPATIENT
Start: 2024-01-05 | End: 2024-01-05 | Stop reason: HOSPADM

## 2024-01-05 RX ORDER — CEFAZOLIN SODIUM 2 G/100ML
2 INJECTION, SOLUTION INTRAVENOUS ONCE
Status: DISCONTINUED | OUTPATIENT
Start: 2024-01-05 | End: 2024-01-05

## 2024-01-05 RX ORDER — FUROSEMIDE 20 MG/1
20 TABLET ORAL 2 TIMES DAILY
Status: DISCONTINUED | OUTPATIENT
Start: 2024-01-05 | End: 2024-01-08 | Stop reason: HOSPADM

## 2024-01-05 RX ORDER — ROSUVASTATIN CALCIUM 40 MG/1
40 TABLET, COATED ORAL DAILY
Status: DISCONTINUED | OUTPATIENT
Start: 2024-01-05 | End: 2024-01-08 | Stop reason: HOSPADM

## 2024-01-05 RX ORDER — SODIUM CHLORIDE 9 MG/ML
40 INJECTION, SOLUTION INTRAVENOUS AS NEEDED
Status: DISCONTINUED | OUTPATIENT
Start: 2024-01-05 | End: 2024-01-08 | Stop reason: HOSPADM

## 2024-01-05 RX ORDER — BUPROPION HYDROCHLORIDE 300 MG/1
300 TABLET ORAL DAILY
Status: DISCONTINUED | OUTPATIENT
Start: 2024-01-05 | End: 2024-01-08 | Stop reason: HOSPADM

## 2024-01-05 RX ORDER — LABETALOL HYDROCHLORIDE 5 MG/ML
5 INJECTION, SOLUTION INTRAVENOUS
Status: DISCONTINUED | OUTPATIENT
Start: 2024-01-05 | End: 2024-01-05 | Stop reason: HOSPADM

## 2024-01-05 RX ORDER — FENTANYL CITRATE 50 UG/ML
50 INJECTION, SOLUTION INTRAMUSCULAR; INTRAVENOUS ONCE AS NEEDED
Status: DISCONTINUED | OUTPATIENT
Start: 2024-01-05 | End: 2024-01-05 | Stop reason: HOSPADM

## 2024-01-05 RX ORDER — HYDRALAZINE HYDROCHLORIDE 20 MG/ML
5 INJECTION INTRAMUSCULAR; INTRAVENOUS
Status: DISCONTINUED | OUTPATIENT
Start: 2024-01-05 | End: 2024-01-05 | Stop reason: HOSPADM

## 2024-01-05 RX ORDER — IPRATROPIUM BROMIDE AND ALBUTEROL SULFATE 2.5; .5 MG/3ML; MG/3ML
3 SOLUTION RESPIRATORY (INHALATION) ONCE AS NEEDED
Status: DISCONTINUED | OUTPATIENT
Start: 2024-01-05 | End: 2024-01-05 | Stop reason: HOSPADM

## 2024-01-05 RX ORDER — KETAMINE HCL IN NACL, ISO-OSM 100MG/10ML
SYRINGE (ML) INJECTION AS NEEDED
Status: DISCONTINUED | OUTPATIENT
Start: 2024-01-05 | End: 2024-01-05 | Stop reason: SURG

## 2024-01-05 RX ORDER — HYDROMORPHONE HYDROCHLORIDE 1 MG/ML
0.5 INJECTION, SOLUTION INTRAMUSCULAR; INTRAVENOUS; SUBCUTANEOUS
Status: DISCONTINUED | OUTPATIENT
Start: 2024-01-05 | End: 2024-01-05 | Stop reason: HOSPADM

## 2024-01-05 RX ORDER — ONDANSETRON 2 MG/ML
4 INJECTION INTRAMUSCULAR; INTRAVENOUS ONCE AS NEEDED
Status: DISCONTINUED | OUTPATIENT
Start: 2024-01-05 | End: 2024-01-05 | Stop reason: HOSPADM

## 2024-01-05 RX ORDER — DEXAMETHASONE SODIUM PHOSPHATE 4 MG/ML
INJECTION, SOLUTION INTRA-ARTICULAR; INTRALESIONAL; INTRAMUSCULAR; INTRAVENOUS; SOFT TISSUE AS NEEDED
Status: DISCONTINUED | OUTPATIENT
Start: 2024-01-05 | End: 2024-01-05 | Stop reason: SURG

## 2024-01-05 RX ORDER — LIDOCAINE HYDROCHLORIDE 10 MG/ML
0.5 INJECTION, SOLUTION INFILTRATION; PERINEURAL ONCE AS NEEDED
Status: DISCONTINUED | OUTPATIENT
Start: 2024-01-05 | End: 2024-01-05 | Stop reason: HOSPADM

## 2024-01-05 RX ORDER — LOSARTAN POTASSIUM 50 MG/1
50 TABLET ORAL
Status: DISCONTINUED | OUTPATIENT
Start: 2024-01-05 | End: 2024-01-08 | Stop reason: HOSPADM

## 2024-01-05 RX ORDER — HYDROCODONE BITARTRATE AND ACETAMINOPHEN 5; 325 MG/1; MG/1
1 TABLET ORAL ONCE AS NEEDED
Status: DISCONTINUED | OUTPATIENT
Start: 2024-01-05 | End: 2024-01-05 | Stop reason: HOSPADM

## 2024-01-05 RX ORDER — SODIUM CHLORIDE, SODIUM LACTATE, POTASSIUM CHLORIDE, CALCIUM CHLORIDE 600; 310; 30; 20 MG/100ML; MG/100ML; MG/100ML; MG/100ML
9 INJECTION, SOLUTION INTRAVENOUS CONTINUOUS
Status: DISCONTINUED | OUTPATIENT
Start: 2024-01-05 | End: 2024-01-05

## 2024-01-05 RX ORDER — MIDAZOLAM HYDROCHLORIDE 1 MG/ML
1 INJECTION INTRAMUSCULAR; INTRAVENOUS
Status: DISCONTINUED | OUTPATIENT
Start: 2024-01-05 | End: 2024-01-05 | Stop reason: HOSPADM

## 2024-01-05 RX ORDER — VANCOMYCIN HYDROCHLORIDE 1 G/200ML
1000 INJECTION, SOLUTION INTRAVENOUS ONCE
Status: COMPLETED | OUTPATIENT
Start: 2024-01-05 | End: 2024-01-05

## 2024-01-05 RX ORDER — MORPHINE SULFATE 4 MG/ML
2 INJECTION, SOLUTION INTRAMUSCULAR; INTRAVENOUS EVERY 4 HOURS PRN
Status: DISCONTINUED | OUTPATIENT
Start: 2024-01-05 | End: 2024-01-08 | Stop reason: HOSPADM

## 2024-01-05 RX ORDER — ONDANSETRON 4 MG/1
4 TABLET, ORALLY DISINTEGRATING ORAL EVERY 6 HOURS PRN
Status: DISCONTINUED | OUTPATIENT
Start: 2024-01-05 | End: 2024-01-08 | Stop reason: HOSPADM

## 2024-01-05 RX ORDER — NALOXONE HCL 0.4 MG/ML
0.2 VIAL (ML) INJECTION AS NEEDED
Status: DISCONTINUED | OUTPATIENT
Start: 2024-01-05 | End: 2024-01-05 | Stop reason: HOSPADM

## 2024-01-05 RX ORDER — PROPOFOL 10 MG/ML
INJECTION, EMULSION INTRAVENOUS AS NEEDED
Status: DISCONTINUED | OUTPATIENT
Start: 2024-01-05 | End: 2024-01-05 | Stop reason: SURG

## 2024-01-05 RX ORDER — ESCITALOPRAM OXALATE 20 MG/1
20 TABLET ORAL DAILY
Status: DISCONTINUED | OUTPATIENT
Start: 2024-01-05 | End: 2024-01-08 | Stop reason: HOSPADM

## 2024-01-05 RX ORDER — PROMETHAZINE HYDROCHLORIDE 25 MG/1
25 SUPPOSITORY RECTAL ONCE AS NEEDED
Status: DISCONTINUED | OUTPATIENT
Start: 2024-01-05 | End: 2024-01-05 | Stop reason: HOSPADM

## 2024-01-05 RX ORDER — SODIUM CHLORIDE 0.9 % (FLUSH) 0.9 %
3 SYRINGE (ML) INJECTION EVERY 12 HOURS SCHEDULED
Status: DISCONTINUED | OUTPATIENT
Start: 2024-01-05 | End: 2024-01-08 | Stop reason: HOSPADM

## 2024-01-05 RX ORDER — ONDANSETRON 2 MG/ML
INJECTION INTRAMUSCULAR; INTRAVENOUS AS NEEDED
Status: DISCONTINUED | OUTPATIENT
Start: 2024-01-05 | End: 2024-01-05 | Stop reason: SURG

## 2024-01-05 RX ORDER — SODIUM CHLORIDE AND POTASSIUM CHLORIDE 150; 900 MG/100ML; MG/100ML
100 INJECTION, SOLUTION INTRAVENOUS CONTINUOUS
Status: DISCONTINUED | OUTPATIENT
Start: 2024-01-05 | End: 2024-01-08

## 2024-01-05 RX ORDER — OXYCODONE AND ACETAMINOPHEN 7.5; 325 MG/1; MG/1
1 TABLET ORAL EVERY 4 HOURS PRN
Status: DISCONTINUED | OUTPATIENT
Start: 2024-01-05 | End: 2024-01-05 | Stop reason: HOSPADM

## 2024-01-05 RX ORDER — FAMOTIDINE 20 MG/1
20 TABLET, FILM COATED ORAL 2 TIMES DAILY
Status: DISCONTINUED | OUTPATIENT
Start: 2024-01-05 | End: 2024-01-08 | Stop reason: HOSPADM

## 2024-01-05 RX ORDER — ALPRAZOLAM 0.25 MG/1
0.25 TABLET ORAL 2 TIMES DAILY PRN
Status: DISCONTINUED | OUTPATIENT
Start: 2024-01-05 | End: 2024-01-08 | Stop reason: HOSPADM

## 2024-01-05 RX ORDER — NALOXONE HCL 0.4 MG/ML
0.4 VIAL (ML) INJECTION
Status: DISCONTINUED | OUTPATIENT
Start: 2024-01-05 | End: 2024-01-08 | Stop reason: HOSPADM

## 2024-01-05 RX ORDER — DIPHENHYDRAMINE HYDROCHLORIDE 50 MG/ML
12.5 INJECTION INTRAMUSCULAR; INTRAVENOUS
Status: DISCONTINUED | OUTPATIENT
Start: 2024-01-05 | End: 2024-01-05 | Stop reason: HOSPADM

## 2024-01-05 RX ORDER — PROMETHAZINE HYDROCHLORIDE 25 MG/1
25 TABLET ORAL ONCE AS NEEDED
Status: DISCONTINUED | OUTPATIENT
Start: 2024-01-05 | End: 2024-01-05 | Stop reason: HOSPADM

## 2024-01-05 RX ORDER — METHOCARBAMOL 100 MG/ML
1000 INJECTION, SOLUTION INTRAMUSCULAR; INTRAVENOUS ONCE
Status: COMPLETED | OUTPATIENT
Start: 2024-01-05 | End: 2024-01-05

## 2024-01-05 RX ORDER — FAMOTIDINE 10 MG/ML
20 INJECTION, SOLUTION INTRAVENOUS ONCE
Status: COMPLETED | OUTPATIENT
Start: 2024-01-05 | End: 2024-01-05

## 2024-01-05 RX ORDER — VANCOMYCIN HYDROCHLORIDE 1 G/200ML
1000 INJECTION, SOLUTION INTRAVENOUS EVERY 12 HOURS
Status: COMPLETED | OUTPATIENT
Start: 2024-01-06 | End: 2024-01-07

## 2024-01-05 RX ADMIN — HYDROMORPHONE HYDROCHLORIDE 0.5 MG: 1 INJECTION, SOLUTION INTRAMUSCULAR; INTRAVENOUS; SUBCUTANEOUS at 17:12

## 2024-01-05 RX ADMIN — ROCURONIUM BROMIDE 50 MG: 10 INJECTION, SOLUTION INTRAVENOUS at 14:50

## 2024-01-05 RX ADMIN — Medication 100 MCG: at 16:02

## 2024-01-05 RX ADMIN — ONDANSETRON 4 MG: 2 INJECTION INTRAMUSCULAR; INTRAVENOUS at 16:09

## 2024-01-05 RX ADMIN — Medication 25 MG: at 15:36

## 2024-01-05 RX ADMIN — FENTANYL CITRATE 50 MCG: 50 INJECTION, SOLUTION INTRAMUSCULAR; INTRAVENOUS at 13:51

## 2024-01-05 RX ADMIN — FENTANYL CITRATE 50 MCG: 50 INJECTION, SOLUTION INTRAMUSCULAR; INTRAVENOUS at 17:21

## 2024-01-05 RX ADMIN — ESCITALOPRAM 20 MG: 20 TABLET, FILM COATED ORAL at 21:52

## 2024-01-05 RX ADMIN — HYDROMORPHONE HYDROCHLORIDE 0.5 MG: 1 INJECTION, SOLUTION INTRAMUSCULAR; INTRAVENOUS; SUBCUTANEOUS at 17:32

## 2024-01-05 RX ADMIN — HYDROMORPHONE HYDROCHLORIDE 0.5 MG: 1 INJECTION, SOLUTION INTRAMUSCULAR; INTRAVENOUS; SUBCUTANEOUS at 16:09

## 2024-01-05 RX ADMIN — FUROSEMIDE 20 MG: 20 TABLET ORAL at 21:52

## 2024-01-05 RX ADMIN — FENTANYL CITRATE 50 MCG: 50 INJECTION, SOLUTION INTRAMUSCULAR; INTRAVENOUS at 16:53

## 2024-01-05 RX ADMIN — LOSARTAN POTASSIUM 50 MG: 50 TABLET, FILM COATED ORAL at 21:52

## 2024-01-05 RX ADMIN — FENTANYL CITRATE 50 MCG: 50 INJECTION, SOLUTION INTRAMUSCULAR; INTRAVENOUS at 17:03

## 2024-01-05 RX ADMIN — DEXAMETHASONE SODIUM PHOSPHATE 8 MG: 4 INJECTION, SOLUTION INTRA-ARTICULAR; INTRALESIONAL; INTRAMUSCULAR; INTRAVENOUS; SOFT TISSUE at 13:38

## 2024-01-05 RX ADMIN — SUGAMMADEX 200 MG: 100 INJECTION, SOLUTION INTRAVENOUS at 16:18

## 2024-01-05 RX ADMIN — ROCURONIUM BROMIDE 20 MG: 10 INJECTION, SOLUTION INTRAVENOUS at 15:39

## 2024-01-05 RX ADMIN — FAMOTIDINE 20 MG: 20 TABLET, FILM COATED ORAL at 21:52

## 2024-01-05 RX ADMIN — MIDAZOLAM 1 MG: 1 INJECTION INTRAMUSCULAR; INTRAVENOUS at 11:59

## 2024-01-05 RX ADMIN — FENTANYL CITRATE 50 MCG: 50 INJECTION, SOLUTION INTRAMUSCULAR; INTRAVENOUS at 13:15

## 2024-01-05 RX ADMIN — HYDROCODONE BITARTRATE AND ACETAMINOPHEN 1 TABLET: 5; 325 TABLET ORAL at 22:40

## 2024-01-05 RX ADMIN — ROSUVASTATIN CALCIUM 40 MG: 40 TABLET, FILM COATED ORAL at 21:52

## 2024-01-05 RX ADMIN — Medication 200 MCG: at 14:03

## 2024-01-05 RX ADMIN — BUPROPION HYDROCHLORIDE 300 MG: 300 TABLET, EXTENDED RELEASE ORAL at 21:52

## 2024-01-05 RX ADMIN — FAMOTIDINE 20 MG: 10 INJECTION INTRAVENOUS at 11:12

## 2024-01-05 RX ADMIN — OXYCODONE AND ACETAMINOPHEN 1 TABLET: 7.5; 325 TABLET ORAL at 18:34

## 2024-01-05 RX ADMIN — SODIUM CHLORIDE, POTASSIUM CHLORIDE, SODIUM LACTATE AND CALCIUM CHLORIDE: 600; 310; 30; 20 INJECTION, SOLUTION INTRAVENOUS at 14:40

## 2024-01-05 RX ADMIN — METHOCARBAMOL 1000 MG: 100 INJECTION INTRAMUSCULAR; INTRAVENOUS at 17:37

## 2024-01-05 RX ADMIN — VANCOMYCIN HYDROCHLORIDE 1000 MG: 1 INJECTION, SOLUTION INTRAVENOUS at 13:06

## 2024-01-05 RX ADMIN — Medication 3 ML: at 21:57

## 2024-01-05 RX ADMIN — MORPHINE SULFATE 2 MG: 4 INJECTION, SOLUTION INTRAMUSCULAR; INTRAVENOUS at 20:01

## 2024-01-05 RX ADMIN — LIDOCAINE HYDROCHLORIDE 100 MG: 20 INJECTION, SOLUTION INFILTRATION; PERINEURAL at 13:15

## 2024-01-05 RX ADMIN — DROPERIDOL 0.62 MG: 2.5 INJECTION, SOLUTION INTRAMUSCULAR; INTRAVENOUS at 13:53

## 2024-01-05 RX ADMIN — Medication 200 MCG: at 15:08

## 2024-01-05 RX ADMIN — ROCURONIUM BROMIDE 50 MG: 10 INJECTION, SOLUTION INTRAVENOUS at 13:15

## 2024-01-05 RX ADMIN — POTASSIUM CHLORIDE AND SODIUM CHLORIDE 100 ML/HR: 900; 150 INJECTION, SOLUTION INTRAVENOUS at 21:52

## 2024-01-05 RX ADMIN — HYDROMORPHONE HYDROCHLORIDE 0.5 MG: 1 INJECTION, SOLUTION INTRAMUSCULAR; INTRAVENOUS; SUBCUTANEOUS at 17:00

## 2024-01-05 RX ADMIN — SODIUM CHLORIDE, POTASSIUM CHLORIDE, SODIUM LACTATE AND CALCIUM CHLORIDE 9 ML/HR: 600; 310; 30; 20 INJECTION, SOLUTION INTRAVENOUS at 11:12

## 2024-01-05 RX ADMIN — CYCLOBENZAPRINE 10 MG: 10 TABLET, FILM COATED ORAL at 21:53

## 2024-01-05 RX ADMIN — Medication 25 MG: at 15:58

## 2024-01-05 RX ADMIN — PROPOFOL 160 MG: 10 INJECTION, EMULSION INTRAVENOUS at 13:15

## 2024-01-05 NOTE — NURSING NOTE
Awaiting Vancomycin  from [facility]; KALLIE Douglas at bedside and states he will start  Vancomycin in the O.R. CALEB Douglas. here to take patient to the O.R.

## 2024-01-05 NOTE — ANESTHESIA PROCEDURE NOTES
Airway  Urgency: elective    Date/Time: 1/5/2024 1:19 PM  Airway not difficult    General Information and Staff    Patient location during procedure: OR  CRNA/CAA: Heidy Mahan CRNA    Indications and Patient Condition  Indications for airway management: airway protection    Preoxygenated: yes  MILS not maintained throughout  Mask difficulty assessment: 1 - vent by mask    Final Airway Details  Final airway type: endotracheal airway      Successful airway: ETT  Cuffed: yes   Successful intubation technique: direct laryngoscopy  Facilitating devices/methods: cricoid pressure  Endotracheal tube insertion site: oral  Blade: [place]  Blade size: 3  ETT size (mm): 7.0  Cormack-Lehane Classification: grade IIa - partial view of glottis  Placement verified by: chest auscultation and capnometry   Measured from: lips  ETT/EBT  to lips (cm): 21  Number of attempts at approach: 1  Assessment: lips, teeth, and gum same as pre-op and atraumatic intubation    Additional Comments  Dentition intact and unchanged. CBEBS.  +ETCO2.

## 2024-01-05 NOTE — ANESTHESIA POSTPROCEDURE EVALUATION
Patient: Dasha De Leon    Procedure Summary       Date: 01/05/24 Room / Location: [facility] OR  / [facility] MAIN OR    Anesthesia Start: 1312 Anesthesia Stop: 1649    Procedure: Lumbar 3 to lumbar 4 and lumbar 4 to lumbar 5 laminectomy with fusion and instrumentation (Spine Lumbar) Diagnosis:       Lumbar radiculopathy      (Lumbar radiculopathy [M54.16])    Surgeons: Rigoberto Botello MD Provider: Sorin Aguirre MD    Anesthesia Type: general ASA Status: 2            Anesthesia Type: general    Vitals  Vitals Value Taken Time   /75 01/05/24 1806   Temp 37.3 °C (99.2 °F) 01/05/24 1652   Pulse 112 01/05/24 1813   Resp     SpO2 92 % 01/05/24 1813   Vitals shown include unfiled device data.        Post Anesthesia Care and Evaluation    Patient location during evaluation: PACU  Patient participation: complete - patient participated  Level of consciousness: awake and alert  Pain management: adequate    Airway patency: patent  Anesthetic complications: No anesthetic complications  PONV Status: none  Cardiovascular status: acceptable, hemodynamically stable and tachycardic  Respiratory status: acceptable, nonlabored ventilation and spontaneous ventilation  Hydration status: acceptable

## 2024-01-05 NOTE — OP NOTE
Preoperative diagnosis: Severe lumbar stenosis with lumbar radiculopathy L3-4 and L4-5 and neurogenic claudication    Postoperative diagnosis: same    Procedure performed: Bilateral L3, L4 and L5 laminectomy with medial facetectomy and foraminotomies and a posterior lumbar interbody fusion and instrumentation using microsurgical technique microsurgical instrumentation    Spinal Surgery Levels Completed:2 Levels     Surgeon: Rigoberto Botello M.D.    Assistant: Ava Turner CFA who was instrumental in helping with hemostasis, visualization of neural structures, and retraction of neural structures.  Her skilled assistance was necessary for the success of this case    Indications for procedure: This patient has been having severe pain in the legs and the back.  Imaging shows severe stenosis with a grade 1 spondylolisthesis at L4-5.  There was also stenosis at L3-4 and I felt that we had to fuse that level if we were going to fuse L4-5 which I think needed to be fused as explained to my preoperative note.  There has been no improvement with nonsurgical treatment and the patient elected to proceed with surgery.    Operative summary: After induction of general anesthesia with intravenous agents patient was intubated and placed on the operating table in the prone position.  All pressure points were padded including peripheral points of entrapment.  The back was then prepped with ChloraPrep.  After a 3 minute drying time the back was draped with Ioban, towels, half sheets and a split sheet.    An incision was made in the posterior iliac crest ridge and a Jamshidi needle was placed into the marrow cavity and 15 mL of marrow were aspirated and placed over Mastergraft.  Following this a pin was placed into the iliac crest to anchor the Mazor robot.    An incision was then made in the skin in the midline.  The dissection was carried down to the thoracolumbar fascia which was opened in the midline. The muscles were stripped  off the L3 and L4 laminar arch and spinous process and the L5 laminar arch and spinous process.  Following this the entire spinous process of L3, L4 were removed as well as the superior spinous process of L5.  Next the Mazor robot was referenced to the case using C arm and the previously obtained CT scan.  We had previously planned placement of the screws and then 6.5 mm by 40 mm screws were placed into the lateral mass of L3 and L4 bilaterally and 6.5 mm by 40 mm screws were placed bilaterally at L5.   Following this the laminar arch of L3 and L4 were removed by thinning it down with the Midas Giovani drill and cracking it directly back off the dura.  The superior spinous process and laminar arch of L5 were also removed.  Once the dura was exposed the remainder of the operation was done under the high power magnification of the operating microscope using microsurgical technique and microsurgical instrumentation.  The Penfield 4 was used to dissect the dura and the nerve root off of the medial pedicle of L3, L4 and L5 bilaterally.  A combination of the Kerrison's and the Midas Giovani were used to open the lateral recess out to the level of the medial pedicle.  This was both at the top and the lower decompressed level.  Once this was done the medial aspect of the facet was removed in order to open the superior foramen.  Following this each of the 4 nerve roots were checked to be sure they were decompressed.  Once the nerve roots were thoroughly decompressed the dural sac was then mobilized medially and the disc was exposed.  The disc was incised and disc material was removed from the disc space using the down-biting curettes and the pituitary ronguers.  Once the disc was emptied as much as possible shahrzad were inserted into the disc space to clear the cartilaginous endplate completely and make a space for the cages.  I was then able to take 2 Medtronic catalyft cages measuring 23 mm in length and place them into the disc  space.  The Mastergraft and marrow as well as locally harvested bone were also placed into the disc space.  The cages were then expanded until they were tight in the disc space and had produced a lordosis.  Once this was done at L3-4 same thing was done at L4-5.  Those cages were also expanded.  They were filled master graft and marrow.    Finally intraoperative x-ray confirmed good placement of the hardware and the screws were then connected with a roverto.  Following this the area was copiously irrigated with antibiotic solution and then another dose of Kefzol was given prior to closure.  The paraspinous musculature was injected with 100 cc of 1/8% Marcaine with 1:200,000 epinephrine solution.  A drain was placed in the epidural space and tunneled subcutaneously.  The incision was then closed in layers dressed, and the patient was taken to the recovery room in stable condition.  There were no apparent complications and sponge instrument and needle counts were correct at the end of the procedure.

## 2024-01-05 NOTE — NURSING NOTE
"Dr Brown asked to evaluate tachycardia sinus rhythm was 80-90 preop pt reports her heart rate is normally \"fast\"MD ok with current reading as long as bp is good and pt not co any symptoms .   "

## 2024-01-05 NOTE — ANESTHESIA PREPROCEDURE EVALUATION
Anesthesia Evaluation     Patient summary reviewed and Nursing notes reviewed   NPO Solid Status: > 8 hours  NPO Liquid Status: > 4 hours           Airway   Mallampati: II  TM distance: >3 FB  Neck ROM: full  No difficulty expected  Dental - normal exam     Pulmonary - normal exam    breath sounds clear to auscultation  (+) a smoker Former,  Cardiovascular - normal exam    ECG reviewed  Rhythm: regular  Rate: normal    (+) hypertension 2 medications or greater, hyperlipidemia      Neuro/Psych  (+) numbness, psychiatric history Depression    ROS Comment: Mood disorder  GI/Hepatic/Renal/Endo    (+) GERD    Musculoskeletal     (+) back pain      ROS comment: Lumbar DDD with herniated lumbar disc  Abdominal  - normal exam   Substance History      OB/GYN          Other   arthritis,                   Anesthesia Plan    ASA 2     general     intravenous induction     Anesthetic plan, risks, benefits, and alternatives have been provided, discussed and informed consent has been obtained with: patient.    CODE STATUS:

## 2024-01-05 NOTE — BRIEF OP NOTE
LUMBAR FUSION POSTERIOR AND INSTRUMENTATION WITH NEURO ROBOT  Progress Note    Dasha De Leon  1/5/2024    Pre-op Diagnosis:   Lumbar radiculopathy [M54.16]       Post-Op Diagnosis Codes:     * Lumbar radiculopathy [M54.16]    Procedure/CPT® Codes:        Procedure(s):  Lumbar 3 to lumbar 4 and lumbar 4 to lumbar 5 laminectomy with fusion and instrumentation              Surgeon(s):  Rigoberto Botello MD    Anesthesia: General    Staff:   Cell Saver : Palma Ly  Circulator: Jessie Mejia RN; Misael May RN  Scrub Person: Tiki Dacosta  Vendor Representative: Jagdish Trimble  Assistant: Jennie Turner CSA  Assistant: Jennie Turner CSA      Estimated Blood Loss: 270 mL    Urine Voided: 975 mL    Specimens:                None          Drains:   Closed/Suction Drain Midline Back Bulb 10 Fr. (Active)       Urethral Catheter Straight-tip;Silicone 16 Fr. (Active)       Findings: Severe stenosis        Complications: None        Rigoberto Botello MD     Date: 1/5/2024  Time: 16:19 EST

## 2024-01-06 ENCOUNTER — APPOINTMENT (OUTPATIENT)
Dept: GENERAL RADIOLOGY | Facility: HOSPITAL | Age: 54
End: 2024-01-06
Payer: COMMERCIAL

## 2024-01-06 LAB
BASOPHILS # BLD AUTO: 0.02 10*3/MM3 (ref 0–0.2)
BASOPHILS NFR BLD AUTO: 0.2 % (ref 0–1.5)
DEPRECATED RDW RBC AUTO: 38.5 FL (ref 37–54)
EOSINOPHIL # BLD AUTO: 0 10*3/MM3 (ref 0–0.4)
EOSINOPHIL NFR BLD AUTO: 0 % (ref 0.3–6.2)
ERYTHROCYTE [DISTWIDTH] IN BLOOD BY AUTOMATED COUNT: 12.1 % (ref 12.3–15.4)
HCT VFR BLD AUTO: 30.3 % (ref 34–46.6)
HGB BLD-MCNC: 10.2 G/DL (ref 12–15.9)
IMM GRANULOCYTES # BLD AUTO: 0.07 10*3/MM3 (ref 0–0.05)
IMM GRANULOCYTES NFR BLD AUTO: 0.6 % (ref 0–0.5)
LYMPHOCYTES # BLD AUTO: 0.81 10*3/MM3 (ref 0.7–3.1)
LYMPHOCYTES NFR BLD AUTO: 7.4 % (ref 19.6–45.3)
MCH RBC QN AUTO: 29.9 PG (ref 26.6–33)
MCHC RBC AUTO-ENTMCNC: 33.7 G/DL (ref 31.5–35.7)
MCV RBC AUTO: 88.9 FL (ref 79–97)
MONOCYTES # BLD AUTO: 1.35 10*3/MM3 (ref 0.1–0.9)
MONOCYTES NFR BLD AUTO: 12.3 % (ref 5–12)
NEUTROPHILS NFR BLD AUTO: 79.5 % (ref 42.7–76)
NEUTROPHILS NFR BLD AUTO: 8.75 10*3/MM3 (ref 1.7–7)
NRBC BLD AUTO-RTO: 0 /100 WBC (ref 0–0.2)
PLATELET # BLD AUTO: 241 10*3/MM3 (ref 140–450)
PMV BLD AUTO: 9.6 FL (ref 6–12)
RBC # BLD AUTO: 3.41 10*6/MM3 (ref 3.77–5.28)
WBC NRBC COR # BLD AUTO: 11 10*3/MM3 (ref 3.4–10.8)

## 2024-01-06 PROCEDURE — 85025 COMPLETE CBC W/AUTO DIFF WBC: CPT | Performed by: NEUROLOGICAL SURGERY

## 2024-01-06 PROCEDURE — G0378 HOSPITAL OBSERVATION PER HR: HCPCS

## 2024-01-06 PROCEDURE — 97162 PT EVAL MOD COMPLEX 30 MIN: CPT

## 2024-01-06 PROCEDURE — 99024 POSTOP FOLLOW-UP VISIT: CPT | Performed by: NEUROLOGICAL SURGERY

## 2024-01-06 PROCEDURE — 25010000002 MORPHINE PER 10 MG: Performed by: NEUROLOGICAL SURGERY

## 2024-01-06 PROCEDURE — 97530 THERAPEUTIC ACTIVITIES: CPT

## 2024-01-06 PROCEDURE — 25010000002 VANCOMYCIN PER 500 MG: Performed by: NEUROLOGICAL SURGERY

## 2024-01-06 PROCEDURE — 72100 X-RAY EXAM L-S SPINE 2/3 VWS: CPT

## 2024-01-06 RX ADMIN — FAMOTIDINE 20 MG: 20 TABLET, FILM COATED ORAL at 09:32

## 2024-01-06 RX ADMIN — Medication 3 ML: at 20:54

## 2024-01-06 RX ADMIN — HYDROCODONE BITARTRATE AND ACETAMINOPHEN 1 TABLET: 5; 325 TABLET ORAL at 22:33

## 2024-01-06 RX ADMIN — VANCOMYCIN HYDROCHLORIDE 1000 MG: 1 INJECTION, SOLUTION INTRAVENOUS at 01:22

## 2024-01-06 RX ADMIN — FAMOTIDINE 20 MG: 20 TABLET, FILM COATED ORAL at 20:54

## 2024-01-06 RX ADMIN — HYDROCODONE BITARTRATE AND ACETAMINOPHEN 1 TABLET: 5; 325 TABLET ORAL at 03:46

## 2024-01-06 RX ADMIN — FUROSEMIDE 20 MG: 20 TABLET ORAL at 09:32

## 2024-01-06 RX ADMIN — MORPHINE SULFATE 2 MG: 4 INJECTION, SOLUTION INTRAMUSCULAR; INTRAVENOUS at 11:18

## 2024-01-06 RX ADMIN — CYCLOBENZAPRINE 10 MG: 10 TABLET, FILM COATED ORAL at 20:54

## 2024-01-06 RX ADMIN — VANCOMYCIN HYDROCHLORIDE 1000 MG: 1 INJECTION, SOLUTION INTRAVENOUS at 12:54

## 2024-01-06 RX ADMIN — LOSARTAN POTASSIUM 50 MG: 50 TABLET, FILM COATED ORAL at 09:32

## 2024-01-06 RX ADMIN — ALPRAZOLAM 0.25 MG: 0.25 TABLET ORAL at 20:54

## 2024-01-06 RX ADMIN — FUROSEMIDE 20 MG: 20 TABLET ORAL at 20:54

## 2024-01-06 RX ADMIN — MORPHINE SULFATE 2 MG: 4 INJECTION, SOLUTION INTRAMUSCULAR; INTRAVENOUS at 04:45

## 2024-01-06 RX ADMIN — HYDROCODONE BITARTRATE AND ACETAMINOPHEN 1 TABLET: 5; 325 TABLET ORAL at 13:39

## 2024-01-06 RX ADMIN — CYCLOBENZAPRINE 10 MG: 10 TABLET, FILM COATED ORAL at 12:54

## 2024-01-06 RX ADMIN — Medication 3 ML: at 09:32

## 2024-01-06 RX ADMIN — HYDROCODONE BITARTRATE AND ACETAMINOPHEN 1 TABLET: 5; 325 TABLET ORAL at 09:32

## 2024-01-06 RX ADMIN — MORPHINE SULFATE 2 MG: 4 INJECTION, SOLUTION INTRAMUSCULAR; INTRAVENOUS at 20:54

## 2024-01-06 RX ADMIN — HYDROCODONE BITARTRATE AND ACETAMINOPHEN 1 TABLET: 5; 325 TABLET ORAL at 18:10

## 2024-01-06 RX ADMIN — MORPHINE SULFATE 2 MG: 4 INJECTION, SOLUTION INTRAMUSCULAR; INTRAVENOUS at 16:15

## 2024-01-06 NOTE — PLAN OF CARE
Goal Outcome Evaluation:      Patient is POD #0 for a lumbar 3 to lumbar 4 and lumbar 4 to lumbar 5 laminectomy with fusion and instrumentation.  Patient is alert x4.  Patient is tachycardic. Dressing is dry and intact.  Shankar drain is operating properly.  Shankar drain was emptied.  Chirinos catheter to bedside drainage.  Chirinos is draining clear, yellow urine.  Patient was able to ambulate from the stretcher to the bed.  Patient reported dizziness with ambulation.  Norco, and morphine were ordered for pain management.  Will continue to monitor, and update accordingl.

## 2024-01-06 NOTE — THERAPY EVALUATION
Patient Name: Dasha De Leon  : 1970    MRN: [id number]                              Today's Date: 2024       Admit Date: 2024    Visit Dx:     ICD-10-CM ICD-9-CM   1. Lumbar radiculopathy  M54.16 724.4     Patient Active Problem List   Diagnosis    IUD (intrauterine device) in place    Essential hypertension    Mood disorder    Allergic rhinitis    Hyperlipidemia    Knee pain, right    Lumbar radiculopathy    Vitamin D deficiency    Lumbar spinal stenosis     Past Medical History:   Diagnosis Date    Abnormal Pap smear of cervix     Baker's cyst     Bulging lumbar disc     Bursitis     Depression     GERD (gastroesophageal reflux disease)     H/O colposcopy with cervical biopsy     unknown pap result, biopsy was neg per pt, 2013 [facility]    HPV (human papilloma virus) infection     Hyperlipemia     Hypertension     Low back pain     Lumbar radiculopathy     Osteoarthritis     Scoliosis     Seasonal allergies      Past Surgical History:   Procedure Laterality Date    AUGMENTATION MAMMAPLASTY      BREAST AUGMENTATION      CARPAL TUNNEL RELEASE      CRYOTHERAPY          EPIDURAL BLOCK      HAND SURGERY      Pisiformectomy    MAMMO BILATERAL  2014    PAP SMEAR      SHOULDER ARTHROSCOPY  ,    SHOULDER SURGERY      WRIST SURGERY        General Information       Row Name 24 1215          Physical Therapy Time and Intention    Document Type evaluation  -     Mode of Treatment individual therapy;physical therapy  -DR Galindo Name 24 1215          General Information    Patient Profile Reviewed yes  -     Prior Level of Function independent:;all household mobility;gait;community mobility;ADL's  -     Existing Precautions/Restrictions fall  -     Barriers to Rehab none identified  -DR Galindo Name 24 1215          Living Environment    People in Home parent(s)  -DR Galindo Name 24 1215          Home Main Entrance    Number of Stairs, Main  Entrance four  -DR     Stair Railings, Main Entrance railings on both sides of stairs  -DR       Row Name 01/06/24 1215          Cognition    Orientation Status (Cognition) oriented x 4  -DR       Row Name 01/06/24 1215          Safety Issues, Functional Mobility    Impairments Affecting Function (Mobility) endurance/activity tolerance;pain;range of motion (ROM);strength  -DR               User Key  (r) = Recorded By, (t) = Taken By, (c) = Cosigned By      Initials Name Provider Type    Moisés Bruno, PT Physical Therapist                   Mobility       Row Name 01/06/24 1215          Bed Mobility    Bed Mobility sit-supine;rolling left  -DR     Rolling Left [place] (Bed Mobility) contact guard;1 person assist  used railing  -DR     Sit-Supine [place] (Bed Mobility) minimum assist (75% patient effort);contact guard  -DR     Assistive Device (Bed Mobility) bed rails  -     Comment, (Bed Mobility) HOB flat  -DR       Row Name 01/06/24 1215          Sit-Stand Transfer    Sit-Stand [place] (Transfers) contact guard  -DR     Assistive Device (Sit-Stand Transfers) walker, front-wheeled  -DR       Row Name 01/06/24 1215          Gait/Stairs (Locomotion)    [place] Level (Gait) contact guard;1 person assist  -DR     Assistive Device (Gait) walker, front-wheeled  -DR     Patient was able to Ambulate yes  -DR     Distance in Feet (Gait) 5  -DR     Deviations/Abnormal Patterns (Gait) bilateral deviations;antalgic;base of support, narrow;gait speed decreased  -DR     Bilateral Gait Deviations forward flexed posture;heel strike decreased  -DR     [place] Level (Stairs) not tested  -     Comment, (Gait/Stairs) Pt ambulated 5ft from recliner to bed, 3 side steps to the R to get to HOB. CGAx1  -DR       Row Name 01/06/24 1215          Mobility    Extremity Weight-bearing Status right lower extremity;left lower extremity  -DR     Left Lower Extremity (Weight-bearing Status) weight-bearing as  tolerated (WBAT)  -     Right Lower Extremity (Weight-bearing Status) weight-bearing as tolerated (WBAT)  -               User Key  (r) = Recorded By, (t) = Taken By, (c) = Cosigned By      Initials Name Provider Type    Moisés Bruno, PT Physical Therapist                   Obj/Interventions       Row Name 01/06/24 1218          Range of Motion Comprehensive    General Range of Motion bilateral lower extremity ROM WFL  -       Row Name 01/06/24 1218          Strength Comprehensive (MMT)    General Manual Muscle Testing (MMT) Assessment lower extremity strength deficits identified  -DR Jules, General Manual Muscle Testing (MMT) Assessment generalized p/o weakness 4/5 B LE  -       Row Name 01/06/24 1218          Motor Skills    Therapeutic Exercise hip  hip ther ex x10- morteza BANUELOS AP  -       Row Name 01/06/24 1218          Hip (Therapeutic Exercise)    Hip (Therapeutic Exercise) AROM (active range of motion)  -       Row Name 01/06/24 1218          Balance    Balance Assessment sitting static balance;standing static balance;standing dynamic balance  -     Static Sitting Balance standby assist  -DR     Position, Sitting Balance unsupported;sitting in chair  -DR     Static Standing Balance contact guard;standby assist  -     Dynamic Standing Balance contact guard  -DR     Position/Device Used, Standing Balance supported;walker, front-wheeled  -               User Key  (r) = Recorded By, (t) = Taken By, (c) = Cosigned By      Initials Name Provider Type    Moisés Bruno, PT Physical Therapist                   Goals/Plan       Row Name 01/06/24 1226          Bed Mobility Goal 1 (PT)    Activity/Assistive Device (Bed Mobility Goal 1, PT) bed mobility activities, all  -DR     [place] Level/Cues Needed (Bed Mobility Goal 1, PT) modified independence  -DR     Time Frame (Bed Mobility Goal 1, PT) long term goal (LTG);2 weeks  -       Row Name 01/06/24 1226          Transfer  Goal 1 (PT)    Activity/Assistive Device (Transfer Goal 1, PT) transfers, all  -DR     [place] Level/Cues Needed (Transfer Goal 1, PT) standby assist  -DR     Time Frame (Transfer Goal 1, PT) long term goal (LTG);1 week  -       Row Name 01/06/24 1226          Gait Training Goal 1 (PT)    Activity/Assistive Device (Gait Training Goal 1, PT) gait (walking locomotion);assistive device use;walker, rolling  -DR     [place] Level (Gait Training Goal 1, PT) standby assist  -DR     Distance (Gait Training Goal 1, PT) 150  -DR     Time Frame (Gait Training Goal 1, PT) long term goal (LTG);2 weeks  -DR               User Key  (r) = Recorded By, (t) = Taken By, (c) = Cosigned By      Initials Name Provider Type    Moisés Bruno, PT Physical Therapist                   Clinical Impression       Row Name 01/06/24 1219          Pain    Pretreatment Pain Rating 9/10  -DR     Posttreatment Pain Rating 10/10  -DR     Pain Location incisional  -DR     Pain Location - back  -DR     Pain Intervention(s) Nursing Notified;Ambulation/increased activity;Rest;Repositioned  -       Row Name 01/06/24 1219          Plan of Care Review    Plan of Care Reviewed With patient  -DR     Progress no change  -DR     Outcome Evaluation Dasha De Leon is a 54 y/o F POD 1 L3-5 laminectomy with fusion. PMHx includes HTN, narrowing of spinal canal, HLD. Pt UIC upon arrival, requesting to get back to bed. Pt pain 9/10 at beginning, nsg notified for meds. Pt was living at home prior to surgery, independent and no AD used at baseline. Pt completed STS with CGA-SBAx1 from recliner, ambulated 5' to bed with 3 side steps to the R to HOB using rwx. Pt completed log roll technique with CGAx1 and multiple VC for proper positioning. Pt had increased verbalizations of pain, stating it was 10/10 by end of visit. Pt returned to HOB flat, supine in bed, all needs met and call light in reach. Recommend IRF at time of d/c depending on pain control and  functional mobility. Pt will benefit from skilled PT for increasing pt ambulation/endurance tolerance and returning to PLOF.  -       Row Name 01/06/24 1219          Therapy Assessment/Plan (PT)    Rehab Potential (PT) good, to achieve stated therapy goals  -     Criteria for Skilled Interventions Met (PT) yes  -     Therapy Frequency (PT) daily  -       Row Name 01/06/24 1219          Positioning and Restraints    Pre-Treatment Position sitting in chair/recliner  -DR     Post Treatment Position bed  -DR     In Bed notified nsg;supine;call light within reach;encouraged to call for assist;exit alarm on;with other staff  -               User Key  (r) = Recorded By, (t) = Taken By, (c) = Cosigned By      Initials Name Provider Type    Moisés Bruno, PT Physical Therapist                   Outcome Measures       Row Name 01/06/24 1226 01/06/24 0810       How much help from another person do you currently need...    Turning from your back to your side while in flat bed without using bedrails? 3  -DR 4  -PP    Moving from lying on back to sitting on the side of a flat bed without bedrails? 3  -DR 3  -PP    Moving to and from a bed to a chair (including a wheelchair)? 3  -DR 3  -PP    Standing up from a chair using your arms (e.g., wheelchair, bedside chair)? 3  -DR 3  -PP    Climbing 3-5 steps with a railing? 2  -DR 3  -PP    To walk in hospital room? 3  -DR 4  -PP    AM-PAC 6 Clicks Score (PT) 17  -DR 20  -PP    Highest Level of Mobility Goal 5 --> Static standing  -DR 6 --> Walk 10 steps or more  -PP      Row Name 01/06/24 1226          Functional Assessment    Outcome Measure Options AM-PAC 6 Clicks Basic Mobility (PT)  -DR               User Key  (r) = Recorded By, (t) = Taken By, (c) = Cosigned By      Initials Name Provider Type    Corie De La Cruz, RN Registered Nurse    Moisés Bruno, PT Physical Therapist                                 Physical Therapy Education       Title: PT OT SLP  Therapies (Done)       Topic: Physical Therapy (Done)       Point: Mobility training (Done)       Learning Progress Summary             Patient Acceptance, E,TB, VU by  at 1/6/2024 1227                         Point: Home exercise program (Done)       Learning Progress Summary             Patient Acceptance, E,TB, VU by  at 1/6/2024 1227                         Point: Body mechanics (Done)       Learning Progress Summary             Patient Acceptance, E,TB, VU by  at 1/6/2024 1227                         Point: Precautions (Done)       Learning Progress Summary             Patient Acceptance, E,TB, VU by  at 1/6/2024 1227                                         User Key       Initials Effective Dates Name Provider Type Discipline     05/24/23 -  Moisés Rosa, PT Physical Therapist PT                  PT Recommendation and Plan     Plan of Care Reviewed With: patient  Progress: no change  Outcome Evaluation: Dasha De Leon is a 54 y/o F POD 1 L3-5 laminectomy with fusion. PMHx includes HTN, narrowing of spinal canal, HLD. Pt UIC upon arrival, requesting to get back to bed. Pt pain 9/10 at beginning, nsg notified for meds. Pt was living at home prior to surgery, independent and no AD used at baseline. Pt completed STS with CGA-SBAx1 from recliner, ambulated 5' to bed with 3 side steps to the R to HOB using rwx. Pt completed log roll technique with CGAx1 and multiple VC for proper positioning. Pt had increased verbalizations of pain, stating it was 10/10 by end of visit. Pt returned to HOB flat, supine in bed, all needs met and call light in reach. Recommend IRF at time of d/c depending on pain control and functional mobility. Pt will benefit from skilled PT for increasing pt ambulation/endurance tolerance and returning to PLOF.     Time Calculation:         PT Charges       Row Name 01/06/24 1228             Time Calculation    Start Time 0915  -DR      Stop Time 0926  -DR      Time Calculation (min) 11  min  -DR      PT Received On 01/06/24  -      PT - Next Appointment 01/07/24  -      PT Goal Re-Cert Due Date 01/20/24  -DR         Time Calculation- PT    Total Timed Code Minutes- PT 8 minute(s)  -DR         Timed Charges    96922 - PT Therapeutic Activity Minutes 8  -DR         Untimed Charges    PT Eval/Re-eval Minutes 3  -DR         Total Minutes    Timed Charges Total Minutes 8  -DR      Untimed Charges Total Minutes 3  -DR       Total Minutes 11  -DR                User Key  (r) = Recorded By, (t) = Taken By, (c) = Cosigned By      Initials Name Provider Type    Moisés Bruno, PT Physical Therapist                  Therapy Charges for Today       Code Description Service Date Service Provider Modifiers Qty    42657003052 HC PT THERAPEUTIC ACT EA 15 MIN 1/6/2024 Moisés Rosa, PT GP 1    [id number] HC PT EVAL MOD COMPLEXITY 3 1/6/2024 Moisés Rosa, PT GP 1            PT G-Codes  Outcome Measure Options: AM-PAC 6 Clicks Basic Mobility (PT)  AM-PAC 6 Clicks Score (PT): 17  PT Discharge Summary  Anticipated Discharge Disposition (PT): inpatient rehabilitation facility    Moisés Rosa, SUPA  1/6/2024

## 2024-01-06 NOTE — PROGRESS NOTES
LOS: 1 day   Patient Care Team:  Ariana Coffman PA-C as PCP - General (Physician Assistant)  Darrick Dowling MD as Consulting Physician (Obstetrics and Gynecology)  Zay Hernandez MD as Consulting Physician (Allergy and Immunology)  Latonia Burciaga, KAREN as Ambulatory  ([facility])    Chief Complaint: Postop lumbar fusion    Subjective     The patient is feeling pretty good.  She has a lot of pain in her back and stiffness but her legs feel better.  She is otherwise doing well.    Interval History:     History taken from: patient chart    Objective      She has good movement of both legs    Vital Signs  Temp:  [97.8 °F (36.6 °C)-99.2 °F (37.3 °C)] 98.3 °F (36.8 °C)  Heart Rate:  [] 99  Resp:  [16] 16  BP: ()/() 128/73       Results Review:     I reviewed the patient's new clinical results.  She is afebrile.  She has had only 60 cc out of her drain.  Her white count is 4.0 and her hemoglobin 10.2.  Her x-rays look good.      Assessment & Plan       Lumbar radiculopathy    Lumbar spinal stenosis      I told the patient that from my point of view we will get her up walking more today.  Will leave her drain in 1 more day and then decide whether to take it out and let her go home tomorrow.  She agrees with that plan.      Rigoberto Botello MD  01/06/24  13:42 EST

## 2024-01-06 NOTE — PLAN OF CARE
Goal Outcome Evaluation:  Plan of Care Reviewed With: patient        Progress: no change  Outcome Evaluation: Dasha De Leon is a 52 y/o F POD 1 L3-5 laminectomy with fusion. PMHx includes HTN, narrowing of spinal canal, HLD. Pt UIC upon arrival, requesting to get back to bed. Pt pain 9/10 at beginning, nsg notified for meds. Pt was living at home prior to surgery, independent and no AD used at baseline. Pt completed STS with CGA-SBAx1 from recliner, ambulated 5' to bed with 3 side steps to the R to HOB using rwx. Pt completed log roll technique with CGAx1 and multiple VC for proper positioning. Pt had increased verbalizations of pain, stating it was 10/10 by end of visit. Pt returned to HOB flat, supine in bed, all needs met and call light in reach. Recommend IRF at time of d/c depending on pain control and functional mobility. Pt will benefit from skilled PT for increasing pt ambulation/endurance tolerance and returning to PLOF.      Anticipated Discharge Disposition (PT): inpatient rehabilitation facility

## 2024-01-06 NOTE — PLAN OF CARE
Problem: Adult Inpatient Plan of Care  Goal: Plan of Care Review  Outcome: Ongoing, Progressing  Flowsheets  Taken 1/6/2024 1507 by Corie Martinez, RN  Outcome Evaluation: Alert and oriented x 4. Dressing to lumbar spine CDI. Rate pain 7-10/10 this shift. Medicated with PRN's, ice application. Taking PO well. Up to chair for meals. No s/sx of distress. Monitoring.  Taken 1/6/2024 1219 by Moisés Rosa, SUPA  Plan of Care Reviewed With: patient  Goal: Patient-Specific Goal (Individualized)  Outcome: Ongoing, Progressing  Goal: Absence of Hospital-Acquired Illness or Injury  Outcome: Ongoing, Progressing  Intervention: Identify and Manage Fall Risk  Description: Perform standard risk assessment on admission using a validated tool or comprehensive approach appropriate to the patient; reassess fall risk frequently, with change in status or transfer to another level of care.  Communicate fall injury risk to interprofessional healthcare team.  Determine need for increased observation, equipment and environmental modification, such as low bed, signage and supportive, nonskid footwear.  Adjust safety measures to individual developmental age, stage and identified risk factors.  Reinforce the importance of safety and physical activity with patient and family.  Perform regular intentional rounding to assess need for position change, pain assessment and personal needs, including assistance with toileting.  Recent Flowsheet Documentation  Taken 1/6/2024 1339 by Corie Martinez, RN  Safety Promotion/Fall Prevention:   activity supervised   assistive device/personal items within reach   clutter free environment maintained   fall prevention program maintained   nonskid shoes/slippers when out of bed   room organization consistent   safety round/check completed  Taken 1/6/2024 1118 by Corie Martinez, RN  Safety Promotion/Fall Prevention:   assistive device/personal items within reach   clutter free environment maintained   fall  prevention program maintained   nonskid shoes/slippers when out of bed   room organization consistent   safety round/check completed  Taken 1/6/2024 0932 by Corie Martinez RN  Safety Promotion/Fall Prevention:   activity supervised   assistive device/personal items within reach   clutter free environment maintained   fall prevention program maintained   nonskid shoes/slippers when out of bed   safety round/check completed   room organization consistent  Taken 1/6/2024 0810 by Corie Martinez RN  Safety Promotion/Fall Prevention:   activity supervised   assistive device/personal items within reach   fall prevention program maintained   clutter free environment maintained   nonskid shoes/slippers when out of bed   safety round/check completed   lighting adjusted   room organization consistent  Intervention: Prevent Skin Injury  Description: Perform a screening for skin injury risk, such as pressure or moisture associated skin damage on admission and at regular intervals throughout hospital stay.  Keep all areas of skin (especially folds) clean and dry.  Maintain adequate skin hydration.  Relieve and redistribute pressure and protect bony prominences; implement measures based on patient-specific risk factors.  Match turning and repositioning schedule to clinical condition.  Encourage weight shift frequently; assist with reposition if unable to complete independently.  Float heels off bed; avoid pressure on the Achilles tendon.  Keep skin free from extended contact with medical devices.  Encourage functional activity and mobility, as early as tolerated.  Use aids (e.g., slide boards, mechanical lift) during transfer.  Recent Flowsheet Documentation  Taken 1/6/2024 1339 by Corie Martinez, RN  Body Position:   left   side-lying   position changed independently   neutral body alignment   neutral head position  Taken 1/6/2024 1118 by Corie Martinez RN  Body Position:   supine   neutral body alignment   neutral head  position   position changed independently  Taken 1/6/2024 0932 by Corie Martinez RN  Body Position:   neutral body alignment   neutral head position   position changed independently   left   side-lying  Taken 1/6/2024 0810 by Corie Martinez RN  Body Position:   neutral body alignment   neutral head position   weight shifting  Skin Protection:   adhesive use limited   tubing/devices free from skin contact  Intervention: Prevent and Manage VTE (Venous Thromboembolism) Risk  Description: Assess for VTE (venous thromboembolism) risk.  Encourage and assist with early ambulation.  Initiate and maintain compression or other therapy, as indicated, based on identified risk in accordance with organizational protocol and provider order.  Encourage both active and passive leg exercises while in bed, if unable to ambulate.  Recent Flowsheet Documentation  Taken 1/6/2024 1339 by Corie Martinez RN  Activity Management:   up in chair   back to bed  Taken 1/6/2024 0932 by Corie Martinez RN  Activity Management: back to bed  Taken 1/6/2024 0810 by Corie Martinez RN  Activity Management: up in chair  Intervention: Prevent Infection  Description: Maintain skin and mucous membrane integrity; promote hand, oral and pulmonary hygiene.  Optimize fluid balance, nutrition, sleep and glycemic control to maximize infection resistance.  Identify potential sources of infection early to prevent or mitigate progression of infection (e.g., wound, lines, devices).  Evaluate ongoing need for invasive devices; remove promptly when no longer indicated.  Recent Flowsheet Documentation  Taken 1/6/2024 1339 by Corie Martinez RN  Infection Prevention:   environmental surveillance performed   equipment surfaces disinfected   hand hygiene promoted   single patient room provided  Taken 1/6/2024 1118 by Corie Martinez RN  Infection Prevention:   environmental surveillance performed   equipment surfaces disinfected   hand hygiene promoted   single  patient room provided  Taken 1/6/2024 0932 by Corie Martinez RN  Infection Prevention:   environmental surveillance performed   equipment surfaces disinfected   hand hygiene promoted   single patient room provided  Taken 1/6/2024 0810 by Corie Martinez RN  Infection Prevention:   equipment surfaces disinfected   hand hygiene promoted   single patient room provided  Goal: Optimal Comfort and Wellbeing  Outcome: Ongoing, Progressing  Intervention: Monitor Pain and Promote Comfort  Description: Assess pain level, treatment efficacy and patient response at regular intervals using a consistent pain scale.  Consider the presence and impact of preexisting chronic pain.  Encourage patient and caregiver involvement in pain assessment, interventions and safety measures.  Recent Flowsheet Documentation  Taken 1/6/2024 1339 by Corie Martinez RN  Pain Management Interventions:   care clustered   cold applied   diversional activity provided   pain management plan reviewed with patient/caregiver   pillow support provided   position adjusted   see MAR  Taken 1/6/2024 1118 by Corie Martinez RN  Pain Management Interventions:   care clustered   cold applied   diversional activity provided   pain management plan reviewed with patient/caregiver   position adjusted   pillow support provided   see MAR  Taken 1/6/2024 0932 by Corie Martinez RN  Pain Management Interventions:   care clustered   cold applied   diversional activity provided   position adjusted   pillow support provided   see MAR   quiet environment facilitated   pain management plan reviewed with patient/caregiver  Intervention: Provide Person-Centered Care  Description: Use a family-focused approach to care.  Develop trust and rapport by proactively providing information, encouraging questions, addressing concerns and offering reassurance.  Acknowledge emotional response to hospitalization.  Recognize and utilize personal coping strategies.  Honor spiritual and  cultural preferences.  Recent Flowsheet Documentation  Taken 1/6/2024 0810 by Corie Martinez, RN  Trust Relationship/Rapport:   care explained   choices provided   emotional support provided   empathic listening provided   questions answered   questions encouraged   reassurance provided   thoughts/feelings acknowledged  Goal: Readiness for Transition of Care  Outcome: Ongoing, Progressing     Problem: Pain Acute  Goal: Acceptable Pain Control and Functional Ability  Outcome: Ongoing, Progressing  Intervention: Prevent or Manage Pain  Description: Evaluate pain level, effect of treatment and patient response at regular intervals.  Minimize painful stimuli; coordinate care and adjust environment (e.g., light, noise, unnecessary movement); promote sleep/rest.  Match pharmacologic analgesia to severity and type of pain mechanism (e.g., neuropathic, muscle, inflammatory); consider multimodal approach (e.g., nonopioid, opioid, adjuvant).  Provide medication at regular intervals; titrate to patient response; premedicate for painful procedures.  Manage breakthrough pain with additional doses; consider rotation or switching medication.  Monitor for signs of substance tolerance (increased dose to reach desired effect, decreased effect with same dose).  Manage medication-induced effects, such as constipation, nausea, pruritus, urinary retention, somnolence and dizziness.  Provide multimodal interventions, such as as physical activity, therapeutic exercise, yoga, TENS (transcutaneous electrical nerve stimulation) and manual therapy.  Train in functional activity modifications, such as body mechanics, posture, ergonomics, energy conservation and activity pacing.  Consider addition of complementary or alternative therapy, such as acupuncture, hypnosis or therapeutic touch.  Recent Flowsheet Documentation  Taken 1/6/2024 1339 by Corie Martinez, RN  Medication Review/Management: medications reviewed  Taken 1/6/2024 1118 by Michelle  KAREN Fontenot  Medication Review/Management: medications reviewed  Taken 1/6/2024 0932 by Corie Martinez RN  Medication Review/Management: medications reviewed  Taken 1/6/2024 0810 by Corie Martinez RN  Medication Review/Management: medications reviewed  Intervention: Develop Pain Management Plan  Description: Acknowledge patient as the expert in pain self-management.  Use a consistent, validated tool for pain assessment; include function and quality of life.  Evaluate risk for opioid use and dependence.  Set pain management goals; determine acceptable level of discomfort to allow for maximal functioning.  Determine [id number] pain management plan, including both pharmacologic and nonpharmacologic measures; integrate management of chronic (persistent) pain.  Identify and integrate past successful treatment measures, if able.  Encourage patient and caregiver involvement in pain assessment, interventions and safety measures.  Re-evaluate plan regularly.  Recent Flowsheet Documentation  Taken 1/6/2024 1339 by Corie Martinez, RN  Pain Management Interventions:   care clustered   cold applied   diversional activity provided   pain management plan reviewed with patient/caregiver   pillow support provided   position adjusted   see MAR  Taken 1/6/2024 1118 by Corie Martinez, RN  Pain Management Interventions:   care clustered   cold applied   diversional activity provided   pain management plan reviewed with patient/caregiver   position adjusted   pillow support provided   see MAR  Taken 1/6/2024 0932 by Corie Martinez, RN  Pain Management Interventions:   care clustered   cold applied   diversional activity provided   position adjusted   pillow support provided   see MAR   quiet environment facilitated   pain management plan reviewed with patient/caregiver  Intervention: Optimize Psychosocial Wellbeing  Description: Facilitate patient’s self-control over pain by providing pain information and allowing choices in  treatment.  Consider and address emotional response to pain.  Explore and promote use of coping strategies; address barriers to successful coping.  Evaluate and assist with psychosocial, cultural and spiritual factors impacting pain.  Modify pain perception using techniques, such as distraction, mindfulness, guided imagery, meditation or music.  Assess for risk factors for developing chronic pain, such as depression, fear, pain avoidance and pain catastrophizing.  Consider referral for ongoing coping support, such as education, relaxation training and role of thoughts.  Recent Flowsheet Documentation  Taken 1/6/2024 0810 by Corie Martinez RN  Supportive Measures:   active listening utilized   positive reinforcement provided   verbalization of feelings encouraged  Diversional Activities:   smartphone   television  Spiritual Activities Assistance: affirmation provided     Problem: Fall Injury Risk  Goal: Absence of Fall and Fall-Related Injury  Outcome: Ongoing, Progressing  Intervention: Identify and Manage Contributors  Description: Develop a fall prevention plan with the patient and caregiver/family.  Provide reorientation, appropriate sensory stimulation and routines with changes in mental status to decrease risk of fall.  Promote use of personal vision and auditory aids.  Assess assistance level required for safe and effective self-care; provide support as needed, such as toileting, mobilization. For age 65 and older, implement timed toileting with assistance.  Encourage physical activity, such as performance of mobility and self-care at highest level of patient ability, multicomponent exercise program and provision of appropriate assistive devices.  If fall occurs, assess the severity of injury; implement fall injury protocol. Determine the cause and revise fall injury prevention plan.  Regularly review medication contribution to fall risk; adjust medication administration times to minimize risk of  falling.  Consider risk related to polypharmacy and age.  Balance adequate pain management with potential for oversedation.  Recent Flowsheet Documentation  Taken 1/6/2024 1339 by Corie Martinez RN  Medication Review/Management: medications reviewed  Taken 1/6/2024 1118 by Corie Martinez RN  Medication Review/Management: medications reviewed  Taken 1/6/2024 0932 by Corie Martinez RN  Medication Review/Management: medications reviewed  Taken 1/6/2024 0810 by Corie Martinez RN  Medication Review/Management: medications reviewed  Intervention: Promote Injury-Free Environment  Description: Provide a safe, barrier-free environment that encourages independent activity.  Keep care area uncluttered and well-lighted.  Determine need for increased observation or monitoring.  Avoid use of devices that minimize mobility, such as restraints or indwelling urinary catheter.  Recent Flowsheet Documentation  Taken 1/6/2024 1339 by Corie Martinez RN  Safety Promotion/Fall Prevention:   activity supervised   assistive device/personal items within reach   clutter free environment maintained   fall prevention program maintained   nonskid shoes/slippers when out of bed   room organization consistent   safety round/check completed  Taken 1/6/2024 1118 by Corie Martinez RN  Safety Promotion/Fall Prevention:   assistive device/personal items within reach   clutter free environment maintained   fall prevention program maintained   nonskid shoes/slippers when out of bed   room organization consistent   safety round/check completed  Taken 1/6/2024 0932 by Corie Martinez RN  Safety Promotion/Fall Prevention:   activity supervised   assistive device/personal items within reach   clutter free environment maintained   fall prevention program maintained   nonskid shoes/slippers when out of bed   safety round/check completed   room organization consistent  Taken 1/6/2024 0810 by Corie Martinez RN  Safety Promotion/Fall Prevention:   activity  supervised   assistive device/personal items within reach   fall prevention program maintained   clutter free environment maintained   nonskid shoes/slippers when out of bed   safety round/check completed   lighting adjusted   room organization consistent   Goal Outcome Evaluation:              Outcome Evaluation: Alert and oriented x 4. Dressing to lumbar spine CDI. Rate pain 7-10/10 this shift. Medicated with PRN's, ice application. Taking PO well. Up to chair for meals. No s/sx of distress. Monitoring.

## 2024-01-06 NOTE — PLAN OF CARE
Goal Outcome Evaluation:   POD 1 L3-5 laminectomy with fusion. VSS despite being slightly tachycardic. Dressing clean dry and intact with GT drain in place. Chirinos cath in place, urinating without difficulty. Pain controlled with PO and IV medications. HOB flat per order.

## 2024-01-07 LAB
BASOPHILS # BLD AUTO: 0.03 10*3/MM3 (ref 0–0.2)
BASOPHILS NFR BLD AUTO: 0.3 % (ref 0–1.5)
DEPRECATED RDW RBC AUTO: 37.9 FL (ref 37–54)
EOSINOPHIL # BLD AUTO: 0.1 10*3/MM3 (ref 0–0.4)
EOSINOPHIL NFR BLD AUTO: 1 % (ref 0.3–6.2)
ERYTHROCYTE [DISTWIDTH] IN BLOOD BY AUTOMATED COUNT: 11.8 % (ref 12.3–15.4)
HCT VFR BLD AUTO: 29.4 % (ref 34–46.6)
HGB BLD-MCNC: 9.7 G/DL (ref 12–15.9)
IMM GRANULOCYTES # BLD AUTO: 0.04 10*3/MM3 (ref 0–0.05)
IMM GRANULOCYTES NFR BLD AUTO: 0.4 % (ref 0–0.5)
LYMPHOCYTES # BLD AUTO: 1.82 10*3/MM3 (ref 0.7–3.1)
LYMPHOCYTES NFR BLD AUTO: 18.3 % (ref 19.6–45.3)
MCH RBC QN AUTO: 29.1 PG (ref 26.6–33)
MCHC RBC AUTO-ENTMCNC: 33 G/DL (ref 31.5–35.7)
MCV RBC AUTO: 88.3 FL (ref 79–97)
MONOCYTES # BLD AUTO: 1.49 10*3/MM3 (ref 0.1–0.9)
MONOCYTES NFR BLD AUTO: 15 % (ref 5–12)
NEUTROPHILS NFR BLD AUTO: 6.47 10*3/MM3 (ref 1.7–7)
NEUTROPHILS NFR BLD AUTO: 65 % (ref 42.7–76)
NRBC BLD AUTO-RTO: 0 /100 WBC (ref 0–0.2)
PLATELET # BLD AUTO: 234 10*3/MM3 (ref 140–450)
PMV BLD AUTO: 9.3 FL (ref 6–12)
RBC # BLD AUTO: 3.33 10*6/MM3 (ref 3.77–5.28)
WBC NRBC COR # BLD AUTO: 9.95 10*3/MM3 (ref 3.4–10.8)

## 2024-01-07 PROCEDURE — 85025 COMPLETE CBC W/AUTO DIFF WBC: CPT | Performed by: NEUROLOGICAL SURGERY

## 2024-01-07 PROCEDURE — 97116 GAIT TRAINING THERAPY: CPT

## 2024-01-07 PROCEDURE — 99024 POSTOP FOLLOW-UP VISIT: CPT | Performed by: NEUROLOGICAL SURGERY

## 2024-01-07 PROCEDURE — 25010000002 VANCOMYCIN PER 500 MG: Performed by: NEUROLOGICAL SURGERY

## 2024-01-07 RX ADMIN — LOSARTAN POTASSIUM 50 MG: 50 TABLET, FILM COATED ORAL at 08:37

## 2024-01-07 RX ADMIN — HYDROCODONE BITARTRATE AND ACETAMINOPHEN 1 TABLET: 5; 325 TABLET ORAL at 03:15

## 2024-01-07 RX ADMIN — HYDROCODONE BITARTRATE AND ACETAMINOPHEN 1 TABLET: 5; 325 TABLET ORAL at 16:13

## 2024-01-07 RX ADMIN — FUROSEMIDE 20 MG: 20 TABLET ORAL at 20:30

## 2024-01-07 RX ADMIN — FAMOTIDINE 20 MG: 20 TABLET, FILM COATED ORAL at 08:38

## 2024-01-07 RX ADMIN — BUPROPION HYDROCHLORIDE 300 MG: 300 TABLET, EXTENDED RELEASE ORAL at 08:38

## 2024-01-07 RX ADMIN — HYDROCODONE BITARTRATE AND ACETAMINOPHEN 1 TABLET: 5; 325 TABLET ORAL at 07:29

## 2024-01-07 RX ADMIN — HYDROCODONE BITARTRATE AND ACETAMINOPHEN 1 TABLET: 5; 325 TABLET ORAL at 20:30

## 2024-01-07 RX ADMIN — Medication 3 ML: at 20:31

## 2024-01-07 RX ADMIN — FUROSEMIDE 20 MG: 20 TABLET ORAL at 08:37

## 2024-01-07 RX ADMIN — HYDROCODONE BITARTRATE AND ACETAMINOPHEN 1 TABLET: 5; 325 TABLET ORAL at 12:12

## 2024-01-07 RX ADMIN — FAMOTIDINE 20 MG: 20 TABLET, FILM COATED ORAL at 20:30

## 2024-01-07 RX ADMIN — Medication 3 ML: at 08:38

## 2024-01-07 RX ADMIN — ROSUVASTATIN CALCIUM 40 MG: 40 TABLET, FILM COATED ORAL at 08:37

## 2024-01-07 RX ADMIN — CYCLOBENZAPRINE 10 MG: 10 TABLET, FILM COATED ORAL at 07:29

## 2024-01-07 RX ADMIN — CYCLOBENZAPRINE 10 MG: 10 TABLET, FILM COATED ORAL at 16:13

## 2024-01-07 RX ADMIN — VANCOMYCIN HYDROCHLORIDE 1000 MG: 1 INJECTION, SOLUTION INTRAVENOUS at 12:12

## 2024-01-07 RX ADMIN — VANCOMYCIN HYDROCHLORIDE 1000 MG: 1 INJECTION, SOLUTION INTRAVENOUS at 01:04

## 2024-01-07 RX ADMIN — ESCITALOPRAM 20 MG: 20 TABLET, FILM COATED ORAL at 08:38

## 2024-01-07 NOTE — PROGRESS NOTES
LOS: 1 day   Patient Care Team:  Ariana Coffman PA-C as PCP - General (Physician Assistant)  Darrick Dowling MD as Consulting Physician (Obstetrics and Gynecology)  Zay Hernandez MD as Consulting Physician (Allergy and Immunology)  Latonia Burciaga RN as Ambulatory  ([facility])    Chief Complaint: Postop lumbar fusion    Subjective     The patient is feeling pretty good.  She has some back pain but no severe pain.  She has no leg pain.    Interval History:     History taken from: patient chart    Objective      She has good movement of both legs    Vital Signs  Temp:  [98.1 °F (36.7 °C)-99 °F (37.2 °C)] 98.1 °F (36.7 °C)  Heart Rate:  [] 94  Resp:  [16] 16  BP: (122-138)/(53-73) 122/63       Results Review:     I reviewed the patient's new clinical results.  She is afebrile.  She has had 90 cc out of her drain since 7:00 this morning.  Her hemoglobin is 9.7 and her white count is 9.9.      Assessment & Plan       Lumbar radiculopathy    Lumbar spinal stenosis      I told the patient I would like her to stay 1 more day just to see what the drainage does.  If it does not increase and continues to go down she can have her drain removed in the morning and go home.      Rigoberto Botello MD  01/07/24  11:38 EST

## 2024-01-07 NOTE — THERAPY TREATMENT NOTE
Patient Name: Dasha De Leon  : 1970    MRN: [id number]                              Today's Date: 2024       Admit Date: 2024    Visit Dx:     ICD-10-CM ICD-9-CM   1. Lumbar radiculopathy  M54.16 724.4     Patient Active Problem List   Diagnosis    IUD (intrauterine device) in place    Essential hypertension    Mood disorder    Allergic rhinitis    Hyperlipidemia    Knee pain, right    Lumbar radiculopathy    Vitamin D deficiency    Lumbar spinal stenosis     Past Medical History:   Diagnosis Date    Abnormal Pap smear of cervix     Baker's cyst     Bulging lumbar disc     Bursitis     Depression     GERD (gastroesophageal reflux disease)     H/O colposcopy with cervical biopsy     unknown pap result, biopsy was neg per pt, 2013 [facility]    HPV (human papilloma virus) infection     Hyperlipemia     Hypertension     Low back pain     Lumbar radiculopathy     Osteoarthritis     Scoliosis     Seasonal allergies      Past Surgical History:   Procedure Laterality Date    AUGMENTATION MAMMAPLASTY      BREAST AUGMENTATION      CARPAL TUNNEL RELEASE      CRYOTHERAPY          EPIDURAL BLOCK      HAND SURGERY  2004    Pisiformectomy    MAMMO BILATERAL  2014    PAP SMEAR      SHOULDER ARTHROSCOPY  ,    SHOULDER SURGERY      WRIST SURGERY        General Information       Row Name 24 1318          Physical Therapy Time and Intention    Document Type therapy note (daily note)  -     Mode of Treatment individual therapy;physical therapy  -       Row Name 24 1318          General Information    Patient Profile Reviewed yes  -     Existing Precautions/Restrictions fall  -       Row Name 24 1318          Cognition    Orientation Status (Cognition) oriented x 4  -       Row Name 24 1318          Safety Issues, Functional Mobility    Impairments Affecting Function (Mobility) endurance/activity tolerance;pain;range of motion (ROM);strength  -               User  Key  (r) = Recorded By, (t) = Taken By, (c) = Cosigned By      Initials Name Provider Type    Moisés Bruno, PT Physical Therapist                   Mobility       Row Name 01/07/24 1318          Bed Mobility    Bed Mobility sit-supine;rolling left  -     Sit-Supine [place] (Bed Mobility) contact guard;1 person assist  -     Assistive Device (Bed Mobility) bed rails  -       Row Name 01/07/24 1318          Sit-Stand Transfer    Sit-Stand [place] (Transfers) standby assist;1 person to manage equipment  -     Assistive Device (Sit-Stand Transfers) walker, standard  -DR       Row Name 01/07/24 1318          Gait/Stairs (Locomotion)    [place] Level (Gait) standby assist;1 person to manage equipment  -     Assistive Device (Gait) walker, standard  -     Patient was able to Ambulate yes  -DR     Distance in Feet (Gait) 75  -DR     Deviations/Abnormal Patterns (Gait) bilateral deviations;antalgic;base of support, narrow;gait speed decreased  -     [place] Level (Stairs) not tested  -       Row Name 01/07/24 1318          Mobility    Extremity Weight-bearing Status right lower extremity;left lower extremity  -     Left Lower Extremity (Weight-bearing Status) weight-bearing as tolerated (WBAT)  -     Right Lower Extremity (Weight-bearing Status) weight-bearing as tolerated (WBAT)  -               User Key  (r) = Recorded By, (t) = Taken By, (c) = Cosigned By      Initials Name Provider Type    Moisés Bruno, PT Physical Therapist                   Obj/Interventions       Row Name 01/07/24 1319          Range of Motion Comprehensive    General Range of Motion bilateral lower extremity ROM WFL  -       Row Name 01/07/24 1319          Strength Comprehensive (MMT)    General Manual Muscle Testing (MMT) Assessment lower extremity strength deficits identified  -DR Jules, General Manual Muscle Testing (MMT) Assessment mild generalized p/o weakness 4+/5  -DR Galindo  Name 01/07/24 1319          Motor Skills    Therapeutic Exercise knee;hip  ther ex seated EOB x10 reps- LAQ, marches, AP BLE  -       Row Name 01/07/24 1319          Hip (Therapeutic Exercise)    Hip (Therapeutic Exercise) AROM (active range of motion)  -DR       Row Name 01/07/24 1319          Knee (Therapeutic Exercise)    Knee (Therapeutic Exercise) AROM (active range of motion)  -DR       Row Name 01/07/24 1319          Balance    Balance Assessment sitting static balance  -DR     Static Sitting Balance supervision  -DR     Position, Sitting Balance supported;sitting edge of bed  -DR     Static Standing Balance standby assist  -DR     Dynamic Standing Balance standby assist  -DR     Position/Device Used, Standing Balance supported;walker, standard  -DR               User Key  (r) = Recorded By, (t) = Taken By, (c) = Cosigned By      Initials Name Provider Type    Moisés Bruno, PT Physical Therapist                   Goals/Plan       Row Name 01/07/24 1325          Bed Mobility Goal 1 (PT)    Activity/Assistive Device (Bed Mobility Goal 1, PT) bed mobility activities, all  -DR     [place] Level/Cues Needed (Bed Mobility Goal 1, PT) modified independence  -DR     Time Frame (Bed Mobility Goal 1, PT) long term goal (LTG);2 weeks  -DR     Progress/Outcomes (Bed Mobility Goal 1, PT) good progress toward goal  -DR       Row Name 01/07/24 1325          Transfer Goal 1 (PT)    Activity/Assistive Device (Transfer Goal 1, PT) transfers, all  -DR     [place] Level/Cues Needed (Transfer Goal 1, PT) standby assist  -DR     Time Frame (Transfer Goal 1, PT) long term goal (LTG);1 week  -DR     Progress/Outcome (Transfer Goal 1, PT) good progress toward goal  -DR       Row Name 01/07/24 1325          Gait Training Goal 1 (PT)    Activity/Assistive Device (Gait Training Goal 1, PT) gait (walking locomotion);assistive device use;walker, rolling  -DR     [place] Level (Gait Training Goal 1, PT) standby  assist  -DR     Distance (Gait Training Goal 1, PT) 150  -DR     Time Frame (Gait Training Goal 1, PT) long term goal (LTG);2 weeks  -DR     Progress/Outcome (Gait Training Goal 1, PT) good progress toward goal  -DR               User Key  (r) = Recorded By, (t) = Taken By, (c) = Cosigned By      Initials Name Provider Type    Moisés Bruno, PT Physical Therapist                   Clinical Impression       Row Name 01/07/24 1321          Pain    Pretreatment Pain Rating 7/10  -DR     Posttreatment Pain Rating 7/10  -DR     Pain Location incisional  -DR     Pain Location - back  -DR     Pain Intervention(s) Repositioned;Ambulation/increased activity;Rest  -       Row Name 01/07/24 1321          Plan of Care Review    Plan of Care Reviewed With patient  -DR     Progress improving  -DR     Outcome Evaluation Pt agreeable to PT treatment this PM. Approached while supine in bed, rating pain 7/10 in back. Pt completed bed mobility with CGAx1 using bed rail to assist. Pt completed STS at SBA as well as ambulating using standard walker for 75' at SBA, requiring one person for managing equipment. She returned to supine in bed post tx, with VC required for proper log roll technique, but able to adjust independently in bed. At this time, recommend home with HHPT and OPPT at time of d/c. Pt will benefit from skilled PT to continue working towards functional endurance and mobility to return home independently.  -       Row Name 01/07/24 1321          Therapy Assessment/Plan (PT)    Rehab Potential (PT) good, to achieve stated therapy goals  -DR     Criteria for Skilled Interventions Met (PT) yes  -DR     Therapy Frequency (PT) 5 times/wk  -       Row Name 01/07/24 1321          Positioning and Restraints    Pre-Treatment Position in bed  -DR     Post Treatment Position bed  -DR     In Bed supine;fowlers;call light within reach;encouraged to call for assist  -DR               User Key  (r) = Recorded By, (t) = Taken  By, (c) = Cosigned By      Initials Name Provider Type    Moisés Bruno, PT Physical Therapist                   Outcome Measures       Row Name 01/07/24 1325 01/07/24 0838       How much help from another person do you currently need...    Turning from your back to your side while in flat bed without using bedrails? 4  -DR 4  -TUNDE    Moving from lying on back to sitting on the side of a flat bed without bedrails? 4  -DR 4  -TUNDE    Moving to and from a bed to a chair (including a wheelchair)? 4  -DR 3  -TUNDE    Standing up from a chair using your arms (e.g., wheelchair, bedside chair)? 4  -DR 3  -TUNDE    Climbing 3-5 steps with a railing? 3  -DR 2  -TUNDE    To walk in hospital room? 3  -DR 3  -TUNDE    AM-PAC 6 Clicks Score (PT) 22  -DR 19  -TUNDE    Highest Level of Mobility Goal 7 --> Walk 25 feet or more  -DR 6 --> Walk 10 steps or more  -TUNDE      Row Name 01/07/24 1325          Functional Assessment    Outcome Measure Options AM-PAC 6 Clicks Basic Mobility (PT)  -               User Key  (r) = Recorded By, (t) = Taken By, (c) = Cosigned By      Initials Name Provider Type    Nghia Garnett, RN Registered Nurse    Moisés Bruno, PT Physical Therapist                                 Physical Therapy Education       Title: PT OT SLP Therapies (Done)       Topic: Physical Therapy (Done)       Point: Mobility training (Done)       Learning Progress Summary             Patient Acceptance, E,TB, VU by  at 1/7/2024 1326    Acceptance, E,TB, VU by  at 1/6/2024 1227                         Point: Home exercise program (Done)       Learning Progress Summary             Patient Acceptance, E,TB, VU by  at 1/7/2024 1326    Acceptance, E,TB, VU by  at 1/6/2024 1227                         Point: Body mechanics (Done)       Learning Progress Summary             Patient Acceptance, E,TB, VU by  at 1/7/2024 1326    Acceptance, E,TB, VU by  at 1/6/2024 1227                         Point: Precautions (Done)        Learning Progress Summary             Patient Acceptance, E,TB, VU by  at 1/7/2024 1326    Acceptance, E,TB, VU by  at 1/6/2024 1227                                         User Key       Initials Effective Dates Name Provider Type Discipline     05/24/23 -  Moisés Rosa, PT Physical Therapist PT                  PT Recommendation and Plan     Plan of Care Reviewed With: patient  Progress: improving  Outcome Evaluation: Pt agreeable to PT treatment this PM. Approached while supine in bed, rating pain 7/10 in back. Pt completed bed mobility with CGAx1 using bed rail to assist. Pt completed STS at S[facility] as well as ambulating using standard walker for 75' at SBA, requiring one person for managing equipment. She returned to supine in bed post tx, with VC required for proper log roll technique, but able to adjust independently in bed. At this time, recommend home with HHPT and OPPT at time of d/c. Pt will benefit from skilled PT to continue working towards functional endurance and mobility to return home independently.     Time Calculation:         PT Charges       Row Name 01/07/24 1326             Time Calculation    Start Time 1259  -DR      Stop Time 1313  -DR      Time Calculation (min) 14 min  -DR      PT Received On 01/07/24  -      PT - Next Appointment 01/07/25  -      PT Goal Re-Cert Due Date 01/14/24  -DR         Time Calculation- PT    Total Timed Code Minutes- PT 14 minute(s)  -DR         Timed Charges    30321 - Gait Training Minutes  8  -DR      57751 - PT Therapeutic Activity Minutes 6  -DR         Total Minutes    Timed Charges Total Minutes 14  -DR       Total Minutes 14  -DR                User Key  (r) = Recorded By, (t) = Taken By, (c) = Cosigned By      Initials Name Provider Type    Moisés Bruno, PT Physical Therapist                  Therapy Charges for Today       Code Description Service Date Service Provider Modifiers Qty    91175936930  PT THERAPEUTIC ACT EA 15 MIN  1/6/2024 Moisés Rosa, PT GP 1    94855165045 HC PT EVAL MOD COMPLEXITY 3 1/6/2024 Moisés Rosa, PT GP 1    42404641583 HC GAIT TRAINING EA 15 MIN 1/7/2024 Moisés Rosa, PT GP 1            PT G-Codes  Outcome Measure Options: AM-PAC 6 Clicks Basic Mobility (PT)  AM-PAC 6 Clicks Score (PT): 22  PT Discharge Summary  Anticipated Discharge Disposition (PT): home with home health, home with outpatient therapy services, home with assist    Moisés Rosa, PT  1/7/2024

## 2024-01-07 NOTE — PLAN OF CARE
A/Ox4. Restless in bed, unable to find comfortable position. No s/s distress. POD#2 lumbar fusion, w/ 1 GT drain, 135ml drained this shift, serosanguinous fluid. On IV Vanc q12H for 2 days. Voiding per Chirinos. Last BM on 01/04/24. Ambulated this evening w/ staff on the hallway. PRN Norco, Flexeril, Morphine for pain. PRN Xanax for anxiety given. Plan to d/c in-pt rehab.

## 2024-01-07 NOTE — PLAN OF CARE
Goal Outcome Evaluation:  Plan of Care Reviewed With: patient        Progress: improving  Outcome Evaluation: Pt agreeable to PT treatment this PM. Approached while supine in bed, rating pain 7/10 in back. Pt completed bed mobility with CGAx1 using bed rail to assist. Pt completed STS at S[facility] as well as ambulating using standard walker for 75' at SBA, requiring one person for managing equipment. She returned to supine in bed post tx, with VC required for proper log roll technique, but able to adjust independently in bed. At this time, recommend home with HHPT and OPPT at time of d/c. Pt will benefit from skilled PT to continue working towards functional endurance and mobility to return home independently.      Anticipated Discharge Disposition (PT): home with home health, home with outpatient therapy services, home with assist

## 2024-01-07 NOTE — PLAN OF CARE
Goal Outcome Evaluation:   Patient is POD 2. Aox4. VSS on RA. Ambulates assist x1 with a walker. GT drain remains. Chirinos catheter removed today per protocol, currently DTV. Possible discharge home tomorrow depending on GT drain output. All needs currently met, will continue to monitor.

## 2024-01-08 ENCOUNTER — READMISSION MANAGEMENT (OUTPATIENT)
Dept: CALL CENTER | Facility: HOSPITAL | Age: 54
End: 2024-01-08
Payer: COMMERCIAL

## 2024-01-08 VITALS
WEIGHT: 167 LBS | TEMPERATURE: 98.3 F | DIASTOLIC BLOOD PRESSURE: 69 MMHG | OXYGEN SATURATION: 96 % | HEIGHT: 66 IN | HEART RATE: 91 BPM | RESPIRATION RATE: 16 BRPM | BODY MASS INDEX: 26.84 KG/M2 | SYSTOLIC BLOOD PRESSURE: 112 MMHG

## 2024-01-08 LAB
BASOPHILS # BLD AUTO: 0.05 10*3/MM3 (ref 0–0.2)
BASOPHILS NFR BLD AUTO: 0.5 % (ref 0–1.5)
DEPRECATED RDW RBC AUTO: 38.4 FL (ref 37–54)
EOSINOPHIL # BLD AUTO: 0.21 10*3/MM3 (ref 0–0.4)
EOSINOPHIL NFR BLD AUTO: 2.2 % (ref 0.3–6.2)
ERYTHROCYTE [DISTWIDTH] IN BLOOD BY AUTOMATED COUNT: 11.8 % (ref 12.3–15.4)
HCT VFR BLD AUTO: 31.1 % (ref 34–46.6)
HGB BLD-MCNC: 10.2 G/DL (ref 12–15.9)
IMM GRANULOCYTES # BLD AUTO: 0.05 10*3/MM3 (ref 0–0.05)
IMM GRANULOCYTES NFR BLD AUTO: 0.5 % (ref 0–0.5)
LYMPHOCYTES # BLD AUTO: 1.95 10*3/MM3 (ref 0.7–3.1)
LYMPHOCYTES NFR BLD AUTO: 20.7 % (ref 19.6–45.3)
MCH RBC QN AUTO: 28.8 PG (ref 26.6–33)
MCHC RBC AUTO-ENTMCNC: 32.8 G/DL (ref 31.5–35.7)
MCV RBC AUTO: 87.9 FL (ref 79–97)
MONOCYTES # BLD AUTO: 1.5 10*3/MM3 (ref 0.1–0.9)
MONOCYTES NFR BLD AUTO: 15.9 % (ref 5–12)
NEUTROPHILS NFR BLD AUTO: 5.67 10*3/MM3 (ref 1.7–7)
NEUTROPHILS NFR BLD AUTO: 60.2 % (ref 42.7–76)
NRBC BLD AUTO-RTO: 0 /100 WBC (ref 0–0.2)
PLATELET # BLD AUTO: 273 10*3/MM3 (ref 140–450)
PMV BLD AUTO: 9.4 FL (ref 6–12)
RBC # BLD AUTO: 3.54 10*6/MM3 (ref 3.77–5.28)
WBC NRBC COR # BLD AUTO: 9.43 10*3/MM3 (ref 3.4–10.8)

## 2024-01-08 PROCEDURE — 99024 POSTOP FOLLOW-UP VISIT: CPT | Performed by: NURSE PRACTITIONER

## 2024-01-08 PROCEDURE — 85025 COMPLETE CBC W/AUTO DIFF WBC: CPT | Performed by: NEUROLOGICAL SURGERY

## 2024-01-08 PROCEDURE — 97530 THERAPEUTIC ACTIVITIES: CPT

## 2024-01-08 RX ORDER — AMOXICILLIN 250 MG
2 CAPSULE ORAL NIGHTLY
COMMUNITY
Start: 2024-01-08

## 2024-01-08 RX ORDER — PSEUDOEPHEDRINE HCL 30 MG
100 TABLET ORAL DAILY
COMMUNITY
Start: 2024-01-09

## 2024-01-08 RX ORDER — HYDROCODONE BITARTRATE AND ACETAMINOPHEN 5; 325 MG/1; MG/1
1 TABLET ORAL EVERY 4 HOURS PRN
Qty: 25 TABLET | Refills: 0 | Status: SHIPPED | OUTPATIENT
Start: 2024-01-08 | End: 2024-01-13

## 2024-01-08 RX ORDER — DOCUSATE SODIUM 100 MG/1
100 CAPSULE, LIQUID FILLED ORAL DAILY
Status: DISCONTINUED | OUTPATIENT
Start: 2024-01-08 | End: 2024-01-08 | Stop reason: HOSPADM

## 2024-01-08 RX ORDER — CYCLOBENZAPRINE HCL 10 MG
10 TABLET ORAL 3 TIMES DAILY PRN
Qty: 30 TABLET | Refills: 0 | Status: SHIPPED | OUTPATIENT
Start: 2024-01-08

## 2024-01-08 RX ORDER — AMOXICILLIN 250 MG
2 CAPSULE ORAL NIGHTLY
Status: DISCONTINUED | OUTPATIENT
Start: 2024-01-08 | End: 2024-01-08 | Stop reason: HOSPADM

## 2024-01-08 RX ADMIN — HYDROCODONE BITARTRATE AND ACETAMINOPHEN 1 TABLET: 5; 325 TABLET ORAL at 01:22

## 2024-01-08 RX ADMIN — ROSUVASTATIN CALCIUM 40 MG: 40 TABLET, FILM COATED ORAL at 09:43

## 2024-01-08 RX ADMIN — Medication 3 ML: at 09:43

## 2024-01-08 RX ADMIN — FUROSEMIDE 20 MG: 20 TABLET ORAL at 09:43

## 2024-01-08 RX ADMIN — HYDROCODONE BITARTRATE AND ACETAMINOPHEN 1 TABLET: 5; 325 TABLET ORAL at 07:43

## 2024-01-08 RX ADMIN — FAMOTIDINE 20 MG: 20 TABLET, FILM COATED ORAL at 09:43

## 2024-01-08 RX ADMIN — BUPROPION HYDROCHLORIDE 300 MG: 300 TABLET, EXTENDED RELEASE ORAL at 09:43

## 2024-01-08 RX ADMIN — CYCLOBENZAPRINE 10 MG: 10 TABLET, FILM COATED ORAL at 01:22

## 2024-01-08 RX ADMIN — ESCITALOPRAM 20 MG: 20 TABLET, FILM COATED ORAL at 09:43

## 2024-01-08 RX ADMIN — DOCUSATE SODIUM 100 MG: 100 CAPSULE, LIQUID FILLED ORAL at 09:43

## 2024-01-08 RX ADMIN — LOSARTAN POTASSIUM 50 MG: 50 TABLET, FILM COATED ORAL at 09:43

## 2024-01-08 NOTE — THERAPY DISCHARGE NOTE
Patient Name: Dasha De Leon  : 1970    MRN: [id number]                              Today's Date: 2024       Admit Date: 2024    Visit Dx:     ICD-10-CM ICD-9-CM   1. Lumbar radiculopathy  M54.16 724.4     Patient Active Problem List   Diagnosis    IUD (intrauterine device) in place    Essential hypertension    Mood disorder    Allergic rhinitis    Hyperlipidemia    Knee pain, right    Lumbar radiculopathy    Vitamin D deficiency    Lumbar spinal stenosis     Past Medical History:   Diagnosis Date    Abnormal Pap smear of cervix     Baker's cyst     Bulging lumbar disc     Bursitis     Depression     GERD (gastroesophageal reflux disease)     H/O colposcopy with cervical biopsy     unknown pap result, biopsy was neg per pt, 2013 [facility]    HPV (human papilloma virus) infection     Hyperlipemia     Hypertension     Low back pain     Lumbar radiculopathy     Osteoarthritis     Scoliosis     Seasonal allergies      Past Surgical History:   Procedure Laterality Date    AUGMENTATION MAMMAPLASTY      BREAST AUGMENTATION      CARPAL TUNNEL RELEASE      CRYOTHERAPY          EPIDURAL BLOCK      HAND SURGERY      Pisiformectomy    LUMBAR DISCECTOMY FUSION INSTRUMENTATION N/A 2024    Procedure: Lumbar 3 to lumbar 4 and lumbar 4 to lumbar 5 laminectomy with fusion and instrumentation;  Surgeon: Rigoberto Botello MD;  Location: [facility];  Service: Robotics - Neuro;  Laterality: N/A;    MAMMO BILATERAL      PAP SMEAR      SHOULDER ARTHROSCOPY  ,    SHOULDER SURGERY      WRIST SURGERY        General Information       Row Name 24 0928          Physical Therapy Time and Intention    Document Type discharge treatment  -CS     Mode of Treatment individual therapy;physical therapy  -CS       Row Name 24 0928          General Information    Patient Profile Reviewed yes  -CS     Existing Precautions/Restrictions spinal  -CS       Row Name 24 0928           Cognition    Orientation Status (Cognition) oriented x 4  -CS       Row Name 01/08/24 0928          Safety Issues, Functional Mobility    Impairments Affecting Function (Mobility) pain;strength  -CS               User Key  (r) = Recorded By, (t) = Taken By, (c) = Cosigned By      Initials Name Provider Type    CS Idalmis Carson, PT Physical Therapist                   Mobility       Row Name 01/08/24 0928          Bed Mobility    Bed Mobility supine-sit;sit-supine  -CS     Supine-Sit [place] (Bed Mobility) modified independence  -CS     Sit-Supine [place] (Bed Mobility) modified independence  -CS     Assistive Device (Bed Mobility) bed rails  -CS     Comment, (Bed Mobility) log roll technqiue  -CS       Row Name 01/08/24 0928          Sit-Stand Transfer    Sit-Stand [place] (Transfers) modified independence  -CS     Assistive Device (Sit-Stand Transfers) walker, front-wheeled  -CS       Row Name 01/08/24 0928          Gait/Stairs (Locomotion)    [place] Level (Gait) supervision  -CS     Assistive Device (Gait) walker, front-wheeled  -CS     Distance in Feet (Gait) 400'  -CS     Deviations/Abnormal Patterns (Gait) ira decreased;stride length decreased  -CS     Comment, (Gait/Stairs) slow pace; no LOB  -CS               User Key  (r) = Recorded By, (t) = Taken By, (c) = Cosigned By      Initials Name Provider Type    CS Idalmis Carson, PT Physical Therapist                   Obj/Interventions       Row Name 01/08/24 0929          Motor Skills    Therapeutic Exercise other (see comments)  10 reps B LE AP & LAQ  -CS       Row Name 01/08/24 0929          Balance    Balance Assessment sitting static balance;sitting dynamic balance;standing static balance;standing dynamic balance  -CS     Static Sitting Balance independent  -CS     Dynamic Sitting Balance independent  -CS     Position, Sitting Balance sitting edge of bed  -CS     Static Standing Balance modified independence  -CS     Dynamic  Standing Balance supervision  -CS     Position/Device Used, Standing Balance supported;walker, front-wheeled  -CS               User Key  (r) = Recorded By, (t) = Taken By, (c) = Cosigned By      Initials Name Provider Type    CS Idalmis Carson PT Physical Therapist                   Goals/Plan       Row Name 01/08/24 0931          Bed Mobility Goal 1 (PT)    Activity/Assistive Device (Bed Mobility Goal 1, PT) bed mobility activities, all  -CS     [place] Level/Cues Needed (Bed Mobility Goal 1, PT) modified independence  -CS     Time Frame (Bed Mobility Goal 1, PT) long term goal (LTG);2 weeks  -CS     Progress/Outcomes (Bed Mobility Goal 1, PT) goal met  -CS       Row Name 01/08/24 0931          Transfer Goal 1 (PT)    Activity/Assistive Device (Transfer Goal 1, PT) transfers, all  -CS     [place] Level/Cues Needed (Transfer Goal 1, PT) standby assist  -CS     Time Frame (Transfer Goal 1, PT) long term goal (LTG);1 week  -CS     Progress/Outcome (Transfer Goal 1, PT) goal met  -CS       Row Name 01/08/24 0931          Gait Training Goal 1 (PT)    Activity/Assistive Device (Gait Training Goal 1, PT) gait (walking locomotion);assistive device use;walker, rolling  -CS     [place] Level (Gait Training Goal 1, PT) standby assist  -CS     Distance (Gait Training Goal 1, PT) 150  -CS     Time Frame (Gait Training Goal 1, PT) long term goal (LTG);2 weeks  -CS     Progress/Outcome (Gait Training Goal 1, PT) goal met  -CS               User Key  (r) = Recorded By, (t) = Taken By, (c) = Cosigned By      Initials Name Provider Type    Idalmis Casey PT Physical Therapist                   Clinical Impression       Row Name 01/08/24 0929          Pain    Pretreatment Pain Rating 6/10  -CS     Posttreatment Pain Rating 7/10  -CS     Pain Location - back  -CS     Pain Intervention(s) Ambulation/increased activity;Rest;Repositioned  -CS       Row Name 01/08/24 0929          Plan of Care Review    Plan of Care  Reviewed With patient  -CS     Progress improving  -CS     Outcome Evaluation Pt received in bed upon arrival and agreeable to PT. Pt completed bed mobility mod (I). Pt completed B LE ther-ex prior to mobility. Pt stood and ambulated 400' c RW requiring SPV. Pt demo's a slow pace but no LOB. PT reviewed spinal precautions and encouraged ambulation. Pt is safe to D/C home and f/u with outpatient PT to address strength.  -CS       Row Name 01/08/24 0929          Therapy Assessment/Plan (PT)    Criteria for Skilled Interventions Met (PT) yes;meets criteria  -CS     Therapy Frequency (PT) 5 times/wk  -CS       Row Name 01/08/24 0929          Positioning and Restraints    Pre-Treatment Position in bed  -CS     Post Treatment Position bed  -CS     In Bed supine;call light within reach;encouraged to call for assist  no alarm upon arrival  -CS               User Key  (r) = Recorded By, (t) = Taken By, (c) = Cosigned By      Initials Name Provider Type    Idalmis Casey, PT Physical Therapist                   Outcome Measures       Row Name 01/08/24 0932          How much help from another person do you currently need...    Turning from your back to your side while in flat bed without using bedrails? 4  -CS     Moving from lying on back to sitting on the side of a flat bed without bedrails? 3  -CS     Moving to and from a bed to a chair (including a wheelchair)? 4  -CS     Standing up from a chair using your arms (e.g., wheelchair, bedside chair)? 4  -CS     Climbing 3-5 steps with a railing? 3  -CS     To walk in hospital room? 4  -CS     AM-PAC 6 Clicks Score (PT) 22  -CS     Highest Level of Mobility Goal 7 --> Walk 25 feet or more  -CS       Row Name 01/08/24 0932          Functional Assessment    Outcome Measure Options AM-PAC 6 Clicks Basic Mobility (PT)  -CS               User Key  (r) = Recorded By, (t) = Taken By, (c) = Cosigned By      Initials Name Provider Type    Idalmis Casey, PT Physical Therapist                   Physical Therapy Education       Title: PT OT SLP Therapies (Done)       Topic: Physical Therapy (Done)       Point: Mobility training (Done)       Learning Progress Summary             Patient Acceptance, E,TB, VU,DU by SADAF at 1/8/2024 0932    Acceptance, E,TB, VU by  at 1/7/2024 1326    Acceptance, E,TB, VU by  at 1/6/2024 1227                         Point: Home exercise program (Done)       Learning Progress Summary             Patient Acceptance, E,TB, VU,DU by SADAF at 1/8/2024 0932    Acceptance, E,TB, VU by  at 1/7/2024 1326    Acceptance, E,TB, VU by DR at 1/6/2024 1227                         Point: Body mechanics (Done)       Learning Progress Summary             Patient Acceptance, E,TB, VU,DU by SADAF at 1/8/2024 0932    Acceptance, E,TB, VU by  at 1/7/2024 1326    Acceptance, E,TB, VU by DR at 1/6/2024 1227                         Point: Precautions (Done)       Learning Progress Summary             Patient Acceptance, E,TB, VU,DU by SADAF at 1/8/2024 0932    Acceptance, E,TB, VU by  at 1/7/2024 1326    Acceptance, E,TB, VU by  at 1/6/2024 1227                                         User Key       Initials Effective Dates Name Provider Type Discipline    SADAF 09/22/22 -  Idalmis Carson, PT Physical Therapist PT     05/24/23 -  Moisés Rosa, PT Physical Therapist PT                  PT Recommendation and Plan     Plan of Care Reviewed With: patient  Progress: improving  Outcome Evaluation: Pt received in bed upon arrival and agreeable to PT. Pt completed bed mobility mod (I). Pt completed B LE ther-ex prior to mobility. Pt stood and ambulated 400' c RW requiring SPV. Pt demo's a slow pace but no LOB. PT reviewed spinal precautions and encouraged ambulation. Pt is safe to D/C home and f/u with outpatient PT to address strength.     Time Calculation:         PT Charges       Row Name 01/08/24 0932             Time Calculation    Start Time 0840  -      Stop Time 0851  -       Time Calculation (min) 11 min  -CS      PT Received On 01/08/24  -CS         Time Calculation- PT    Total Timed Code Minutes- PT 10 minute(s)  -CS         Timed Charges    42006 - PT Therapeutic Activity Minutes 10  -CS         Total Minutes    Timed Charges Total Minutes 10  -CS       Total Minutes 10  -CS                User Key  (r) = Recorded By, (t) = Taken By, (c) = Cosigned By      Initials Name Provider Type    CS Idalmis Carson, PT Physical Therapist                  Therapy Charges for Today       Code Description Service Date Service Provider Modifiers Qty    [id number]  PT THERAPEUTIC ACT EA 15 MIN 1/8/2024 Idalmis Carson, PT GP 1            PT G-Codes  Outcome Measure Options: AM-PAC 6 Clicks Basic Mobility (PT)  AM-PAC 6 Clicks Score (PT): 22    PT Discharge Summary  Anticipated Discharge Disposition (PT): home with assist, home with outpatient therapy services    Idalmis Carson PT  1/8/2024

## 2024-01-08 NOTE — PLAN OF CARE
A/Ox4. No s/s distress. POD#3 lumbar fusion, w/ 1 GT drain, 25ml drained this shift, serosanguinous fluid. Assist x1 w/ walker. Voiding per BRP. Last BM on 01/04/24. PRN Norco, Flexeril pain. Plan to d/c in-pt rehab.

## 2024-01-08 NOTE — OUTREACH NOTE
Prep Survey      Flowsheet Row Responses   [facility] facility patient discharged from? [facility]   Is LACE score < 7 ? Yes   Eligibility [facility]   Date of Admission 01/05/24   Date of Discharge 01/08/24   Discharge Disposition Home or Self Care   Discharge diagnosis Lumbar 3 to lumbar 4 and lumbar 4 to lumbar 5 laminectomy with fusion and instrumentation   Does the patient have one of the following disease processes/diagnoses(primary or secondary)? General Surgery   Does the patient have Home health ordered? No   Is there a DME ordered? No   Prep survey completed? Yes            Monica Mederos Registered Nurse

## 2024-01-08 NOTE — PLAN OF CARE
Goal Outcome Evaluation:  Plan of Care Reviewed With: patient        Progress: improving  Outcome Evaluation: Pt received in bed upon arrival and agreeable to PT. Pt completed bed mobility mod (I). Pt completed B LE ther-ex prior to mobility. Pt stood and ambulated 400' c RW requiring SPV. Pt demo's a slow pace but no LOB. PT reviewed spinal precautions and encouraged ambulation. Pt is safe to D/C home and f/u with outpatient PT to address strength.      Anticipated Discharge Disposition (PT): home with assist, home with outpatient therapy services

## 2024-01-08 NOTE — DISCHARGE SUMMARY
Dasha De Leon  1970    Patient Care Team:  Ariana Coffman PA-C as PCP - General (Physician Assistant)  Darrick Dowling MD as Consulting Physician (Obstetrics and Gynecology)  Zay Hernandez MD as Consulting Physician (Allergy and Immunology)  Latonia Burciaga, KAREN as Ambulatory  ([facility])    Date of Admit: 1/5/2024    Date of Discharge:  1/8/2024    Discharge Diagnosis:  Lumbar radiculopathy    Lumbar spinal stenosis      Procedures Performed  Procedure(s):  Lumbar 3 to lumbar 4 and lumbar 4 to lumbar 5 laminectomy with fusion and instrumentation       Complications: none    Consultants:   Consults       No orders found from 12/7/2023 to 1/6/2024.            Condition on Discharge: stable    Discharge disposition: home    Pertinent Test Results: radiology: X-Ray: Lumbar x-rays reveal postoperative change L3-5 with instrumentation in good position.  There is mild retrolisthesis of L2 on 3 as present preoperatively.  There is resolution of anterolisthesis of L4 on 5.    Brief HPI: Patient evaluated in office for complaints of back and left posterior lateral thigh and calf pain. Imaging revealed plain films show a grade 1 spondylolisthesis of L4 on L5.  On the myelogram there is stenosis at L2-3 and L3-4 but severe stenosis at L4-5.  On the post myelographic CT scan there was just a little bit of bulging of the disc at L2-3 but more significant narrowing at L3-4 and L4-5.  We have previously discussed an L3-L5 laminectomy and fusion. . RBAs of treatment were discussed including the above procedure. Patient consented to above procedure.    Hospital Course: Patient admitted for above procedure. The procedure itself was without complication. The patient was transferred to [facility] following recovery.  She has done well following surgery.  No leg pain but has some achiness in her hips when first getting out of bed.  Back pain present but controlled with oral medications.  Voiding, ambulating, tolerating  diet.  Passing flatus.  Did well with PT today.  Feels ready for discharge.  GT output waned overnight and will be removed prior to discharge.    Discharge Physical Exam:    Temp:  [98.2 °F (36.8 °C)-99 °F (37.2 °C)] 98.3 °F (36.8 °C)  Heart Rate:  [91-98] 91  Resp:  [16] 16  BP: (100-114)/(63-69) 112/69    Current labs:  Lab Results (last 24 hours)       Procedure Component Value Units Date/Time    CBC & Differential [[id number]]  (Abnormal) Collected: 01/08/24 0508    Specimen: Blood Updated: 01/08/24 0545    Narrative:      The following orders were created for panel order CBC & Differential.  Procedure                               Abnormality         Status                     ---------                               -----------         ------                     CBC Auto Differential[[id number]]        Abnormal            Final result                 Please view results for these tests on the individual orders.    CBC Auto Differential [[id number]]  (Abnormal) Collected: 01/08/24 0508    Specimen: Blood Updated: 01/08/24 0545     WBC 9.43 10*3/mm3      RBC 3.54 10*6/mm3      Hemoglobin 10.2 g/dL      Hematocrit 31.1 %      MCV 87.9 fL      MCH 28.8 pg      MCHC 32.8 g/dL      RDW 11.8 %      RDW-SD 38.4 fl      MPV 9.4 fL      Platelets 273 10*3/mm3      Neutrophil % 60.2 %      Lymphocyte % 20.7 %      Monocyte % 15.9 %      Eosinophil % 2.2 %      Basophil % 0.5 %      Immature Grans % 0.5 %      Neutrophils, Absolute 5.67 10*3/mm3      Lymphocytes, Absolute 1.95 10*3/mm3      Monocytes, Absolute 1.50 10*3/mm3      Eosinophils, Absolute 0.21 10*3/mm3      Basophils, Absolute 0.05 10*3/mm3      Immature Grans, Absolute 0.05 10*3/mm3      nRBC 0.0 /100 WBC               General Appearance No acute distress.  Resting in bed   Neurological Awake, Alert, and oriented x 3   Motor Strength normal bilateral lower extremity, tone normal   Sensory Intact to light touch bilateral lower extremity   Gait and station  Ambulating per PT note 1/8-stood and ambulated 400' c RW requiring SPV. Pt demo's a slow pace but no LOB.    Back Midline incision well-approximated with no redness drainage swelling.  GT to bulb suction with minimal serosanguineous output.  200 cc x 24 hours, 25 cc times last 12 hours   Extremities Moves all extremities well, no edema, no cyanosis, no redness         Discharge Medications  ORI has been reviewed and narcotic consent is on file in the patient's chart.     Your medication list        START taking these medications        Instructions Last Dose Given Next Dose Due   docusate sodium 100 MG capsule  Start taking on: January 9, 2024      Take 1 capsule by mouth Daily.       HYDROcodone-acetaminophen 5-325 MG per tablet  Commonly known as: NORCO      Take 1 tablet by mouth Every 4 (Four) Hours As Needed for Moderate Pain for up to 4 days.       sennosides-docusate 8.6-50 MG per tablet  Commonly known as: PERICOLACE      Take 2 tablets by mouth Every Night.              CONTINUE taking these medications        Instructions Last Dose Given Next Dose Due   ALPRAZolam 0.25 MG tablet  Commonly known as: Xanax      Take 1 tablet by mouth 2 (Two) Times a Day As Needed for Anxiety.       buPROPion  MG 24 hr tablet  Commonly known as: Wellbutrin XL      Take 1 tablet by mouth Daily.       cyclobenzaprine 10 MG tablet  Commonly known as: FLEXERIL      Take 1 tablet by mouth 3 (Three) Times a Day As Needed for Muscle Spasms.       escitalopram 20 MG tablet  Commonly known as: LEXAPRO      Take 1 tablet by mouth Daily.       famotidine 20 MG tablet  Commonly known as: PEPCID      Take 1 tablet by mouth 2 (Two) Times a Day.       furosemide 20 MG tablet  Commonly known as: Lasix      Take 1 tablet by mouth 2 (Two) Times a Day.       levocetirizine 5 MG tablet  Commonly known as: XYZAL      Take one tablet by mouth twice daily for urticaria.       levonorgestrel 20 MCG/24HR IUD  Commonly known as: MIRENA      1  each by Intrauterine route 1 (One) Time.       olmesartan 40 MG tablet  Commonly known as: BENICAR      Take 0.5 tablets by mouth Daily.       rosuvastatin 40 MG tablet  Commonly known as: Crestor      Take 1 tablet by mouth Daily.              STOP taking these medications      meloxicam 15 MG tablet  Commonly known as: MOBIC                  Where to Get Your Medications        These medications were sent to [place] [id number]      Hours: Monday to Friday 7 AM to 6 PM, Saturday & Sunday 8 AM to 4:30 PM (Closed 12 PM to 12:30 PM) Phone: [phone]   cyclobenzaprine 10 MG tablet  HYDROcodone-acetaminophen 5-325 MG per tablet       You can get these medications from any pharmacy    You don't need a prescription for these medications  docusate sodium 100 MG capsule  sennosides-docusate 8.6-50 MG per tablet         Discharge Diet:   Diet Instructions       Diet: Regular/House Diet; Thin (IDDSI 0)      Discharge Diet: Regular/House Diet    Fluid Consistency: Thin (IDDSI 0)          Diet Order   Procedures    Diet: Regular/House Diet; Fluid Consistency: Thin (IDDSI 0)       Activity at Discharge:   Activity Instructions       Discharge Activity      1) No driving until cleared by us and no longer taking narcotics.   2) Off work/school until cleared by us.  3) May shower 1/9 but no submerging wound in tub, pool, etc.  4) Do not lift / push / pull more then 5 lbs.  5.) Wear brace when out of bed except showers  6.) No overhead activity/ No bending/twisting/impact activity    Other Restrictions (Specify)      Pelvic Rest              Call for: questions or concerns    Follow-up Appointments  Future Appointments   Date Time Provider Department Center   1/18/2024 10:00 AM Rigoberto Botello MD [facility]   4/29/2024  8:00 AM [facility] None   4/29/2024  9:00 AM Radha Castillo PA [facility]  [facility]   6/10/2024  8:45 AM [facility]  "[facility]   6/17/2024  8:30 AM Ariana Coffman PA-C K  [facility]      Follow-up Information       Ariana Coffman PA-C .    Specialties: Physician Assistant, Internal Medicine, Family Medicine  Contact information:  [address]  [place] [id number] [phone]                           Additional Instructions for the Follow-ups that You Need to Schedule       Notify Physician or Go To The ED For the Following Conditions   As directed      Including but not limited to fever >100.5, chills, wound concerns (redness, swelling, drainage), new symptoms of numbness, tingling, weakness; new or uncontrolled pain despite using prescribed medications    Order Comments: Including but not limited to fever >100.5, chills, wound concerns (redness, swelling, drainage), new symptoms of numbness, tingling, weakness; new or uncontrolled pain despite using prescribed medications                 Test Results Pending at Discharge     None    I discussed the discharge instructions with patient    CARITO Bowen  01/08/24  11:26 EST    30 min spent in reviewing records, discussion and examination of the patient and discussion with other members of the patient's medical team.    \"Dictated utilizing Dragon dictation\".      "

## 2024-01-08 NOTE — PAYOR COMM NOTE
"Dasha De Leon (53 y.o. Female)      PATIENT DISCHARGED 01/08/23:  REF# [id number]     PLEASE REPLY WITH ALL DAYS APPROVED    [facility] DEPT: [phone]    [facility]: NPI [id number] TIN# [id number]    THOMAS AMADOR RN, [facility]     Date of Birth   1970    Social Security Number       Address   [address] [place] [address]    Home Phone   [phone]    MRN   [id number]       [facility]    Marital Status   Single                            Admission Date   1/5/24    Admission Type   Elective    Admitting Provider   Rigoberto Botello MD    Attending Provider       Department, Room/Bed   [address], P783/1       Discharge Date   1/8/2024    Discharge Disposition   Home or Self Care    Discharge Destination                                 Attending Provider: (none)   Allergies: Penicillins, Lisinopril    Isolation: None   Infection: None   Code Status: CPR    Ht: 167.6 cm (66\")   Wt: 75.8 kg (167 lb)    Admission Cmt: None   Principal Problem: Lumbar radiculopathy [M54.16]                   Active Insurance as of 1/5/2024       Primary Coverage       Payor Plan Insurance Group Employer/Plan Group    [employer] [id number]       Payor Plan Address Payor Plan Phone Number Payor Plan Fax Number Effective Dates    [address] [phone]  1/1/2018 - None Entered    [place] [address]         Subscriber Name Subscriber Birth Date Member ID       DASHA DE LEON 1970 [id number]                     Emergency Contacts        (Rel.) Home Phone Work Phone Mobile Phone    Paula De Leon (Mother) [phone] -- --              Vital Signs (last 2 days) before discharge       Date/Time Temp Temp src Pulse Resp BP Patient Position SpO2    01/08/24 1000 98.3 (36.8) Oral 91 16 112/69 Lying 96    01/08/24 0532 98.4 (36.9) Oral 94 16 113/65 Lying 94    01/08/24 0121 99 (37.2) Oral 96 16 114/63 Lying 94 "    01/07/24 2102 98.4 (36.9) Oral 95 16 100/65 Lying 97    01/07/24 1800 98.4 (36.9) Oral 96 16 110/65 Lying 96    01/07/24 1424 98.2 (36.8) Oral 98 16 109/63 Lying 92    01/07/24 1008 98.1 (36.7) Oral 94 16 122/63 Lying 96    01/07/24 0540 98.4 (36.9) Oral 100 16 126/68 Lying 90    01/07/24 0117 99 (37.2) Oral 101 16 125/72 Lying 91    01/06/24 2104 98.5 (36.9) Oral 93 16 136/73 Lying 97    01/06/24 1800 98.8 (37.1) Oral 105 16 129/65 Lying 93    01/06/24 1405 98.7 (37.1) Oral 101 16 138/53 Lying 96    01/06/24 1024 98.3 (36.8) Oral 99 16 128/73 Lying 96    01/06/24 0535 97.8 (36.6) Oral 91 16 144/78 Lying 98    01/06/24 0122 97.8 (36.6) Oral 104 16 134/82 Lying 97          Oxygen Therapy (last 2 days) before discharge       Date/Time SpO2 Device (Oxygen Therapy) Flow (L/min) Oxygen Concentration (%) ETCO2 (mmHg)    01/08/24 1000 96 room air -- -- --    01/08/24 0930 -- room air -- -- --    01/08/24 0532 94 room air -- -- --    01/08/24 0121 94 room air -- -- --    01/07/24 2102 97 room air -- -- --    01/07/24 2000 -- room air -- -- --    01/07/24 1800 96 room air -- -- --    01/07/24 1424 92 room air -- -- --    01/07/24 1008 96 room air -- -- --    01/07/24 0838 -- room air -- -- --    01/07/24 0540 90 room air -- -- --    01/07/24 0117 91 room air -- -- --    01/06/24 2104 97 room air -- -- --    01/06/24 2000 -- room air -- -- --    01/06/24 1800 93 room air -- -- --    01/06/24 1405 96 room air -- -- --    01/06/24 1024 96 room air -- -- --    01/06/24 0810 -- room air -- -- --    01/06/24 0535 98 nasal cannula 2 -- --    01/06/24 0122 97 nasal cannula 2 -- --          Intake & Output (last 2 days)         01/06 0701 01/07 0700 01/07 0701 01/08 0700 01/08 0701 01/09 0700    P.O. 840 780 240    I.V. (mL/kg)       Blood       Total Intake(mL/kg) 840 (11.1) 780 (10.3) 240 (3.2)    Urine (mL/kg/hr) 2200 (1.2) 1500 (0.8)     Drains 285 200     Blood       Total Output 4322 1700     Net -4669 -911 +240            Urine Unmeasured Occurrence  1 x           Medication Administration Report for Dasha De Leon as of 01/08/24 1511     Legend:    Given Hold Not Given Due Canceled Entry Other Actions    Time Time (Time) Time Time-Action         Discontinued     Completed     Future     MAR Hold     Linked             Medications 01/07/24 01/08/24      ALPRAZolam (XANAX) tablet 0.25 mg  Dose: 0.25 mg  Freq: 2 Times Daily PRN Route: PO  PRN Reason: Anxiety  Start: 01/05/24 1851   Admin Instructions:      Caution: Look alike/sound alike drug alert.   Avoid grapefruit juice         buPROPion XL (WELLBUTRIN XL) 24 hr tablet 300 mg  Dose: 300 mg  Freq: Daily Route: PO  Start: 01/05/24 2100   Admin Instructions:   Do not crush or chew the capsules or tablets. The drug may not work as designed if the capsule or tablet is crushed or chewed. Swallow whole.  Caution: Look alike/sound alike drug alert. Swallow whole.  Do not crush, chew, or split tablet.    0838-Given            0943-Given               cyclobenzaprine (FLEXERIL) tablet 10 mg  Dose: 10 mg  Freq: 3 Times Daily PRN Route: PO  PRN Reason: Muscle Spasms  Start: 01/05/24 1717    0729-Given     1613-Given           0122-Given               docusate sodium (COLACE) capsule 100 mg  Dose: 100 mg  Freq: Daily Route: PO  Start: 01/08/24 0900   Admin Instructions:   Swallow whole.  Do not open, crush, or chew capsule.     0943-Given               escitalopram (LEXAPRO) tablet 20 mg  Dose: 20 mg  Freq: Daily Route: PO  Start: 01/05/24 2100   Admin Instructions:   Caution: Look alike/sound alike drug alert.    0838-Given            0943-Given               famotidine (PEPCID) tablet 20 mg  Dose: 20 mg  Freq: 2 Times Daily Route: PO  Start: 01/05/24 2100 0838-Given     2030-Given           0943-Given     2100              furosemide (LASIX) tablet 20 mg  Dose: 20 mg  Freq: 2 Times Daily Route: PO  Start: 01/05/24 2100   Admin Instructions:   Hold for SBP less than 100, DBP less than 60     0837-Given     2030-Given           0943-Given     2100              HYDROcodone-acetaminophen (NORCO) 5-325 MG per tablet 1 tablet  Dose: 1 tablet  Freq: Every 4 Hours PRN Route: PO  PRN Reason: Moderate Pain  Start: 01/05/24 1851   End: 01/12/24 1850   Admin Instructions:   [VIJI]    Do not exceed 4 grams of acetaminophen in a 24 hr period. Max dose of 2gm for AST/ALT greater than 120 units/L        If given for pain, use the following pain scale:   Mild Pain = Pain Score of 1-3, CPOT 1-2  Moderate Pain = Pain Score of 4-6, CPOT 3-4  Severe Pain = Pain Score of 7-10, CPOT 5-8    0315-Given     0729-Given     1212-Given     1613-Given     2030-Given        0122-Given     0743-Given              losartan (COZAAR) tablet 50 mg  Dose: 50 mg  Freq: Every 24 Hours Scheduled Route: PO  Start: 01/05/24 2000    0837-Given            0943-Given               Morphine sulfate (PF) injection 2 mg  Dose: 2 mg  Freq: Every 4 Hours PRN Route: IV  PRN Reason: Severe Pain  Start: 01/05/24 1851   End: 01/12/24 1850   Admin Instructions:         Caution: Look alike/sound alike drug alert    If given for pain, use the following pain scale:  Mild Pain = Pain Score of 1-3, CPOT 1-2  Moderate Pain = Pain Score of 4-6, CPOT 3-4  Severe Pain = Pain Score of 7-10, CPOT 5-8        And  naloxone (NARCAN) injection 0.4 mg  Dose: 0.4 mg  Freq: Every 5 Minutes PRN Route: IV  PRN Reason: Respiratory Depression  Start: 01/05/24 1851   Admin Instructions:   If respiratory rate is less than 8 breaths/minute or patient is difficult to arouse stop any narcotics and contact physician.   Administer slow IV push. Repeat as ordered until patient's respiratory rate is greater than 12 breaths/minute.         ondansetron ODT (ZOFRAN-ODT) disintegrating tablet 4 mg  Dose: 4 mg  Freq: Every 6 Hours PRN Route: PO  PRN Reasons: Nausea,Vomiting  Start: 01/05/24 1851   Admin Instructions:   If BOTH ondansetron (ZOFRAN) and promethazine (PHENERGAN) are ordered use  ondansetron first and THEN promethazine IF ondansetron is ineffective.  Place on tongue and allow to dissolve.        Or  ondansetron (ZOFRAN) injection 4 mg  Dose: 4 mg  Freq: Every 6 Hours PRN Route: IV  PRN Reasons: Nausea,Vomiting  Start: 01/05/24 1851   Admin Instructions:   If BOTH ondansetron (ZOFRAN) and promethazine (PHENERGAN) are ordered use ondansetron first and THEN promethazine IF ondansetron is ineffective.         rosuvastatin (CRESTOR) tablet 40 mg  Dose: 40 mg  Freq: Daily Route: PO  Start: 01/05/24 2100    0837-Given            0943-Given               sodium chloride 0.9 % flush 10 mL  Dose: 10 mL  Freq: As Needed Route: IV  PRN Reason: Line Care  Start: 01/05/24 1851         sodium chloride 0.9 % flush 3 mL  Dose: 3 mL  Freq: Every 12 Hours Scheduled Route: IV  Start: 01/05/24 2100    0838-Given     2031-Given           0943-Given     2100              sodium chloride 0.9 % infusion 40 mL  Dose: 40 mL  Freq: As Needed Route: IV  PRN Reason: Line Care  Start: 01/05/24 1851   Admin Instructions:   Following administration of an IV intermittent medication, flush line with 40mL NS at 100mL/hr.        Future Medications  Medications 01/07/24 01/08/24       sennosides-docusate (PERICOLACE) 8.6-50 MG per tablet 2 tablet  Dose: 2 tablet  Freq: Nightly Route: PO  Start: 01/08/24 2100     2100              Completed Medications  Medications 01/07/24 01/08/24       famotidine (PEPCID) injection 20 mg  Dose: 20 mg  Freq: Once Route: IV  Start: 01/05/24 1055   End: 01/05/24 1112   Admin Instructions:   Give IV push over 2 minutes.         Methocarbamol (ROBAXIN) injection 1,000 mg  Dose: 1,000 mg  Freq: Once Route: IV  Start: 01/05/24 1731   End: 01/05/24 1737   Admin Instructions:   1. For IV Admin: Give while recumbent maintain position for 15-30 min. Give slow IV push over 5 min not to exceed 3mL/min 2. For IM Admin: a max of 5mL can be administered into each gluteal region.         vancomycin (VANCOCIN)  1000 mg/200 mL dextrose 5% IVPB  Dose: 1,000 mg  Freq: Every 12 Hours Route: IV  Indications of Use: PERIOPERATIVE PHARMACOPROPHYLAXIS  Start: 01/06/24 0100   End: 01/07/24 1312   Admin Instructions:   Time dose from pre-op dose.      0104-New Bag     0319-Stopped     1212-New Bag              vancomycin (VANCOCIN) 1000 mg/200 mL dextrose 5% IVPB  Dose: 1,000 mg  Freq: Once Route: IV  Indications of Use: PERIOPERATIVE PHARMACOPROPHYLAXIS  Start: 01/05/24 1252   End: 01/05/24 1406        Discontinued Medications  Medications 01/07/24 01/08/24       ceFAZolin in dextrose (ANCEF) IVPB solution 2 g  Dose: 2 g  Freq: Once Route: IV  Indications of Use: PERIOPERATIVE PHARMACOPROPHYLAXIS  Start: 01/05/24 1252   End: 01/05/24 1251   Admin Instructions:   Administer within 1 hour of surgical incision. Redose 4 hours from pre-op dose if procedure ongoing or >1.5 L blood loss.  Caution: Look alike/sound alike drug alert         diphenhydrAMINE (BENADRYL) injection 12.5 mg  Dose: 12.5 mg  Freq: Every 15 Minutes PRN Route: IV  PRN Reason: Itching  PRN Comment: May repeat x 1  Start: 01/05/24 1644   End: 01/05/24 1849   Admin Instructions:   Caution: Look alike/sound alike drug alert. This med may be ordered in other forms and routes. Before giving verify the last time the drug was given by any route/form.         droperidol (INAPSINE) injection 0.625 mg  Dose: 0.625 mg  Freq: Every 20 Minutes PRN Route: IV  PRN Reasons: Nausea,Vomiting  Indications of Use: NAUSEA AND VOMITING  Start: 01/05/24 1644   End: 01/05/24 1849   Admin Instructions:   Use ondansetron first; proceed to droperidol for nausea refractory to ondansetron.        Or  droperidol (INAPSINE) injection 0.625 mg  Dose: 0.625 mg  Freq: Every 20 Minutes PRN Route: IM  PRN Reasons: Nausea,Vomiting  Start: 01/05/24 1644   End: 01/05/24 1849   Admin Instructions:   Use ondansetron first; proceed to droperidol for nausea refractory to ondansetron.         ePHEDrine  injection 5 mg  Dose: 5 mg  Freq: Once As Needed Route: IV  PRN Comment: symptomatic hypotension - Notify attending anesthesiologist if this needs to be given  Start: 01/05/24 1644   End: 01/05/24 1849   Admin Instructions:   Caution: Look alike/sound alike drug alert   Dilute with NS to 5-10 mg/mL.  Central line preferred, if unavailable use large bore IV access with frequent nurse monitoring of IV site.         fentaNYL citrate (PF) (SUBLIMAZE) injection 50 mcg  Dose: 50 mcg  Freq: Every 5 Minutes PRN Route: IV  PRN Reason: Severe Pain  Start: 01/05/24 1644   End: 01/05/24 1849   Admin Instructions:   Maximum total dose of fentanyl is 200 mcg.  If given for pain, use the following pain scale:  Mild Pain = Pain Score of 1-3, CPOT 1-2  Moderate Pain = Pain Score of 4-6, CPOT 3-4  Severe Pain = Pain Score of 7-10, CPOT 5-8         fentaNYL citrate (PF) (SUBLIMAZE) injection 50 mcg  Dose: 50 mcg  Freq: Once As Needed Route: IV  PRN Reason: Severe Pain  Start: 01/05/24 1053   End: 01/05/24 1654   Admin Instructions:   Maximum total dose of fentanyl is 50 mcg without additional orders from physician. May be used for preoperative pain or procedural sedation.  If given for pain, use the following pain scale:  Mild Pain = Pain Score of 1-3, CPOT 1-2  Moderate Pain = Pain Score of 4-6, CPOT 3-4  Severe Pain = Pain Score of 7-10, CPOT 5-8         flumazenil (ROMAZICON) injection 0.2 mg  Dose: 0.2 mg  Freq: As Needed Route: IV  PRN Comment: for benzodiazepine induced unresponsiveness or sedation  Indications of Use: BENZODIAZEPINE-INDUCED SEDATION  Start: 01/05/24 1644   End: 01/05/24 1849   Admin Instructions:   Notify Anesthesia if given  ** give IV over 15-30 seconds **         hydrALAZINE (APRESOLINE) injection 5 mg  Dose: 5 mg  Freq: Every 10 Minutes PRN Route: IV  PRN Reason: High Blood Pressure  PRN Comment: for systolic blood pressure greater than 180 mmHg or diastolic blood pressure greater than 105 mmHg  Start:  01/05/24 1644   End: 01/05/24 1849   Admin Instructions:   Up to 20 mg. If labetalol and hydralazine are both ordered, use labetalol first.  Caution: Look alike/sound alike drug alert         HYDROcodone-acetaminophen (NORCO) 5-325 MG per tablet 1 tablet  Dose: 1 tablet  Freq: Once As Needed Route: PO  PRN Reason: Moderate Pain  Start: 01/05/24 1644   End: 01/05/24 1849   Admin Instructions:   Based on patient request - if ordered for moderate or severe pain, provider allows for administration of a medication prescribed for a lower pain scale.  [VIJI]    Do not exceed 4 grams of acetaminophen in a 24 hr period. Max dose of 2gm for AST/ALT greater than 120 units/L        If given for pain, use the following pain scale:   Mild Pain = Pain Score of 1-3, CPOT 1-2  Moderate Pain = Pain Score of 4-6, CPOT 3-4  Severe Pain = Pain Score of 7-10, CPOT 5-8         HYDROmorphone (DILAUDID) injection 0.5 mg  Dose: 0.5 mg  Freq: Every 5 Minutes PRN Route: IV  PRN Reason: Moderate Pain  Start: 01/05/24 1644   End: 01/05/24 1849   Admin Instructions:   Maximum total dose of hydromorphone is 2 mg.  If given for pain, use the following pain scale:  Mild Pain = Pain Score of 1-3, CPOT 1-2  Moderate Pain = Pain Score of 4-6, CPOT 3-4  Severe Pain = Pain Score of 7-10, CPOT 5-8         ipratropium-albuterol (DUO-NEB) nebulizer solution 3 mL  Dose: 3 mL  Freq: Once As Needed Route: NEBULIZATION  PRN Reasons: Wheezing,Shortness of Air  PRN Comment: bronchospasm  Start: 01/05/24 1644   End: 01/05/24 1849   Admin Instructions:   Notify Anesthesia if minineb is given         labetalol (NORMODYNE,TRANDATE) injection 5 mg  Dose: 5 mg  Freq: Every 5 Minutes PRN Route: IV  PRN Reason: High Blood Pressure  PRN Comment: for systolic blood pressure greater than 180 mmHg or diastolic blood pressure greater than 105 mmHg  Start: 01/05/24 1644   End: 01/05/24 1849   Admin Instructions:   Hold for heart rate less than 60.    If labetalol and  hydralazine are both ordered, use labetalol first. Maximum dose of labetalol is 20mg IV while in PACU, notify anesthesiologist for further orders if needed.  Give IV Push over 2 minutes.         lactated ringers infusion  Rate: 9 mL/hr Dose: 9 mL/hr  Freq: Continuous Route: IV  Start: 01/05/24 1055   End: 01/05/24 1851         lidocaine (XYLOCAINE) 1 % injection 0.5 mL  Dose: 0.5 mL  Freq: Once As Needed Route: ID  PRN Comment: IV Start  Start: 01/05/24 1053   End: 01/05/24 1654         midazolam (VERSED) injection 1 mg  Dose: 1 mg  Freq: Every 5 Minutes PRN Route: IV  PRN Comment: Anxiety prophylaxis, Pre-op comfort  Start: 01/05/24 1053   End: 01/05/24 1654   Admin Instructions:   May repeat dose in 5 minutes one time then contact provider for additional orders.             naloxone (NARCAN) injection 0.2 mg  Dose: 0.2 mg  Freq: As Needed Route: IV  PRN Reasons: Opioid Reversal,Respiratory Depression  PRN Comment: unresponsiveness, decrease oxygen saturation  Indications of Use: ACUTE RESPIRATORY FAILURE,OPIOID-INDUCED RESPIRATORY DEPRESSION  Start: 01/05/24 1644   End: 01/05/24 1849   Admin Instructions:   Notify Anesthesia if given         ondansetron (ZOFRAN) injection 4 mg  Dose: 4 mg  Freq: Once As Needed Route: IV  PRN Reasons: Nausea,Vomiting  Indications of Use: POSTOPERATIVE NAUSEA AND VOMITING  Start: 01/05/24 1644   End: 01/05/24 1849   Admin Instructions:   If BOTH ondansetron (ZOFRAN) and promethazine (PHENERGAN) are ordered use ondansetron first and THEN promethazine IF ondansetron is ineffective.         oxyCODONE-acetaminophen (PERCOCET) 7.5-325 MG per tablet 1 tablet  Dose: 1 tablet  Freq: Every 4 Hours PRN Route: PO  PRN Reason: Severe Pain  Start: 01/05/24 1644   End: 01/05/24 1849   Admin Instructions:   [VIJI]    Do not exceed 4 grams of acetaminophen in a 24 hr period. Max dose of 2gm for AST/ALT greater than 120 units/L        If given for pain, use the following pain scale:   Mild Pain =  Pain Score of 1-3, CPOT 1-2  Moderate Pain = Pain Score of 4-6, CPOT 3-4  Severe Pain = Pain Score of 7-10, CPOT 5-8         promethazine (PHENERGAN) suppository 25 mg  Dose: 25 mg  Freq: Once As Needed Route: RE  PRN Reasons: Nausea,Vomiting  Start: 01/05/24 1644   End: 01/05/24 1849   Admin Instructions:   If BOTH ondansetron (ZOFRAN) and promethazine (PHENERGAN) are ordered use ondansetron first and THEN promethazine IF ondansetron is ineffective.        Or  promethazine (PHENERGAN) tablet 25 mg  Dose: 25 mg  Freq: Once As Needed Route: PO  PRN Reasons: Nausea,Vomiting  Start: 01/05/24 1644   End: 01/05/24 1849   Admin Instructions:   If BOTH ondansetron (ZOFRAN) and promethazine (PHENERGAN) are ordered use ondansetron first and THEN promethazine IF ondansetron is ineffective.             sodium chloride 0.9 % flush 3 mL  Dose: 3 mL  Freq: Every 12 Hours Scheduled Route: IV  Start: 01/05/24 1055   End: 01/05/24 1654         sodium chloride 0.9 % flush 3-10 mL  Dose: 3-10 mL  Freq: As Needed Route: IV  PRN Reason: Line Care  Start: 01/05/24 1053   End: 01/05/24 1654         sodium chloride 0.9 % with KCl 20 mEq/L infusion  Rate: 100 mL/hr Dose: 100 mL/hr  Freq: Continuous Route: IV  Start: 01/05/24 1945   End: 01/08/24 1059         sodium chloride 1,000 mL with vancomycin 1,000 mg irrigation  Freq: As Needed  Start: 01/05/24 1343   End: 01/05/24 1644         sodium chloride 50 mL, bupivacaine-EPINEPHrine PF 50 mL mixture  Freq: As Needed  Start: 01/05/24 1343   End: 01/05/24 1644         thrombin (THROMBIN-JMI) spray kit  Freq: As Needed  Start: 01/05/24 1343   End: 01/05/24 1644                      Lab Results (last 48 hours)       Procedure Component Value Units Date/Time    CBC & Differential [[id number]]  (Abnormal) Collected: 01/08/24 0508    Specimen: Blood Updated: 01/08/24 0589    Narrative:      The following orders were created for panel order CBC & Differential.  Procedure                                Abnormality         Status                     ---------                               -----------         ------                     CBC Auto Differential[[id number]]        Abnormal            Final result                 Please view results for these tests on the individual orders.    CBC Auto Differential [[id number]]  (Abnormal) Collected: 01/08/24 0508    Specimen: Blood Updated: 01/08/24 0545     WBC 9.43 10*3/mm3      RBC 3.54 10*6/mm3      Hemoglobin 10.2 g/dL      Hematocrit 31.1 %      MCV 87.9 fL      MCH 28.8 pg      MCHC 32.8 g/dL      RDW 11.8 %      RDW-SD 38.4 fl      MPV 9.4 fL      Platelets 273 10*3/mm3      Neutrophil % 60.2 %      Lymphocyte % 20.7 %      Monocyte % 15.9 %      Eosinophil % 2.2 %      Basophil % 0.5 %      Immature Grans % 0.5 %      Neutrophils, Absolute 5.67 10*3/mm3      Lymphocytes, Absolute 1.95 10*3/mm3      Monocytes, Absolute 1.50 10*3/mm3      Eosinophils, Absolute 0.21 10*3/mm3      Basophils, Absolute 0.05 10*3/mm3      Immature Grans, Absolute 0.05 10*3/mm3      nRBC 0.0 /100 WBC     CBC & Differential [[id number]]  (Abnormal) Collected: 01/07/24 0349    Specimen: Blood Updated: 01/07/24 0411    Narrative:      The following orders were created for panel order CBC & Differential.  Procedure                               Abnormality         Status                     ---------                               -----------         ------                     CBC Auto Differential[[id number]]        Abnormal            Final result                 Please view results for these tests on the individual orders.    CBC Auto Differential [[id number]]  (Abnormal) Collected: 01/07/24 0349    Specimen: Blood Updated: 01/07/24 0411     WBC 9.95 10*3/mm3      RBC 3.33 10*6/mm3      Hemoglobin 9.7 g/dL      Hematocrit 29.4 %      MCV 88.3 fL      MCH 29.1 pg      MCHC 33.0 g/dL      RDW 11.8 %      RDW-SD 37.9 fl      MPV 9.3 fL      Platelets 234 10*3/mm3      Neutrophil % 65.0 %       Lymphocyte % 18.3 %      Monocyte % 15.0 %      Eosinophil % 1.0 %      Basophil % 0.3 %      Immature Grans % 0.4 %      Neutrophils, Absolute 6.47 10*3/mm3      Lymphocytes, Absolute 1.82 10*3/mm3      Monocytes, Absolute 1.49 10*3/mm3      Eosinophils, Absolute 0.10 10*3/mm3      Basophils, Absolute 0.03 10*3/mm3      Immature Grans, Absolute 0.04 10*3/mm3      nRBC 0.0 /100 WBC           Imaging Results (Last 48 Hours)       ** No results found for the last 48 hours. **          ECG/EMG Results (last 48 hours)       ** No results found for the last 48 hours. **             Physician Progress Notes (last 48 hours)        Rigoberto Botello MD at 01/07/24 1138               LOS: 1 day   Patient Care Team:  Ariana Coffman PA-C as PCP - General (Physician Assistant)  Darrick Dowling MD as Consulting Physician (Obstetrics and Gynecology)  Zay Hernandez MD as Consulting Physician (Allergy and Immunology)  Latonia Burciaga RN as Ambulatory  ([facility])    Chief Complaint: Postop lumbar fusion    Subjective     The patient is feeling pretty good.  She has some back pain but no severe pain.  She has no leg pain.    Interval History:     History taken from: patient chart    Objective      She has good movement of both legs    Vital Signs  Temp:  [98.1 °F (36.7 °C)-99 °F (37.2 °C)] 98.1 °F (36.7 °C)  Heart Rate:  [] 94  Resp:  [16] 16  BP: (122-138)/(53-73) 122/63       Results Review:     I reviewed the patient's new clinical results.  She is afebrile.  She has had 90 cc out of her drain since 7:00 this morning.  Her hemoglobin is 9.7 and her white count is 9.9.      Assessment & Plan       Lumbar radiculopathy    Lumbar spinal stenosis      I told the patient I would like her to stay 1 more day just to see what the drainage does.  If it does not increase and continues to go down she can have her drain removed in the morning and go home.      Rigoberto Botello MD  01/07/24  11:38  EST          Electronically signed by Rigoberto Botello MD at 01/07/24 1140       Consult Notes (last 48 hours)  Notes from 01/06/24 1512 through 01/08/24 1512   No notes of this type exist for this encounter.          Discharge Summary        Luba SchwabCARITO at 01/08/24 1121       Attestation signed by Rigoberto Botello MD at 01/08/24 1411    I have reviewed this documentation and agree.                  Dasha De Leon  1970    Patient Care Team:  Ariana Coffman PA-C as PCP - General (Physician Assistant)  Darrick Dowling MD as Consulting Physician (Obstetrics and Gynecology)  Zay Hernandez MD as Consulting Physician (Allergy and Immunology)  Latonia Burciaga RN as Ambulatory  ([facility])    Date of Admit: 1/5/2024    Date of Discharge:  1/8/2024    Discharge Diagnosis:  Lumbar radiculopathy    Lumbar spinal stenosis      Procedures Performed  Procedure(s):  Lumbar 3 to lumbar 4 and lumbar 4 to lumbar 5 laminectomy with fusion and instrumentation       Complications: none    Consultants:   Consults       No orders found from 12/7/2023 to 1/6/2024.            Condition on Discharge: stable    Discharge disposition: home    Pertinent Test Results: radiology: X-Ray: Lumbar x-rays reveal postoperative change L3-5 with instrumentation in good position.  There is mild retrolisthesis of L2 on 3 as present preoperatively.  There is resolution of anterolisthesis of L4 on 5.    Brief HPI: Patient evaluated in office for complaints of back and left posterior lateral thigh and calf pain. Imaging revealed plain films show a grade 1 spondylolisthesis of L4 on L5.  On the myelogram there is stenosis at L2-3 and L3-4 but severe stenosis at L4-5.  On the post myelographic CT scan there was just a little bit of bulging of the disc at L2-3 but more significant narrowing at L3-4 and L4-5.  We have previously discussed an L3-L5 laminectomy and fusion. . RBAs of treatment were discussed including the above  procedure. Patient consented to above procedure.    Hospital Course: Patient admitted for above procedure. The procedure itself was without complication. The patient was transferred to [facility] following recovery.  She has done well following surgery.  No leg pain but has some achiness in her hips when first getting out of bed.  Back pain present but controlled with oral medications.  Voiding, ambulating, tolerating diet.  Passing flatus.  Did well with PT today.  Feels ready for discharge.  GT output waned overnight and will be removed prior to discharge.    Discharge Physical Exam:    Temp:  [98.2 °F (36.8 °C)-99 °F (37.2 °C)] 98.3 °F (36.8 °C)  Heart Rate:  [91-98] 91  Resp:  [16] 16  BP: (100-114)/(63-69) 112/69    Current labs:  Lab Results (last 24 hours)       Procedure Component Value Units Date/Time    CBC & Differential [[id number]]  (Abnormal) Collected: 01/08/24 0508    Specimen: Blood Updated: 01/08/24 0545    Narrative:      The following orders were created for panel order CBC & Differential.  Procedure                               Abnormality         Status                     ---------                               -----------         ------                     CBC Auto Differential[[id number]]        Abnormal            Final result                 Please view results for these tests on the individual orders.    CBC Auto Differential [[id number]]  (Abnormal) Collected: 01/08/24 0508    Specimen: Blood Updated: 01/08/24 0545     WBC 9.43 10*3/mm3      RBC 3.54 10*6/mm3      Hemoglobin 10.2 g/dL      Hematocrit 31.1 %      MCV 87.9 fL      MCH 28.8 pg      MCHC 32.8 g/dL      RDW 11.8 %      RDW-SD 38.4 fl      MPV 9.4 fL      Platelets 273 10*3/mm3      Neutrophil % 60.2 %      Lymphocyte % 20.7 %      Monocyte % 15.9 %      Eosinophil % 2.2 %      Basophil % 0.5 %      Immature Grans % 0.5 %      Neutrophils, Absolute 5.67 10*3/mm3      Lymphocytes, Absolute 1.95 10*3/mm3      Monocytes, Absolute 1.50  10*3/mm3      Eosinophils, Absolute 0.21 10*3/mm3      Basophils, Absolute 0.05 10*3/mm3      Immature Grans, Absolute 0.05 10*3/mm3      nRBC 0.0 /100 WBC               General Appearance No acute distress.  Resting in bed   Neurological Awake, Alert, and oriented x 3   Motor Strength normal bilateral lower extremity, tone normal   Sensory Intact to light touch bilateral lower extremity   Gait and station Ambulating per PT note 1/8-stood and ambulated 400' c RW requiring SPV. Pt demo's a slow pace but no LOB.    Back Midline incision well-approximated with no redness drainage swelling.  GT to bulb suction with minimal serosanguineous output.  200 cc x 24 hours, 25 cc times last 12 hours   Extremities Moves all extremities well, no edema, no cyanosis, no redness         Discharge Medications  ORI has been reviewed and narcotic consent is on file in the patient's chart.     Your medication list        START taking these medications        Instructions Last Dose Given Next Dose Due   docusate sodium 100 MG capsule  Start taking on: January 9, 2024      Take 1 capsule by mouth Daily.       HYDROcodone-acetaminophen 5-325 MG per tablet  Commonly known as: NORCO      Take 1 tablet by mouth Every 4 (Four) Hours As Needed for Moderate Pain for up to 4 days.       sennosides-docusate 8.6-50 MG per tablet  Commonly known as: PERICOLACE      Take 2 tablets by mouth Every Night.              CONTINUE taking these medications        Instructions Last Dose Given Next Dose Due   ALPRAZolam 0.25 MG tablet  Commonly known as: Xanax      Take 1 tablet by mouth 2 (Two) Times a Day As Needed for Anxiety.       buPROPion  MG 24 hr tablet  Commonly known as: Wellbutrin XL      Take 1 tablet by mouth Daily.       cyclobenzaprine 10 MG tablet  Commonly known as: FLEXERIL      Take 1 tablet by mouth 3 (Three) Times a Day As Needed for Muscle Spasms.       escitalopram 20 MG tablet  Commonly known as: LEXAPRO      Take 1 tablet by  mouth Daily.       famotidine 20 MG tablet  Commonly known as: PEPCID      Take 1 tablet by mouth 2 (Two) Times a Day.       furosemide 20 MG tablet  Commonly known as: Lasix      Take 1 tablet by mouth 2 (Two) Times a Day.       levocetirizine 5 MG tablet  Commonly known as: XYZAL      Take one tablet by mouth twice daily for urticaria.       levonorgestrel 20 MCG/24HR IUD  Commonly known as: MIRENA      1 each by Intrauterine route 1 (One) Time.       olmesartan 40 MG tablet  Commonly known as: BENICAR      Take 0.5 tablets by mouth Daily.       rosuvastatin 40 MG tablet  Commonly known as: Crestor      Take 1 tablet by mouth Daily.              STOP taking these medications      meloxicam 15 MG tablet  Commonly known as: MOBIC                  Where to Get Your Medications        These medications were sent to [facility] [place] [id number]      Hours: Monday to Friday 7 AM to 6 PM, Saturday & Sunday 8 AM to 4:30 PM (Closed 12 PM to 12:30 PM) Phone: [phone]   cyclobenzaprine 10 MG tablet  HYDROcodone-acetaminophen 5-325 MG per tablet       You can get these medications from any pharmacy    You don't need a prescription for these medications  docusate sodium 100 MG capsule  sennosides-docusate 8.6-50 MG per tablet         Discharge Diet:   Diet Instructions       Diet: Regular/House Diet; Thin (IDDSI 0)      Discharge Diet: Regular/House Diet    Fluid Consistency: Thin (IDDSI 0)          Diet Order   Procedures    Diet: Regular/House Diet; Fluid Consistency: Thin (IDDSI 0)       Activity at Discharge:   Activity Instructions       Discharge Activity      1) No driving until cleared by us and no longer taking narcotics.   2) Off work/school until cleared by us.  3) May shower 1/9 but no submerging wound in tub, pool, etc.  4) Do not lift / push / pull more then 5 lbs.  5.) Wear brace when out of bed except showers  6.) No overhead activity/ No bending/twisting/impact  "activity    Other Restrictions (Specify)      Pelvic Rest              Call for: questions or concerns    Follow-up Appointments  Future Appointments   Date Time Provider Department Center   1/18/2024 10:00 AM Rigoberto Botello MD [facility]   4/29/2024  8:00 AM [facility] MA BR None   4/29/2024  9:00 AM Radha Castillo PA [facility]  [facility]   6/10/2024  8:45 AM [facility]   6/17/2024  8:30 AM Ariana Coffman PA-C [facility]      Follow-up Information       Ariana Coffman PA-C .    Specialties: Physician Assistant, Internal Medicine, Family Medicine  Contact information:  [address]  [place] [id number] [phone]                           Additional Instructions for the Follow-ups that You Need to Schedule       Notify Physician or Go To The ED For the Following Conditions   As directed      Including but not limited to fever >100.5, chills, wound concerns (redness, swelling, drainage), new symptoms of numbness, tingling, weakness; new or uncontrolled pain despite using prescribed medications    Order Comments: Including but not limited to fever >100.5, chills, wound concerns (redness, swelling, drainage), new symptoms of numbness, tingling, weakness; new or uncontrolled pain despite using prescribed medications                 Test Results Pending at Discharge     None    I discussed the discharge instructions with patient    Luba Schwab, CARITO  01/08/24  11:26 EST    30 min spent in reviewing records, discussion and examination of the patient and discussion with other members of the patient's medical team.    \"Dictated utilizing Dragon dictation\".        Electronically signed by Rigoberto Botello MD at 01/08/24 1411       Discharge Order (From admission, onward)       Start     Ordered    01/08/24 1116  Discharge patient  Once        Expected Discharge Date: 01/08/24   Expected Discharge Time: Midday   Discharge Disposition: Home or " Self Care   Physician of Record for Attribution - Please select from Treatment Team: NATHANIEL CRANDALL [[id number]]   Review needed by CMO to determine Physician of Record: No      Question Answer Comment   Physician of Record for Attribution - Please select from Treatment Team NATHANIEL CRANDALL    Review needed by CMO to determine Physician of Record No        01/08/24 1119

## 2024-01-08 NOTE — PLAN OF CARE
Goal Outcome Evaluation:POD3 Lumbar fusion, VSS, afebrile, pain controlled with po pain medication, worked well with therapy, GT drain removed w/o difficulty, incision c/d/I, new LSO brace delivered, M2B delivered, DC instructions reviewed with pt, all questions answered. Pt taken via WC to DC exit with all belongings.

## 2024-01-08 NOTE — CASE MANAGEMENT/SOCIAL WORK
Continued Stay Note  [facility]     Patient Name: Dasha De Leon  MRN: [id number]  Today's Date: 1/8/2024    Admit Date: 1/5/2024        Discharge Plan       Row Name 01/08/24 1708       Plan    Final Discharge Disposition Code 01 - home or self-care    Final Note dc home                   Discharge Codes    No documentation.                 Expected Discharge Date and Time       Expected Discharge Date Expected Discharge Time    Jan 8, 2024               Eliza Marrufo RN

## 2024-01-09 ENCOUNTER — TRANSITIONAL CARE MANAGEMENT TELEPHONE ENCOUNTER (OUTPATIENT)
Dept: CALL CENTER | Facility: HOSPITAL | Age: 54
End: 2024-01-09
Payer: COMMERCIAL

## 2024-01-09 NOTE — OUTREACH NOTE
Call Center TCM Note      Flowsheet Row Responses   [facility] patient discharged from? [place]   Does the patient have one of the following disease processes/diagnoses(primary or secondary)? General Surgery   TCM attempt successful? Yes   Call start time 1308   Call end time 1310   Discharge diagnosis Lumbar 3 to lumbar 4 and lumbar 4 to lumbar 5 laminectomy with fusion and instrumentation   Person spoke with today (if not patient) and relationship patient   Meds reviewed with patient/caregiver? Yes   Is the patient having any side effects they believe may be caused by any medication additions or changes? No   Does the patient have all medications related to this admission filled (includes all antibiotics, pain medications, etc.) Yes   Is the patient taking all medications as directed (includes completed medication regime)? Yes   Comments Patient has a teleheath followup with surgeon scheduled on 1/18/2024   Does the patient have an appointment with their PCP within 7-14 days of discharge? Other   Nursing Interventions Patient desires to follow up with specialty only   Psychosocial issues? No   Did the patient receive a copy of their discharge instructions? Yes   Nursing interventions Reviewed instructions with patient   What is the patient's perception of their health status since discharge? Improving   Nursing interventions Nurse provided patient education   Is the patient/caregiver able to teach back signs and symptoms of incisional infection? Increased redness, swelling or pain at the incisonal site, Increased drainage or bleeding, Incisional warmth, Pus or odor from incision, Fever   Is the patient/caregiver able to teach back steps to recovery at home? Set small, achievable goals for return to baseline health, Rest and rebuild strength, gradually increase activity   If the patient is a current smoker, are they able to teach back resources for cessation? Not a smoker   Is the patient/caregiver able to  teach back the hierarchy of who to call/visit for symptoms/problems? PCP, Specialist, Home health nurse, Urgent Care, ED, 911 Yes   TCM call completed? Yes   Wrap up additional comments Having pain. Using pain med as directed. No needs at this time.   Call end time 1310   Would this patient benefit from a Referral to Amb Social Work? No   Is the patient interested in additional calls from an ambulatory ? No            Landen Rivas RN    1/9/2024, 13:10 EST

## 2024-01-11 ENCOUNTER — TELEPHONE (OUTPATIENT)
Dept: NEUROSURGERY | Facility: CLINIC | Age: 54
End: 2024-01-11
Payer: COMMERCIAL

## 2024-01-18 ENCOUNTER — TELEMEDICINE (OUTPATIENT)
Dept: NEUROSURGERY | Facility: CLINIC | Age: 54
End: 2024-01-18
Payer: COMMERCIAL

## 2024-01-18 DIAGNOSIS — F39 MOOD DISORDER: ICD-10-CM

## 2024-01-18 DIAGNOSIS — Z09 FOLLOW-UP EXAMINATION FOLLOWING SURGERY: Primary | ICD-10-CM

## 2024-01-18 PROCEDURE — 99024 POSTOP FOLLOW-UP VISIT: CPT | Performed by: NEUROLOGICAL SURGERY

## 2024-01-18 RX ORDER — ESCITALOPRAM OXALATE 20 MG/1
20 TABLET ORAL DAILY
Qty: 90 TABLET | Refills: 0 | Status: CANCELLED | OUTPATIENT
Start: 2024-01-18

## 2024-01-18 RX ORDER — HYDROCODONE BITARTRATE AND ACETAMINOPHEN 5; 325 MG/1; MG/1
1 TABLET ORAL EVERY 8 HOURS PRN
Qty: 20 TABLET | Refills: 0 | Status: SHIPPED | OUTPATIENT
Start: 2024-01-18

## 2024-01-18 RX ORDER — BUPROPION HYDROCHLORIDE 300 MG/1
300 TABLET ORAL DAILY
Qty: 90 TABLET | Refills: 1 | Status: CANCELLED | OUTPATIENT
Start: 2024-01-18

## 2024-01-18 NOTE — PROGRESS NOTES
Subjective   Patient ID: Dasha De Leon is a 53 y.o. female is here today via video for 2 week PO follow-up. Patient had a Lumbar 3 to lumbar 4 and lumbar 4 to lumbar 5 laminectomy with fusion and instrumentation on 01/05/2024    You have chosen to receive care through a telehealth visit.  Do you consent to use a video/audio connection for your medical care today? Yes     We had a video visit today.  The patient was at home and I was in the office.  We talked for 5 minutes.    History of Present Illness    I talked to this patient today.  She is doing very well.  She has almost no pain at all.  She still has some stiffness in her back.  She says her incision looks good.    The following portions of the patient's history were reviewed and updated as appropriate: allergies, current medications, past family history, past medical history, past social history, past surgical history, and problem list.    Review of Systems    I reviewed the review of systems listed by the patient and discussed by my MA    Objective       Tobacco Use: Medium Risk (1/5/2024)    Patient History     Smoking Tobacco Use: Former     Smokeless Tobacco Use: Never     Passive Exposure: Past          Physical Exam  Neurological:      Mental Status: She is alert and oriented to person, place, and time.       Neurologic Exam     Mental Status   Oriented to person, place, and time.           Assessment & Plan   Independent Review of Radiographic Studies:      I personally reviewed the images from the following studies.    There is no new imaging to review    Medical Decision Making:      I told the patient that she should just keep up with her current activities.  We will see her in the month with an x-ray.  If that looks okay we should be able to take her out of her brace and start her on physical therapy.  She agrees.    Diagnoses and all orders for this visit:    1. Follow-up examination following surgery (Primary)  -     XR Spine Lumbar 2 or 3 View;  Future  -     HYDROcodone-acetaminophen (NORCO) 5-325 MG per tablet; Take 1 tablet by mouth Every 8 (Eight) Hours As Needed for Moderate Pain.  Dispense: 20 tablet; Refill: 0      Return in about 4 weeks (around 2/15/2024).

## 2024-01-19 DIAGNOSIS — F39 MOOD DISORDER: ICD-10-CM

## 2024-01-19 RX ORDER — BUPROPION HYDROCHLORIDE 300 MG/1
300 TABLET ORAL DAILY
Qty: 90 TABLET | Refills: 1 | Status: SHIPPED | OUTPATIENT
Start: 2024-01-19

## 2024-01-19 RX ORDER — ESCITALOPRAM OXALATE 20 MG/1
20 TABLET ORAL DAILY
Qty: 90 TABLET | Refills: 0 | Status: SHIPPED | OUTPATIENT
Start: 2024-01-19

## 2024-01-22 DIAGNOSIS — M19.90 ARTHRITIS: Primary | ICD-10-CM

## 2024-01-22 RX ORDER — MELOXICAM 15 MG/1
15 TABLET ORAL DAILY
Qty: 30 TABLET | Refills: 0 | Status: SHIPPED | OUTPATIENT
Start: 2024-01-22

## 2024-02-15 ENCOUNTER — OFFICE VISIT (OUTPATIENT)
Dept: NEUROSURGERY | Facility: CLINIC | Age: 54
End: 2024-02-15
Payer: COMMERCIAL

## 2024-02-15 ENCOUNTER — HOSPITAL ENCOUNTER (OUTPATIENT)
Dept: GENERAL RADIOLOGY | Facility: HOSPITAL | Age: 54
Discharge: HOME | End: 2024-02-15
Admitting: NEUROLOGICAL SURGERY
Payer: COMMERCIAL

## 2024-02-15 DIAGNOSIS — Z09 FOLLOW-UP EXAMINATION FOLLOWING SURGERY: Primary | ICD-10-CM

## 2024-02-15 DIAGNOSIS — Z09 FOLLOW-UP EXAMINATION FOLLOWING SURGERY: ICD-10-CM

## 2024-02-15 PROCEDURE — 99024 POSTOP FOLLOW-UP VISIT: CPT | Performed by: NEUROLOGICAL SURGERY

## 2024-02-15 PROCEDURE — 72100 X-RAY EXAM L-S SPINE 2/3 VWS: CPT

## 2024-02-19 ENCOUNTER — TREATMENT (OUTPATIENT)
Dept: PHYSICAL THERAPY | Facility: CLINIC | Age: 54
End: 2024-02-19
Payer: COMMERCIAL

## 2024-02-19 DIAGNOSIS — M54.16 LUMBAR RADICULOPATHY: Primary | ICD-10-CM

## 2024-02-19 DIAGNOSIS — Z98.1 STATUS POST LUMBAR SPINAL FUSION: ICD-10-CM

## 2024-02-19 DIAGNOSIS — M54.50 ACUTE BILATERAL LOW BACK PAIN WITHOUT SCIATICA: Primary | ICD-10-CM

## 2024-02-19 PROCEDURE — 97161 PT EVAL LOW COMPLEX 20 MIN: CPT | Performed by: PHYSICAL THERAPIST

## 2024-02-19 PROCEDURE — 97530 THERAPEUTIC ACTIVITIES: CPT | Performed by: PHYSICAL THERAPIST

## 2024-02-19 PROCEDURE — 97112 NEUROMUSCULAR REEDUCATION: CPT | Performed by: PHYSICAL THERAPIST

## 2024-02-19 NOTE — PROGRESS NOTES
Physical Therapy Initial Evaluation and Plan of Care  [address], [address], [place] [address]    Patient: Dasha De Leon   : 1970  Diagnosis/ICD-10 Code:  Acute bilateral low back pain without sciatica [M54.50]  Referring practitioner: Rigoberto Botello MD    Subjective Evaluation    History of Present Illness  Date of surgery: 2024  Mechanism of injury: S/p L3-5 laminectomies and fusion by Dr. Botello. Patient had radicular symptoms down entire (L) LE in addition to numbness of (L) 4th and 5th toes.  The majority of her (L) LE radicular symptoms have resolved except for some pain travelling down anterior hip and to buttock, and she has some slight lingering numbness.  She has some new (L) lateral hip symptoms since surgery; she is unsure whether this is due to squatting to  items instead of bending or walking differently.  Her symptoms have been manageable since surgery, and she has not needed pain medication in the past 1-2 weeks.  She saw Dr. Botello last week and xrays revealed intact hardware with good alignment.  She states she was told to do 2 weeks of PT prior to returning to work. She reports pain and difficulty tolerating standing > walking as well as bending.  She is avoiding lifting anything greater than 5 pounds.        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[place] Quality of life: good    Pain  Current pain ratin  At best pain ratin  At worst pain ratin  Location: (B) lower lumbar region  Quality: dull ache  Relieving factors: medications (medication prn only)  Aggravating factors: standing, lifting, ambulation and squatting (bending)  Progression: improved    Social Support  Lives with: parents (mother)    Hand dominance: right    Diagnostic Tests  X-ray: normal (recent xrays last week showed good hardware alignment and appropriate healing)    Patient Goals  Patient goals for therapy: increased strength           Subjective Questionnaire:  Oswestry: 17/50 = 34%    Objective          Tenderness     Additional Tenderness Details  Lumbar incision is fully approximated and appears well-healing without signs of infection    Neurological Testing     Sensation     Lumbar   Left   Diminished: light touch    Right   Intact: light touch    Additional Neurological Details  Decreased light touch (L) L2 and L4 dermatomes compared to (R)    Active Range of Motion     Additional Active Range of Motion Details  Lumbar AROM (%):  Flexion 75%, discomfort in (B) gluteals upon return to neutral  Extension deferred  Right lateral flexion 50%, discomfort (R) lumbar region  Left lateral flexion 50%, discomfort (L) gluteal region  Right rotation 50%, discomfort, stretching (L) lumbar region  Left rotation 50%, discomfort (L) lumbar region    Strength/Myotome Testing     Left Hip   Planes of Motion   Flexion: 3+    Right Hip   Planes of Motion   Flexion: 4-    Left Knee   Flexion: 4-  Extension: 4    Right Knee   Flexion: 4  Extension: 4+    Left Ankle/Foot   Dorsiflexion: 4    Right Ankle/Foot   Dorsiflexion: 4+    Additional Strength Details  General trunk/core strength tested in short sitting:  Flexion 3+/5  Extension 4-/5  (B) lateral flexion 4-/5  (B) rotation 3+/5          Assessment & Plan       Assessment  Impairments: abnormal muscle firing, abnormal or restricted ROM, activity intolerance, impaired physical strength, lacks appropriate home exercise program and pain with function   Functional limitations: carrying objects, lifting, walking, pulling, pushing, uncomfortable because of pain, stooping and unable to perform repetitive tasks   Assessment details: Patient is a 53 y.o female s/p L3-5 laminectomy and fusion on 01/05/2024 by Dr. Botello.  Her post-operative course has been uneventful and her pain fairly well controlled evidenced by her ability to wean fully from pain medication the past 1-2 weeks.    She reports lumbar and (L) gluteal symptoms 0-2/10.  She notes  difficulty tolerating standing, walking, bending, and lifting/carrying.  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  She demonstrates a well-healing lumbar incision, limited and painful lumbar AROM, and decreased trunk and proximal LE strength.  Her Oswestry score is 34% indicating a moderate perceived level of functional limitation.  She will benefit from skilled PT services to optimize functional strength of trunk and hips to facilitate successful and expedited RTW in full capacity.  Prognosis: good    Goals  Plan Goals: STGs: to be met in 4 weeks  1. Patient will be independent with initial HEP  2. Patient will report 50% improved tolerance to standing and walking   3. Patient will report resolution of lumbar and (L) hip symptoms for improved positional and activity tolerance  4. Patient will demonstrate fair to good lumbopelvic stability during moderately advance core strengthening activities to demonstrate readiness to RTW    LTGs: to be met in 8 weeks  1. Patient will be independent with progressed HEP  2. Patient will report >/= 75% functional improvement since surgery  3. Patient will demonstrate sufficient trunk and hip strength to allow unrestricted participation in ADLs and regular work tasks  4. Patient will have improved Oswestry score </= 20% for subjective evidence of functional improvement  5. Patient will perform regular work tasks unrestricted and uncompensated      Plan  Therapy options: will be seen for skilled therapy services  Planned modality interventions: cryotherapy, thermotherapy (hydrocollator packs) and electrical stimulation/Russian stimulation  Planned therapy interventions: abdominal trunk stabilization, ADL retraining, body mechanics training, flexibility, functional ROM exercises, home exercise program, manual therapy, neuromuscular re-education, postural training, soft tissue mobilization, strengthening, therapeutic activities and stretching  Frequency:  2x week  Duration in weeks: 8  Treatment plan discussed with: patient        Manual Therapy:         mins  09023;  Therapeutic Exercise:         mins  02370;     Neuromuscular Matt:    19    mins  33659;    Therapeutic Activity:     9     mins  03185;     Gait Training:           mins  47976;     Ultrasound:          mins  26890;    Electrical Stimulation:         mins  02324 ( );  Dry Needling          mins self-pay    Timed Treatment:   28   mins   Total Treatment:     48   mins    PT SIGNATURE: Radha Palacio PT, DPT, OCS  Electronically signed by: Radha Palacio PT, 02/19/24, 2:32 PM EST  [place] License #[id number]     DATE TREATMENT INITIATED: 2/19/2024    Initial Certification  Certification Period: 2/19/2024 thru 5/18/2024  I certify that the therapy services are furnished while this patient is under my care.  The services outlined above are required by this patient, and will be reviewed every 90 days.     PHYSICIAN: Rigoberto Botello MD  [id number]                                          DATE:     Please sign and return via fax to [phone]. Thank you, [facility].

## 2024-02-21 ENCOUNTER — TREATMENT (OUTPATIENT)
Dept: PHYSICAL THERAPY | Facility: CLINIC | Age: 54
End: 2024-02-21
Payer: COMMERCIAL

## 2024-02-21 ENCOUNTER — TELEPHONE (OUTPATIENT)
Dept: NEUROSURGERY | Facility: CLINIC | Age: 54
End: 2024-02-21
Payer: COMMERCIAL

## 2024-02-21 DIAGNOSIS — M54.50 ACUTE BILATERAL LOW BACK PAIN WITHOUT SCIATICA: Primary | ICD-10-CM

## 2024-02-21 DIAGNOSIS — Z98.1 STATUS POST LUMBAR SPINAL FUSION: ICD-10-CM

## 2024-02-21 PROCEDURE — 97530 THERAPEUTIC ACTIVITIES: CPT | Performed by: PHYSICAL THERAPIST

## 2024-02-21 PROCEDURE — 97112 NEUROMUSCULAR REEDUCATION: CPT | Performed by: PHYSICAL THERAPIST

## 2024-02-21 NOTE — TELEPHONE ENCOUNTER
Patient is requesting to speak to Arminda about FMLA , she was supposed to be going back March 4th but the lady who does her FMLA said it says until the end of March.

## 2024-02-21 NOTE — PROGRESS NOTES
Physical Therapy Daily Treatment Note      [address] [facility], [address], [place] [id number]    Patient: Dasha De Leon   : 1970  Referring practitioner: Rigoberto Botello MD  Date of Initial Visit: Type: THERAPY  Noted: 2024  Today's Date: 2024  Patient seen for 2 sessions         Visit Diagnoses:     ICD-10-CM ICD-9-CM   1. Acute bilateral low back pain without sciatica  M54.50 724.2     338.19   2. Status post lumbar spinal fusion  Z98.1 V45.4         Subjective Evaluation    History of Present Illness    Subjective comment: I can feel it in my low back where we've started doing the exercises.  Also since I just got out of the car I've been having some sharp pains across both sides of my low back.Pain  Current pain ratin (up to 5/10 sharp pains across low back since getting out of the car)           Objective   See Exercise, Manual, and Modality Logs for complete treatment.   *Initiated abdom bracing with sidelying clamshell, sit to stand,     Assessment & Plan       Assessment  Assessment details: Patient reports lower lumbar and gluteal soreness following PT eval and initiation of exercise.  She also notes some sharper lower lumbar pains occurring just today since getting out of her car after her drive to PT.  Despite these c/o, she tolerates current HEP performance and core strength/lumbar stabilization progressions well without notable provocation.  There is functional weakness of trunk and hip musculature as well as early onset of muscular fatigue.  Issued red band and updated HEP to facilitate exercise performance and recall.           Progress strengthening /stabilization /functional activity - emphasis on core strength and lumbar stabilization activities.         Timed:  Manual Therapy:         mins  84322;  Therapeutic Exercise:         mins  76983;     Neuromuscular Matt:    23    mins  43723;    Therapeutic Activity:     8     mins  27940;     Gait Training:           mins   88991;     Ultrasound:          mins  89225;    Untimed:  Electrical Stimulation:         mins  04727 ( );  Mechanical Traction:         mins  96829;   Dry Needling              ___  mins   20561    Timed Treatment:   31   mins   Total Treatment:     31   mins    Radha Palacio PT, DPT, OCS  Physical Therapist  [place] License #[id number]  Electronically signed by: Radha Palacio PT, 02/21/24, 1:56 PM EST

## 2024-02-26 ENCOUNTER — TELEPHONE (OUTPATIENT)
Dept: NEUROSURGERY | Facility: CLINIC | Age: 54
End: 2024-02-26
Payer: COMMERCIAL

## 2024-02-26 ENCOUNTER — TREATMENT (OUTPATIENT)
Dept: PHYSICAL THERAPY | Facility: CLINIC | Age: 54
End: 2024-02-26
Payer: COMMERCIAL

## 2024-02-26 DIAGNOSIS — M54.50 ACUTE BILATERAL LOW BACK PAIN WITHOUT SCIATICA: Primary | ICD-10-CM

## 2024-02-26 DIAGNOSIS — Z98.1 STATUS POST LUMBAR SPINAL FUSION: ICD-10-CM

## 2024-02-26 PROCEDURE — 97110 THERAPEUTIC EXERCISES: CPT | Performed by: PHYSICAL THERAPIST

## 2024-02-26 PROCEDURE — 97112 NEUROMUSCULAR REEDUCATION: CPT | Performed by: PHYSICAL THERAPIST

## 2024-02-26 NOTE — TELEPHONE ENCOUNTER
Spoke with patient regarding her return to work notice. Awaiting for the response from Dr Botello. Patient stated she understood.

## 2024-02-26 NOTE — TELEPHONE ENCOUNTER
Patient called for Arminda, pt states Arminda was suppose to call back with her return to work date. Please call back patient.

## 2024-02-26 NOTE — PROGRESS NOTES
Physical Therapy Daily Treatment Note               [address] [facility] [address]                                                                                                                                             [place] [address]    Patient: Dasha De Leon   : 1970  Referring practitioner: Rigoberto Botello MD  Date of Initial Visit: Type: THERAPY  Noted: 2024  Today's Date: 2024  Patient seen for 3 sessions       Visit Diagnoses:    ICD-10-CM ICD-9-CM   1. Acute bilateral low back pain without sciatica  M54.50 724.2     338.19   2. Status post lumbar spinal fusion  Z98.1 V45.4       Subjective Evaluation    History of Present Illness    Subjective comment: Pt reports that she is having pain in (L) glute today.  denies pain in lumbar.       Objective   See Exercise, Manual, and Modality Logs for complete treatment.   Added pallof press, and farmers walk    Assessment & Plan       Assessment  Assessment details: Pt completed treatment with mild c/o pain in (L) glute today.  Pt was able to progress reps on most exercises. She tolerated the addition of pallof press and farmers walk for improved dynamic core stability.  Pt HEP was updated and given to her.  Continue to progress per POC.          Timed:         Manual Therapy:    0     mins  27091;     Therapeutic Exercise:    0     mins  84005;     Neuromuscular Matt:    20    mins  71090;    Therapeutic Activity:     8     mins  32811;     Gait Trainin     mins  91373;     Ultrasound:     0     mins  28158;    E Stim                            0    mins   54789( [id number])  Work [employer]      0    mins   42994        Timed Treatment:   28   mins   Total Treatment:     28   mins    Rogerio Verdugo PTA  [place] License: [id number]

## 2024-02-28 ENCOUNTER — TREATMENT (OUTPATIENT)
Dept: PHYSICAL THERAPY | Facility: CLINIC | Age: 54
End: 2024-02-28
Payer: COMMERCIAL

## 2024-02-28 DIAGNOSIS — Z98.1 STATUS POST LUMBAR SPINAL FUSION: ICD-10-CM

## 2024-02-28 DIAGNOSIS — M54.50 ACUTE BILATERAL LOW BACK PAIN WITHOUT SCIATICA: Primary | ICD-10-CM

## 2024-02-28 PROCEDURE — 97110 THERAPEUTIC EXERCISES: CPT | Performed by: PHYSICAL THERAPIST

## 2024-02-28 NOTE — PROGRESS NOTES
"Physical Therapy Daily Treatment Note               [address] [facility] [address]                                                                                                                                             [place] [address]    Patient: Dasha De Leon   : 1970  Referring practitioner: Rigoberto Botello MD  Date of Initial Visit: Type: THERAPY  Noted: 2024  Today's Date: 2024  Patient seen for 4 sessions       Visit Diagnoses:    ICD-10-CM ICD-9-CM   1. Acute bilateral low back pain without sciatica  M54.50 724.2     338.19   2. Status post lumbar spinal fusion  Z98.1 V45.4       Subjective Evaluation    History of Present Illness    Subjective comment: Pt reports that her lumbar is \"not too bad \" today.       Objective   See Exercise, Manual, and Modality Logs for complete treatment.       Assessment & Plan       Assessment  Assessment details: Pt completed treatment with a slight provocation of pain in (L) glute, but none in lumbar.  Pt tolerated progressions in volume with no issues. Pt has good understanding of her exercises and needed minimal vc to complete them.  Pt states she will continue with HEP on her own.          Timed:         Manual Therapy:    0     mins  23674;     Therapeutic Exercise:      0 mins  86001;     Neuromuscular Matt:    26    mins  50620;    Therapeutic Activity:     0     mins  78096;     Gait Trainin     mins  16781;     Ultrasound:     0     mins  13986;    E Stim                            0    mins   98424( g0283)  Work [employer]      0    mins   67744        Timed Treatment:   26   mins   Total Treatment:     26   mins    Rogerio Verdugo PTA  [place] License: [id number]  "

## 2024-03-04 DIAGNOSIS — M19.90 ARTHRITIS: ICD-10-CM

## 2024-03-04 RX ORDER — FUROSEMIDE 20 MG/1
20 TABLET ORAL 2 TIMES DAILY
Qty: 90 TABLET | Refills: 1 | Status: SHIPPED | OUTPATIENT
Start: 2024-03-04

## 2024-03-04 RX ORDER — LEVOCETIRIZINE DIHYDROCHLORIDE 5 MG/1
TABLET, FILM COATED ORAL
Qty: 180 TABLET | Refills: 3 | Status: SHIPPED | OUTPATIENT
Start: 2024-03-04

## 2024-03-04 RX ORDER — MELOXICAM 15 MG/1
15 TABLET ORAL DAILY
Qty: 90 TABLET | Refills: 0 | Status: SHIPPED | OUTPATIENT
Start: 2024-03-04

## 2024-03-04 RX ORDER — FAMOTIDINE 20 MG/1
20 TABLET, FILM COATED ORAL 2 TIMES DAILY
Qty: 180 TABLET | Refills: 3 | Status: SHIPPED | OUTPATIENT
Start: 2024-03-04

## 2024-03-21 ENCOUNTER — PATIENT MESSAGE (OUTPATIENT)
Dept: INTERNAL MEDICINE | Facility: CLINIC | Age: 54
End: 2024-03-21
Payer: COMMERCIAL

## 2024-03-21 DIAGNOSIS — F39 MOOD DISORDER: ICD-10-CM

## 2024-03-21 DIAGNOSIS — M62.838 MUSCLE SPASM: ICD-10-CM

## 2024-03-21 RX ORDER — ESCITALOPRAM OXALATE 20 MG/1
20 TABLET ORAL DAILY
Qty: 90 TABLET | Refills: 0 | Status: CANCELLED | OUTPATIENT
Start: 2024-03-21

## 2024-03-21 NOTE — TELEPHONE ENCOUNTER
From: Dasha De Leon  To: Ariana Coffman  Sent: 3/21/2024 1:09 PM EDT  Subject: Flexeril refill    My Flexeril prescription was last sent in after my surgery for only 30, can you send in another rx for 90 days? Thank you

## 2024-03-22 RX ORDER — CYCLOBENZAPRINE HCL 10 MG
10 TABLET ORAL 3 TIMES DAILY PRN
Qty: 90 TABLET | Refills: 3 | Status: SHIPPED | OUTPATIENT
Start: 2024-03-22

## 2024-03-22 RX ORDER — ESCITALOPRAM OXALATE 20 MG/1
20 TABLET ORAL DAILY
Qty: 90 TABLET | Refills: 3 | Status: SHIPPED | OUTPATIENT
Start: 2024-03-22
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
## 2024-03-28 ENCOUNTER — OFFICE VISIT (OUTPATIENT)
Dept: NEUROSURGERY | Facility: CLINIC | Age: 54
End: 2024-03-28
Payer: COMMERCIAL

## 2024-03-28 ENCOUNTER — HOSPITAL ENCOUNTER (OUTPATIENT)
Dept: GENERAL RADIOLOGY | Facility: HOSPITAL | Age: 54
Discharge: HOME | End: 2024-03-28
Admitting: NEUROLOGICAL SURGERY
Payer: COMMERCIAL

## 2024-03-28 DIAGNOSIS — Z09 FOLLOW-UP EXAMINATION FOLLOWING SURGERY: ICD-10-CM

## 2024-03-28 DIAGNOSIS — Z09 FOLLOW-UP EXAMINATION FOLLOWING SURGERY: Primary | ICD-10-CM

## 2024-03-28 PROCEDURE — 99024 POSTOP FOLLOW-UP VISIT: CPT | Performed by: NEUROLOGICAL SURGERY

## 2024-03-28 PROCEDURE — 72100 X-RAY EXAM L-S SPINE 2/3 VWS: CPT

## 2024-04-10 ENCOUNTER — PATIENT OUTREACH (OUTPATIENT)
Dept: CASE MANAGEMENT | Facility: OTHER | Age: 54
End: 2024-04-10
Payer: COMMERCIAL

## 2024-04-10 NOTE — OUTREACH NOTE
"AMBULATORY CASE MANAGEMENT NOTE    Name and Relationship of Patient/Support Person: Dasha De Leon - Self  Self        Education Documentation  pain management, taught by Latonia Burciaga, RN at 4/10/2024  1:24 PM.  Learner: Patient  Readiness: Acceptance  Method: Explanation  Response: Verbalizes Understanding    coping strategies, taught by Latonia Burciaga, RN at 4/10/2024  1:24 PM.  Learner: Patient  Readiness: Acceptance  Method: Explanation  Response: Verbalizes Understanding        Patient Outreach    Call to patient for f/u. She states the pain she was feeling prior to surgery is no longer present, but new pain has developed in her left hip and has sciatic pain into left leg and \"spasms\" in left lower back. She states standing for long periods of time will make her left leg feel numb.She has an MRI with contrast order and scheduled in the next week and f/u with surgery later in the month. Pt states she is taking Mobic as directed, No assists devices being used. She is able to sit with less pain using a pad in her chair at work. Discussed using pain medication when needed and to get relief to sleep or rest. No other questions at this time. Will f/u as needed  LATONIA PENA  Ambulatory Case Management    4/10/2024, 13:24 EDT  "

## 2024-04-14 ENCOUNTER — HOSPITAL ENCOUNTER (OUTPATIENT)
Dept: MRI IMAGING | Facility: HOSPITAL | Age: 54
Discharge: HOME | End: 2024-04-14
Admitting: NEUROLOGICAL SURGERY
Payer: COMMERCIAL

## 2024-04-14 DIAGNOSIS — Z09 FOLLOW-UP EXAMINATION FOLLOWING SURGERY: ICD-10-CM

## 2024-04-14 PROCEDURE — 72158 MRI LUMBAR SPINE W/O & W/DYE: CPT

## 2024-04-14 PROCEDURE — 0 GADOBENATE DIMEGLUMINE 529 MG/ML SOLUTION: Performed by: NEUROLOGICAL SURGERY

## 2024-04-14 PROCEDURE — 82565 ASSAY OF CREATININE: CPT

## 2024-04-14 PROCEDURE — A9577 INJ MULTIHANCE: HCPCS | Performed by: NEUROLOGICAL SURGERY

## 2024-04-14 RX ADMIN — GADOBENATE DIMEGLUMINE 15 ML: 529 INJECTION, SOLUTION INTRAVENOUS at 13:21

## 2024-04-15 LAB — CREAT BLDA-MCNC: 0.8 MG/DL (ref 0.6–1.3)

## 2024-04-24 NOTE — PROGRESS NOTES
Subjective   Patient ID: Dasha De Leon is a 53 y.o. female is here today for follow-up with a new MRI L-spine done on 4/14/2024.    Today patient is having numbness in her L leg that radiates to B/L feet, and low back pain     History of Present Illness    This patient returns today.  She is still having pain in her left hip and across her lower back.  There is a little radiation into her left leg just into the thigh.  She also has numbness in her feet.    The following portions of the patient's history were reviewed and updated as appropriate: allergies, current medications, past family history, past medical history, past social history, past surgical history, and problem list.    Review of Systems   Constitutional:  Negative for chills and fever.   HENT:  Negative for congestion.    Genitourinary:  Negative for difficulty urinating and dysuria.   Musculoskeletal:  Positive for back pain and myalgias.   Neurological:  Positive for numbness.       I reviewed the review of systems listed by the patient and discussed by my MA    Objective     There were no vitals filed for this visit.  There is no height or weight on file to calculate BMI.    Tobacco Use: Medium Risk (4/25/2024)    Patient History     Smoking Tobacco Use: Former     Smokeless Tobacco Use: Never     Passive Exposure: Past          Physical Exam  Neurological:      Mental Status: She is alert and oriented to person, place, and time.       Neurologic Exam     Mental Status   Oriented to person, place, and time.           Assessment & Plan   Independent Review of Radiographic Studies:      I personally reviewed the images from the following studies.    I reviewed her plain films from the end of March.  These look okay.  I also reviewed an MRI of her lumbar spine done on 14 April.  This looks pretty good as well.  There is a little narrowing at L2-3.  But it is not severe.  L5-S1 shows some disc bulging on the sagittal image but no compression of the  nerves.  The radiologist felt that the L2-3 level was a bit worse than it was on previous imaging in August of last year.    Medical Decision Making:      I told the patient about the imaging.  I told her I thought the problem is coming from the L2-3 level.  I told her that if we do another surgery we will have to extend the fusion up another level which I would really prefer not to do.  In lieu of that I would suggest some epidural blocks and physical therapy just to see if that helps her feel better.  She agrees.  I will see her back in 6 weeks.    Diagnoses and all orders for this visit:    1. Lumbar radiculopathy (Primary)    2. Spinal stenosis of lumbar region with neurogenic claudication  -     Epidural Block  -     Ambulatory Referral to Physical Therapy      Return in about 6 weeks (around 6/6/2024).

## 2024-04-25 ENCOUNTER — OFFICE VISIT (OUTPATIENT)
Dept: NEUROSURGERY | Facility: CLINIC | Age: 54
End: 2024-04-25
Payer: COMMERCIAL

## 2024-04-25 DIAGNOSIS — M48.062 SPINAL STENOSIS OF LUMBAR REGION WITH NEUROGENIC CLAUDICATION: ICD-10-CM

## 2024-04-25 DIAGNOSIS — M54.16 LUMBAR RADICULOPATHY: Primary | ICD-10-CM

## 2024-04-25 PROCEDURE — 99214 OFFICE O/P EST MOD 30 MIN: CPT | Performed by: NEUROLOGICAL SURGERY

## 2024-04-29 ENCOUNTER — TELEPHONE (OUTPATIENT)
Dept: SURGERY | Facility: CLINIC | Age: 54
End: 2024-04-29
Payer: COMMERCIAL

## 2024-04-29 ENCOUNTER — OFFICE VISIT (OUTPATIENT)
Dept: OBSTETRICS AND GYNECOLOGY | Age: 54
End: 2024-04-29
Payer: COMMERCIAL

## 2024-04-29 ENCOUNTER — HOSPITAL ENCOUNTER (OUTPATIENT)
Facility: HOSPITAL | Age: 54
Discharge: HOME | End: 2024-04-29
Admitting: PHYSICIAN ASSISTANT
Payer: COMMERCIAL

## 2024-04-29 VITALS
SYSTOLIC BLOOD PRESSURE: 124 MMHG | DIASTOLIC BLOOD PRESSURE: 78 MMHG | BODY MASS INDEX: 27 KG/M2 | HEIGHT: 66 IN | WEIGHT: 168 LBS

## 2024-04-29 DIAGNOSIS — Z97.5 IUD (INTRAUTERINE DEVICE) IN PLACE: ICD-10-CM

## 2024-04-29 DIAGNOSIS — N63.13 BREAST LUMP ON RIGHT SIDE AT 8 O'CLOCK POSITION: ICD-10-CM

## 2024-04-29 DIAGNOSIS — Z12.11 COLON CANCER SCREENING: ICD-10-CM

## 2024-04-29 DIAGNOSIS — Z12.31 VISIT FOR SCREENING MAMMOGRAM: ICD-10-CM

## 2024-04-29 DIAGNOSIS — Z12.31 SCREENING MAMMOGRAM FOR BREAST CANCER: Primary | ICD-10-CM

## 2024-04-29 DIAGNOSIS — Z01.419 WELL WOMAN EXAM WITH ROUTINE GYNECOLOGICAL EXAM: ICD-10-CM

## 2024-04-29 PROCEDURE — 99459 PELVIC EXAMINATION: CPT | Performed by: PHYSICIAN ASSISTANT

## 2024-04-29 PROCEDURE — 99396 PREV VISIT EST AGE 40-64: CPT | Performed by: PHYSICIAN ASSISTANT

## 2024-04-29 PROCEDURE — 77067 SCR MAMMO BI INCL CAD: CPT

## 2024-04-29 PROCEDURE — 77063 BREAST TOMOSYNTHESIS BI: CPT

## 2024-04-29 NOTE — TELEPHONE ENCOUNTER
New patient chart prepared for Dr Carina Morataya to review.     Chart to Mert ARZATE for scheduling

## 2024-04-29 NOTE — PROGRESS NOTES
Subjective     Chief Complaint   Patient presents with    Annual Exam     Annual exam last pap 2023 Ascus/ neg, m/g done today, colonoscopy never       History of Present Illness    Dasha De Leon is a 53 y.o.  who presents for annual exam.    Has a breast lump on the right side  Painful if she presses on it  No skin changes  Present for a month  No change in shape or size    She has an iud in place  Has been in since   Tried to get it out once but it was too painful  Was offered to remove it surgically but she declined    Had recent spine surgery  Has also had shoulder surgery    She is PM  Mammogram done today  Never had CSC, is open to referral  Pap was ASCUS/neg hpv last year, ASCCP guidelines recommend f/u in 3 years      Obstetric History:  OB History          0    Para   0    Term   0       0    AB   0    Living   0         SAB   0    IAB   0    Ectopic   0    Molar        Multiple   0    Live Births                   Menstrual History:     No LMP recorded. Patient is postmenopausal.         Current contraception: IUD  History of abnormal Pap smear: yes -   Received Gardasil immunization: no  Perform regular self breast exam: yes - mthly  Family history of uterine or ovarian cancer: yes - maternal aunt  Family History of colon cancer: no  Family history of breast cancer: no    Mammogram: done today.  Colonoscopy: recommended.  DEXA: not indicated.    Exercise: not active  Calcium/Vitamin D: adequate intake    The following portions of the patient's history were reviewed and updated as appropriate: allergies, current medications, past family history, past medical history, past social history, past surgical history, and problem list.    Review of Systems   All other systems reviewed and are negative.      Review of Systems   Constitutional: Negative for fatigue.   Respiratory: Negative for shortness of breath.    Gastrointestinal: Negative for abdominal pain.   Genitourinary:  "Negative for dysuria.   Neurological: Negative for headaches.   Psychiatric/Behavioral: Negative for dysphoric mood.         Objective   Physical Exam    /78   Ht 167.6 cm (66\")   Wt 76.2 kg (168 lb)   BMI 27.12 kg/m²   General:   alert, comfortable, and no distress   Heart: regular rate and rhythm   Lungs: clear to auscultation bilaterally   Breast: Inspection negative, No nipple retraction or dimpling, No nipple discharge or bleeding, No axillary or supraclavicular adenopathy, positive findings: 3 cm, rubbery, fixed, and non-tender nodule located on the right 8 o'clock and implants bilaterally   Neck: no adenopathy and no carotid bruit   Abdomen: normal findings: soft, non-tender   CVA: Not performed today   Pelvis: External genitalia: normal general appearance  Vaginal: normal mucosa without prolapse or lesions  Cervix: normal appearance and IUD string visualized  Adnexa: normal bimanual exam  Uterus: normal single, nontender   Extremities: Not performed today   Neurologic: negative   Psychiatric: Normal affect, judgement, and mood     Assessment & Plan   Diagnoses and all orders for this visit:    1. Well woman exam with routine gynecological exam    2. IUD (intrauterine device) in place    3. Colon cancer screening  -     Ambulatory Referral For Screening Colonoscopy    4. Breast lump on right side at 8 o'clock position  -     US Breast Right Limited  -     Ambulatory Referral to Breast Surgery  -     Mammo Diagnostic Right With CAD        All questions answered.  Breast self exam technique reviewed and patient encouraged to perform self-exam monthly.  Discussed healthy lifestyle modifications.  Recommended 30 minutes of aerobic exercise five times per week.  Discussed calcium needs to prevent osteoporosis.    Pap utd  Iud in place, declines to have it surgically removed  Imaging ordered for right breast. Will plan breast surgeon referral as well               "

## 2024-05-01 ENCOUNTER — TELEPHONE (OUTPATIENT)
Dept: SURGERY | Facility: CLINIC | Age: 54
End: 2024-05-01
Payer: COMMERCIAL

## 2024-05-01 NOTE — TELEPHONE ENCOUNTER
Spoke to pt and got her scheduled for a new pt appt with hue baron for right breast lump on 05/14 @ 840am     Pt stated understanding   Mailed new pt paperwor   Pt aware to come 20 min early if for some reason new pt packet doesn't get to her on time   Verified our address

## 2024-05-06 PROBLEM — R92.8 ABNORMALITY OF RIGHT BREAST ON SCREENING MAMMOGRAM: Status: ACTIVE | Noted: 2024-05-06

## 2024-05-08 ENCOUNTER — TREATMENT (OUTPATIENT)
Dept: PHYSICAL THERAPY | Facility: CLINIC | Age: 54
End: 2024-05-08
Payer: COMMERCIAL

## 2024-05-08 DIAGNOSIS — M54.42 CHRONIC LEFT-SIDED LOW BACK PAIN WITH LEFT-SIDED SCIATICA: ICD-10-CM

## 2024-05-08 DIAGNOSIS — M48.062 SPINAL STENOSIS OF LUMBAR REGION WITH NEUROGENIC CLAUDICATION: Primary | ICD-10-CM

## 2024-05-08 DIAGNOSIS — G89.29 CHRONIC LEFT-SIDED LOW BACK PAIN WITH LEFT-SIDED SCIATICA: ICD-10-CM

## 2024-05-08 PROCEDURE — 97110 THERAPEUTIC EXERCISES: CPT | Performed by: PHYSICAL THERAPIST

## 2024-05-08 PROCEDURE — 97530 THERAPEUTIC ACTIVITIES: CPT | Performed by: PHYSICAL THERAPIST

## 2024-05-08 PROCEDURE — 97162 PT EVAL MOD COMPLEX 30 MIN: CPT | Performed by: PHYSICAL THERAPIST

## 2024-05-10 ENCOUNTER — APPOINTMENT (OUTPATIENT)
Dept: WOMENS IMAGING | Facility: HOSPITAL | Age: 54
End: 2024-05-10
Payer: COMMERCIAL

## 2024-05-10 PROCEDURE — 77065 DX MAMMO INCL CAD UNI: CPT | Performed by: RADIOLOGY

## 2024-05-10 PROCEDURE — 77061 BREAST TOMOSYNTHESIS UNI: CPT | Performed by: RADIOLOGY

## 2024-05-10 PROCEDURE — G0279 TOMOSYNTHESIS, MAMMO: HCPCS | Performed by: RADIOLOGY

## 2024-05-10 PROCEDURE — 76642 ULTRASOUND BREAST LIMITED: CPT | Performed by: RADIOLOGY

## 2024-05-13 ENCOUNTER — TELEPHONE (OUTPATIENT)
Dept: SURGERY | Facility: CLINIC | Age: 54
End: 2024-05-13
Payer: COMMERCIAL

## 2024-05-13 ENCOUNTER — TELEPHONE (OUTPATIENT)
Dept: OBSTETRICS AND GYNECOLOGY | Age: 54
End: 2024-05-13
Payer: COMMERCIAL

## 2024-05-13 DIAGNOSIS — N63.10 MASS OF RIGHT BREAST, UNSPECIFIED QUADRANT: Primary | ICD-10-CM

## 2024-05-13 NOTE — TELEPHONE ENCOUNTER
I called PT with her new PT appt with Dr. De Anda.    New PT appt: 05/22/2024 at 11:00 AM    PT gave a verbal understanding of appt date, time, and location.    New PT paperwork sent out 05/13/2024    MEN

## 2024-05-13 NOTE — TELEPHONE ENCOUNTER
I have spoke to pt, ref phy office and [facility] on this pt and we have cx her appt with Mert Escobedo for tomorrow she has a bx scheduled at [facility] on Wed @ 1130 and she will probably be switched to a surgeon so one of the schedulers will call her later today to get her scheduled

## 2024-05-13 NOTE — TELEPHONE ENCOUNTER
5/13/2024 SW pt today regarding need for R US guided breast biopsy. Pt aware and waiting for [facility] to call and schedule. Order placed for R bx. SW [facility] to confirm that they will call her with appt. PT has appt with Dr Morataya at 8:40am on 5/14/24

## 2024-05-15 ENCOUNTER — LAB REQUISITION (OUTPATIENT)
Dept: LAB | Facility: HOSPITAL | Age: 54
End: 2024-05-15
Payer: COMMERCIAL

## 2024-05-15 ENCOUNTER — APPOINTMENT (OUTPATIENT)
Dept: WOMENS IMAGING | Facility: HOSPITAL | Age: 54
End: 2024-05-15
Payer: COMMERCIAL

## 2024-05-15 DIAGNOSIS — N63.0 UNSPECIFIED LUMP IN UNSPECIFIED BREAST: ICD-10-CM

## 2024-05-15 PROCEDURE — 88360 TUMOR IMMUNOHISTOCHEM/MANUAL: CPT | Performed by: OBSTETRICS & GYNECOLOGY

## 2024-05-15 PROCEDURE — A4648 IMPLANTABLE TISSUE MARKER: HCPCS | Performed by: RADIOLOGY

## 2024-05-15 PROCEDURE — 19084 BX BREAST ADD LESION US IMAG: CPT | Performed by: RADIOLOGY

## 2024-05-15 PROCEDURE — 88112 CYTOPATH CELL ENHANCE TECH: CPT | Performed by: OBSTETRICS & GYNECOLOGY

## 2024-05-15 PROCEDURE — G0279 TOMOSYNTHESIS, MAMMO: HCPCS | Performed by: RADIOLOGY

## 2024-05-15 PROCEDURE — 10005 FNA BX W/US GDN 1ST LES: CPT | Performed by: RADIOLOGY

## 2024-05-15 PROCEDURE — 88305 TISSUE EXAM BY PATHOLOGIST: CPT | Performed by: OBSTETRICS & GYNECOLOGY

## 2024-05-15 PROCEDURE — 76642 ULTRASOUND BREAST LIMITED: CPT | Performed by: RADIOLOGY

## 2024-05-15 PROCEDURE — 77061 BREAST TOMOSYNTHESIS UNI: CPT | Performed by: RADIOLOGY

## 2024-05-15 PROCEDURE — 77065 DX MAMMO INCL CAD UNI: CPT | Performed by: RADIOLOGY

## 2024-05-15 PROCEDURE — 19083 BX BREAST 1ST LESION US IMAG: CPT | Performed by: RADIOLOGY

## 2024-05-15 PROCEDURE — 88342 IMHCHEM/IMCYTCHM 1ST ANTB: CPT | Performed by: OBSTETRICS & GYNECOLOGY

## 2024-05-15 PROCEDURE — 88341 IMHCHEM/IMCYTCHM EA ADD ANTB: CPT | Performed by: OBSTETRICS & GYNECOLOGY

## 2024-05-17 NOTE — PROGRESS NOTES
Discussed biopsy is positive for right-sided breast cancer.  Patient has follow-up scheduled with Dr. De Anda early next week.

## 2024-05-20 NOTE — PROGRESS NOTES
[facility]     Referring Provider: Darrick Dowling MD     Chief complaint: newly diagnosed breast cancer    Subjective    HPI: Ms. Dasha De Leon is a 53 y.o. yo woman, seen at the request of Darrick Dowling MD for a new diagnosis of right breast cancer.    She noticed a palpable mass in her right breast starting in early to mid April.  She already had a scheduled mammogram around that time.  After she noted the palpable mass she noticed some pulling and dimpling of her skin in a similar area.  She denies any skin thickening, nipple discharge or other changes to her breast.  Her previous screening mammogram in April 2023 was normal and largely fatty replaced with scattered areas of fibroglandular densities.  She underwent breast augmentation with retropectoral implants sometime ago, completed by plastic surgery at [facility].    She denies any prior history of abnormal mammograms or breast biopsies.     She reports she has a pertinent PMH of HTN, HLD, spinal stenosis, allergies.     She was by herself in clinic today.        Oncology/Hematology History   Malignant neoplasm of lower-outer quadrant of right breast of female, estrogen receptor positive   5/15/2024 Cancer Staged    Staging form: Breast, AJCC 8th Edition  - Clinical stage from 5/15/2024: Stage IA (cT1c, cN0(f), cM0, G2, ER+, CA+, HER2-) - Signed by Romina De Anda MD on 5/22/2024 5/22/2024 Initial Diagnosis    Malignant neoplasm of lower-outer quadrant of right breast of female, estrogen receptor positive         Review of Systems   Constitutional:  Positive for chills.   HENT:  Negative.     Eyes: Negative.    Respiratory: Negative.     Cardiovascular: Negative.    Gastrointestinal: Negative.    Endocrine: Negative.    Genitourinary: Negative.     Musculoskeletal: Negative.    Skin: Negative.    Neurological: Negative.    Hematological: Negative.    Psychiatric/Behavioral: Negative.         Medications:    Current Outpatient Medications:      ALPRAZolam (Xanax) 0.25 MG tablet, Take 1 tablet by mouth 2 (Two) Times a Day As Needed for Anxiety., Disp: 30 tablet, Rfl: 0    buPROPion XL (Wellbutrin XL) 300 MG 24 hr tablet, Take 1 tablet by mouth Daily., Disp: 90 tablet, Rfl: 1    cyclobenzaprine (FLEXERIL) 10 MG tablet, Take 1 tablet by mouth 3 (Three) Times a Day As Needed for Muscle Spasms., Disp: 90 tablet, Rfl: 3    escitalopram (LEXAPRO) 20 MG tablet, Take 1 tablet by mouth Daily., Disp: 90 tablet, Rfl: 3    famotidine (PEPCID) 20 MG tablet, Take 1 tablet by mouth 2 (Two) Times a Day., Disp: 180 tablet, Rfl: 3    furosemide (Lasix) 20 MG tablet, Take 1 tablet by mouth 2 (Two) Times a Day., Disp: 90 tablet, Rfl: 1    HYDROcodone-acetaminophen (NORCO) 5-325 MG per tablet, Take 1 tablet by mouth Every 8 (Eight) Hours As Needed for Moderate Pain., Disp: 20 tablet, Rfl: 0    levocetirizine (XYZAL) 5 MG tablet, Take one tablet by mouth twice daily for urticaria., Disp: 180 tablet, Rfl: 3    meloxicam (MOBIC) 15 MG tablet, Take 1 tablet by mouth Daily., Disp: 90 tablet, Rfl: 0    olmesartan (BENICAR) 40 MG tablet, Take 0.5 tablets by mouth Daily. (Patient taking differently: Take 0.5 tablets by mouth Daily. Hold 24 hours prior to surgery), Disp: 45 tablet, Rfl: 1    rosuvastatin (Crestor) 40 MG tablet, Take 1 tablet by mouth Daily., Disp: 90 tablet, Rfl: 3    levonorgestrel (MIRENA) 20 MCG/24HR IUD, 1 each by Intrauterine route 1 (One) Time. (Patient not taking: Reported on 5/22/2024), Disp: , Rfl:     Allergies:  Allergies   Allergen Reactions    Penicillins Anaphylaxis    Lisinopril Cough       Medical history:  Past Medical History:   Diagnosis Date    Abnormal Pap smear of cervix     Baker's cyst     Bulging lumbar disc     Bursitis     Depression     GERD (gastroesophageal reflux disease)     H/O colposcopy with cervical biopsy     unknown pap result, biopsy was neg per pt, 2013 [facility]    HPV (human papilloma virus) infection     Hyperlipemia      Hypertension     Low back pain     Lumbar radiculopathy     Osteoarthritis     Scoliosis     Seasonal allergies        Surgical History:  Past Surgical History:   Procedure Laterality Date    AUGMENTATION MAMMAPLASTY      BREAST AUGMENTATION      CARPAL TUNNEL RELEASE  2004    CRYOTHERAPY      1997    EPIDURAL BLOCK      HAND SURGERY  2004    Pisiformectomy    LUMBAR DISCECTOMY FUSION INSTRUMENTATION N/A 1/5/2024    Procedure: Lumbar 3 to lumbar 4 and lumbar 4 to lumbar 5 laminectomy with fusion and instrumentation;  Surgeon: Rigoberto Botello MD;  Location: [facility] MAIN OR;  Service: Robotics - Neuro;  Laterality: N/A;    MAMMO BILATERAL  2014    PAP SMEAR  20116    SHOULDER ARTHROSCOPY  2002,2003    SHOULDER SURGERY      WRIST SURGERY         Family History:  Family History   Problem Relation Age of Onset    Hypertension Mother     Hyperlipidemia Mother     Diverticulitis Mother     Pancreatitis Mother     Kidney disease Father     Skin cancer Father     Hypertension Father     Hyperlipidemia Father     Stroke Father     Atrial fibrillation Father     Transient ischemic attack Father     Depression Brother     Liver disease Maternal Grandmother     Heart disease Maternal Grandfather     Cancer Maternal Grandfather     Heart attack Paternal Grandmother     No Known Problems Paternal Grandfather     Lung cancer Maternal Uncle     Heart attack Maternal Uncle     Uterine cancer Maternal Aunt 58    Heart disease Maternal Aunt     Colon cancer Paternal Uncle 65    No Known Problems Sister     No Known Problems Daughter     No Known Problems Son     No Known Problems Paternal Aunt     BRCA 1/2 Neg Hx     Breast cancer Neg Hx     Endometrial cancer Neg Hx     Ovarian cancer Neg Hx        Family Cancer History: relationship - (age of diagnosis/current age or age at death)  Breast: N/A   Ovarian: N/A  Colon: Paternal Uncle Dx (in 60's)   Pancreas: N/A  Prostate: N/A    Social History:   Social History     Socioeconomic  History    Marital status: Single   Tobacco Use    Smoking status: Former     Current packs/day: 0.00     Average packs/day: 1 pack/day for 20.0 years (20.0 ttl pk-yrs)     Types: Cigarettes     Start date:      Quit date:      Years since quittin.4     Passive exposure: Past    Smokeless tobacco: Never   Vaping Use    Vaping status: Never Used   Substance and Sexual Activity    Alcohol use: Yes     Alcohol/week: 2.0 - 4.0 standard drinks of alcohol     Types: 2 - 4 Glasses of wine per week     Comment: weekly    Drug use: No    Sexual activity: Not Currently     Birth control/protection: I.U.D.       She lives with her mother.  She works as a registered medical assistant.          GYNECOLOGIC HISTORY:   . P: 0. AB: 0.  Last menstrual period: 2020  Age at menarche: 13  Age at first childbirth: N/A  Lactation/How long: N/A  Age at menopause: 49  Total years of oral contraceptive use: 25  Total years of hormone replacement therapy: 0      Objective   PHYSICAL EXAMINATION:   Vitals:    24 1118   BP: 132/92   Pulse: 106   Resp: 18   SpO2: 98%     Examination chaperoned by Leila Ruiz.  ECOG 0 - Asymptomatic  General: NAD, well appearing  Psych: a&o x 3, normal mood and affect  Eyes: EOMI, no scleral icterus  ENMT: neck supple without masses or thyromegaly, mucus membranes moist  Resp: normal effort  CV: RRR, no edema   GI: soft, NT, ND  MSK: normal gait, normal ROM in bilateral shoulders  Lymph nodes:  no cervical, supraclavicular or axillary lymphadenopathy  Breast: symmetric, 30 cc, large breast volume bilaterally, implants with well-healed inframammary incisions.  Right: Lower outer quadrant with skin tethering, palpable mass approximately 2 cm in size.  No other skin changes, or nipple abnormalities.  Left: No visible abnormalities on inspection while seated, with arms raised or hands on hips. No masses, skin changes, or nipple abnormalities.    Right breast, in-office ultrasound: Lower  outer quadrant 8:00 visible breast mass with biopsy clip centrally located.  Second breast mass not well-visualized.  Appears to be attached to underlying pectoralis muscle       Previous IMAGING: I have independently reviewed her imagin2023 bilateral screening [facility] scattered areas of fibroglandular density.  Bilateral retropectoral saline implants.  No suspicious masses calcifications or other abnormalities are seen.  BI-RADS 1    2024 bilateral screening mammogram  The breasts are almost entirely fatty  Bilateral retropectoral saline implants  There is a round mass measuring 16 mm with spiculated margins seen in the posterior one third region of the right breast at 9:00.  This is new since prior exams.  Left breast no suspicious masses calcifications or other abnormalities are seen  BI-RADS 0    5/10/2024 right diagnostic mammogram  Breast is almost entirely fatty  Retropectoral saline implants  Finding 1: Round mass in the right breast at 9:00 previously seen 2024.  On present there is a high density round mass measuring 16 mm with spiculated margins.  Approximately in the right breast 8:00 10 cm from the nipple.  Right breast ultrasound  Finding 1: Round parallel hypoechoic mass with indistinct And spiculated margins 15 mm in the posterior one third region of the right breast at 8:00 10 cm from the nipple. with internal vascularity there are decreased posterior echoes; edge shadows are excluded.  Finding 2: There is an irregular nonparallel hypoechoic mass with indistinct margins measuring 4 x 6 x 4 mm seen in the 830 o'clock region of the right breast 11 cm from the nipple there are decreased posterior echoes, edge shadows are excluded posterior echoes, and shadows not excluded  Visualized axillary lymph nodes are normal  Impression:  Finding 1: Mass right breast is highly suggestive of malignancy ultrasound-guided biopsy is  recommended  Finding 2: Mass 830 o'clock region of the right breast is suspicious ultrasound-guided biopsy is recommended  BI-RADS 5  Addendum:  Finding 1: On the obtained sonographic images mass approaches the skin was not definitively involve the overlying skin, no abnormal skin thickening visualized  Due to posterior acoustic shadowing no definitive involvement of the underlying pectoralis is seen however during mammographic imaging acquisition the technologist reported difficulty obtaining images which potentially may be secondary to her implant rather than pectoralis involvement  Impression finding 1 mass in the right breast is highly suggestive of malignancy ultrasound-guided biopsy is recommended  BI-RADS 5    5/15/2024 right ultrasound-guided biopsy 8 o'clock position 12-gauge vacuum-assisted device was utilized 5 cores were obtained.  A true kyra vision Globe biopsy marker was placed at the biopsy site.  Postbiopsy mammogram shows the clip in expected location  Pathology returned as invasive ductal carcinoma  Surgical consultation is recommended  5/15/2024 right ultrasound-guided biopsy 830 o'clock  Using a 12-gauge core needle biopsy 5 cores were obtained true kyra Pro Q tissue marker was placed within the biopsy site.  Pathology showed invasive ductal carcinoma  Follow-up mammogram and ultrasound shows clip in the expected location  Surgical consultation is recommended    5/15/2024 right ultrasound-guided needle aspiration the patient's right breast 8:00 was imaged and a 21-gauge needle was advanced to the target 3 passes were made through the area and a HydroMARK biopsy clip was deployed within the FNA bed.  Postprocedure mammogram ultrasound shows the clip in expected location pathology returned as bland lymphocytes with no evidence of metastatic disease in intramammary lymph node.  Pathology is benign and concordant    PATHOLOGY:   1.  Breast, right 8 o'clock position, core biopsy: (Vision globe  clip)  A.  Invasive mammary carcinoma, no special type (ductal with apocrine features), Cynthia histologic grade 2 (tubules = 3, nuclei = 3, mitoses = 1), measuring 3 mm maximally, and involving approximately 4 core fragments.  B.  No definitive in situ carcinoma is identified.  Estrogen Receptor (ER) Status  Positive (greater than 10% of cells demonstrate nuclear positivity)   Percentage of Cells with Nuclear Positivity  %   Average Intensity of Staining  Strong   Test Type  Food and Drug Administration (FDA) cleared (test / vendor): [facility]   Primary Antibody  SP1   Test(s) Performed     Progesterone Receptor (PgR) Status  Positive   Percentage of Cells with Nuclear Positivity  %   Average Intensity of Staining  Strong   Test Type  Food and Drug Administration (FDA) cleared (test / vendor): [facility]   Primary Antibody  1E2   Test(s) Performed     HER2 by Immunohistochemistry  Negative (Score 1+)   Test Type  Food and Drug Administration (FDA) cleared (test / vendor): [facility]   Primary Antibody  4B5   Test(s) Performed  Ki-67   Ki-67 Percentage of Positive Nuclei  22 %        2.  Breast, right 8: 30 o'clock position, core biopsy: ([place]ark Q clip)  A.  Invasive mammary carcinoma, no special type (ductal with apocrine features), Mcfarland histologic grade 2 (tubules = 3, nuclei = 3, mitoses = 1), measuring 2.7 mm maximally, and involving approximately 3 core fragments.  B.  No definitive in situ component is identified.  Estrogen Receptor (ER) Status  Positive (greater than 10% of cells demonstrate nuclear positivity)   Percentage of Cells with Nuclear Positivity  %   Average Intensity of Staining  Strong   Test Type  Food and Drug Administration (FDA) cleared (test / vendor): [facility]   Primary Antibody  SP1   Test(s) Performed     Progesterone Receptor (PgR) Status  Positive   Percentage of Cells with Nuclear Positivity  %   Average Intensity of Staining  Strong   Test Type  Food and Drug  Administration (FDA) cleared (test / vendor): [place]   Primary Antibody  1E2   Test(s) Performed     HER2 by Immunohistochemistry  Negative (Score 0)   Test Type  Food and Drug Administration (FDA) cleared (test / vendor): [place]   Primary Antibody  4B5   Test(s) Performed  Ki-67   Ki-67 Percentage of Positive Nuclei  20 %       Assessment & Plan   Assessment:  53 y.o. F with a new diagnosis of right: Invasive ductal carcinoma grade 2, ER/HI +, Her2 -; [id number], Clinical anatomic stage IA, Clinical prognostic stage IA.      Discussion:  I had an extensive discussion with the patient and family about the nature of this breast cancer diagnosis. We reviewed the components of breast tissue including ducts and lobules. We reviewed her pathology report in detail. We reviewed breast cancer histology, including stage, grade, ER/HI receptors, HER2 receptors and how this applies to her diagnosis. We discussed the multidisciplinary approach to breast cancer care.  Treatments can include surgery, radiation therapy, and medical therapy (chemotherapy, targeted therapy, and/or endocrine therapy).  The exact type of treatment and the order of therapy depends on the size and location/distribution of breast disease, the involvement of the regional lymph node basins, concern for or presence of metastatic disease, potential genetic mutations, and the individual breast cancer tumor markers expressed by the cancer. We also discussed other treatment options including the option of not undergoing any surgical treatment, with a palliative rather than curative intent and the risks associated with this including disease progression.    The patient's clinical stage is documented as above. This was discussed with the patient prior to initiation of treatment. All available pathology reports were discussed with the patient today. All treatment decisions were made via shared decision making with the patient. This patient was evaluated for  appropriate ancillary referrals including, pre-treatment functional assessment, exercise program, nutrition program, genetics, and lymphedema clinic. This patient received preoperative and postoperative education. The patient was offered a breast reconstruction referral appropriate to plan surgical intervention; risks and benefits were discussed today. The patient was educated on perioperative multimodal pain management strategies. Barriers to effective and efficient care are/will be evaluated by the nurse navigator.    We discussed these options and recommendations for treatment:    Surgical Options:  Discussed breast surgery and lymph node sampling surgery will be required to move forward.  At this point in time she is a candidate for breast conservation or mastectomy.  Will complete breast MRI for full extent of disease and evaluation for involvement in pectoralis.  By available imaging and physical exam with dimpling of the skin and mass not freely mobile within the breast tissue concern for either pectoralis involvement or capsular involvement.  She has subpectoral implants.  Any surgical intervention will require involvement of that implant capsule.  Should she proceed with breast conservation would plan to excise the skin overlying the palpable mass as well as the tissue deep to the cancer.  She is interested in breast conservation if at all possible.  This will be determined on breast MRI.  Should she need mastectomy would again need assistance from plastic surgery for appropriate cosmetic goals and management of her existing implants.    Medical Oncology:  We discussed that in her case, systemic treatment would involve endocrine therapy and possibly chemotherapy. She will be referred to medical oncology postoperatively to discuss this further. She is an oncotype candidate to determine if chemotherapy following surgery would help improve her overall survival and reduce the possibility of distant metastasis  OR if there would be very little benefit from these therapies.     Radiation Oncology:  Pending surgery of choice and final surgical margins she is a candidate for adjuvant radiation therapy.  Would be recommended for all cases of breast conservation, would be indicated in some cases of mastectomies.    Role for Genetic Testing:  I explained that most breast cancer is not hereditary, that I do not believe this plays a role in her case, and that she does not meet NCCN criteria for genetic testing. I would not recommend a bilateral mastectomy, since her risk of a second primary cancer is relatively low and endocrine therapy will further reduce her risk.        Plan:  A multidisciplinary plan has been formulated for the patient:      # Breast Surgical Oncology:  -Breast MRI now, either our office or myself will call her with the findings from this test  - Discussed both surgical options during the visit, final surgical plan pending imaging results  - Plastic surgery referral placed today      # Medical Oncology:  -Is not Oncotype candidate  -Will refer postoperatively.    #  Radiation Oncology:  -Will refer postoperatively.    # Nurse Navigation  - Referral made today    # Lymphedema clinic  - Referral was placed today     # Genetic Testing   -Not indicated by NCCN guidelines    # Breast Imaging  - Next Screening mammogram due: 4/2025      Romina De Anda MD  Breast Surgical Oncology    I spent 60 minutes caring for Dasha  on this date of service. This time includes time spent by me in the following activities: preparing for the visit, reviewing tests, obtaining and/or reviewing a separately obtained history, performing a medically appropriate examination and/or evaluation , counseling and educating the patient/family/caregiver, ordering medications, tests, or procedures, referring and communicating with other health care professionals , documenting information in the medical record, independently interpreting results and  communicating that information with the patient/family/caregiver, and care coordination.      CC:  MD Augustus Christina, Ariana PONCE PA-C

## 2024-05-21 ENCOUNTER — TELEPHONE (OUTPATIENT)
Dept: SURGERY | Facility: CLINIC | Age: 54
End: 2024-05-21
Payer: COMMERCIAL

## 2024-05-21 LAB
CYTO UR: NORMAL
DX PRELIMINARY: NORMAL
DX PRELIMINARY: NORMAL
LAB AP CASE REPORT: NORMAL
LAB AP CASE REPORT: NORMAL
LAB AP CLINICAL INFORMATION: NORMAL
LAB AP DIAGNOSIS COMMENT: NORMAL
LAB AP INTRADEPARTMENTAL CONSULT: NORMAL
LAB AP SPECIAL STAINS: NORMAL
LAB AP SYNOPTIC CHECKLIST: NORMAL
PATH REPORT.FINAL DX SPEC: NORMAL
PATH REPORT.FINAL DX SPEC: NORMAL
PATH REPORT.GROSS SPEC: NORMAL
PATH REPORT.GROSS SPEC: NORMAL

## 2024-05-21 NOTE — TELEPHONE ENCOUNTER
I left PT a massage about her upcoming MRI I let PT know russel if she had any questions to call back at our office number ([phone]     Sent out appt reminder.    MEN

## 2024-05-22 ENCOUNTER — DOCUMENTATION (OUTPATIENT)
Dept: PHYSICAL THERAPY | Facility: CLINIC | Age: 54
End: 2024-05-22

## 2024-05-22 ENCOUNTER — TELEPHONE (OUTPATIENT)
Dept: MAMMOGRAPHY | Facility: CLINIC | Age: 54
End: 2024-05-22
Payer: COMMERCIAL

## 2024-05-22 ENCOUNTER — OFFICE VISIT (OUTPATIENT)
Dept: SURGERY | Facility: CLINIC | Age: 54
End: 2024-05-22
Payer: COMMERCIAL

## 2024-05-22 ENCOUNTER — HOSPITAL ENCOUNTER (OUTPATIENT)
Dept: SURGERY | Facility: HOSPITAL | Age: 54
Discharge: HOME | End: 2024-05-22
Payer: COMMERCIAL

## 2024-05-22 VITALS
HEIGHT: 66 IN | SYSTOLIC BLOOD PRESSURE: 132 MMHG | BODY MASS INDEX: 27.64 KG/M2 | RESPIRATION RATE: 18 BRPM | OXYGEN SATURATION: 98 % | DIASTOLIC BLOOD PRESSURE: 92 MMHG | WEIGHT: 172 LBS | HEART RATE: 106 BPM

## 2024-05-22 DIAGNOSIS — C50.511 MALIGNANT NEOPLASM OF LOWER-OUTER QUADRANT OF RIGHT BREAST OF FEMALE, ESTROGEN RECEPTOR POSITIVE: ICD-10-CM

## 2024-05-22 DIAGNOSIS — C50.511 MALIGNANT NEOPLASM OF LOWER-OUTER QUADRANT OF RIGHT BREAST OF FEMALE, ESTROGEN RECEPTOR POSITIVE: Primary | ICD-10-CM

## 2024-05-22 DIAGNOSIS — Z17.0 MALIGNANT NEOPLASM OF LOWER-OUTER QUADRANT OF RIGHT BREAST OF FEMALE, ESTROGEN RECEPTOR POSITIVE: Primary | ICD-10-CM

## 2024-05-22 DIAGNOSIS — Z17.0 MALIGNANT NEOPLASM OF LOWER-OUTER QUADRANT OF RIGHT BREAST OF FEMALE, ESTROGEN RECEPTOR POSITIVE: ICD-10-CM

## 2024-05-22 NOTE — LETTER
May 24, 2024     Darrick Dowling MD  [address] [facility] [address]    Patient: Dasha De Leon   YOB: 1970   Date of Visit: 5/22/2024     Dear Darrick Dowling MD:       Thank you for referring Dasha De Leon to me for evaluation. Below are the relevant portions of my assessment and plan of care.    If you have questions, please do not hesitate to call me. I look forward to following Dasha along with you.         Sincerely,        Romina De Anda MD        CC: SOLANGE Draper Sarah, MD  05/24/24 0931  Sign when Signing Visit  [facility]     Referring Provider: Darrick Dowling MD     Chief complaint: newly diagnosed breast cancer    Subjective   HPI: Ms. Dasha De Leon is a 53 y.o. yo woman, seen at the request of Darrick Dowling MD for a new diagnosis of right breast cancer.    She noticed a palpable mass in her right breast starting in early to mid April.  She already had a scheduled mammogram around that time.  After she noted the palpable mass she noticed some pulling and dimpling of her skin in a similar area.  She denies any skin thickening, nipple discharge or other changes to her breast.  Her previous screening mammogram in April 2023 was normal and largely fatty replaced with scattered areas of fibroglandular densities.  She underwent breast augmentation with retropectoral implants sometime ago, completed by plastic surgery at [facility].    She denies any prior history of abnormal mammograms or breast biopsies.     She reports she has a pertinent PMH of HTN, HLD, spinal stenosis, allergies.     She was by herself in clinic today.        Oncology/Hematology History   Malignant neoplasm of lower-outer quadrant of right breast of female, estrogen receptor positive   5/15/2024 Cancer Staged    Staging form: Breast, AJCC 8th Edition  - Clinical stage from 5/15/2024: Stage IA (cT1c, cN0(f), cM0, G2, ER+, FL+, HER2-) - Signed by Romina De Anda MD on 5/22/2024 5/22/2024 Initial  Diagnosis    Malignant neoplasm of lower-outer quadrant of right breast of female, estrogen receptor positive         Review of Systems   Constitutional:  Positive for chills.   HENT:  Negative.     Eyes: Negative.    Respiratory: Negative.     Cardiovascular: Negative.    Gastrointestinal: Negative.    Endocrine: Negative.    Genitourinary: Negative.     Musculoskeletal: Negative.    Skin: Negative.    Neurological: Negative.    Hematological: Negative.    Psychiatric/Behavioral: Negative.         Medications:    Current Outpatient Medications:   •  ALPRAZolam (Xanax) 0.25 MG tablet, Take 1 tablet by mouth 2 (Two) Times a Day As Needed for Anxiety., Disp: 30 tablet, Rfl: 0  •  buPROPion XL (Wellbutrin XL) 300 MG 24 hr tablet, Take 1 tablet by mouth Daily., Disp: 90 tablet, Rfl: 1  •  cyclobenzaprine (FLEXERIL) 10 MG tablet, Take 1 tablet by mouth 3 (Three) Times a Day As Needed for Muscle Spasms., Disp: 90 tablet, Rfl: 3  •  escitalopram (LEXAPRO) 20 MG tablet, Take 1 tablet by mouth Daily., Disp: 90 tablet, Rfl: 3  •  famotidine (PEPCID) 20 MG tablet, Take 1 tablet by mouth 2 (Two) Times a Day., Disp: 180 tablet, Rfl: 3  •  furosemide (Lasix) 20 MG tablet, Take 1 tablet by mouth 2 (Two) Times a Day., Disp: 90 tablet, Rfl: 1  •  HYDROcodone-acetaminophen (NORCO) 5-325 MG per tablet, Take 1 tablet by mouth Every 8 (Eight) Hours As Needed for Moderate Pain., Disp: 20 tablet, Rfl: 0  •  levocetirizine (XYZAL) 5 MG tablet, Take one tablet by mouth twice daily for urticaria., Disp: 180 tablet, Rfl: 3  •  meloxicam (MOBIC) 15 MG tablet, Take 1 tablet by mouth Daily., Disp: 90 tablet, Rfl: 0  •  olmesartan (BENICAR) 40 MG tablet, Take 0.5 tablets by mouth Daily. (Patient taking differently: Take 0.5 tablets by mouth Daily. Hold 24 hours prior to surgery), Disp: 45 tablet, Rfl: 1  •  rosuvastatin (Crestor) 40 MG tablet, Take 1 tablet by mouth Daily., Disp: 90 tablet, Rfl: 3  •  levonorgestrel (MIRENA) 20 MCG/24HR IUD, 1  each by Intrauterine route 1 (One) Time. (Patient not taking: Reported on 5/22/2024), Disp: , Rfl:     Allergies:  Allergies   Allergen Reactions   • Penicillins Anaphylaxis   • Lisinopril Cough       Medical history:  Past Medical History:   Diagnosis Date   • Abnormal Pap smear of cervix    • Baker's cyst    • Bulging lumbar disc    • Bursitis    • Depression    • GERD (gastroesophageal reflux disease)    • H/O colposcopy with cervical biopsy     unknown pap result, biopsy was neg per pt, 2013 [facility]   • HPV (human papilloma virus) infection    • Hyperlipemia    • Hypertension    • Low back pain    • Lumbar radiculopathy    • Osteoarthritis    • Scoliosis    • Seasonal allergies        Surgical History:  Past Surgical History:   Procedure Laterality Date   • AUGMENTATION MAMMAPLASTY     • BREAST AUGMENTATION     • CARPAL TUNNEL RELEASE  2004   • CRYOTHERAPY      1997   • EPIDURAL BLOCK     • HAND SURGERY  2004    Pisiformectomy   • LUMBAR DISCECTOMY FUSION INSTRUMENTATION N/A 1/5/2024    Procedure: Lumbar 3 to lumbar 4 and lumbar 4 to lumbar 5 laminectomy with fusion and instrumentation;  Surgeon: Rigoberto Botello MD;  Location: [facility];  Service: Robotics - Neuro;  Laterality: N/A;   • MAMMO BILATERAL  2014   • PAP SMEAR  20116   • SHOULDER ARTHROSCOPY  2002,2003   • SHOULDER SURGERY     • WRIST SURGERY         Family History:  Family History   Problem Relation Age of Onset   • Hypertension Mother    • Hyperlipidemia Mother    • Diverticulitis Mother    • Pancreatitis Mother    • Kidney disease Father    • Skin cancer Father    • Hypertension Father    • Hyperlipidemia Father    • Stroke Father    • Atrial fibrillation Father    • Transient ischemic attack Father    • Depression Brother    • Liver disease Maternal Grandmother    • Heart disease Maternal Grandfather    • Cancer Maternal Grandfather    • Heart attack Paternal Grandmother    • No Known Problems Paternal Grandfather    • Lung cancer  Maternal Uncle    • Heart attack Maternal Uncle    • Uterine cancer Maternal Aunt 58   • Heart disease Maternal Aunt    • Colon cancer Paternal Uncle 65   • No Known Problems Sister    • No Known Problems Daughter    • No Known Problems Son    • No Known Problems Paternal Aunt    • BRCA 1/2 Neg Hx    • Breast cancer Neg Hx    • Endometrial cancer Neg Hx    • Ovarian cancer Neg Hx        Family Cancer History: relationship - (age of diagnosis/current age or age at death)  Breast: N/A   Ovarian: N/A  Colon: Paternal Uncle Dx (in 60's)   Pancreas: N/A  Prostate: N/A    Social History:   Social History     Socioeconomic History   • Marital status: Single   Tobacco Use   • Smoking status: Former     Current packs/day: 0.00     Average packs/day: 1 pack/day for 20.0 years (20.0 ttl pk-yrs)     Types: Cigarettes     Start date:      Quit date:      Years since quittin.4     Passive exposure: Past   • Smokeless tobacco: Never   Vaping Use   • Vaping status: Never Used   Substance and Sexual Activity   • Alcohol use: Yes     Alcohol/week: 2.0 - 4.0 standard drinks of alcohol     Types: 2 - 4 Glasses of wine per week     Comment: weekly   • Drug use: No   • Sexual activity: Not Currently     Birth control/protection: I.U.D.       She lives with her mother.  She works as a registered medical assistant.          GYNECOLOGIC HISTORY:   . P: 0. AB: 0.  Last menstrual period: 2020  Age at menarche: 13  Age at first childbirth: N/A  Lactation/How long: N/A  Age at menopause: 49  Total years of oral contraceptive use: 25  Total years of hormone replacement therapy: 0      Objective  PHYSICAL EXAMINATION:   Vitals:    24 1118   BP: 132/92   Pulse: 106   Resp: 18   SpO2: 98%     Examination chaperoned by Leila Ruiz.  ECOG 0 - Asymptomatic  General: NAD, well appearing  Psych: a&o x 3, normal mood and affect  Eyes: EOMI, no scleral icterus  ENMT: neck supple without masses or thyromegaly, mucus membranes  moist  Resp: normal effort  CV: RRR, no edema   GI: soft, NT, ND  MSK: normal gait, normal ROM in bilateral shoulders  Lymph nodes:  no cervical, supraclavicular or axillary lymphadenopathy  Breast: symmetric, 30 cc, large breast volume bilaterally, implants with well-healed inframammary incisions.  Right: Lower outer quadrant with skin tethering, palpable mass approximately 2 cm in size.  No other skin changes, or nipple abnormalities.  Left: No visible abnormalities on inspection while seated, with arms raised or hands on hips. No masses, skin changes, or nipple abnormalities.    Right breast, in-office ultrasound: Lower outer quadrant 8:00 visible breast mass with biopsy clip centrally located.  Second breast mass not well-visualized.  Appears to be attached to underlying pectoralis muscle       Previous IMAGING: I have independently reviewed her imagin2023 bilateral screening [facility] scattered areas of fibroglandular density.  Bilateral retropectoral saline implants.  No suspicious masses calcifications or other abnormalities are seen.  BI-RADS 1    2024 bilateral screening mammogram  The breasts are almost entirely fatty  Bilateral retropectoral saline implants  There is a round mass measuring 16 mm with spiculated margins seen in the posterior one third region of the right breast at 9:00.  This is new since prior exams.  Left breast no suspicious masses calcifications or other abnormalities are seen  BI-RADS 0    5/10/2024 right diagnostic mammogram  Breast is almost entirely fatty  Retropectoral saline implants  Finding 1: Round mass in the right breast at 9:00 previously seen 2024.  On present there is a high density round mass measuring 16 mm with spiculated margins.  Approximately in the right breast 8:00 10 cm from the nipple.  Right breast ultrasound  Finding 1: Round parallel hypoechoic mass with indistinct And spiculated margins 15 mm  in the posterior one third region of the right breast at 8:00 10 cm from the nipple. with internal vascularity there are decreased posterior echoes; edge shadows are excluded.  Finding 2: There is an irregular nonparallel hypoechoic mass with indistinct margins measuring 4 x 6 x 4 mm seen in the 830 o'clock region of the right breast 11 cm from the nipple there are decreased posterior echoes, edge shadows are excluded posterior echoes, and shadows not excluded  Visualized axillary lymph nodes are normal  Impression:  Finding 1: Mass right breast is highly suggestive of malignancy ultrasound-guided biopsy is recommended  Finding 2: Mass 830 o'clock region of the right breast is suspicious ultrasound-guided biopsy is recommended  BI-RADS 5  Addendum:  Finding 1: On the obtained sonographic images mass approaches the skin was not definitively involve the overlying skin, no abnormal skin thickening visualized  Due to posterior acoustic shadowing no definitive involvement of the underlying pectoralis is seen however during mammographic imaging acquisition the technologist reported difficulty obtaining images which potentially may be secondary to her implant rather than pectoralis involvement  Impression finding 1 mass in the right breast is highly suggestive of malignancy ultrasound-guided biopsy is recommended  BI-RADS 5    5/15/2024 right ultrasound-guided biopsy 8 o'clock position 12-gauge vacuum-assisted device was utilized 5 cores were obtained.  A true kyra vision Globe biopsy marker was placed at the biopsy site.  Postbiopsy mammogram shows the clip in expected location  Pathology returned as invasive ductal carcinoma  Surgical consultation is recommended  5/15/2024 right ultrasound-guided biopsy 830 o'clock  Using a 12-gauge core needle biopsy 5 cores were obtained true kyra Pro Q tissue marker was placed within the biopsy site.  Pathology showed invasive ductal carcinoma  Follow-up mammogram and ultrasound shows  clip in the expected location  Surgical consultation is recommended    5/15/2024 right ultrasound-guided needle aspiration the patient's right breast 8:00 was imaged and a 21-gauge needle was advanced to the target 3 passes were made through the area and a HydroMARK biopsy clip was deployed within the FNA bed.  Postprocedure mammogram ultrasound shows the clip in expected location pathology returned as bland lymphocytes with no evidence of metastatic disease in intramammary lymph node.  Pathology is benign and concordant    PATHOLOGY:   1.  Breast, right 8 o'clock position, core biopsy: (Vision globe clip)  A.  Invasive mammary carcinoma, no special type (ductal with apocrine features), [place] histologic grade 2 (tubules = 3, nuclei = 3, mitoses = 1), measuring 3 mm maximally, and involving approximately 4 core fragments.  B.  No definitive in situ carcinoma is identified.  Estrogen Receptor (ER) Status  Positive (greater than 10% of cells demonstrate nuclear positivity)   Percentage of Cells with Nuclear Positivity  %   Average Intensity of Staining  Strong   Test Type  Food and Drug Administration (FDA) cleared (test / vendor): [place]   Primary Antibody  SP1   Test(s) Performed     Progesterone Receptor (PgR) Status  Positive   Percentage of Cells with Nuclear Positivity  %   Average Intensity of Staining  Strong   Test Type  Food and Drug Administration (FDA) cleared (test / vendor): [place]   Primary Antibody  1E2   Test(s) Performed     HER2 by Immunohistochemistry  Negative (Score 1+)   Test Type  Food and Drug Administration (FDA) cleared (test / vendor): [place]   Primary Antibody  4B5   Test(s) Performed  Ki-67   Ki-67 Percentage of Positive Nuclei  22 %        2.  Breast, right 8: 30 o'clock position, core biopsy: ([place] Q clip)  A.  Invasive mammary carcinoma, no special type (ductal with apocrine features), [place] histologic grade 2 (tubules = 3, nuclei = 3, mitoses = 1), measuring  2.7 mm maximally, and involving approximately 3 core fragments.  B.  No definitive in situ component is identified.  Estrogen Receptor (ER) Status  Positive (greater than 10% of cells demonstrate nuclear positivity)   Percentage of Cells with Nuclear Positivity  %   Average Intensity of Staining  Strong   Test Type  Food and Drug Administration (FDA) cleared (test / vendor): [place]   Primary Antibody  SP1   Test(s) Performed     Progesterone Receptor (PgR) Status  Positive   Percentage of Cells with Nuclear Positivity  %   Average Intensity of Staining  Strong   Test Type  Food and Drug Administration (FDA) cleared (test / vendor): [place]   Primary Antibody  1E2   Test(s) Performed     HER2 by Immunohistochemistry  Negative (Score 0)   Test Type  Food and Drug Administration (FDA) cleared (test / vendor): [place]   Primary Antibody  4B5   Test(s) Performed  Ki-67   Ki-67 Percentage of Positive Nuclei  20 %       Assessment & Plan  Assessment:  53 y.o. F with a new diagnosis of right: Invasive ductal carcinoma grade 2, ER/VA +, Her2 -; [id number], Clinical anatomic stage IA, Clinical prognostic stage IA.      Discussion:  I had an extensive discussion with the patient and family about the nature of this breast cancer diagnosis. We reviewed the components of breast tissue including ducts and lobules. We reviewed her pathology report in detail. We reviewed breast cancer histology, including stage, grade, ER/VA receptors, HER2 receptors and how this applies to her diagnosis. We discussed the multidisciplinary approach to breast cancer care.  Treatments can include surgery, radiation therapy, and medical therapy (chemotherapy, targeted therapy, and/or endocrine therapy).  The exact type of treatment and the order of therapy depends on the size and location/distribution of breast disease, the involvement of the regional lymph node basins, concern for or presence of metastatic disease, potential genetic  mutations, and the individual breast cancer tumor markers expressed by the cancer. We also discussed other treatment options including the option of not undergoing any surgical treatment, with a palliative rather than curative intent and the risks associated with this including disease progression.    The patient's clinical stage is documented as above. This was discussed with the patient prior to initiation of treatment. All available pathology reports were discussed with the patient today. All treatment decisions were made via shared decision making with the patient. This patient was evaluated for appropriate ancillary referrals including, pre-treatment functional assessment, exercise program, nutrition program, genetics, and lymphedema clinic. This patient received preoperative and postoperative education. The patient was offered a breast reconstruction referral appropriate to plan surgical intervention; risks and benefits were discussed today. The patient was educated on perioperative multimodal pain management strategies. Barriers to effective and efficient care are/will be evaluated by the nurse navigator.    We discussed these options and recommendations for treatment:    Surgical Options:  Discussed breast surgery and lymph node sampling surgery will be required to move forward.  At this point in time she is a candidate for breast conservation or mastectomy.  Will complete breast MRI for full extent of disease and evaluation for involvement in pectoralis.  By available imaging and physical exam with dimpling of the skin and mass not freely mobile within the breast tissue concern for either pectoralis involvement or capsular involvement.  She has subpectoral implants.  Any surgical intervention will require involvement of that implant capsule.  Should she proceed with breast conservation would plan to excise the skin overlying the palpable mass as well as the tissue deep to the cancer.  She is interested in  breast conservation if at all possible.  This will be determined on breast MRI.  Should she need mastectomy would again need assistance from plastic surgery for appropriate cosmetic goals and management of her existing implants.    Medical Oncology:  We discussed that in her case, systemic treatment would involve endocrine therapy and possibly chemotherapy. She will be referred to medical oncology postoperatively to discuss this further. She is an oncotype candidate to determine if chemotherapy following surgery would help improve her overall survival and reduce the possibility of distant metastasis OR if there would be very little benefit from these therapies.     Radiation Oncology:  Pending surgery of choice and final surgical margins she is a candidate for adjuvant radiation therapy.  Would be recommended for all cases of breast conservation, would be indicated in some cases of mastectomies.    Role for Genetic Testing:  I explained that most breast cancer is not hereditary, that I do not believe this plays a role in her case, and that she does not meet NCCN criteria for genetic testing. I would not recommend a bilateral mastectomy, since her risk of a second primary cancer is relatively low and endocrine therapy will further reduce her risk.        Plan:  A multidisciplinary plan has been formulated for the patient:      # Breast Surgical Oncology:  -Breast MRI now, either our office or myself will call her with the findings from this test  - Discussed both surgical options during the visit, final surgical plan pending imaging results  - Plastic surgery referral placed today      # Medical Oncology:  -Is not Oncotype candidate  -Will refer postoperatively.    #  Radiation Oncology:  -Will refer postoperatively.    # Nurse Navigation  - Referral made today    # Lymphedema clinic  - Referral was placed today     # Genetic Testing   -Not indicated by NCCN guidelines    # Breast Imaging  - Next Screening mammogram  due: 4/2025      Romina De Anda MD  Breast Surgical Oncology    I spent 60 minutes caring for Dasha  on this date of service. This time includes time spent by me in the following activities: preparing for the visit, reviewing tests, obtaining and/or reviewing a separately obtained history, performing a medically appropriate examination and/or evaluation , counseling and educating the patient/family/caregiver, ordering medications, tests, or procedures, referring and communicating with other health care professionals , documenting information in the medical record, independently interpreting results and communicating that information with the patient/family/caregiver, and care coordination.      CC:  MD Augustus Christina, Ariana PONCE PA-C

## 2024-05-22 NOTE — PROGRESS NOTES
Discharge Summary  Discharge Summary from Physical Therapy     Patient: Dasha De Leon   : 1970  Today's Date: 2024    Patient seen for 1 visit  Dates of Service: 24      Comments : Pt came for her initial evaluation but she called and cancelled all scheduled appointments due to having other more pressing medical issues that she needs to take care of. Pt will be DC from our services.        Thank you for this referral to [facility].    SIGNATURE: Gideon Pink PT

## 2024-05-22 NOTE — TELEPHONE ENCOUNTER
Pt notified he appt with Dr Bender is scheduled on 5/30/2024 at 1:00.  Address and contact information was shared with pt as well.     CMA

## 2024-05-24 ENCOUNTER — PATIENT OUTREACH (OUTPATIENT)
Dept: OTHER | Facility: HOSPITAL | Age: 54
End: 2024-05-24
Payer: COMMERCIAL

## 2024-05-24 NOTE — PROGRESS NOTES
Referral rec'd from Dr. De Anda.  Reviewed chart.     Called patient. Introduced myself and explained that I am covering for Jennie Martinez, her breast navigator, who will be back the first week of June.  Briefly explained navigation services.  The patient is unable to talk right now.  Provided by name and number as well as Jennie's.  Asked that she call back at her convenience to discuss the role of navigation and services available to her through the Supportive Oncology team.      Will ask Jennie to contact her the week of 6/3 if patient hasn't called back.

## 2024-05-25 ENCOUNTER — HOSPITAL ENCOUNTER (OUTPATIENT)
Facility: HOSPITAL | Age: 54
Discharge: HOME | End: 2024-05-25
Admitting: STUDENT IN AN ORGANIZED HEALTH CARE EDUCATION/TRAINING PROGRAM
Payer: COMMERCIAL

## 2024-05-25 DIAGNOSIS — Z17.0 MALIGNANT NEOPLASM OF LOWER-OUTER QUADRANT OF RIGHT BREAST OF FEMALE, ESTROGEN RECEPTOR POSITIVE: ICD-10-CM

## 2024-05-25 DIAGNOSIS — C50.511 MALIGNANT NEOPLASM OF LOWER-OUTER QUADRANT OF RIGHT BREAST OF FEMALE, ESTROGEN RECEPTOR POSITIVE: ICD-10-CM

## 2024-05-25 PROCEDURE — 0 GADOBENATE DIMEGLUMINE 529 MG/ML SOLUTION: Performed by: STUDENT IN AN ORGANIZED HEALTH CARE EDUCATION/TRAINING PROGRAM

## 2024-05-25 PROCEDURE — A9577 INJ MULTIHANCE: HCPCS | Performed by: STUDENT IN AN ORGANIZED HEALTH CARE EDUCATION/TRAINING PROGRAM

## 2024-05-25 PROCEDURE — 77049 MRI BREAST C-+ W/CAD BI: CPT

## 2024-05-25 RX ADMIN — GADOBENATE DIMEGLUMINE 17 ML: 529 INJECTION, SOLUTION INTRAVENOUS at 12:08

## 2024-05-29 ENCOUNTER — TELEPHONE (OUTPATIENT)
Dept: SURGERY | Facility: CLINIC | Age: 54
End: 2024-05-29
Payer: COMMERCIAL

## 2024-06-01 DIAGNOSIS — M19.90 ARTHRITIS: ICD-10-CM

## 2024-06-03 ENCOUNTER — PATIENT OUTREACH (OUTPATIENT)
Dept: OTHER | Facility: HOSPITAL | Age: 54
End: 2024-06-03
Payer: COMMERCIAL

## 2024-06-03 RX ORDER — MELOXICAM 15 MG/1
15 TABLET ORAL DAILY
Qty: 90 TABLET | Refills: 0 | Status: SHIPPED | OUTPATIENT
Start: 2024-06-03

## 2024-06-03 NOTE — PROGRESS NOTES
Referral received from Dr. De Anda's office. I called Ms. De Leon and introduced myself and navigational services. She stated the consult with Dr. De Anda went well and she is leaning toward a mastectomy with reconstruction and is waiting on a surgery date. She has a good understanding of her pathology and treatment options. She was able to verbalize teach back on her plan of care.      She stated she has a wonderful support system with her mom, family and friends. She stated she feels comfortable talking to them about needs or issues.      She stated she has no financial or transportation concerns at this time. She has no resource needs or ongoing concerns at this time.      She stated she is doing well emotionally at this time. We discussed we have support options if the need arises. She was thankful for the information.      We discussed integrative therapies and other services at the [facility]. She already received a navigation folder with the following information in the mail:     [facility], Cancer and Restorative Exercise (CARE), [employer] Exercise program, Guide for the Newly Diagnosed, [employer], [facility], Massage Therapy, Reiki Therapy, [employer] [place], Cancer Nutrition, and Survivorship Clinic.     She verbalized appreciation for navigational services and she has my contact information and will call with any questions that arise.

## 2024-06-04 NOTE — PROGRESS NOTES
Subjective   Patient ID: Dasha De Leon is a 53 y.o. female is here today for 6 week follow-up.     Today patient states that her symptoms have gotten better     History of Present Illness    This patient returns today.  She is doing a little bit better than she was when I last saw her.  She still has some pain but it is more tolerable than it was when she was here in April.    The following portions of the patient's history were reviewed and updated as appropriate: allergies, current medications, past family history, past medical history, past social history, past surgical history, and problem list.    Review of Systems   Constitutional:  Negative for chills and fever.   HENT:  Negative for congestion.    Musculoskeletal:  Positive for back pain. Negative for gait problem and myalgias.   Neurological:  Positive for numbness. Negative for weakness.       I reviewed the review of systems listed by the patient and discussed by my MA    Objective     There were no vitals filed for this visit.  There is no height or weight on file to calculate BMI.    Tobacco Use: Medium Risk (6/13/2024)    Patient History     Smoking Tobacco Use: Former     Smokeless Tobacco Use: Never     Passive Exposure: Past          Physical Exam  Neurological:      Mental Status: She is alert and oriented to person, place, and time.       Neurologic Exam     Mental Status   Oriented to person, place, and time.           Assessment & Plan   Independent Review of Radiographic Studies:      I personally reviewed the images from the following studies.    I reviewed her MRI which was done at the end of March.  This does show some narrowing at L2-3 but it is not severe.  There is some disc bulging at L5-S1 as well.    Medical Decision Making:      I told the patient I would tend to hold off on doing anything further at this point.  She is to call if any problems develop.  She is scheduled to have a mastectomy in the near future and I do not think  her back issues will affect that.    Diagnoses and all orders for this visit:    1. Spinal stenosis of lumbar region with neurogenic claudication (Primary)      Return if symptoms worsen or fail to improve.

## 2024-06-06 ENCOUNTER — TELEPHONE (OUTPATIENT)
Dept: SURGERY | Facility: CLINIC | Age: 54
End: 2024-06-06
Payer: COMMERCIAL

## 2024-06-13 ENCOUNTER — OFFICE VISIT (OUTPATIENT)
Dept: NEUROSURGERY | Facility: CLINIC | Age: 54
End: 2024-06-13
Payer: COMMERCIAL

## 2024-06-13 ENCOUNTER — LAB (OUTPATIENT)
Facility: HOSPITAL | Age: 54
End: 2024-06-13
Payer: COMMERCIAL

## 2024-06-13 DIAGNOSIS — I10 ESSENTIAL HYPERTENSION: Primary | ICD-10-CM

## 2024-06-13 DIAGNOSIS — M48.062 SPINAL STENOSIS OF LUMBAR REGION WITH NEUROGENIC CLAUDICATION: Primary | ICD-10-CM

## 2024-06-13 LAB
HOLD SPECIMEN: NORMAL
WHOLE BLOOD HOLD SPECIMEN: NORMAL

## 2024-06-13 PROCEDURE — 80048 BASIC METABOLIC PNL TOTAL CA: CPT | Performed by: PHYSICIAN ASSISTANT

## 2024-06-13 PROCEDURE — 99213 OFFICE O/P EST LOW 20 MIN: CPT | Performed by: NEUROLOGICAL SURGERY

## 2024-06-17 ENCOUNTER — TELEPHONE (OUTPATIENT)
Dept: INTERNAL MEDICINE | Facility: CLINIC | Age: 54
End: 2024-06-17

## 2024-06-17 ENCOUNTER — OFFICE VISIT (OUTPATIENT)
Dept: INTERNAL MEDICINE | Facility: CLINIC | Age: 54
End: 2024-06-17
Payer: COMMERCIAL

## 2024-06-17 VITALS
DIASTOLIC BLOOD PRESSURE: 74 MMHG | HEIGHT: 66 IN | TEMPERATURE: 98.6 F | SYSTOLIC BLOOD PRESSURE: 122 MMHG | BODY MASS INDEX: 27.32 KG/M2 | WEIGHT: 170 LBS

## 2024-06-17 DIAGNOSIS — E55.9 VITAMIN D DEFICIENCY: ICD-10-CM

## 2024-06-17 DIAGNOSIS — F41.9 ANXIETY: ICD-10-CM

## 2024-06-17 DIAGNOSIS — E78.2 MIXED HYPERLIPIDEMIA: Primary | ICD-10-CM

## 2024-06-17 DIAGNOSIS — E78.2 MIXED HYPERLIPIDEMIA: ICD-10-CM

## 2024-06-17 DIAGNOSIS — I10 ESSENTIAL HYPERTENSION: Primary | ICD-10-CM

## 2024-06-17 PROCEDURE — 99213 OFFICE O/P EST LOW 20 MIN: CPT | Performed by: PHYSICIAN ASSISTANT

## 2024-06-17 RX ORDER — ALPRAZOLAM 0.25 MG/1
0.25 TABLET ORAL 2 TIMES DAILY PRN
Qty: 30 TABLET | Refills: 0 | Status: SHIPPED | OUTPATIENT
Start: 2024-06-17

## 2024-06-17 NOTE — TELEPHONE ENCOUNTER
Dandre with [employer] [id number] option 1 [id number] is needing some clarification on where this lab was done, if it was drawn here at the office and sent out for date of service 6/13/24.

## 2024-06-17 NOTE — PROGRESS NOTES
Subjective   Chief Complaint   Patient presents with    Hypertension    Anxiety    Hyperlipidemia       History of Present Illness      Pt is here today for fup. Bp has been well controlled. Back surgery was somewhat helpful, still has some pain. Recently diagnosed with BC HER2 neg, ER/CO positive. Will see her surgeon tomorrow, scheduled for right total mastectomy with reconstruction in July. Having some anxiety, using Xanax sparingly, needs a refill. No CP or SOA.     Patient Active Problem List   Diagnosis    IUD (intrauterine device) in place    Essential hypertension    Mood disorder    Allergic rhinitis    Hyperlipidemia    Knee pain, right    Lumbar radiculopathy    Vitamin D deficiency    Lumbar spinal stenosis    Follow-up examination following surgery    Abnormality of right breast on screening mammogram    Malignant neoplasm of lower-outer quadrant of right breast of female, estrogen receptor positive    Anxiety       Allergies   Allergen Reactions    Penicillins Anaphylaxis    Lisinopril Cough       Current Outpatient Medications on File Prior to Visit   Medication Sig Dispense Refill    buPROPion XL (Wellbutrin XL) 300 MG 24 hr tablet Take 1 tablet by mouth Daily. 90 tablet 1    cyclobenzaprine (FLEXERIL) 10 MG tablet Take 1 tablet by mouth 3 (Three) Times a Day As Needed for Muscle Spasms. 90 tablet 3    escitalopram (LEXAPRO) 20 MG tablet Take 1 tablet by mouth Daily. 90 tablet 3    famotidine (PEPCID) 20 MG tablet Take 1 tablet by mouth 2 (Two) Times a Day. 180 tablet 3    furosemide (Lasix) 20 MG tablet Take 1 tablet by mouth 2 (Two) Times a Day. 90 tablet 1    HYDROcodone-acetaminophen (NORCO) 5-325 MG per tablet Take 1 tablet by mouth Every 8 (Eight) Hours As Needed for Moderate Pain. 20 tablet 0    levocetirizine (XYZAL) 5 MG tablet Take one tablet by mouth twice daily for urticaria. 180 tablet 3    levonorgestrel (MIRENA) 20 MCG/24HR IUD 1 each by Intrauterine route 1 (One) Time.      meloxicam  (MOBIC) 15 MG tablet Take 1 tablet by mouth Daily. 90 tablet 0    olmesartan (BENICAR) 40 MG tablet Take 0.5 tablets by mouth Daily. 45 tablet 1    rosuvastatin (Crestor) 40 MG tablet Take 1 tablet by mouth Daily. 90 tablet 3    [DISCONTINUED] ALPRAZolam (Xanax) 0.25 MG tablet Take 1 tablet by mouth 2 (Two) Times a Day As Needed for Anxiety. 30 tablet 0     No current facility-administered medications on file prior to visit.       Past Medical History:   Diagnosis Date    Abnormal Pap smear of cervix     Baker's cyst     Bulging lumbar disc     Bursitis     Depression     GERD (gastroesophageal reflux disease)     H/O colposcopy with cervical biopsy     unknown pap result, biopsy was neg per pt, 2013 [facility]    HPV (human papilloma virus) infection     Hyperlipemia     Hypertension     Low back pain     Lumbar radiculopathy     Osteoarthritis     Scoliosis     Seasonal allergies        Family History   Problem Relation Age of Onset    Hypertension Mother     Hyperlipidemia Mother     Diverticulitis Mother     Pancreatitis Mother     Kidney disease Father     Skin cancer Father     Hypertension Father     Hyperlipidemia Father     Stroke Father     Atrial fibrillation Father     Transient ischemic attack Father     Depression Brother     Liver disease Maternal Grandmother     Heart disease Maternal Grandfather     Cancer Maternal Grandfather     Heart attack Paternal Grandmother     No Known Problems Paternal Grandfather     Lung cancer Maternal Uncle     Heart attack Maternal Uncle     Uterine cancer Maternal Aunt 58    Heart disease Maternal Aunt     Colon cancer Paternal Uncle 65    No Known Problems Sister     No Known Problems Daughter     No Known Problems Son     No Known Problems Paternal Aunt     BRCA 1/2 Neg Hx     Breast cancer Neg Hx     Endometrial cancer Neg Hx     Ovarian cancer Neg Hx        Social History     Socioeconomic History    Marital status: Single   Tobacco Use    Smoking status: Former  "    Current packs/day: 0.00     Average packs/day: 1 pack/day for 20.0 years (20.0 ttl pk-yrs)     Types: Cigarettes     Start date:      Quit date:      Years since quittin.4     Passive exposure: Past    Smokeless tobacco: Never   Vaping Use    Vaping status: Never Used   Substance and Sexual Activity    Alcohol use: Yes     Alcohol/week: 2.0 - 4.0 standard drinks of alcohol     Types: 2 - 4 Glasses of wine per week     Comment: weekly    Drug use: No    Sexual activity: Not Currently     Birth control/protection: I.U.D.       Past Surgical History:   Procedure Laterality Date    AUGMENTATION MAMMAPLASTY      BREAST AUGMENTATION      CARPAL TUNNEL RELEASE      CRYOTHERAPY          EPIDURAL BLOCK      HAND SURGERY      Pisiformectomy    LUMBAR DISCECTOMY FUSION INSTRUMENTATION N/A 2024    Procedure: Lumbar 3 to lumbar 4 and lumbar 4 to lumbar 5 laminectomy with fusion and instrumentation;  Surgeon: Rigoberto Botello MD;  Location: [facility];  Service: [employer] - Neuro;  Laterality: N/A;    MAMMO BILATERAL      PAP SMEAR      SHOULDER ARTHROSCOPY  ,    SHOULDER SURGERY      WRIST SURGERY           The following portions of the patient's history were reviewed and updated as appropriate: problem list, allergies, current medications, past medical history, past family history, past social history, and past surgical history.    ROS    See HPI    Immunization History   Administered Date(s) Administered    COVID-19 (PFIZER) Purple Cap Monovalent 2021, 2021    Fluzone (or Fluarix & Flulaval for VFC) >6mos 2020, 10/28/2021    Fluzone Quad >6mos (Multi-dose) 10/29/2018, 10/28/2019    Tdap 2021       Objective   Vitals:    24 0810   BP: 122/74   Temp: 98.6 °F (37 °C)   Weight: 77.1 kg (170 lb)   Height: 167.6 cm (65.98\")     Body mass index is 27.45 kg/m².  Physical Exam  Vitals reviewed.   Constitutional:       Appearance: She is well-developed. "   HENT:      Head: Normocephalic and atraumatic.   Cardiovascular:      Rate and Rhythm: Normal rate and regular rhythm.      Heart sounds: Normal heart sounds, S1 normal and S2 normal.   Pulmonary:      Effort: Pulmonary effort is normal.      Breath sounds: Normal breath sounds.   Skin:     General: Skin is warm.   Neurological:      Mental Status: She is alert.   Psychiatric:         Behavior: Behavior normal.           Assessment & Plan   Diagnoses and all orders for this visit:    1. Essential hypertension (Primary)    2. Anxiety  -     ALPRAZolam (Xanax) 0.25 MG tablet; Take 1 tablet by mouth 2 (Two) Times a Day As Needed for Anxiety.  Dispense: 30 tablet; Refill: 0    3. Mixed hyperlipidemia  -     Lipid Panel With / Chol / HDL Ratio; Future  -     Comprehensive Metabolic Panel; Future    4. Vitamin D deficiency  -     Vitamin D,25-Hydroxy; Future     BP controlled. Need to recheck lipids, could not have added on to lab. Appropriate use of Xanax prn.     Return in about 6 months (around 12/17/2024) for Lab Appt Before FUP.

## 2024-06-18 ENCOUNTER — PREP FOR SURGERY (OUTPATIENT)
Dept: OTHER | Facility: HOSPITAL | Age: 54
End: 2024-06-18
Payer: COMMERCIAL

## 2024-06-18 ENCOUNTER — OFFICE VISIT (OUTPATIENT)
Dept: SURGERY | Facility: CLINIC | Age: 54
End: 2024-06-18
Payer: COMMERCIAL

## 2024-06-18 VITALS
DIASTOLIC BLOOD PRESSURE: 86 MMHG | BODY MASS INDEX: 27.32 KG/M2 | OXYGEN SATURATION: 98 % | HEART RATE: 107 BPM | SYSTOLIC BLOOD PRESSURE: 124 MMHG | RESPIRATION RATE: 18 BRPM | WEIGHT: 170 LBS | HEIGHT: 66 IN

## 2024-06-18 DIAGNOSIS — C50.511 MALIGNANT NEOPLASM OF LOWER-OUTER QUADRANT OF RIGHT BREAST OF FEMALE, ESTROGEN RECEPTOR POSITIVE: Primary | ICD-10-CM

## 2024-06-18 DIAGNOSIS — Z17.0 MALIGNANT NEOPLASM OF LOWER-OUTER QUADRANT OF RIGHT BREAST OF FEMALE, ESTROGEN RECEPTOR POSITIVE: Primary | ICD-10-CM

## 2024-06-18 RX ORDER — CLINDAMYCIN PHOSPHATE 900 MG/50ML
900 INJECTION, SOLUTION INTRAVENOUS ONCE
Status: CANCELLED | OUTPATIENT
Start: 2024-06-18 | End: 2024-06-18

## 2024-06-18 RX ORDER — DIAZEPAM 5 MG/1
10 TABLET ORAL ONCE
Status: CANCELLED | OUTPATIENT
Start: 2024-06-18 | End: 2024-06-18

## 2024-06-18 NOTE — PROGRESS NOTES
[facility]     Referring Provider: No ref. provider found     Chief complaint: newly diagnosed breast cancer    Subjective    HPI: Ms. Dasha De Leon is a 53 y.o. yo woman, seen at the request of No ref. provider found for a new diagnosis of right breast cancer.    She noticed a palpable mass in her right breast starting in early to mid April.  She already had a scheduled mammogram around that time.  After she noted the palpable mass she noticed some pulling and dimpling of her skin in a similar area.  She denies any skin thickening, nipple discharge or other changes to her breast.  Her previous screening mammogram in April 2023 was normal and largely fatty replaced with scattered areas of fibroglandular densities.  She underwent breast augmentation with retropectoral implants sometime ago, completed by plastic surgery at [facility].    She denies any prior history of abnormal mammograms or breast biopsies.     She reports she has a pertinent PMH of HTN, HLD, spinal stenosis, allergies.     She was by herself in clinic today.        HPI 5/18/24 - met with Dr Pretty. Plan for left mastectomy and sentinel lymph node biopsy. She has no new concerns, no changes to her overall state of health. Plan for explant of her implant at the time of surgery with reconstruction per plastic surgery.     Oncology/Hematology History   Malignant neoplasm of lower-outer quadrant of right breast of female, estrogen receptor positive   5/15/2024 Cancer Staged    Staging form: Breast, AJCC 8th Edition  - Clinical stage from 5/15/2024: Stage IA (cT1c, cN0(f), cM0, G2, ER+, WA+, HER2-) - Signed by Romina De Anda MD on 5/22/2024 5/22/2024 Initial Diagnosis    Malignant neoplasm of lower-outer quadrant of right breast of female, estrogen receptor positive         Review of Systems   Constitutional:  Positive for chills.   HENT:  Negative.     Eyes: Negative.    Respiratory: Negative.     Cardiovascular: Negative.     Gastrointestinal: Negative.    Endocrine: Negative.    Genitourinary: Negative.     Musculoskeletal: Negative.    Skin: Negative.    Neurological: Negative.    Hematological: Negative.    Psychiatric/Behavioral: Negative.         Medications:    Current Outpatient Medications:     ALPRAZolam (Xanax) 0.25 MG tablet, Take 1 tablet by mouth 2 (Two) Times a Day As Needed for Anxiety., Disp: 30 tablet, Rfl: 0    buPROPion XL (Wellbutrin XL) 300 MG 24 hr tablet, Take 1 tablet by mouth Daily., Disp: 90 tablet, Rfl: 1    cyclobenzaprine (FLEXERIL) 10 MG tablet, Take 1 tablet by mouth 3 (Three) Times a Day As Needed for Muscle Spasms., Disp: 90 tablet, Rfl: 3    escitalopram (LEXAPRO) 20 MG tablet, Take 1 tablet by mouth Daily., Disp: 90 tablet, Rfl: 3    famotidine (PEPCID) 20 MG tablet, Take 1 tablet by mouth 2 (Two) Times a Day., Disp: 180 tablet, Rfl: 3    furosemide (Lasix) 20 MG tablet, Take 1 tablet by mouth 2 (Two) Times a Day., Disp: 90 tablet, Rfl: 1    HYDROcodone-acetaminophen (NORCO) 5-325 MG per tablet, Take 1 tablet by mouth Every 8 (Eight) Hours As Needed for Moderate Pain., Disp: 20 tablet, Rfl: 0    levocetirizine (XYZAL) 5 MG tablet, Take one tablet by mouth twice daily for urticaria., Disp: 180 tablet, Rfl: 3    levonorgestrel (MIRENA) 20 MCG/24HR IUD, 1 each by Intrauterine route 1 (One) Time., Disp: , Rfl:     meloxicam (MOBIC) 15 MG tablet, Take 1 tablet by mouth Daily., Disp: 90 tablet, Rfl: 0    olmesartan (BENICAR) 40 MG tablet, Take 0.5 tablets by mouth Daily., Disp: 45 tablet, Rfl: 1    rosuvastatin (Crestor) 40 MG tablet, Take 1 tablet by mouth Daily., Disp: 90 tablet, Rfl: 3    Allergies:  Allergies   Allergen Reactions    Penicillins Anaphylaxis    Lisinopril Cough       Medical history:  Past Medical History:   Diagnosis Date    Abnormal Pap smear of cervix     Baker's cyst     Bulging lumbar disc     Bursitis     Depression     GERD (gastroesophageal reflux disease)     H/O colposcopy with  cervical biopsy     unknown pap result, biopsy was neg per pt, 2013 [facility]    HPV (human papilloma virus) infection     Hyperlipemia     Hypertension     Low back pain     Lumbar radiculopathy     Osteoarthritis     Scoliosis     Seasonal allergies        Surgical History:  Past Surgical History:   Procedure Laterality Date    AUGMENTATION MAMMAPLASTY      BREAST AUGMENTATION      CARPAL TUNNEL RELEASE  2004    CRYOTHERAPY      1997    EPIDURAL BLOCK      HAND SURGERY  2004    Pisiformectomy    LUMBAR DISCECTOMY FUSION INSTRUMENTATION N/A 1/5/2024    Procedure: Lumbar 3 to lumbar 4 and lumbar 4 to lumbar 5 laminectomy with fusion and instrumentation;  Surgeon: Rigoberto Botello MD;  Location: [facility] OR;  Service: Robotics - Neuro;  Laterality: N/A;    MAMMO BILATERAL  2014    PAP SMEAR  20116    SHOULDER ARTHROSCOPY  2002,2003    SHOULDER SURGERY      WRIST SURGERY         Family History:  Family History   Problem Relation Age of Onset    Hypertension Mother     Hyperlipidemia Mother     Diverticulitis Mother     Pancreatitis Mother     Kidney disease Father     Skin cancer Father     Hypertension Father     Hyperlipidemia Father     Stroke Father     Atrial fibrillation Father     Transient ischemic attack Father     Depression Brother     Liver disease Maternal Grandmother     Heart disease Maternal Grandfather     Cancer Maternal Grandfather     Heart attack Paternal Grandmother     No Known Problems Paternal Grandfather     Lung cancer Maternal Uncle     Heart attack Maternal Uncle     Uterine cancer Maternal Aunt 58    Heart disease Maternal Aunt     Colon cancer Paternal Uncle 65    No Known Problems Sister     No Known Problems Daughter     No Known Problems Son     No Known Problems Paternal Aunt     BRCA 1/2 Neg Hx     Breast cancer Neg Hx     Endometrial cancer Neg Hx     Ovarian cancer Neg Hx        Family Cancer History: relationship - (age of diagnosis/current age or age at death)  Breast: N/A    Ovarian: N/A  Colon: Paternal Uncle Dx (in 60's)   Pancreas: N/A  Prostate: N/A    Social History:   Social History     Socioeconomic History    Marital status: Single   Tobacco Use    Smoking status: Former     Current packs/day: 0.00     Average packs/day: 1 pack/day for 20.0 years (20.0 ttl pk-yrs)     Types: Cigarettes     Start date:      Quit date:      Years since quittin.4     Passive exposure: Past    Smokeless tobacco: Never   Vaping Use    Vaping status: Never Used   Substance and Sexual Activity    Alcohol use: Yes     Alcohol/week: 2.0 - 4.0 standard drinks of alcohol     Types: 2 - 4 Glasses of wine per week     Comment: weekly    Drug use: No    Sexual activity: Not Currently     Birth control/protection: I.U.D.       She lives with her mother.  She works as a registered medical assistant.       GYNECOLOGIC HISTORY:   . P: 0. AB: 0.  Last menstrual period: 2020  Age at menarche: 13  Age at first childbirth: N/A  Lactation/How long: N/A  Age at menopause: 49  Total years of oral contraceptive use: 25  Total years of hormone replacement therapy: 0      Objective   PHYSICAL EXAMINATION:   Vitals:    24 0755   BP: 124/86   Pulse: 107   Resp: 18   SpO2: 98%     Examination chaperoned by Leila Ruiz.  ECOG 0 - Asymptomatic  General: NAD, well appearing  Psych: a&o x 3, normal mood and affect  Eyes: EOMI, no scleral icterus  ENMT: neck supple without masses or thyromegaly, mucus membranes moist  Resp: normal effort  CV: RRR, no edema   GI: soft, NT, ND  MSK: normal gait, normal ROM in bilateral shoulders  Lymph nodes:  no cervical, supraclavicular or axillary lymphadenopathy  Breast: symmetric, 30 cc, large breast volume bilaterally, implants with well-healed inframammary incisions.  Right: Lower outer quadrant with skin tethering, palpable mass approximately 2 cm in size.  No other skin changes, or nipple abnormalities.  Left: No visible abnormalities on inspection while  seated, with arms raised or hands on hips. No masses, skin changes, or nipple abnormalities.    Right breast, in-office ultrasound: Lower outer quadrant 8:00 visible breast mass with biopsy clip centrally located.  Second breast mass not well-visualized.  Appears to be attached to underlying pectoralis muscle       Previous IMAGING: I have independently reviewed her imagin2023 bilateral screening mammogram [facility]) scattered areas of fibroglandular density.  Bilateral retropectoral saline implants.  No suspicious masses calcifications or other abnormalities are seen.  BI-RADS 1    2024 bilateral screening mammogram  The breasts are almost entirely fatty  Bilateral retropectoral saline implants  There is a round mass measuring 16 mm with spiculated margins seen in the posterior one third region of the right breast at 9:00.  This is new since prior exams.  Left breast no suspicious masses calcifications or other abnormalities are seen  BI-RADS 0    5/10/2024 right diagnostic mammogram  Breast is almost entirely fatty  Retropectoral saline implants  Finding 1: Round mass in the right breast at 9:00 previously seen 2024.  On present there is a high density round mass measuring 16 mm with spiculated margins.  Approximately in the right breast 8:00 10 cm from the nipple.  Right breast ultrasound  Finding 1: Round parallel hypoechoic mass with indistinct And spiculated margins 15 mm in the posterior one third region of the right breast at 8:00 10 cm from the nipple. with internal vascularity there are decreased posterior echoes; edge shadows are excluded.  Finding 2: There is an irregular nonparallel hypoechoic mass with indistinct margins measuring 4 x 6 x 4 mm seen in the 830 o'clock region of the right breast 11 cm from the nipple there are decreased posterior echoes, edge shadows are excluded posterior echoes, and shadows not excluded  Visualized axillary lymph  nodes are normal  Impression:  Finding 1: Mass right breast is highly suggestive of malignancy ultrasound-guided biopsy is recommended  Finding 2: Mass 830 o'clock region of the right breast is suspicious ultrasound-guided biopsy is recommended  BI-RADS 5  Addendum:  Finding 1: On the obtained sonographic images mass approaches the skin was not definitively involve the overlying skin, no abnormal skin thickening visualized  Due to posterior acoustic shadowing no definitive involvement of the underlying pectoralis is seen however during mammographic imaging acquisition the technologist reported difficulty obtaining images which potentially may be secondary to her implant rather than pectoralis involvement  Impression finding 1 mass in the right breast is highly suggestive of malignancy ultrasound-guided biopsy is recommended  BI-RADS 5    5/15/2024 right ultrasound-guided biopsy 8 o'clock position 12-gauge vacuum-assisted device was utilized 5 cores were obtained.  A true kyra vision Globe biopsy marker was placed at the biopsy site.  Postbiopsy mammogram shows the clip in expected location  Pathology returned as invasive ductal carcinoma  Surgical consultation is recommended  5/15/2024 right ultrasound-guided biopsy 830 o'clock  Using a 12-gauge core needle biopsy 5 cores were obtained true kyra Pro Q tissue marker was placed within the biopsy site.  Pathology showed invasive ductal carcinoma  Follow-up mammogram and ultrasound shows clip in the expected location  Surgical consultation is recommended    5/15/2024 right ultrasound-guided needle aspiration the patient's right breast 8:00 was imaged and a 21-gauge needle was advanced to the target 3 passes were made through the area and a HydroMARK biopsy clip was deployed within the FNA bed.  Postprocedure mammogram ultrasound shows the clip in expected location pathology returned as bland lymphocytes with no evidence of metastatic disease in intramammary lymph node.   Pathology is benign and concordant    5/25/24 Breast MRI  FINDINGS: Scattered fibroglandular tissue is seen throughout both  breasts. Minimal background parenchymal enhancement is noted. Bilateral  subpectoral intact saline implants are noted.     In the posterior one-third lower outer quadrant of the right breast at  the 8:30 position centered on the order of 10 cm from the nipple there  is an irregular enhancing mass that measures on the order of 2.3 cm in  the anterior to posterior dimension, 2 cm in the superior to inferior  dimension and 2.2 cm in the medial to lateral dimension. A central  signal void is noted. This corresponds to the biopsy-proven malignancy  with the internal vision globe metallic clip. There is dendritic  enhancement that extends along the posteromedial margin of the mass to  the lateral margin of the implant capsule and there is some enhancement  of the overlying pectoral muscle and/or biologic implant capsule in this  region.     On the order of 2.2 cm posterior to the described malignancy at the 8:30  position and centered on the order of 13 cm from the nipple at the 8:30  position there is an irregular area of nonmass enhancement that measures  2.4 cm in the superior to inferior dimension, 1.9 cm in the anterior to  posterior dimension and 1.4 cm in the medial to lateral dimension. A  signal void is noted within this region. This corresponds to the  biopsy-proven site of malignancy with the internal [place] twirl-shaped  metallic clip. The medial margin of the nonmass enhancement abuts the  lateral margin of the overlying pectoral muscle and/or biologic implant  capsule at this location.     No other areas of suspicious enhancement or morphology are seen in the  right breast. I see no evidence for abnormal right breast skin or nipple  enhancement. Asymmetrically enlarged right axillary lymph nodes, some  with a rounded contour are noted. The largest right axillary lymph node  measures on  the order of 1.4 cm in greatest dimension. Cortical  thickening of at least 2 lymph nodes in the right axilla is noted. No  evidence for right internal mammary chain adenopathy is appreciated.     There are no areas of suspicious enhancement or morphology in the left  breast. I see no evidence for abnormal skin, nipple or chest wall  enhancement of the left breast and there is no evidence for left  axillary or internal mammary chain adenopathy.      IMPRESSION:  1. There are 2 biopsy-proven sites of malignancy in the right breast at  the 8:30 position in the posterior one-third that measure on the order  of 2.3 cm and 2.4 cm in greatest dimension with the vision globe and  twirl-shaped metallic clips seen within these lesions, respectively. The  lesions either abut or extend to the lateral margin of the pectoral  fascia that overlies the implant capsule and some extension into the  pectoral muscle at this location and/or biologic capsule is suspected.  Findings suspicious for right axillary adenopathy are noted.     2. There are no findings suspicious for malignancy in the left breast.     BI-RADS CATEGORY 6: Known biopsy-proven malignancy.    PATHOLOGY:   1.  Breast, right 8 o'clock position, core biopsy: (Vision globe clip)  A.  Invasive mammary carcinoma, no special type (ductal with apocrine features), [place] histologic grade 2 (tubules = 3, nuclei = 3, mitoses = 1), measuring 3 mm maximally, and involving approximately 4 core fragments.  B.  No definitive in situ carcinoma is identified.  Estrogen Receptor (ER) Status  Positive (greater than 10% of cells demonstrate nuclear positivity)   Percentage of Cells with Nuclear Positivity  %   Average Intensity of Staining  Strong   Test Type  Food and Drug Administration (FDA) cleared (test / vendor): [place]   Primary Antibody  SP1   Test(s) Performed     Progesterone Receptor (PgR) Status  Positive   Percentage of Cells with Nuclear Positivity  %    Average Intensity of Staining  Strong   Test Type  Food and Drug Administration (FDA) cleared (test / vendor): [place]   Primary Antibody  1E2   Test(s) Performed     HER2 by Immunohistochemistry  Negative (Score 1+)   Test Type  Food and Drug Administration (FDA) cleared (test / vendor): [place]   Primary Antibody  4B5   Test(s) Performed  Ki-67   Ki-67 Percentage of Positive Nuclei  22 %        2.  Breast, right 8: 30 o'clock position, core biopsy: ([place] Q clip)  A.  Invasive mammary carcinoma, no special type (ductal with apocrine features), [place] histologic grade 2 (tubules = 3, nuclei = 3, mitoses = 1), measuring 2.7 mm maximally, and involving approximately 3 core fragments.  B.  No definitive in situ component is identified.  Estrogen Receptor (ER) Status  Positive (greater than 10% of cells demonstrate nuclear positivity)   Percentage of Cells with Nuclear Positivity  %   Average Intensity of Staining  Strong   Test Type  Food and Drug Administration (FDA) cleared (test / vendor): [place]   Primary Antibody  SP1   Test(s) Performed     Progesterone Receptor (PgR) Status  Positive   Percentage of Cells with Nuclear Positivity  %   Average Intensity of Staining  Strong   Test Type  Food and Drug Administration (FDA) cleared (test / vendor): [place]   Primary Antibody  1E2   Test(s) Performed     HER2 by Immunohistochemistry  Negative (Score 0)   Test Type  Food and Drug Administration (FDA) cleared (test / vendor): [place]   Primary Antibody  4B5   Test(s) Performed  Ki-67   Ki-67 Percentage of Positive Nuclei  20 %       Assessment & Plan   Assessment:  53 y.o. F with a new diagnosis of right: Invasive ductal carcinoma grade 2, ER/OK +, Her2 -; [id number], Clinical anatomic stage IA, Clinical prognostic stage IA.      Discussion:  I had an extensive discussion with the patient and family about the nature of this breast cancer diagnosis. We reviewed the components of breast tissue including  ducts and lobules. We reviewed her pathology report in detail. We reviewed breast cancer histology, including stage, grade, ER/OK receptors, HER2 receptors and how this applies to her diagnosis. We discussed the multidisciplinary approach to breast cancer care.  Treatments can include surgery, radiation therapy, and medical therapy (chemotherapy, targeted therapy, and/or endocrine therapy).  The exact type of treatment and the order of therapy depends on the size and location/distribution of breast disease, the involvement of the regional lymph node basins, concern for or presence of metastatic disease, potential genetic mutations, and the individual breast cancer tumor markers expressed by the cancer. We also discussed other treatment options including the option of not undergoing any surgical treatment, with a palliative rather than curative intent and the risks associated with this including disease progression.    The patient's clinical stage is documented as above. This was discussed with the patient prior to initiation of treatment. All available pathology reports were discussed with the patient today. All treatment decisions were made via shared decision making with the patient. This patient was evaluated for appropriate ancillary referrals including, pre-treatment functional assessment, exercise program, nutrition program, genetics, and lymphedema clinic. This patient received preoperative and postoperative education. The patient was offered a breast reconstruction referral appropriate to plan surgical intervention; risks and benefits were discussed today. The patient was educated on perioperative multimodal pain management strategies. Barriers to effective and efficient care are/will be evaluated by the nurse navigator.    We discussed these options and recommendations for treatment:    Surgical Options:  Discussed breast surgery and lymph node sampling surgery will be required to move forward.  At this  point in time she is a candidate for breast conservation or mastectomy.  Will complete breast MRI for full extent of disease and evaluation for involvement in pectoralis.  By available imaging and physical exam with dimpling of the skin and mass not freely mobile within the breast tissue concern for either pectoralis involvement or capsular involvement.  She has subpectoral implants.  Any surgical intervention will require involvement of that implant capsule.  Should she proceed with breast conservation would plan to excise the skin overlying the palpable mass as well as the tissue deep to the cancer.  She is interested in breast conservation if at all possible.  This will be determined on breast MRI.  Should she need mastectomy would again need assistance from plastic surgery for appropriate cosmetic goals and management of her existing implants.    Medical Oncology:  We discussed that in her case, systemic treatment would involve endocrine therapy and possibly chemotherapy. She will be referred to medical oncology postoperatively to discuss this further. She is an oncotype candidate to determine if chemotherapy following surgery would help improve her overall survival and reduce the possibility of distant metastasis OR if there would be very little benefit from these therapies.     Radiation Oncology:  Pending surgery of choice and final surgical margins she is a candidate for adjuvant radiation therapy.  Would be recommended for all cases of breast conservation, would be indicated in some cases of mastectomies.    Role for Genetic Testing:  I explained that most breast cancer is not hereditary, that I do not believe this plays a role in her case, and that she does not meet NCCN criteria for genetic testing. I would not recommend a bilateral mastectomy, since her risk of a second primary cancer is relatively low and endocrine therapy will further reduce her risk.      Plan:  A multidisciplinary plan has been  formulated for the patient:      # Breast Surgical Oncology:  -Left mastectomy and left sentinel lymph node biopsy possible axillary dissection.  Management of previously placed implant and reconstruction to be completed by Dr. Bender  -Will plan to use ultrasound to assist in visualization of posterior lesions    # Medical Oncology:  -Is an Oncotype candidate  -Will refer postoperatively.    #  Radiation Oncology:  -Will refer postoperatively.    # Nurse Navigation  - Referral made today    # Lymphedema clinic  - Referral was placed today     # Genetic Testing   -Not indicated by NCCN guidelines    # Breast Imaging  - Next Screening mammogram due: 4/2025      Romina De Anda MD  Breast Surgical Oncology    I spent 60 minutes caring for Dasha  on this date of service. This time includes time spent by me in the following activities: preparing for the visit, reviewing tests, obtaining and/or reviewing a separately obtained history, performing a medically appropriate examination and/or evaluation , counseling and educating the patient/family/caregiver, ordering medications, tests, or procedures, referring and communicating with other health care professionals , documenting information in the medical record, independently interpreting results and communicating that information with the patient/family/caregiver, and care coordination.

## 2024-06-18 NOTE — H&P (VIEW-ONLY)
[facility]     Referring Provider: No ref. provider found     Chief complaint: newly diagnosed breast cancer    Subjective    HPI: Ms. Dasha De Leon is a 53 y.o. yo woman, seen at the request of No ref. provider found for a new diagnosis of right breast cancer.    She noticed a palpable mass in her right breast starting in early to mid April.  She already had a scheduled mammogram around that time.  After she noted the palpable mass she noticed some pulling and dimpling of her skin in a similar area.  She denies any skin thickening, nipple discharge or other changes to her breast.  Her previous screening mammogram in April 2023 was normal and largely fatty replaced with scattered areas of fibroglandular densities.  She underwent breast augmentation with retropectoral implants sometime ago, completed by plastic surgery at [facility].    She denies any prior history of abnormal mammograms or breast biopsies.     She reports she has a pertinent PMH of HTN, HLD, spinal stenosis, allergies.     She was by herself in clinic today.        HPI 5/18/24 - met with Dr Pretty. Plan for left mastectomy and sentinel lymph node biopsy. She has no new concerns, no changes to her overall state of health. Plan for explant of her implant at the time of surgery with reconstruction per plastic surgery.     Oncology/Hematology History   Malignant neoplasm of lower-outer quadrant of right breast of female, estrogen receptor positive   5/15/2024 Cancer Staged    Staging form: Breast, AJCC 8th Edition  - Clinical stage from 5/15/2024: Stage IA (cT1c, cN0(f), cM0, G2, ER+, WV+, HER2-) - Signed by Romina De Anda MD on 5/22/2024 5/22/2024 Initial Diagnosis    Malignant neoplasm of lower-outer quadrant of right breast of female, estrogen receptor positive         Review of Systems   Constitutional:  Positive for chills.   HENT:  Negative.     Eyes: Negative.    Respiratory: Negative.     Cardiovascular: Negative.     Gastrointestinal: Negative.    Endocrine: Negative.    Genitourinary: Negative.     Musculoskeletal: Negative.    Skin: Negative.    Neurological: Negative.    Hematological: Negative.    Psychiatric/Behavioral: Negative.         Medications:    Current Outpatient Medications:     ALPRAZolam (Xanax) 0.25 MG tablet, Take 1 tablet by mouth 2 (Two) Times a Day As Needed for Anxiety., Disp: 30 tablet, Rfl: 0    buPROPion XL (Wellbutrin XL) 300 MG 24 hr tablet, Take 1 tablet by mouth Daily., Disp: 90 tablet, Rfl: 1    cyclobenzaprine (FLEXERIL) 10 MG tablet, Take 1 tablet by mouth 3 (Three) Times a Day As Needed for Muscle Spasms., Disp: 90 tablet, Rfl: 3    escitalopram (LEXAPRO) 20 MG tablet, Take 1 tablet by mouth Daily., Disp: 90 tablet, Rfl: 3    famotidine (PEPCID) 20 MG tablet, Take 1 tablet by mouth 2 (Two) Times a Day., Disp: 180 tablet, Rfl: 3    furosemide (Lasix) 20 MG tablet, Take 1 tablet by mouth 2 (Two) Times a Day., Disp: 90 tablet, Rfl: 1    HYDROcodone-acetaminophen (NORCO) 5-325 MG per tablet, Take 1 tablet by mouth Every 8 (Eight) Hours As Needed for Moderate Pain., Disp: 20 tablet, Rfl: 0    levocetirizine (XYZAL) 5 MG tablet, Take one tablet by mouth twice daily for urticaria., Disp: 180 tablet, Rfl: 3    levonorgestrel (MIRENA) 20 MCG/24HR IUD, 1 each by Intrauterine route 1 (One) Time., Disp: , Rfl:     meloxicam (MOBIC) 15 MG tablet, Take 1 tablet by mouth Daily., Disp: 90 tablet, Rfl: 0    olmesartan (BENICAR) 40 MG tablet, Take 0.5 tablets by mouth Daily., Disp: 45 tablet, Rfl: 1    rosuvastatin (Crestor) 40 MG tablet, Take 1 tablet by mouth Daily., Disp: 90 tablet, Rfl: 3    Allergies:  Allergies   Allergen Reactions    Penicillins Anaphylaxis    Lisinopril Cough       Medical history:  Past Medical History:   Diagnosis Date    Abnormal Pap smear of cervix     Baker's cyst     Bulging lumbar disc     Bursitis     Depression     GERD (gastroesophageal reflux disease)     H/O colposcopy with  cervical biopsy     unknown pap result, biopsy was neg per pt, 2013 [facility]    HPV (human papilloma virus) infection     Hyperlipemia     Hypertension     Low back pain     Lumbar radiculopathy     Osteoarthritis     Scoliosis     Seasonal allergies        Surgical History:  Past Surgical History:   Procedure Laterality Date    AUGMENTATION MAMMAPLASTY      BREAST AUGMENTATION      CARPAL TUNNEL RELEASE  2004    CRYOTHERAPY      1997    EPIDURAL BLOCK      HAND SURGERY  2004    Pisiformectomy    LUMBAR DISCECTOMY FUSION INSTRUMENTATION N/A 1/5/2024    Procedure: Lumbar 3 to lumbar 4 and lumbar 4 to lumbar 5 laminectomy with fusion and instrumentation;  Surgeon: Rigoberto Botello MD;  Location: [facility] OR;  Service: Robotics - Neuro;  Laterality: N/A;    MAMMO BILATERAL  2014    PAP SMEAR  20116    SHOULDER ARTHROSCOPY  2002,2003    SHOULDER SURGERY      WRIST SURGERY         Family History:  Family History   Problem Relation Age of Onset    Hypertension Mother     Hyperlipidemia Mother     Diverticulitis Mother     Pancreatitis Mother     Kidney disease Father     Skin cancer Father     Hypertension Father     Hyperlipidemia Father     Stroke Father     Atrial fibrillation Father     Transient ischemic attack Father     Depression Brother     Liver disease Maternal Grandmother     Heart disease Maternal Grandfather     Cancer Maternal Grandfather     Heart attack Paternal Grandmother     No Known Problems Paternal Grandfather     Lung cancer Maternal Uncle     Heart attack Maternal Uncle     Uterine cancer Maternal Aunt 58    Heart disease Maternal Aunt     Colon cancer Paternal Uncle 65    No Known Problems Sister     No Known Problems Daughter     No Known Problems Son     No Known Problems Paternal Aunt     BRCA 1/2 Neg Hx     Breast cancer Neg Hx     Endometrial cancer Neg Hx     Ovarian cancer Neg Hx        Family Cancer History: relationship - (age of diagnosis/current age or age at death)  Breast: N/A    Ovarian: N/A  Colon: Paternal Uncle Dx (in 60's)   Pancreas: N/A  Prostate: N/A    Social History:   Social History     Socioeconomic History    Marital status: Single   Tobacco Use    Smoking status: Former     Current packs/day: 0.00     Average packs/day: 1 pack/day for 20.0 years (20.0 ttl pk-yrs)     Types: Cigarettes     Start date:      Quit date:      Years since quittin.4     Passive exposure: Past    Smokeless tobacco: Never   Vaping Use    Vaping status: Never Used   Substance and Sexual Activity    Alcohol use: Yes     Alcohol/week: 2.0 - 4.0 standard drinks of alcohol     Types: 2 - 4 Glasses of wine per week     Comment: weekly    Drug use: No    Sexual activity: Not Currently     Birth control/protection: I.U.D.       She lives with her mother.  She works as a registered medical assistant.       GYNECOLOGIC HISTORY:   . P: 0. AB: 0.  Last menstrual period: 2020  Age at menarche: 13  Age at first childbirth: N/A  Lactation/How long: N/A  Age at menopause: 49  Total years of oral contraceptive use: 25  Total years of hormone replacement therapy: 0      Objective   PHYSICAL EXAMINATION:   Vitals:    24 0755   BP: 124/86   Pulse: 107   Resp: 18   SpO2: 98%     Examination chaperoned by Leila Ruiz.  ECOG 0 - Asymptomatic  General: NAD, well appearing  Psych: a&o x 3, normal mood and affect  Eyes: EOMI, no scleral icterus  ENMT: neck supple without masses or thyromegaly, mucus membranes moist  Resp: normal effort  CV: RRR, no edema   GI: soft, NT, ND  MSK: normal gait, normal ROM in bilateral shoulders  Lymph nodes:  no cervical, supraclavicular or axillary lymphadenopathy  Breast: symmetric, 30 cc, large breast volume bilaterally, implants with well-healed inframammary incisions.  Right: Lower outer quadrant with skin tethering, palpable mass approximately 2 cm in size.  No other skin changes, or nipple abnormalities.  Left: No visible abnormalities on inspection while  seated, with arms raised or hands on hips. No masses, skin changes, or nipple abnormalities.    Right breast, in-office ultrasound: Lower outer quadrant 8:00 visible breast mass with biopsy clip centrally located.  Second breast mass not well-visualized.  Appears to be attached to underlying pectoralis muscle       Previous IMAGING: I have independently reviewed her imagin2023 bilateral screening mammogram ([facility]) scattered areas of fibroglandular density.  Bilateral retropectoral saline implants.  No suspicious masses calcifications or other abnormalities are seen.  BI-RADS 1    2024 bilateral screening mammogram  The breasts are almost entirely fatty  Bilateral retropectoral saline implants  There is a round mass measuring 16 mm with spiculated margins seen in the posterior one third region of the right breast at 9:00.  This is new since prior exams.  Left breast no suspicious masses calcifications or other abnormalities are seen  BI-RADS 0    5/10/2024 right diagnostic mammogram  Breast is almost entirely fatty  Retropectoral saline implants  Finding 1: Round mass in the right breast at 9:00 previously seen 2024.  On present there is a high density round mass measuring 16 mm with spiculated margins.  Approximately in the right breast 8:00 10 cm from the nipple.  Right breast ultrasound  Finding 1: Round parallel hypoechoic mass with indistinct And spiculated margins 15 mm in the posterior one third region of the right breast at 8:00 10 cm from the nipple. with internal vascularity there are decreased posterior echoes; edge shadows are excluded.  Finding 2: There is an irregular nonparallel hypoechoic mass with indistinct margins measuring 4 x 6 x 4 mm seen in the 830 o'clock region of the right breast 11 cm from the nipple there are decreased posterior echoes, edge shadows are excluded posterior echoes, and shadows not excluded  Visualized axillary lymph  nodes are normal  Impression:  Finding 1: Mass right breast is highly suggestive of malignancy ultrasound-guided biopsy is recommended  Finding 2: Mass 830 o'clock region of the right breast is suspicious ultrasound-guided biopsy is recommended  BI-RADS 5  Addendum:  Finding 1: On the obtained sonographic images mass approaches the skin was not definitively involve the overlying skin, no abnormal skin thickening visualized  Due to posterior acoustic shadowing no definitive involvement of the underlying pectoralis is seen however during mammographic imaging acquisition the technologist reported difficulty obtaining images which potentially may be secondary to her implant rather than pectoralis involvement  Impression finding 1 mass in the right breast is highly suggestive of malignancy ultrasound-guided biopsy is recommended  BI-RADS 5    5/15/2024 right ultrasound-guided biopsy 8 o'clock position 12-gauge vacuum-assisted device was utilized 5 cores were obtained.  A true kyra vision Globe biopsy marker was placed at the biopsy site.  Postbiopsy mammogram shows the clip in expected location  Pathology returned as invasive ductal carcinoma  Surgical consultation is recommended  5/15/2024 right ultrasound-guided biopsy 830 o'clock  Using a 12-gauge core needle biopsy 5 cores were obtained true kyra Pro Q tissue marker was placed within the biopsy site.  Pathology showed invasive ductal carcinoma  Follow-up mammogram and ultrasound shows clip in the expected location  Surgical consultation is recommended    5/15/2024 right ultrasound-guided needle aspiration the patient's right breast 8:00 was imaged and a 21-gauge needle was advanced to the target 3 passes were made through the area and a HydroMARK biopsy clip was deployed within the FNA bed.  Postprocedure mammogram ultrasound shows the clip in expected location pathology returned as bland lymphocytes with no evidence of metastatic disease in intramammary lymph node.   Pathology is benign and concordant    5/25/24 Breast MRI  FINDINGS: Scattered fibroglandular tissue is seen throughout both  breasts. Minimal background parenchymal enhancement is noted. Bilateral  subpectoral intact saline implants are noted.     In the posterior one-third lower outer quadrant of the right breast at  the 8:30 position centered on the order of 10 cm from the nipple there  is an irregular enhancing mass that measures on the order of 2.3 cm in  the anterior to posterior dimension, 2 cm in the superior to inferior  dimension and 2.2 cm in the medial to lateral dimension. A central  signal void is noted. This corresponds to the biopsy-proven malignancy  with the internal vision globe metallic clip. There is dendritic  enhancement that extends along the posteromedial margin of the mass to  the lateral margin of the implant capsule and there is some enhancement  of the overlying pectoral muscle and/or biologic implant capsule in this  region.     On the order of 2.2 cm posterior to the described malignancy at the 8:30  position and centered on the order of 13 cm from the nipple at the 8:30  position there is an irregular area of nonmass enhancement that measures  2.4 cm in the superior to inferior dimension, 1.9 cm in the anterior to  posterior dimension and 1.4 cm in the medial to lateral dimension. A  signal void is noted within this region. This corresponds to the  biopsy-proven site of malignancy with the internal [place] twirl-shaped  metallic clip. The medial margin of the nonmass enhancement abuts the  lateral margin of the overlying pectoral muscle and/or biologic implant  capsule at this location.     No other areas of suspicious enhancement or morphology are seen in the  right breast. I see no evidence for abnormal right breast skin or nipple  enhancement. Asymmetrically enlarged right axillary lymph nodes, some  with a rounded contour are noted. The largest right axillary lymph node  measures on  the order of 1.4 cm in greatest dimension. Cortical  thickening of at least 2 lymph nodes in the right axilla is noted. No  evidence for right internal mammary chain adenopathy is appreciated.     There are no areas of suspicious enhancement or morphology in the left  breast. I see no evidence for abnormal skin, nipple or chest wall  enhancement of the left breast and there is no evidence for left  axillary or internal mammary chain adenopathy.      IMPRESSION:  1. There are 2 biopsy-proven sites of malignancy in the right breast at  the 8:30 position in the posterior one-third that measure on the order  of 2.3 cm and 2.4 cm in greatest dimension with the vision globe and  twirl-shaped metallic clips seen within these lesions, respectively. The  lesions either abut or extend to the lateral margin of the pectoral  fascia that overlies the implant capsule and some extension into the  pectoral muscle at this location and/or biologic capsule is suspected.  Findings suspicious for right axillary adenopathy are noted.     2. There are no findings suspicious for malignancy in the left breast.     BI-RADS CATEGORY 6: Known biopsy-proven malignancy.    PATHOLOGY:   1.  Breast, right 8 o'clock position, core biopsy: (Vision globe clip)  A.  Invasive mammary carcinoma, no special type (ductal with apocrine features), [place] histologic grade 2 (tubules = 3, nuclei = 3, mitoses = 1), measuring 3 mm maximally, and involving approximately 4 core fragments.  B.  No definitive in situ carcinoma is identified.  Estrogen Receptor (ER) Status  Positive (greater than 10% of cells demonstrate nuclear positivity)   Percentage of Cells with Nuclear Positivity  %   Average Intensity of Staining  Strong   Test Type  Food and Drug Administration (FDA) cleared (test / vendor): Frederica   Primary Antibody  SP1   Test(s) Performed     Progesterone Receptor (PgR) Status  Positive   Percentage of Cells with Nuclear Positivity  %    Average Intensity of Staining  Strong   Test Type  Food and Drug Administration (FDA) cleared (test / vendor): Freeburn   Primary Antibody  1E2   Test(s) Performed     HER2 by Immunohistochemistry  Negative (Score 1+)   Test Type  Food and Drug Administration (FDA) cleared (test / vendor): Freeburn   Primary Antibody  4B5   Test(s) Performed  Ki-67   Ki-67 Percentage of Positive Nuclei  22 %        2.  Breast, right 8: 30 o'clock position, core biopsy: ([employer] Q clip)  A.  Invasive mammary carcinoma, no special type (ductal with apocrine features), [place] histologic grade 2 (tubules = 3, nuclei = 3, mitoses = 1), measuring 2.7 mm maximally, and involving approximately 3 core fragments.  B.  No definitive in situ component is identified.  Estrogen Receptor (ER) Status  Positive (greater than 10% of cells demonstrate nuclear positivity)   Percentage of Cells with Nuclear Positivity  %   Average Intensity of Staining  Strong   Test Type  Food and Drug Administration (FDA) cleared (test / vendor): Freeburn   Primary Antibody  SP1   Test(s) Performed     Progesterone Receptor (PgR) Status  Positive   Percentage of Cells with Nuclear Positivity  %   Average Intensity of Staining  Strong   Test Type  Food and Drug Administration (FDA) cleared (test / vendor): Freeburn   Primary Antibody  1E2   Test(s) Performed     HER2 by Immunohistochemistry  Negative (Score 0)   Test Type  Food and Drug Administration (FDA) cleared (test / vendor): Freeburn   Primary Antibody  4B5   Test(s) Performed  Ki-67   Ki-67 Percentage of Positive Nuclei  20 %       Assessment & Plan   Assessment:  53 y.o. F with a new diagnosis of right: Invasive ductal carcinoma grade 2, ER/NV +, Her2 -; [id number], Clinical anatomic stage IA, Clinical prognostic stage IA.      Discussion:  I had an extensive discussion with the patient and family about the nature of this breast cancer diagnosis. We reviewed the components of breast tissue including  ducts and lobules. We reviewed her pathology report in detail. We reviewed breast cancer histology, including stage, grade, ER/OK receptors, HER2 receptors and how this applies to her diagnosis. We discussed the multidisciplinary approach to breast cancer care.  Treatments can include surgery, radiation therapy, and medical therapy (chemotherapy, targeted therapy, and/or endocrine therapy).  The exact type of treatment and the order of therapy depends on the size and location/distribution of breast disease, the involvement of the regional lymph node basins, concern for or presence of metastatic disease, potential genetic mutations, and the individual breast cancer tumor markers expressed by the cancer. We also discussed other treatment options including the option of not undergoing any surgical treatment, with a palliative rather than curative intent and the risks associated with this including disease progression.    The patient's clinical stage is documented as above. This was discussed with the patient prior to initiation of treatment. All available pathology reports were discussed with the patient today. All treatment decisions were made via shared decision making with the patient. This patient was evaluated for appropriate ancillary referrals including, pre-treatment functional assessment, exercise program, nutrition program, genetics, and lymphedema clinic. This patient received preoperative and postoperative education. The patient was offered a breast reconstruction referral appropriate to plan surgical intervention; risks and benefits were discussed today. The patient was educated on perioperative multimodal pain management strategies. Barriers to effective and efficient care are/will be evaluated by the nurse navigator.    We discussed these options and recommendations for treatment:    Surgical Options:  Discussed breast surgery and lymph node sampling surgery will be required to move forward.  At this  point in time she is a candidate for breast conservation or mastectomy.  Will complete breast MRI for full extent of disease and evaluation for involvement in pectoralis.  By available imaging and physical exam with dimpling of the skin and mass not freely mobile within the breast tissue concern for either pectoralis involvement or capsular involvement.  She has subpectoral implants.  Any surgical intervention will require involvement of that implant capsule.  Should she proceed with breast conservation would plan to excise the skin overlying the palpable mass as well as the tissue deep to the cancer.  She is interested in breast conservation if at all possible.  This will be determined on breast MRI.  Should she need mastectomy would again need assistance from plastic surgery for appropriate cosmetic goals and management of her existing implants.    Medical Oncology:  We discussed that in her case, systemic treatment would involve endocrine therapy and possibly chemotherapy. She will be referred to medical oncology postoperatively to discuss this further. She is an oncotype candidate to determine if chemotherapy following surgery would help improve her overall survival and reduce the possibility of distant metastasis OR if there would be very little benefit from these therapies.     Radiation Oncology:  Pending surgery of choice and final surgical margins she is a candidate for adjuvant radiation therapy.  Would be recommended for all cases of breast conservation, would be indicated in some cases of mastectomies.    Role for Genetic Testing:  I explained that most breast cancer is not hereditary, that I do not believe this plays a role in her case, and that she does not meet NCCN criteria for genetic testing. I would not recommend a bilateral mastectomy, since her risk of a second primary cancer is relatively low and endocrine therapy will further reduce her risk.      Plan:  A multidisciplinary plan has been  formulated for the patient:      # Breast Surgical Oncology:  -Left mastectomy and left sentinel lymph node biopsy possible axillary dissection.  Management of previously placed implant and reconstruction to be completed by Dr. Bender  -Will plan to use ultrasound to assist in visualization of posterior lesions    # Medical Oncology:  -Is an Oncotype candidate  -Will refer postoperatively.    #  Radiation Oncology:  -Will refer postoperatively.    # Nurse Navigation  - Referral made today    # Lymphedema clinic  - Referral was placed today     # Genetic Testing   -Not indicated by NCCN guidelines    # Breast Imaging  - Next Screening mammogram due: 4/2025      Romina De Anda MD  Breast Surgical Oncology    I spent 60 minutes caring for Dasha  on this date of service. This time includes time spent by me in the following activities: preparing for the visit, reviewing tests, obtaining and/or reviewing a separately obtained history, performing a medically appropriate examination and/or evaluation , counseling and educating the patient/family/caregiver, ordering medications, tests, or procedures, referring and communicating with other health care professionals , documenting information in the medical record, independently interpreting results and communicating that information with the patient/family/caregiver, and care coordination.

## 2024-06-24 ENCOUNTER — PATIENT OUTREACH (OUTPATIENT)
Dept: OTHER | Facility: HOSPITAL | Age: 54
End: 2024-06-24
Payer: COMMERCIAL

## 2024-06-24 NOTE — PROGRESS NOTES
Called Ms. De Leon to see how she was doing. She stated she is doing well but has questions and concerns related to her surgery scheduling. Message sent to Dr. De Anda's office to help with those details. Otherwise, she has no resource or support needs at this time. She was thankful for the call and will reach out if any questions or needs arise.

## 2024-06-26 ENCOUNTER — PATIENT OUTREACH (OUTPATIENT)
Dept: OTHER | Facility: HOSPITAL | Age: 54
End: 2024-06-26
Payer: COMMERCIAL

## 2024-06-26 NOTE — PROGRESS NOTES
Dasha called tearful and worried about surgery scheduling. We talked about her needs Monday June 24th and message sent to Dr. De Anda's office at that time. Patient messaged office yesterday and tried calling today. She stated she needs confirmation on surgery date and was supposed to hear something last week. Patient saw Dr. Bender who has her tentatively down for July 11. She stated she is waiting for details and specifics from Dr. De Anda's office at this time. Message sent to their office to help her navigate next steps.

## 2024-06-27 ENCOUNTER — TELEPHONE (OUTPATIENT)
Dept: SURGERY | Facility: CLINIC | Age: 54
End: 2024-06-27
Payer: COMMERCIAL

## 2024-06-27 NOTE — TELEPHONE ENCOUNTER
I called this pt to address her concerns about scheduling her surgery. I let her know that as of right now we are still planning for surgery on July 11, 2024.  I will be working with leadership to obtain a second room for this combo case with Dr Bender.  As of right now OR block has not dropped but we are putting her case on and will call her shortly with those details.    I will email her surgery packet per pt request.     CMA

## 2024-07-01 ENCOUNTER — HOSPITAL ENCOUNTER (OUTPATIENT)
Dept: OCCUPATIONAL THERAPY | Facility: HOSPITAL | Age: 54
Setting detail: THERAPIES SERIES
Discharge: HOME | End: 2024-07-01
Payer: COMMERCIAL

## 2024-07-01 ENCOUNTER — PRE-ADMISSION TESTING (OUTPATIENT)
Dept: PREADMISSION TESTING | Facility: HOSPITAL | Age: 54
End: 2024-07-01
Payer: COMMERCIAL

## 2024-07-01 VITALS
OXYGEN SATURATION: 100 % | HEART RATE: 109 BPM | DIASTOLIC BLOOD PRESSURE: 73 MMHG | HEIGHT: 65 IN | RESPIRATION RATE: 16 BRPM | BODY MASS INDEX: 28.31 KG/M2 | TEMPERATURE: 98.4 F | SYSTOLIC BLOOD PRESSURE: 107 MMHG | WEIGHT: 169.9 LBS

## 2024-07-01 DIAGNOSIS — Z91.89 AT RISK FOR LYMPHEDEMA: ICD-10-CM

## 2024-07-01 DIAGNOSIS — Z17.0 MALIGNANT NEOPLASM OF LOWER-OUTER QUADRANT OF RIGHT BREAST OF FEMALE, ESTROGEN RECEPTOR POSITIVE: ICD-10-CM

## 2024-07-01 DIAGNOSIS — C50.511 MALIGNANT NEOPLASM OF LOWER-OUTER QUADRANT OF RIGHT BREAST OF FEMALE, ESTROGEN RECEPTOR POSITIVE: ICD-10-CM

## 2024-07-01 DIAGNOSIS — Z01.818 OTHER SPECIFIED PRE-OPERATIVE EXAMINATION: Primary | ICD-10-CM

## 2024-07-01 LAB
ANION GAP SERPL CALCULATED.3IONS-SCNC: 11.2 MMOL/L (ref 5–15)
BASOPHILS # BLD AUTO: 0.05 10*3/MM3 (ref 0–0.2)
BASOPHILS NFR BLD AUTO: 0.7 % (ref 0–1.5)
BUN SERPL-MCNC: 19 MG/DL (ref 6–20)
BUN/CREAT SERPL: 25.3 (ref 7–25)
CALCIUM SPEC-SCNC: 9.4 MG/DL (ref 8.6–10.5)
CHLORIDE SERPL-SCNC: 103 MMOL/L (ref 98–107)
CO2 SERPL-SCNC: 22.8 MMOL/L (ref 22–29)
CREAT SERPL-MCNC: 0.75 MG/DL (ref 0.57–1)
DEPRECATED RDW RBC AUTO: 43.1 FL (ref 37–54)
EGFRCR SERPLBLD CKD-EPI 2021: 95.3 ML/MIN/1.73
EOSINOPHIL # BLD AUTO: 0.19 10*3/MM3 (ref 0–0.4)
EOSINOPHIL NFR BLD AUTO: 2.8 % (ref 0.3–6.2)
ERYTHROCYTE [DISTWIDTH] IN BLOOD BY AUTOMATED COUNT: 13.6 % (ref 12.3–15.4)
GLUCOSE SERPL-MCNC: 100 MG/DL (ref 65–99)
HCT VFR BLD AUTO: 39.5 % (ref 34–46.6)
HGB BLD-MCNC: 12.5 G/DL (ref 12–15.9)
IMM GRANULOCYTES # BLD AUTO: 0.05 10*3/MM3 (ref 0–0.05)
IMM GRANULOCYTES NFR BLD AUTO: 0.7 % (ref 0–0.5)
LYMPHOCYTES # BLD AUTO: 2.02 10*3/MM3 (ref 0.7–3.1)
LYMPHOCYTES NFR BLD AUTO: 29.5 % (ref 19.6–45.3)
MCH RBC QN AUTO: 27.4 PG (ref 26.6–33)
MCHC RBC AUTO-ENTMCNC: 31.6 G/DL (ref 31.5–35.7)
MCV RBC AUTO: 86.6 FL (ref 79–97)
MONOCYTES # BLD AUTO: 0.94 10*3/MM3 (ref 0.1–0.9)
MONOCYTES NFR BLD AUTO: 13.7 % (ref 5–12)
NEUTROPHILS NFR BLD AUTO: 3.6 10*3/MM3 (ref 1.7–7)
NEUTROPHILS NFR BLD AUTO: 52.6 % (ref 42.7–76)
NRBC BLD AUTO-RTO: 0 /100 WBC (ref 0–0.2)
PLATELET # BLD AUTO: 277 10*3/MM3 (ref 140–450)
PMV BLD AUTO: 9.6 FL (ref 6–12)
POTASSIUM SERPL-SCNC: 4 MMOL/L (ref 3.5–5.2)
QT INTERVAL: 381 MS
QTC INTERVAL: 464 MS
RBC # BLD AUTO: 4.56 10*6/MM3 (ref 3.77–5.28)
SODIUM SERPL-SCNC: 137 MMOL/L (ref 136–145)
WBC NRBC COR # BLD AUTO: 6.85 10*3/MM3 (ref 3.4–10.8)

## 2024-07-01 PROCEDURE — 93005 ELECTROCARDIOGRAM TRACING: CPT

## 2024-07-01 PROCEDURE — 93702 BIS XTRACELL FLUID ANALYSIS: CPT

## 2024-07-01 PROCEDURE — 97165 OT EVAL LOW COMPLEX 30 MIN: CPT

## 2024-07-01 PROCEDURE — 80048 BASIC METABOLIC PNL TOTAL CA: CPT

## 2024-07-01 PROCEDURE — 36415 COLL VENOUS BLD VENIPUNCTURE: CPT

## 2024-07-01 PROCEDURE — 85025 COMPLETE CBC W/AUTO DIFF WBC: CPT | Performed by: STUDENT IN AN ORGANIZED HEALTH CARE EDUCATION/TRAINING PROGRAM

## 2024-07-01 NOTE — THERAPY EVALUATION
"Outpatient Occupational Therapy Lymphedema Initial Evaluation  [facility]     Patient Name: Dasha De Leon  : 1970  MRN: [id number]  Today's Date: 2024      Visit Date: 2024    Patient Active Problem List   Diagnosis    IUD (intrauterine device) in place    Essential hypertension    Mood disorder    Allergic rhinitis    Hyperlipidemia    Knee pain, right    Lumbar radiculopathy    Vitamin D deficiency    Lumbar spinal stenosis    Follow-up examination following surgery    Abnormality of right breast on screening mammogram    Malignant neoplasm of lower-outer quadrant of right breast of female, estrogen receptor positive    Anxiety        Past Medical History:   Diagnosis Date    Abnormal Pap smear of cervix     Anxiety     Miller's cyst     RESOLVED    Breast cancer     Bulging lumbar disc     Bursitis     HX    Depression     GERD (gastroesophageal reflux disease)     H/O colposcopy with cervical biopsy     unknown pap result, biopsy was neg per pt, 2013 [facility]    History of 2019 novel coronavirus disease (COVID-19)     X2    History of eczema     HPV (human papilloma virus) infection     Hyperlipemia     Hypertension     Low back pain     Lumbar radiculopathy     Numbness and tingling of left leg     Osteoarthritis     Scoliosis     Seasonal allergies         Past Surgical History:   Procedure Laterality Date    BREAST AUGMENTATION      CARPAL TUNNEL RELEASE Right     CRYOTHERAPY          EPIDURAL BLOCK      HAND SURGERY      Pisiformectomy    LUMBAR DISCECTOMY FUSION INSTRUMENTATION N/A 2024    Procedure: Lumbar 3 to lumbar 4 and lumbar 4 to lumbar 5 laminectomy with fusion and instrumentation;  Surgeon: Rigoberto Botello MD;  Location: [facility];  Service: Robotics - Neuro;  Laterality: N/A;    MAMMO BILATERAL      PAP SMEAR      SHOULDER ARTHROSCOPY Left ,    SHOULDER SURGERY      \"MANIPULATION FOR FROZEN SHOULDER\"         Visit Dx:     " ICD-10-CM ICD-9-CM   1. Other specified pre-operative examination  Z01.818 V72.83   2. At risk for lymphedema  Z91.89 V49.89   3. Malignant neoplasm of lower-outer quadrant of right breast of female, estrogen receptor positive  C50.511 174.5    Z17.0 V86.0        Patient History       Row Name 07/01/24 0800             History    Date Current Problem(s) Began 05/24/24  Referral date  -RE      Brief Description of Current Complaint Patient presents for a preoperative evaluation following diagnosis of right breast cancer.  Patient is scheduled for a right mastectomy with a right sentinel lymph node biopsy and possible axillary dissection.  Patient has a history of bilateral retropectoral breast implants.  Current plan is for reconstruction of the right side with probable tissue expander.  -RE      Patient/Caregiver Goals Know what to do to help the symptoms;Return to prior level of function  -RE      Hand Dominance right-handed  -RE      History of Previous Related Injuries Multiple left shoulder surgeries but no surgeries on the right  -RE      Are you or can you be pregnant No  -RE         Pain     Pain at Present 0  -RE      Pain at Best 0  -RE      Pain at Worst 0  -RE      Pain Comments no pain  -RE      Is your sleep disturbed? No  -RE      Difficulties at work? No issues.  -RE      Difficulties with ADL's? No issues.  -RE      Difficulties with recreational activities? No issues.  -RE         Fall Risk Assessment    Any falls in the past year: No  -RE         Services    Prior Rehab/Home Health Experiences No  -RE      Are you currently receiving Home Health services No  -RE      Do you plan to receive Home Health services in the near future No  -RE         Daily Activities    Primary Language English  -RE      How does patient learn best? Listening;Reading;Demonstration  -RE      Teaching needs identified Management of Condition  -RE      Barriers to learning None  -RE      Pt Participated in POC and Goals Yes   -RE         Safety    Are you being hurt, hit, or frightened by anyone at home or in your life? No  -RE      Are you being neglected by a caregiver No  -RE      Have you had any of the following issues with N/A  -RE                User Key  (r) = Recorded By, (t) = Taken By, (c) = Cosigned By      Initials Name Provider Type    RE Rois Landers OTR Occupational Therapist                     Lymphedema       Row Name 07/01/24 0800             Subjective Pain    Able to rate subjective pain? yes  -RE      Pre-Treatment Pain Level 0  -RE      Subjective Pain Comment pre-operative; denies pain  -RE         Lymphedema Assessment    Lymphedema Classification at risk/stage 0  -RE      Lymphedema Surgery Comments pre-operative  Planned right mastectomy with right sentinel lymph node biopsy and reconstruction  -RE      Radiation Therapy Received --  TBD  -RE         Posture/Observations    Posture/Observations Comments WNL  -RE         General ROM    GENERAL ROM COMMENTS BUE WFL  -RE         MMT (Manual Muscle Testing)    General MMT Comments BUE WFL  -RE         Lymphedema Edema Assessment    Edema Assessment Comment no obvious edema; pre-operative  -RE         Skin Changes/Observations    Skin Observations Comment normal  -RE         Lymphedema Sensation    Lymphedema Sensation Comments normal  -RE         Lymphedema Pulses/Capillary Refill    Lymph Pulses Capillary Refill Comments normal  -RE         Compression/Skin Care    Compression/Skin Care Comments discussed compression garment for prevention  -RE         L-Dex Bioimpedence Screening    L-Dex Measurement Extremity LUE  -RE      L-Dex Patient Position Standing  -RE      L-Dex UE Dominate Side Right  -RE      L-Dex UE At Risk Side Left  -RE      L-Dex UE Pre Surgical Value Yes  -RE      L-Dex UE Score 1.7  -RE      L-Dex UE Baseline Score 1.7  -RE      L-Dex UE Value Change 0  -RE      L-Dex UE Comment WNL  -RE      $ L-Dex Charge --  -RE                User Key   "(r) = Recorded By, (t) = Taken By, (c) = Cosigned By      Initials Name Provider Type    Rosi Plata OTR Occupational Therapist                  The patient had a preop baseline SOZO measurement which I reviewed today. The score is WNL  see scanned to EMR. Bioimpedance spectroscopy helps identify the   onset of lymphedema in an arm or leg before patients experience noticeable swelling. Research has   shown that 92% of patients with early detection of lymphedema using L-Dex combined with   intervention do not progress to chronic lymphedema through three years. Additionally, as of March 2023, the NCCN Guidelines® for Survivorship recommend proactive screening for lymphedema using   bioimpedance spectroscopy. Whenever possible, patients are tested for baseline L-Dex score before   cancer treatment begins and then are reassessed during regular follow-up visits using the SOZO device.   Otherwise, this can be started postoperatively and continued during regular follow-up visits. If the   patient’s L-Dex score increases above normal levels, that is a sign that lymphedema is developing and a   referral is made to occupational therapy for further evaluation and early compression treatment.   Lymphedema assessment with the SOZO L-Dex score is recommended to be done every 3 months for   the first 3 years and then every 6 months for years 4 and 5 followed by annually afterwards          Therapy Education  Education Details: Discussed personal risk factors for lymphedema postoperatively for the affected upper extremity and trunk quadrant.  I provided patient with \"What Is Lymphedema\" and \"Healthy Habits for Patients at Risk for Lymphedema\" and reviewed with the patient.  We also discussed indications for bioimpedance and patient's current L-Dex value.  I also explained the recommended prevention and surveillance schedule using bioimpedance.  Given: Symptoms/condition management, Other (comment)  Program: New  How " Provided: Verbal, Written  Provided to: Patient  Level of Understanding: Teach back education performed, Verbalized         OT Goals       Row Name 07/01/24 0900          OT Short Term Goals    STG Date to Achieve 08/01/24  -RE     STG 1 Patient will demonstrate proper awareness of “What is Lymphedema?” and “Healthy Habits” for improved prevention, management, care of symptoms and ease of transition to self-care of condition.  -RE     STG 1 Progress New  -RE        Long Term Goals    LTG Date to Achieve 10/01/24  -RE     LTG 1 Patient will participate in bioimpedance scans every 3 months as a method of early detection of lymphedema to allow for early intervention.  -RE     LTG 1 Progress New  -RE     LTG 2 Patient's bioimpedance score to remain below 8.2 for decreased risk of stage II lymphedema.  -RE     LTG 2 Progress New  -RE        Time Calculation    OT Goal Re-Cert Due Date 10/01/24  -RE               User Key  (r) = Recorded By, (t) = Taken By, (c) = Cosigned By      Initials Name Provider Type    Rosi Plata OTR Occupational Therapist                     OT Assessment/Plan       Row Name 07/01/24 0910          OT Assessment    Assessment Comments Patient presents to Occupational Therapy preoperatively for planned breast cancer surgery to include right mastectomy and right sentinel lymph node biopsy as well as reconstruction.  Patient is scheduled for surgery on 7/11/2024.  Baseline range of motion postural and bioimpedance measurements were taken today to be compared to measurements retaken postoperatively.  At that time, any reduced movement, decline in function or postural issues will be addressed with skilled therapy and new goals will be established.  Patient's strength and active range of motion are within functional limits.  Patient's L-Dex value today was 1.7 which is well within normal limits.  Personal risk factors for lymphedema postoperatively for the right upper extremity and trunk quadrant  were also assessed today and basic lymphedema precautions were discussed.  A more detailed discussion regarding personal risk factors will take place postoperatively once the number of nodes removed and the plan for further medical care is known.  -RE     Please refer to paper survey for additional self-reported information No  -RE     OT Diagnosis At risk for lymphedema; preoperative evaluation  -RE     OT Rehab Potential Good  -RE     Patient/caregiver participated in establishment of treatment plan and goals Yes  -RE     Patient would benefit from skilled therapy intervention Yes  -RE        OT Plan    OT Frequency Other (comment)  -RE     Predicted Duration of Therapy Intervention (OT) Follow-up 4 to 6 weeks postoperatively and then every 3 months for bioimpedance  -RE     Planned CPT's? OT EVAL LOW COMPLEXITY: 48661;OT SELF CARE/MGMT/TRAIN 15 MIN: 49405;OT BIS XTRACELL FLUID ANALYSIS: 61443  -RE     Planned Therapy Interventions (Optional Details) home exercise program;patient/family education;other (see comments)  -RE     OT Plan Comments Follow-up 4 to 6 weeks postop  -RE               User Key  (r) = Recorded By, (t) = Taken By, (c) = Cosigned By      Initials Name Provider Type    RE Rosi Landers OTR Occupational Therapist                    Outcome Measure Options: Quick DASH  Quick DASH  Open a tight or new jar.: Mild Difficulty  Do heavy household chores (e.g., wash walls, wash floors): No Difficulty  Carry a shopping bag or briefcase: No Difficulty  Wash your back: No Difficulty  Use a knife to cut food: No Difficulty  Recreational activities in which you take some force or impact through your arm, should or hand (e.g. golf, hammering, tennis, etc.): Mild Difficulty  During the past week, to what extent has your arm, shoulder, or hand problem interfered with your normal social activites with family, friends, neighbors or groups?: Not at all  During the past week, were you limited in your work or other  regular daily activities as a result of your arm, shoulder or hand problem?: Not limited at all  Arm, Shoulder, or hand pain: None  Tingling (pins and needles) in your arm, shoulder, or hand: Mild  During the past week, how much difficulty have you had sleeping because of the pain in your arm, shoulder or hand?: No difficulty  Quick Dash Comments: 6.8% disability  Number of Questions Answered: 11  Quick DASH Score: 6.82         Time Calculation:   OT Start Time: 0820  OT Stop Time: 0855  OT Time Calculation (min): 35 min  Untimed Charges  OT Eval/Re-eval Minutes: 25  08660 - OT Bioimpedence Rx Minutes: 10  Total Minutes  Untimed Charges Total Minutes: 35   Total Minutes: 35     Therapy Charges for Today       Code Description Service Date Service Provider Modifiers Qty    53319699755 HC OT BIS XTRACELL FLUID ANALYSIS 7/1/2024 Rosi Landers OTR GO 1    62299226397 HC OT EVAL LOW COMPLEXITY 2 7/1/2024 Rosi Landers OTR GO 1          Dictated utilizing Dragon dictation:  Much of this encounter note is an electronic transcription/translation of spoken language to printed text. The electronic translation of spoken language may permit erroneous, or at times, nonsensical words or phrases to be inadvertently transcribed; Although I have reviewed the note for such errors, some may still exist.            DORA Pisano  7/1/2024

## 2024-07-01 NOTE — DISCHARGE INSTRUCTIONS
Take the following medications the morning of surgery:  HYDROCODONE IF NEEDED     DO NOT TAKE OLMESARTAN NIGHT PRIOR TO SURGERY    If you are on prescription narcotic pain medication to control your pain you may also take that medication the morning of surgery.    General Instructions:  Do not eat solid food after midnight the night before surgery.  You may drink clear liquids day of surgery but must stop at least one hour before your hospital arrival time.  It is beneficial for you to have a clear drink that contains carbohydrates the day of surgery.  We suggest a 12 to 20 ounce bottle of Gatorade or Powerade for non-diabetic patients or a 12 to 20 ounce bottle of G2 or Powerade Zero for diabetic patients. (Pediatric patients, are not advised to drink a 12 to 20 ounce carbohydrate drink)    Clear liquids are liquids you can see through.  Nothing red in color.     Plain water                               Sports drinks  Sodas                                   Gelatin (Jell-O)  Fruit juices without pulp such as white grape juice and apple juice  Popsicles that contain no fruit or yogurt  Tea or coffee (no cream or milk added)  Gatorade / Powerade  G2 / Powerade Zero    Infants may have breast milk up to four hours before surgery.  Infants drinking formula may drink formula up to six hours before surgery.   Patients who avoid smoking, chewing tobacco and alcohol for 4 weeks prior to surgery have a reduced risk of post-operative complications.  Quit smoking as many days before surgery as you can.  Do not smoke, use chewing tobacco or drink alcohol the day of surgery.   If applicable bring your C-PAP/ BI-PAP machine in with you to preop day of surgery.  Bring any papers given to you in the doctor’s office.  Wear clean comfortable clothes.  Do not wear contact lenses, false eyelashes or make-up.  Bring a case for your glasses.   Bring crutches or walker if applicable.  Remove all piercings.  Leave jewelry and any other  valuables at home.  Hair extensions with metal clips must be removed prior to surgery.  The Pre-Admission Testing nurse will instruct you to bring medications if unable to obtain an accurate list in Pre-Admission Testing.            Preventing a Surgical Site Infection:  For 2 to 3 days before surgery, avoid shaving with a razor because the razor can irritate skin and make it easier to develop an infection.    Any areas of open skin can increase the risk of a post-operative wound infection by allowing bacteria to enter and travel throughout the body.  Notify your surgeon if you have any skin wounds / rashes even if it is not near the expected surgical site.  The area will need assessed to determine if surgery should be delayed until it is healed.  The night prior to surgery shower using a fresh bar of anti-bacterial soap (such as Dial) and clean washcloth.  Sleep in a clean bed with clean clothing.  Do not allow pets to sleep with you.  Shower on the morning of surgery using a fresh bar of anti-bacterial soap (such as Dial) and clean washcloth.  Dry with a clean towel and dress in clean clothing.  Ask your surgeon if you will be receiving antibiotics prior to surgery.  Make sure you, your family, and all healthcare providers clean their hands with soap and water or an alcohol based hand  before caring for you or your wound.    Day of surgery:  Your arrival time is approximately two hours before your scheduled surgery time.  Upon arrival, a Pre-op nurse and Anesthesiologist will review your health history, obtain vital signs, and answer questions you may have.  The only belongings needed at this time will be a list of your home medications and if applicable your C-PAP/BI-PAP machine.  A Pre-op nurse will start an IV and you may receive medication in preparation for surgery, including something to help you relax.     Please be aware that surgery does come with discomfort.  We want to make every effort to  control your discomfort so please discuss any uncontrolled symptoms with your nurse.   Your doctor will most likely have prescribed pain medications.      If you are going home after surgery you will receive individualized written care instructions before being discharged.  A responsible adult must drive you to and from the hospital on the day of your surgery and ideally stay with you through the night.   .  Discharge prescriptions can be filled by the hospital pharmacy during regular pharmacy hours.  If you are having surgery late in the day/evening your prescription may be e-prescribed to your pharmacy.  Please verify your pharmacy hours or chose a 24 hour pharmacy to avoid not having access to your prescription because your pharmacy has closed for the day.    If you are staying overnight following surgery, you will be transported to your hospital room following the recovery period.  [facility] has all private rooms.    If you have any questions please call Pre-Admission Testing at [phone].  Deductibles and co-payments are collected on the day of service. Please be prepared to pay the required co-pay, deductible or deposit on the day of service as defined by your plan.    Call your surgeon immediately if you experience any of the following symptoms:  Sore Throat  Shortness of Breath or difficulty breathing  Cough  Chills  Body soreness or muscle pain  Headache  Fever  New loss of taste or smell  Do not arrive for your surgery ill.  Your procedure will need to be rescheduled to another time.  You will need to call your physician before the day of surgery to avoid any unnecessary exposure to hospital staff as well as other patients.

## 2024-07-11 ENCOUNTER — ANESTHESIA (OUTPATIENT)
Dept: PERIOP | Facility: HOSPITAL | Age: 54
End: 2024-07-11
Payer: COMMERCIAL

## 2024-07-11 ENCOUNTER — TRANSCRIBE ORDERS (OUTPATIENT)
Dept: LAB | Facility: HOSPITAL | Age: 54
End: 2024-07-11
Payer: COMMERCIAL

## 2024-07-11 ENCOUNTER — ANCILLARY PROCEDURE (OUTPATIENT)
Dept: LAB | Facility: HOSPITAL | Age: 54
End: 2024-07-11
Payer: COMMERCIAL

## 2024-07-11 ENCOUNTER — HOSPITAL ENCOUNTER (OUTPATIENT)
Dept: NUCLEAR MEDICINE | Facility: HOSPITAL | Age: 54
Discharge: HOME | End: 2024-07-11
Payer: COMMERCIAL

## 2024-07-11 ENCOUNTER — HOSPITAL ENCOUNTER (OUTPATIENT)
Facility: HOSPITAL | Age: 54
Setting detail: HOSPITAL OUTPATIENT SURGERY
Discharge: HOME | End: 2024-07-11
Attending: STUDENT IN AN ORGANIZED HEALTH CARE EDUCATION/TRAINING PROGRAM | Admitting: STUDENT IN AN ORGANIZED HEALTH CARE EDUCATION/TRAINING PROGRAM
Payer: COMMERCIAL

## 2024-07-11 ENCOUNTER — ANESTHESIA EVENT (OUTPATIENT)
Dept: PERIOP | Facility: HOSPITAL | Age: 54
End: 2024-07-11
Payer: COMMERCIAL

## 2024-07-11 VITALS
WEIGHT: 171.7 LBS | OXYGEN SATURATION: 92 % | RESPIRATION RATE: 18 BRPM | BODY MASS INDEX: 28.61 KG/M2 | TEMPERATURE: 98.1 F | SYSTOLIC BLOOD PRESSURE: 133 MMHG | HEART RATE: 97 BPM | HEIGHT: 65 IN | DIASTOLIC BLOOD PRESSURE: 73 MMHG

## 2024-07-11 DIAGNOSIS — Z17.0 MALIGNANT NEOPLASM OF LOWER-OUTER QUADRANT OF RIGHT BREAST OF FEMALE, ESTROGEN RECEPTOR POSITIVE: ICD-10-CM

## 2024-07-11 DIAGNOSIS — C50.511 MALIGNANT NEOPLASM OF LOWER-OUTER QUADRANT OF RIGHT BREAST OF FEMALE, ESTROGEN RECEPTOR POSITIVE: ICD-10-CM

## 2024-07-11 DIAGNOSIS — C50.911 INVASIVE DUCTAL CARCINOMA OF BREAST, FEMALE, RIGHT: ICD-10-CM

## 2024-07-11 DIAGNOSIS — C50.911 INVASIVE DUCTAL CARCINOMA OF BREAST, FEMALE, RIGHT: Primary | ICD-10-CM

## 2024-07-11 LAB
B-HCG UR QL: NEGATIVE
EXPIRATION DATE: NORMAL
INTERNAL NEGATIVE CONTROL: NORMAL
INTERNAL POSITIVE CONTROL: NORMAL
Lab: NORMAL

## 2024-07-11 PROCEDURE — 38792 RA TRACER ID OF SENTINL NODE: CPT

## 2024-07-11 PROCEDURE — 25010000002 ONDANSETRON PER 1 MG: Performed by: NURSE ANESTHETIST, CERTIFIED REGISTERED

## 2024-07-11 PROCEDURE — 88342 IMHCHEM/IMCYTCHM 1ST ANTB: CPT | Performed by: STUDENT IN AN ORGANIZED HEALTH CARE EDUCATION/TRAINING PROGRAM

## 2024-07-11 PROCEDURE — 81025 URINE PREGNANCY TEST: CPT | Performed by: STUDENT IN AN ORGANIZED HEALTH CARE EDUCATION/TRAINING PROGRAM

## 2024-07-11 PROCEDURE — 88341 IMHCHEM/IMCYTCHM EA ADD ANTB: CPT | Performed by: STUDENT IN AN ORGANIZED HEALTH CARE EDUCATION/TRAINING PROGRAM

## 2024-07-11 PROCEDURE — 76098 X-RAY EXAM SURGICAL SPECIMEN: CPT

## 2024-07-11 PROCEDURE — 25010000002 SUGAMMADEX 200 MG/2ML SOLUTION: Performed by: NURSE ANESTHETIST, CERTIFIED REGISTERED

## 2024-07-11 PROCEDURE — 25010000002 ISOSULFAN BLUE 1 % SOLUTION: Performed by: STUDENT IN AN ORGANIZED HEALTH CARE EDUCATION/TRAINING PROGRAM

## 2024-07-11 PROCEDURE — 63710000001 ONDANSETRON PER 8 MG: Performed by: STUDENT IN AN ORGANIZED HEALTH CARE EDUCATION/TRAINING PROGRAM

## 2024-07-11 PROCEDURE — 25010000002 MAGNESIUM SULFATE PER 500 MG OF MAGNESIUM: Performed by: NURSE ANESTHETIST, CERTIFIED REGISTERED

## 2024-07-11 PROCEDURE — 38525 BIOPSY/REMOVAL LYMPH NODES: CPT | Performed by: STUDENT IN AN ORGANIZED HEALTH CARE EDUCATION/TRAINING PROGRAM

## 2024-07-11 PROCEDURE — 25810000003 LACTATED RINGERS PER 1000 ML: Performed by: STUDENT IN AN ORGANIZED HEALTH CARE EDUCATION/TRAINING PROGRAM

## 2024-07-11 PROCEDURE — 25010000002 DEXAMETHASONE SODIUM PHOSPHATE 20 MG/5ML SOLUTION: Performed by: NURSE ANESTHETIST, CERTIFIED REGISTERED

## 2024-07-11 PROCEDURE — 25010000002 PROPOFOL 10 MG/ML EMULSION: Performed by: NURSE ANESTHETIST, CERTIFIED REGISTERED

## 2024-07-11 PROCEDURE — 25010000002 FENTANYL CITRATE (PF) 50 MCG/ML SOLUTION: Performed by: NURSE ANESTHETIST, CERTIFIED REGISTERED

## 2024-07-11 PROCEDURE — C1789 PROSTHESIS, BREAST, IMP: HCPCS | Performed by: STUDENT IN AN ORGANIZED HEALTH CARE EDUCATION/TRAINING PROGRAM

## 2024-07-11 PROCEDURE — 88360 TUMOR IMMUNOHISTOCHEM/MANUAL: CPT | Performed by: STUDENT IN AN ORGANIZED HEALTH CARE EDUCATION/TRAINING PROGRAM

## 2024-07-11 PROCEDURE — 25010000002 GENTAMICIN PER 80 MG: Performed by: PLASTIC SURGERY

## 2024-07-11 PROCEDURE — 78195 LYMPH SYSTEM IMAGING: CPT | Performed by: STUDENT IN AN ORGANIZED HEALTH CARE EDUCATION/TRAINING PROGRAM

## 2024-07-11 PROCEDURE — 88331 PATH CONSLTJ SURG 1 BLK 1SPC: CPT | Performed by: STUDENT IN AN ORGANIZED HEALTH CARE EDUCATION/TRAINING PROGRAM

## 2024-07-11 PROCEDURE — A9520 TC99 TILMANOCEPT DIAG 0.5MCI: HCPCS | Performed by: STUDENT IN AN ORGANIZED HEALTH CARE EDUCATION/TRAINING PROGRAM

## 2024-07-11 PROCEDURE — 25010000002 INDOCYANINE GREEN 25 MG RECONSTITUTED SOLUTION: Performed by: NURSE ANESTHETIST, CERTIFIED REGISTERED

## 2024-07-11 PROCEDURE — 19303 MAST SIMPLE COMPLETE: CPT | Performed by: SPECIALIST/TECHNOLOGIST, OTHER

## 2024-07-11 PROCEDURE — 25010000002 ROPIVACAINE PER 1 MG: Performed by: PLASTIC SURGERY

## 2024-07-11 PROCEDURE — 0 TECHETIUM TC99M TILMANOCEPT: Performed by: STUDENT IN AN ORGANIZED HEALTH CARE EDUCATION/TRAINING PROGRAM

## 2024-07-11 PROCEDURE — 19303 MAST SIMPLE COMPLETE: CPT | Performed by: STUDENT IN AN ORGANIZED HEALTH CARE EDUCATION/TRAINING PROGRAM

## 2024-07-11 PROCEDURE — 88332 PATH CONSLTJ SURG EA ADD BLK: CPT | Performed by: STUDENT IN AN ORGANIZED HEALTH CARE EDUCATION/TRAINING PROGRAM

## 2024-07-11 PROCEDURE — 38900 IO MAP OF SENT LYMPH NODE: CPT | Performed by: STUDENT IN AN ORGANIZED HEALTH CARE EDUCATION/TRAINING PROGRAM

## 2024-07-11 PROCEDURE — 25010000002 CLINDAMYCIN 900 MG/50ML SOLUTION: Performed by: STUDENT IN AN ORGANIZED HEALTH CARE EDUCATION/TRAINING PROGRAM

## 2024-07-11 PROCEDURE — 25010000002 PHENYLEPHRINE 10 MG/ML SOLUTION: Performed by: NURSE ANESTHETIST, CERTIFIED REGISTERED

## 2024-07-11 PROCEDURE — 88307 TISSUE EXAM BY PATHOLOGIST: CPT | Performed by: STUDENT IN AN ORGANIZED HEALTH CARE EDUCATION/TRAINING PROGRAM

## 2024-07-11 DEVICE — IMPLANTABLE DEVICE: Type: IMPLANTABLE DEVICE | Site: BREAST | Status: FUNCTIONAL

## 2024-07-11 DEVICE — LIGACLIP MCA MULTIPLE CLIP APPLIERS, 20 MEDIUM CLIPS
Type: IMPLANTABLE DEVICE | Site: BREAST | Status: FUNCTIONAL
Brand: LIGACLIP

## 2024-07-11 RX ORDER — FENTANYL CITRATE 50 UG/ML
50 INJECTION, SOLUTION INTRAMUSCULAR; INTRAVENOUS
Status: DISCONTINUED | OUTPATIENT
Start: 2024-07-11 | End: 2024-07-11 | Stop reason: HOSPADM

## 2024-07-11 RX ORDER — ONDANSETRON 2 MG/ML
INJECTION INTRAMUSCULAR; INTRAVENOUS AS NEEDED
Status: DISCONTINUED | OUTPATIENT
Start: 2024-07-11 | End: 2024-07-11 | Stop reason: SURG

## 2024-07-11 RX ORDER — FENTANYL CITRATE 50 UG/ML
25 INJECTION, SOLUTION INTRAMUSCULAR; INTRAVENOUS
Status: DISCONTINUED | OUTPATIENT
Start: 2024-07-11 | End: 2024-07-11 | Stop reason: HOSPADM

## 2024-07-11 RX ORDER — MIDAZOLAM HYDROCHLORIDE 1 MG/ML
1 INJECTION INTRAMUSCULAR; INTRAVENOUS
Status: DISCONTINUED | OUTPATIENT
Start: 2024-07-11 | End: 2024-07-11 | Stop reason: HOSPADM

## 2024-07-11 RX ORDER — ONDANSETRON 2 MG/ML
4 INJECTION INTRAMUSCULAR; INTRAVENOUS ONCE AS NEEDED
Status: DISCONTINUED | OUTPATIENT
Start: 2024-07-11 | End: 2024-07-11 | Stop reason: HOSPADM

## 2024-07-11 RX ORDER — DROPERIDOL 2.5 MG/ML
0.62 INJECTION, SOLUTION INTRAMUSCULAR; INTRAVENOUS
Status: DISCONTINUED | OUTPATIENT
Start: 2024-07-11 | End: 2024-07-11 | Stop reason: HOSPADM

## 2024-07-11 RX ORDER — ACETAMINOPHEN 325 MG/1
650 TABLET ORAL EVERY 4 HOURS PRN
Qty: 60 TABLET | Refills: 0 | Status: SHIPPED | OUTPATIENT
Start: 2024-07-11

## 2024-07-11 RX ORDER — EPHEDRINE SULFATE 50 MG/ML
5 INJECTION, SOLUTION INTRAVENOUS ONCE AS NEEDED
Status: DISCONTINUED | OUTPATIENT
Start: 2024-07-11 | End: 2024-07-11 | Stop reason: HOSPADM

## 2024-07-11 RX ORDER — OXYCODONE HYDROCHLORIDE 5 MG/1
10 TABLET ORAL ONCE
Status: COMPLETED | OUTPATIENT
Start: 2024-07-11 | End: 2024-07-11

## 2024-07-11 RX ORDER — LIDOCAINE HYDROCHLORIDE 20 MG/ML
INJECTION, SOLUTION INFILTRATION; PERINEURAL AS NEEDED
Status: DISCONTINUED | OUTPATIENT
Start: 2024-07-11 | End: 2024-07-11 | Stop reason: SURG

## 2024-07-11 RX ORDER — HYDROCODONE BITARTRATE AND ACETAMINOPHEN 5; 325 MG/1; MG/1
1 TABLET ORAL ONCE AS NEEDED
Status: DISCONTINUED | OUTPATIENT
Start: 2024-07-11 | End: 2024-07-11 | Stop reason: HOSPADM

## 2024-07-11 RX ORDER — DOCUSATE SODIUM 250 MG
250 CAPSULE ORAL 2 TIMES DAILY PRN
Qty: 60 CAPSULE | Refills: 1 | Status: SHIPPED | OUTPATIENT
Start: 2024-07-11

## 2024-07-11 RX ORDER — LIDOCAINE 40 MG/G
1 CREAM TOPICAL ONCE
Status: COMPLETED | OUTPATIENT
Start: 2024-07-11 | End: 2024-07-11

## 2024-07-11 RX ORDER — DOXYCYCLINE 100 MG/1
200 CAPSULE ORAL ONCE
Status: COMPLETED | OUTPATIENT
Start: 2024-07-11 | End: 2024-07-11

## 2024-07-11 RX ORDER — IPRATROPIUM BROMIDE AND ALBUTEROL SULFATE 2.5; .5 MG/3ML; MG/3ML
3 SOLUTION RESPIRATORY (INHALATION) ONCE AS NEEDED
Status: DISCONTINUED | OUTPATIENT
Start: 2024-07-11 | End: 2024-07-11 | Stop reason: HOSPADM

## 2024-07-11 RX ORDER — FLUMAZENIL 0.1 MG/ML
0.2 INJECTION INTRAVENOUS AS NEEDED
Status: DISCONTINUED | OUTPATIENT
Start: 2024-07-11 | End: 2024-07-11 | Stop reason: HOSPADM

## 2024-07-11 RX ORDER — SODIUM CHLORIDE, SODIUM LACTATE, POTASSIUM CHLORIDE, CALCIUM CHLORIDE 600; 310; 30; 20 MG/100ML; MG/100ML; MG/100ML; MG/100ML
9 INJECTION, SOLUTION INTRAVENOUS CONTINUOUS PRN
Status: DISCONTINUED | OUTPATIENT
Start: 2024-07-11 | End: 2024-07-11 | Stop reason: HOSPADM

## 2024-07-11 RX ORDER — HYDRALAZINE HYDROCHLORIDE 20 MG/ML
5 INJECTION INTRAMUSCULAR; INTRAVENOUS
Status: DISCONTINUED | OUTPATIENT
Start: 2024-07-11 | End: 2024-07-11 | Stop reason: HOSPADM

## 2024-07-11 RX ORDER — SODIUM CHLORIDE 0.9 % (FLUSH) 0.9 %
10 SYRINGE (ML) INJECTION AS NEEDED
Status: DISCONTINUED | OUTPATIENT
Start: 2024-07-11 | End: 2024-07-11 | Stop reason: HOSPADM

## 2024-07-11 RX ORDER — GABAPENTIN 100 MG/1
100 CAPSULE ORAL EVERY 8 HOURS
Qty: 60 CAPSULE | Refills: 1 | Status: SHIPPED | OUTPATIENT
Start: 2024-07-11 | End: 2025-07-11

## 2024-07-11 RX ORDER — ISOSULFAN BLUE 50 MG/5ML
INJECTION, SOLUTION SUBCUTANEOUS AS NEEDED
Status: DISCONTINUED | OUTPATIENT
Start: 2024-07-11 | End: 2024-07-11 | Stop reason: HOSPADM

## 2024-07-11 RX ORDER — MAGNESIUM SULFATE HEPTAHYDRATE 500 MG/ML
INJECTION, SOLUTION INTRAMUSCULAR; INTRAVENOUS AS NEEDED
Status: DISCONTINUED | OUTPATIENT
Start: 2024-07-11 | End: 2024-07-11 | Stop reason: SURG

## 2024-07-11 RX ORDER — PROMETHAZINE HYDROCHLORIDE 25 MG/1
25 TABLET ORAL ONCE AS NEEDED
Status: DISCONTINUED | OUTPATIENT
Start: 2024-07-11 | End: 2024-07-11 | Stop reason: HOSPADM

## 2024-07-11 RX ORDER — FENTANYL CITRATE 50 UG/ML
INJECTION, SOLUTION INTRAMUSCULAR; INTRAVENOUS AS NEEDED
Status: DISCONTINUED | OUTPATIENT
Start: 2024-07-11 | End: 2024-07-11 | Stop reason: SURG

## 2024-07-11 RX ORDER — MEPERIDINE HYDROCHLORIDE 25 MG/ML
12.5 INJECTION INTRAMUSCULAR; INTRAVENOUS; SUBCUTANEOUS
Status: DISCONTINUED | OUTPATIENT
Start: 2024-07-11 | End: 2024-07-11 | Stop reason: HOSPADM

## 2024-07-11 RX ORDER — LABETALOL HYDROCHLORIDE 5 MG/ML
5 INJECTION, SOLUTION INTRAVENOUS
Status: DISCONTINUED | OUTPATIENT
Start: 2024-07-11 | End: 2024-07-11 | Stop reason: HOSPADM

## 2024-07-11 RX ORDER — ACETAMINOPHEN 500 MG
1000 TABLET ORAL ONCE
Status: COMPLETED | OUTPATIENT
Start: 2024-07-11 | End: 2024-07-11

## 2024-07-11 RX ORDER — ONDANSETRON 8 MG/1
8 TABLET, ORALLY DISINTEGRATING ORAL EVERY 8 HOURS PRN
Qty: 10 TABLET | Refills: 1 | Status: SHIPPED | OUTPATIENT
Start: 2024-07-11

## 2024-07-11 RX ORDER — HYDROMORPHONE HYDROCHLORIDE 1 MG/ML
0.5 INJECTION, SOLUTION INTRAMUSCULAR; INTRAVENOUS; SUBCUTANEOUS
Status: DISCONTINUED | OUTPATIENT
Start: 2024-07-11 | End: 2024-07-11 | Stop reason: HOSPADM

## 2024-07-11 RX ORDER — PHENYLEPHRINE HYDROCHLORIDE 10 MG/ML
INJECTION INTRAVENOUS AS NEEDED
Status: DISCONTINUED | OUTPATIENT
Start: 2024-07-11 | End: 2024-07-11 | Stop reason: SURG

## 2024-07-11 RX ORDER — GABAPENTIN 300 MG/1
300 CAPSULE ORAL ONCE
Status: COMPLETED | OUTPATIENT
Start: 2024-07-11 | End: 2024-07-11

## 2024-07-11 RX ORDER — DIAZEPAM 5 MG/1
10 TABLET ORAL ONCE
Status: COMPLETED | OUTPATIENT
Start: 2024-07-11 | End: 2024-07-11

## 2024-07-11 RX ORDER — SODIUM CHLORIDE 9 MG/ML
40 INJECTION, SOLUTION INTRAVENOUS AS NEEDED
Status: DISCONTINUED | OUTPATIENT
Start: 2024-07-11 | End: 2024-07-11 | Stop reason: HOSPADM

## 2024-07-11 RX ORDER — ROPIVACAINE HYDROCHLORIDE 5 MG/ML
INJECTION, SOLUTION EPIDURAL; INFILTRATION; PERINEURAL AS NEEDED
Status: DISCONTINUED | OUTPATIENT
Start: 2024-07-11 | End: 2024-07-11 | Stop reason: HOSPADM

## 2024-07-11 RX ORDER — DIPHENHYDRAMINE HYDROCHLORIDE 50 MG/ML
12.5 INJECTION INTRAMUSCULAR; INTRAVENOUS
Status: DISCONTINUED | OUTPATIENT
Start: 2024-07-11 | End: 2024-07-11 | Stop reason: HOSPADM

## 2024-07-11 RX ORDER — SCOLOPAMINE TRANSDERMAL SYSTEM 1 MG/1
1 PATCH, EXTENDED RELEASE TRANSDERMAL ONCE
Status: DISCONTINUED | OUTPATIENT
Start: 2024-07-11 | End: 2024-07-11 | Stop reason: HOSPADM

## 2024-07-11 RX ORDER — SODIUM CHLORIDE, SODIUM LACTATE, POTASSIUM CHLORIDE, CALCIUM CHLORIDE 600; 310; 30; 20 MG/100ML; MG/100ML; MG/100ML; MG/100ML
125 INJECTION, SOLUTION INTRAVENOUS CONTINUOUS
Status: DISCONTINUED | OUTPATIENT
Start: 2024-07-11 | End: 2024-07-11 | Stop reason: HOSPADM

## 2024-07-11 RX ORDER — DEXAMETHASONE SODIUM PHOSPHATE 4 MG/ML
INJECTION, SOLUTION INTRA-ARTICULAR; INTRALESIONAL; INTRAMUSCULAR; INTRAVENOUS; SOFT TISSUE AS NEEDED
Status: DISCONTINUED | OUTPATIENT
Start: 2024-07-11 | End: 2024-07-11 | Stop reason: SURG

## 2024-07-11 RX ORDER — PROPOFOL 10 MG/ML
VIAL (ML) INTRAVENOUS AS NEEDED
Status: DISCONTINUED | OUTPATIENT
Start: 2024-07-11 | End: 2024-07-11 | Stop reason: SURG

## 2024-07-11 RX ORDER — ONDANSETRON HYDROCHLORIDE 8 MG/1
8 TABLET, FILM COATED ORAL ONCE
Status: COMPLETED | OUTPATIENT
Start: 2024-07-11 | End: 2024-07-11

## 2024-07-11 RX ORDER — CLINDAMYCIN PHOSPHATE 900 MG/50ML
900 INJECTION, SOLUTION INTRAVENOUS ONCE
Status: COMPLETED | OUTPATIENT
Start: 2024-07-11 | End: 2024-07-11

## 2024-07-11 RX ORDER — OXYCODONE AND ACETAMINOPHEN 7.5; 325 MG/1; MG/1
1 TABLET ORAL EVERY 4 HOURS PRN
Status: DISCONTINUED | OUTPATIENT
Start: 2024-07-11 | End: 2024-07-11 | Stop reason: HOSPADM

## 2024-07-11 RX ORDER — PROMETHAZINE HYDROCHLORIDE 25 MG/1
25 SUPPOSITORY RECTAL ONCE AS NEEDED
Status: DISCONTINUED | OUTPATIENT
Start: 2024-07-11 | End: 2024-07-11 | Stop reason: HOSPADM

## 2024-07-11 RX ORDER — MAGNESIUM HYDROXIDE 1200 MG/15ML
LIQUID ORAL AS NEEDED
Status: DISCONTINUED | OUTPATIENT
Start: 2024-07-11 | End: 2024-07-11 | Stop reason: HOSPADM

## 2024-07-11 RX ORDER — AMOXICILLIN 250 MG
2 CAPSULE ORAL DAILY PRN
Qty: 30 TABLET | Refills: 1 | Status: SHIPPED | OUTPATIENT
Start: 2024-07-11 | End: 2025-07-11

## 2024-07-11 RX ORDER — ROCURONIUM BROMIDE 10 MG/ML
INJECTION, SOLUTION INTRAVENOUS AS NEEDED
Status: DISCONTINUED | OUTPATIENT
Start: 2024-07-11 | End: 2024-07-11 | Stop reason: SURG

## 2024-07-11 RX ORDER — INDOCYANINE GREEN AND WATER 25 MG
KIT INJECTION AS NEEDED
Status: DISCONTINUED | OUTPATIENT
Start: 2024-07-11 | End: 2024-07-11 | Stop reason: SURG

## 2024-07-11 RX ORDER — SODIUM CHLORIDE 0.9 % (FLUSH) 0.9 %
10 SYRINGE (ML) INJECTION EVERY 12 HOURS SCHEDULED
Status: DISCONTINUED | OUTPATIENT
Start: 2024-07-11 | End: 2024-07-11 | Stop reason: HOSPADM

## 2024-07-11 RX ORDER — CELECOXIB 200 MG/1
400 CAPSULE ORAL ONCE
Status: COMPLETED | OUTPATIENT
Start: 2024-07-11 | End: 2024-07-11

## 2024-07-11 RX ORDER — NALOXONE HCL 0.4 MG/ML
0.2 VIAL (ML) INJECTION AS NEEDED
Status: DISCONTINUED | OUTPATIENT
Start: 2024-07-11 | End: 2024-07-11 | Stop reason: HOSPADM

## 2024-07-11 RX ORDER — DOXYCYCLINE 100 MG/1
100 CAPSULE ORAL 2 TIMES DAILY
Qty: 10 CAPSULE | Refills: 0 | Status: SHIPPED | OUTPATIENT
Start: 2024-07-11 | End: 2024-07-16

## 2024-07-11 RX ORDER — HYDROCODONE BITARTRATE AND ACETAMINOPHEN 5; 325 MG/1; MG/1
1 TABLET ORAL EVERY 4 HOURS PRN
Qty: 20 TABLET | Refills: 0 | Status: SHIPPED | OUTPATIENT
Start: 2024-07-11

## 2024-07-11 RX ADMIN — CLINDAMYCIN PHOSPHATE 900 MG: 900 INJECTION, SOLUTION INTRAVENOUS at 11:56

## 2024-07-11 RX ADMIN — TILMANOCEPT 1 DOSE: KIT at 07:41

## 2024-07-11 RX ADMIN — GABAPENTIN 300 MG: 300 CAPSULE ORAL at 10:02

## 2024-07-11 RX ADMIN — MAGNESIUM SULFATE HEPTAHYDRATE 2 G: 500 INJECTION, SOLUTION INTRAMUSCULAR; INTRAVENOUS at 12:43

## 2024-07-11 RX ADMIN — CELECOXIB 400 MG: 200 CAPSULE ORAL at 10:02

## 2024-07-11 RX ADMIN — LIDOCAINE HYDROCHLORIDE 60 MG: 20 INJECTION, SOLUTION INFILTRATION; PERINEURAL at 12:09

## 2024-07-11 RX ADMIN — SODIUM CHLORIDE, POTASSIUM CHLORIDE, SODIUM LACTATE AND CALCIUM CHLORIDE: 600; 310; 30; 20 INJECTION, SOLUTION INTRAVENOUS at 12:26

## 2024-07-11 RX ADMIN — DOXYCYCLINE 200 MG: 100 CAPSULE ORAL at 10:02

## 2024-07-11 RX ADMIN — PHENYLEPHRINE HYDROCHLORIDE 100 MCG: 10 INJECTION INTRAVENOUS at 12:26

## 2024-07-11 RX ADMIN — PHENYLEPHRINE HYDROCHLORIDE 100 MCG: 10 INJECTION INTRAVENOUS at 14:33

## 2024-07-11 RX ADMIN — INDOCYANINE GREEN AND WATER 7.5 MG: KIT at 15:40

## 2024-07-11 RX ADMIN — FENTANYL CITRATE 50 MCG: 50 INJECTION, SOLUTION INTRAMUSCULAR; INTRAVENOUS at 14:07

## 2024-07-11 RX ADMIN — ACETAMINOPHEN 1000 MG: 500 TABLET ORAL at 10:01

## 2024-07-11 RX ADMIN — SODIUM CHLORIDE, POTASSIUM CHLORIDE, SODIUM LACTATE AND CALCIUM CHLORIDE: 600; 310; 30; 20 INJECTION, SOLUTION INTRAVENOUS at 14:51

## 2024-07-11 RX ADMIN — OXYCODONE HYDROCHLORIDE 10 MG: 5 TABLET ORAL at 10:02

## 2024-07-11 RX ADMIN — SCOPALAMINE 1 PATCH: 1 PATCH, EXTENDED RELEASE TRANSDERMAL at 07:43

## 2024-07-11 RX ADMIN — ONDANSETRON 4 MG: 2 INJECTION INTRAMUSCULAR; INTRAVENOUS at 15:36

## 2024-07-11 RX ADMIN — DIAZEPAM 10 MG: 5 TABLET ORAL at 06:35

## 2024-07-11 RX ADMIN — SODIUM CHLORIDE, POTASSIUM CHLORIDE, SODIUM LACTATE AND CALCIUM CHLORIDE 125 ML/HR: 600; 310; 30; 20 INJECTION, SOLUTION INTRAVENOUS at 05:50

## 2024-07-11 RX ADMIN — INDOCYANINE GREEN AND WATER 7.5 MG: KIT at 14:52

## 2024-07-11 RX ADMIN — PROPOFOL 25 MCG/KG/MIN: 10 INJECTION, EMULSION INTRAVENOUS at 12:18

## 2024-07-11 RX ADMIN — FENTANYL CITRATE 50 MCG: 50 INJECTION, SOLUTION INTRAMUSCULAR; INTRAVENOUS at 12:05

## 2024-07-11 RX ADMIN — DEXAMETHASONE SODIUM PHOSPHATE 8 MG: 4 INJECTION, SOLUTION INTRAMUSCULAR; INTRAVENOUS at 12:15

## 2024-07-11 RX ADMIN — ROCURONIUM BROMIDE 50 MG: 10 INJECTION, SOLUTION INTRAVENOUS at 12:09

## 2024-07-11 RX ADMIN — OXYCODONE AND ACETAMINOPHEN 1 TABLET: 7.5; 325 TABLET ORAL at 17:30

## 2024-07-11 RX ADMIN — ONDANSETRON HYDROCHLORIDE 8 MG: 8 TABLET, FILM COATED ORAL at 10:02

## 2024-07-11 RX ADMIN — PROPOFOL 200 MG: 10 INJECTION, EMULSION INTRAVENOUS at 12:09

## 2024-07-11 RX ADMIN — SODIUM CHLORIDE, POTASSIUM CHLORIDE, SODIUM LACTATE AND CALCIUM CHLORIDE: 600; 310; 30; 20 INJECTION, SOLUTION INTRAVENOUS at 12:00

## 2024-07-11 RX ADMIN — LIDOCAINE 4% 1 APPLICATION: 4 CREAM TOPICAL at 06:04

## 2024-07-11 RX ADMIN — SUGAMMADEX 160 MG: 100 INJECTION, SOLUTION INTRAVENOUS at 16:01

## 2024-07-11 NOTE — OP NOTE
Pre-Operative Diagnosis: Acquired absence bilateral breast     Post-Operative Diagnosis: Same     Procedure Performed:   1.  right  placement of subpectoral tissue expanders for reconstruction ([id number])  2.  right placement cortiva acellular dermal matrix (8 x 16 cm)  3. Complex closure right breast 5cm     Surgeon: ANGELICA Bender MD     Assistant: Amairani Morrison PA-C  Please note Ms. Morrison is present and the case was essential for successful outcome.  She was the first and only assistant, resident assistance was not available.  She provided assistance with retraction, accurate placement of both expanders, hemostasis, accurate skin closure.     Anesthesia: General     Estimated Blood Loss: 10     Specimens: None     Complications: None immediate     Indications: She presented after diagnosis of breast cancer.  She discussed her treatment with Dr. De Anda discussed right sided mastectomy.  We discussed placing the expander in subpectoral position in her existing implant capsule.  We did discuss placement of ADM to help recreate the new footprint of the expander as it is unlikely the existing implant capsule is in an appropriate position.  There is also discussion ahead of time of taking some of the lateral capsule as it was in close approximation to the tumor.     We discussed risks, benefits and alternatives including but not limited to: bleeding, infection, asymmetry, poor or slow wound healing, need for further surgery, possible recurrence.  The patient elected to proceed.     Description of Procedure: The patient was met in the preoperative holding area.  All questions were answered and informed consent was assured.       She was marked in a standing position of the breast landmarks were drawn and periareolar fusiform incision designed.  She was transferred to the operating placed supine on table with arms abducted and padded.     After induction of appropriate anesthesia, a timeout was performed correctly  identifying the patient, operative site, and procedure to be performed.  All present were in agreement.     After completion of the mastectomies the skin flaps were viable.  However there was an area of the inferior lateral skin that was very close to the tumor.  Dr. De Anda and I discussed this in a fusiform vertically oriented excision measuring about 5 cm in length was designed in this skin was removed.  This area was remote from the existing mastectomy incision and required additional time in the operating room in order to address this remote incision.  This area was copiously irrigated and hemostasis achieved.  It was meticulously closed in a layered fashion with 2-0 Vicryl closing the subcutaneous layer, 3-0 Monocryl in the deep dermal layer and 4 Monocryl in the intracuticular layer.      2mm of the mastectomy skin was trimmed to get back to healthy bleeding edges.  copiously irrigated and immaculate hemostasis was achieved.  50% Betadine solution was left to dwell for several minutes and rinsed clear with antibiotic solution.           The subpectoral space was measured and was about 2 cm above the level of the fold and so the fold was lowered and the inferior border of the pec muscle released.  Medial capsulotomy performed.  Gloves were changed and the ADM and expander brought on the field.  Expander placed in the pocket after changing gloves and reprepped and the skin.  The expanders were sutured in 4 locations. 2 drains were placed.  The ADM was then anchored with interrupted 2-0 PDS suture along the desired fold and then draped superiorly and attached to the inferior and lateral border of the pectoralis muscle.  TXA was placed in each pocket. Then closure performed with 2-0 Vicryl in the subcutaneous layer.     SPY used to assess skin and appeared well perfused. The expander reduced to 150cc air and SPY used again and confirmed adequate perfusion.  3-0 Monocryl deep dermal layer and 4-0 Monocryl in the  intracuticular.     Skin was dressed with Nitropaste and Prevena applied and she was placed in a well-padded Ace wrap.     The patient was then aroused from anesthesia with ease and transferred to the postoperative care area in good condition. All sponge, needle, and instrument counts were correct.

## 2024-07-11 NOTE — ANESTHESIA PROCEDURE NOTES
Airway  Urgency: elective    Date/Time: 7/11/2024 12:15 PM  Airway not difficult    General Information and Staff    Patient location during procedure: OR  Anesthesiologist: Mariela Bradshaw MD  CRNA/CAA: Consuelo Reyna CRNA    Indications and Patient Condition  Indications for airway management: airway protection    Preoxygenated: yes  MILS maintained throughout  Mask difficulty assessment: 1 - vent by mask    Final Airway Details  Final airway type: endotracheal airway      Successful airway: ETT  Cuffed: yes   Successful intubation technique: direct laryngoscopy  Facilitating devices/methods: anterior pressure/BURP  Endotracheal tube insertion site: oral  Blade: [place]ne[place]  Blade size: 2  ETT size (mm): 7.0  Cormack-Lehane Classification: grade I - full view of glottis  Placement verified by: chest auscultation   Cuff volume (mL): 8  Measured from: lips  ETT/EBT  to lips (cm): 21  Number of attempts at approach: 1  Assessment: lips, teeth, and gum same as pre-op and atraumatic intubation    Additional Comments  Pt preoxygenated, SIVI, bag mask vent, ATETI, dentition as before

## 2024-07-11 NOTE — ANESTHESIA PREPROCEDURE EVALUATION
Anesthesia Evaluation     Patient summary reviewed   NPO Solid Status: > 8 hours  NPO Liquid Status: > 2 hours           Airway   Mallampati: III  TM distance: >3 FB  Neck ROM: full  Dental      Comment: Denies any chipped, cracked, or loose teeth     Pulmonary - normal exam   (+) a smoker (quit 2016, 1ppd for 20 years) Former,  (-) COPD, asthma, sleep apnea  Cardiovascular - normal exam  Exercise tolerance: good (4-7 METS)    (+) hypertension, hyperlipidemia  (-) past MI, CAD, dysrhythmias, angina      Neuro/Psych  (+) psychiatric history Anxiety and Depression  (-) seizures, TIA, CVA  GI/Hepatic/Renal/Endo    (+) GERD  (-) liver disease, no renal disease, diabetes, no thyroid disorder    Musculoskeletal     (+) back pain  Abdominal    Substance History      OB/GYN          Other   arthritis,   history of cancer (right breast)        Phys Exam Other: 01/2024  Blade: [place]  Blade size: 3  ETT size (mm): 7.0  Cormack-Lehane Classification: grade IIa - partial view of glottis  Placement verified by: chest auscultation and capnometry   Measured from: lips  ETT/EBT  to lips (cm): 21  Number of attempts at approach: 1  Assessment: lips, teeth, and gum same as pre-op and atraumatic intubation                Anesthesia Plan    ASA 3     general     (Complications of general anesthesia include but not limited to awareness under anesthesia, nausea, vomiting, sore throat, hoarseness, chipped or cracked teeth, MI, CVA, or serious allergic reaction. )  intravenous induction     Anesthetic plan, risks, benefits, and alternatives have been provided, discussed and informed consent has been obtained with: patient and mother.    Plan discussed with CRNA and attending.    CODE STATUS:

## 2024-07-11 NOTE — OP NOTE
BREAST MASTECTOMY WITH SENTINEL NODE BIOPSY WITH IMMEDIATE RECONSTRUCTION  Procedure Report    Patient Name:  Dasha De Leon  YOB: 1970    Date of Surgery:  7/11/2024     Indications:  right breast cancer    Pre-op Diagnosis:   Malignant neoplasm of lower-outer quadrant of right breast of female, estrogen receptor positive [C50.511, Z17.0]       Post-Op Diagnosis Codes:     * Malignant neoplasm of lower-outer quadrant of right breast of female, estrogen receptor positive [C50.511, Z17.0]        Procedure(s):  Right MASTECTOMY WITH SENTINEL NODE BIOPSY  RIGHT SUBPECTORAL PLACEMENT TISSUE EXPANDER AND CORTIVA (ACELLULAR DERMAL MATRIX)      Staff:  Surgeon(s):  Romina De Anda MD Wermeling, Frank Ryan, MD    Assistant: Jasmin Jerry CSA    Anesthesia: General    Estimated Blood Loss: minimal - aprox 50 cc    Implants:    Implant Name Type Inv. Item Serial No.  Lot No. LRB No. Used Action   CLIPAPPLR M/ ENDO [id number] 3/8IN MD - [id number] Implant CLIPAPPLR M/ ENDO [id number] 3/8IN MD  [employer]  [employer] J [employer] J [id number] Right 1 Implanted   EXPNDR BRST DERMASPAN SMOTH F/HT INTEGR/PRT [id number] - [id number] - [id number] Implant EXPNDR BRST DERMASPAN SMOTH F/HT INTEGR/PRT [id number] SIENTRA NR Right 1 Implanted   Cortiva 1mm Allograft    [employer] [id number] Right 1 Implanted       Specimen:          Specimens       ID Source Type Tests Collected By Collected At Frozen?    A Swan Lymph Node Tissue TISSUE PATHOLOGY EXAM   Romina De Anda MD 7/11/24 1321 Yes    Description: Right axillary sentinel lymph node for frozen. OR 30, call back #[phone]    Comment: call back #[phone]    B Breast, Right Tissue TISSUE PATHOLOGY EXAM   Romina De Anda MD 7/11/24 1422 No    Description: Right anterior skin margin breast, long stiitch lateral, short stitch superior for permanent    C Breast, Right Tissue TISSUE PATHOLOGY EXAM   Romina De Anda MD 7/11/24 1424 No     Description: Right breast, long stitch lateral, short stitch superior, clip marks capsule, 732g                Findings: 3 lymph nodes identified    Complications: tumor was attached to the skin and into the implant capsule    Description of Procedure:     The risks and benefits of the procedure were explained in detail to the patient.  Informed consent was obtained.  Prior to arrival to the operating room, the patient had radionucleotide injected into the right breast for subsequent sentinel lymph node identification.  The patient was then brought back to the operating room suite and placed supine on the operating table. Sequential compression devices were applied to the lower extremities and perioperative antibiotics were administered.  After induction of general anesthesia the patient's chest was prepped and draped in the standard surgical fashion. Blue dye was injected in the right breast for dual dye sentinel lymph node identification. An elliptical incision was planned encompassing the nipple areolar complex of the right breast as marked by plastic surgery and dissection was carried down to the underlying subcutaneous tissue.  Skin flaps were developed superiorly to the clavicle, inferiorly to the rectus, medially to the sternum and laterally to the latissimus dorsi.    Through the same incision, attention was drawn to the right axilla. All lymph nodes with increased radioactivity from the previously injected radiotracer, blue lymph nodes, or nodes at the end of a blue channel were removed.  Upon removal of said nodes,there was no radioactive signal within 90% of the sentinel nodes identified.  Hemostasis was confirmed.  The breast was then dissected off of the chest wall with care taken to include all of the underlying muscular fascia.   The patient was then handed off to the plastic surgery team for immediate reconstruction.        Hope Node Biopsy for Breast Cancer - Right  Operation performed with  curative intent. Yes   Tracer(s) used to identify sentinel nodes in the upfront surgery (non-neoadjuvant) setting (select all that apply). Dye and Radioactive tracer   Tracer(s) used to identify sentinel nodes in the neoadjuvant setting (select all that apply). N/A   All nodes (colored or non-colored) present at the end of a dye-filled lymphatic channel were removed. Yes   All significantly radioactive nodes were removed. Yes   All palpably suspicious nodes were removed. Yes   Biopsy-proven positive nodes marked with clips prior to chemotherapy were identified and removed. N/A            Assistant: Jasmin Jerry CSA  was responsible for performing the following activities: Retraction, Suction, and Irrigation and their skilled assistance was necessary for the success of this case.    Romina De Anda MD     Date: 7/11/2024  Time: 15:51 EDT

## 2024-07-12 NOTE — ANESTHESIA POSTPROCEDURE EVALUATION
Patient: Dasha De Leon    Procedure Summary       Date: 07/11/24 Room / Location: [facility] OR [address] [place] [address] MAIN OR    Anesthesia Start: 1200 Anesthesia Stop: 1632    Procedures:       Right MASTECTOMY WITH SENTINEL NODE BIOPSY (Right: Breast)      RIGHT SUBPECTORAL PLACEMENT TISSUE EXPANDER AND CORTIVA (ACELLULAR DERMAL MATRIX), COMPLEX CLOSURE RIGHT BREAST 5cm (Right: Breast) Diagnosis:       Malignant neoplasm of lower-outer quadrant of right breast of female, estrogen receptor positive      (Malignant neoplasm of lower-outer quadrant of right breast of female, estrogen receptor positive [C50.511, Z17.0])    Surgeons: Romina De Anda MD; Colin Bender MD Provider: Natanael Gibson MD    Anesthesia Type: general ASA Status: 3            Anesthesia Type: general    Vitals  Vitals Value Taken Time   /77 07/11/24 1700   Temp 36.7 °C (98.1 °F) 07/11/24 1630   Pulse 90 07/11/24 1710   Resp 16 07/11/24 1700   SpO2 91 % 07/11/24 1710   Vitals shown include unfiled device data.        Post Anesthesia Care and Evaluation    Patient location during evaluation: bedside  Pain management: adequate    Airway patency: patent  Anesthetic complications: No anesthetic complications    Cardiovascular status: acceptable  Respiratory status: acceptable  Hydration status: acceptable

## 2024-07-13 LAB
LAB AP CASE REPORT: NORMAL
LAB AP CLINICAL INFORMATION: NORMAL
LAB AP DIAGNOSIS COMMENT: NORMAL
LAB AP SPECIAL STAINS: NORMAL
LAB AP SYNOPTIC CHECKLIST: NORMAL
Lab: NORMAL
PATH REPORT.FINAL DX SPEC: NORMAL
PATH REPORT.GROSS SPEC: NORMAL

## 2024-07-15 ENCOUNTER — PATIENT OUTREACH (OUTPATIENT)
Dept: OTHER | Facility: HOSPITAL | Age: 54
End: 2024-07-15
Payer: COMMERCIAL

## 2024-07-15 DIAGNOSIS — Z17.0 MALIGNANT NEOPLASM OF LOWER-OUTER QUADRANT OF RIGHT BREAST OF FEMALE, ESTROGEN RECEPTOR POSITIVE: Primary | ICD-10-CM

## 2024-07-15 DIAGNOSIS — C50.511 MALIGNANT NEOPLASM OF LOWER-OUTER QUADRANT OF RIGHT BREAST OF FEMALE, ESTROGEN RECEPTOR POSITIVE: Primary | ICD-10-CM

## 2024-07-15 NOTE — PROGRESS NOTES
Called MsCarlos Moises to see how she was doing. Left a message with my contact information and asked her to call back at her convenience.

## 2024-07-16 ENCOUNTER — TELEPHONE (OUTPATIENT)
Dept: SURGERY | Facility: CLINIC | Age: 54
End: 2024-07-16
Payer: COMMERCIAL

## 2024-07-16 ENCOUNTER — PREP FOR SURGERY (OUTPATIENT)
Dept: OTHER | Facility: HOSPITAL | Age: 54
End: 2024-07-16
Payer: COMMERCIAL

## 2024-07-16 DIAGNOSIS — Z17.0 MALIGNANT NEOPLASM OF LOWER-OUTER QUADRANT OF RIGHT BREAST OF FEMALE, ESTROGEN RECEPTOR POSITIVE: Primary | ICD-10-CM

## 2024-07-16 DIAGNOSIS — C50.511 MALIGNANT NEOPLASM OF LOWER-OUTER QUADRANT OF RIGHT BREAST OF FEMALE, ESTROGEN RECEPTOR POSITIVE: Primary | ICD-10-CM

## 2024-07-16 RX ORDER — CLINDAMYCIN PHOSPHATE 900 MG/50ML
900 INJECTION, SOLUTION INTRAVENOUS ONCE
Status: CANCELLED | OUTPATIENT
Start: 2024-07-16 | End: 2024-07-16

## 2024-07-16 NOTE — TELEPHONE ENCOUNTER
Called Dasha De Leon to discuss pathology results.  All questions answered.  I will follow up with her in clinic as scheduled.    Needs margin reexcision for anterior medial margin.  Discussed with plastic surgery will coordinate as soon as our schedules allow.  Plan to present at tumor board, discuss role for Oncotype.  Likely beneficial but will have medical oncology weigh in as well.

## 2024-07-23 DIAGNOSIS — C50.511 MALIGNANT NEOPLASM OF LOWER-OUTER QUADRANT OF RIGHT BREAST OF FEMALE, ESTROGEN RECEPTOR POSITIVE: Primary | ICD-10-CM

## 2024-07-23 DIAGNOSIS — Z17.0 MALIGNANT NEOPLASM OF LOWER-OUTER QUADRANT OF RIGHT BREAST OF FEMALE, ESTROGEN RECEPTOR POSITIVE: Primary | ICD-10-CM

## 2024-07-24 ENCOUNTER — TELEPHONE (OUTPATIENT)
Dept: SURGERY | Facility: CLINIC | Age: 54
End: 2024-07-24
Payer: COMMERCIAL

## 2024-07-24 DIAGNOSIS — F39 MOOD DISORDER: ICD-10-CM

## 2024-07-24 LAB
LAB AP CASE REPORT: NORMAL
LAB AP CLINICAL INFORMATION: NORMAL
LAB AP DIAGNOSIS COMMENT: NORMAL
LAB AP SPECIAL STAINS: NORMAL
LAB AP SYNOPTIC CHECKLIST: NORMAL
Lab: NORMAL
PATH REPORT.ADDENDUM SPEC: NORMAL
PATH REPORT.FINAL DX SPEC: NORMAL
PATH REPORT.GROSS SPEC: NORMAL

## 2024-07-24 RX ORDER — BUPROPION HYDROCHLORIDE 300 MG/1
300 TABLET ORAL DAILY
Qty: 90 TABLET | Refills: 1 | Status: SHIPPED | OUTPATIENT
Start: 2024-07-24

## 2024-07-26 ENCOUNTER — ANESTHESIA EVENT (OUTPATIENT)
Dept: PERIOP | Facility: HOSPITAL | Age: 54
End: 2024-07-26
Payer: COMMERCIAL

## 2024-07-26 ENCOUNTER — HOSPITAL ENCOUNTER (OUTPATIENT)
Facility: HOSPITAL | Age: 54
Setting detail: HOSPITAL OUTPATIENT SURGERY
Discharge: HOME | End: 2024-07-26
Attending: STUDENT IN AN ORGANIZED HEALTH CARE EDUCATION/TRAINING PROGRAM | Admitting: STUDENT IN AN ORGANIZED HEALTH CARE EDUCATION/TRAINING PROGRAM
Payer: COMMERCIAL

## 2024-07-26 ENCOUNTER — ANESTHESIA (OUTPATIENT)
Dept: PERIOP | Facility: HOSPITAL | Age: 54
End: 2024-07-26
Payer: COMMERCIAL

## 2024-07-26 VITALS
TEMPERATURE: 97.9 F | HEART RATE: 80 BPM | BODY MASS INDEX: 27.94 KG/M2 | WEIGHT: 167.9 LBS | OXYGEN SATURATION: 96 % | DIASTOLIC BLOOD PRESSURE: 75 MMHG | SYSTOLIC BLOOD PRESSURE: 130 MMHG | RESPIRATION RATE: 16 BRPM

## 2024-07-26 DIAGNOSIS — Z17.0 MALIGNANT NEOPLASM OF LOWER-OUTER QUADRANT OF RIGHT BREAST OF FEMALE, ESTROGEN RECEPTOR POSITIVE: ICD-10-CM

## 2024-07-26 DIAGNOSIS — C50.511 MALIGNANT NEOPLASM OF LOWER-OUTER QUADRANT OF RIGHT BREAST OF FEMALE, ESTROGEN RECEPTOR POSITIVE: ICD-10-CM

## 2024-07-26 PROCEDURE — 19303 MAST SIMPLE COMPLETE: CPT | Performed by: STUDENT IN AN ORGANIZED HEALTH CARE EDUCATION/TRAINING PROGRAM

## 2024-07-26 PROCEDURE — 25010000002 MIDAZOLAM PER 1 MG: Performed by: ANESTHESIOLOGY

## 2024-07-26 PROCEDURE — 25010000002 CLINDAMYCIN 900 MG/50ML SOLUTION: Performed by: STUDENT IN AN ORGANIZED HEALTH CARE EDUCATION/TRAINING PROGRAM

## 2024-07-26 PROCEDURE — 88307 TISSUE EXAM BY PATHOLOGIST: CPT | Performed by: STUDENT IN AN ORGANIZED HEALTH CARE EDUCATION/TRAINING PROGRAM

## 2024-07-26 PROCEDURE — 25810000003 LACTATED RINGERS PER 1000 ML: Performed by: ANESTHESIOLOGY

## 2024-07-26 PROCEDURE — 25010000002 GENTAMICIN PER 80 MG: Performed by: STUDENT IN AN ORGANIZED HEALTH CARE EDUCATION/TRAINING PROGRAM

## 2024-07-26 PROCEDURE — 25010000002 SUGAMMADEX 200 MG/2ML SOLUTION: Performed by: NURSE ANESTHETIST, CERTIFIED REGISTERED

## 2024-07-26 PROCEDURE — 25010000002 DEXAMETHASONE PER 1 MG: Performed by: NURSE ANESTHETIST, CERTIFIED REGISTERED

## 2024-07-26 PROCEDURE — 63710000001 ONDANSETRON PER 8 MG: Performed by: PLASTIC SURGERY

## 2024-07-26 PROCEDURE — 25010000002 PHENYLEPHRINE 10 MG/ML SOLUTION: Performed by: NURSE ANESTHETIST, CERTIFIED REGISTERED

## 2024-07-26 PROCEDURE — 25010000002 ROPIVACAINE PER 1 MG: Performed by: STUDENT IN AN ORGANIZED HEALTH CARE EDUCATION/TRAINING PROGRAM

## 2024-07-26 PROCEDURE — 25010000002 PROPOFOL 10 MG/ML EMULSION: Performed by: NURSE ANESTHETIST, CERTIFIED REGISTERED

## 2024-07-26 RX ORDER — LIDOCAINE HYDROCHLORIDE AND EPINEPHRINE 10; 10 MG/ML; UG/ML
INJECTION, SOLUTION INFILTRATION; PERINEURAL AS NEEDED
Status: DISCONTINUED | OUTPATIENT
Start: 2024-07-26 | End: 2024-07-26 | Stop reason: HOSPADM

## 2024-07-26 RX ORDER — ROPIVACAINE HYDROCHLORIDE 5 MG/ML
INJECTION, SOLUTION EPIDURAL; INFILTRATION; PERINEURAL AS NEEDED
Status: DISCONTINUED | OUTPATIENT
Start: 2024-07-26 | End: 2024-07-26 | Stop reason: HOSPADM

## 2024-07-26 RX ORDER — ACETAMINOPHEN 500 MG
1000 TABLET ORAL ONCE
Status: COMPLETED | OUTPATIENT
Start: 2024-07-26 | End: 2024-07-26

## 2024-07-26 RX ORDER — EPHEDRINE SULFATE 50 MG/ML
5 INJECTION, SOLUTION INTRAVENOUS ONCE AS NEEDED
Status: DISCONTINUED | OUTPATIENT
Start: 2024-07-26 | End: 2024-07-26 | Stop reason: HOSPADM

## 2024-07-26 RX ORDER — IPRATROPIUM BROMIDE AND ALBUTEROL SULFATE 2.5; .5 MG/3ML; MG/3ML
3 SOLUTION RESPIRATORY (INHALATION) ONCE AS NEEDED
Status: DISCONTINUED | OUTPATIENT
Start: 2024-07-26 | End: 2024-07-26 | Stop reason: HOSPADM

## 2024-07-26 RX ORDER — ONDANSETRON 2 MG/ML
4 INJECTION INTRAMUSCULAR; INTRAVENOUS ONCE AS NEEDED
Status: DISCONTINUED | OUTPATIENT
Start: 2024-07-26 | End: 2024-07-26 | Stop reason: HOSPADM

## 2024-07-26 RX ORDER — OXYCODONE AND ACETAMINOPHEN 7.5; 325 MG/1; MG/1
1 TABLET ORAL EVERY 4 HOURS PRN
Status: DISCONTINUED | OUTPATIENT
Start: 2024-07-26 | End: 2024-07-26 | Stop reason: HOSPADM

## 2024-07-26 RX ORDER — DIPHENHYDRAMINE HYDROCHLORIDE 50 MG/ML
12.5 INJECTION INTRAMUSCULAR; INTRAVENOUS
Status: DISCONTINUED | OUTPATIENT
Start: 2024-07-26 | End: 2024-07-26 | Stop reason: HOSPADM

## 2024-07-26 RX ORDER — SODIUM CHLORIDE, SODIUM LACTATE, POTASSIUM CHLORIDE, CALCIUM CHLORIDE 600; 310; 30; 20 MG/100ML; MG/100ML; MG/100ML; MG/100ML
125 INJECTION, SOLUTION INTRAVENOUS CONTINUOUS
Status: DISCONTINUED | OUTPATIENT
Start: 2024-07-26 | End: 2024-07-26 | Stop reason: HOSPADM

## 2024-07-26 RX ORDER — LIDOCAINE HYDROCHLORIDE 20 MG/ML
INJECTION, SOLUTION INFILTRATION; PERINEURAL AS NEEDED
Status: DISCONTINUED | OUTPATIENT
Start: 2024-07-26 | End: 2024-07-26 | Stop reason: SURG

## 2024-07-26 RX ORDER — DROPERIDOL 2.5 MG/ML
0.62 INJECTION, SOLUTION INTRAMUSCULAR; INTRAVENOUS
Status: DISCONTINUED | OUTPATIENT
Start: 2024-07-26 | End: 2024-07-26 | Stop reason: HOSPADM

## 2024-07-26 RX ORDER — ROCURONIUM BROMIDE 10 MG/ML
INJECTION, SOLUTION INTRAVENOUS AS NEEDED
Status: DISCONTINUED | OUTPATIENT
Start: 2024-07-26 | End: 2024-07-26 | Stop reason: SURG

## 2024-07-26 RX ORDER — LIDOCAINE HYDROCHLORIDE 10 MG/ML
0.5 INJECTION, SOLUTION INFILTRATION; PERINEURAL ONCE AS NEEDED
Status: DISCONTINUED | OUTPATIENT
Start: 2024-07-26 | End: 2024-07-26 | Stop reason: HOSPADM

## 2024-07-26 RX ORDER — DEXAMETHASONE SODIUM PHOSPHATE 4 MG/ML
INJECTION, SOLUTION INTRA-ARTICULAR; INTRALESIONAL; INTRAMUSCULAR; INTRAVENOUS; SOFT TISSUE AS NEEDED
Status: DISCONTINUED | OUTPATIENT
Start: 2024-07-26 | End: 2024-07-26 | Stop reason: SURG

## 2024-07-26 RX ORDER — HYDROCODONE BITARTRATE AND ACETAMINOPHEN 5; 325 MG/1; MG/1
1 TABLET ORAL ONCE AS NEEDED
Status: COMPLETED | OUTPATIENT
Start: 2024-07-26 | End: 2024-07-26

## 2024-07-26 RX ORDER — DOXYCYCLINE 100 MG/1
100 CAPSULE ORAL 2 TIMES DAILY
Qty: 10 CAPSULE | Refills: 0 | Status: SHIPPED | OUTPATIENT
Start: 2024-07-26 | End: 2024-07-31

## 2024-07-26 RX ORDER — CLINDAMYCIN PHOSPHATE 900 MG/50ML
900 INJECTION, SOLUTION INTRAVENOUS ONCE
Status: COMPLETED | OUTPATIENT
Start: 2024-07-26 | End: 2024-07-26

## 2024-07-26 RX ORDER — GABAPENTIN 300 MG/1
300 CAPSULE ORAL ONCE
Status: COMPLETED | OUTPATIENT
Start: 2024-07-26 | End: 2024-07-26

## 2024-07-26 RX ORDER — FLUMAZENIL 0.1 MG/ML
0.2 INJECTION INTRAVENOUS AS NEEDED
Status: DISCONTINUED | OUTPATIENT
Start: 2024-07-26 | End: 2024-07-26 | Stop reason: HOSPADM

## 2024-07-26 RX ORDER — SODIUM CHLORIDE 0.9 % (FLUSH) 0.9 %
3 SYRINGE (ML) INJECTION EVERY 12 HOURS SCHEDULED
Status: DISCONTINUED | OUTPATIENT
Start: 2024-07-26 | End: 2024-07-26 | Stop reason: HOSPADM

## 2024-07-26 RX ORDER — SCOLOPAMINE TRANSDERMAL SYSTEM 1 MG/1
1 PATCH, EXTENDED RELEASE TRANSDERMAL ONCE
Status: DISCONTINUED | OUTPATIENT
Start: 2024-07-26 | End: 2024-07-26 | Stop reason: HOSPADM

## 2024-07-26 RX ORDER — FAMOTIDINE 10 MG/ML
20 INJECTION, SOLUTION INTRAVENOUS ONCE
Status: COMPLETED | OUTPATIENT
Start: 2024-07-26 | End: 2024-07-26

## 2024-07-26 RX ORDER — FENTANYL CITRATE 50 UG/ML
50 INJECTION, SOLUTION INTRAMUSCULAR; INTRAVENOUS
Status: DISCONTINUED | OUTPATIENT
Start: 2024-07-26 | End: 2024-07-26 | Stop reason: HOSPADM

## 2024-07-26 RX ORDER — CELECOXIB 200 MG/1
400 CAPSULE ORAL ONCE
Status: COMPLETED | OUTPATIENT
Start: 2024-07-26 | End: 2024-07-26

## 2024-07-26 RX ORDER — HYDROMORPHONE HYDROCHLORIDE 1 MG/ML
0.5 INJECTION, SOLUTION INTRAMUSCULAR; INTRAVENOUS; SUBCUTANEOUS
Status: DISCONTINUED | OUTPATIENT
Start: 2024-07-26 | End: 2024-07-26 | Stop reason: HOSPADM

## 2024-07-26 RX ORDER — SODIUM CHLORIDE, SODIUM LACTATE, POTASSIUM CHLORIDE, CALCIUM CHLORIDE 600; 310; 30; 20 MG/100ML; MG/100ML; MG/100ML; MG/100ML
9 INJECTION, SOLUTION INTRAVENOUS CONTINUOUS
Status: DISCONTINUED | OUTPATIENT
Start: 2024-07-26 | End: 2024-07-26 | Stop reason: HOSPADM

## 2024-07-26 RX ORDER — ONDANSETRON HYDROCHLORIDE 8 MG/1
8 TABLET, FILM COATED ORAL ONCE
Status: COMPLETED | OUTPATIENT
Start: 2024-07-26 | End: 2024-07-26

## 2024-07-26 RX ORDER — FENTANYL CITRATE 50 UG/ML
50 INJECTION, SOLUTION INTRAMUSCULAR; INTRAVENOUS ONCE AS NEEDED
Status: DISCONTINUED | OUTPATIENT
Start: 2024-07-26 | End: 2024-07-26 | Stop reason: HOSPADM

## 2024-07-26 RX ORDER — DOXYCYCLINE 100 MG/1
200 CAPSULE ORAL ONCE
Status: COMPLETED | OUTPATIENT
Start: 2024-07-26 | End: 2024-07-26

## 2024-07-26 RX ORDER — MIDAZOLAM HYDROCHLORIDE 1 MG/ML
1 INJECTION INTRAMUSCULAR; INTRAVENOUS
Status: DISCONTINUED | OUTPATIENT
Start: 2024-07-26 | End: 2024-07-26 | Stop reason: HOSPADM

## 2024-07-26 RX ORDER — PROMETHAZINE HYDROCHLORIDE 25 MG/1
25 TABLET ORAL ONCE AS NEEDED
Status: DISCONTINUED | OUTPATIENT
Start: 2024-07-26 | End: 2024-07-26 | Stop reason: HOSPADM

## 2024-07-26 RX ORDER — PROPOFOL 10 MG/ML
VIAL (ML) INTRAVENOUS AS NEEDED
Status: DISCONTINUED | OUTPATIENT
Start: 2024-07-26 | End: 2024-07-26 | Stop reason: SURG

## 2024-07-26 RX ORDER — HYDROCODONE BITARTRATE AND ACETAMINOPHEN 5; 325 MG/1; MG/1
1 TABLET ORAL EVERY 4 HOURS PRN
Qty: 20 TABLET | Refills: 0 | Status: SHIPPED | OUTPATIENT
Start: 2024-07-26

## 2024-07-26 RX ORDER — PHENYLEPHRINE HYDROCHLORIDE 10 MG/ML
INJECTION INTRAVENOUS AS NEEDED
Status: DISCONTINUED | OUTPATIENT
Start: 2024-07-26 | End: 2024-07-26 | Stop reason: SURG

## 2024-07-26 RX ORDER — SODIUM CHLORIDE 0.9 % (FLUSH) 0.9 %
3-10 SYRINGE (ML) INJECTION AS NEEDED
Status: DISCONTINUED | OUTPATIENT
Start: 2024-07-26 | End: 2024-07-26 | Stop reason: HOSPADM

## 2024-07-26 RX ORDER — PROMETHAZINE HYDROCHLORIDE 25 MG/1
25 SUPPOSITORY RECTAL ONCE AS NEEDED
Status: DISCONTINUED | OUTPATIENT
Start: 2024-07-26 | End: 2024-07-26 | Stop reason: HOSPADM

## 2024-07-26 RX ORDER — NALOXONE HCL 0.4 MG/ML
0.2 VIAL (ML) INJECTION AS NEEDED
Status: DISCONTINUED | OUTPATIENT
Start: 2024-07-26 | End: 2024-07-26 | Stop reason: HOSPADM

## 2024-07-26 RX ORDER — LABETALOL HYDROCHLORIDE 5 MG/ML
5 INJECTION, SOLUTION INTRAVENOUS
Status: DISCONTINUED | OUTPATIENT
Start: 2024-07-26 | End: 2024-07-26 | Stop reason: HOSPADM

## 2024-07-26 RX ORDER — HYDRALAZINE HYDROCHLORIDE 20 MG/ML
5 INJECTION INTRAMUSCULAR; INTRAVENOUS
Status: DISCONTINUED | OUTPATIENT
Start: 2024-07-26 | End: 2024-07-26 | Stop reason: HOSPADM

## 2024-07-26 RX ADMIN — ACETAMINOPHEN 1000 MG: 500 TABLET ORAL at 06:10

## 2024-07-26 RX ADMIN — ONDANSETRON HYDROCHLORIDE 8 MG: 8 TABLET, FILM COATED ORAL at 06:10

## 2024-07-26 RX ADMIN — CELECOXIB 400 MG: 200 CAPSULE ORAL at 06:10

## 2024-07-26 RX ADMIN — CLINDAMYCIN PHOSPHATE 900 MG: 900 INJECTION, SOLUTION INTRAVENOUS at 06:56

## 2024-07-26 RX ADMIN — SUGAMMADEX 200 MG: 100 INJECTION, SOLUTION INTRAVENOUS at 08:06

## 2024-07-26 RX ADMIN — DOXYCYCLINE 200 MG: 100 CAPSULE ORAL at 06:10

## 2024-07-26 RX ADMIN — ROCURONIUM BROMIDE 50 MG: 10 INJECTION, SOLUTION INTRAVENOUS at 07:07

## 2024-07-26 RX ADMIN — SODIUM CHLORIDE, POTASSIUM CHLORIDE, SODIUM LACTATE AND CALCIUM CHLORIDE 9 ML/HR: 600; 310; 30; 20 INJECTION, SOLUTION INTRAVENOUS at 06:17

## 2024-07-26 RX ADMIN — PHENYLEPHRINE HYDROCHLORIDE 100 MCG: 10 INJECTION INTRAVENOUS at 07:35

## 2024-07-26 RX ADMIN — GABAPENTIN 300 MG: 300 CAPSULE ORAL at 06:45

## 2024-07-26 RX ADMIN — FAMOTIDINE 20 MG: 10 INJECTION INTRAVENOUS at 06:26

## 2024-07-26 RX ADMIN — PHENYLEPHRINE HYDROCHLORIDE 100 MCG: 10 INJECTION INTRAVENOUS at 07:37

## 2024-07-26 RX ADMIN — DEXAMETHASONE SODIUM PHOSPHATE 8 MG: 4 INJECTION, SOLUTION INTRA-ARTICULAR; INTRALESIONAL; INTRAMUSCULAR; INTRAVENOUS; SOFT TISSUE at 07:07

## 2024-07-26 RX ADMIN — HYDROCODONE BITARTRATE AND ACETAMINOPHEN 1 TABLET: 5; 325 TABLET ORAL at 08:36

## 2024-07-26 RX ADMIN — SCOPALAMINE 1 PATCH: 1 PATCH, EXTENDED RELEASE TRANSDERMAL at 06:26

## 2024-07-26 RX ADMIN — LIDOCAINE HYDROCHLORIDE 80 MG: 20 INJECTION, SOLUTION INFILTRATION; PERINEURAL at 07:07

## 2024-07-26 RX ADMIN — PHENYLEPHRINE HYDROCHLORIDE 100 MCG: 10 INJECTION INTRAVENOUS at 07:44

## 2024-07-26 RX ADMIN — PROPOFOL 200 MG: 10 INJECTION, EMULSION INTRAVENOUS at 07:07

## 2024-07-26 RX ADMIN — MIDAZOLAM 1 MG: 1 INJECTION INTRAMUSCULAR; INTRAVENOUS at 06:47

## 2024-07-26 NOTE — ANESTHESIA POSTPROCEDURE EVALUATION
Patient: Dasha De Leon    Procedure Summary       Date: 07/26/24 Room / Location: [facility] OR [address] [place] [address] MAIN OR    Anesthesia Start: 0700 Anesthesia Stop: 0816    Procedures:       right mastectomy, margin excision (Right: Breast)      ADJACENT TISSUE REARRANGEMENT RIGHT BREAST (Right: Chest) Diagnosis:       Malignant neoplasm of lower-outer quadrant of right breast of female, estrogen receptor positive      (Malignant neoplasm of lower-outer quadrant of right breast of female, estrogen receptor positive [C50.511, Z17.0])    Surgeons: Romina De Anda MD; Colin Bender MD Provider: Marcial Saavedra MD    Anesthesia Type: general ASA Status: 2            Anesthesia Type: general    Vitals  Vitals Value Taken Time   /76 07/26/24 0830   Temp 36.6 °C (97.9 °F) 07/26/24 0815   Pulse 86 07/26/24 0841   Resp 18 07/26/24 0825   SpO2 99 % 07/26/24 0841   Vitals shown include unfiled device data.        Post Anesthesia Care and Evaluation    Patient location during evaluation: PACU  Patient participation: complete - patient participated  Level of consciousness: awake and alert  Pain management: adequate    Airway patency: patent  Anesthetic complications: No anesthetic complications  PONV Status: controlled  Cardiovascular status: acceptable and hemodynamically stable  Respiratory status: acceptable  Hydration status: acceptable    Comments: /75 (BP Location: Left arm, Patient Position: Lying)   Pulse 80   Temp 36.6 °C (97.9 °F) (Oral)   Resp 16   Wt 76.2 kg (167 lb 14.4 oz)   LMP  (LMP Unknown)   SpO2 96%   BMI 27.94 kg/m²

## 2024-07-26 NOTE — ANESTHESIA PROCEDURE NOTES
Airway  Urgency: elective    Date/Time: 7/26/2024 7:10 AM  Airway not difficult    General Information and Staff    Patient location during procedure: OR  Anesthesiologist: Marcial Saavedra MD  CRNA/CAA: Mariela Hagan CRNA    Indications and Patient Condition  Indications for airway management: airway protection    Preoxygenated: yes  Mask difficulty assessment: 2 - vent by mask + OA or adjuvant +/- NMBA    Final Airway Details  Final airway type: endotracheal airway      Successful airway: ETT  Cuffed: yes   Successful intubation technique: direct laryngoscopy  Facilitating devices/methods: intubating stylet  Endotracheal tube insertion site: oral  Blade: [place]  Blade size: 3  ETT size (mm): 7.0  Cormack-Lehane Classification: grade I - full view of glottis  Placement verified by: chest auscultation and capnometry   Cuff volume (mL): 6  Measured from: teeth  ETT/EBT  to teeth (cm): 21  Number of attempts at approach: 1  Assessment: lips, teeth, and gum same as pre-op and atraumatic intubation

## 2024-07-26 NOTE — OP NOTE
BREAST MASTECTOMY WITH IMMEDIATE RECONSTRUCTION  Procedure Report    Patient Name:  Dasha De Leon  YOB: 1970    Date of Surgery:  7/26/2024     Indications:  positive anterior margin, medially    Pre-op Diagnosis:   Malignant neoplasm of lower-outer quadrant of right breast of female, estrogen receptor positive [C50.511, Z17.0]       Post-Op Diagnosis Codes:     * Malignant neoplasm of lower-outer quadrant of right breast of female, estrogen receptor positive [C50.511, Z17.0]        Procedure(s):  right mastectomy, margin excision  ADJACENT TISSUE REARRANGEMENT RIGHT BREAST      Staff:  Surgeon(s):  Romina De Anda MD Wermeling, Frank Ryan, MD         Anesthesia: General    Estimated Blood Loss: none    Implants:    Nothing was implanted during the procedure    Specimen:          Specimens       ID Source Type Tests Collected By Collected At Frozen?    A Breast, Right Tissue TISSUE PATHOLOGY EXAM   Romina De Anda MD 7/26/24 0736 No    Description: RIGHT MASTECTOMY MARGIN EXCISION  SHORT STITCH SUPERIOR, LONG STITCH MEDIAL                Findings: incision - medial margin along area of previous mass excised    Complications: none    Description of Procedure:   The risks and benefits of the procedure were explained in detail to the patient.  Informed consent was obtained.  The patient was then taken to the operating room and placed supine on the operating room table.  Sequential compression devices were applied to the lower extremities and preoperative antibiotics administered. A timeout was then performed to identify the proper patient, procedure, and location.     After the administration of adequate anesthesia, bilateral breasts were prepped and draped in the standard surgical fashion.  An elliptical  incision was then made on the right inferior breast along the area of previous excision where pathology showed a focally positive margin. Dissection was carried through the skin to the underlying  subcutaneous tissues.  Sharp dissection with the use of electrocautery was utilized to dissect through the skin flap.  The specimen was then marked short stitch superiorly, long stitch laterally. The patient was then handed over to Dr. Bender for definitve closure over the area of mastectomy and implant.         This procedure was not performed to treat breast cancer through sentinel node biopsy        Romina De Anda MD     Date: 7/26/2024  Time: 11:02 EDT

## 2024-07-26 NOTE — H&P
[facility]     Referring Provider: Romina De Anda MD     Chief complaint: newly diagnosed breast cancer    Subjective    HPI: Ms. Dasha De Leon is a 53 y.o. yo woman, seen at the request of Romina De Anda MD for a new diagnosis of right breast cancer.    She noticed a palpable mass in her right breast starting in early to mid April.  She already had a scheduled mammogram around that time.  After she noted the palpable mass she noticed some pulling and dimpling of her skin in a similar area.  She denies any skin thickening, nipple discharge or other changes to her breast.  Her previous screening mammogram in April 2023 was normal and largely fatty replaced with scattered areas of fibroglandular densities.  She underwent breast augmentation with retropectoral implants sometime ago, completed by plastic surgery at [facility].    She denies any prior history of abnormal mammograms or breast biopsies.     She reports she has a pertinent PMH of HTN, HLD, spinal stenosis, allergies.     She was by herself in clinic today.        HPI 5/18/24 - met with Dr Pretty. Plan for left mastectomy and sentinel lymph node biopsy. She has no new concerns, no changes to her overall state of health. Plan for explant of her implant at the time of surgery with reconstruction per plastic surgery.       HPI 7/26 - completed mastectomy and sentinel lymph node biopsy. Large primary tumor with anterior positive margin. 1 positive lymph node. Discussed with radiation oncology - and they recommend radiation regardless of any additional axillary surgery and ability to clear the margin.     OR today for anterior margin excision.     Oncology/Hematology History   Malignant neoplasm of lower-outer quadrant of right breast of female, estrogen receptor positive   5/15/2024 Cancer Staged    Staging form: Breast, AJCC 8th Edition  - Clinical stage from 5/15/2024: Stage IA (cT1c, cN0(f), cM0, G2, ER+, VA+, HER2-) - Signed by Romina De Anda MD on  5/22/2024 5/22/2024 Initial Diagnosis    Malignant neoplasm of lower-outer quadrant of right breast of female, estrogen receptor positive         Review of Systems   Constitutional:  Positive for chills.   HENT:  Negative.     Eyes: Negative.    Respiratory: Negative.     Cardiovascular: Negative.    Gastrointestinal: Negative.    Endocrine: Negative.    Genitourinary: Negative.     Musculoskeletal: Negative.    Skin: Negative.    Neurological: Negative.    Hematological: Negative.    Psychiatric/Behavioral: Negative.         Medications:    Current Facility-Administered Medications:     clindamycin (CLEOCIN) 900 mg in dextrose 5% 50 mL IVPB (premix), 900 mg, Intravenous, Once, Romina De Anda MD    fentaNYL citrate (PF) (SUBLIMAZE) injection 50 mcg, 50 mcg, Intravenous, Once PRN, Marcial Saavedra MD    lactated ringers infusion, 125 mL/hr, Intravenous, Continuous, Colin Bender MD    lactated ringers infusion, 9 mL/hr, Intravenous, Continuous, Marcial Saavedra MD, Last Rate: 9 mL/hr at 07/26/24 0617, 9 mL/hr at 07/26/24 0617    lidocaine (XYLOCAINE) 1 % injection 0.5 mL, 0.5 mL, Intradermal, Once PRN, Marcial Saavedra MD    midazolam (VERSED) injection 1 mg, 1 mg, Intravenous, Q5 Min PRN, Marcial Saavedra MD    scopolamine patch 1 mg/72 hr, 1 patch, Transdermal, Once, Marcial Saavedra MD, 1 patch at 07/26/24 0626    sodium chloride 0.9 % flush 3 mL, 3 mL, Intravenous, Q12H, Marcial Saavedra MD    sodium chloride 0.9 % flush 3-10 mL, 3-10 mL, Intravenous, PRN, Marcial Saavedra MD    Allergies:  Allergies   Allergen Reactions    Penicillins Anaphylaxis     ..Beta lactam allergy details  Antibiotic reaction: (!) shortness of breath, hives, rash  Age at reaction: adult (20'S)  Dose to reaction time: (!) hours  Reason for antibiotic: other (TOOTH INFECTION)  Epinephrine required for reaction?: (!) yes  Tolerated antibiotics: cephalexin       Hemp Seed Oil Itching      "HEMP IN LOTIONS    Lisinopril Cough       Medical history:  Past Medical History:   Diagnosis Date    Abnormal Pap smear of cervix     Anxiety     Miller's cyst     RESOLVED    Breast cancer     Bulging lumbar disc     Bursitis     HX    Depression     GERD (gastroesophageal reflux disease)     H/O colposcopy with cervical biopsy     unknown pap result, biopsy was neg per pt, 2013 [facility]    History of 2019 novel coronavirus disease (COVID-19)     X2    History of eczema     HPV (human papilloma virus) infection     Hyperlipemia     Hypertension     Low back pain     Lumbar radiculopathy     Numbness and tingling of left leg     Osteoarthritis     Scoliosis     Seasonal allergies        Surgical History:  Past Surgical History:   Procedure Laterality Date    BREAST AUGMENTATION      BREAST RECONSTRUCTION Right 7/11/2024    Procedure: RIGHT SUBPECTORAL PLACEMENT TISSUE EXPANDER AND CORTIVA (ACELLULAR DERMAL MATRIX), COMPLEX CLOSURE RIGHT BREAST 5cm;  Surgeon: Colin Bender MD;  Location: [facility] OR;  Service: Plastics;  Laterality: Right;    CARPAL TUNNEL RELEASE Right 2004    CRYOTHERAPY      1997    EPIDURAL BLOCK      HAND SURGERY  2004    Pisiformectomy    LUMBAR DISCECTOMY FUSION INSTRUMENTATION N/A 01/05/2024    Procedure: Lumbar 3 to lumbar 4 and lumbar 4 to lumbar 5 laminectomy with fusion and instrumentation;  Surgeon: Rigoberto Botello MD;  Location: [facility] OR;  Service: Robotics - Neuro;  Laterality: N/A;    MAMMO BILATERAL  2014    MASTECTOMY W/ SENTINEL NODE BIOPSY Right 7/11/2024    Procedure: Right MASTECTOMY WITH SENTINEL NODE BIOPSY;  Surgeon: Romina De Anda MD;  Location: [facility] OR;  Service: General;  Laterality: Right;    PAP SMEAR  20116    SHOULDER ARTHROSCOPY Left 2002,2003    SHOULDER SURGERY      \"MANIPULATION FOR FROZEN SHOULDER\"       Family History:  Family History   Problem Relation Age of Onset    Hypertension Mother     Hyperlipidemia Mother     Diverticulitis " Mother     Pancreatitis Mother     Kidney disease Father     Skin cancer Father     Hypertension Father     Hyperlipidemia Father     Stroke Father     Atrial fibrillation Father     Transient ischemic attack Father     No Known Problems Sister     Depression Brother     No Known Problems Daughter     No Known Problems Son     Uterine cancer Maternal Aunt 58    Heart disease Maternal Aunt     Lung cancer Maternal Uncle     Heart attack Maternal Uncle     No Known Problems Paternal Aunt     Colon cancer Paternal Uncle 65    Liver disease Maternal Grandmother     Heart disease Maternal Grandfather     Cancer Maternal Grandfather     Heart attack Paternal Grandmother     No Known Problems Paternal Grandfather     BRCA 1/2 Neg Hx     Breast cancer Neg Hx     Endometrial cancer Neg Hx     Ovarian cancer Neg Hx     Malig Hyperthermia Neg Hx        Family Cancer History: relationship - (age of diagnosis/current age or age at death)  Breast: N/A   Ovarian: N/A  Colon: Paternal Uncle Dx (in 60's)   Pancreas: N/A  Prostate: N/A    Social History:   Social History     Socioeconomic History    Marital status: Single   Tobacco Use    Smoking status: Former     Current packs/day: 0.00     Average packs/day: 1 pack/day for 20.0 years (20.0 ttl pk-yrs)     Types: Cigarettes     Start date:      Quit date:      Years since quittin.5     Passive exposure: Past    Smokeless tobacco: Never   Vaping Use    Vaping status: Never Used   Substance and Sexual Activity    Alcohol use: Yes     Alcohol/week: 2.0 - 4.0 standard drinks of alcohol     Types: 2 - 4 Glasses of wine per week     Comment: weekly    Drug use: No    Sexual activity: Not Currently     Birth control/protection: I.U.D.       She lives with her mother.  She works as a registered medical assistant.       GYNECOLOGIC HISTORY:   . P: 0. AB: 0.  Last menstrual period: 2020  Age at menarche: 13  Age at first childbirth: N/A  Lactation/How long: N/A  Age  at menopause: 49  Total years of oral contraceptive use: 25  Total years of hormone replacement therapy: 0      Objective   PHYSICAL EXAMINATION:   Vitals:    24 0602   BP: 132/70   Pulse: 85   Resp: 18   Temp: 98.4 °F (36.9 °C)   SpO2: 95%     Examination chaperoned by Leila Ruiz.  ECOG 0 - Asymptomatic  General: NAD, well appearing  Psych: a&o x 3, normal mood and affect  Eyes: EOMI, no scleral icterus  ENMT: neck supple without masses or thyromegaly, mucus membranes moist  Resp: normal effort  CV: RRR, no edema   GI: soft, NT, ND  MSK: normal gait, normal ROM in bilateral shoulders  Lymph nodes:  no cervical, supraclavicular or axillary lymphadenopathy  Breast: Surgically absent - tissue expanders in place. Incisions healing well.     Right breast, in-office ultrasound: Lower outer quadrant 8:00 visible breast mass with biopsy clip centrally located.  Second breast mass not well-visualized.  Appears to be attached to underlying pectoralis muscle       Previous IMAGING: I have independently reviewed her imagin2023 bilateral screening [facility] scattered areas of fibroglandular density.  Bilateral retropectoral saline implants.  No suspicious masses calcifications or other abnormalities are seen.  BI-RADS 1    2024 bilateral screening mammogram  The breasts are almost entirely fatty  Bilateral retropectoral saline implants  There is a round mass measuring 16 mm with spiculated margins seen in the posterior one third region of the right breast at 9:00.  This is new since prior exams.  Left breast no suspicious masses calcifications or other abnormalities are seen  BI-RADS 0    5/10/2024 right diagnostic mammogram  Breast is almost entirely fatty  Retropectoral saline implants  Finding 1: Round mass in the right breast at 9:00 previously seen 2024.  On present there is a high density round mass measuring 16 mm with spiculated margins.   Approximately in the right breast 8:00 10 cm from the nipple.  Right breast ultrasound  Finding 1: Round parallel hypoechoic mass with indistinct And spiculated margins 15 mm in the posterior one third region of the right breast at 8:00 10 cm from the nipple. with internal vascularity there are decreased posterior echoes; edge shadows are excluded.  Finding 2: There is an irregular nonparallel hypoechoic mass with indistinct margins measuring 4 x 6 x 4 mm seen in the 830 o'clock region of the right breast 11 cm from the nipple there are decreased posterior echoes, edge shadows are excluded posterior echoes, and shadows not excluded  Visualized axillary lymph nodes are normal  Impression:  Finding 1: Mass right breast is highly suggestive of malignancy ultrasound-guided biopsy is recommended  Finding 2: Mass 830 o'clock region of the right breast is suspicious ultrasound-guided biopsy is recommended  BI-RADS 5  Addendum:  Finding 1: On the obtained sonographic images mass approaches the skin was not definitively involve the overlying skin, no abnormal skin thickening visualized  Due to posterior acoustic shadowing no definitive involvement of the underlying pectoralis is seen however during mammographic imaging acquisition the technologist reported difficulty obtaining images which potentially may be secondary to her implant rather than pectoralis involvement  Impression finding 1 mass in the right breast is highly suggestive of malignancy ultrasound-guided biopsy is recommended  BI-RADS 5    5/15/2024 right ultrasound-guided biopsy 8 o'clock position 12-gauge vacuum-assisted device was utilized 5 cores were obtained.  A true kyra vision Globe biopsy marker was placed at the biopsy site.  Postbiopsy mammogram shows the clip in expected location  Pathology returned as invasive ductal carcinoma  Surgical consultation is recommended  5/15/2024 right ultrasound-guided biopsy 830 o'clock  Using a 12-gauge core needle  biopsy 5 cores were obtained true kyra Pro Q tissue marker was placed within the biopsy site.  Pathology showed invasive ductal carcinoma  Follow-up mammogram and ultrasound shows clip in the expected location  Surgical consultation is recommended    5/15/2024 right ultrasound-guided needle aspiration the patient's right breast 8:00 was imaged and a 21-gauge needle was advanced to the target 3 passes were made through the area and a HydroMARK biopsy clip was deployed within the FNA bed.  Postprocedure mammogram ultrasound shows the clip in expected location pathology returned as bland lymphocytes with no evidence of metastatic disease in intramammary lymph node.  Pathology is benign and concordant    5/25/24 Breast MRI  FINDINGS: Scattered fibroglandular tissue is seen throughout both  breasts. Minimal background parenchymal enhancement is noted. Bilateral  subpectoral intact saline implants are noted.     In the posterior one-third lower outer quadrant of the right breast at  the 8:30 position centered on the order of 10 cm from the nipple there  is an irregular enhancing mass that measures on the order of 2.3 cm in  the anterior to posterior dimension, 2 cm in the superior to inferior  dimension and 2.2 cm in the medial to lateral dimension. A central  signal void is noted. This corresponds to the biopsy-proven malignancy  with the internal vision globe metallic clip. There is dendritic  enhancement that extends along the posteromedial margin of the mass to  the lateral margin of the implant capsule and there is some enhancement  of the overlying pectoral muscle and/or biologic implant capsule in this  region.     On the order of 2.2 cm posterior to the described malignancy at the 8:30  position and centered on the order of 13 cm from the nipple at the 8:30  position there is an irregular area of nonmass enhancement that measures  2.4 cm in the superior to inferior dimension, 1.9 cm in the anterior to  posterior  dimension and 1.4 cm in the medial to lateral dimension. A  signal void is noted within this region. This corresponds to the  biopsy-proven site of malignancy with the internal [place] twirl-shaped  metallic clip. The medial margin of the nonmass enhancement abuts the  lateral margin of the overlying pectoral muscle and/or biologic implant  capsule at this location.     No other areas of suspicious enhancement or morphology are seen in the  right breast. I see no evidence for abnormal right breast skin or nipple  enhancement. Asymmetrically enlarged right axillary lymph nodes, some  with a rounded contour are noted. The largest right axillary lymph node  measures on the order of 1.4 cm in greatest dimension. Cortical  thickening of at least 2 lymph nodes in the right axilla is noted. No  evidence for right internal mammary chain adenopathy is appreciated.     There are no areas of suspicious enhancement or morphology in the left  breast. I see no evidence for abnormal skin, nipple or chest wall  enhancement of the left breast and there is no evidence for left  axillary or internal mammary chain adenopathy.      IMPRESSION:  1. There are 2 biopsy-proven sites of malignancy in the right breast at  the 8:30 position in the posterior one-third that measure on the order  of 2.3 cm and 2.4 cm in greatest dimension with the vision globe and  twirl-shaped metallic clips seen within these lesions, respectively. The  lesions either abut or extend to the lateral margin of the pectoral  fascia that overlies the implant capsule and some extension into the  pectoral muscle at this location and/or biologic capsule is suspected.  Findings suspicious for right axillary adenopathy are noted.     2. There are no findings suspicious for malignancy in the left breast.     BI-RADS CATEGORY 6: Known biopsy-proven malignancy.    PATHOLOGY:   1.  Breast, right 8 o'clock position, core biopsy: (Vision globe clip)  A.  Invasive mammary  carcinoma, no special type (ductal with apocrine features), Cynthia histologic grade 2 (tubules = 3, nuclei = 3, mitoses = 1), measuring 3 mm maximally, and involving approximately 4 core fragments.  B.  No definitive in situ carcinoma is identified.  Estrogen Receptor (ER) Status  Positive (greater than 10% of cells demonstrate nuclear positivity)   Percentage of Cells with Nuclear Positivity  %   Average Intensity of Staining  Strong   Test Type  Food and Drug Administration (FDA) cleared (test / vendor): [employer]   Primary Antibody  SP1   Test(s) Performed     Progesterone Receptor (PgR) Status  Positive   Percentage of Cells with Nuclear Positivity  %   Average Intensity of Staining  Strong   Test Type  Food and Drug Administration (FDA) cleared (test / vendor): [employer]   Primary Antibody  1E2   Test(s) Performed     HER2 by Immunohistochemistry  Negative (Score 1+)   Test Type  Food and Drug Administration (FDA) cleared (test / vendor): [employer]   Primary Antibody  4B5   Test(s) Performed  Ki-67   Ki-67 Percentage of Positive Nuclei  22 %        2.  Breast, right 8: 30 o'clock position, core biopsy: (X[employer] Advanced Batteryark Q clip)  A.  Invasive mammary carcinoma, no special type (ductal with apocrine features), Cynthia histologic grade 2 (tubules = 3, nuclei = 3, mitoses = 1), measuring 2.7 mm maximally, and involving approximately 3 core fragments.  B.  No definitive in situ component is identified.  Estrogen Receptor (ER) Status  Positive (greater than 10% of cells demonstrate nuclear positivity)   Percentage of Cells with Nuclear Positivity  %   Average Intensity of Staining  Strong   Test Type  Food and Drug Administration (FDA) cleared (test / vendor): [employer]ips   Primary Antibody  SP1   Test(s) Performed     Progesterone Receptor (PgR) Status  Positive   Percentage of Cells with Nuclear Positivity  %   Average Intensity of Staining  Strong   Test Type  Food and Drug Administration (FDA) cleared  (test / vendor): [employer]   Primary Antibody  1E2   Test(s) Performed     HER2 by Immunohistochemistry  Negative (Score 0)   Test Type  Food and Drug Administration (FDA) cleared (test / vendor): [employer]   Primary Antibody  4B5   Test(s) Performed  Ki-67   Ki-67 Percentage of Positive Nuclei  20 %       7/11/24 Surgical pathology  1.  Right axillary sentinel lymph node:               A.  One of three lymph nodes involved by metastatic carcinoma (1/3).                            -Metastasis estimated up to 12 mm. in greatest dimension.                            -Extranodal extension is identified.                            -See synoptic for biomarkers on sharona metastasis.        2.  Right breast anterior skin margin:               A.  Invasive ductal carcinoma involving subcutaneous tissue.               B.  Invasive carcinoma focally involves medial aspect/margin of excision                         (yellow inked subcutaneous tissue)                             3.  Right breast, simple skin sparing mastectomy:               A. Invasive ductal carcinoma (Q shaped and vision clips):                            1. Invasive carcinoma spans an area estimated at 45 mm x 20 mm x 14 mm.                            2. Overall Aguila grade III (tubular score = 3, nuclear score = 3,                                mitotic score = 2).                            3.  Focal lymphovascular space invasion identified.                                           B.  No associated carcinoma in situ identified.               C.  Invasive carcinoma extends to involve the anterior margin of excision                     Invasive carcinoma focally measures 0.9 mm from the posterior margin of excision                            Anterior margin = positive; extensively involved-see also specimen #2 above.                            Posterior margin = 0.9 mm                            Superior margin = >100 mm                            Inferior  margin = 15 mm                             Medial margin = > 100 mm                            Lateral margin = 50 mm               D. No lobular neoplasia (LCIS, ALH) identified.               E. Focal multiple biopsy site changes are identified, and 3 metallic clips are retrieved.               F.  No Pagetoid involvement of skin nor nipple by malignancy identified.               G. Two intramammary lymph nodes negative for metastatic carcinoma (0/2).               H.  Previous Biomarkers: Estrogen receptors: Positive (), Progesterone receptors: Positive (),                         HER/2-lalit: Negative (score 1+), Ki-67 = 22% (see BV )       Assessment & Plan   Assessment:  53 y.o. F with a new diagnosis of right: Invasive ductal carcinoma grade 2, ER/CT +, Her2 -; [id number], Clinical anatomic stage IA, Clinical prognostic stage IA.  Pathologic staging - Anatomic IIB and pathologic IIA    Discussion:  I had an extensive discussion with the patient and family about the nature of this breast cancer diagnosis. We reviewed the components of breast tissue including ducts and lobules. We reviewed her pathology report in detail. We reviewed breast cancer histology, including stage, grade, ER/CT receptors, HER2 receptors and how this applies to her diagnosis. We discussed the multidisciplinary approach to breast cancer care.  Treatments can include surgery, radiation therapy, and medical therapy (chemotherapy, targeted therapy, and/or endocrine therapy).  The exact type of treatment and the order of therapy depends on the size and location/distribution of breast disease, the involvement of the regional lymph node basins, concern for or presence of metastatic disease, potential genetic mutations, and the individual breast cancer tumor markers expressed by the cancer. We also discussed other treatment options including the option of not undergoing any surgical treatment, with a palliative rather than  curative intent and the risks associated with this including disease progression.    The patient's clinical stage is documented as above. This was discussed with the patient prior to initiation of treatment. All available pathology reports were discussed with the patient today. All treatment decisions were made via shared decision making with the patient. This patient was evaluated for appropriate ancillary referrals including, pre-treatment functional assessment, exercise program, nutrition program, genetics, and lymphedema clinic. This patient received preoperative and postoperative education. The patient was offered a breast reconstruction referral appropriate to plan surgical intervention; risks and benefits were discussed today. The patient was educated on perioperative multimodal pain management strategies. Barriers to effective and efficient care are/will be evaluated by the nurse navigator.    We discussed these options and recommendations for treatment:    Surgical Options:  Discussed breast surgery and lymph node sampling surgery will be required to move forward.  At this point in time she is a candidate for breast conservation or mastectomy.  Will complete breast MRI for full extent of disease and evaluation for involvement in pectoralis.  By available imaging and physical exam with dimpling of the skin and mass not freely mobile within the breast tissue concern for either pectoralis involvement or capsular involvement.  She has subpectoral implants.  Any surgical intervention will require involvement of that implant capsule.  Should she proceed with breast conservation would plan to excise the skin overlying the palpable mass as well as the tissue deep to the cancer.  She is interested in breast conservation if at all possible.  This will be determined on breast MRI.  Should she need mastectomy would again need assistance from plastic surgery for appropriate cosmetic goals and management of her existing  implants.    Medical Oncology:  We discussed that in her case, systemic treatment would involve endocrine therapy and possibly chemotherapy. She will be referred to medical oncology postoperatively to discuss this further. She is an oncotype candidate to determine if chemotherapy following surgery would help improve her overall survival and reduce the possibility of distant metastasis OR if there would be very little benefit from these therapies.     Radiation Oncology:  Pending surgery of choice and final surgical margins she is a candidate for adjuvant radiation therapy.  Would be recommended for all cases of breast conservation, would be indicated in some cases of mastectomies.    Role for Genetic Testing:  I explained that most breast cancer is not hereditary, that I do not believe this plays a role in her case, and that she does not meet NCCN criteria for genetic testing. I would not recommend a bilateral mastectomy, since her risk of a second primary cancer is relatively low and endocrine therapy will further reduce her risk.      Plan:  A multidisciplinary plan has been formulated for the patient:      # Breast Surgical Oncology:  - anterior margin excision for right mastectomy    # Medical Oncology:  -Is an Oncotype candidate  -Will refer postoperatively.    #  Radiation Oncology:  -Will refer postoperatively.    # Nurse Navigation  - Referral made today    # Lymphedema clinic  - Referral was placed today     # Genetic Testing   -Not indicated by NCCN guidelines    # Breast Imaging  - Next Screening mammogram due: 4/2025      Romina De Anda MD  Breast Surgical Oncology

## 2024-07-26 NOTE — ANESTHESIA PREPROCEDURE EVALUATION
Anesthesia Evaluation     Patient summary reviewed and Nursing notes reviewed   NPO Solid Status: > 8 hours  NPO Liquid Status: > 4 hours           Airway   Mallampati: II  Neck ROM: full  No difficulty expected  Dental      Comment: 2 crowns    Pulmonary     breath sounds clear to auscultation  (+) a smoker Former,  Cardiovascular     Rhythm: regular    (+) hypertension, hyperlipidemia      Neuro/Psych  (+) numbness, psychiatric history Anxiety and Depression  GI/Hepatic/Renal/Endo    (+) GERD    Musculoskeletal     Abdominal    Substance History      OB/GYN          Other   arthritis,   history of cancer                Anesthesia Plan    ASA 2     general     (Pt is going home)  intravenous induction     Anesthetic plan, risks, benefits, and alternatives have been provided, discussed and informed consent has been obtained with: patient.    CODE STATUS:

## 2024-07-26 NOTE — OP NOTE
Pre-Operative Diagnosis: Right breast cancer, positive mastectomy margin    Post-Operative Diagnosis: Same    Procedure Performed:   Adjacent tissue rearrangement of the right breast 5 x 6 cm    Surgeon: ANGELICA Bender MD    Assistant: None    Anesthesia: General    Estimated Blood Loss: <5cc    Specimens: None    Complications: None    Indications: She had positive margin the medial aspect of the additional mastectomy excision from the lower outer quadrant.  We discussed reexcision of this with Dr. De Anda.    We discussed risks, benefits and alternatives including but not limited to: bleeding, infection, asymmetry, poor or slow wound healing, need for further surgery, possible recurrence.  The patient elected to proceed.    Description of Procedure: The patient was met in the preoperative holding area.  All questions were answered and informed consent was assured.      The area in question was marked she was transferred the operating room.    After induction of appropriate anesthesia, a timeout was performed correctly identifying the patient, operative site, and procedure to be performed.  All present were in agreement.    Local anesthetic was infiltrated in the chest was prepped and draped in a sterile fashion.  After completion of Dr. De Anda's excision the lower defect was examined and was under undue tension with primary closure.  Lateral flap was reelevated and backcut made and a 5 x 6 cm laterally based random pattern pedicle flap was created.  This was advanced medially and then anchored along the lateral chest border to alleviate tension.  A new drain was placed.  The space was copiously irrigated and cari hemostasis achieved.  50% Betadine solution was left to dwell for several minutes.    Closure was then performed in a layered fashion with 2-0 Vicryl the subcutaneous layer, 3-0 Monocryl in the deep dermal layer and 4-0 Monocryl in the intracuticular.  Incision was dressed with Steri-Strip and she was  placed in a well-padded Ace wrap.    The patient was then aroused from anesthesia with ease and transferred to the postoperative care area in good condition. All sponge, needle, and instrument counts were correct.

## 2024-07-31 ENCOUNTER — TELEPHONE (OUTPATIENT)
Dept: SURGERY | Facility: CLINIC | Age: 54
End: 2024-07-31
Payer: COMMERCIAL

## 2024-07-31 DIAGNOSIS — Z17.0 MALIGNANT NEOPLASM OF LOWER-OUTER QUADRANT OF RIGHT BREAST OF FEMALE, ESTROGEN RECEPTOR POSITIVE: Primary | ICD-10-CM

## 2024-07-31 DIAGNOSIS — C50.511 MALIGNANT NEOPLASM OF LOWER-OUTER QUADRANT OF RIGHT BREAST OF FEMALE, ESTROGEN RECEPTOR POSITIVE: Primary | ICD-10-CM

## 2024-07-31 LAB
LAB AP CASE REPORT: NORMAL
PATH REPORT.FINAL DX SPEC: NORMAL
PATH REPORT.GROSS SPEC: NORMAL

## 2024-07-31 NOTE — TELEPHONE ENCOUNTER
Called Dasha De Leon to discuss pathology results.  All questions answered.  I will follow up with her in clinic as scheduled.    Referrals placed to: Medical oncology and Radiation Oncology.  Oncotype ordered and pending

## 2024-08-06 ENCOUNTER — CONSULT (OUTPATIENT)
Dept: ONCOLOGY | Facility: CLINIC | Age: 54
End: 2024-08-06
Payer: COMMERCIAL

## 2024-08-06 ENCOUNTER — LAB (OUTPATIENT)
Dept: LAB | Facility: HOSPITAL | Age: 54
End: 2024-08-06
Payer: COMMERCIAL

## 2024-08-06 VITALS
TEMPERATURE: 98.4 F | BODY MASS INDEX: 28.31 KG/M2 | WEIGHT: 169.9 LBS | HEART RATE: 100 BPM | HEIGHT: 65 IN | SYSTOLIC BLOOD PRESSURE: 134 MMHG | DIASTOLIC BLOOD PRESSURE: 85 MMHG | OXYGEN SATURATION: 99 %

## 2024-08-06 DIAGNOSIS — Z17.0 MALIGNANT NEOPLASM OF LOWER-OUTER QUADRANT OF RIGHT BREAST OF FEMALE, ESTROGEN RECEPTOR POSITIVE: Primary | ICD-10-CM

## 2024-08-06 DIAGNOSIS — R79.89 ABNORMAL CBC: Primary | ICD-10-CM

## 2024-08-06 DIAGNOSIS — C50.511 MALIGNANT NEOPLASM OF LOWER-OUTER QUADRANT OF RIGHT BREAST OF FEMALE, ESTROGEN RECEPTOR POSITIVE: Primary | ICD-10-CM

## 2024-08-06 LAB
BASOPHILS # BLD AUTO: 0.06 10*3/MM3 (ref 0–0.2)
BASOPHILS NFR BLD AUTO: 0.8 % (ref 0–1.5)
DEPRECATED RDW RBC AUTO: 43.7 FL (ref 37–54)
EOSINOPHIL # BLD AUTO: 0.43 10*3/MM3 (ref 0–0.4)
EOSINOPHIL NFR BLD AUTO: 5.8 % (ref 0.3–6.2)
ERYTHROCYTE [DISTWIDTH] IN BLOOD BY AUTOMATED COUNT: 13.8 % (ref 12.3–15.4)
HCT VFR BLD AUTO: 40.6 % (ref 34–46.6)
HGB BLD-MCNC: 12.9 G/DL (ref 12–15.9)
IMM GRANULOCYTES # BLD AUTO: 0.07 10*3/MM3 (ref 0–0.05)
IMM GRANULOCYTES NFR BLD AUTO: 0.9 % (ref 0–0.5)
LYMPHOCYTES # BLD AUTO: 1.89 10*3/MM3 (ref 0.7–3.1)
LYMPHOCYTES NFR BLD AUTO: 25.6 % (ref 19.6–45.3)
MCH RBC QN AUTO: 27.8 PG (ref 26.6–33)
MCHC RBC AUTO-ENTMCNC: 31.8 G/DL (ref 31.5–35.7)
MCV RBC AUTO: 87.5 FL (ref 79–97)
MONOCYTES # BLD AUTO: 1.04 10*3/MM3 (ref 0.1–0.9)
MONOCYTES NFR BLD AUTO: 14.1 % (ref 5–12)
NEUTROPHILS NFR BLD AUTO: 3.88 10*3/MM3 (ref 1.7–7)
NEUTROPHILS NFR BLD AUTO: 52.8 % (ref 42.7–76)
NRBC BLD AUTO-RTO: 0 /100 WBC (ref 0–0.2)
PLATELET # BLD AUTO: 324 10*3/MM3 (ref 140–450)
PMV BLD AUTO: 9.7 FL (ref 6–12)
RBC # BLD AUTO: 4.64 10*6/MM3 (ref 3.77–5.28)
WBC NRBC COR # BLD AUTO: 7.37 10*3/MM3 (ref 3.4–10.8)

## 2024-08-06 PROCEDURE — 36415 COLL VENOUS BLD VENIPUNCTURE: CPT

## 2024-08-06 PROCEDURE — 85025 COMPLETE CBC W/AUTO DIFF WBC: CPT

## 2024-08-06 PROCEDURE — 99205 OFFICE O/P NEW HI 60 MIN: CPT | Performed by: INTERNAL MEDICINE

## 2024-08-06 NOTE — PROGRESS NOTES
Subjective   Dasha De Leon is a 53 y.o. female.  Referred by Dr. De Anda for right breast invasive ductal carcinoma    History of Present Illness     Patient is a 53-year-old postmenopausal  lady who presented with a palpable mass in her right breast in mid April.  This was right around the time of her screening mammogram and hence she proceeded with a screening mammogram.  She denies any previous breast abnormalities or biopsies.  No family history of breast or ovarian carcinoma.    4/29/2024-bilateral screening mammogram  Breasts are entirely fatty.  Bilateral retropectoral saline implants.  Round mass measuring 16 mm with spiculated margins in the posterior one third region of the right breast at 9:00.  This is new since the prior exams.  No suspicious masses in the left breast.    5/10/2024-right breast diagnostic mammogram  Breast is entirely fatty.  Retropectoral saline implants.  Finding 1.round mass in the right breast at 9:00 previously seen on the screening mammogram, measuring 16 mm with spiculated margin.    Right breast ultrasound  Finding 1.hypoechoic mass with spiculated margins and internal vascularity measuring 15 mm at 8:00, 10 cm from the nipple.  Finding 2.irregular  Palpable hypoechoic mass measuring 4 x 6 x 4 mm at 8:30 position in the right breast, 11 cm from the nipple.    Ultrasound-guided biopsy of both these masses recommended.    5/15/2024-right breast ultrasound-guided biopsy  1.8 o'clock position right breast-invasive ductal carcinoma with apocrine features  Grade 2  ER +91 to 100% strong  HI +91 to 100% strong  HER2 negative, 1+  Ki-67 22%  2.right breast 8:30 position  Invasive ductal carcinoma with apocrine features, grade 2  ER +91 to 100% strong  HI +91 to 100% strong  HER2 negative, Ki-67 20%.    5/25/2024-bilateral breast MRI  1.2 biopsy sites of malignancy at 8:30 position in the posterior one third that measured 2.3 cm and 2.4 cm respectively.  The lesions either  about or extend to the lateral margin of the pectoral fascia that overlies the implant capsule and some extension into the pectoral muscle at this location and/or biologic capsular suspect.  Finding suspicious for axilla lymphadenopathy.  Right axilla lymph node measuring 1.4 cm.  2.no suspicious findings in the left breast.    7/11/2024-right mastectomy and sentinel lymph node biopsy  Invasive ductal carcinoma measuring 45 x 20 x 14 mm  Grade 3  Extensive lymphovascular invasion present.  Invasive carcinoma present extensively at the anterior margin  Distance to the invasive carcinoma from the posterior margin 0.9 mm  1 lymph node with macrometastasis measuring 12 mm, 2 mm extranodal extension  At least 3 sentinel lymph nodes  Total number of Dupont and nonsentinel lymph nodes evaluated 5  PT2 N1a MX  Receptors repeated on the lymph node with estrogen receptor +91 to 100% strong, progesterone receptor +91 to 100% strong, HER2 negative, score 0, Ki-67 35%    Given the positive margin additional surgery performed on 7/26/2024 with ultimately negative margins.  Expander placed.    Patient presents today to discuss adjuvant therapy.    Oncotype DX recurrence score pending.      The following portions of the patient's history were reviewed and updated as appropriate: allergies, current medications, past family history, past medical history, past social history, past surgical history, and problem list.    Past Medical History:   Diagnosis Date    Abnormal Pap smear of cervix     Anxiety     Miller's cyst     RESOLVED    Breast cancer     Bulging lumbar disc     Bursitis     HX    Depression     GERD (gastroesophageal reflux disease)     H/O colposcopy with cervical biopsy     unknown pap result, biopsy was neg per pt, 2013 [facility]    History of 2019 novel coronavirus disease (COVID-19)     X2    History of eczema     HPV (human papilloma virus) infection     Hyperlipemia     Hypertension     Low back pain     Lumbar  "radiculopathy     Numbness and tingling of left leg     Osteoarthritis     Scoliosis     Seasonal allergies         Past Surgical History:   Procedure Laterality Date    BREAST AUGMENTATION      BREAST RECONSTRUCTION Right 7/11/2024    Procedure: RIGHT SUBPECTORAL PLACEMENT TISSUE EXPANDER AND CORTIVA (ACELLULAR DERMAL MATRIX), COMPLEX CLOSURE RIGHT BREAST 5cm;  Surgeon: Colin Bender MD;  Location: [facility];  Service: Plastics;  Laterality: Right;    BREAST SURGERY Right 7/26/2024    Procedure: ADJACENT TISSUE REARRANGEMENT RIGHT BREAST;  Surgeon: Colin Bender MD;  Location: [facility] OR;  Service: Plastics;  Laterality: Right;    CARPAL TUNNEL RELEASE Right 2004    CRYOTHERAPY      1997    EPIDURAL BLOCK      HAND SURGERY  2004    Pisiformectomy    LUMBAR DISCECTOMY FUSION INSTRUMENTATION N/A 01/05/2024    Procedure: Lumbar 3 to lumbar 4 and lumbar 4 to lumbar 5 laminectomy with fusion and instrumentation;  Surgeon: Rigoberto Botello MD;  Location: [facility];  Service: Robotics - Neuro;  Laterality: N/A;    MAMMO BILATERAL  2014    MASTECTOMY W/ SENTINEL NODE BIOPSY Right 7/11/2024    Procedure: Right MASTECTOMY WITH SENTINEL NODE BIOPSY;  Surgeon: Romina De Anda MD;  Location: [facility];  Service: General;  Laterality: Right;    MASTECTOMY WITH IMMEDIATE RECONSTRUCTION Right 7/26/2024    Procedure: right mastectomy, margin excision;  Surgeon: Romina De Anda MD;  Location: [facility];  Service: General;  Laterality: Right;    PAP SMEAR  [id number]    SHOULDER ARTHROSCOPY Left 2002,2003    SHOULDER SURGERY      \"MANIPULATION FOR FROZEN SHOULDER\"        Family History   Problem Relation Age of Onset    Hypertension Mother     Hyperlipidemia Mother     Diverticulitis Mother     Pancreatitis Mother     Kidney disease Father     Skin cancer Father     Hypertension Father     Hyperlipidemia Father     Stroke Father     Atrial fibrillation Father     Transient ischemic attack Father     " No Known Problems Sister     Depression Brother     No Known Problems Daughter     No Known Problems Son     Uterine cancer Maternal Aunt 58    Heart disease Maternal Aunt     Lung cancer Maternal Uncle     Heart attack Maternal Uncle     No Known Problems Paternal Aunt     Colon cancer Paternal Uncle 65    Liver disease Maternal Grandmother     Heart disease Maternal Grandfather     Cancer Maternal Grandfather     Heart attack Paternal Grandmother     No Known Problems Paternal Grandfather     BRCA 1/2 Neg Hx     Breast cancer Neg Hx     Endometrial cancer Neg Hx     Ovarian cancer Neg Hx     Malig Hyperthermia Neg Hx         Social History     Socioeconomic History    Marital status: Single   Tobacco Use    Smoking status: Former     Current packs/day: 0.00     Average packs/day: 1 pack/day for 20.0 years (20.0 ttl pk-yrs)     Types: Cigarettes     Start date:      Quit date:      Years since quittin.6     Passive exposure: Past    Smokeless tobacco: Never   Vaping Use    Vaping status: Never Used   Substance and Sexual Activity    Alcohol use: Yes     Alcohol/week: 2.0 - 4.0 standard drinks of alcohol     Types: 2 - 4 Glasses of wine per week     Comment: weekly    Drug use: No    Sexual activity: Not Currently     Birth control/protection: I.U.D.        OB History          0    Para   0    Term   0       0    AB   0    Living   0         SAB   0    IAB   0    Ectopic   0    Molar        Multiple   0    Live Births                   Age at menarche-13  Age at first live childbirth-not applicable   0 para 0  0  Age at menopause-49  Oral conceptive pill use for 25 years  No use of hormone replacement therapy    Allergies   Allergen Reactions    Penicillins Anaphylaxis     ..Beta lactam allergy details  Antibiotic reaction: (!) shortness of breath, hives, rash  Age at reaction: adult (20'S)  Dose to reaction time: (!) hours  Reason for antibiotic: other (TOOTH  "INFECTION)  Epinephrine required for reaction?: (!) yes  Tolerated antibiotics: cephalexin       Hemp Seed Oil Itching     HEMP IN LOTIONS    Lisinopril Cough            Review of Systems   Constitutional: Negative.    HENT: Negative.     Eyes: Negative.    Respiratory: Negative.     Cardiovascular: Negative.    Gastrointestinal: Negative.    Endocrine: Negative.    Genitourinary: Negative.    Musculoskeletal:  Positive for arthralgias and back pain.   Neurological: Negative.    Hematological: Negative.    Psychiatric/Behavioral:  The patient is nervous/anxious.          Objective   Blood pressure 134/85, pulse 100, temperature 98.4 °F (36.9 °C), temperature source Oral, height 165.1 cm (65\"), weight 77.1 kg (169 lb 14.4 oz), SpO2 99%, not currently breastfeeding.   Physical Exam  Vitals reviewed.   Constitutional:       Appearance: She is normal weight.   HENT:      Head: Normocephalic and atraumatic.      Right Ear: External ear normal.      Left Ear: External ear normal.      Nose: Nose normal.      Mouth/Throat:      Pharynx: Oropharynx is clear.   Eyes:      Conjunctiva/sclera: Conjunctivae normal.   Cardiovascular:      Rate and Rhythm: Normal rate.   Pulmonary:      Effort: Pulmonary effort is normal.   Abdominal:      General: Abdomen is flat.   Musculoskeletal:         General: Normal range of motion.      Cervical back: Normal range of motion.   Skin:     General: Skin is warm.   Neurological:      General: No focal deficit present.      Mental Status: She is alert and oriented to person, place, and time.   Psychiatric:         Mood and Affect: Mood normal.         Behavior: Behavior normal.         Thought Content: Thought content normal.         Judgment: Judgment normal.       Breast Exam: Right breast status post mastectomy with expander in place.  Drain present.      Lab on 08/06/2024   Component Date Value Ref Range Status    WBC 08/06/2024 7.37  3.40 - 10.80 10*3/mm3 Final    RBC 08/06/2024 4.64  " 3.77 - 5.28 10*6/mm3 Final    Hemoglobin 08/06/2024 12.9  12.0 - 15.9 g/dL Final    Hematocrit 08/06/2024 40.6  34.0 - 46.6 % Final    MCV 08/06/2024 87.5  79.0 - 97.0 fL Final    MCH 08/06/2024 27.8  26.6 - 33.0 pg Final    MCHC 08/06/2024 31.8  31.5 - 35.7 g/dL Final    RDW 08/06/2024 13.8  12.3 - 15.4 % Final    RDW-SD 08/06/2024 43.7  37.0 - 54.0 fl Final    MPV 08/06/2024 9.7  6.0 - 12.0 fL Final    Platelets 08/06/2024 324  140 - 450 10*3/mm3 Final    Neutrophil % 08/06/2024 52.8  42.7 - 76.0 % Final    Lymphocyte % 08/06/2024 25.6  19.6 - 45.3 % Final    Monocyte % 08/06/2024 14.1 (H)  5.0 - 12.0 % Final    Eosinophil % 08/06/2024 5.8  0.3 - 6.2 % Final    Basophil % 08/06/2024 0.8  0.0 - 1.5 % Final    Immature Grans % 08/06/2024 0.9 (H)  0.0 - 0.5 % Final    Neutrophils, Absolute 08/06/2024 3.88  1.70 - 7.00 10*3/mm3 Final    Lymphocytes, Absolute 08/06/2024 1.89  0.70 - 3.10 10*3/mm3 Final    Monocytes, Absolute 08/06/2024 1.04 (H)  0.10 - 0.90 10*3/mm3 Final    Eosinophils, Absolute 08/06/2024 0.43 (H)  0.00 - 0.40 10*3/mm3 Final    Basophils, Absolute 08/06/2024 0.06  0.00 - 0.20 10*3/mm3 Final    Immature Grans, Absolute 08/06/2024 0.07 (H)  0.00 - 0.05 10*3/mm3 Final    nRBC 08/06/2024 0.0  0.0 - 0.2 /100 WBC Final   Admission on 07/26/2024, Discharged on 07/26/2024   Component Date Value Ref Range Status    Case Report 07/26/2024    Final                    Value:Surgical Pathology Report                         Case: [id number]                                  Authorizing Provider:  Romina De Anda MD           Collected:           07/26/2024 07:36 AM          Ordering Location:     [facility]  Received:            07/26/2024 10:00 AM                                 MAIN OR                                                                      Pathologist:           Julianna Mejia MD                                                          Specimen:    Breast, Right, RIGHT MASTECTOMY  MARGIN EXCISION  SHORT STITCH SUPERIOR, LONG STITCH                 MEDIAL                                                                                     Final Diagnosis 07/26/2024    Final                    Value:This result contains rich text formatting which cannot be displayed here.    Gross Description 07/26/2024    Final                    Value:This result contains rich text formatting which cannot be displayed here.   Admission on 07/11/2024, Discharged on 07/11/2024   Component Date Value Ref Range Status    WBC 07/01/2024 6.85  3.40 - 10.80 10*3/mm3 Final    RBC 07/01/2024 4.56  3.77 - 5.28 10*6/mm3 Final    Hemoglobin 07/01/2024 12.5  12.0 - 15.9 g/dL Final    Hematocrit 07/01/2024 39.5  34.0 - 46.6 % Final    MCV 07/01/2024 86.6  79.0 - 97.0 fL Final    MCH 07/01/2024 27.4  26.6 - 33.0 pg Final    MCHC 07/01/2024 31.6  31.5 - 35.7 g/dL Final    RDW 07/01/2024 13.6  12.3 - 15.4 % Final    RDW-SD 07/01/2024 43.1  37.0 - 54.0 fl Final    MPV 07/01/2024 9.6  6.0 - 12.0 fL Final    Platelets 07/01/2024 277  140 - 450 10*3/mm3 Final    Neutrophil % 07/01/2024 52.6  42.7 - 76.0 % Final    Lymphocyte % 07/01/2024 29.5  19.6 - 45.3 % Final    Monocyte % 07/01/2024 13.7 (H)  5.0 - 12.0 % Final    Eosinophil % 07/01/2024 2.8  0.3 - 6.2 % Final    Basophil % 07/01/2024 0.7  0.0 - 1.5 % Final    Immature Grans % 07/01/2024 0.7 (H)  0.0 - 0.5 % Final    Neutrophils, Absolute 07/01/2024 3.60  1.70 - 7.00 10*3/mm3 Final    Lymphocytes, Absolute 07/01/2024 2.02  0.70 - 3.10 10*3/mm3 Final    Monocytes, Absolute 07/01/2024 0.94 (H)  0.10 - 0.90 10*3/mm3 Final    Eosinophils, Absolute 07/01/2024 0.19  0.00 - 0.40 10*3/mm3 Final    Basophils, Absolute 07/01/2024 0.05  0.00 - 0.20 10*3/mm3 Final    Immature Grans, Absolute 07/01/2024 0.05  0.00 - 0.05 10*3/mm3 Final    nRBC 07/01/2024 0.0  0.0 - 0.2 /100 WBC Final    HCG, Urine, QL 07/11/2024 Negative  Negative Final    Lot Number 07/11/2024 [id number]   Final    Internal  Positive Control 07/11/2024 Passed  Positive, Passed Final    Internal Negative Control 07/11/2024 Passed  Negative, Passed Final    Expiration Date 07/11/2024 01/28/2025   Final    Addendum 07/11/2024    Final                    Value:This result contains rich text formatting which cannot be displayed here.    Case Report 07/11/2024    Final                    Value:Surgical Pathology Report                         Case: [id number]                                  Authorizing Provider:  Romina De Anda MD           Collected:           07/11/2024 01:21 PM          Ordering Location:     [facility]  Received:            07/11/2024 01:32 PM                                 MAIN OR                                                                      Pathologist:           Lit Stockton MD                                                         Specimens:   1) - [place] Lymph Node, Right axillary sentinel lymph node for frozen. OR 30, call                back #[phone]                                                                                          2) - Breast, Right, Right anterior skin margin breast, long stiitch lateral, short                  stitch superior for permanent                                                                       3) - Breast, Right, Right breast, long stitch lateral, short stitch superior, clip                                            marks capsule, 732g                                                                        Clinical Information 07/11/2024    Final                    Value:This result contains rich text formatting which cannot be displayed here.    Final Diagnosis 07/11/2024    Final                    Value:This result contains rich text formatting which cannot be displayed here.    Synoptic Checklist 07/11/2024    Final                    Value:INVASIVE CARCINOMA OF THE BREAST: Resection                            INVASIVE CARCINOMA OF THE BREAST:  RESECTION - All Specimens                            8th Edition - Protocol posted: 12/13/2023                                                        SPECIMEN                               Procedure:    Total mastectomy                                Specimen Laterality:    Right                                                         TUMOR                               Tumor Site:    Lower outer quadrant                                Histologic Type:    Invasive carcinoma of no special type (ductal)                                Histologic Grade ([place] Histologic Score):                                     Glandular (Acinar) / Tubular Differentiation:    Score 3                                  Nuclear Pleomorphism:    Score 3                                  Mitotic Rate:    Score 2                                  Overall Grade:    Grade 3 (scores of 8 or 9)                                Tumor Size:    Greatest dimension of largest invasive focus (Millimeters): 45 mm                               Tumor Focality:    Single focus of invasive carcinoma                                Ductal Carcinoma In Situ (DCIS):    Not identified                                Lobular Carcinoma In Situ (LCIS):    Not identified                                Lymphatic and / or Vascular Invasion:    Present                                  :    Extensive                                Dermal Lymphatic and / or Vascular Invasion:    Not identified                                Microcalcifications:    Not identified                                Treatment Effect in the Breast:    No known presurgical therapy                                                         MARGINS                               Margin Status for Invasive Carcinoma:    Invasive carcinoma present at margin                                  Margin(s) Involved by Invasive Carcinoma:    Anterior: Extensive                                  Distance from  Invasive Carcinoma to Posterior Margin:    0.9 mm                                                        REGIONAL LYMPH NODES                               Regional Lymph Node Status:                                     :    Tumor present in regional lymph node(s)                                    Number of Lymph Nodes with Macrometastases:    1                                    Number of Lymph Nodes with Micrometastases:    0                                    Number of Lymph Nodes with Isolated Tumor Cells:    0                                    Size of Largest Margarita Metastatic Deposit:    12 mm                                   Extranodal Extension:    Present, greater than 2 mm                                  Total Number of Lymph Nodes Examined (sentinel and non-sentinel):    5                                  Number of [place] Nodes Examined:    At least: 3                                                         pTNM CLASSIFICATION (AJCC 8th Edition)                               Reporting of pT, pN, and (when applicable) pM categories is based on information available to the pathologist at the time the report is issued. As per the AJCC (Chapter 1, 8th Ed.) it is the managing physician’s responsibility to establish the final pathologic stage based upon all pertinent information, including but potentially not limited to this pathology report.                               pT Category:    pT2                                pN Category:    pN1a                                N Suffix:    (sn)                                                         SPECIAL STUDIES                               Estrogen Receptor (ER) Status:    Positive (greater than 10% of cells demonstrate nuclear positivity)                                  Percentage of Cells with Nuclear Positivity:    %                                Progesterone Receptor (PgR) Status:    Positive                                  Percentage of  Cells with Nuclear Positivity:    %                                HER2 (by immunohistochemistry):    Negative (Score 1+)                                Ki-67 Percentage of Positive Nuclei:    22 %                               Testing Performed on Case Number:    [id number]                             Breast Biomarker Reporting Template                            BREAST BIOMARKER REPORTING TEMPLATE - All Specimens                            Protocol posted: 12/13/2023                                                           Test(s) Performed:                                     Estrogen Receptor (ER) Status:    Positive (greater than 10% of cells demonstrate nuclear positivity)                                    Percentage of Cells with Nuclear Positivity:    %                                    Average Intensity of Staining:    Strong                                  Test Type:    Food and Drug Administration (FDA) cleared (test / vendor): [facility]                                  Primary Antibody:    SP1                                  Scoring System:    Julio César                                    Proportion Score:    5                                    Intensity Score:    3                                    Total Julio César Score:    8                                Test(s) Performed:                                     Progesterone Receptor (PgR) Status:    Positive                                    Percentage of Cells with Nuclear Positivity:    %                                    Average Intensity of Staining:    Strong                                  Test Type:    Food and Drug Administration (FDA) cleared (test / vendor): [facility]                                  Primary Antibody:    1E2                                  Scoring System:    Julio César                                    Proportion Score:    5                                    Intensity Score:    3                                     Total Julio César Score:    8                                Test(s) Performed:                                     HER2 by Immunohistochemistry:    Negative (Score 0)                                  Test Type:    Food and Drug Administration (FDA) cleared (test / vendor): [place]                                  Primary Antibody:    4B5                                Test(s) Performed:    Ki-67                                  Ki-67 Percentage of Positive Nuclei:    35 %                                 Primary Antibody:    30-9                                Cold Ischemia and Fixation Times:    Meet requirements specified in latest version of the ASCO / CAP Guidelines                                Cold Ischemia Time (minutes):    18 min                               Fixation Time (hours):    9.5 hours                               Testing Performed on Block Number(s):    1E                                                         METHODS                               Fixative:    Formalin                                Image Analysis:    Not performed                                                            Comment(s):    Margarita metastasis part 1       Comment 07/11/2024    Final                    Value:This result contains rich text formatting which cannot be displayed here.    Intraoperative Consultation 07/11/2024    Final                    Value:This result contains rich text formatting which cannot be displayed here.    Gross Description 07/11/2024    Final                    Value:This result contains rich text formatting which cannot be displayed here.    Special Stains 07/11/2024    Final                    Value:This result contains rich text formatting which cannot be displayed here.        No radiology results for the last 30 days.       Assessment & Plan       *Right breast invasive ductal carcinoma  Status post right mastectomy on 7/11/2024-pathology with 45 mm of invasive ductal carcinoma, grade 3,  ER/IL strongly positive at 91 to 100%, HER2 1+, Ki-67 22%  1 out of 5 lymph nodes positive for metastatic disease and receptors on lymph node also ER positive and IL positive.  PT2 N1a MX  Discussed at length the details of imaging and pathology report.Discussed the origin of breast cancer from the ducts and the lobules and the histological type of breast cancer based on site of origin. Discussed the tumor size, lymph node status and stage of the cancer. Explained the presence of DCIS. Discussed the receptor status including ER, IL and her-2 lalit and their significance in determining the biology and treatment. Also discussed the importance of grade and ki-67.   Since patient is postmenopausal with N1 disease we would recommend proceeding with Oncotype DX recurrence score to determine need for chemotherapy.  Oncotype DX recurrence score sent but results pending.  Explained to the patient that if the score is greater than 26 then there is definite benefit from adjuvant chemotherapy however if the score is less than 26 then no additional chemotherapy recommended.  Will proceed with staging CT chest and pelvis and bone scan due to lymph node positive disease as well as extensive lymphovascular invasion and grade 3 disease.  Adjuvant endocrine therapy as well as adjuvant Verzenio would be recommended given that the disease is lymph node positive and grade 3.  She has an appointment with radiation oncology to discuss adjuvant radiation.    *Anxiety and depression  Continue Wellbutrin, Lexapro    *Hypertension  Blood pressure 134/85  Continue current medications    *Hyperlipidemia  Continue Crestor    *Back issues/pain  Status with spinal fusion  Currently on Flexeril    *Follow-up-1 to 2 weeks      61 minutes total spent on the encounter on the same day including reviewing the medical records, reviewing the breast imaging particularly the MRI images independently and interpreted them independently, reviewing the pathology  and discussing with patient, face-to-face time, care coordination and documentation on the same day

## 2024-08-09 ENCOUNTER — OFFICE VISIT (OUTPATIENT)
Dept: SURGERY | Facility: CLINIC | Age: 54
End: 2024-08-09
Payer: COMMERCIAL

## 2024-08-09 VITALS
DIASTOLIC BLOOD PRESSURE: 82 MMHG | BODY MASS INDEX: 28.16 KG/M2 | OXYGEN SATURATION: 97 % | SYSTOLIC BLOOD PRESSURE: 134 MMHG | HEART RATE: 106 BPM | HEIGHT: 65 IN | WEIGHT: 169 LBS | RESPIRATION RATE: 18 BRPM

## 2024-08-09 DIAGNOSIS — C50.511 MALIGNANT NEOPLASM OF LOWER-OUTER QUADRANT OF RIGHT BREAST OF FEMALE, ESTROGEN RECEPTOR POSITIVE: Primary | ICD-10-CM

## 2024-08-09 DIAGNOSIS — Z17.0 MALIGNANT NEOPLASM OF LOWER-OUTER QUADRANT OF RIGHT BREAST OF FEMALE, ESTROGEN RECEPTOR POSITIVE: Primary | ICD-10-CM

## 2024-08-09 PROCEDURE — 99024 POSTOP FOLLOW-UP VISIT: CPT | Performed by: STUDENT IN AN ORGANIZED HEALTH CARE EDUCATION/TRAINING PROGRAM

## 2024-08-09 NOTE — PROGRESS NOTES
Breast Surgical Oncology Post-Op Visit    Surgery:  bilateral mastectomies, right sentinel lymph node biopsy, additional margin excision   Subjective: No acute issues. Denies f/c/wnd issues. Intermittent use of pain medication.     O:  Vitals:    08/09/24 0854   BP: 134/82   Pulse: 106   Resp: 18   SpO2: 97%     Breast incisions:  healing well without evidence of infection or significant seroma      Pathology:   7/11/24  1.  Right axillary sentinel lymph node:               A.  One of three lymph nodes involved by metastatic carcinoma (1/3).                            -Metastasis estimated up to 12 mm. in greatest dimension.                            -Extranodal extension is identified.                            -See synoptic for biomarkers on sharona metastasis.        2.  Right breast anterior skin margin:               A.  Invasive ductal carcinoma involving subcutaneous tissue.               B.  Invasive carcinoma focally involves medial aspect/margin of excision                         (yellow inked subcutaneous tissue)                             3.  Right breast, simple skin sparing mastectomy:               A. Invasive ductal carcinoma (Q shaped and vision clips):                            1. Invasive carcinoma spans an area estimated at 45 mm x 20 mm x 14 mm.                            2. Overall Langdon grade III (tubular score = 3, nuclear score = 3,                                mitotic score = 2).                            3.  Focal lymphovascular space invasion identified.                                           B.  No associated carcinoma in situ identified.               C.  Invasive carcinoma extends to involve the anterior margin of excision                     Invasive carcinoma focally measures 0.9 mm from the posterior margin of excision                            Anterior margin = positive; extensively involved-see also specimen #2 above.                            Posterior margin =  0.9 mm                            Superior margin = >100 mm                            Inferior margin = 15 mm                             Medial margin = > 100 mm                            Lateral margin = 50 mm               D. No lobular neoplasia (LCIS, ALH) identified.               E. Focal multiple biopsy site changes are identified, and 3 metallic clips are retrieved.               F.  No Pagetoid involvement of skin nor nipple by malignancy identified.               G. Two intramammary lymph nodes negative for metastatic carcinoma (0/2).               H.  Previous Biomarkers: Estrogen receptors: Positive (), Progesterone receptors: Positive (),                         HER/2-lalit: Negative (score 1+), Ki-67 = 22% (see BV )     7/26/24  1. Right breast and skin, mastectomy margin, re-excision:               A. Focal residual invasive ductal carcinoma, moderately differentiated; Lyman histologic grade II/III      (tubule score = 3, nuclear score = 3, mitoses score = 1), measuring 2 mm and coming to within 1 mm       of the nearest new (final) lateral skin margin; all other margins are free of tumor.               B. Positive for perineural invasion.               C. Prior procedure related changes noted.               D. Unremarkable skin.     Assessment: s/p bilateral mastectomies and right sentinel lymph node biopsy with mastectomy margin re-excision (right) for  Stage IIB (anatomic) IB (prognostic) ([id number], ER+/TN+/Her2-) IDC breast cancer recovering well.  Discussed pathology at length as well as planned/possible adjuvant treatments.  All questions answered.    Plan:   - RTC 6 months  - Next imaging due - 5/1/2025 [facility] mammogram (left)  - Med Onc referral- previously met with Reina 8/6/24  - Oncotype should be available any day  - med onc ordered staging scans, will follow up results next visit.  - Rad Onc referral - apt with Caridad 8/12  - established with lymphedema clinic and  navigation        Romina De Anda MD  Breast Surgical Oncology

## 2024-08-12 ENCOUNTER — CONSULT (OUTPATIENT)
Dept: RADIATION ONCOLOGY | Facility: HOSPITAL | Age: 54
End: 2024-08-12
Payer: COMMERCIAL

## 2024-08-12 ENCOUNTER — HOSPITAL ENCOUNTER (OUTPATIENT)
Dept: CT IMAGING | Facility: HOSPITAL | Age: 54
Discharge: HOME | End: 2024-08-12
Payer: COMMERCIAL

## 2024-08-12 ENCOUNTER — HOSPITAL ENCOUNTER (OUTPATIENT)
Dept: NUCLEAR MEDICINE | Facility: HOSPITAL | Age: 54
Discharge: HOME | End: 2024-08-12
Payer: COMMERCIAL

## 2024-08-12 VITALS
SYSTOLIC BLOOD PRESSURE: 112 MMHG | DIASTOLIC BLOOD PRESSURE: 76 MMHG | OXYGEN SATURATION: 99 % | BODY MASS INDEX: 27.96 KG/M2 | WEIGHT: 168 LBS | HEART RATE: 109 BPM

## 2024-08-12 DIAGNOSIS — Z17.0 MALIGNANT NEOPLASM OF LOWER-OUTER QUADRANT OF RIGHT BREAST OF FEMALE, ESTROGEN RECEPTOR POSITIVE: ICD-10-CM

## 2024-08-12 DIAGNOSIS — Z17.0 MALIGNANT NEOPLASM OF LOWER-OUTER QUADRANT OF RIGHT BREAST OF FEMALE, ESTROGEN RECEPTOR POSITIVE: Primary | ICD-10-CM

## 2024-08-12 DIAGNOSIS — C50.511 MALIGNANT NEOPLASM OF LOWER-OUTER QUADRANT OF RIGHT BREAST OF FEMALE, ESTROGEN RECEPTOR POSITIVE: ICD-10-CM

## 2024-08-12 DIAGNOSIS — C50.511 MALIGNANT NEOPLASM OF LOWER-OUTER QUADRANT OF RIGHT BREAST OF FEMALE, ESTROGEN RECEPTOR POSITIVE: Primary | ICD-10-CM

## 2024-08-12 PROCEDURE — G0463 HOSPITAL OUTPT CLINIC VISIT: HCPCS | Performed by: RADIOLOGY

## 2024-08-12 PROCEDURE — 78306 BONE IMAGING WHOLE BODY: CPT

## 2024-08-12 PROCEDURE — 71260 CT THORAX DX C+: CPT

## 2024-08-12 PROCEDURE — 74177 CT ABD & PELVIS W/CONTRAST: CPT

## 2024-08-12 PROCEDURE — A9503 TC99M MEDRONATE: HCPCS | Performed by: INTERNAL MEDICINE

## 2024-08-12 PROCEDURE — 25510000001 IOPAMIDOL 61 % SOLUTION: Performed by: INTERNAL MEDICINE

## 2024-08-12 PROCEDURE — 0 TECHNETIUM MEDRONATE KIT: Performed by: INTERNAL MEDICINE

## 2024-08-12 RX ORDER — TC 99M MEDRONATE 20 MG/10ML
19.6 INJECTION, POWDER, LYOPHILIZED, FOR SOLUTION INTRAVENOUS
Status: COMPLETED | OUTPATIENT
Start: 2024-08-12 | End: 2024-08-12

## 2024-08-12 RX ADMIN — IOPAMIDOL 85 ML: 612 INJECTION, SOLUTION INTRAVENOUS at 13:01

## 2024-08-12 RX ADMIN — Medication 19.6 MILLICURIE: at 12:21

## 2024-08-12 NOTE — PROGRESS NOTES
Cancer Staging   Stage IA (cT1c, cN0(f), cM0, G2, ER+, MT+, HER2-)    CC: right breast cancer pT2N1a with 1 of 3 nodes positive ER MT pos Her 2 negative                                 Dear Romina De Anda MD    I had the pleasure of seeing Dasha De Leon  today in the Radiation [facility].   The patient is a 53 y.o. female with recently diagnosed right breast cancer.  She had a bilateral screening mammogram 4/29/24 which showed bilateral retropectoral saline implants.   There is a round mass measuring 16 mm with spiculated margins seen in the posterior one third region of the right breast at 9:00.  This is new since prior exams.  Left breast no suspicious masses calcifications or other abnormalities are seen BI-RADS 0.  She had a diagnostic right mammogram on 5/10/2024 which showed mass in the right breast at 9:00 previously seen 4/29/2024.  On present there is a high density round mass measuring 16 mm with spiculated margins.  Approximately in the right breast 8:00 10 cm from the nipple.Right breast ultrasound showedhypoechoic mass with indistinct And spiculated margins 15 mm in the posterior one third region of the right breast at 8:00 10 cm from the nipple. with internal vascularity there are decreased posterior echoes; edge shadows are excluded.Finding 2: There is an irregular nonparallel hypoechoic mass with indistinct margins measuring 4 x 6 x 4 mm seen in the 830 o'clock region of the right breast 11 cm from the nipple there are decreased posterior echoes, edge shadows are excluded posterior echoes, and shadows not excluded Visualized axillary lymph nodes are normal  Impression:Finding 1: Mass right breast is highly suggestive of malignancy ultrasound-guided biopsy is recommended Finding 2: Mass 830 o'clock region of the right breast is suspicious ultrasound-guided biopsy is recommended. BI-RADS 5 Addendum:  Finding 1: On the obtained sonographic images mass approaches the skin was not definitively involve the  overlying skin, no abnormal skin thickening visualized.  Due to posterior acoustic shadowing no definitive involvement of the underlying pectoralis is seen however during mammographic imaging acquisition the technologist reported difficulty obtaining images which potentially may be secondary to her implant rather than pectoralis involvement. Impression finding 1 mass in the right breast is highly suggestive of malignancy ultrasound-guided biopsy is recommended BI-RADS 5.    She had an ultrasound guided biopsy of the right breast mass on 5/14/24 with pathology from the 8oclock position revealed invasive ductal carcinoma, grade 2, 3mm.  Biopsy from the right breast 8:30 position revealed invasive mammary carcinoma, grade 2, 2.7mm, no dcis, ER % OK % ki67 22& Her 2 negative.      She had a breast mri on 5/25/25 which showed  2 biopsy-proven sites of malignancy in the right breast atthe 8:30 position in the posterior one-third that measure on the orderof 2.3 cm and 2.4 cm in greatest dimension with the vision globe and twirl-shaped metallic clips seen within these lesions, respectively. The lesions either abut or extend to the lateral margin of the pectoral fascia that overlies the implant capsule and some extension into the pectoral muscle at this location and/or biologic capsule is suspected. Findings suspicious for right axillary adenopathy are noted    She underwent a right breast skin sparing mastectomy and sentinel node biopsy on 7/11/24 with pathology revealing invasive ductal carcinoma, grade III measuring 45mm with focal lvsi, with invasive disease extending to the anterior margin, 0.9mm from the posterior margin.  Additional anterior skin margin revealed invasive ductal carcinoma involving the subcutaneous tissue and focally involves the medial aspect of the excision.  One of three sentinel nodes was positive for metastatic disease measuring 12mm with extranodal extension. Her pathologic stage was  pT2N1a.     She underwent re-excision of the right breast skins margin on 24 with pathology revealign residual invasive ductal carcinoma, grade II, measuring 2mm and coming within 1mm of the new final lateral skin margin.  All other margins were negative.  Perineural invasion was present.       She is . P: 0. AB: 0.Last menstrual period: 2020. Age at menarche: 13. Age at first childbirth: N/A. Lactation/How long: N/A  Age at menopause: 49. Total years of oral contraceptive use: 25. Total years of hormone replacement therapy: 0    She is recovering well from her surgery and referred today for evaluation for adjuvant radiation.  She met with Dr. Huang with medical oncology on 24 and she has requested oncotype testing which is still pending and recommended Adjuvant endocrine therapy as well as adjuvant Verzenio.    Review of Systems   Constitutional:  Positive for fatigue.   HENT: Negative.     Respiratory: Negative.     Cardiovascular: Negative.    Gastrointestinal: Negative.    Genitourinary: Negative.    Musculoskeletal:  Positive for arthralgias and back pain.   Neurological: Negative.    Psychiatric/Behavioral:  The patient is nervous/anxious.        Past Medical History:   Diagnosis Date    Abnormal Pap smear of cervix     Anxiety     Miller's cyst     RESOLVED    Breast cancer     Bulging lumbar disc     Bursitis     HX    Depression     GERD (gastroesophageal reflux disease)     H/O colposcopy with cervical biopsy     unknown pap result, biopsy was neg per pt, 2013 [facility]    History of 2019 novel coronavirus disease (COVID-19)     X2    History of eczema     HPV (human papilloma virus) infection     Hyperlipemia     Hypertension     Low back pain     Lumbar radiculopathy     Numbness and tingling of left leg     Osteoarthritis     Scoliosis     Seasonal allergies          Past Surgical History:   Procedure Laterality Date    BREAST AUGMENTATION      BREAST RECONSTRUCTION Right  "2024    Procedure: RIGHT SUBPECTORAL PLACEMENT TISSUE EXPANDER AND CORTIVA (ACELLULAR DERMAL MATRIX), COMPLEX CLOSURE RIGHT BREAST 5cm;  Surgeon: Colin Bender MD;  Location: [facility] MAIN OR;  Service: Plastics;  Laterality: Right;    BREAST SURGERY Right 2024    Procedure: ADJACENT TISSUE REARRANGEMENT RIGHT BREAST;  Surgeon: Colin Bender MD;  [facility];  Service: Plastics;  Laterality: Right;    CARPAL TUNNEL RELEASE Right     CRYOTHERAPY          EPIDURAL BLOCK      HAND SURGERY      Pisiformectomy    LUMBAR DISCECTOMY FUSION INSTRUMENTATION N/A 2024    Procedure: Lumbar 3 to lumbar 4 and lumbar 4 to lumbar 5 laminectomy with fusion and instrumentation;  Surgeon: Rigoberto Botello MD;  Location: [facility] MAIN OR;  Service: Robotics - Neuro;  Laterality: N/A;    MAMMO BILATERAL      MASTECTOMY W/ SENTINEL NODE BIOPSY Right 2024    Procedure: Right MASTECTOMY WITH SENTINEL NODE BIOPSY;  Surgeon: Romina De Anda MD;  Location: [facility] MAIN OR;  Service: General;  Laterality: Right;    MASTECTOMY WITH IMMEDIATE RECONSTRUCTION Right 2024    Procedure: right mastectomy, margin excision;  Surgeon: Romina De Anda MD;  Location: [facility] MAIN OR;  Service: General;  Laterality: Right;    PAP SMEAR      SHOULDER ARTHROSCOPY Left ,    SHOULDER SURGERY      \"MANIPULATION FOR FROZEN SHOULDER\"         Social History     Socioeconomic History    Marital status: Single   Tobacco Use    Smoking status: Former     Current packs/day: 0.00     Average packs/day: 1 pack/day for 20.0 years (20.0 ttl pk-yrs)     Types: Cigarettes     Start date:      Quit date: 2006     Years since quittin.6     Passive exposure: Past    Smokeless tobacco: Never   Vaping Use    Vaping status: Never Used   Substance and Sexual Activity    Alcohol use: Yes     Alcohol/week: 2.0 - 4.0 standard drinks of alcohol     Types: 2 - 4 Glasses of wine per week     Comment: " weekly    Drug use: No    Sexual activity: Not Currently     Birth control/protection: I.U.D.         Family History   Problem Relation Age of Onset    Hypertension Mother     Hyperlipidemia Mother     Diverticulitis Mother     Pancreatitis Mother     Kidney disease Father     Skin cancer Father     Hypertension Father     Hyperlipidemia Father     Stroke Father     Atrial fibrillation Father     Transient ischemic attack Father     No Known Problems Sister     Depression Brother     No Known Problems Daughter     No Known Problems Son     Uterine cancer Maternal Aunt 58    Heart disease Maternal Aunt     Lung cancer Maternal Uncle     Heart attack Maternal Uncle     No Known Problems Paternal Aunt     Colon cancer Paternal Uncle 65    Liver disease Maternal Grandmother     Heart disease Maternal Grandfather     Cancer Maternal Grandfather     Heart attack Paternal Grandmother     No Known Problems Paternal Grandfather     BRCA 1/2 Neg Hx     Breast cancer Neg Hx     Endometrial cancer Neg Hx     Ovarian cancer Neg Hx     Malig Hyperthermia Neg Hx           Objective    Physical Exam  Constitutional:       Appearance: Normal appearance.   HENT:      Head: Atraumatic.   Eyes:      Extraocular Movements: Extraocular movements intact.   Chest:          Comments: Right breast surgically absent, drain in place, incision closed, expander in place, no sign of infection  Neurological:      General: No focal deficit present.      Mental Status: She is alert.   Psychiatric:         Mood and Affect: Mood normal.         Current Outpatient Medications on File Prior to Visit   Medication Sig Dispense Refill    ALPRAZolam (Xanax) 0.25 MG tablet Take 1 tablet by mouth 2 (Two) Times a Day As Needed for Anxiety. 30 tablet 0    buPROPion XL (Wellbutrin XL) 300 MG 24 hr tablet Take 1 tablet by mouth Daily. 90 tablet 1    cyclobenzaprine (FLEXERIL) 10 MG tablet Take 1 tablet by mouth 3 (Three) Times a Day As Needed for Muscle Spasms.  90 tablet 3    escitalopram (LEXAPRO) 20 MG tablet Take 1 tablet by mouth Daily. 90 tablet 3    famotidine (PEPCID) 20 MG tablet Take 1 tablet by mouth 2 (Two) Times a Day. 180 tablet 3    furosemide (Lasix) 20 MG tablet Take 1 tablet by mouth 2 (Two) Times a Day. 90 tablet 1    gabapentin (Neurontin) 100 MG capsule Take 1 capsule by mouth Every 8 (Eight) Hours. 60 capsule 1    HYDROcodone-acetaminophen (NORCO) 5-325 MG per tablet Take 1 tablet by mouth Every 4 (Four) Hours As Needed (Pain). 20 tablet 0    levocetirizine (XYZAL) 5 MG tablet Take one tablet by mouth twice daily for urticaria. 180 tablet 3    olmesartan (BENICAR) 40 MG tablet Take 0.5 tablets by mouth Daily. 45 tablet 1    rosuvastatin (Crestor) 40 MG tablet Take 1 tablet by mouth Daily. 90 tablet 3     No current facility-administered medications on file prior to visit.       ALLERGIES:    Allergies   Allergen Reactions    Penicillins Anaphylaxis     ..Beta lactam allergy details  Antibiotic reaction: (!) shortness of breath, hives, rash  Age at reaction: adult (20'S)  Dose to reaction time: (!) hours  Reason for antibiotic: other (TOOTH INFECTION)  Epinephrine required for reaction?: (!) yes  Tolerated antibiotics: cephalexin       Hemp Seed Oil Itching     HEMP IN LOTIONS    Lisinopril Cough       LMP  (LMP Unknown)      (0) Fully active, able to carry on all predisease performance without restriction      8/6/2024     1:55 PM   Current Status   ECOG score 0         Assessment & Plan     53 year old female with right breast cancer pT2N1a with 1 of 3 nodes positive ER UT pos Her 2 negative s/p skin sparing mastectomy with grade 3 disease and lvsi.   I discussed with her my recommendation for post-mastectomy radiation therapy to the right chest wall and regional nodes to decrease the risk of local regional recurrence.      I discussed with her in detail the risks, benefits and rationale of radiation therapy to include but not limited to the  following:    Acute: skin erythema, breakdown/moist desquamation, swelling or discomfort of the tissue, infection requiring removal of the expander, fatigue, pneumonitis resulting in shortness of breath, cough or pain, lymphedema of right arm    Late: Permanent skin changes including hyperpigmentation, telangiectasias, fibrosis of the tissues resulting in smaller size or poor cosmetic outcome, late edema or cellulitis, late rib fracture, late pulmonary fibrosis, late lymphedema of the arm and the remote risk of second malignancies.      She voiced understanding and was given an opportunity to ask questions which I believe were answered to her satisfaction.  She would like to have her treatments at [facility] which is close to her work.  I have scheduled her consultation with Dr. Germain Puga in 2 weeks. I asked her to call with any questions or concerns in the future.     I personally spent greater than 60 minutes today assessing, managing, discussing and documenting my visit with the patient. That time includes review of records, imaging and pathology reports, obtaining my own history, performing a medically appropriate evaluation, counseling and educating the patient, discussing goals, logistics, alternatives and risks of my recommendations, surveillance options and potential outcomes. It also includes the time documenting the clinical information in the EMR and communicating my recommendations to the other involved physicians.                Thank you very much for allowing me to participate in the care of this very pleasant patient.    Sincerely,      Fifi Mclean MD

## 2024-08-14 ENCOUNTER — TELEPHONE (OUTPATIENT)
Dept: PSYCHIATRY | Facility: HOSPITAL | Age: 54
End: 2024-08-14
Payer: COMMERCIAL

## 2024-08-14 NOTE — TELEPHONE ENCOUNTER
Oncology Social Work  ..Distress Screening Follow-up    Diagnosis: Right Breast Cancer  Treatment: S/P  Mastectomy.  Anticipate adjuvant Radiation Therapy.  Awaiting Oncotype dx score to determine need for chemo.     Location of Distress Screening: Radiation Oncology    Distress Level: 6 (8/12/2024 12:08 PM)    Physical Concerns:    Fatigue: Y  Pain: Y  Sleep: Y    Practical Problems:    Treatment decisions: Y  Work: Y  Finances: Y  Insurance: Y    Emotional Concerns:    Worry or anxiety: Y  Sadness or depression: Y  Loss of interest or enjoyment: Y  Fear: Y  Changes in appearance: Y    Family Concerns:       Spiritual Concerns:        Interventions:   OSW called and spoke to patient. Introduced self and role of social work and services we can assist with.  Patient states that most of her distress/ anxiety is centered around not knowing if she is going to have to undergo chemotherapy or not.  The fear of the unknown and the inability to be able to plan is causing a lot of stress.   Patient states that she plans to do her radiation treatments at [facility].  She reports no transportation or financial needs - she worries about the potential of possibly having to go on STD if she has to do chemo but that is her only financial worry.  Discussed Peer Support through [employer] [facility] at [employer].  She is on meds for depression and anxiety that is prescribed by her PCP.    Discussed community referral for talk therapy if that is ever wanted.     OSW emailed patient her contact information and encouraged her to reach out if needs arise  Romina Curran, MARIAW, LCSW

## 2024-08-15 ENCOUNTER — TELEPHONE (OUTPATIENT)
Dept: ONCOLOGY | Facility: CLINIC | Age: 54
End: 2024-08-15
Payer: COMMERCIAL

## 2024-08-15 NOTE — TELEPHONE ENCOUNTER
----- Message from Jessie CODY sent at 8/15/2024  3:01 PM EDT -----  We don't have her oncotype back for appt tomorrow.  I spoke with her and will cancel tomorrow, however unsure when oncotype will be back, so don't know where to put her just yet

## 2024-08-16 ENCOUNTER — PATIENT OUTREACH (OUTPATIENT)
Dept: OTHER | Facility: HOSPITAL | Age: 54
End: 2024-08-16
Payer: COMMERCIAL

## 2024-08-16 NOTE — PROGRESS NOTES
Called Ms. De Leon to see how she was doing. She stated she is recovering from surgery and is sore, but is doing ok. She will meet with medical oncology on the 21st and has no needs or questions at this time. She stated she has great support. She was thankful for the call and will reach out if any questions or needs arise.

## 2024-08-19 ENCOUNTER — TELEPHONE (OUTPATIENT)
Dept: SURGERY | Facility: CLINIC | Age: 54
End: 2024-08-19
Payer: COMMERCIAL

## 2024-08-19 NOTE — TELEPHONE ENCOUNTER
Patients oncotype was put in and ordered on 7/23 has not came back yet was dated to be back on 8/8. Have tried calling her insurance being [employer] 3 times regarding the pre authorization. Have not gotten a for sure answer on this. The lady I spoke to this morning said she thinks it is approved from what she saw. I called [employer] they said it is still stating pending review just waiting on insurance.     Called again today, spoke with Humaira MANRIQUE. She gave me a authorization number being [id number] stating it is now approved. Will be calling [email or web address] back to give them this info.     [place]

## 2024-08-21 ENCOUNTER — OFFICE VISIT (OUTPATIENT)
Dept: ONCOLOGY | Facility: CLINIC | Age: 54
End: 2024-08-21
Payer: COMMERCIAL

## 2024-08-21 VITALS
HEIGHT: 65 IN | HEART RATE: 104 BPM | BODY MASS INDEX: 28.27 KG/M2 | OXYGEN SATURATION: 96 % | TEMPERATURE: 98.2 F | WEIGHT: 169.7 LBS | RESPIRATION RATE: 16 BRPM | DIASTOLIC BLOOD PRESSURE: 85 MMHG | SYSTOLIC BLOOD PRESSURE: 123 MMHG

## 2024-08-21 DIAGNOSIS — C50.511 MALIGNANT NEOPLASM OF LOWER-OUTER QUADRANT OF RIGHT BREAST OF FEMALE, ESTROGEN RECEPTOR POSITIVE: Primary | ICD-10-CM

## 2024-08-21 DIAGNOSIS — Z79.811 USE OF AROMATASE INHIBITORS: ICD-10-CM

## 2024-08-21 DIAGNOSIS — Z17.0 MALIGNANT NEOPLASM OF LOWER-OUTER QUADRANT OF RIGHT BREAST OF FEMALE, ESTROGEN RECEPTOR POSITIVE: Primary | ICD-10-CM

## 2024-08-21 LAB
LAB AP CASE REPORT: NORMAL
LAB AP CLINICAL INFORMATION: NORMAL
LAB AP DIAGNOSIS COMMENT: NORMAL
LAB AP SPECIAL STAINS: NORMAL
LAB AP SYNOPTIC CHECKLIST: NORMAL
Lab: NORMAL
Lab: NORMAL
PATH REPORT.ADDENDUM SPEC: NORMAL
PATH REPORT.FINAL DX SPEC: NORMAL
PATH REPORT.GROSS SPEC: NORMAL

## 2024-08-21 PROCEDURE — 99214 OFFICE O/P EST MOD 30 MIN: CPT | Performed by: INTERNAL MEDICINE

## 2024-08-21 NOTE — PROGRESS NOTES
Subjective   Dasha De Leon is a 53 y.o. female.  Referred by Dr. De Anda for right breast invasive ductal carcinoma    History of Present Illness     Patient is a 53-year-old postmenopausal  lady who presented with a palpable mass in her right breast in mid April.  This was right around the time of her screening mammogram and hence she proceeded with a screening mammogram.  She denies any previous breast abnormalities or biopsies.  No family history of breast or ovarian carcinoma.    4/29/2024-bilateral screening mammogram  Breasts are entirely fatty.  Bilateral retropectoral saline implants.  Round mass measuring 16 mm with spiculated margins in the posterior one third region of the right breast at 9:00.  This is new since the prior exams.  No suspicious masses in the left breast.    5/10/2024-right breast diagnostic mammogram  Breast is entirely fatty.  Retropectoral saline implants.  Finding 1.round mass in the right breast at 9:00 previously seen on the screening mammogram, measuring 16 mm with spiculated margin.    Right breast ultrasound  Finding 1.hypoechoic mass with spiculated margins and internal vascularity measuring 15 mm at 8:00, 10 cm from the nipple.  Finding 2.irregular  Palpable hypoechoic mass measuring 4 x 6 x 4 mm at 8:30 position in the right breast, 11 cm from the nipple.    Ultrasound-guided biopsy of both these masses recommended.    5/15/2024-right breast ultrasound-guided biopsy  1.8 o'clock position right breast-invasive ductal carcinoma with apocrine features  Grade 2  ER +91 to 100% strong  ME +91 to 100% strong  HER2 negative, 1+  Ki-67 22%  2.right breast 8:30 position  Invasive ductal carcinoma with apocrine features, grade 2  ER +91 to 100% strong  ME +91 to 100% strong  HER2 negative, Ki-67 20%.    5/25/2024-bilateral breast MRI  1.2 biopsy sites of malignancy at 8:30 position in the posterior one third that measured 2.3 cm and 2.4 cm respectively.  The lesions either  about or extend to the lateral margin of the pectoral fascia that overlies the implant capsule and some extension into the pectoral muscle at this location and/or biologic capsular suspect.  Finding suspicious for axilla lymphadenopathy.  Right axilla lymph node measuring 1.4 cm.  2.no suspicious findings in the left breast.    7/11/2024-right mastectomy and sentinel lymph node biopsy  Invasive ductal carcinoma measuring 45 x 20 x 14 mm  Grade 3  Extensive lymphovascular invasion present.  Invasive carcinoma present extensively at the anterior margin  Distance to the invasive carcinoma from the posterior margin 0.9 mm  1 lymph node with macrometastasis measuring 12 mm, 2 mm extranodal extension  At least 3 sentinel lymph nodes  Total number of [place] and nonsentinel lymph nodes evaluated 5  PT2 N1a MX  Receptors repeated on the lymph node with estrogen receptor +91 to 100% strong, progesterone receptor +91 to 100% strong, HER2 negative, score 0, Ki-67 35%    Given the positive margin additional surgery performed on 7/26/2024 with ultimately negative margins.  Expander placed.    Patient presents today to discuss adjuvant therapy.    Oncotype DX recurrence score 18 with no additional benefit from chemotherapy and 5-year risk of distant metastasis being 4%.    Interval history  Dasha presents today for follow-up to discuss further recommendations on adjuvant therapy.  She has met with Dr. Fifi Christie and now scheduled to see Dr. Puga to start adjuvant radiation to the right breast and axilla.  She is recovering well from surgery and has no new issues.      The following portions of the patient's history were reviewed and updated as appropriate: allergies, current medications, past family history, past medical history, past social history, past surgical history, and problem list.    Past Medical History:   Diagnosis Date    Abnormal Pap smear of cervix     Anxiety     Miller's cyst     RESOLVED    Breast cancer      Bulging lumbar disc     Bursitis     HX    Depression     GERD (gastroesophageal reflux disease)     H/O colposcopy with cervical biopsy     unknown pap result, biopsy was neg per pt, 2013 [facility]    History of 2019 novel coronavirus disease (COVID-19)     X2    History of eczema     HPV (human papilloma virus) infection     Hyperlipemia     Hypertension     Low back pain     Lumbar radiculopathy     Numbness and tingling of left leg     Osteoarthritis     Scoliosis     Seasonal allergies         Past Surgical History:   Procedure Laterality Date    BREAST AUGMENTATION      BREAST RECONSTRUCTION Right 7/11/2024    Procedure: RIGHT SUBPECTORAL PLACEMENT TISSUE EXPANDER AND CORTIVA (ACELLULAR DERMAL MATRIX), COMPLEX CLOSURE RIGHT BREAST 5cm;  Surgeon: Colin Bender MD;  Location: [facility] OR;  Service: Plastics;  Laterality: Right;    BREAST SURGERY Right 7/26/2024    Procedure: ADJACENT TISSUE REARRANGEMENT RIGHT BREAST;  Surgeon: Colin Bender MD;  Location: [facility] OR;  Service: Plastics;  Laterality: Right;    CARPAL TUNNEL RELEASE Right 2004    CRYOTHERAPY      1997    EPIDURAL BLOCK      HAND SURGERY  2004    Pisiformectomy    LUMBAR DISCECTOMY FUSION INSTRUMENTATION N/A 01/05/2024    Procedure: Lumbar 3 to lumbar 4 and lumbar 4 to lumbar 5 laminectomy with fusion and instrumentation;  Surgeon: Rigoberto Botello MD;  Location: [facility] OR;  Service: Robotics - Neuro;  Laterality: N/A;    MAMMO BILATERAL  2014    MASTECTOMY W/ SENTINEL NODE BIOPSY Right 7/11/2024    Procedure: Right MASTECTOMY WITH SENTINEL NODE BIOPSY;  Surgeon: Romina De Anda MD;  Location: [facility] OR;  Service: General;  Laterality: Right;    MASTECTOMY WITH IMMEDIATE RECONSTRUCTION Right 7/26/2024    Procedure: right mastectomy, margin excision;  Surgeon: Romina De Anda MD;  Location: [facility] OR;  Service: General;  Laterality: Right;    PAP SMEAR  [id number]    SHOULDER ARTHROSCOPY Left 2002,2003     "SHOULDER SURGERY      \"MANIPULATION FOR FROZEN SHOULDER\"        Family History   Problem Relation Age of Onset    Hypertension Mother     Hyperlipidemia Mother     Diverticulitis Mother     Pancreatitis Mother     Kidney disease Father     Skin cancer Father     Hypertension Father     Hyperlipidemia Father     Stroke Father     Atrial fibrillation Father     Transient ischemic attack Father     No Known Problems Sister     Depression Brother     No Known Problems Daughter     No Known Problems Son     Uterine cancer Maternal Aunt 58    Heart disease Maternal Aunt     Lung cancer Maternal Uncle     Heart attack Maternal Uncle     No Known Problems Paternal Aunt     Colon cancer Paternal Uncle 65    Liver disease Maternal Grandmother     Heart disease Maternal Grandfather     Cancer Maternal Grandfather     Heart attack Paternal Grandmother     No Known Problems Paternal Grandfather     BRCA 1/2 Neg Hx     Breast cancer Neg Hx     Endometrial cancer Neg Hx     Ovarian cancer Neg Hx     Malig Hyperthermia Neg Hx         Social History     Socioeconomic History    Marital status: Single   Tobacco Use    Smoking status: Former     Current packs/day: 0.00     Average packs/day: 1 pack/day for 20.0 years (20.0 ttl pk-yrs)     Types: Cigarettes     Start date:      Quit date:      Years since quittin.6     Passive exposure: Past    Smokeless tobacco: Never   Vaping Use    Vaping status: Never Used   Substance and Sexual Activity    Alcohol use: Yes     Alcohol/week: 2.0 - 4.0 standard drinks of alcohol     Types: 2 - 4 Glasses of wine per week     Comment: weekly    Drug use: No    Sexual activity: Not Currently     Birth control/protection: I.U.D.        OB History          0    Para   0    Term   0       0    AB   0    Living   0         SAB   0    IAB   0    Ectopic   0    Molar        Multiple   0    Live Births                   Age at menarche-13  Age at first live childbirth-not " "applicable   0 para 0  0  Age at menopause-49  Oral conceptive pill use for 25 years  No use of hormone replacement therapy    Allergies   Allergen Reactions    Penicillins Anaphylaxis     ..Beta lactam allergy details  Antibiotic reaction: (!) shortness of breath, hives, rash  Age at reaction: adult (20'S)  Dose to reaction time: (!) hours  Reason for antibiotic: other (TOOTH INFECTION)  Epinephrine required for reaction?: (!) yes  Tolerated antibiotics: cephalexin       Hemp Seed Oil Itching     HEMP IN LOTIONS    Lisinopril Cough            Review of Systems   Constitutional: Negative.    HENT: Negative.     Eyes: Negative.    Respiratory: Negative.     Cardiovascular: Negative.    Gastrointestinal: Negative.    Endocrine: Negative.    Genitourinary: Negative.    Musculoskeletal:  Positive for arthralgias and back pain.   Neurological: Negative.    Hematological: Negative.    Psychiatric/Behavioral:  The patient is nervous/anxious.      Review of systems as mentioned HPI otherwise negative    Objective   Blood pressure 123/85, pulse 104, temperature 98.2 °F (36.8 °C), temperature source Oral, resp. rate 16, height 165.1 cm (65\"), weight 77 kg (169 lb 11.2 oz), SpO2 96%, not currently breastfeeding.   Physical Exam  Vitals reviewed.   Constitutional:       Appearance: She is normal weight.   HENT:      Head: Normocephalic and atraumatic.      Right Ear: External ear normal.      Left Ear: External ear normal.      Nose: Nose normal.      Mouth/Throat:      Pharynx: Oropharynx is clear.   Eyes:      Conjunctiva/sclera: Conjunctivae normal.   Cardiovascular:      Rate and Rhythm: Normal rate.   Pulmonary:      Effort: Pulmonary effort is normal.   Abdominal:      General: Abdomen is flat.   Musculoskeletal:         General: Normal range of motion.      Cervical back: Normal range of motion.   Skin:     General: Skin is warm.   Neurological:      General: No focal deficit present.      Mental Status: " She is alert and oriented to person, place, and time.   Psychiatric:         Mood and Affect: Mood normal.         Behavior: Behavior normal.         Thought Content: Thought content normal.         Judgment: Judgment normal.       Breast Exam: Right breast status post mastectomy with expander in place. (Breast not examined today)    I have reexamined the patient and the results are consistent with the previously documented exam. Talia Huang MD        Lab on 08/06/2024   Component Date Value Ref Range Status    WBC 08/06/2024 7.37  3.40 - 10.80 10*3/mm3 Final    RBC 08/06/2024 4.64  3.77 - 5.28 10*6/mm3 Final    Hemoglobin 08/06/2024 12.9  12.0 - 15.9 g/dL Final    Hematocrit 08/06/2024 40.6  34.0 - 46.6 % Final    MCV 08/06/2024 87.5  79.0 - 97.0 fL Final    MCH 08/06/2024 27.8  26.6 - 33.0 pg Final    MCHC 08/06/2024 31.8  31.5 - 35.7 g/dL Final    RDW 08/06/2024 13.8  12.3 - 15.4 % Final    RDW-SD 08/06/2024 43.7  37.0 - 54.0 fl Final    MPV 08/06/2024 9.7  6.0 - 12.0 fL Final    Platelets 08/06/2024 324  140 - 450 10*3/mm3 Final    Neutrophil % 08/06/2024 52.8  42.7 - 76.0 % Final    Lymphocyte % 08/06/2024 25.6  19.6 - 45.3 % Final    Monocyte % 08/06/2024 14.1 (H)  5.0 - 12.0 % Final    Eosinophil % 08/06/2024 5.8  0.3 - 6.2 % Final    Basophil % 08/06/2024 0.8  0.0 - 1.5 % Final    Immature Grans % 08/06/2024 0.9 (H)  0.0 - 0.5 % Final    Neutrophils, Absolute 08/06/2024 3.88  1.70 - 7.00 10*3/mm3 Final    Lymphocytes, Absolute 08/06/2024 1.89  0.70 - 3.10 10*3/mm3 Final    Monocytes, Absolute 08/06/2024 1.04 (H)  0.10 - 0.90 10*3/mm3 Final    Eosinophils, Absolute 08/06/2024 0.43 (H)  0.00 - 0.40 10*3/mm3 Final    Basophils, Absolute 08/06/2024 0.06  0.00 - 0.20 10*3/mm3 Final    Immature Grans, Absolute 08/06/2024 0.07 (H)  0.00 - 0.05 10*3/mm3 Final    nRBC 08/06/2024 0.0  0.0 - 0.2 /100 WBC Final   Admission on 07/26/2024, Discharged on 07/26/2024   Component Date Value Ref Range Status    Case  Report 07/26/2024    Final                    Value:Surgical Pathology Report                         Case: [id number]                                  Authorizing Provider:  Romina De Anda MD           Collected:           07/26/2024 07:36 AM          Ordering Location:     [facility]  Received:            07/26/2024 10:00 AM                                 MAIN OR                                                                      Pathologist:           Julianna Mejia MD                                                          Specimen:    Breast, Right, RIGHT MASTECTOMY MARGIN EXCISION  SHORT STITCH SUPERIOR, LONG STITCH                 MEDIAL                                                                                     Final Diagnosis 07/26/2024    Final                    Value:This result contains rich text formatting which cannot be displayed here.    Gross Description 07/26/2024    Final                    Value:This result contains rich text formatting which cannot be displayed here.        NM Bone Scan Whole Body    Result Date: 8/14/2024  No scintigraphic evidence of osseous metastatic disease.   This report was finalized on 8/14/2024 1:03 PM by Dr. Marty Bragg M.D on Workstation: [id number]      CT Chest With Contrast Diagnostic    Result Date: 8/14/2024  1. There is a single mildly enlarged right axillary lymph node suspicious for a sharona metastasis. No evidence to suggest distal metastatic disease in patient with recent right mastectomy with implant reconstruction. There is fluid and air surrounding the implant and a tissue expander is present with adjacent surgical drain. 2. Mild hepatic steatosis. 3. Duodenal diverticulum. 4. IUD is present.  Radiation dose reduction techniques were utilized, including automated exposure control and exposure modulation based on body size.   This report was finalized on 8/14/2024 11:23 AM by Yehuda Ornelas M.D on Workstation: [id number]       CT Abdomen Pelvis With Contrast    Result Date: 8/14/2024  1. There is a single mildly enlarged right axillary lymph node suspicious for a sharona metastasis. No evidence to suggest distal metastatic disease in patient with recent right mastectomy with implant reconstruction. There is fluid and air surrounding the implant and a tissue expander is present with adjacent surgical drain. 2. Mild hepatic steatosis. 3. Duodenal diverticulum. 4. IUD is present.  Radiation dose reduction techniques were utilized, including automated exposure control and exposure modulation based on body size.   This report was finalized on 8/14/2024 11:23 AM by Yehuda Ornelas M.D on Workstation: [id number]          Assessment & Plan       *Right breast invasive ductal carcinoma  Status post right mastectomy on 7/11/2024-pathology with 45 mm of invasive ductal carcinoma, grade 3, ER/VA strongly positive at 91 to 100%, HER2 1+, Ki-67 22%  1 out of 5 lymph nodes positive for metastatic disease and receptors on lymph node also ER positive and VA positive.  PT2 N1a MX  Oncotype DX recurrence score 18 with no additional benefit from chemotherapy and 4% risk of distant metastasis at 5 years  Given the low Oncotype DX recurrence score she will proceed with adjuvant radiation.  Subsequently she will be started on anastrozole 1 mg p.o. daily.  We also discussed Verzenio for 2 years in the adjuvant setting due to grade 3 disease as well as lymph node positive disease.  She is willing to proceed with both anastrozole as well as Verzenio.  Adverse effects of anastrozole including but not limited to hot flashes, mood changes, fatigue, insomnia, nausea, arthralgias and myalgias, decrease in bone density discussed.  Adverse effects of Verzenio including but not limited to diarrhea, nausea, fatigue, myelosuppression, increased risk of infection, risk of DVT PE discussed.  Patient willing to proceed with both medications.  Prescription for anastrozole  sent to pharmacy  She will be seen back in clinic again in 4 to 5 weeks for chemotherapy education for Verzenio and start Verzenio.    *Anxiety and depression  Continue Wellbutrin, Lexapro  Well-controlled at this time    *Hypertension  Blood pressure 123/85  Continue current medications    *Hyperlipidemia  Continue Crestor    *Back issues/pain  Status with spinal fusion  Currently on Flexeril    *Bone health  Obtain DEXA for baseline  Also discussed adjuvant Zometa.    *Follow-up-4 weeks for chemotherapy education  6 weeks for toxicity check on Verzenio

## 2024-08-22 ENCOUNTER — SPECIALTY PHARMACY (OUTPATIENT)
Dept: PHARMACY | Facility: HOSPITAL | Age: 54
End: 2024-08-22
Payer: COMMERCIAL

## 2024-08-22 ENCOUNTER — TELEPHONE (OUTPATIENT)
Dept: ONCOLOGY | Facility: CLINIC | Age: 54
End: 2024-08-22
Payer: COMMERCIAL

## 2024-08-22 NOTE — PROGRESS NOTES
Staff message rec from [facility] Pharmacist-Dr Huang would like to start pt on Verzenio with ramp up dosing.    I have submitted the PA to [employer] through covermymeds. Currently waiting for a decision.    Talia Huang MD sent to Leatha Abbott [facility]; Arminda Baumann, Pharmacy Technician; Jessie Yousif, RN  Yes pls    Thanks          Previous Messages       ----- Message -----  From: Leatha Abbott RPH  Sent: 8/22/2024   1:22 PM EDT  To: Jessie Yousif, RN; *  Subject: verzenio                                        Hi! I see she is on our education schedule for Verzenio? Would you like the usual ramp up of 100 mg po daily x 7 days; 100 mg po bid x 7 days; then 150 mg po bid thereafter?    Just let us know and we will work on this.    Thanks,  Leatha Baumann, Pharmacy Technician  Specialty Pharmacy Technician

## 2024-08-22 NOTE — PROGRESS NOTES
Specialty Pharmacy Patient Management Program        Dasha is seen by their provider for breast cancer with plans to begin adjuvant Verzenio (abemaciclib) in combination with anastrozole.    It is undecided if the patient will be filling specialty medication at [facility] and/or enrolling in the Oncology Patient Management Program at this time and enrollment is therefore UNDER REVIEW.     Benefit Investigation   As of 8/22/2024 13:47 EDT  To be conducted by specialty pharmacy Care Coordinator.    Leatha Abbott RPH  8/22/2024  13:47 EDT

## 2024-08-23 ENCOUNTER — SPECIALTY PHARMACY (OUTPATIENT)
Dept: PHARMACY | Facility: HOSPITAL | Age: 54
End: 2024-08-23
Payer: COMMERCIAL

## 2024-08-23 NOTE — PROGRESS NOTES
Verzenio approved from 8/22/2024-8/22/2025-[facility]    Test claim ran through [facility] and it returned paid with a copay of $1,312.69. Pt is eligible for the Verzenio copay card which will drop her copay to $0.    Once she is fully educated the rx will be sent and delivery will be coordinated.    Arminda Baumann, Pharmacy Technician  Specialty Pharmacy Technician

## 2024-08-23 NOTE — PROGRESS NOTES
[facility] RADIATION ONCOLOGY  CONSULTATION NOTE    NAME: Dasha De Leon  YOB: 1970  MRN #: [id number]  DATE OF SERVICE: 8/27/2024  REFERRING PROVIDER: Fifi Mclean MD   PRIMARY CARE PROVIDER: Ariana Coffman PA-C    CHIEF COMPLAINT:  Right breast cancer    DIAGNOSIS:    Encounter Diagnosis   Name Primary?    Malignant neoplasm of lower-outer quadrant of right breast of female, estrogen receptor positive Yes       Cancer Staging   Stage IA (cT1c, cN0(f), cM0, G2, ER+, TN+, HER2-)  Stage IIA (pT2, pN1a(sn), cM0, G3, ER+, TN+, HER2-) right breast invasive ductal carcinoma    Pacemaker?:  no   Prior XRT?:  no   Contraindications?:  no  Pregnancy Test?:  Yes       HISTORY OF PRESENT ILLNESS     Dasha De Leon is a 53 y.o. female who presents with history of stage IIA (pT2, pN1a(sn), cM0, G3, ER+, TN+, HER2-) right breast invasive ductal carcinoma. She is sp mastectomy and SLNB on 7/11/2024. She underwent re-resection for a positive margin on 7/26/2024. She now presents for discussion of radiation therapy options.    4/29/2024 the patient presented to her OB/GYN with complaints of a breast lump on the right side at the 8 o'clock position.  Ultrasound and diagnostic mammogram were ordered.    5/10/2024 right breast diagnostic mammogram and ultrasound were performed.  Findings showed mass in the right breast that was highly suggestive of malignancy.  Ultrasound-guided biopsy was recommended.    5/15/2024 breast biopsy of the 8:00 and 8:30 Mass were performed.  Pathology showed invasive mammary carcinoma, no special type (ductal with apocrine features) at the 8:00 position and Invasive mammary carcinoma, no special type (ductal with apocrine features) at the 8:30 position. The specimens were ER/TN positive, HER2 negative.     5/25/2024 bilateral breast MRIs were performed.  Imaging showed 2 biopsy-proven sites of malignancy in the right breast that measured 2.3 and 2.4 cm  respectively.  Right axillary adenopathy was also noted.    7/11/2024 the patient was taken for right breast mastectomy with sentinel lymph node biopsy and right subpectoral placement of tissue expander.    Pathology showed invasive ductal carcinoma in the lower outer quadrant of the right breast.  The tumor was grade 3 with a single focus of invasive carcinoma that measured 45 mm in greatest diameter.  DCIS and LCIS were not identified.  LVSI was present.  Invasive carcinoma was present at the anterior margin and was extensively involved.  The tumor was 0.9 mm from the posterior margin.  1 of 3 sentinel lymph nodes was involved with metastatic disease that measured 12 mm in diameter with extranodal extension present that was greater than 2 mm.  Final staging was stage IIA, [id number].     7/26/2024 The patient return for resection of positive anterior margin.  Pathology showed focal residual invasive ductal carcinoma, moderately differentiated, measuring 2 mm and came within 1 mm of the nearest new final lateral skin margin.  All other margins were free of tumor.  The specimen is also positive for perineural invasion.    8/12/2024 the patient met with Dr. Fifi Mclean who recommended adjuvant radiation to the chest wall and regional lymph nodes given the positive lymph node.    On the same date a CT chest abdomen pelvis was performed.  CT imaging showed a single mildly enlarged right axillary lymph node that was suspicious for sharona metastasis.  There was no evidence to suggest distant metastatic disease.  Bone scan was also performed and negative for metastases.    8/21/2024 the patient met with Dr. Huang.  An Oncotype was requested and came back as 18.  Chemotherapy was not recommended.  The patient was referred for radiation therapy followed by adjuvant endocrine therapy.    The patient reports she is healing well.  She has no major issues or concerns at this time.  She is here to discuss the role of  radiation therapy in the management of her disease.    IMAGING     NM Bone Scan Whole Body    Result Date: 8/14/2024  No scintigraphic evidence of osseous metastatic disease.   This report was finalized on 8/14/2024 1:03 PM by Dr. Marty Bragg M.D on Workstation: [id number]      CT Chest With Contrast Diagnostic    Result Date: 8/14/2024  1. There is a single mildly enlarged right axillary lymph node suspicious for a sharona metastasis. No evidence to suggest distal metastatic disease in patient with recent right mastectomy with implant reconstruction. There is fluid and air surrounding the implant and a tissue expander is present with adjacent surgical drain. 2. Mild hepatic steatosis. 3. Duodenal diverticulum. 4. IUD is present.  Radiation dose reduction techniques were utilized, including automated exposure control and exposure modulation based on body size.   This report was finalized on 8/14/2024 11:23 AM by Yehuda Ornelas M.D on Workstation: [id number]      CT Abdomen Pelvis With Contrast    Result Date: 8/14/2024  1. There is a single mildly enlarged right axillary lymph node suspicious for a sharona metastasis. No evidence to suggest distal metastatic disease in patient with recent right mastectomy with implant reconstruction. There is fluid and air surrounding the implant and a tissue expander is present with adjacent surgical drain. 2. Mild hepatic steatosis. 3. Duodenal diverticulum. 4. IUD is present.  Radiation dose reduction techniques were utilized, including automated exposure control and exposure modulation based on body size.   This report was finalized on 8/14/2024 11:23 AM by Yehuda Ornelas M.D on Workstation: [id number]      MRI Breast Bilateral Diagnostic W WO Contrast    Result Date: 5/28/2024  1. There are 2 biopsy-proven sites of malignancy in the right breast at the 8:30 position in the posterior one-third that measure on the order of 2.3 cm and 2.4 cm in greatest dimension with the  vision globe and twirl-shaped metallic clips seen within these lesions, respectively. The lesions either abut or extend to the lateral margin of the pectoral fascia that overlies the implant capsule and some extension into the pectoral muscle at this location and/or biologic capsule is suspected. Findings suspicious for right axillary adenopathy are noted.  2. There are no findings suspicious for malignancy in the left breast.  BI-RADS CATEGORY 6: Known biopsy-proven malignancy.  This report was finalized on 5/28/2024 4:50 PM by Dr. Chidi Gorman M.D on Workstation: [id number]           PATHOLOGY     Reviewed in [facility]      LABS     HEMATOLOGY:  WBC   Date Value Ref Range Status   08/06/2024 7.37 3.40 - 10.80 10*3/mm3 Final   01/26/2021 8.75 3.40 - 10.80 10*3/mm3 Final     RBC   Date Value Ref Range Status   08/06/2024 4.64 3.77 - 5.28 10*6/mm3 Final   01/26/2021 4.36 3.77 - 5.28 10*6/mm3 Final     Hemoglobin   Date Value Ref Range Status   08/06/2024 12.9 12.0 - 15.9 g/dL Final     Hematocrit   Date Value Ref Range Status   08/06/2024 40.6 34.0 - 46.6 % Final     Platelets   Date Value Ref Range Status   08/06/2024 324 140 - 450 10*3/mm3 Final     CHEMISTRY:  Lab Results   Component Value Date    GLUCOSE 100 (H) 07/01/2024    BUN 19 07/01/2024    CREATININE 0.75 07/01/2024    EGFRIFNONA 87 09/09/2021    EGFRIFAFRI 106 09/09/2021    BCR 25.3 (H) 07/01/2024    K 4.0 07/01/2024    CO2 22.8 07/01/2024    CALCIUM 9.4 07/01/2024    PROTENTOTREF 6.9 06/30/2023    ALBUMIN 4.5 06/30/2023    LABIL2 1.9 06/30/2023    AST 25 06/30/2023    ALT 25 06/30/2023       PROBLEM LIST     Patient Active Problem List   Diagnosis    IUD (intrauterine device) in place    Essential hypertension    Mood disorder    Allergic rhinitis    Hyperlipidemia    Knee pain, right    Lumbar radiculopathy    Vitamin D deficiency    Lumbar spinal stenosis    Follow-up examination following surgery    Abnormality of right breast on screening mammogram     Malignant neoplasm of lower-outer quadrant of right breast of female, estrogen receptor positive    Anxiety        CURRENT MEDICATIONS     Current Outpatient Medications   Medication Instructions    ALPRAZolam (XANAX) 0.25 mg, Oral, 2 Times Daily PRN    buPROPion XL (WELLBUTRIN XL) 300 mg, Oral, Daily    cyclobenzaprine (FLEXERIL) 10 mg, Oral, 3 Times Daily PRN    escitalopram (LEXAPRO) 20 mg, Oral, Daily    famotidine (PEPCID) 20 mg, Oral, 2 Times Daily    furosemide (LASIX) 20 mg, Oral, 2 Times Daily    gabapentin (NEURONTIN) 100 mg, Oral, Every 8 Hours    HYDROcodone-acetaminophen (NORCO) 5-325 MG per tablet 1 tablet, Oral, Every 4 Hours PRN    levocetirizine (XYZAL) 5 MG tablet Take one tablet by mouth twice daily for urticaria.    olmesartan (BENICAR) 20 mg, Oral, Daily        ALLERGIES     Allergies   Allergen Reactions    Penicillins Anaphylaxis     ..Beta lactam allergy details  Antibiotic reaction: (!) shortness of breath, hives, rash  Age at reaction: adult (20'S)  Dose to reaction time: (!) hours  Reason for antibiotic: other (TOOTH INFECTION)  Epinephrine required for reaction?: (!) yes  Tolerated antibiotics: cephalexin       Hemp Seed Oil Itching     HEMP IN LOTIONS    Lisinopril Cough         FAMILY HISTORY     Family History   Problem Relation Age of Onset    Hypertension Mother     Hyperlipidemia Mother     Diverticulitis Mother     Pancreatitis Mother     Kidney disease Father     Skin cancer Father     Hypertension Father     Hyperlipidemia Father     Stroke Father     Atrial fibrillation Father     Transient ischemic attack Father     No Known Problems Sister     Depression Brother     No Known Problems Daughter     No Known Problems Son     Uterine cancer Maternal Aunt 58    Heart disease Maternal Aunt     Lung cancer Maternal Uncle     Heart attack Maternal Uncle     No Known Problems Paternal Aunt     Colon cancer Paternal Uncle 65    Liver disease Maternal Grandmother     Heart disease  Maternal Grandfather     Cancer Maternal Grandfather     Heart attack Paternal Grandmother     No Known Problems Paternal Grandfather     BRCA 1/2 Neg Hx     Breast cancer Neg Hx     Endometrial cancer Neg Hx     Ovarian cancer Neg Hx     Malig Hyperthermia Neg Hx         SOCIAL HISTORY     Social History     Tobacco Use    Smoking status: Former     Current packs/day: 0.00     Average packs/day: 1 pack/day for 20.0 years (20.0 ttl pk-yrs)     Types: Cigarettes     Start date:      Quit date:      Years since quittin.6     Passive exposure: Past    Smokeless tobacco: Never   Vaping Use    Vaping status: Never Used   Substance Use Topics    Alcohol use: Yes     Alcohol/week: 2.0 - 4.0 standard drinks of alcohol     Types: 2 - 4 Glasses of wine per week     Comment: weekly    Drug use: No        REVIEW OF SYSTEMS     Review of Systems   Constitutional:  Positive for fatigue.   HENT:  Positive for congestion, sinus pressure, sinus pain and sneezing.    Eyes:  Positive for itching.   Respiratory:  Positive for cough.    Gastrointestinal:  Positive for abdominal distention and nausea.   Musculoskeletal:  Positive for arthralgias, back pain, myalgias, neck pain and neck stiffness.   Allergic/Immunologic: Positive for environmental allergies.   Neurological:  Positive for numbness and headaches.   Psychiatric/Behavioral:  Positive for sleep disturbance. The patient is nervous/anxious.       Vitals:    24 1042   BP: 136/86   Pulse: 108   Resp: 18   SpO2: 99%      Physical Exam  Constitutional:       General: She is not in acute distress.  HENT:      Head: Normocephalic and atraumatic.   Pulmonary:      Effort: Pulmonary effort is normal. No respiratory distress.   Neurological:      Mental Status: She is alert and oriented to person, place, and time. Mental status is at baseline.   Psychiatric:         Mood and Affect: Mood normal.         Behavior: Behavior normal.          ECOG:  Restricted in  physically strenuous activity but ambulatory and able to carry out work of a light or sedentary nature, e.g., light house work, office work = 1      ASSESSMENT AND PLAN     ASSESSMENT:      Dasha De Leon is a 53 y.o. female who presents with history of stage IIA (pT2, pN1a(sn), cM0, G3, ER+, IA+, HER2-) right breast invasive ductal carcinoma. She is sp mastectomy and SLNB on 7/11/2024. She underwent re-resection for a positive margin on 7/26/2024. She now presents for discussion of radiation therapy options.    Diagnoses and all orders for this visit:    1. Malignant neoplasm of lower-outer quadrant of right breast of female, estrogen receptor positive (Primary)       PLAN:           ASSESSMENT     1)  Ms. De Leon is a 53 y.o. female with AJCC 8th Edition Prognostic Stage IIA (T2 N1a M0) RIGHT breast cancer.          2)  Hormone Receptor Status:  ER: positive IA positive, HER2/Valarie: negative          3)  Ms. De Leon has undergone segmental mastectomy with sentinel lymph node biopsy.  Final pathology demonstrated invasive ductal carcinoma in the lower outer quadrant of the right breast.  The tumor was grade 3 with a single focus of invasive carcinoma that measured 45 mm in greatest diameter.  DCIS and LCIS were not identified.  LVSI was present.  Invasive carcinoma was present at the anterior margin and was extensively involved.  The tumor was 0.9 mm from the posterior margin.  1 of 3 sentinel lymph nodes was involved with metastatic disease that measured 12 mm in diameter with extranodal extension present that was greater than 2 mm.  Final staging was stage IIA, [id number]. Re-resection on 7/26/2024 achieved negative margins.  Overall, she is healing well from the surgery.          4)  Ms. De Leon has met with medical oncology and the plan is endocrine therapy after completing radiation therapy.           PLAN     1)  I recommend further workup of the suspicious right axillary lymph node.  We discussed a plan to  review her case in tumor board.  I also recommend a biopsy of this lymph node to determine if gross disease is still present in the right axilla.  Depending on the findings of biopsy and tumor board discussion, additional recommendations will be provided regarding additional need for potential surgery or radiation therapy.       EDUCATION     Ready to learn, no apparent learning barriers were identified; learning preferences include listening. Explained diagnosis and treatment plan; patient expressed understanding of the content.          INFORMED CONSENT     The treatment alternatives, expected side effects and potential complications were discussed.  There is a small risk radiotherapy may cause permanent damage to any normal healthy tissue or organ adjacent to the treatment site which can include the following:          Breast resulting in poor cosmetic outcome     Ribs causing an increased risk of fracture     Lung causing a risk of pneumonitis     Heart causing a risk of heart attack, conduction or valvular abnormalities     Lymphatics resulting in lymphedema     Brachial plexus resulting in neurological dysfunction     DNA of normal cells resulting in a risk of 2nd malignancy          Acute side effects including dermatitis, skin itching, skin burning, peeling or blisters, breast swelling resulting in tenderness as well as fatigue.          I spent 60 minutes caring for Dasha on this date of service. This time includes time spent by me in the following activities:preparing for the visit, reviewing tests, obtaining and/or reviewing a separately obtained history, performing a medically appropriate examination and/or evaluation , counseling and educating the patient/family/caregiver, ordering medications, tests, or procedures, referring and communicating with other health care professionals , documenting information in the medical record, independently interpreting results and communicating that information with  the patient/family/caregiver, and care coordination    FOLLOW UP     No follow-ups on file.     CC: Fifi Mclean MD Ford, Ariana PONCE PA-C

## 2024-08-23 NOTE — PROGRESS NOTES
[facility] Radiation Oncology   {Consult/Reevaluation:36331}    Chief Complaint  ***      Diagnosis: ***    Overall Stage ***    {clinical/pathologic:52383}T***: ***  {clinical/pathologic:00420}N***: ***  {clinical/pathologic:77604}M***: ***      Pacemaker: {YES:67357}  Prior History of Radiation: {YES:83361}  Contraindications to Radiation: {YES:03889}  Patient Requires Pregnancy Test: {Radiation Pregnancy Test:09807}      HPI:    Dasha De Leon is a 53 y.o. female ***        Imaging:      ***    Pathology:      ***    Labs:    Lab Results   Component Value Date    CREATININE 0.75 07/01/2024       {Ambulatory Labs (Optional):61756}    {Problem List  Visit Diagnosis   Encounters  Notes  Medications  Labs  Result Review Imaging  Media :23}      Problem List:  Patient Active Problem List   Diagnosis    IUD (intrauterine device) in place    Essential hypertension    Mood disorder    Allergic rhinitis    Hyperlipidemia    Knee pain, right    Lumbar radiculopathy    Vitamin D deficiency    Lumbar spinal stenosis    Follow-up examination following surgery    Abnormality of right breast on screening mammogram    Malignant neoplasm of lower-outer quadrant of right breast of female, estrogen receptor positive    Anxiety          Medications:  Current Outpatient Medications on File Prior to Visit   Medication Sig Dispense Refill    ALPRAZolam (Xanax) 0.25 MG tablet Take 1 tablet by mouth 2 (Two) Times a Day As Needed for Anxiety. 30 tablet 0    buPROPion XL (Wellbutrin XL) 300 MG 24 hr tablet Take 1 tablet by mouth Daily. 90 tablet 1    cyclobenzaprine (FLEXERIL) 10 MG tablet Take 1 tablet by mouth 3 (Three) Times a Day As Needed for Muscle Spasms. 90 tablet 3    escitalopram (LEXAPRO) 20 MG tablet Take 1 tablet by mouth Daily. 90 tablet 3    famotidine (PEPCID) 20 MG tablet Take 1 tablet by mouth 2 (Two) Times a Day. 180 tablet 3    furosemide (Lasix) 20 MG tablet Take 1 tablet by mouth 2 (Two) Times a Day. 90  "tablet 1    gabapentin (Neurontin) 100 MG capsule Take 1 capsule by mouth Every 8 (Eight) Hours. 60 capsule 1    HYDROcodone-acetaminophen (NORCO) 5-325 MG per tablet Take 1 tablet by mouth Every 4 (Four) Hours As Needed (Pain). 20 tablet 0    levocetirizine (XYZAL) 5 MG tablet Take one tablet by mouth twice daily for urticaria. 180 tablet 3    olmesartan (BENICAR) 40 MG tablet Take 0.5 tablets by mouth Daily. 45 tablet 1    rosuvastatin (Crestor) 40 MG tablet Take 1 tablet by mouth Daily. 90 tablet 3     No current facility-administered medications on file prior to visit.          Allergies:  Allergies   Allergen Reactions    Penicillins Anaphylaxis     ..Beta lactam allergy details  Antibiotic reaction: (!) shortness of breath, hives, rash  Age at reaction: adult (20'S)  Dose to reaction time: (!) hours  Reason for antibiotic: other (TOOTH INFECTION)  Epinephrine required for reaction?: (!) yes  Tolerated antibiotics: cephalexin       Hemp Seed Oil Itching     HEMP IN LOTIONS    Lisinopril Cough         Family History:  {FamilyHistory/Contraindications:10008}      Social History:  {Smoking History:37559}    Distance From Clinic: {distance travelled:94152}    Patient has someone who can assist with transportation: {YES:38307}      Review of Systems:    Review of Systems      Vital Signs:  There were no vitals taken for this visit.  Estimated body mass index is 28.24 kg/m² as calculated from the following:    Height as of 24: 165.1 cm (65\").    Weight as of 24: 77 kg (169 lb 11.2 oz).  There were no vitals filed for this visit.      ECOG: {ECO}    Physical Exam     Result Review :{Labs  Result Review  Imaging  Med Tab  Media  Procedures :23}  {The following data was reviewed by (Optional):69000}  Labs: Last Creatinine ***  {Data reviewed (Optional):34100:::1}          {CC Problem List  Visit Diagnosis   ROS  Review (Popup)  Health Maintenance  Quality  BestPractice  Medications  " SmartSets  SnapShot Encounters  Media :23}  There are no diagnoses linked to this encounter.    Assessment:    ***    Plan:    ***       {Time Spent (Optional):90862}  Follow Up {Instructions Charge Capture  Follow-up Communications :23}  No follow-ups on file.  Patient was given instructions and counseling regarding her condition or for health maintenance advice. Please see specific information pulled into the AVS if appropriate.     Divina Moralez RN

## 2024-08-26 ENCOUNTER — HOSPITAL ENCOUNTER (OUTPATIENT)
Dept: OCCUPATIONAL THERAPY | Facility: HOSPITAL | Age: 54
Setting detail: THERAPIES SERIES
Discharge: HOME | End: 2024-08-26
Payer: COMMERCIAL

## 2024-08-26 DIAGNOSIS — C50.511 MALIGNANT NEOPLASM OF LOWER-OUTER QUADRANT OF RIGHT BREAST OF FEMALE, ESTROGEN RECEPTOR POSITIVE: ICD-10-CM

## 2024-08-26 DIAGNOSIS — Z91.89 AT RISK FOR LYMPHEDEMA: Primary | ICD-10-CM

## 2024-08-26 DIAGNOSIS — Z17.0 MALIGNANT NEOPLASM OF LOWER-OUTER QUADRANT OF RIGHT BREAST OF FEMALE, ESTROGEN RECEPTOR POSITIVE: ICD-10-CM

## 2024-08-26 PROCEDURE — 77263 THER RADIOLOGY TX PLNG CPLX: CPT | Performed by: INTERNAL MEDICINE

## 2024-08-26 PROCEDURE — 93702 BIS XTRACELL FLUID ANALYSIS: CPT

## 2024-08-26 PROCEDURE — 97168 OT RE-EVAL EST PLAN CARE: CPT

## 2024-08-26 NOTE — THERAPY RE-EVALUATION
Outpatient Occupational Therapy Lymphedema Re-Evaluation  [facility]     Patient Name: Dasha De Leon  : 1970  MRN: [id number]  Today's Date: 2024      Visit Date: 2024    Patient Active Problem List   Diagnosis    IUD (intrauterine device) in place    Essential hypertension    Mood disorder    Allergic rhinitis    Hyperlipidemia    Knee pain, right    Lumbar radiculopathy    Vitamin D deficiency    Lumbar spinal stenosis    Follow-up examination following surgery    Abnormality of right breast on screening mammogram    Malignant neoplasm of lower-outer quadrant of right breast of female, estrogen receptor positive    Anxiety        Past Medical History:   Diagnosis Date    Abnormal Pap smear of cervix     Anxiety     Miller's cyst     RESOLVED    Breast cancer     Bulging lumbar disc     Bursitis     HX    Depression     GERD (gastroesophageal reflux disease)     H/O colposcopy with cervical biopsy     unknown pap result, biopsy was neg per pt, 2013 [facility]    History of 2019 novel coronavirus disease (COVID-19)     X2    History of eczema     HPV (human papilloma virus) infection     Hyperlipemia     Hypertension     Low back pain     Lumbar radiculopathy     Numbness and tingling of left leg     Osteoarthritis     Scoliosis     Seasonal allergies         Past Surgical History:   Procedure Laterality Date    BREAST AUGMENTATION      BREAST RECONSTRUCTION Right 2024    Procedure: RIGHT SUBPECTORAL PLACEMENT TISSUE EXPANDER AND CORTIVA (ACELLULAR DERMAL MATRIX), COMPLEX CLOSURE RIGHT BREAST 5cm;  Surgeon: Colin Bender MD;  Location: [facility];  Service: Plastics;  Laterality: Right;    BREAST SURGERY Right 2024    Procedure: ADJACENT TISSUE REARRANGEMENT RIGHT BREAST;  Surgeon: Colin Bender MD;  Location: [facility] OR;  Service: Plastics;  Laterality: Right;    CARPAL TUNNEL RELEASE Right     CRYOTHERAPY          EPIDURAL BLOCK      HAND  "SURGERY  2004    Pisiformectomy    LUMBAR DISCECTOMY FUSION INSTRUMENTATION N/A 01/05/2024    Procedure: Lumbar 3 to lumbar 4 and lumbar 4 to lumbar 5 laminectomy with fusion and instrumentation;  Surgeon: Rigoberto Botello MD;  Location: [facility];  Service: Robotics - Neuro;  Laterality: N/A;    MAMMO BILATERAL  2014    MASTECTOMY W/ SENTINEL NODE BIOPSY Right 7/11/2024    Procedure: Right MASTECTOMY WITH SENTINEL NODE BIOPSY;  Surgeon: Romina De Anda MD;  Location: [facility];  Service: General;  Laterality: Right;    MASTECTOMY WITH IMMEDIATE RECONSTRUCTION Right 7/26/2024    Procedure: right mastectomy, margin excision;  Surgeon: Romina De Anda MD;  Location: [facility];  Service: General;  Laterality: Right;    PAP SMEAR  20116    SHOULDER ARTHROSCOPY Left 2002,2003    SHOULDER SURGERY      \"MANIPULATION FOR FROZEN SHOULDER\"         Visit Dx:     ICD-10-CM ICD-9-CM   1. At risk for lymphedema  Z91.89 V49.89   2. Malignant neoplasm of lower-outer quadrant of right breast of female, estrogen receptor positive  C50.511 174.5    Z17.0 V86.0            Lymphedema       Row Name 08/26/24 1000             Subjective Pain    Able to rate subjective pain? yes  -RE      Pre-Treatment Pain Level 0  -RE      Post-Treatment Pain Level 0  -RE         Lymphedema Assessment    Lymphedema Classification RUE:;at risk/stage 0  -RE      Lymphedema Cancer Related Sx right;simple mastectomy;sentinel node biopsy  -RE      Lymphedema Surgery Comments Second surgery for reexcision due to positive margins on 7/26/2024.  Patient has a subpectoral tissue expander.  -RE      Lymph Nodes Removed # 3  -RE      Positive Lymph Nodes # 1  -RE      Chemo Received no  -RE      Radiation Therapy Received yes  -RE      Radiation Treatments #/Timeframe Breast and regional nodes  -RE      Infections or Cellulitis? no  -RE         Posture/Observations    Posture/Observations Comments Right shoulder is mildly internally rotated  -RE         " General ROM    GENERAL ROM COMMENTS Bilateral upper extremity active range of motion is within functional limits but right shoulder is stiff  -RE         Lymphedema Measurements    Measurement Type(s) Quick Girth  -RE      Quick Girth Areas Upper extremities  -RE         RUE Quick Girth (cm)    Axilla 34 cm  -RE      Mid upper arm 29 cm  -RE      Elbow 24.5 cm  -RE      Mid forearm 24 cm  -RE      Wrist crease 15.5 cm  -RE      Met-heads 18.8 cm  -RE      RUE Quick Girth Total 145.8  -RE         Compression/Skin Care    Compression/Skin Care Comments Measured for Medi Jeffrey compression sleeve: Size 3 extrawide and Medi Jeffrey gauntlet size 3 both are 20 to 30 mmHg  -RE         L-Dex Bioimpedence Screening    L-Dex Measurement Extremity LUE  -RE      L-Dex Patient Position Standing  -RE      L-Dex UE Dominate Side Right  -RE      L-Dex UE At Risk Side Left  -RE      L-Dex UE Pre Surgical Value No  -RE      L-Dex UE Score 1.3  -RE      L-Dex UE Baseline Score 1.7  -RE      L-Dex UE Value Change -0.4  -RE      L-Dex UE Comment WNL  -RE                User Key  (r) = Recorded By, (t) = Taken By, (c) = Cosigned By      Initials Name Provider Type    RE Rosi Landers OTR Occupational Therapist                  The patient had a postop SOZO measurement which I reviewed today. The score is WNL  see scanned to EMR. Bioimpedance spectroscopy helps identify the   onset of lymphedema in an arm or leg before patients experience noticeable swelling. Research has   shown that 92% of patients with early detection of lymphedema using L-Dex combined with   intervention do not progress to chronic lymphedema through three years. Additionally, as of March 2023, the NCCN Guidelines® for Survivorship recommend proactive screening for lymphedema using   bioimpedance spectroscopy. Whenever possible, patients are tested for baseline L-Dex score before   cancer treatment begins and then are reassessed during regular follow-up visits  using the SOZO device.   Otherwise, this can be started postoperatively and continued during regular follow-up visits. If the   patient’s L-Dex score increases above normal levels, that is a sign that lymphedema is developing and a   referral is made to occupational therapy for further evaluation and early compression treatment.   Lymphedema assessment with the SOZO L-Dex score is recommended to be done every 3 months for   the first 3 years and then every 6 months for years 4 and 5 followed by annually afterwards          Therapy Education  Education Details: Discussed lymphedema precautions and gave written information.  Recommended compression during exercise and heavy UE activity and air travel. Instructed in use and care of compression sleeve. Explained purpose of Bioimpedance testing including recommended frequency. Discussed patient's L-dex value.  Provided patient with a simple home exercise program that she can  begin after she checks with her plastic surgeon regarding any precautions.  Access Code: [email or web address]  URL: [email or web address]  Date: 08/26/2024  Prepared by: Rosi Landers    Exercises  - Shoulder Flexion Wall Slide with Towel  - 1 x daily - 7 x weekly - 3 sets - 10 reps  - Shoulder Scaption Wall Slide with Towel  - 1 x daily - 7 x weekly - 3 sets - 10 reps  - Shoulder External Rotation and Scapular Retraction  - 1 x daily - 7 x weekly - 3 sets - 10 reps  - Supine Shoulder Flexion Extension Full Range AROM  - 1 x daily - 7 x weekly - 3 sets - 10 reps  - Supine Shoulder Abduction AROM  - 1 x daily - 7 x weekly - 3 sets - 10 reps I recommended patient wear her compression sleeve daily while undergoing radiation to decrease the risk of swelling.  I recommended that she continue to wear the sleeve for approximately 2 weeks postradiation and then wean out of it for 2 weeks.  I will follow-up with her 4 weeks postradiation.  Given: Other (comment), Symptoms/condition management,  HEP  Program: New, Reinforced  How Provided: Verbal, Written, Demonstration  Provided to: Patient  Level of Understanding: Teach back education performed, Verbalized         OT Goals       Row Name 08/26/24 1600          OT Short Term Goals    STG Date to Achieve 09/26/24  -RE     STG 1 Patient will demonstrate proper awareness of “What is Lymphedema?” and “Healthy Habits” for improved prevention, management, care of symptoms and ease of transition to self-care of condition.  -RE     STG 1 Progress Met  -RE     STG 2 Pt will demonstrate understanding of use of compression sleeve for edema prevention, exercise, radiation, and air travel.  -RE     STG 2 Progress New  -RE     STG 3 Patient independent and compliant with initial home exercise program focused on diaphragmatic breathing, range of motion, flexibility to decrease edema and improve lymphatic flow for decreased edema and decreased risk of infection.  -RE     STG 3 Progress New  -RE        Long Term Goals    LTG Date to Achieve 11/26/24  -RE     LTG 1 Patient will participate in bioimpedance scans every 3 months as a method of early detection of lymphedema to allow for early intervention.  -RE     LTG 1 Progress Met;Ongoing  -RE     LTG 2 Patient's bioimpedance score to remain below 8.2 for decreased risk of stage II lymphedema.  -RE     LTG 2 Progress Met;Ongoing  -RE     LTG 3 Patient will demonstrate good understanding of post radiation skin care/massage.  -RE     LTG 3 Progress New  -RE        Time Calculation    OT Goal Re-Cert Due Date 11/26/24  -RE               User Key  (r) = Recorded By, (t) = Taken By, (c) = Cosigned By      Initials Name Provider Type    RE Rosi Landers OTR Occupational Therapist                     OT Assessment/Plan       Row Name 08/26/24 1708          OT Assessment    Impairments Impaired lymphatic circulation;Pain;Range of motion  -RE     Assessment Comments Patient is a 53y.o. female recently diagnosed with right breast  cancer, presents to therapy in evolving condition. She is status post right mastectomy with a sentinel lymph node biopsy and reconstruction with subpectoral tissue expander.  Patient is at increased risk of post mastectomy lymphedema syndrome in the right upper extremity due to due to lymph node removal, lymph node involvement, and radiation. She presents with right shoulder active range of motion which is within functional limits but slightly stiff.  Currently, she has no signs or symptoms of lymphedema or axillary cording.  We did complete a baseline post operative bioimpedance assessment, with current L-Dex score of 1.3 which is within normal limits and consistent with her preoperative baseline.  Patient will benefit from skilled formal Breast Care Occupational Therapy at this time to address listed dysfunctions and to follow for lymphedema prevention and surveillance.  -RE     Please refer to paper survey for additional self-reported information No  -RE     OT Diagnosis At risk for lymphedema  -RE     OT Rehab Potential Good  -RE     Patient/caregiver participated in establishment of treatment plan and goals Yes  -RE     Patient would benefit from skilled therapy intervention Yes  -RE        OT Plan    OT Frequency Other (comment)  -RE     Predicted Duration of Therapy Intervention (OT) Bioimpedance every 3 months after postradiation visit  -RE     Planned CPT's? OT SELF CARE/MGMT/TRAIN 15 MIN: 81237;OT BIS XTRACELL FLUID ANALYSIS: 07335;OT RE-EVAL: 72184  -RE     Planned Therapy Interventions (Optional Details) home exercise program;patient/family education;other (see comments)  Bioimpedance  -RE     OT Plan Comments Follow-up 4 weeks postradiation for bioimpedance and instruction and postradiation skin massage.  -RE               User Key  (r) = Recorded By, (t) = Taken By, (c) = Cosigned By      Initials Name Provider Type    RE Rosi Landers, OTR Occupational Therapist                              Time  Calculation:   OT Start Time: 1030  OT Stop Time: 1110  OT Time Calculation (min): 40 min  Untimed Charges  OT Eval/Re-eval Minutes: 30  69178 - OT Bioimpedence Rx Minutes: 10  Total Minutes  Untimed Charges Total Minutes: 40   Total Minutes: 40     Therapy Charges for Today       Code Description Service Date Service Provider Modifiers Qty    21521435295 HC OT RE-EVAL 2 8/26/2024 Rosi Landers OTR GO 1    15985154034 HC OT BIS XTRACELL FLUID ANALYSIS 8/26/2024 Rosi Landers OTR GO 1              Dictated utilizing Dragon dictation:  Much of this encounter note is an electronic transcription/translation of spoken language to printed text. The electronic translation of spoken language may permit erroneous, or at times, nonsensical words or phrases to be inadvertently transcribed; Although I have reviewed the note for such errors, some may still exist.        DORA Pisano  8/26/2024

## 2024-08-27 ENCOUNTER — CONSULT (OUTPATIENT)
Dept: RADIATION ONCOLOGY | Facility: HOSPITAL | Age: 54
End: 2024-08-27
Payer: COMMERCIAL

## 2024-08-27 VITALS
BODY MASS INDEX: 28.26 KG/M2 | DIASTOLIC BLOOD PRESSURE: 86 MMHG | WEIGHT: 169.8 LBS | SYSTOLIC BLOOD PRESSURE: 136 MMHG | RESPIRATION RATE: 18 BRPM | OXYGEN SATURATION: 99 % | HEART RATE: 108 BPM

## 2024-08-27 DIAGNOSIS — Z17.0 MALIGNANT NEOPLASM OF LOWER-OUTER QUADRANT OF RIGHT BREAST OF FEMALE, ESTROGEN RECEPTOR POSITIVE: Primary | ICD-10-CM

## 2024-08-27 DIAGNOSIS — C50.511 MALIGNANT NEOPLASM OF LOWER-OUTER QUADRANT OF RIGHT BREAST OF FEMALE, ESTROGEN RECEPTOR POSITIVE: Primary | ICD-10-CM

## 2024-08-27 PROCEDURE — G0463 HOSPITAL OUTPT CLINIC VISIT: HCPCS | Performed by: INTERNAL MEDICINE

## 2024-08-28 ENCOUNTER — SPECIALTY PHARMACY (OUTPATIENT)
Dept: ONCOLOGY | Facility: HOSPITAL | Age: 54
End: 2024-08-28
Payer: COMMERCIAL

## 2024-08-28 ENCOUNTER — TELEMEDICINE (OUTPATIENT)
Dept: ONCOLOGY | Facility: CLINIC | Age: 54
End: 2024-08-28
Payer: COMMERCIAL

## 2024-08-28 ENCOUNTER — SPECIALTY PHARMACY (OUTPATIENT)
Dept: PHARMACY | Facility: HOSPITAL | Age: 54
End: 2024-08-28
Payer: COMMERCIAL

## 2024-08-28 DIAGNOSIS — C50.511 MALIGNANT NEOPLASM OF LOWER-OUTER QUADRANT OF RIGHT BREAST OF FEMALE, ESTROGEN RECEPTOR POSITIVE: Primary | ICD-10-CM

## 2024-08-28 DIAGNOSIS — Z17.0 MALIGNANT NEOPLASM OF LOWER-OUTER QUADRANT OF RIGHT BREAST OF FEMALE, ESTROGEN RECEPTOR POSITIVE: Primary | ICD-10-CM

## 2024-08-28 DIAGNOSIS — R59.9 ENLARGED LYMPH NODE: ICD-10-CM

## 2024-08-28 RX ORDER — ONDANSETRON 8 MG/1
8 TABLET, FILM COATED ORAL 3 TIMES DAILY PRN
Qty: 30 TABLET | Refills: 5 | Status: SHIPPED | OUTPATIENT
Start: 2024-08-28

## 2024-08-28 RX ORDER — ANASTROZOLE 1 MG/1
1 TABLET ORAL DAILY
Qty: 30 TABLET | Refills: 5 | Status: SHIPPED | OUTPATIENT
Start: 2024-08-28

## 2024-08-28 NOTE — PROGRESS NOTES
TREATMENT  PREPARATION    Dasha De Leon  [id number]  1970    Chief Complaint: Treatment preparation and needs assessment  Mode of Visit: Video  Visit Platform: Video Options: [employer]  Location of patient: home  Location of provider: [facility] clinic  You have chosen to receive care through a telehealth visit.  Does the patient consent to use a video/audio connection for your medical care today? Yes  The visit included audio and video interaction. No technical issues occurred during this visit.     History of present illness:  Dasha De Leon is a 53 y.o. year old female who is here today for treatment preparation and needs assessment.  The patient has been diagnosed with   Encounter Diagnosis   Name Primary?    Malignant neoplasm of lower-outer quadrant of right breast of female, estrogen receptor positive Yes    and is scheduled to begin treatment with:     Oncology History:    Oncology/Hematology History   Malignant neoplasm of lower-outer quadrant of right breast of female, estrogen receptor positive   5/15/2024 Cancer Staged    Staging form: Breast, AJCC 8th Edition  - Clinical stage from 5/15/2024: Stage IA (cT1c, cN0(f), cM0, G2, ER+, CT+, HER2-) - Signed by Romina De Anda MD on 5/22/2024 5/22/2024 Initial Diagnosis    Malignant neoplasm of lower-outer quadrant of right breast of female, estrogen receptor positive     7/11/2024 Cancer Staged    Staging form: Breast, AJCC 8th Edition  - Pathologic stage from 7/11/2024: Stage IIA (pT2, pN1a(sn), cM0, G3, ER+, CT+, HER2-) - Signed by Germain Puga MD on 8/26/2024     9/3/2024 -  Chemotherapy    OP BREAST Abemaciclib / Anastrozole         The current medication list and allergy list were reviewed and reconciled.     Past Medical History, Past Surgical History, Social History, Family History have been reviewed and are without significant changes except as mentioned.    Physical Exam:    There were no vitals filed for this visit.  Vitals:     08/28/24 0759   PainSc: 0-No pain        ECOG score: 0             Physical Exam  HENT:      Head: Normocephalic and atraumatic.   Eyes:      Extraocular Movements: Extraocular movements intact.      Conjunctiva/sclera: Conjunctivae normal.   Pulmonary:      Effort: Pulmonary effort is normal. No respiratory distress.   Neurological:      General: No focal deficit present.      Mental Status: She is alert and oriented to person, place, and time.   Psychiatric:         Mood and Affect: Mood normal.         Behavior: Behavior normal.           NEEDS ASSESSMENTS    Genetics  The patient's new diagnosis and family history have been reviewed for genetic counseling needs. The patient will not be referred..     Psychosocial and Barriers to care  The patient has completed a PHQ-9 Depression Screening and the Distress Thermometer (DT) today.  PHQ-9 results show PHQ-2 Total Score: 0 PHQ-9 Total Score: PHQ-9 Total Score: 0     The patient scored their distress today as   on a scale of 0-10 with 0 being no distress and 10 being extreme distress. Problems marked by the patient as being an issue for them within the last week include   .      Results were reviewed along with psychosocial resources offered by our cancer center.  Our Supportive Oncology team will be flagged for a score of 4 or above, and a same day call will be made for a score of 9 or 10.  A mental health referral is offered at that time. Patients who score less than 4 have been educated on our support services and can be referred to our  upon request.  The patient will not be referred to our .       Nutrition  The patient has completed the malnutrition screening today. They scored Malnutrition Screening Tool  Have you recently lost weight without trying?  If yes, how much weight have you lost?: 0--> No  Have you been eating poorly because of a decreased appetite?: 0--> No  MST score: 0   with a score of 0-1 meaning not at risk in a score  of 2 or greater meaning at risk.  Patients with a score of 3 or higher will be referred to our oncology dietitian for support. Patients beginning at risk treatment regimens or who have dietary concerns will also be referred to our oncology dietitian. The patient will not be referred.    Functional Assessment  Persons who are age 70 or greater will be screened for qualification of a comprehensive geriatric assessment by our survivorship nurse practitioner.  Older adults with cancer face unique challenges. These may include an increased risk of drug reactions, financial burdens, and caregiver stress. The patient scored   . Patients scoring 14 or lower will referred for an older adult functional assessment with the survivorship advanced practice registered nurse to ensure all needed support is provided as patients plan for their treatments. NOT APPLICABLE    Intravenous Access Assessment  The patient and I discussed planned intravenous chemo/biotherapy as well as other IV treatments that are often needed throughout the course of treatment. These may include, but are not limited to blood transfusions, antibiotics, and IV hydration. Discussed that depending on selected treatment and vein assessment, patient may require venous access device (VAD) which could include but not limited to a Mediport or PICC line. Risks and benefits of VADs reviewed. The patient will be treated via Oral Treatment.    Reproductive/Sexual Activity   People should avoid becoming pregnant and should not get a partner pregnant while undergoing chemo/biotherapy.  People of childbearing age should use effective contraception during active therapy. The best recommendation for all people is to use a barrier method for a minimum of 1 week after the last infusion of chemo/biotherapy to prevent your partner being exposed to byproducts from treatment medications in bodily fluids. Effective contraception should be discussed with your oncology team to make  "sure it is safe to take based on your diagnosis. Possible options include oral contraceptives, barrier methods. Chemo/biotherapy can change your ability to reproduce children in the future.  There are options for fertility preservation. NOT APPLICABLE    Advanced Care Planning  Advance Care Planning   The patient and I discussed advanced care planning, \"Conversations that Matter\".   This service is offered for development of advance directives with a certified ACP facilitator.  The patient does not have an up-to-date advanced directive. This document is not on file with our office. The patient is not interested in an appointment with one of our facilitators to create or update their advanced directives.    Have you reviewed your Advance Directive and is it valid for this stay?: Not applicable  Patient Requests Assistance on Advance Directives: Patient Declined          Smoking cessation  Tobacco Use: Medium Risk (8/21/2024)    Patient History     Smoking Tobacco Use: Former     Smokeless Tobacco Use: Never     Passive Exposure: Past       Patient and I discussed their tobacco use history. Referral will not be made for smoking cessation.      Palliative Care  When appropriate, the patient and I discussed the availability palliative care services and when appropriate Hospice care. Palliative care is not the same as Hospice care which was explained to the patient.NOT APPLICABLE.    Survivorship   When appropriate, we discussed that we will refer the patient to survivorship clinic to discuss next steps following completion of planned treatment.  Reviewed this visit will include assessment of your physical, psychological, functional, and spiritual needs as a survivor and the need at attend this visit when scheduled.    TREATMENT EDUCATION    Today [facility] met with the patient to discuss the chemo/biotherapy regimen recommended for treatment of Malignant neoplasm of lower-outer quadrant of right breast of female, " estrogen receptor positive  .  The patient was given explanation of treatment premed side effects including office policy that prohibits patients to drive if sedating medications are administered, MD explanation given regarding benefits, side effects, toxicities and goals of treatment.      Discussion also included side effects specific to drugs in the treatment plan, specifically:    Treatment Plans       Name Type Plan Dates Plan Provider         Active    OP BREAST Abemaciclib / Anastrozole ONCOLOGY TREATMENT  8/28/2024 - Present Talia Huang MD                      Assessment and Plan:    Diagnoses and all orders for this visit:    1. Malignant neoplasm of lower-outer quadrant of right breast of female, estrogen receptor positive (Primary)      No orders of the defined types were placed in this encounter.        The patient and I have reviewed their diagnosis and scheduled treatment plan. Needs assessment was completed where applicable including genetics, psychosocial needs, barriers to care, VAD evaluation, advanced care planning, survivorship, and palliative care services where indicated. Referrals have been ordered as appropriate based upon evaluation today and patient desires.   Chemo/biotherapy teaching was completed today and consent obtained by [facility]. See separate documentation for further details.  Adequate time was given to answer questions.  Patient made aware of their care team members and contact information if they have questions or problems throughout the treatment course.  Discussion held and written information provided describing frequency of office visits and ongoing monitoring throughout the treatment plan.     Reviewed with patient any prescribed medication sent to pharmacy.  Education provided regarding proper storage, safe handling, and proper disposal of unused medication.  Proper handling of body fluids and waste discussed and written information provided.  If appropriate,  patient had pretreatment labs drawn today.  Appointments discussed with clinic RN as radiation oncology is presenting the case on Friday and has not yet set initial radiation appointment.  Patient aware we may push out follow-up with Dr. Huang based upon radiation course as she will not start Verzenio until radiation is completed.    Learning assessment completed at initial patient encounter. See separate flowsheet. Chemo/biotherapy education comprehension assessed at today's visit.    I spent 12 minutes caring for Dasha on this date of service. This time includes time spent by me in the following activities: preparing for the visit, reviewing tests, obtaining and/or reviewing a separately obtained history, performing a medically appropriate examination and/or evaluation, counseling and educating the patient/family/caregiver, and documenting information in the medical record.     Radiation start date not set, discussed with clinic RN.    Yolanda Arciniega, APRN   08/30/24

## 2024-08-28 NOTE — PROGRESS NOTES
Specialty Pharmacy Patient Management Program  Per Protocol Prescription Order or Refill       Requested Prescriptions     Signed Prescriptions Disp Refills    Abemaciclib 100 MG tablet 28 tablet 0     Sig: Take 1 tablet by mouth Daily for 7 days, THEN 1 tablet 2 (Two) Times a Day for 7 days. Then increase to 150 mg by mouth twice daily thereafter.    anastrozole (ARIMIDEX) 1 MG tablet 30 tablet 5     Sig: Take 1 tablet by mouth Daily. Take 1 tablet by mouth daily    ondansetron (ZOFRAN) 8 MG tablet 30 tablet 5     Sig: Take 1 tablet by mouth 3 (Three) Times a Day As Needed for Nausea or Vomiting.     Prescription orders above were sent to [facility] per Collaborative Care Agreement Protocol.     Completed independent double check on medication order/RX.    Antonio Cherry, [place], BCOP  Clinical Specialty Pharmacist, Oncology  8/28/2024  10:03 EDT

## 2024-08-28 NOTE — PROGRESS NOTES
Specialty Pharmacy Patient Management Program  Oncology Initial Assessment     Dasha De Leon was referred by their provider to the Oncology Patient Management program offered by [facility] for Breast Cancer on 08/28/24.  An initial outreach was conducted, including assessment of therapy appropriateness and specialty medication education for Verzenio (abemaciclib) + Arimidex (anastrozole). The patient was introduced to services offered by [facility], including: regular assessments, refill coordination, curbside pick-up or mail order delivery options, prior authorization maintenance, and financial assistance programs as applicable. The patient was also provided with contact information for the pharmacy team.     Goal of chemotherapy: adjuvant    Treatment Medication(s) / Frequency and Dosing    Verzenio 100 mg po daily x 7 days; then 100 mg po bid x 7 days; then 150 mg po bid thereafter  Anastrozole 1 mg po daily    Number of cycles: until disease progression or unacceptable toxicity    Start date of oral specialty medication:  start Verzenio after radiation completed    Follow-up Testing to be determined after TBD cycles by MD.     Items for home use: Imodium AD (for diarrhea) and Acetaminophen or Tylenol (for fever and/or pain)    Rx written for: [] Nausea    [] Pre-Chemo   ondansetron 8 mg by mouth every 8 hours as needed for nausea    Completing Pharmacist: Leatha Abbott RPH             Date/time: 08/28/2024 09:12 EDT     Insurance Coverage & Financial Support  $0 copay      Relevant Past Medical History and Comorbidities  Relevant medical history and concomitant health conditions were discussed with the patient. The patient's chart has been reviewed for relevant past medical history and comorbid conditions and updated as necessary.  Past Medical History:   Diagnosis Date    Abnormal Pap smear of cervix     Anxiety     Miller's cyst     RESOLVED    Breast cancer      Bulging lumbar disc     Bursitis     HX    Depression     GERD (gastroesophageal reflux disease)     H/O colposcopy with cervical biopsy     unknown pap result, biopsy was neg per pt, 2013 [facility]    History of 2019 novel coronavirus disease (COVID-19)     X2    History of eczema     HPV (human papilloma virus) infection     Hyperlipemia     Hypertension     Low back pain     Lumbar radiculopathy     Numbness and tingling of left leg     Osteoarthritis     Scoliosis     Seasonal allergies      Social History     Socioeconomic History    Marital status: Single   Tobacco Use    Smoking status: Former     Current packs/day: 0.00     Average packs/day: 1 pack/day for 20.0 years (20.0 ttl pk-yrs)     Types: Cigarettes     Start date:      Quit date:      Years since quittin.6     Passive exposure: Past    Smokeless tobacco: Never   Vaping Use    Vaping status: Never Used   Substance and Sexual Activity    Alcohol use: Yes     Alcohol/week: 2.0 - 4.0 standard drinks of alcohol     Types: 2 - 4 Glasses of wine per week     Comment: weekly    Drug use: No    Sexual activity: Not Currently     Birth control/protection: I.U.D.       Problem list reviewed by Leatha Abbott RPH on 2024 at  9:10 AM    Allergies  Known allergies and reactions were discussed with the patient. The patient's chart has been reviewed for  allergy information and updated as necessary.   Allergies   Allergen Reactions    Penicillins Anaphylaxis     ..Beta lactam allergy details  Antibiotic reaction: (!) shortness of breath, hives, rash  Age at reaction: adult (20'S)  Dose to reaction time: (!) hours  Reason for antibiotic: other (TOOTH INFECTION)  Epinephrine required for reaction?: (!) yes  Tolerated antibiotics: cephalexin       Hemp Seed Oil Itching     HEMP IN LOTIONS    Lisinopril Cough       Allergies reviewed by Leatha Abbott RPH on 2024 at  9:10 AM    Relevant Laboratory Values  Relevant laboratory values were  discussed with the patient. The following specialty medication dose adjustment(s) are recommended: none  Lab Results   Component Value Date    GLUCOSE 100 (H) 07/01/2024    CALCIUM 9.4 07/01/2024     07/01/2024    K 4.0 07/01/2024    CO2 22.8 07/01/2024     07/01/2024    BUN 19 07/01/2024    CREATININE 0.75 07/01/2024    EGFRIFAFRI 106 09/09/2021    EGFRIFNONA 87 09/09/2021    BCR 25.3 (H) 07/01/2024    ANIONGAP 11.2 07/01/2024     Lab Results   Component Value Date    WBC 7.37 08/06/2024    RBC 4.64 08/06/2024    HGB 12.9 08/06/2024    HCT 40.6 08/06/2024    MCV 87.5 08/06/2024    MCH 27.8 08/06/2024    MCHC 31.8 08/06/2024    RDW 13.8 08/06/2024    RDWSD 43.7 08/06/2024    MPV 9.7 08/06/2024     08/06/2024    NEUTRORELPCT 52.8 08/06/2024    LYMPHORELPCT 25.6 08/06/2024    MONORELPCT 14.1 (H) 08/06/2024    EOSRELPCT 5.8 08/06/2024    BASORELPCT 0.8 08/06/2024    AUTOIGPER 0.9 (H) 08/06/2024    NEUTROABS 3.88 08/06/2024    LYMPHSABS 1.89 08/06/2024    MONOSABS 1.04 (H) 08/06/2024    EOSABS 0.43 (H) 08/06/2024    BASOSABS 0.06 08/06/2024    AUTOIGNUM 0.07 (H) 08/06/2024    NRBC 0.0 08/06/2024       Current Medication List  This medication list has been reviewed with the patient and evaluated for any interactions or necessary modifications/recommendations, and updated to include all prescription medications, OTC medications, and supplements the patient is currently taking.  This list reflects what is contained in the patient's profile, which has also been marked as reviewed to communicate to other providers it is the most up to date version of the patient's current medication therapy.     Current Outpatient Medications:     [START ON 9/2/2024] Abemaciclib 100 MG tablet, Take 1 tablet by mouth Daily for 7 days, THEN 1 tablet 2 (Two) Times a Day for 7 days. Then increase to 150 mg by mouth twice daily thereafter., Disp: 28 tablet, Rfl: 0    ALPRAZolam (Xanax) 0.25 MG tablet, Take 1 tablet by mouth 2  (Two) Times a Day As Needed for Anxiety., Disp: 30 tablet, Rfl: 0    anastrozole (ARIMIDEX) 1 MG tablet, Take 1 tablet by mouth Daily. Take 1 tablet by mouth daily, Disp: 30 tablet, Rfl: 5    buPROPion XL (Wellbutrin XL) 300 MG 24 hr tablet, Take 1 tablet by mouth Daily., Disp: 90 tablet, Rfl: 1    cyclobenzaprine (FLEXERIL) 10 MG tablet, Take 1 tablet by mouth 3 (Three) Times a Day As Needed for Muscle Spasms., Disp: 90 tablet, Rfl: 3    escitalopram (LEXAPRO) 20 MG tablet, Take 1 tablet by mouth Daily., Disp: 90 tablet, Rfl: 3    famotidine (PEPCID) 20 MG tablet, Take 1 tablet by mouth 2 (Two) Times a Day., Disp: 180 tablet, Rfl: 3    furosemide (Lasix) 20 MG tablet, Take 1 tablet by mouth 2 (Two) Times a Day., Disp: 90 tablet, Rfl: 1    gabapentin (Neurontin) 100 MG capsule, Take 1 capsule by mouth Every 8 (Eight) Hours., Disp: 60 capsule, Rfl: 1    HYDROcodone-acetaminophen (NORCO) 5-325 MG per tablet, Take 1 tablet by mouth Every 4 (Four) Hours As Needed (Pain)., Disp: 20 tablet, Rfl: 0    levocetirizine (XYZAL) 5 MG tablet, Take one tablet by mouth twice daily for urticaria., Disp: 180 tablet, Rfl: 3    olmesartan (BENICAR) 40 MG tablet, Take 0.5 tablets by mouth Daily., Disp: 45 tablet, Rfl: 1    ondansetron (ZOFRAN) 8 MG tablet, Take 1 tablet by mouth 3 (Three) Times a Day As Needed for Nausea or Vomiting., Disp: 30 tablet, Rfl: 5  No current facility-administered medications for this visit.    Medicines reviewed by Leatha Abbott RPH on 8/28/2024 at  9:10 AM    Drug Interactions  Reviewed concomitant medications, allergies, labs, comorbidities/medical history, quality of life, and immunization history.   Drug-drug interactions noted and discussed during education: no significant drug interactions noted. . Reminded the patient to let us know before making any changes or starting any new prescription or OTC medications so we can first assess drug interactions.  Drug-food interactions noted and discussed  during education: Patient was instructed to avoid eating grapefruit and drinking grapefruit juice    Initial Education Provided for Specialty Medication  The patient has been provided with the following education and any applicable administration techniques (i.e. self-injection) have been demonstrated for the therapies indicated. All questions and concerns have been addressed prior to the patient receiving the medication, and the patient has verbalized comprehension of the education and any materials provided. Additional patient education shall be provided and documented upon request by the patient, provider, or payer.    Provided patient with:   Education sheets about the medication, 24-hour clinic phone number and my contact information and instructions to call should additional questions arise.     Medication Education Sheets Provided:   Oral Specialty Medication: Arimidex (anastrozole) and Verzenio (abemaciclib)    TOPICS COMMENTS   Storage and Handling of Oral Specialty Medication Store in the original container, in a dry location out of direct sunlight, and out of reach of children or pets. and Store at room temperature.  Discussed safe handling and what to do with any unused medication.   Administration of Oral Specialty Medication Take with or without food at the same time(s) each day. and Do not crush or chew tablets.   Adherence to Oral Specialty Regimen and Handling Missed Doses Patient is likely to have good treatment adherence; reinforced the importance of adherence. Reviewed how to address missed doses and to let us know of any missed doses.   Anemia: role of RBC, cause, s/s, ways to manage, role of transfusion Reviewed the role of RBC and the use of transfusions if hemoglobin decreases too much.  Patient to notify us if they experience shortness of breath, dizziness, or palpitations.  Also let patient know they could feel more tired than usual and to try to stay active, but rest if they need to.     Thrombocytopenia: role of platelet, cause, s/s, ways to prevent bleeding, things to avoid, when to seek help Reviewed the role of platelets in blood clotting and when to call clinic (bloody nose that bleeds for 5 mins despite pressure, a cut that won't stop bleeding despite pressure, gums that bleed excessively with brushing or flossing). Recommended using an electric razor, soft bristle toothbrush, and blowing your nose gently.    Neutropenia: role of WBC, cause, infection precautions, s/s of infection, when to call MD Reviewed the role of WBC, good infection prevention practices, and when to call the clinic (temperature 100.4F, sore throat, burning urination, etc)   Nutrition and Appetite Changes:  importance of maintaining healthy diet & weight, ways to manage to improve intake, dietary consult, exercise regimen, electrolyte and/or blood glucose abnormalities Decreased Appetite: Discussed the risk of decreased appetite. Recommended eating smaller, more frequent meals. Instructed the patient to contact the clinic if losing weight or having difficulty eating enough to maintain energy level., Lipid Panel Abnormalities:  Explained that the oncology therapy may lead to abnormalities in lipid values, specifically: cholesterol   Diarrhea: causes, s/s of dehydration, ways to manage, dietary changes, when to call MD Chemotherapy : Discussed the risk of diarrhea. Instructed patient on use OTC loperamide with diarrhea onset, but emphasized the importance of calling the clinic if 4-6 episodes in 24 hours not relieved by OTC loperamide.   Constipation: causes, ways to manage, dietary changes, when to call MD Provided supplementary handout with instructions for use of docusate and other OTC therapies to manage constipation.  Instructed to call us if medications aren't working.   Nausea/Vomiting: cause, use of antiemetics, dietary changes, when to call MD Emetic risk: Low-Minimal  PRN home meds: Ondansetron  Pharmacy home  meds sent to: [facility]    Instructed the patient to take a dose of the PRN medication at the first onset of nausea and if it's not working to call us for additional medications.  Also provided non-drug measures to mitigate nausea.           Nervous System Changes: causes, s/s, neuropathies, cognitive changes, ways to manage Hot Flashes - Discussed the possibility of hot flashes as well as mitigation strategies   Pain: causes, ways to manage Chemo: Discussed muscle and joint aches/pains with chemotherapy, and recommended the use of OTC pain relief with ibuprofen or acetaminophen if needed.   Skin/Nail Changes: cause, s/s, ways to manage Skin rash may occur. Please contact the office       Organ Toxicities: cause, s/s, need for diagnostic tests, labs, when to notify MD Discussed potential effects on organ systems, monitoring, diagnostic tests, labs, and when to notify their MD. Discussed the signs/symptoms of the following: cardiotoxicity, hepatotoxicity, lung changes, and nephrotoxicity       Miscellaneous Financial Issues: none  Lab Draws:  per MD discretion   Infertility and Sexuality:  causes, fertility preservation options, sexuality changes, ways to manage, importance of birth control The patient is not of childbearing potential.   Home Care: how to manage bodily fluids Counseled on management of soiled linens and proper flush technique.  Discussed how to manage all the side effects at home and advised when to contact the MD office           Adherence and Self-Administration  Adherence related to the patient's specialty therapy was discussed with the patient. The Adherence segment of this outreach has been reviewed and updated.     Is there a concern with patient's ability to self administer the medication correctly and without issue?: No  Were any potential barriers to adherence identified during the initial assessment or patient education?: No  Are there any concerns regarding the patient's understanding of  the importance of medication adherence?: No  Methods for Supporting Patient Adherence and/or Self-Administration:  not needed  Expected duration of therapy:  2 years adjuvant treatment with Verzenio    Open Medication Therapy Problems  No medication therapy recommendations to display    Goals of Therapy  Goals related to the patient's specialty therapy were discussed with the patient. The Patient Goals segment of this outreach has been reviewed and updated.   Goals Addressed Today        Specialty Pharmacy General Goal      Complete 2 years of adjuvant treatment             Wrap up  Discussed aforementioned material with patient via telemedicine.   Chemo consents/CCA will need to be signed at next visit to office on 9/25/24.   Medication availability:  Arminda W to coordinate  Patient expressed understanding.   Patient demonstrates ability to self-administer medication. No barriers to adherence identified.  All questions and concerns addressed.     Reassessment Plan & Follow-Up  1. Medication Therapy Changes: none  2. Related Plans, Therapy Recommendations, or Therapy Problems to Be Addressed: no  3. Pharmacist to perform regular assessments no more than (6) months from the previous assessment.  4. Care Coordinator to set up future refill outreaches, coordinate prescription delivery, and escalate clinical questions to pharmacist.  5. Welcome information and patient satisfaction survey to be sent by specialty pharmacy team with patient's initial fill.    Attestation  Therapeutic appropriateness: Appropriate   I attest the patient was actively involved in and has agreed to the above plan of care. If the prescribed therapy is at any point deemed not appropriate based on the current or future assessments, a consultation will be initiated with the patient's specialty care provider to determine the best course of action. The revised plan of therapy will be documented along with any required assessments and/or additional  patient education provided.     Leatha Abbott PharmD, [facility]  Clinical Specialty Pharmacist, Oncology  8/28/2024  09:12 EDT   Oral Chemotherapy - New Referral    Received a referral from Dr. Huang    Treatment Plan: Arimidex (anastrozole) and Verzenio (abemaciclib)  Start date of treatment planned for:  once radiation completed  Indication: breast cancer, adjuvant  Relevant past treatments: mastectomy, XRT  Is the therapy appropriate based on treatment guidelines and FDA labeling?: yes  Therapeutic Goals:  complete 2 years adjuvant verzenio  Patient can self-administer oral medications: Yes    Drug-Drug Interactions: The current medication list was reviewed and there are no relevant drug-drug interactions with the specialty medication.  Medication Allergies: The patient has no relevant allergies as it relates to their oral specialty medication  Review of Labs/Dose Adjustments: The patient's most recent labs were reviewed and all are WNL to start treatment at this dose.     A prescription was released to [facility] specialty pharmacy for   Drug: Verzenio (abemaciclib)  Strength:   100 mg  Directions: Take 1 tablet by mouth Daily for 7 days, THEN 1 tablet 2 (Two) Times a Day for 7 days. Then increase to 150 mg by mouth twice daily thereafter.  Quantity: 28  Refills: 0    Pharmacy education  completed today    Name/Credentials: Leatha Abbott PharmD, BCPS    8/28/2024  09:18 EDT

## 2024-08-28 NOTE — PROGRESS NOTES
Staff message rec from Jessie VELAZCO, Clinical RN-Start date of Verzenio clarified. This will start after radiation is complete. Pt will start her Anastrozole now.    I will contact pt closer to the Verzenio start date to coordinate the delivery. Pt will return to see Dr Huang on 9/25/2024.    Jessie Yousif RN sent to Leatha Abbott [facility]; Arminda Baumann, Pharmacy Technician  Reviewed with Dr Huang, she does want her to go ahead and start the anastrozole.  Verzenio will start when radiation is complete.    Thank you!          Previous Messages       ----- Message -----  From: Leatha Abbott [facility]  Sent: 8/28/2024   9:08 AM EDT  To: Jessie Yousif RN; *  Subject: RE: verzenio                                    Dr. Huang,    Would you like her to go ahead and start on anastrozole? Then the Verzenio will start after radiation is complete, correct?    Thanks,  Leatha Baumann, Pharmacy Technician  Specialty Pharmacy Technician

## 2024-08-29 ENCOUNTER — HOSPITAL ENCOUNTER (OUTPATIENT)
Dept: ULTRASOUND IMAGING | Facility: HOSPITAL | Age: 54
Discharge: HOME | End: 2024-08-29
Payer: COMMERCIAL

## 2024-08-29 ENCOUNTER — HOSPITAL ENCOUNTER (OUTPATIENT)
Facility: HOSPITAL | Age: 54
Discharge: HOME | End: 2024-08-29
Admitting: INTERNAL MEDICINE
Payer: COMMERCIAL

## 2024-08-29 DIAGNOSIS — Z17.0 MALIGNANT NEOPLASM OF LOWER-OUTER QUADRANT OF RIGHT BREAST OF FEMALE, ESTROGEN RECEPTOR POSITIVE: ICD-10-CM

## 2024-08-29 DIAGNOSIS — R59.9 ENLARGED LYMPH NODE: ICD-10-CM

## 2024-08-29 DIAGNOSIS — C50.511 MALIGNANT NEOPLASM OF LOWER-OUTER QUADRANT OF RIGHT BREAST OF FEMALE, ESTROGEN RECEPTOR POSITIVE: ICD-10-CM

## 2024-08-29 DIAGNOSIS — Z79.811 USE OF AROMATASE INHIBITORS: ICD-10-CM

## 2024-08-29 PROCEDURE — 25010000002 LIDOCAINE 1 % SOLUTION: Performed by: INTERNAL MEDICINE

## 2024-08-29 PROCEDURE — 76882 US LMTD JT/FCL EVL NVASC XTR: CPT

## 2024-08-29 PROCEDURE — 77080 DXA BONE DENSITY AXIAL: CPT

## 2024-08-29 PROCEDURE — 76942 ECHO GUIDE FOR BIOPSY: CPT

## 2024-08-29 RX ORDER — LIDOCAINE HYDROCHLORIDE 10 MG/ML
10 INJECTION, SOLUTION INFILTRATION; PERINEURAL ONCE
Status: COMPLETED | OUTPATIENT
Start: 2024-08-29 | End: 2024-08-29

## 2024-08-29 RX ORDER — LIDOCAINE HYDROCHLORIDE AND EPINEPHRINE BITARTRATE 20; .01 MG/ML; MG/ML
10 INJECTION, SOLUTION SUBCUTANEOUS ONCE
Status: COMPLETED | OUTPATIENT
Start: 2024-08-29 | End: 2024-08-29

## 2024-08-29 RX ADMIN — LIDOCAINE HYDROCHLORIDE AND EPINEPHRINE 8 ML: 20; 10 INJECTION, SOLUTION INFILTRATION; PERINEURAL at 15:24

## 2024-08-29 RX ADMIN — LIDOCAINE HYDROCHLORIDE 5 ML: 10 INJECTION, SOLUTION INFILTRATION; PERINEURAL at 15:24

## 2024-08-30 ENCOUNTER — TELEPHONE (OUTPATIENT)
Dept: MAMMOGRAPHY | Facility: HOSPITAL | Age: 54
End: 2024-08-30
Payer: COMMERCIAL

## 2024-09-04 LAB
LAB AP CASE REPORT: NORMAL
LAB AP DIAGNOSIS COMMENT: NORMAL
PATH REPORT.FINAL DX SPEC: NORMAL
PATH REPORT.GROSS SPEC: NORMAL

## 2024-09-05 ENCOUNTER — OFFICE VISIT (OUTPATIENT)
Dept: RADIATION ONCOLOGY | Facility: HOSPITAL | Age: 54
End: 2024-09-05
Payer: COMMERCIAL

## 2024-09-05 VITALS
OXYGEN SATURATION: 99 % | DIASTOLIC BLOOD PRESSURE: 83 MMHG | WEIGHT: 174 LBS | HEART RATE: 111 BPM | RESPIRATION RATE: 18 BRPM | SYSTOLIC BLOOD PRESSURE: 144 MMHG | BODY MASS INDEX: 28.96 KG/M2

## 2024-09-05 DIAGNOSIS — Z17.0 MALIGNANT NEOPLASM OF LOWER-OUTER QUADRANT OF RIGHT BREAST OF FEMALE, ESTROGEN RECEPTOR POSITIVE: Primary | ICD-10-CM

## 2024-09-05 DIAGNOSIS — C50.511 MALIGNANT NEOPLASM OF LOWER-OUTER QUADRANT OF RIGHT BREAST OF FEMALE, ESTROGEN RECEPTOR POSITIVE: Primary | ICD-10-CM

## 2024-09-05 NOTE — PROGRESS NOTES
[facility] RADIATION ONCOLOGY  FOLLOW-UP NOTE    NAME: Dasha De Leon  YOB: 1970  MRN #: [id number]  DATE OF SERVICE: 9/5/2024  REFERRING PROVIDER: No ref. provider found   PRIMARY CARE PROVIDER: Ariana Coffman PA-C    CHIEF COMPLAINT:  Right axillary lymph node, review biopsy results    DIAGNOSIS:    Encounter Diagnosis   Name Primary?    Malignant neoplasm of lower-outer quadrant of right breast of female, estrogen receptor positive Yes          INTERVAL HISTORY     Dasha De Leon is a 53 y.o. female who presents with history of stage IIA (pT2, pN1a(sn), cM0, G3, ER+, CT+, HER2-) right breast invasive ductal carcinoma. She is sp mastectomy and SLNB on 7/11/2024. She underwent re-resection for a positive margin on 7/26/2024. She now returns to discuss biopsy results from her recent right axilla LN biopsy.    Since our last appointment the patient underwent an ultrasound-guided lymph node biopsy.  Pathology showed metastatic ductal carcinoma with no lymphoid tissue identified.    The patient's case was also discussed at  last Friday's tumor board.    She returns to discuss the results of her biopsy and the tumor board treatment recommendations.        IMAGING     US Guided Lymph Node Biopsy    Addendum Date: 9/5/2024    The patient's final pathology results are now available from the ultrasound-guided right axillary lymph node core biopsy procedure. There are findings consistent with metastatic ductal carcinoma. No lymphoid tissue was visible in the specimen. I would recommend appropriate oncologic and surgical management of this patient. The abnormal results were relayed to the referring clinician's office by the [facility] Breast Navigator.  Electronically Signed By-Costa Robles MD On:9/5/2024 8:31 AM      Result Date: 9/5/2024  IMPRESSION : 1. The right axillary ultrasound confirms the presence of an abnormal lymph node measuring 1.6 x 1.2  x 1.1 cm. This corresponds well to the finding seen on the CT of the chest. 2. Technically successful ultrasound-guided percutaneous right axillary lymph node core biopsy procedure as described above.  Electronically Signed By-Costa Robles MD On:8/29/2024 4:11 PM      US Axilla Right    Addendum Date: 9/5/2024    The patient's final pathology results are now available from the ultrasound-guided right axillary lymph node core biopsy procedure. There are findings consistent with metastatic ductal carcinoma. No lymphoid tissue was visible in the specimen. I would recommend appropriate oncologic and surgical management of this patient. The abnormal results were relayed to the referring clinician's office by the [facility] Breast Navigator.  Electronically Signed By-Costa Robles MD On:9/5/2024 8:31 AM      Result Date: 9/5/2024  IMPRESSION : 1. The right axillary ultrasound confirms the presence of an abnormal lymph node measuring 1.6 x 1.2 x 1.1 cm. This corresponds well to the finding seen on the CT of the chest. 2. Technically successful ultrasound-guided percutaneous right axillary lymph node core biopsy procedure as described above.  Electronically Signed By-Costa Robles MD On:8/29/2024 4:11 PM      DEXA Bone Density Axial    Result Date: 8/30/2024  1.  Normal bone mineral density.   FRAX *  WHO category: Normal. Fracture risk: Not increased. *  FRAX not reported: All T-scores at or above -1.0.   Current recommendations are that a follow-up bone density be obtained at an interval of not less than 2 years except in specific circumstances, such as after initiation or change of therapy.  This report was finalized on 8/30/2024 10:22 AM by Yehuda Ornelas M.D on Workstation: [id number]      NM Bone Scan Whole Body    Result Date: 8/14/2024  No scintigraphic evidence of osseous metastatic disease.   This report was finalized on 8/14/2024 1:03 PM by Dr. Marty Bragg M.D on Workstation:  [id number]      CT Chest With Contrast Diagnostic    Result Date: 8/14/2024  1. There is a single mildly enlarged right axillary lymph node suspicious for a sharona metastasis. No evidence to suggest distal metastatic disease in patient with recent right mastectomy with implant reconstruction. There is fluid and air surrounding the implant and a tissue expander is present with adjacent surgical drain. 2. Mild hepatic steatosis. 3. Duodenal diverticulum. 4. IUD is present.  Radiation dose reduction techniques were utilized, including automated exposure control and exposure modulation based on body size.   This report was finalized on 8/14/2024 11:23 AM by Yehuda Ornelas M.D on Workstation: [id number]      CT Abdomen Pelvis With Contrast    Result Date: 8/14/2024  1. There is a single mildly enlarged right axillary lymph node suspicious for a sharona metastasis. No evidence to suggest distal metastatic disease in patient with recent right mastectomy with implant reconstruction. There is fluid and air surrounding the implant and a tissue expander is present with adjacent surgical drain. 2. Mild hepatic steatosis. 3. Duodenal diverticulum. 4. IUD is present.  Radiation dose reduction techniques were utilized, including automated exposure control and exposure modulation based on body size.   This report was finalized on 8/14/2024 11:23 AM by Yehuda Ornelas M.D on Workstation: [id number]      MRI Breast Bilateral Diagnostic W WO Contrast    Result Date: 5/28/2024  1. There are 2 biopsy-proven sites of malignancy in the right breast at the 8:30 position in the posterior one-third that measure on the order of 2.3 cm and 2.4 cm in greatest dimension with the vision globe and twirl-shaped metallic clips seen within these lesions, respectively. The lesions either abut or extend to the lateral margin of the pectoral fascia that overlies the implant capsule and some extension into the pectoral muscle at this location and/or  biologic capsule is suspected. Findings suspicious for right axillary adenopathy are noted.  2. There are no findings suspicious for malignancy in the left breast.  BI-RADS CATEGORY 6: Known biopsy-proven malignancy.  This report was finalized on 5/28/2024 4:50 PM by Dr. Chidi Gorman M.D on Workstation: [id number]         PATHOLOGY     8/29/2024  Final Diagnosis   Right axilla, core biopsy:    Metastatic ductal carcinoma, see comment    No lymphoid tissue identified       LABS     HEMATOLOGY:  WBC   Date Value Ref Range Status   08/06/2024 7.37 3.40 - 10.80 10*3/mm3 Final   01/26/2021 8.75 3.40 - 10.80 10*3/mm3 Final     RBC   Date Value Ref Range Status   08/06/2024 4.64 3.77 - 5.28 10*6/mm3 Final   01/26/2021 4.36 3.77 - 5.28 10*6/mm3 Final     Hemoglobin   Date Value Ref Range Status   08/06/2024 12.9 12.0 - 15.9 g/dL Final     Hematocrit   Date Value Ref Range Status   08/06/2024 40.6 34.0 - 46.6 % Final     Platelets   Date Value Ref Range Status   08/06/2024 324 140 - 450 10*3/mm3 Final     CHEMISTRY:  Lab Results   Component Value Date    GLUCOSE 100 (H) 07/01/2024    BUN 19 07/01/2024    CREATININE 0.75 07/01/2024    EGFRIFNONA 87 09/09/2021    EGFRIFAFRI 106 09/09/2021    BCR 25.3 (H) 07/01/2024    K 4.0 07/01/2024    CO2 22.8 07/01/2024    CALCIUM 9.4 07/01/2024    PROTENTOTREF 6.9 06/30/2023    ALBUMIN 4.5 06/30/2023    LABIL2 1.9 06/30/2023    AST 25 06/30/2023    ALT 25 06/30/2023     PROBLEM LIST     Patient Active Problem List   Diagnosis    IUD (intrauterine device) in place    Essential hypertension    Mood disorder    Allergic rhinitis    Hyperlipidemia    Knee pain, right    Lumbar radiculopathy    Vitamin D deficiency    Lumbar spinal stenosis    Follow-up examination following surgery    Abnormality of right breast on screening mammogram    Malignant neoplasm of lower-outer quadrant of right breast of female, estrogen receptor positive    Anxiety      CURRENT MEDICATIONS     Current  Outpatient Medications   Medication Instructions    Abemaciclib 100 MG tablet Take 1 tablet by mouth Daily for 7 days, then 1 tablet 2 (Two) Times a Day for 7 days, then increase to 1.5 tablet twice daily thereafter.    ALPRAZolam (XANAX) 0.25 mg, Oral, 2 Times Daily PRN    anastrozole (ARIMIDEX) 1 mg, Oral, Daily    buPROPion XL (WELLBUTRIN XL) 300 mg, Oral, Daily    cyclobenzaprine (FLEXERIL) 10 mg, Oral, 3 Times Daily PRN    escitalopram (LEXAPRO) 20 mg, Oral, Daily    famotidine (PEPCID) 20 mg, Oral, 2 Times Daily    furosemide (LASIX) 20 mg, Oral, 2 Times Daily    gabapentin (NEURONTIN) 100 mg, Oral, Every 8 Hours    HYDROcodone-acetaminophen (NORCO) 5-325 MG per tablet 1 tablet, Oral, Every 4 Hours PRN    levocetirizine (XYZAL) 5 MG tablet Take one tablet by mouth twice daily for urticaria.    olmesartan (BENICAR) 20 mg, Oral, Daily    ondansetron (ZOFRAN) 8 mg, Oral, 3 Times Daily PRN      ALLERGIES     Allergies   Allergen Reactions    Penicillins Anaphylaxis    Hemp Seed Oil Itching     HEMP IN LOTIONS    Lisinopril Cough       REVIEW OF SYSTEMS     Review of Systems   Constitutional:  Positive for fatigue. Negative for activity change.      Vitals:    09/05/24 0939   BP: 144/83   Pulse: 111   Resp: 18   SpO2: 99%      Physical Exam  Constitutional:       General: She is not in acute distress.  HENT:      Head: Normocephalic and atraumatic.   Pulmonary:      Effort: Pulmonary effort is normal. No respiratory distress.   Chest:      Comments: No palpable right axillary lymphadenopathy.  Neurological:      Mental Status: She is alert and oriented to person, place, and time. Mental status is at baseline.   Psychiatric:         Mood and Affect: Mood normal.         Behavior: Behavior normal.            ECOG:  Restricted in physically strenuous activity but ambulatory and able to carry out work of a light or sedentary nature, e.g., light house work, office work = 1      ASSESSMENT AND PLAN     ASSESSMENT:       Dasha De Leon is a 53 y.o. female who presents with history of stage IIA (pT2, pN1a(sn), cM0, G3, ER+, IL+, HER2-) right breast invasive ductal carcinoma. She is sp mastectomy and SLNB on 7/11/2024. She underwent re-resection for a positive margin on 7/26/2024. She now returns to discuss biopsy results from her recent right axilla LN biopsy.    Diagnoses and all orders for this visit:    1. Malignant neoplasm of lower-outer quadrant of right breast of female, estrogen receptor positive (Primary)       PLAN:      Orders:  -Refer back to Dr. De Anda for resection of the right axilla biopsy-proven positive lymph node  -Plan for adjuvant radiation therapy after resection    We reviewed the patient's biopsy results we discussed the right axillary lymph node that was suspicious on the recent CT scan was positive for metastatic breast cancer.  We reviewed her tumor board discussion.  The tumor board recommended resection of this lymph node if positive.  The patient will be referred back to Dr. De Anda for resection of the biopsy-proven lymph node.  We will review her pathology roughly 2 weeks after surgery and plan for adjuvant radiation therapy once she is fully healed.  The patient is in agreement with this plan.  She is encouraged reach out with questions or concerns prior to her next appointment.    I spent 30 minutes caring for Dasha on this date of service. This time includes time spent by me in the following activities:preparing for the visit, reviewing tests, obtaining and/or reviewing a separately obtained history, performing a medically appropriate examination and/or evaluation , counseling and educating the patient/family/caregiver, referring and communicating with other health care professionals , documenting information in the medical record, and independently interpreting results and communicating that information with the patient/family/caregiver    FOLLOW UP     No follow-ups on file.     CC: No ref.  provider found Ariana Coffman PA-C

## 2024-09-09 ENCOUNTER — PREP FOR SURGERY (OUTPATIENT)
Dept: OTHER | Facility: HOSPITAL | Age: 54
End: 2024-09-09
Payer: COMMERCIAL

## 2024-09-09 ENCOUNTER — TELEPHONE (OUTPATIENT)
Dept: SURGERY | Facility: CLINIC | Age: 54
End: 2024-09-09
Payer: COMMERCIAL

## 2024-09-09 DIAGNOSIS — T83.32XD INTRAUTERINE DEVICE (IUD) MIGRATION, SUBSEQUENT ENCOUNTER: ICD-10-CM

## 2024-09-09 DIAGNOSIS — Z17.0 MALIGNANT NEOPLASM OF LOWER-OUTER QUADRANT OF RIGHT BREAST OF FEMALE, ESTROGEN RECEPTOR POSITIVE: Primary | ICD-10-CM

## 2024-09-09 DIAGNOSIS — Z97.5 IUD (INTRAUTERINE DEVICE) IN PLACE: ICD-10-CM

## 2024-09-09 DIAGNOSIS — C50.511 MALIGNANT NEOPLASM OF LOWER-OUTER QUADRANT OF RIGHT BREAST OF FEMALE, ESTROGEN RECEPTOR POSITIVE: Primary | ICD-10-CM

## 2024-09-09 RX ORDER — CLINDAMYCIN PHOSPHATE 900 MG/50ML
900 INJECTION, SOLUTION INTRAVENOUS ONCE
OUTPATIENT
Start: 2024-09-09 | End: 2024-09-09

## 2024-09-09 NOTE — PROGRESS NOTES
[facility]     Referring Provider: No ref. provider found     Chief complaint: right breast cancer    Subjective    HPI: Ms. Dasha De Leon is a 53 y.o. yo woman, seen at the request of No ref. provider found for a new diagnosis of right breast cancer.    She noticed a palpable mass in her right breast starting in early to mid April.  She already had a scheduled mammogram around that time.  After she noted the palpable mass she noticed some pulling and dimpling of her skin in a similar area.  She denies any skin thickening, nipple discharge or other changes to her breast.  Her previous screening mammogram in April 2023 was normal and largely fatty replaced with scattered areas of fibroglandular densities.  She underwent breast augmentation with retropectoral implants sometime ago, completed by plastic surgery at [facility].    She denies any prior history of abnormal mammograms or breast biopsies.     She reports she has a pertinent PMH of HTN, HLD, spinal stenosis, allergies.     She was by herself in clinic today.        HPI 5/18/24 - met with Dr Pretty. Plan for left mastectomy and sentinel lymph node biopsy. She has no new concerns, no changes to her overall state of health. Plan for explant of her implant at the time of surgery with reconstruction per plastic surgery.     PI 9/10/2024: She completed additional imaging following surgery which revealed a continued enlarged axillary lymph node.  Medical oncology was not concern for additional tissue sampling to change therapy plans.  When she met with radiation oncology they are axillary radiation plans would differ depending on this abnormal lymph node.  She underwent ultrasound and biopsy which showed a breast cancer.    She is ready to be through this phase of her cancer treatment. Otherwise in her usual state of health.     Oncology/Hematology History   Malignant neoplasm of lower-outer quadrant of right breast of female, estrogen receptor positive    5/15/2024 Cancer Staged    Staging form: Breast, AJCC 8th Edition  - Clinical stage from 5/15/2024: Stage IA (cT1c, cN0(f), cM0, G2, ER+, NE+, HER2-) - Signed by Romina De Anda MD on 5/22/2024 5/22/2024 Initial Diagnosis    Malignant neoplasm of lower-outer quadrant of right breast of female, estrogen receptor positive     7/11/2024 Cancer Staged    Staging form: Breast, AJCC 8th Edition  - Pathologic stage from 7/11/2024: Stage IIA (pT2, pN1a(sn), cM0, G3, ER+, NE+, HER2-) - Signed by Germain Puga MD on 8/26/2024     9/3/2024 -  Chemotherapy    OP BREAST Abemaciclib / Anastrozole         Review of Systems   Constitutional:  Positive for chills.   HENT:  Negative.     Eyes: Negative.    Respiratory: Negative.     Cardiovascular: Negative.    Gastrointestinal: Negative.    Endocrine: Negative.    Genitourinary: Negative.     Musculoskeletal: Negative.    Skin: Negative.    Neurological: Negative.    Hematological: Negative.    Psychiatric/Behavioral: Negative.         Medications:    Current Outpatient Medications:     Abemaciclib 100 MG tablet, Take 1 tablet by mouth Daily for 7 days, then 1 tablet 2 (Two) Times a Day for 7 days, then increase to 1.5 tablet twice daily thereafter., Disp: 28 tablet, Rfl: 0    ALPRAZolam (Xanax) 0.25 MG tablet, Take 1 tablet by mouth 2 (Two) Times a Day As Needed for Anxiety., Disp: 30 tablet, Rfl: 0    anastrozole (ARIMIDEX) 1 MG tablet, Take 1 tablet by mouth Daily., Disp: 30 tablet, Rfl: 5    buPROPion XL (Wellbutrin XL) 300 MG 24 hr tablet, Take 1 tablet by mouth Daily., Disp: 90 tablet, Rfl: 1    cyclobenzaprine (FLEXERIL) 10 MG tablet, Take 1 tablet by mouth 3 (Three) Times a Day As Needed for Muscle Spasms., Disp: 90 tablet, Rfl: 3    escitalopram (LEXAPRO) 20 MG tablet, Take 1 tablet by mouth Daily., Disp: 90 tablet, Rfl: 3    famotidine (PEPCID) 20 MG tablet, Take 1 tablet by mouth 2 (Two) Times a Day., Disp: 180 tablet, Rfl: 3    furosemide (Lasix) 20 MG tablet,  Take 1 tablet by mouth 2 (Two) Times a Day., Disp: 90 tablet, Rfl: 1    gabapentin (Neurontin) 100 MG capsule, Take 1 capsule by mouth Every 8 (Eight) Hours., Disp: 60 capsule, Rfl: 1    HYDROcodone-acetaminophen (NORCO) 5-325 MG per tablet, Take 1 tablet by mouth Every 4 (Four) Hours As Needed (Pain)., Disp: 20 tablet, Rfl: 0    levocetirizine (XYZAL) 5 MG tablet, Take one tablet by mouth twice daily for urticaria., Disp: 180 tablet, Rfl: 3    olmesartan (BENICAR) 40 MG tablet, Take 0.5 tablets by mouth Daily., Disp: 45 tablet, Rfl: 1    ondansetron (ZOFRAN) 8 MG tablet, Take 1 tablet by mouth 3 (Three) Times a Day As Needed for Nausea or Vomiting., Disp: 30 tablet, Rfl: 5    Allergies:  Allergies   Allergen Reactions    Penicillins Anaphylaxis    Hemp Seed Oil Itching     HEMP IN LOTIONS    Lisinopril Cough       Medical history:  Past Medical History:   Diagnosis Date    Abnormal Pap smear of cervix     Anxiety     Miller's cyst     RESOLVED    Breast cancer     Bulging lumbar disc     Bursitis     HX    Depression     GERD (gastroesophageal reflux disease)     H/O colposcopy with cervical biopsy     unknown pap result, biopsy was neg per pt, 2013 [facility]    History of 2019 novel coronavirus disease (COVID-19)     X2    History of eczema     HPV (human papilloma virus) infection     Hyperlipemia     Hypertension     Low back pain     Lumbar radiculopathy     Numbness and tingling of left leg     Osteoarthritis     Scoliosis     Seasonal allergies        Surgical History:  Past Surgical History:   Procedure Laterality Date    BREAST AUGMENTATION      BREAST RECONSTRUCTION Right 7/11/2024    Procedure: RIGHT SUBPECTORAL PLACEMENT TISSUE EXPANDER AND CORTIVA (ACELLULAR DERMAL MATRIX), COMPLEX CLOSURE RIGHT BREAST 5cm;  Surgeon: Colin Bender MD;  Location: [facility];  Service: Plastics;  Laterality: Right;    BREAST SURGERY Right 7/26/2024    Procedure: ADJACENT TISSUE REARRANGEMENT RIGHT BREAST;   "Surgeon: Colin Bender MD;  Location:  [facility];  Service: Plastics;  Laterality: Right;    CARPAL TUNNEL RELEASE Right 2004    CRYOTHERAPY      1997    EPIDURAL BLOCK      HAND SURGERY  2004    Pisiformectomy    LUMBAR DISCECTOMY FUSION INSTRUMENTATION N/A 01/05/2024    Procedure: Lumbar 3 to lumbar 4 and lumbar 4 to lumbar 5 laminectomy with fusion and instrumentation;  Surgeon: Rigoberto Botello MD;  Location:  [facility];  Service: Robotics - Neuro;  Laterality: N/A;    MAMMO BILATERAL  2014    MASTECTOMY W/ SENTINEL NODE BIOPSY Right 7/11/2024    Procedure: Right MASTECTOMY WITH SENTINEL NODE BIOPSY;  Surgeon: Romina De Anda MD;  Location[facility];  Service: General;  Laterality: Right;    MASTECTOMY WITH IMMEDIATE RECONSTRUCTION Right 7/26/2024    Procedure: right mastectomy, margin excision;  Surgeon: Romina De Anda MD;  Location: [facility];  Service: General;  Laterality: Right;    PAP SMEAR  [id number]    SHOULDER ARTHROSCOPY Left 2002,2003    SHOULDER SURGERY      \"MANIPULATION FOR FROZEN SHOULDER\"    US GUIDED LYMPH NODE BIOPSY  8/29/2024       Family History:  Family History   Problem Relation Age of Onset    Hypertension Mother     Hyperlipidemia Mother     Diverticulitis Mother     Pancreatitis Mother     Kidney disease Father     Skin cancer Father     Hypertension Father     Hyperlipidemia Father     Stroke Father     Atrial fibrillation Father     Transient ischemic attack Father     No Known Problems Sister     Depression Brother     No Known Problems Daughter     No Known Problems Son     Uterine cancer Maternal Aunt 58    Heart disease Maternal Aunt     Lung cancer Maternal Uncle     Heart attack Maternal Uncle     No Known Problems Paternal Aunt     Colon cancer Paternal Uncle 65    Liver disease Maternal Grandmother     Heart disease Maternal Grandfather     Cancer Maternal Grandfather     Heart attack Paternal Grandmother     No Known Problems Paternal Grandfather     BRCA " /2 Neg Hx     Breast cancer Neg Hx     Endometrial cancer Neg Hx     Ovarian cancer Neg Hx     Malig Hyperthermia Neg Hx        Family Cancer History: relationship - (age of diagnosis/current age or age at death)  Breast: N/A   Ovarian: N/A  Colon: Paternal Uncle Dx (in 60's)   Pancreas: N/A  Prostate: N/A    Social History:   Social History     Socioeconomic History    Marital status: Single   Tobacco Use    Smoking status: Former     Current packs/day: 0.00     Average packs/day: 1 pack/day for 20.0 years (20.0 ttl pk-yrs)     Types: Cigarettes     Start date:      Quit date:      Years since quittin.7     Passive exposure: Past    Smokeless tobacco: Never   Vaping Use    Vaping status: Never Used   Substance and Sexual Activity    Alcohol use: Yes     Alcohol/week: 2.0 - 4.0 standard drinks of alcohol     Types: 2 - 4 Glasses of wine per week     Comment: weekly    Drug use: No    Sexual activity: Not Currently     Birth control/protection: I.U.D.       She lives with her mother.  She works as a registered medical assistant.       GYNECOLOGIC HISTORY:   . P: 0. AB: 0.  Last menstrual period: 2020  Age at menarche: 13  Age at first childbirth: N/A  Lactation/How long: N/A  Age at menopause: 49  Total years of oral contraceptive use: 25  Total years of hormone replacement therapy: 0      Objective   PHYSICAL EXAMINATION:   Vitals:    09/10/24 1031   BP: 128/82   Pulse: 102   Resp: 18   SpO2: 99%       Examination chaperoned by Leila Ruiz.  ECOG 0 - Asymptomatic  General: NAD, well appearing  Psych: a&o x 3, normal mood and affect  Eyes: EOMI, no scleral icterus  ENMT: neck supple without masses or thyromegaly, mucus membranes moist  Resp: normal effort  CV: RRR, no edema   GI: soft, NT, ND  MSK: normal gait, normal ROM in bilateral shoulders  Lymph nodes:  no cervical, supraclavicular or axillary lymphadenopathy  Breast: symmetric, 30 cc, large breast volume bilaterally, implants with  well-healed inframammary incisions.  Right: Lower outer quadrant with skin tethering, palpable mass approximately 2 cm in size.  No other skin changes, or nipple abnormalities.  Left: No visible abnormalities on inspection while seated, with arms raised or hands on hips. No masses, skin changes, or nipple abnormalities.    Right breast, in-office ultrasound: axilla scanned, enlarged lymph node and biopsy clip seen in lower axilla near her implant under the muscle.        Previous IMAGING: I have independently reviewed her imagin2023 bilateral screening [facility] scattered areas of fibroglandular density.  Bilateral retropectoral saline implants.  No suspicious masses calcifications or other abnormalities are seen.  BI-RADS 1    2024 bilateral screening mammogram  The breasts are almost entirely fatty  Bilateral retropectoral saline implants  There is a round mass measuring 16 mm with spiculated margins seen in the posterior one third region of the right breast at 9:00.  This is new since prior exams.  Left breast no suspicious masses calcifications or other abnormalities are seen  BI-RADS 0    5/10/2024 right diagnostic mammogram  Breast is almost entirely fatty  Retropectoral saline implants  Finding 1: Round mass in the right breast at 9:00 previously seen 2024.  On present there is a high density round mass measuring 16 mm with spiculated margins.  Approximately in the right breast 8:00 10 cm from the nipple.  Right breast ultrasound  Finding 1: Round parallel hypoechoic mass with indistinct And spiculated margins 15 mm in the posterior one third region of the right breast at 8:00 10 cm from the nipple. with internal vascularity there are decreased posterior echoes; edge shadows are excluded.  Finding 2: There is an irregular nonparallel hypoechoic mass with indistinct margins measuring 4 x 6 x 4 mm seen in the 830 o'clock region of the right  breast 11 cm from the nipple there are decreased posterior echoes, edge shadows are excluded posterior echoes, and shadows not excluded  Visualized axillary lymph nodes are normal  Impression:  Finding 1: Mass right breast is highly suggestive of malignancy ultrasound-guided biopsy is recommended  Finding 2: Mass 830 o'clock region of the right breast is suspicious ultrasound-guided biopsy is recommended  BI-RADS 5  Addendum:  Finding 1: On the obtained sonographic images mass approaches the skin was not definitively involve the overlying skin, no abnormal skin thickening visualized  Due to posterior acoustic shadowing no definitive involvement of the underlying pectoralis is seen however during mammographic imaging acquisition the technologist reported difficulty obtaining images which potentially may be secondary to her implant rather than pectoralis involvement  Impression finding 1 mass in the right breast is highly suggestive of malignancy ultrasound-guided biopsy is recommended  BI-RADS 5    5/15/2024 right ultrasound-guided biopsy 8 o'clock position 12-gauge vacuum-assisted device was utilized 5 cores were obtained.  A true kyra vision Globe biopsy marker was placed at the biopsy site.  Postbiopsy mammogram shows the clip in expected location  Pathology returned as invasive ductal carcinoma  Surgical consultation is recommended  5/15/2024 right ultrasound-guided biopsy 830 o'clock  Using a 12-gauge core needle biopsy 5 cores were obtained true kyra Pro Q tissue marker was placed within the biopsy site.  Pathology showed invasive ductal carcinoma  Follow-up mammogram and ultrasound shows clip in the expected location  Surgical consultation is recommended    5/15/2024 right ultrasound-guided needle aspiration the patient's right breast 8:00 was imaged and a 21-gauge needle was advanced to the target 3 passes were made through the area and a HydroMARK biopsy clip was deployed within the FNA bed.  Postprocedure  mammogram ultrasound shows the clip in expected location pathology returned as bland lymphocytes with no evidence of metastatic disease in intramammary lymph node.  Pathology is benign and concordant    5/25/24 Breast MRI  FINDINGS: Scattered fibroglandular tissue is seen throughout both  breasts. Minimal background parenchymal enhancement is noted. Bilateral  subpectoral intact saline implants are noted.     In the posterior one-third lower outer quadrant of the right breast at  the 8:30 position centered on the order of 10 cm from the nipple there  is an irregular enhancing mass that measures on the order of 2.3 cm in  the anterior to posterior dimension, 2 cm in the superior to inferior  dimension and 2.2 cm in the medial to lateral dimension. A central  signal void is noted. This corresponds to the biopsy-proven malignancy  with the internal vision globe metallic clip. There is dendritic  enhancement that extends along the posteromedial margin of the mass to  the lateral margin of the implant capsule and there is some enhancement  of the overlying pectoral muscle and/or biologic implant capsule in this  region.     On the order of 2.2 cm posterior to the described malignancy at the 8:30  position and centered on the order of 13 cm from the nipple at the 8:30  position there is an irregular area of nonmass enhancement that measures  2.4 cm in the superior to inferior dimension, 1.9 cm in the anterior to  posterior dimension and 1.4 cm in the medial to lateral dimension. A  signal void is noted within this region. This corresponds to the  biopsy-proven site of malignancy with the internal [place] twirl-shaped  metallic clip. The medial margin of the nonmass enhancement abuts the  lateral margin of the overlying pectoral muscle and/or biologic implant  capsule at this location.     No other areas of suspicious enhancement or morphology are seen in the right breast. I see no evidence for abnormal right breast skin or  nipple enhancement. Asymmetrically enlarged right axillary lymph nodes, some with a rounded contour are noted. The largest right axillary lymph node measures on the order of 1.4 cm in greatest dimension. Cortical thickening of at least 2 lymph nodes in the right axilla is noted. No evidence for right internal mammary chain adenopathy is appreciated.     There are no areas of suspicious enhancement or morphology in the left breast. I see no evidence for abnormal skin, nipple or chest wall  enhancement of the left breast and there is no evidence for left  axillary or internal mammary chain adenopathy.      IMPRESSION:  1. There are 2 biopsy-proven sites of malignancy in the right breast at  the 8:30 position in the posterior one-third that measure on the order  of 2.3 cm and 2.4 cm in greatest dimension with the vision globe and  twirl-shaped metallic clips seen within these lesions, respectively. The  lesions either abut or extend to the lateral margin of the pectoral  fascia that overlies the implant capsule and some extension into the  pectoral muscle at this location and/or biologic capsule is suspected.  Findings suspicious for right axillary adenopathy are noted.     2. There are no findings suspicious for malignancy in the left breast.     BI-RADS CATEGORY 6: Known biopsy-proven malignancy.    PATHOLOGY:   7/11/24  1.  Right axillary sentinel lymph node:               A.  One of three lymph nodes involved by metastatic carcinoma (1/3).                            -Metastasis estimated up to 12 mm. in greatest dimension.                            -Extranodal extension is identified.                            -See synoptic for biomarkers on sharona metastasis.        2.  Right breast anterior skin margin:               A.  Invasive ductal carcinoma involving subcutaneous tissue.               B.  Invasive carcinoma focally involves medial aspect/margin of excision                         (yellow inked subcutaneous  tissue)                             3.  Right breast, simple skin sparing mastectomy:               A. Invasive ductal carcinoma (Q shaped and vision clips):                            1. Invasive carcinoma spans an area estimated at 45 mm x 20 mm x 14 mm.                            2. Overall Cynthia grade III (tubular score = 3, nuclear score = 3,                                mitotic score = 2).                            3.  Focal lymphovascular space invasion identified.                                           B.  No associated carcinoma in situ identified.               C.  Invasive carcinoma extends to involve the anterior margin of excision                     Invasive carcinoma focally measures 0.9 mm from the posterior margin of excision                            Anterior margin = positive; extensively involved-see also specimen #2 above.                            Posterior margin = 0.9 mm                            Superior margin = >100 mm                            Inferior margin = 15 mm                             Medial margin = > 100 mm                            Lateral margin = 50 mm               D. No lobular neoplasia (LCIS, ALH) identified.               E. Focal multiple biopsy site changes are identified, and 3 metallic clips are retrieved.               F.  No Pagetoid involvement of skin nor nipple by malignancy identified.               G. Two intramammary lymph nodes negative for metastatic carcinoma (0/2).               H.  Previous Biomarkers: Estrogen receptors: Positive (), Progesterone receptors: Positive (),                         HER/2-lalit: Negative (score 1+), Ki-67 = 22% (see BV )     7/26/24  1. Right breast and skin, mastectomy margin, re-excision:               A. Focal residual invasive ductal carcinoma, moderately differentiated; Cynthia histologic grade II/III      (tubule score = 3, nuclear score = 3, mitoses score = 1), measuring 2 mm and  coming to within 1 mm       of the nearest new (final) lateral skin margin; all other margins are free of tumor.               B. Positive for perineural invasion.               C. Prior procedure related changes noted.               D. Unremarkable skin.     8/29/24  Right axilla, core biopsy:    Metastatic ductal carcinoma, see comment    No lymphoid tissue identified  The current biopsy shows adipose tissue involved with carcinoma. Immunohistochemistry was performed utilizing appropriate controls and the tumor is positive for CK7 and strong 100% positive for estrogen receptor and progesterone receptor. CK20 is negative. If HER2 testing is required, please contact pathology to order.     Assessment & Plan   Assessment:  53 y.o. F with a diagnosis of right: Invasive ductal carcinoma grade 3, ER/KS +, Her2 -; [id number], Clinical anatomic stage IIA, Clinical prognostic stage IIA.      Discussion:  I had an extensive discussion with the patient and family about the nature of this breast cancer diagnosis. We reviewed the components of breast tissue including ducts and lobules. We reviewed her pathology report in detail. We reviewed breast cancer histology, including stage, grade, ER/KS receptors, HER2 receptors and how this applies to her diagnosis. We discussed the multidisciplinary approach to breast cancer care.  Treatments can include surgery, radiation therapy, and medical therapy (chemotherapy, targeted therapy, and/or endocrine therapy).  The exact type of treatment and the order of therapy depends on the size and location/distribution of breast disease, the involvement of the regional lymph node basins, concern for or presence of metastatic disease, potential genetic mutations, and the individual breast cancer tumor markers expressed by the cancer. We also discussed other treatment options including the option of not undergoing any surgical treatment, with a palliative rather than curative intent and the risks  associated with this including disease progression.    The patient's clinical stage is documented as above. This was discussed with the patient prior to initiation of treatment. All available pathology reports were discussed with the patient today. All treatment decisions were made via shared decision making with the patient. This patient was evaluated for appropriate ancillary referrals including, pre-treatment functional assessment, exercise program, nutrition program, genetics, and lymphedema clinic. This patient received preoperative and postoperative education. The patient was offered a breast reconstruction referral appropriate to plan surgical intervention; risks and benefits were discussed today. The patient was educated on perioperative multimodal pain management strategies. Barriers to effective and efficient care are/will be evaluated by the nurse navigator.    Plan:  A multidisciplinary plan has been formulated for the patient:      # Breast Surgical Oncology:  -Consents completed and signed. Discussed the risks and benefits of right axillary lymph node excision, intraoperative ultrasound guided. at length including estimated time for procedure, postoperative recovery, and postoperative instructions. Discussed the risks to include pain, bleeding, infection, nerve injury, numbness, seroma, skin complications including necrosis, breast asymmetry, need for  lymphedema, lymphocele, and scar.  Patient appears to understand and wishes to proceed.  All questions were answered.      # Medical Oncology:  - on Anastrazole, no chemo per oncotype score  - plan for abemaciclib following radiation    #  Radiation Oncology:  - plan for post mastectomy and axillary radiation following surgery. Surgical excision of visualized disease    # Nurse Navigation  - Referral previously made    # Lymphedema clinic  - Referral previously placed     # Genetic Testing   -Not indicated by NCCN guidelines    # Breast Imaging  - Next  Screening mammogram due: 4/2025      Romina De Anda MD  Breast Surgical Oncology    I spent 20 minutes caring for Dasha  on this date of service. This time includes time spent by me in the following activities: preparing for the visit, reviewing tests, obtaining and/or reviewing a separately obtained history, performing a medically appropriate examination and/or evaluation , counseling and educating the patient/family/caregiver, ordering medications, tests, or procedures, referring and communicating with other health care professionals , documenting information in the medical record, independently interpreting results and communicating that information with the patient/family/caregiver, and care [email or web address]

## 2024-09-10 ENCOUNTER — OFFICE VISIT (OUTPATIENT)
Dept: SURGERY | Facility: CLINIC | Age: 54
End: 2024-09-10
Payer: COMMERCIAL

## 2024-09-10 VITALS
HEART RATE: 102 BPM | WEIGHT: 174 LBS | HEIGHT: 65 IN | SYSTOLIC BLOOD PRESSURE: 128 MMHG | DIASTOLIC BLOOD PRESSURE: 82 MMHG | RESPIRATION RATE: 18 BRPM | BODY MASS INDEX: 28.99 KG/M2 | OXYGEN SATURATION: 99 %

## 2024-09-10 DIAGNOSIS — C50.511 MALIGNANT NEOPLASM OF LOWER-OUTER QUADRANT OF RIGHT BREAST OF FEMALE, ESTROGEN RECEPTOR POSITIVE: Primary | ICD-10-CM

## 2024-09-10 DIAGNOSIS — Z17.0 MALIGNANT NEOPLASM OF LOWER-OUTER QUADRANT OF RIGHT BREAST OF FEMALE, ESTROGEN RECEPTOR POSITIVE: Primary | ICD-10-CM

## 2024-09-10 PROCEDURE — 99024 POSTOP FOLLOW-UP VISIT: CPT | Performed by: STUDENT IN AN ORGANIZED HEALTH CARE EDUCATION/TRAINING PROGRAM

## 2024-09-10 NOTE — LETTER
September 10, 2024       No Recipients    Patient: Dasha De Leon   YOB: 1970   Date of Visit: 9/10/2024     Dear Ariana Coffman PA-C:       Thank you for referring Dasha De Leon to me for evaluation. Below are the relevant portions of my assessment and plan of care.    If you have questions, please do not hesitate to call me. I look forward to following Dasha along with you.         Sincerely,        Romina De Anda MD        CC:   No Recipients    Romina De Anda MD  09/10/24 1512  Sign when Signing Visit  [facility]     Referring Provider: No ref. provider found     Chief complaint: right breast cancer    Subjective   HPI: Ms. Dasha De Leon is a 53 y.o. yo woman, seen at the request of No ref. provider found for a new diagnosis of right breast cancer.    She noticed a palpable mass in her right breast starting in early to mid April.  She already had a scheduled mammogram around that time.  After she noted the palpable mass she noticed some pulling and dimpling of her skin in a similar area.  She denies any skin thickening, nipple discharge or other changes to her breast.  Her previous screening mammogram in April 2023 was normal and largely fatty replaced with scattered areas of fibroglandular densities.  She underwent breast augmentation with retropectoral implants sometime ago, completed by plastic surgery at [facility].    She denies any prior history of abnormal mammograms or breast biopsies.     She reports she has a pertinent PMH of HTN, HLD, spinal stenosis, allergies.     She was by herself in clinic today.        HPI 5/18/24 - met with Dr Pretty. Plan for left mastectomy and sentinel lymph node biopsy. She has no new concerns, no changes to her overall state of health. Plan for explant of her implant at the time of surgery with reconstruction per plastic surgery.     PI 9/10/2024: She completed additional imaging following surgery which revealed a continued enlarged axillary lymph  node.  Medical oncology was not concern for additional tissue sampling to change therapy plans.  When she met with radiation oncology they are axillary radiation plans would differ depending on this abnormal lymph node.  She underwent ultrasound and biopsy which showed a breast cancer.    She is ready to be through this phase of her cancer treatment. Otherwise in her usual state of health.     Oncology/Hematology History   Malignant neoplasm of lower-outer quadrant of right breast of female, estrogen receptor positive   5/15/2024 Cancer Staged    Staging form: Breast, AJCC 8th Edition  - Clinical stage from 5/15/2024: Stage IA (cT1c, cN0(f), cM0, G2, ER+, NV+, HER2-) - Signed by Romina De Anda MD on 5/22/2024 5/22/2024 Initial Diagnosis    Malignant neoplasm of lower-outer quadrant of right breast of female, estrogen receptor positive     7/11/2024 Cancer Staged    Staging form: Breast, AJCC 8th Edition  - Pathologic stage from 7/11/2024: Stage IIA (pT2, pN1a(sn), cM0, G3, ER+, NV+, HER2-) - Signed by Germain Puga MD on 8/26/2024     9/3/2024 -  Chemotherapy    OP BREAST Abemaciclib / Anastrozole         Review of Systems   Constitutional:  Positive for chills.   HENT:  Negative.     Eyes: Negative.    Respiratory: Negative.     Cardiovascular: Negative.    Gastrointestinal: Negative.    Endocrine: Negative.    Genitourinary: Negative.     Musculoskeletal: Negative.    Skin: Negative.    Neurological: Negative.    Hematological: Negative.    Psychiatric/Behavioral: Negative.         Medications:    Current Outpatient Medications:   •  Abemaciclib 100 MG tablet, Take 1 tablet by mouth Daily for 7 days, then 1 tablet 2 (Two) Times a Day for 7 days, then increase to 1.5 tablet twice daily thereafter., Disp: 28 tablet, Rfl: 0  •  ALPRAZolam (Xanax) 0.25 MG tablet, Take 1 tablet by mouth 2 (Two) Times a Day As Needed for Anxiety., Disp: 30 tablet, Rfl: 0  •  anastrozole (ARIMIDEX) 1 MG tablet, Take 1 tablet by  mouth Daily., Disp: 30 tablet, Rfl: 5  •  buPROPion XL (Wellbutrin XL) 300 MG 24 hr tablet, Take 1 tablet by mouth Daily., Disp: 90 tablet, Rfl: 1  •  cyclobenzaprine (FLEXERIL) 10 MG tablet, Take 1 tablet by mouth 3 (Three) Times a Day As Needed for Muscle Spasms., Disp: 90 tablet, Rfl: 3  •  escitalopram (LEXAPRO) 20 MG tablet, Take 1 tablet by mouth Daily., Disp: 90 tablet, Rfl: 3  •  famotidine (PEPCID) 20 MG tablet, Take 1 tablet by mouth 2 (Two) Times a Day., Disp: 180 tablet, Rfl: 3  •  furosemide (Lasix) 20 MG tablet, Take 1 tablet by mouth 2 (Two) Times a Day., Disp: 90 tablet, Rfl: 1  •  gabapentin (Neurontin) 100 MG capsule, Take 1 capsule by mouth Every 8 (Eight) Hours., Disp: 60 capsule, Rfl: 1  •  HYDROcodone-acetaminophen (NORCO) 5-325 MG per tablet, Take 1 tablet by mouth Every 4 (Four) Hours As Needed (Pain)., Disp: 20 tablet, Rfl: 0  •  levocetirizine (XYZAL) 5 MG tablet, Take one tablet by mouth twice daily for urticaria., Disp: 180 tablet, Rfl: 3  •  olmesartan (BENICAR) 40 MG tablet, Take 0.5 tablets by mouth Daily., Disp: 45 tablet, Rfl: 1  •  ondansetron (ZOFRAN) 8 MG tablet, Take 1 tablet by mouth 3 (Three) Times a Day As Needed for Nausea or Vomiting., Disp: 30 tablet, Rfl: 5    Allergies:  Allergies   Allergen Reactions   • Penicillins Anaphylaxis   • Hemp Seed Oil Itching     HEMP IN LOTIONS   • Lisinopril Cough       Medical history:  Past Medical History:   Diagnosis Date   • Abnormal Pap smear of cervix    • Anxiety    • Baker's cyst     RESOLVED   • Breast cancer    • Bulging lumbar disc    • Bursitis     HX   • Depression    • GERD (gastroesophageal reflux disease)    • H/O colposcopy with cervical biopsy     unknown pap result, biopsy was neg per pt, 2013 [facility]   • History of 2019 novel coronavirus disease (COVID-19)     X2   • History of eczema    • HPV (human papilloma virus) infection    • Hyperlipemia    • Hypertension    • Low back pain    • Lumbar radiculopathy    •  "Numbness and tingling of left leg    • Osteoarthritis    • Scoliosis    • Seasonal allergies        Surgical History:  Past Surgical History:   Procedure Laterality Date   • BREAST AUGMENTATION     • BREAST RECONSTRUCTION Right 7/11/2024    Procedure: RIGHT SUBPECTORAL PLACEMENT TISSUE EXPANDER AND CORTIVA (ACELLULAR DERMAL MATRIX), COMPLEX CLOSURE RIGHT BREAST 5cm;  Surgeon: Colin Bender MD;  Location: [facility];  Service: Plastics;  Laterality: Right;   • BREAST SURGERY Right 7/26/2024    Procedure: ADJACENT TISSUE REARRANGEMENT RIGHT BREAST;  Surgeon: Colin Bender MD;  Location: [facility] MAIN OR;  Service: Plastics;  Laterality: Right;   • CARPAL TUNNEL RELEASE Right 2004   • CRYOTHERAPY      1997   • EPIDURAL BLOCK     • HAND SURGERY  2004    Pisiformectomy   • LUMBAR DISCECTOMY FUSION INSTRUMENTATION N/A 01/05/2024    Procedure: Lumbar 3 to lumbar 4 and lumbar 4 to lumbar 5 laminectomy with fusion and instrumentation;  Surgeon: Rigoberto Botello MD;  Location: [facility] OR;  Service: Robotics - Neuro;  Laterality: N/A;   • MAMMO BILATERAL  2014   • MASTECTOMY W/ SENTINEL NODE BIOPSY Right 7/11/2024    Procedure: Right MASTECTOMY WITH SENTINEL NODE BIOPSY;  Surgeon: Romina De Anda MD;  Location: [facility] OR;  Service: General;  Laterality: Right;   • MASTECTOMY WITH IMMEDIATE RECONSTRUCTION Right 7/26/2024    Procedure: right mastectomy, margin excision;  Surgeon: Romina De Anda MD;  Location: [facility];  Service: General;  Laterality: Right;   • PAP SMEAR  20116   • SHOULDER ARTHROSCOPY Left 2002,2003   • SHOULDER SURGERY      \"MANIPULATION FOR FROZEN SHOULDER\"   • US GUIDED LYMPH NODE BIOPSY  8/29/2024       Family History:  Family History   Problem Relation Age of Onset   • Hypertension Mother    • Hyperlipidemia Mother    • Diverticulitis Mother    • Pancreatitis Mother    • Kidney disease Father    • Skin cancer Father    • Hypertension Father    • Hyperlipidemia Father    • " Stroke Father    • Atrial fibrillation Father    • Transient ischemic attack Father    • No Known Problems Sister    • Depression Brother    • No Known Problems Daughter    • No Known Problems Son    • Uterine cancer Maternal Aunt 58   • Heart disease Maternal Aunt    • Lung cancer Maternal Uncle    • Heart attack Maternal Uncle    • No Known Problems Paternal Aunt    • Colon cancer Paternal Uncle 65   • Liver disease Maternal Grandmother    • Heart disease Maternal Grandfather    • Cancer Maternal Grandfather    • Heart attack Paternal Grandmother    • No Known Problems Paternal Grandfather    • BRCA 1/2 Neg Hx    • Breast cancer Neg Hx    • Endometrial cancer Neg Hx    • Ovarian cancer Neg Hx    • Malig Hyperthermia Neg Hx        Family Cancer History: relationship - (age of diagnosis/current age or age at death)  Breast: N/A   Ovarian: N/A  Colon: Paternal Uncle Dx (in 60's)   Pancreas: N/A  Prostate: N/A    Social History:   Social History     Socioeconomic History   • Marital status: Single   Tobacco Use   • Smoking status: Former     Current packs/day: 0.00     Average packs/day: 1 pack/day for 20.0 years (20.0 ttl pk-yrs)     Types: Cigarettes     Start date:      Quit date:      Years since quittin.7     Passive exposure: Past   • Smokeless tobacco: Never   Vaping Use   • Vaping status: Never Used   Substance and Sexual Activity   • Alcohol use: Yes     Alcohol/week: 2.0 - 4.0 standard drinks of alcohol     Types: 2 - 4 Glasses of wine per week     Comment: weekly   • Drug use: No   • Sexual activity: Not Currently     Birth control/protection: I.U.D.       She lives with her mother.  She works as a registered medical assistant.       GYNECOLOGIC HISTORY:   . P: 0. AB: 0.  Last menstrual period: 2020  Age at menarche: 13  Age at first childbirth: N/A  Lactation/How long: N/A  Age at menopause: 49  Total years of oral contraceptive use: 25  Total years of hormone replacement therapy:  0      Objective  PHYSICAL EXAMINATION:   Vitals:    09/10/24 1031   BP: 128/82   Pulse: 102   Resp: 18   SpO2: 99%       Examination chaperoned by Leila Ruiz.  ECOG 0 - Asymptomatic  General: NAD, well appearing  Psych: a&o x 3, normal mood and affect  Eyes: EOMI, no scleral icterus  ENMT: neck supple without masses or thyromegaly, mucus membranes moist  Resp: normal effort  CV: RRR, no edema   GI: soft, NT, ND  MSK: normal gait, normal ROM in bilateral shoulders  Lymph nodes:  no cervical, supraclavicular or axillary lymphadenopathy  Breast: symmetric, 30 cc, large breast volume bilaterally, implants with well-healed inframammary incisions.  Right: Lower outer quadrant with skin tethering, palpable mass approximately 2 cm in size.  No other skin changes, or nipple abnormalities.  Left: No visible abnormalities on inspection while seated, with arms raised or hands on hips. No masses, skin changes, or nipple abnormalities.    Right breast, in-office ultrasound: axilla scanned, enlarged lymph node and biopsy clip seen in lower axilla near her implant under the muscle.        Previous IMAGING: I have independently reviewed her imagin2023 bilateral screening [facility] scattered areas of fibroglandular density.  Bilateral retropectoral saline implants.  No suspicious masses calcifications or other abnormalities are seen.  BI-RADS 1    2024 bilateral screening mammogram  The breasts are almost entirely fatty  Bilateral retropectoral saline implants  There is a round mass measuring 16 mm with spiculated margins seen in the posterior one third region of the right breast at 9:00.  This is new since prior exams.  Left breast no suspicious masses calcifications or other abnormalities are seen  BI-RADS 0    5/10/2024 right diagnostic mammogram  Breast is almost entirely fatty  Retropectoral saline implants  Finding 1: Round mass in the right breast at 9:00 previously  seen 4/29/2024.  On present there is a high density round mass measuring 16 mm with spiculated margins.  Approximately in the right breast 8:00 10 cm from the nipple.  Right breast ultrasound  Finding 1: Round parallel hypoechoic mass with indistinct And spiculated margins 15 mm in the posterior one third region of the right breast at 8:00 10 cm from the nipple. with internal vascularity there are decreased posterior echoes; edge shadows are excluded.  Finding 2: There is an irregular nonparallel hypoechoic mass with indistinct margins measuring 4 x 6 x 4 mm seen in the 830 o'clock region of the right breast 11 cm from the nipple there are decreased posterior echoes, edge shadows are excluded posterior echoes, and shadows not excluded  Visualized axillary lymph nodes are normal  Impression:  Finding 1: Mass right breast is highly suggestive of malignancy ultrasound-guided biopsy is recommended  Finding 2: Mass 830 o'clock region of the right breast is suspicious ultrasound-guided biopsy is recommended  BI-RADS 5  Addendum:  Finding 1: On the obtained sonographic images mass approaches the skin was not definitively involve the overlying skin, no abnormal skin thickening visualized  Due to posterior acoustic shadowing no definitive involvement of the underlying pectoralis is seen however during mammographic imaging acquisition the technologist reported difficulty obtaining images which potentially may be secondary to her implant rather than pectoralis involvement  Impression finding 1 mass in the right breast is highly suggestive of malignancy ultrasound-guided biopsy is recommended  BI-RADS 5    5/15/2024 right ultrasound-guided biopsy 8 o'clock position 12-gauge vacuum-assisted device was utilized 5 cores were obtained.  A true kyra vision Globe biopsy marker was placed at the biopsy site.  Postbiopsy mammogram shows the clip in expected location  Pathology returned as invasive ductal carcinoma  Surgical  consultation is recommended  5/15/2024 right ultrasound-guided biopsy 830 o'clock  Using a 12-gauge core needle biopsy 5 cores were obtained true kyra Pro Q tissue marker was placed within the biopsy site.  Pathology showed invasive ductal carcinoma  Follow-up mammogram and ultrasound shows clip in the expected location  Surgical consultation is recommended    5/15/2024 right ultrasound-guided needle aspiration the patient's right breast 8:00 was imaged and a 21-gauge needle was advanced to the target 3 passes were made through the area and a HydroMARK biopsy clip was deployed within the FNA bed.  Postprocedure mammogram ultrasound shows the clip in expected location pathology returned as bland lymphocytes with no evidence of metastatic disease in intramammary lymph node.  Pathology is benign and concordant    5/25/24 Breast MRI  FINDINGS: Scattered fibroglandular tissue is seen throughout both  breasts. Minimal background parenchymal enhancement is noted. Bilateral  subpectoral intact saline implants are noted.     In the posterior one-third lower outer quadrant of the right breast at  the 8:30 position centered on the order of 10 cm from the nipple there  is an irregular enhancing mass that measures on the order of 2.3 cm in  the anterior to posterior dimension, 2 cm in the superior to inferior  dimension and 2.2 cm in the medial to lateral dimension. A central  signal void is noted. This corresponds to the biopsy-proven malignancy  with the internal vision globe metallic clip. There is dendritic  enhancement that extends along the posteromedial margin of the mass to  the lateral margin of the implant capsule and there is some enhancement  of the overlying pectoral muscle and/or biologic implant capsule in this  region.     On the order of 2.2 cm posterior to the described malignancy at the 8:30  position and centered on the order of 13 cm from the nipple at the 8:30  position there is an irregular area of nonmass  enhancement that measures  2.4 cm in the superior to inferior dimension, 1.9 cm in the anterior to  posterior dimension and 1.4 cm in the medial to lateral dimension. A  signal void is noted within this region. This corresponds to the  biopsy-proven site of malignancy with the internal [place] twirl-shaped  metallic clip. The medial margin of the nonmass enhancement abuts the  lateral margin of the overlying pectoral muscle and/or biologic implant  capsule at this location.     No other areas of suspicious enhancement or morphology are seen in the right breast. I see no evidence for abnormal right breast skin or nipple enhancement. Asymmetrically enlarged right axillary lymph nodes, some with a rounded contour are noted. The largest right axillary lymph node measures on the order of 1.4 cm in greatest dimension. Cortical thickening of at least 2 lymph nodes in the right axilla is noted. No evidence for right internal mammary chain adenopathy is appreciated.     There are no areas of suspicious enhancement or morphology in the left breast. I see no evidence for abnormal skin, nipple or chest wall  enhancement of the left breast and there is no evidence for left  axillary or internal mammary chain adenopathy.      IMPRESSION:  1. There are 2 biopsy-proven sites of malignancy in the right breast at  the 8:30 position in the posterior one-third that measure on the order  of 2.3 cm and 2.4 cm in greatest dimension with the vision globe and  twirl-shaped metallic clips seen within these lesions, respectively. The  lesions either abut or extend to the lateral margin of the pectoral  fascia that overlies the implant capsule and some extension into the  pectoral muscle at this location and/or biologic capsule is suspected.  Findings suspicious for right axillary adenopathy are noted.     2. There are no findings suspicious for malignancy in the left breast.     BI-RADS CATEGORY 6: Known biopsy-proven malignancy.    PATHOLOGY:    7/11/24  1.  Right axillary sentinel lymph node:               A.  One of three lymph nodes involved by metastatic carcinoma (1/3).                            -Metastasis estimated up to 12 mm. in greatest dimension.                            -Extranodal extension is identified.                            -See synoptic for biomarkers on sharona metastasis.        2.  Right breast anterior skin margin:               A.  Invasive ductal carcinoma involving subcutaneous tissue.               B.  Invasive carcinoma focally involves medial aspect/margin of excision                         (yellow inked subcutaneous tissue)                             3.  Right breast, simple skin sparing mastectomy:               A. Invasive ductal carcinoma (Q shaped and vision clips):                            1. Invasive carcinoma spans an area estimated at 45 mm x 20 mm x 14 mm.                            2. Overall Krypton grade III (tubular score = 3, nuclear score = 3,                                mitotic score = 2).                            3.  Focal lymphovascular space invasion identified.                                           B.  No associated carcinoma in situ identified.               C.  Invasive carcinoma extends to involve the anterior margin of excision                     Invasive carcinoma focally measures 0.9 mm from the posterior margin of excision                            Anterior margin = positive; extensively involved-see also specimen #2 above.                            Posterior margin = 0.9 mm                            Superior margin = >100 mm                            Inferior margin = 15 mm                             Medial margin = > 100 mm                            Lateral margin = 50 mm               D. No lobular neoplasia (LCIS, ALH) identified.               E. Focal multiple biopsy site changes are identified, and 3 metallic clips are retrieved.               F.  No Pagetoid  involvement of skin nor nipple by malignancy identified.               G. Two intramammary lymph nodes negative for metastatic carcinoma (0/2).               H.  Previous Biomarkers: Estrogen receptors: Positive (), Progesterone receptors: Positive (),                         HER/2-lalit: Negative (score 1+), Ki-67 = 22% (see BV )     7/26/24  1. Right breast and skin, mastectomy margin, re-excision:               A. Focal residual invasive ductal carcinoma, moderately differentiated; Cynthia histologic grade II/III      (tubule score = 3, nuclear score = 3, mitoses score = 1), measuring 2 mm and coming to within 1 mm       of the nearest new (final) lateral skin margin; all other margins are free of tumor.               B. Positive for perineural invasion.               C. Prior procedure related changes noted.               D. Unremarkable skin.     8/29/24  Right axilla, core biopsy:    Metastatic ductal carcinoma, see comment    No lymphoid tissue identified  The current biopsy shows adipose tissue involved with carcinoma. Immunohistochemistry was performed utilizing appropriate controls and the tumor is positive for CK7 and strong 100% positive for estrogen receptor and progesterone receptor. CK20 is negative. If HER2 testing is required, please contact pathology to order.     Assessment & Plan  Assessment:  53 y.o. F with a diagnosis of right: Invasive ductal carcinoma grade 3, ER/IL +, Her2 -; [id number], Clinical anatomic stage IIA, Clinical prognostic stage IIA.      Discussion:  I had an extensive discussion with the patient and family about the nature of this breast cancer diagnosis. We reviewed the components of breast tissue including ducts and lobules. We reviewed her pathology report in detail. We reviewed breast cancer histology, including stage, grade, ER/IL receptors, HER2 receptors and how this applies to her diagnosis. We discussed the multidisciplinary approach to breast cancer  care.  Treatments can include surgery, radiation therapy, and medical therapy (chemotherapy, targeted therapy, and/or endocrine therapy).  The exact type of treatment and the order of therapy depends on the size and location/distribution of breast disease, the involvement of the regional lymph node basins, concern for or presence of metastatic disease, potential genetic mutations, and the individual breast cancer tumor markers expressed by the cancer. We also discussed other treatment options including the option of not undergoing any surgical treatment, with a palliative rather than curative intent and the risks associated with this including disease progression.    The patient's clinical stage is documented as above. This was discussed with the patient prior to initiation of treatment. All available pathology reports were discussed with the patient today. All treatment decisions were made via shared decision making with the patient. This patient was evaluated for appropriate ancillary referrals including, pre-treatment functional assessment, exercise program, nutrition program, genetics, and lymphedema clinic. This patient received preoperative and postoperative education. The patient was offered a breast reconstruction referral appropriate to plan surgical intervention; risks and benefits were discussed today. The patient was educated on perioperative multimodal pain management strategies. Barriers to effective and efficient care are/will be evaluated by the nurse navigator.    Plan:  A multidisciplinary plan has been formulated for the patient:      # Breast Surgical Oncology:  -Consents completed and signed. Discussed the risks and benefits of right axillary lymph node excision, intraoperative ultrasound guided. at length including estimated time for procedure, postoperative recovery, and postoperative instructions. Discussed the risks to include pain, bleeding, infection, nerve injury, numbness, seroma, skin  complications including necrosis, breast asymmetry, need for  lymphedema, lymphocele, and scar.  Patient appears to understand and wishes to proceed.  All questions were answered.      # Medical Oncology:  - on Anastrazole, no chemo per oncotype score  - plan for abemaciclib following radiation    #  Radiation Oncology:  - plan for post mastectomy and axillary radiation following surgery. Surgical excision of visualized disease    # Nurse Navigation  - Referral previously made    # Lymphedema clinic  - Referral previously placed     # Genetic Testing   -Not indicated by NCCN guidelines    # Breast Imaging  - Next Screening mammogram due: 4/2025      Romina De Anda MD  Breast Surgical Oncology    I spent 20 minutes caring for Dasha  on this date of service. This time includes time spent by me in the following activities: preparing for the visit, reviewing tests, obtaining and/or reviewing a separately obtained history, performing a medically appropriate examination and/or evaluation , counseling and educating the patient/family/caregiver, ordering medications, tests, or procedures, referring and communicating with other health care professionals , documenting information in the medical record, independently interpreting results and communicating that information with the patient/family/caregiver, and care coordination.

## 2024-09-12 ENCOUNTER — TELEPHONE (OUTPATIENT)
Dept: OBSTETRICS AND GYNECOLOGY | Age: 54
End: 2024-09-12
Payer: COMMERCIAL

## 2024-09-12 RX ORDER — SODIUM CHLORIDE 0.9 % (FLUSH) 0.9 %
10 SYRINGE (ML) INJECTION EVERY 12 HOURS SCHEDULED
OUTPATIENT
Start: 2024-09-12

## 2024-09-12 RX ORDER — SODIUM CHLORIDE 9 MG/ML
40 INJECTION, SOLUTION INTRAVENOUS AS NEEDED
OUTPATIENT
Start: 2024-09-12

## 2024-09-12 RX ORDER — SODIUM CHLORIDE 0.9 % (FLUSH) 0.9 %
10 SYRINGE (ML) INJECTION AS NEEDED
OUTPATIENT
Start: 2024-09-12

## 2024-09-12 NOTE — TELEPHONE ENCOUNTER
I received letter from Dr. De Anda about patient's upcoming procedure to have her lymph node surgery next week.  Patient has a Mirena IUD in place and her breast cancer is estrogen progesterone receptor positive.  Would recommend removal of the IUD.  In the past attempt at removal was painful and I had recommended hysteroscopic removal.  The patient never had the IUD removed.  I offered to try to remove the IUD with lidocaine injections into the cervix but patient declines.  She would like to see if it is possible to have her IUD removed while she is asleep for her surgery next Thursday.  I will check with Dr. De Anda and if she is agreeable we can try to set that up.  I am in the office next Thursday but I can see if one of my partners could come in to try to remove the IUD.

## 2024-09-16 ENCOUNTER — TELEPHONE (OUTPATIENT)
Dept: ONCOLOGY | Facility: CLINIC | Age: 54
End: 2024-09-16
Payer: COMMERCIAL

## 2024-09-17 ENCOUNTER — SPECIALTY PHARMACY (OUTPATIENT)
Dept: PHARMACY | Facility: HOSPITAL | Age: 54
End: 2024-09-17
Payer: COMMERCIAL

## 2024-09-19 ENCOUNTER — ANESTHESIA (OUTPATIENT)
Dept: PERIOP | Facility: HOSPITAL | Age: 54
End: 2024-09-19
Payer: COMMERCIAL

## 2024-09-19 ENCOUNTER — ANESTHESIA EVENT (OUTPATIENT)
Dept: PERIOP | Facility: HOSPITAL | Age: 54
End: 2024-09-19
Payer: COMMERCIAL

## 2024-09-19 ENCOUNTER — HOSPITAL ENCOUNTER (OUTPATIENT)
Facility: HOSPITAL | Age: 54
Setting detail: HOSPITAL OUTPATIENT SURGERY
Discharge: HOME | End: 2024-09-19
Attending: STUDENT IN AN ORGANIZED HEALTH CARE EDUCATION/TRAINING PROGRAM | Admitting: STUDENT IN AN ORGANIZED HEALTH CARE EDUCATION/TRAINING PROGRAM
Payer: COMMERCIAL

## 2024-09-19 ENCOUNTER — APPOINTMENT (OUTPATIENT)
Dept: GENERAL RADIOLOGY | Facility: HOSPITAL | Age: 54
End: 2024-09-19
Payer: COMMERCIAL

## 2024-09-19 VITALS
DIASTOLIC BLOOD PRESSURE: 75 MMHG | RESPIRATION RATE: 14 BRPM | HEART RATE: 96 BPM | TEMPERATURE: 97 F | SYSTOLIC BLOOD PRESSURE: 141 MMHG | OXYGEN SATURATION: 95 %

## 2024-09-19 DIAGNOSIS — T83.32XD INTRAUTERINE DEVICE (IUD) MIGRATION, SUBSEQUENT ENCOUNTER: ICD-10-CM

## 2024-09-19 DIAGNOSIS — Z17.0 MALIGNANT NEOPLASM OF LOWER-OUTER QUADRANT OF RIGHT BREAST OF FEMALE, ESTROGEN RECEPTOR POSITIVE: ICD-10-CM

## 2024-09-19 DIAGNOSIS — Z97.5 IUD (INTRAUTERINE DEVICE) IN PLACE: ICD-10-CM

## 2024-09-19 DIAGNOSIS — C50.511 MALIGNANT NEOPLASM OF LOWER-OUTER QUADRANT OF RIGHT BREAST OF FEMALE, ESTROGEN RECEPTOR POSITIVE: ICD-10-CM

## 2024-09-19 LAB
DEPRECATED RDW RBC AUTO: 43.1 FL (ref 37–54)
ERYTHROCYTE [DISTWIDTH] IN BLOOD BY AUTOMATED COUNT: 13.2 % (ref 12.3–15.4)
HCT VFR BLD AUTO: 41 % (ref 34–46.6)
HGB BLD-MCNC: 13.1 G/DL (ref 12–15.9)
MCH RBC QN AUTO: 28.5 PG (ref 26.6–33)
MCHC RBC AUTO-ENTMCNC: 32 G/DL (ref 31.5–35.7)
MCV RBC AUTO: 89.1 FL (ref 79–97)
PLATELET # BLD AUTO: 291 10*3/MM3 (ref 140–450)
PMV BLD AUTO: 9.4 FL (ref 6–12)
RBC # BLD AUTO: 4.6 10*6/MM3 (ref 3.77–5.28)
WBC NRBC COR # BLD AUTO: 6.86 10*3/MM3 (ref 3.4–10.8)

## 2024-09-19 PROCEDURE — 76098 X-RAY EXAM SURGICAL SPECIMEN: CPT

## 2024-09-19 PROCEDURE — 25010000002 BUPIVACAINE (PF) 0.5 % SOLUTION 10 ML VIAL: Performed by: STUDENT IN AN ORGANIZED HEALTH CARE EDUCATION/TRAINING PROGRAM

## 2024-09-19 PROCEDURE — 25810000003 LACTATED RINGERS PER 1000 ML: Performed by: STUDENT IN AN ORGANIZED HEALTH CARE EDUCATION/TRAINING PROGRAM

## 2024-09-19 PROCEDURE — 25010000002 CLINDAMYCIN 900 MG/50ML SOLUTION: Performed by: STUDENT IN AN ORGANIZED HEALTH CARE EDUCATION/TRAINING PROGRAM

## 2024-09-19 PROCEDURE — 85027 COMPLETE CBC AUTOMATED: CPT | Performed by: STUDENT IN AN ORGANIZED HEALTH CARE EDUCATION/TRAINING PROGRAM

## 2024-09-19 PROCEDURE — 25010000002 HYDROMORPHONE 1 MG/ML SOLUTION: Performed by: NURSE ANESTHETIST, CERTIFIED REGISTERED

## 2024-09-19 PROCEDURE — 25010000002 PROPOFOL 200 MG/20ML EMULSION: Performed by: NURSE ANESTHETIST, CERTIFIED REGISTERED

## 2024-09-19 PROCEDURE — 25010000002 FENTANYL CITRATE (PF) 50 MCG/ML SOLUTION: Performed by: STUDENT IN AN ORGANIZED HEALTH CARE EDUCATION/TRAINING PROGRAM

## 2024-09-19 PROCEDURE — 38525 BIOPSY/REMOVAL LYMPH NODES: CPT | Performed by: STUDENT IN AN ORGANIZED HEALTH CARE EDUCATION/TRAINING PROGRAM

## 2024-09-19 PROCEDURE — 58301 REMOVE INTRAUTERINE DEVICE: CPT | Performed by: OBSTETRICS & GYNECOLOGY

## 2024-09-19 PROCEDURE — 25010000002 MIDAZOLAM PER 1 MG: Performed by: STUDENT IN AN ORGANIZED HEALTH CARE EDUCATION/TRAINING PROGRAM

## 2024-09-19 PROCEDURE — 88360 TUMOR IMMUNOHISTOCHEM/MANUAL: CPT | Performed by: STUDENT IN AN ORGANIZED HEALTH CARE EDUCATION/TRAINING PROGRAM

## 2024-09-19 PROCEDURE — 25010000002 DEXAMETHASONE SODIUM PHOSPHATE 20 MG/5ML SOLUTION: Performed by: NURSE ANESTHETIST, CERTIFIED REGISTERED

## 2024-09-19 PROCEDURE — 25010000002 KETOROLAC TROMETHAMINE PER 15 MG: Performed by: NURSE ANESTHETIST, CERTIFIED REGISTERED

## 2024-09-19 PROCEDURE — 88307 TISSUE EXAM BY PATHOLOGIST: CPT | Performed by: STUDENT IN AN ORGANIZED HEALTH CARE EDUCATION/TRAINING PROGRAM

## 2024-09-19 PROCEDURE — 25010000002 ONDANSETRON PER 1 MG: Performed by: NURSE ANESTHETIST, CERTIFIED REGISTERED

## 2024-09-19 RX ORDER — SCOLOPAMINE TRANSDERMAL SYSTEM 1 MG/1
1 PATCH, EXTENDED RELEASE TRANSDERMAL
Status: DISCONTINUED | OUTPATIENT
Start: 2024-09-19 | End: 2024-09-19 | Stop reason: HOSPADM

## 2024-09-19 RX ORDER — EPHEDRINE SULFATE 50 MG/ML
5 INJECTION, SOLUTION INTRAVENOUS ONCE AS NEEDED
Status: DISCONTINUED | OUTPATIENT
Start: 2024-09-19 | End: 2024-09-19 | Stop reason: HOSPADM

## 2024-09-19 RX ORDER — HYDROCODONE BITARTRATE AND ACETAMINOPHEN 7.5; 325 MG/1; MG/1
1 TABLET ORAL EVERY 4 HOURS PRN
Status: DISCONTINUED | OUTPATIENT
Start: 2024-09-19 | End: 2024-09-19 | Stop reason: HOSPADM

## 2024-09-19 RX ORDER — FENTANYL CITRATE 50 UG/ML
25 INJECTION, SOLUTION INTRAMUSCULAR; INTRAVENOUS
Status: DISCONTINUED | OUTPATIENT
Start: 2024-09-19 | End: 2024-09-19 | Stop reason: HOSPADM

## 2024-09-19 RX ORDER — DROPERIDOL 2.5 MG/ML
0.62 INJECTION, SOLUTION INTRAMUSCULAR; INTRAVENOUS
Status: DISCONTINUED | OUTPATIENT
Start: 2024-09-19 | End: 2024-09-19 | Stop reason: HOSPADM

## 2024-09-19 RX ORDER — NALOXONE HCL 0.4 MG/ML
0.2 VIAL (ML) INJECTION AS NEEDED
Status: DISCONTINUED | OUTPATIENT
Start: 2024-09-19 | End: 2024-09-19 | Stop reason: HOSPADM

## 2024-09-19 RX ORDER — SODIUM CHLORIDE 0.9 % (FLUSH) 0.9 %
3 SYRINGE (ML) INJECTION EVERY 12 HOURS SCHEDULED
Status: DISCONTINUED | OUTPATIENT
Start: 2024-09-19 | End: 2024-09-19 | Stop reason: HOSPADM

## 2024-09-19 RX ORDER — HYDROCODONE BITARTRATE AND ACETAMINOPHEN 5; 325 MG/1; MG/1
1 TABLET ORAL ONCE AS NEEDED
Status: DISCONTINUED | OUTPATIENT
Start: 2024-09-19 | End: 2024-09-19 | Stop reason: HOSPADM

## 2024-09-19 RX ORDER — ONDANSETRON 2 MG/ML
INJECTION INTRAMUSCULAR; INTRAVENOUS AS NEEDED
Status: DISCONTINUED | OUTPATIENT
Start: 2024-09-19 | End: 2024-09-19 | Stop reason: SURG

## 2024-09-19 RX ORDER — ATORVASTATIN CALCIUM 40 MG/1
40 TABLET, FILM COATED ORAL DAILY
COMMUNITY

## 2024-09-19 RX ORDER — IPRATROPIUM BROMIDE AND ALBUTEROL SULFATE 2.5; .5 MG/3ML; MG/3ML
3 SOLUTION RESPIRATORY (INHALATION) ONCE AS NEEDED
Status: DISCONTINUED | OUTPATIENT
Start: 2024-09-19 | End: 2024-09-19 | Stop reason: HOSPADM

## 2024-09-19 RX ORDER — LIDOCAINE HYDROCHLORIDE 20 MG/ML
INJECTION, SOLUTION INFILTRATION; PERINEURAL AS NEEDED
Status: DISCONTINUED | OUTPATIENT
Start: 2024-09-19 | End: 2024-09-19 | Stop reason: SURG

## 2024-09-19 RX ORDER — KETOROLAC TROMETHAMINE 30 MG/ML
INJECTION, SOLUTION INTRAMUSCULAR; INTRAVENOUS AS NEEDED
Status: DISCONTINUED | OUTPATIENT
Start: 2024-09-19 | End: 2024-09-19 | Stop reason: SURG

## 2024-09-19 RX ORDER — HYDROMORPHONE HYDROCHLORIDE 1 MG/ML
0.25 INJECTION, SOLUTION INTRAMUSCULAR; INTRAVENOUS; SUBCUTANEOUS
Status: DISCONTINUED | OUTPATIENT
Start: 2024-09-19 | End: 2024-09-19 | Stop reason: HOSPADM

## 2024-09-19 RX ORDER — CLINDAMYCIN PHOSPHATE 900 MG/50ML
900 INJECTION, SOLUTION INTRAVENOUS ONCE
Status: COMPLETED | OUTPATIENT
Start: 2024-09-19 | End: 2024-09-19

## 2024-09-19 RX ORDER — FENTANYL CITRATE 50 UG/ML
50 INJECTION, SOLUTION INTRAMUSCULAR; INTRAVENOUS ONCE AS NEEDED
Status: COMPLETED | OUTPATIENT
Start: 2024-09-19 | End: 2024-09-19

## 2024-09-19 RX ORDER — SODIUM CHLORIDE 9 MG/ML
40 INJECTION, SOLUTION INTRAVENOUS AS NEEDED
Status: DISCONTINUED | OUTPATIENT
Start: 2024-09-19 | End: 2024-09-19 | Stop reason: HOSPADM

## 2024-09-19 RX ORDER — LIDOCAINE HYDROCHLORIDE 10 MG/ML
0.5 INJECTION, SOLUTION INFILTRATION; PERINEURAL ONCE AS NEEDED
Status: DISCONTINUED | OUTPATIENT
Start: 2024-09-19 | End: 2024-09-19 | Stop reason: HOSPADM

## 2024-09-19 RX ORDER — SODIUM CHLORIDE 0.9 % (FLUSH) 0.9 %
10 SYRINGE (ML) INJECTION AS NEEDED
Status: DISCONTINUED | OUTPATIENT
Start: 2024-09-19 | End: 2024-09-19 | Stop reason: HOSPADM

## 2024-09-19 RX ORDER — MELOXICAM 15 MG/1
15 TABLET ORAL DAILY
COMMUNITY
End: 2024-09-25 | Stop reason: SDUPTHER

## 2024-09-19 RX ORDER — PROPOFOL 10 MG/ML
INJECTION, EMULSION INTRAVENOUS AS NEEDED
Status: DISCONTINUED | OUTPATIENT
Start: 2024-09-19 | End: 2024-09-19 | Stop reason: SURG

## 2024-09-19 RX ORDER — DIPHENHYDRAMINE HYDROCHLORIDE 50 MG/ML
12.5 INJECTION INTRAMUSCULAR; INTRAVENOUS
Status: DISCONTINUED | OUTPATIENT
Start: 2024-09-19 | End: 2024-09-19 | Stop reason: HOSPADM

## 2024-09-19 RX ORDER — PROMETHAZINE HYDROCHLORIDE 25 MG/1
25 SUPPOSITORY RECTAL ONCE AS NEEDED
Status: DISCONTINUED | OUTPATIENT
Start: 2024-09-19 | End: 2024-09-19 | Stop reason: HOSPADM

## 2024-09-19 RX ORDER — DEXAMETHASONE SODIUM PHOSPHATE 4 MG/ML
INJECTION, SOLUTION INTRA-ARTICULAR; INTRALESIONAL; INTRAMUSCULAR; INTRAVENOUS; SOFT TISSUE AS NEEDED
Status: DISCONTINUED | OUTPATIENT
Start: 2024-09-19 | End: 2024-09-19 | Stop reason: SURG

## 2024-09-19 RX ORDER — LABETALOL HYDROCHLORIDE 5 MG/ML
5 INJECTION, SOLUTION INTRAVENOUS
Status: DISCONTINUED | OUTPATIENT
Start: 2024-09-19 | End: 2024-09-19 | Stop reason: HOSPADM

## 2024-09-19 RX ORDER — HYDRALAZINE HYDROCHLORIDE 20 MG/ML
5 INJECTION INTRAMUSCULAR; INTRAVENOUS
Status: DISCONTINUED | OUTPATIENT
Start: 2024-09-19 | End: 2024-09-19 | Stop reason: HOSPADM

## 2024-09-19 RX ORDER — SODIUM CHLORIDE, SODIUM LACTATE, POTASSIUM CHLORIDE, CALCIUM CHLORIDE 600; 310; 30; 20 MG/100ML; MG/100ML; MG/100ML; MG/100ML
9 INJECTION, SOLUTION INTRAVENOUS CONTINUOUS
Status: DISCONTINUED | OUTPATIENT
Start: 2024-09-19 | End: 2024-09-19 | Stop reason: HOSPADM

## 2024-09-19 RX ORDER — ONDANSETRON 2 MG/ML
4 INJECTION INTRAMUSCULAR; INTRAVENOUS ONCE AS NEEDED
Status: DISCONTINUED | OUTPATIENT
Start: 2024-09-19 | End: 2024-09-19 | Stop reason: HOSPADM

## 2024-09-19 RX ORDER — MIDAZOLAM HYDROCHLORIDE 1 MG/ML
1 INJECTION INTRAMUSCULAR; INTRAVENOUS
Status: COMPLETED | OUTPATIENT
Start: 2024-09-19 | End: 2024-09-19

## 2024-09-19 RX ORDER — SODIUM CHLORIDE 0.9 % (FLUSH) 0.9 %
10 SYRINGE (ML) INJECTION EVERY 12 HOURS SCHEDULED
Status: DISCONTINUED | OUTPATIENT
Start: 2024-09-19 | End: 2024-09-19 | Stop reason: HOSPADM

## 2024-09-19 RX ORDER — PROMETHAZINE HYDROCHLORIDE 25 MG/1
25 TABLET ORAL ONCE AS NEEDED
Status: DISCONTINUED | OUTPATIENT
Start: 2024-09-19 | End: 2024-09-19 | Stop reason: HOSPADM

## 2024-09-19 RX ORDER — SODIUM CHLORIDE 0.9 % (FLUSH) 0.9 %
3-10 SYRINGE (ML) INJECTION AS NEEDED
Status: DISCONTINUED | OUTPATIENT
Start: 2024-09-19 | End: 2024-09-19 | Stop reason: HOSPADM

## 2024-09-19 RX ORDER — FLUMAZENIL 0.1 MG/ML
0.2 INJECTION INTRAVENOUS AS NEEDED
Status: DISCONTINUED | OUTPATIENT
Start: 2024-09-19 | End: 2024-09-19 | Stop reason: HOSPADM

## 2024-09-19 RX ADMIN — SCOPALAMINE 1 PATCH: 1 PATCH, EXTENDED RELEASE TRANSDERMAL at 09:59

## 2024-09-19 RX ADMIN — MIDAZOLAM 1 MG: 1 INJECTION INTRAMUSCULAR; INTRAVENOUS at 10:53

## 2024-09-19 RX ADMIN — KETOROLAC TROMETHAMINE 30 MG: 30 INJECTION, SOLUTION INTRAMUSCULAR at 13:03

## 2024-09-19 RX ADMIN — ONDANSETRON 4 MG: 2 INJECTION INTRAMUSCULAR; INTRAVENOUS at 12:55

## 2024-09-19 RX ADMIN — PROPOFOL 200 MG: 10 INJECTION, EMULSION INTRAVENOUS at 12:02

## 2024-09-19 RX ADMIN — LIDOCAINE HYDROCHLORIDE 100 MG: 20 INJECTION, SOLUTION INFILTRATION; PERINEURAL at 12:02

## 2024-09-19 RX ADMIN — FENTANYL CITRATE 50 MCG: 50 INJECTION, SOLUTION INTRAMUSCULAR; INTRAVENOUS at 12:07

## 2024-09-19 RX ADMIN — SODIUM CHLORIDE, POTASSIUM CHLORIDE, SODIUM LACTATE AND CALCIUM CHLORIDE 9 ML/HR: 600; 310; 30; 20 INJECTION, SOLUTION INTRAVENOUS at 09:59

## 2024-09-19 RX ADMIN — MIDAZOLAM 1 MG: 1 INJECTION INTRAMUSCULAR; INTRAVENOUS at 09:59

## 2024-09-19 RX ADMIN — HYDROMORPHONE HYDROCHLORIDE 0.5 MG: 1 INJECTION, SOLUTION INTRAMUSCULAR; INTRAVENOUS; SUBCUTANEOUS at 12:36

## 2024-09-19 RX ADMIN — DEXAMETHASONE SODIUM PHOSPHATE 10 MG: 4 INJECTION, SOLUTION INTRAMUSCULAR; INTRAVENOUS at 12:08

## 2024-09-19 RX ADMIN — CLINDAMYCIN PHOSPHATE 900 MG: 900 INJECTION, SOLUTION INTRAVENOUS at 11:50

## 2024-09-19 RX ADMIN — PROPOFOL 25 MCG/KG/MIN: 10 INJECTION, EMULSION INTRAVENOUS at 12:19

## 2024-09-24 ENCOUNTER — TELEPHONE (OUTPATIENT)
Dept: SURGERY | Facility: CLINIC | Age: 54
End: 2024-09-24
Payer: COMMERCIAL

## 2024-09-24 LAB
CYTO UR: NORMAL
LAB AP CASE REPORT: NORMAL
LAB AP SPECIAL STAINS: NORMAL
LAB AP SYNOPTIC CHECKLIST: NORMAL
PATH REPORT.ADDENDUM SPEC: NORMAL
PATH REPORT.FINAL DX SPEC: NORMAL
PATH REPORT.GROSS SPEC: NORMAL

## 2024-09-25 DIAGNOSIS — I10 ESSENTIAL HYPERTENSION: ICD-10-CM

## 2024-09-25 RX ORDER — OLMESARTAN MEDOXOMIL 40 MG/1
20 TABLET ORAL DAILY
Qty: 45 TABLET | Refills: 1 | Status: SHIPPED | OUTPATIENT
Start: 2024-09-25

## 2024-09-25 RX ORDER — MELOXICAM 15 MG/1
15 TABLET ORAL DAILY
Qty: 90 TABLET | Refills: 0 | Status: SHIPPED | OUTPATIENT
Start: 2024-09-25

## 2024-10-04 ENCOUNTER — OFFICE VISIT (OUTPATIENT)
Dept: SURGERY | Facility: CLINIC | Age: 54
End: 2024-10-04
Payer: COMMERCIAL

## 2024-10-04 ENCOUNTER — OFFICE VISIT (OUTPATIENT)
Dept: RADIATION ONCOLOGY | Facility: HOSPITAL | Age: 54
End: 2024-10-04
Payer: COMMERCIAL

## 2024-10-04 VITALS
OXYGEN SATURATION: 99 % | HEART RATE: 106 BPM | SYSTOLIC BLOOD PRESSURE: 130 MMHG | RESPIRATION RATE: 18 BRPM | WEIGHT: 172.2 LBS | TEMPERATURE: 97.9 F | DIASTOLIC BLOOD PRESSURE: 79 MMHG | BODY MASS INDEX: 28.66 KG/M2

## 2024-10-04 VITALS
OXYGEN SATURATION: 97 % | SYSTOLIC BLOOD PRESSURE: 124 MMHG | BODY MASS INDEX: 28.99 KG/M2 | DIASTOLIC BLOOD PRESSURE: 76 MMHG | HEIGHT: 65 IN | HEART RATE: 96 BPM | WEIGHT: 174 LBS | RESPIRATION RATE: 18 BRPM

## 2024-10-04 DIAGNOSIS — C50.511 MALIGNANT NEOPLASM OF LOWER-OUTER QUADRANT OF RIGHT BREAST OF FEMALE, ESTROGEN RECEPTOR POSITIVE: Primary | ICD-10-CM

## 2024-10-04 DIAGNOSIS — Z17.0 MALIGNANT NEOPLASM OF LOWER-OUTER QUADRANT OF RIGHT BREAST OF FEMALE, ESTROGEN RECEPTOR POSITIVE: Primary | ICD-10-CM

## 2024-10-04 PROCEDURE — G0463 HOSPITAL OUTPT CLINIC VISIT: HCPCS | Performed by: INTERNAL MEDICINE

## 2024-10-04 PROCEDURE — 99024 POSTOP FOLLOW-UP VISIT: CPT | Performed by: STUDENT IN AN ORGANIZED HEALTH CARE EDUCATION/TRAINING PROGRAM

## 2024-10-07 ENCOUNTER — OFFICE VISIT (OUTPATIENT)
Dept: OBSTETRICS AND GYNECOLOGY | Age: 54
End: 2024-10-07
Payer: COMMERCIAL

## 2024-10-07 ENCOUNTER — HOSPITAL ENCOUNTER (OUTPATIENT)
Dept: RADIATION ONCOLOGY | Facility: HOSPITAL | Age: 54
Setting detail: RADIATION/ONCOLOGY SERIES
End: 2024-10-07
Payer: COMMERCIAL

## 2024-10-07 ENCOUNTER — PATIENT OUTREACH (OUTPATIENT)
Dept: OTHER | Facility: HOSPITAL | Age: 54
End: 2024-10-07
Payer: COMMERCIAL

## 2024-10-07 VITALS
SYSTOLIC BLOOD PRESSURE: 118 MMHG | BODY MASS INDEX: 28.32 KG/M2 | HEIGHT: 65 IN | WEIGHT: 170 LBS | DIASTOLIC BLOOD PRESSURE: 66 MMHG

## 2024-10-07 DIAGNOSIS — R87.610 ASCUS OF CERVIX WITH NEGATIVE HIGH RISK HPV: Primary | ICD-10-CM

## 2024-10-07 DIAGNOSIS — Z11.51 SCREENING FOR HUMAN PAPILLOMAVIRUS (HPV): ICD-10-CM

## 2024-10-07 DIAGNOSIS — Z12.4 SCREENING FOR MALIGNANT NEOPLASM OF CERVIX: ICD-10-CM

## 2024-10-07 PROCEDURE — 99213 OFFICE O/P EST LOW 20 MIN: CPT | Performed by: OBSTETRICS & GYNECOLOGY

## 2024-10-07 NOTE — PROGRESS NOTES
"  Chief [id number] after IUD removal and ASCUS Pap with negative HPV    History of present illness- Patient is a 53 y.o.  who is undergoing treatment for breast cancer.  Patient had a Mirena IUD in place that needed to be removed but was unable to be removed in the office.  At the time of patient's breast surgery Dr. Burciaga did an IUD removal.  The IUD felt adherent and the strings broke.  He was able to remove it by dilating the cervix and grasping the IUD.  The IUD was removed intact.  Patient had minimal bleeding afterwards.    Patient had an ASCUS Pap with negative HPV in 2023.  She is overdue for repeat Justin Pap.        /66   Ht 165.1 cm (65\")   Wt 77.1 kg (170 lb)   LMP  (LMP Unknown)   BMI 28.29 kg/m²   Physical Exam  Constitutional:       Appearance: Normal appearance.   Genitourinary:     Comments: On sterile speculum exam the cervix appears normal with no lesions or bleeding.  Pap smear is collected.  On bimanual exam there is no cervical motion tenderness.  Uterus feels small and mobile.  No adnexal masses are palpated.  Neurological:      Mental Status: She is alert.   Psychiatric:         Mood and Affect: Mood normal.         Thought Content: Thought content normal.         Judgment: Judgment normal.            Diagnoses and all orders for this visit:    1. ASCUS of cervix with negative high risk HPV (Primary)    2. Screening for human papillomavirus (HPV)  -     IGP, Apt HPV,rfx 16 / 18,45    3. Screening for malignant neoplasm of cervix  -     IGP, Apt HPV,rfx 16 / 18,45    Repeat Pap was sent off today.  We will call the patient with results  Cervix appears normal after IUD removal.    Patient will keep her annual exam in the spring with Radha.  "

## 2024-10-07 NOTE — PROGRESS NOTES
[facility] RADIATION ONCOLOGY  FOLLOW-UP NOTE    NAME: Dasha De Leon  YOB: 1970  MRN #: [id number]  DATE OF SERVICE: 10/7/2024  REFERRING PROVIDER: Ariana Coffman PA-C   PRIMARY CARE PROVIDER: Ariana Coffman PA-C    CHIEF COMPLAINT: Primary malignant neoplasm of the lower outer quadrant of the right breast, discuss recent pathology    DIAGNOSIS:    Encounter Diagnosis   Name Primary?    Malignant neoplasm of lower-outer quadrant of right breast of female, estrogen receptor positive Yes          INTERVAL HISTORY     Dasha De Leon is a 53 y.o. female who presents with history of stage IIA (pT2, pN1a(sn), cM0, G3, ER+, DC+, HER2-) right breast invasive ductal carcinoma. She is sp mastectomy and SLNB on 7/11/2024. She underwent re-resection for a positive margin on 7/26/2024. She now returns to discuss biopsy results from her recent right axilla LN biopsy/excision.     Since our last appointment the patient underwent surgery to remove her biopsy-proven metastatic lymph node.  This was performed on 9/19/2024.  The patient returns to review pathology and discuss next steps in treatment.    The patient reports she is healing well.  Her expanders have been inflated.  She is otherwise doing well and interested in proceeding with her next steps in treatment.        IMAGING     US Guided Lymph Node Biopsy    Addendum Date: 9/5/2024    The patient's final pathology results are now available from the ultrasound-guided right axillary lymph node core biopsy procedure. There are findings consistent with metastatic ductal carcinoma. No lymphoid tissue was visible in the specimen. I would recommend appropriate oncologic and surgical management of this patient. The abnormal results were relayed to the referring clinician's office by the [facility] Breast Navigator.  Electronically Signed By-Costa Robles MD On:9/5/2024 8:31 AM      Result Date:  9/5/2024  IMPRESSION : 1. The right axillary ultrasound confirms the presence of an abnormal lymph node measuring 1.6 x 1.2 x 1.1 cm. This corresponds well to the finding seen on the CT of the chest. 2. Technically successful ultrasound-guided percutaneous right axillary lymph node core biopsy procedure as described above.  Electronically Signed By-Costa Robles MD On:8/29/2024 4:11 PM      US Axilla Right    Addendum Date: 9/5/2024    The patient's final pathology results are now available from the ultrasound-guided right axillary lymph node core biopsy procedure. There are findings consistent with metastatic ductal carcinoma. No lymphoid tissue was visible in the specimen. I would recommend appropriate oncologic and surgical management of this patient. The abnormal results were relayed to the referring clinician's office by the [facility] Breast Navigator.  Electronically Signed By-Costa Robles MD On:9/5/2024 8:31 AM      Result Date: 9/5/2024  IMPRESSION : 1. The right axillary ultrasound confirms the presence of an abnormal lymph node measuring 1.6 x 1.2 x 1.1 cm. This corresponds well to the finding seen on the CT of the chest. 2. Technically successful ultrasound-guided percutaneous right axillary lymph node core biopsy procedure as described above.  Electronically Signed By-Costa Robles MD On:8/29/2024 4:11 PM      DEXA Bone Density Axial    Result Date: 8/30/2024  1.  Normal bone mineral density.   FRAX *  WHO category: Normal. Fracture risk: Not increased. *  FRAX not reported: All T-scores at or above -1.0.   Current recommendations are that a follow-up bone density be obtained at an interval of not less than 2 years except in specific circumstances, such as after initiation or change of therapy.  This report was finalized on 8/30/2024 10:22 AM by Yehuda Ornelas M.D on Workstation: [id number]      NM Bone Scan Whole Body    Result Date: 8/14/2024  No scintigraphic evidence of  osseous metastatic disease.   This report was finalized on 8/14/2024 1:03 PM by Dr. Marty Bragg M.D on Workstation: [id number]      CT Chest With Contrast Diagnostic    Result Date: 8/14/2024  1. There is a single mildly enlarged right axillary lymph node suspicious for a sharona metastasis. No evidence to suggest distal metastatic disease in patient with recent right mastectomy with implant reconstruction. There is fluid and air surrounding the implant and a tissue expander is present with adjacent surgical drain. 2. Mild hepatic steatosis. 3. Duodenal diverticulum. 4. IUD is present.  Radiation dose reduction techniques were utilized, including automated exposure control and exposure modulation based on body size.   This report was finalized on 8/14/2024 11:23 AM by Yehuda Ornelas M.D on Workstation: [id number]      CT Abdomen Pelvis With Contrast    Result Date: 8/14/2024  1. There is a single mildly enlarged right axillary lymph node suspicious for a sharona metastasis. No evidence to suggest distal metastatic disease in patient with recent right mastectomy with implant reconstruction. There is fluid and air surrounding the implant and a tissue expander is present with adjacent surgical drain. 2. Mild hepatic steatosis. 3. Duodenal diverticulum. 4. IUD is present.  Radiation dose reduction techniques were utilized, including automated exposure control and exposure modulation based on body size.   This report was finalized on 8/14/2024 11:23 AM by Yehuda Ornelas M.D on Workstation: [id number]      MRI Breast Bilateral Diagnostic W WO Contrast    Result Date: 5/28/2024  1. There are 2 biopsy-proven sites of malignancy in the right breast at the 8:30 position in the posterior one-third that measure on the order of 2.3 cm and 2.4 cm in greatest dimension with the vision globe and twirl-shaped metallic clips seen within these lesions, respectively. The lesions either abut or extend to the lateral margin of the  pectoral fascia that overlies the implant capsule and some extension into the pectoral muscle at this location and/or biologic capsule is suspected. Findings suspicious for right axillary adenopathy are noted.  2. There are no findings suspicious for malignancy in the left breast.  BI-RADS CATEGORY 6: Known biopsy-proven malignancy.  This report was finalized on 5/28/2024 4:50 PM by Dr. Chidi Gorman M.D on Workstation: [id number]         PATHOLOGY     9/19/2024    Final Diagnosis   1.  Right axillary lymph node, excision:         A.  One lymph node involved by macrometastatic mammary carcinoma, measuring 9.5 mm in extent (1/1).  Extranodal extension is present (>2 mm).         B.  Background fibroadipose tissue with lymphovascular invasion.           C.  Clip and biopsy site associated with carcinoma.           D.  See biomarker template.       LABS     HEMATOLOGY:  WBC   Date Value Ref Range Status   09/19/2024 6.86 3.40 - 10.80 10*3/mm3 Final   01/26/2021 8.75 3.40 - 10.80 10*3/mm3 Final     RBC   Date Value Ref Range Status   09/19/2024 4.60 3.77 - 5.28 10*6/mm3 Final   01/26/2021 4.36 3.77 - 5.28 10*6/mm3 Final     Hemoglobin   Date Value Ref Range Status   09/19/2024 13.1 12.0 - 15.9 g/dL Final     Hematocrit   Date Value Ref Range Status   09/19/2024 41.0 34.0 - 46.6 % Final     Platelets   Date Value Ref Range Status   09/19/2024 291 140 - 450 10*3/mm3 Final     CHEMISTRY:  Lab Results   Component Value Date    GLUCOSE 100 (H) 07/01/2024    BUN 19 07/01/2024    CREATININE 0.75 07/01/2024    EGFRIFNONA 87 09/09/2021    EGFRIFAFRI 106 09/09/2021    BCR 25.3 (H) 07/01/2024    K 4.0 07/01/2024    CO2 22.8 07/01/2024    CALCIUM 9.4 07/01/2024    PROTENTOTREF 6.9 06/30/2023    ALBUMIN 4.5 06/30/2023    LABIL2 1.9 06/30/2023    AST 25 06/30/2023    ALT 25 06/30/2023     PROBLEM LIST     Patient Active Problem List   Diagnosis    Essential hypertension    Mood disorder    Allergic rhinitis    Hyperlipidemia     Knee pain, right    Lumbar radiculopathy    Vitamin D deficiency    Lumbar spinal stenosis    Follow-up examination following surgery    Abnormality of right breast on screening mammogram    Malignant neoplasm of lower-outer quadrant of right breast of female, estrogen receptor positive    Anxiety    ASCUS of cervix with negative high risk HPV      CURRENT MEDICATIONS     Current Outpatient Medications   Medication Instructions    ALPRAZolam (XANAX) 0.25 mg, Oral, 2 Times Daily PRN    anastrozole (ARIMIDEX) 1 mg, Oral, Daily    atorvastatin (LIPITOR) 40 mg, Oral, Daily    buPROPion XL (WELLBUTRIN XL) 300 mg, Oral, Daily    cyclobenzaprine (FLEXERIL) 10 mg, Oral, 3 Times Daily PRN    escitalopram (LEXAPRO) 20 mg, Oral, Daily    famotidine (PEPCID) 20 mg, Oral, 2 Times Daily    furosemide (LASIX) 20 mg, Oral, 2 Times Daily    HYDROcodone-acetaminophen (NORCO) 5-325 MG per tablet 1 tablet, Oral, Every 4 Hours PRN    levocetirizine (XYZAL) 5 MG tablet Take one tablet by mouth twice daily for urticaria.    meloxicam (MOBIC) 15 mg, Oral, Daily    olmesartan (BENICAR) 20 mg, Oral, Daily    ondansetron (ZOFRAN) 8 mg, Oral, 3 Times Daily PRN      ALLERGIES     Allergies   Allergen Reactions    Penicillins Anaphylaxis    Hemp Seed Oil Itching     HEMP IN LOTIONS    Lisinopril Cough       REVIEW OF SYSTEMS     Review of Systems   Constitutional:  Positive for fatigue. Negative for activity change.      Vitals:    10/04/24 1048   BP: 130/79   Pulse: 106   Resp: 18   Temp: 97.9 °F (36.6 °C)   SpO2: 99%      Physical Exam  Constitutional:       General: She is not in acute distress.  HENT:      Head: Normocephalic and atraumatic.   Pulmonary:      Effort: Pulmonary effort is normal. No respiratory distress.   Chest:      Comments: Surgical scar from recent excision appears appropriately healed with no evidence of recent bleeding or infection  Neurological:      Mental Status: She is alert and oriented to person, place, and time.  Mental status is at baseline.   Psychiatric:         Mood and Affect: Mood normal.         Behavior: Behavior normal.            ECOG:  Restricted in physically strenuous activity but ambulatory and able to carry out work of a light or sedentary nature, e.g., light house work, office work = 1      ASSESSMENT AND PLAN     ASSESSMENT:      Dasha De Leon is a 53 y.o. female who presents with history of stage IIA (pT2, pN1a(sn), cM0, G3, ER+, LA+, HER2-) right breast invasive ductal carcinoma. She is sp mastectomy and SLNB on 7/11/2024. She underwent re-resection for a positive margin on 7/26/2024.  She underwent reresection of a biopsy-proven positive lymph node on 9/19/2024.  She presents to discuss the role of radiation therapy in the management of her disease.    Diagnoses and all orders for this visit:    1. Malignant neoplasm of lower-outer quadrant of right breast of female, estrogen receptor positive (Primary)       PLAN:           ASSESSMENT     1)  Ms. De Leon is a 53 y.o. female with AJCC 8th Edition Prognostic Stage IIA (T2 N1a M0) RIGHT breast cancer.          2)  Hormone Receptor Status:  ER: positive LA positive, HER2/Valarie: negative          3)  Ms. De Leon has undergone segmental mastectomy with sentinel lymph node biopsy.  Final pathology demonstrated invasive ductal carcinoma in the lower outer quadrant of the right breast.  The tumor was grade 3 with a single focus of invasive carcinoma that measured 45 mm in greatest diameter.  DCIS and LCIS were not identified.  LVSI was present.  Invasive carcinoma was present at the anterior margin and was extensively involved.  The tumor was 0.9 mm from the posterior margin.  1 of 3 sentinel lymph nodes was involved with metastatic disease that measured 12 mm in diameter with extranodal extension present that was greater than 2 mm.  Final staging was stage IIA, [id number]. Re-resection on 7/26/2024 achieved negative margins. She underwent an additional  excisional biopsy/removal of a biopsy-proven right axillary lymph node on 9/19/2024.  Overall, she is healing well from all of her prior procedures.         4)  Ms. De Leon has met with medical oncology and the plan is for adjuvant anastrozole and Verzenio after completion of radiation therapy.         PLAN     1)  I recommend a course of adjuvant radiotherapy to the intact RIGHT chest wall and regional lymph nodes. Standard of care radiotherapy would be conventional radiation therapy to the chest wall and regional lymph nodes over 5 to 6 weeks depending on the addition of a scar boost.         2)  I DO recommend a scar boost based on the following indication:  Grade 3 disease, initial extensive involvement of the anterior margin         3)  CT simulation will be scheduled at the patient's earliest convenience with the goal of starting radiotherapy 4-6 weeks following surgery.           EDUCATION     Ready to learn, no apparent learning barriers were identified; learning preferences include listening. Explained diagnosis and treatment plan; patient expressed understanding of the content.          INFORMED CONSENT     The treatment alternatives, expected side effects and potential complications were discussed.  There is a small risk radiotherapy may cause permanent damage to any normal healthy tissue or organ adjacent to the treatment site which can include but is not limited to the following:          Breast/reconstructed chest wall resulting in poor cosmetic outcome     Ribs causing an increased risk of fracture     Lung causing a risk of pneumonitis     Heart causing a risk of heart attack, conduction or valvular abnormalities     Lymphatics resulting in lymphedema     Brachial plexus resulting in neurological dysfunction     DNA of normal cells resulting in a risk of 2nd malignancy          Acute side effects including dermatitis, skin itching, skin burning, peeling or blisters, breast swelling resulting in  tenderness as well as fatigue.         I spent 30 minutes caring for Dasha on this date of service. This time includes time spent by me in the following activities:preparing for the visit, reviewing tests, obtaining and/or reviewing a separately obtained history, performing a medically appropriate examination and/or evaluation , counseling and educating the patient/family/caregiver, referring and communicating with other health care professionals , documenting information in the medical record, and independently interpreting results and communicating that information with the patient/family/caregiver    FOLLOW UP     No follow-ups on file.     CC: Ariana Coffman PA-C Ford, Erika P, PA-C

## 2024-10-07 NOTE — PROGRESS NOTES
Breast Surgical Oncology Post-Op Visit    Surgery:  deep axillary note excision, ultrasound guided  Subjective: No acute issues. Denies f/c/wnd issues. Intermittent use of pain medication.     O:  Vitals:    10/04/24 0756   BP: 124/76   Pulse: 96   Resp: 18   SpO2: 97%     Axillary Incision: healing well without evidence of infection or significant seroma    Pathology:   9/19/24  1.  Right axillary lymph node, excision:         A.  One lymph node involved by macrometastatic mammary carcinoma, measuring 9.5 mm in extent (1/1).  Extranodal extension is present (>2 mm).         B.  Background fibroadipose tissue with lymphovascular invasion.           C.  Clip and biopsy site associated with carcinoma.           D.  See biomarker template.    Assessment: s/p right axillary node excision for  Stage IIA/IIA ([id number], ER+/NJ+/Her2-) right invasive ductal carcinoma GR 3 breast cancer recovering well.  Discussed pathology all questions answered.    Plan:   - RTC in February for clinical exam  - Next imaging due - 4/2025 - will order at follow up exam, left breast  - Med Onc is following  - Rad Onc is following        Romina De Anda MD  Breast Surgical Oncology

## 2024-10-10 ENCOUNTER — HOSPITAL ENCOUNTER (OUTPATIENT)
Dept: RADIATION ONCOLOGY | Facility: HOSPITAL | Age: 54
Discharge: HOME | End: 2024-10-10

## 2024-10-10 PROCEDURE — 77290 THER RAD SIMULAJ FIELD CPLX: CPT | Performed by: INTERNAL MEDICINE

## 2024-10-10 PROCEDURE — 77334 RADIATION TREATMENT AID(S): CPT | Performed by: INTERNAL MEDICINE

## 2024-10-10 PROCEDURE — 77332 RADIATION TREATMENT AID(S): CPT | Performed by: INTERNAL MEDICINE

## 2024-10-14 LAB
CYTOLOGIST CVX/VAG CYTO: ABNORMAL
CYTOLOGY CVX/VAG DOC CYTO: ABNORMAL
CYTOLOGY CVX/VAG DOC THIN PREP: ABNORMAL
DX ICD CODE: ABNORMAL
DX ICD CODE: ABNORMAL
HPV I/H RISK 4 DNA CVX QL PROBE+SIG AMP: NEGATIVE
Lab: ABNORMAL
OTHER STN SPEC: ABNORMAL
PATHOLOGIST CVX/VAG CYTO: ABNORMAL
RECOM F/U CVX/VAG CYTO: ABNORMAL
STAT OF ADQ CVX/VAG CYTO-IMP: ABNORMAL

## 2024-10-17 PROCEDURE — 77295 3-D RADIOTHERAPY PLAN: CPT | Performed by: INTERNAL MEDICINE

## 2024-10-17 PROCEDURE — 77300 RADIATION THERAPY DOSE PLAN: CPT | Performed by: INTERNAL MEDICINE

## 2024-10-17 PROCEDURE — 77334 RADIATION TREATMENT AID(S): CPT | Performed by: INTERNAL MEDICINE

## 2024-10-21 ENCOUNTER — RADIATION ONCOLOGY WEEKLY ASSESSMENT (OUTPATIENT)
Dept: RADIATION ONCOLOGY | Facility: HOSPITAL | Age: 54
End: 2024-10-21
Payer: COMMERCIAL

## 2024-10-21 ENCOUNTER — HOSPITAL ENCOUNTER (OUTPATIENT)
Dept: RADIATION ONCOLOGY | Facility: HOSPITAL | Age: 54
Discharge: HOME | End: 2024-10-21
Payer: COMMERCIAL

## 2024-10-21 VITALS
OXYGEN SATURATION: 98 % | BODY MASS INDEX: 28.09 KG/M2 | DIASTOLIC BLOOD PRESSURE: 82 MMHG | WEIGHT: 168.8 LBS | RESPIRATION RATE: 18 BRPM | TEMPERATURE: 97.6 F | SYSTOLIC BLOOD PRESSURE: 138 MMHG | HEART RATE: 84 BPM

## 2024-10-21 DIAGNOSIS — C50.511 MALIGNANT NEOPLASM OF LOWER-OUTER QUADRANT OF RIGHT BREAST OF FEMALE, ESTROGEN RECEPTOR POSITIVE: Primary | ICD-10-CM

## 2024-10-21 DIAGNOSIS — Z17.0 MALIGNANT NEOPLASM OF LOWER-OUTER QUADRANT OF RIGHT BREAST OF FEMALE, ESTROGEN RECEPTOR POSITIVE: Primary | ICD-10-CM

## 2024-10-21 LAB
RAD ONC ARIA COURSE ID: NORMAL
RAD ONC ARIA COURSE LAST TREATMENT DATE: NORMAL
RAD ONC ARIA COURSE START DATE: NORMAL
RAD ONC ARIA COURSE TREATMENT ELAPSED DAYS: 0
RAD ONC ARIA FIRST TREATMENT DATE: NORMAL
RAD ONC ARIA PLAN FRACTIONS TREATED TO DATE: 1
RAD ONC ARIA PLAN ID: NORMAL
RAD ONC ARIA PLAN PRESCRIBED DOSE PER FRACTION: 2 GY
RAD ONC ARIA PLAN PRIMARY REFERENCE POINT: NORMAL
RAD ONC ARIA PLAN TOTAL FRACTIONS PRESCRIBED: 25
RAD ONC ARIA PLAN TOTAL PRESCRIBED DOSE: 5000 CGY
RAD ONC ARIA REFERENCE POINT DOSAGE GIVEN TO DATE: 2 GY
RAD ONC ARIA REFERENCE POINT ID: NORMAL
RAD ONC ARIA REFERENCE POINT SESSION DOSAGE GIVEN: 2 GY

## 2024-10-21 PROCEDURE — FACE2FACE: Performed by: INTERNAL MEDICINE

## 2024-10-21 PROCEDURE — 77280 THER RAD SIMULAJ FIELD SMPL: CPT | Performed by: INTERNAL MEDICINE

## 2024-10-21 PROCEDURE — 77427 RADIATION TX MANAGEMENT X5: CPT | Performed by: INTERNAL MEDICINE

## 2024-10-21 PROCEDURE — 77387 GUIDANCE FOR RADJ TX DLVR: CPT | Performed by: INTERNAL MEDICINE

## 2024-10-21 PROCEDURE — 77412 RADIATION TX DELIVERY LVL 3: CPT | Performed by: INTERNAL MEDICINE

## 2024-10-21 NOTE — PROGRESS NOTES
[facility] RADIATION ONCOLOGY  ON-TREATMENT VISIT NOTE    NAME: Dasha De Leon  YOB: 1970  MRN #: [id number]  DATE OF SERVICE: 10/21/2024  PRESCRIBING PHYSICIAN: Fifi Mclean MD     DIAGNOSIS:      ICD-10-CM ICD-9-CM   1. Malignant neoplasm of lower-outer quadrant of right breast of female, estrogen receptor positive  C50.511 174.5    Z17.0 V86.0      RADIATION THERAPY VISIT:  Continue radiation therapy, Dosimetry plan remains acceptable, Films reviewed and remains acceptable, Pain assessed, Pain management planned, Radiation dose schedule reviewed and remains acceptable, Radiation technique remains acceptable, and Symptoms within expected range    Radiation Treatments       Active   Plans   RtSCAxChWall   Most recent treatment: Dose planned: 200 cGy (fraction 1 on 10/21/2024)   Total: Dose planned: 5,000 cGy (25 fractions)   Elapsed Days: 0      Reference Points   RtChWall   Most recent treatment: Dose given: 200 cGy (on 10/21/2024)   Total: Dose given: 200 cGy   Elapsed Days: 0                    [] Concurrent Chemo   Regimen:  n/a     PHYSICAL ASSESSMENT         Vitals:    10/21/24 0818   BP: 138/82   Pulse: 84   Resp: 18   Temp: 97.6 °F (36.4 °C)   SpO2: 98%      Wt Readings from Last 3 Encounters:   10/21/24 76.6 kg (168 lb 12.8 oz)   10/07/24 77.1 kg (170 lb)   10/04/24 78.1 kg (172 lb 3.2 oz)     Fully active, able to carry on all pre-disease performance without restriction = 0    We examined the relevant areas: yes  Findings are within the expected range for this stage of treatment: yes    ACTION ITEMS     Patient tolerating treatment well and as expected for this stage in their treatment and Continue radiation therapy as planned    Estimated Completion Date: 12/3/2024    Anticipatory guidance provided.    Reviewed appropriate skin care.

## 2024-10-22 ENCOUNTER — HOSPITAL ENCOUNTER (OUTPATIENT)
Dept: RADIATION ONCOLOGY | Facility: HOSPITAL | Age: 54
Discharge: HOME | End: 2024-10-22

## 2024-10-22 LAB
RAD ONC ARIA COURSE ID: NORMAL
RAD ONC ARIA COURSE LAST TREATMENT DATE: NORMAL
RAD ONC ARIA COURSE START DATE: NORMAL
RAD ONC ARIA COURSE TREATMENT ELAPSED DAYS: 1
RAD ONC ARIA FIRST TREATMENT DATE: NORMAL
RAD ONC ARIA PLAN FRACTIONS TREATED TO DATE: 2
RAD ONC ARIA PLAN ID: NORMAL
RAD ONC ARIA PLAN PRESCRIBED DOSE PER FRACTION: 2 GY
RAD ONC ARIA PLAN PRIMARY REFERENCE POINT: NORMAL
RAD ONC ARIA PLAN TOTAL FRACTIONS PRESCRIBED: 25
RAD ONC ARIA PLAN TOTAL PRESCRIBED DOSE: 5000 CGY
RAD ONC ARIA REFERENCE POINT DOSAGE GIVEN TO DATE: 4 GY
RAD ONC ARIA REFERENCE POINT ID: NORMAL
RAD ONC ARIA REFERENCE POINT SESSION DOSAGE GIVEN: 2 GY

## 2024-10-22 PROCEDURE — 77412 RADIATION TX DELIVERY LVL 3: CPT | Performed by: INTERNAL MEDICINE

## 2024-10-22 PROCEDURE — 77387 GUIDANCE FOR RADJ TX DLVR: CPT | Performed by: INTERNAL MEDICINE

## 2024-10-22 PROCEDURE — 77014 CHG CT GUIDANCE RADIATION THERAPY FLDS PLACEMENT: CPT | Performed by: INTERNAL MEDICINE

## 2024-10-23 ENCOUNTER — HOSPITAL ENCOUNTER (OUTPATIENT)
Dept: RADIATION ONCOLOGY | Facility: HOSPITAL | Age: 54
Discharge: HOME | End: 2024-10-23

## 2024-10-23 LAB
RAD ONC ARIA COURSE ID: NORMAL
RAD ONC ARIA COURSE LAST TREATMENT DATE: NORMAL
RAD ONC ARIA COURSE START DATE: NORMAL
RAD ONC ARIA COURSE TREATMENT ELAPSED DAYS: 2
RAD ONC ARIA FIRST TREATMENT DATE: NORMAL
RAD ONC ARIA PLAN FRACTIONS TREATED TO DATE: 3
RAD ONC ARIA PLAN ID: NORMAL
RAD ONC ARIA PLAN PRESCRIBED DOSE PER FRACTION: 2 GY
RAD ONC ARIA PLAN PRIMARY REFERENCE POINT: NORMAL
RAD ONC ARIA PLAN TOTAL FRACTIONS PRESCRIBED: 25
RAD ONC ARIA PLAN TOTAL PRESCRIBED DOSE: 5000 CGY
RAD ONC ARIA REFERENCE POINT DOSAGE GIVEN TO DATE: 6 GY
RAD ONC ARIA REFERENCE POINT ID: NORMAL
RAD ONC ARIA REFERENCE POINT SESSION DOSAGE GIVEN: 2 GY

## 2024-10-23 PROCEDURE — 77331 SPECIAL RADIATION DOSIMETRY: CPT | Performed by: INTERNAL MEDICINE

## 2024-10-23 PROCEDURE — 77014 CHG CT GUIDANCE RADIATION THERAPY FLDS PLACEMENT: CPT | Performed by: INTERNAL MEDICINE

## 2024-10-23 PROCEDURE — 77412 RADIATION TX DELIVERY LVL 3: CPT | Performed by: INTERNAL MEDICINE

## 2024-10-23 PROCEDURE — 77387 GUIDANCE FOR RADJ TX DLVR: CPT | Performed by: INTERNAL MEDICINE

## 2024-10-24 ENCOUNTER — HOSPITAL ENCOUNTER (OUTPATIENT)
Dept: RADIATION ONCOLOGY | Facility: HOSPITAL | Age: 54
Discharge: HOME | End: 2024-10-24

## 2024-10-24 ENCOUNTER — TELEPHONE (OUTPATIENT)
Dept: ONCOLOGY | Facility: CLINIC | Age: 54
End: 2024-10-24
Payer: COMMERCIAL

## 2024-10-24 LAB
RAD ONC ARIA COURSE ID: NORMAL
RAD ONC ARIA COURSE LAST TREATMENT DATE: NORMAL
RAD ONC ARIA COURSE START DATE: NORMAL
RAD ONC ARIA COURSE TREATMENT ELAPSED DAYS: 3
RAD ONC ARIA FIRST TREATMENT DATE: NORMAL
RAD ONC ARIA PLAN FRACTIONS TREATED TO DATE: 4
RAD ONC ARIA PLAN ID: NORMAL
RAD ONC ARIA PLAN PRESCRIBED DOSE PER FRACTION: 2 GY
RAD ONC ARIA PLAN PRIMARY REFERENCE POINT: NORMAL
RAD ONC ARIA PLAN TOTAL FRACTIONS PRESCRIBED: 25
RAD ONC ARIA PLAN TOTAL PRESCRIBED DOSE: 5000 CGY
RAD ONC ARIA REFERENCE POINT DOSAGE GIVEN TO DATE: 8 GY
RAD ONC ARIA REFERENCE POINT ID: NORMAL
RAD ONC ARIA REFERENCE POINT SESSION DOSAGE GIVEN: 2 GY

## 2024-10-24 PROCEDURE — 77387 GUIDANCE FOR RADJ TX DLVR: CPT | Performed by: INTERNAL MEDICINE

## 2024-10-24 PROCEDURE — 77412 RADIATION TX DELIVERY LVL 3: CPT | Performed by: INTERNAL MEDICINE

## 2024-10-24 PROCEDURE — 77014 CHG CT GUIDANCE RADIATION THERAPY FLDS PLACEMENT: CPT | Performed by: INTERNAL MEDICINE

## 2024-10-24 PROCEDURE — 77336 RADIATION PHYSICS CONSULT: CPT | Performed by: INTERNAL MEDICINE

## 2024-10-25 ENCOUNTER — HOSPITAL ENCOUNTER (OUTPATIENT)
Dept: RADIATION ONCOLOGY | Facility: HOSPITAL | Age: 54
Discharge: HOME | End: 2024-10-25

## 2024-10-25 LAB
RAD ONC ARIA COURSE ID: NORMAL
RAD ONC ARIA COURSE LAST TREATMENT DATE: NORMAL
RAD ONC ARIA COURSE START DATE: NORMAL
RAD ONC ARIA COURSE TREATMENT ELAPSED DAYS: 4
RAD ONC ARIA FIRST TREATMENT DATE: NORMAL
RAD ONC ARIA PLAN FRACTIONS TREATED TO DATE: 5
RAD ONC ARIA PLAN ID: NORMAL
RAD ONC ARIA PLAN PRESCRIBED DOSE PER FRACTION: 2 GY
RAD ONC ARIA PLAN PRIMARY REFERENCE POINT: NORMAL
RAD ONC ARIA PLAN TOTAL FRACTIONS PRESCRIBED: 25
RAD ONC ARIA PLAN TOTAL PRESCRIBED DOSE: 5000 CGY
RAD ONC ARIA REFERENCE POINT DOSAGE GIVEN TO DATE: 10 GY
RAD ONC ARIA REFERENCE POINT ID: NORMAL
RAD ONC ARIA REFERENCE POINT SESSION DOSAGE GIVEN: 2 GY

## 2024-10-25 PROCEDURE — 77412 RADIATION TX DELIVERY LVL 3: CPT | Performed by: INTERNAL MEDICINE

## 2024-10-25 PROCEDURE — 77387 GUIDANCE FOR RADJ TX DLVR: CPT | Performed by: INTERNAL MEDICINE

## 2024-10-25 PROCEDURE — 77014 CHG CT GUIDANCE RADIATION THERAPY FLDS PLACEMENT: CPT | Performed by: INTERNAL MEDICINE

## 2024-10-28 ENCOUNTER — RADIATION ONCOLOGY WEEKLY ASSESSMENT (OUTPATIENT)
Dept: RADIATION ONCOLOGY | Facility: HOSPITAL | Age: 54
End: 2024-10-28
Payer: COMMERCIAL

## 2024-10-28 ENCOUNTER — HOSPITAL ENCOUNTER (OUTPATIENT)
Dept: RADIATION ONCOLOGY | Facility: HOSPITAL | Age: 54
Discharge: HOME | End: 2024-10-28
Payer: COMMERCIAL

## 2024-10-28 VITALS
HEART RATE: 106 BPM | OXYGEN SATURATION: 97 % | BODY MASS INDEX: 28.29 KG/M2 | DIASTOLIC BLOOD PRESSURE: 85 MMHG | TEMPERATURE: 97.4 F | RESPIRATION RATE: 20 BRPM | SYSTOLIC BLOOD PRESSURE: 149 MMHG | WEIGHT: 170 LBS

## 2024-10-28 DIAGNOSIS — Z17.0 MALIGNANT NEOPLASM OF LOWER-OUTER QUADRANT OF RIGHT BREAST OF FEMALE, ESTROGEN RECEPTOR POSITIVE: Primary | ICD-10-CM

## 2024-10-28 DIAGNOSIS — C50.511 MALIGNANT NEOPLASM OF LOWER-OUTER QUADRANT OF RIGHT BREAST OF FEMALE, ESTROGEN RECEPTOR POSITIVE: Primary | ICD-10-CM

## 2024-10-28 LAB
RAD ONC ARIA COURSE ID: NORMAL
RAD ONC ARIA COURSE LAST TREATMENT DATE: NORMAL
RAD ONC ARIA COURSE START DATE: NORMAL
RAD ONC ARIA COURSE TREATMENT ELAPSED DAYS: 7
RAD ONC ARIA FIRST TREATMENT DATE: NORMAL
RAD ONC ARIA PLAN FRACTIONS TREATED TO DATE: 6
RAD ONC ARIA PLAN ID: NORMAL
RAD ONC ARIA PLAN PRESCRIBED DOSE PER FRACTION: 2 GY
RAD ONC ARIA PLAN PRIMARY REFERENCE POINT: NORMAL
RAD ONC ARIA PLAN TOTAL FRACTIONS PRESCRIBED: 25
RAD ONC ARIA PLAN TOTAL PRESCRIBED DOSE: 5000 CGY
RAD ONC ARIA REFERENCE POINT DOSAGE GIVEN TO DATE: 12 GY
RAD ONC ARIA REFERENCE POINT ID: NORMAL
RAD ONC ARIA REFERENCE POINT SESSION DOSAGE GIVEN: 2 GY

## 2024-10-28 PROCEDURE — 77014 CHG CT GUIDANCE RADIATION THERAPY FLDS PLACEMENT: CPT | Performed by: INTERNAL MEDICINE

## 2024-10-28 PROCEDURE — 77387 GUIDANCE FOR RADJ TX DLVR: CPT | Performed by: INTERNAL MEDICINE

## 2024-10-28 PROCEDURE — 77412 RADIATION TX DELIVERY LVL 3: CPT | Performed by: INTERNAL MEDICINE

## 2024-10-29 ENCOUNTER — HOSPITAL ENCOUNTER (OUTPATIENT)
Dept: RADIATION ONCOLOGY | Facility: HOSPITAL | Age: 54
Discharge: HOME | End: 2024-10-29

## 2024-10-29 LAB
RAD ONC ARIA COURSE ID: NORMAL
RAD ONC ARIA COURSE LAST TREATMENT DATE: NORMAL
RAD ONC ARIA COURSE START DATE: NORMAL
RAD ONC ARIA COURSE TREATMENT ELAPSED DAYS: 8
RAD ONC ARIA FIRST TREATMENT DATE: NORMAL
RAD ONC ARIA PLAN FRACTIONS TREATED TO DATE: 7
RAD ONC ARIA PLAN ID: NORMAL
RAD ONC ARIA PLAN PRESCRIBED DOSE PER FRACTION: 2 GY
RAD ONC ARIA PLAN PRIMARY REFERENCE POINT: NORMAL
RAD ONC ARIA PLAN TOTAL FRACTIONS PRESCRIBED: 25
RAD ONC ARIA PLAN TOTAL PRESCRIBED DOSE: 5000 CGY
RAD ONC ARIA REFERENCE POINT DOSAGE GIVEN TO DATE: 14 GY
RAD ONC ARIA REFERENCE POINT ID: NORMAL
RAD ONC ARIA REFERENCE POINT SESSION DOSAGE GIVEN: 2 GY

## 2024-10-29 PROCEDURE — 77387 GUIDANCE FOR RADJ TX DLVR: CPT | Performed by: INTERNAL MEDICINE

## 2024-10-29 PROCEDURE — 77014 CHG CT GUIDANCE RADIATION THERAPY FLDS PLACEMENT: CPT | Performed by: INTERNAL MEDICINE

## 2024-10-29 PROCEDURE — 77412 RADIATION TX DELIVERY LVL 3: CPT | Performed by: INTERNAL MEDICINE

## 2024-10-30 ENCOUNTER — HOSPITAL ENCOUNTER (OUTPATIENT)
Dept: RADIATION ONCOLOGY | Facility: HOSPITAL | Age: 54
Discharge: HOME | End: 2024-10-30

## 2024-10-30 LAB
RAD ONC ARIA COURSE ID: NORMAL
RAD ONC ARIA COURSE LAST TREATMENT DATE: NORMAL
RAD ONC ARIA COURSE START DATE: NORMAL
RAD ONC ARIA COURSE TREATMENT ELAPSED DAYS: 9
RAD ONC ARIA FIRST TREATMENT DATE: NORMAL
RAD ONC ARIA PLAN FRACTIONS TREATED TO DATE: 8
RAD ONC ARIA PLAN ID: NORMAL
RAD ONC ARIA PLAN PRESCRIBED DOSE PER FRACTION: 2 GY
RAD ONC ARIA PLAN PRIMARY REFERENCE POINT: NORMAL
RAD ONC ARIA PLAN TOTAL FRACTIONS PRESCRIBED: 25
RAD ONC ARIA PLAN TOTAL PRESCRIBED DOSE: 5000 CGY
RAD ONC ARIA REFERENCE POINT DOSAGE GIVEN TO DATE: 16 GY
RAD ONC ARIA REFERENCE POINT ID: NORMAL
RAD ONC ARIA REFERENCE POINT SESSION DOSAGE GIVEN: 2 GY

## 2024-10-30 PROCEDURE — 77412 RADIATION TX DELIVERY LVL 3: CPT | Performed by: INTERNAL MEDICINE

## 2024-10-30 PROCEDURE — 77387 GUIDANCE FOR RADJ TX DLVR: CPT | Performed by: INTERNAL MEDICINE

## 2024-10-30 PROCEDURE — 77014 CHG CT GUIDANCE RADIATION THERAPY FLDS PLACEMENT: CPT | Performed by: INTERNAL MEDICINE

## 2024-10-31 ENCOUNTER — HOSPITAL ENCOUNTER (OUTPATIENT)
Dept: RADIATION ONCOLOGY | Facility: HOSPITAL | Age: 54
Discharge: HOME | End: 2024-10-31

## 2024-10-31 LAB
RAD ONC ARIA COURSE ID: NORMAL
RAD ONC ARIA COURSE LAST TREATMENT DATE: NORMAL
RAD ONC ARIA COURSE START DATE: NORMAL
RAD ONC ARIA COURSE TREATMENT ELAPSED DAYS: 10
RAD ONC ARIA FIRST TREATMENT DATE: NORMAL
RAD ONC ARIA PLAN FRACTIONS TREATED TO DATE: 9
RAD ONC ARIA PLAN ID: NORMAL
RAD ONC ARIA PLAN PRESCRIBED DOSE PER FRACTION: 2 GY
RAD ONC ARIA PLAN PRIMARY REFERENCE POINT: NORMAL
RAD ONC ARIA PLAN TOTAL FRACTIONS PRESCRIBED: 25
RAD ONC ARIA PLAN TOTAL PRESCRIBED DOSE: 5000 CGY
RAD ONC ARIA REFERENCE POINT DOSAGE GIVEN TO DATE: 18 GY
RAD ONC ARIA REFERENCE POINT ID: NORMAL
RAD ONC ARIA REFERENCE POINT SESSION DOSAGE GIVEN: 2 GY

## 2024-10-31 PROCEDURE — 77387 GUIDANCE FOR RADJ TX DLVR: CPT | Performed by: INTERNAL MEDICINE

## 2024-10-31 PROCEDURE — 77014 CHG CT GUIDANCE RADIATION THERAPY FLDS PLACEMENT: CPT | Performed by: INTERNAL MEDICINE

## 2024-10-31 PROCEDURE — 77412 RADIATION TX DELIVERY LVL 3: CPT | Performed by: INTERNAL MEDICINE

## 2024-10-31 PROCEDURE — 77336 RADIATION PHYSICS CONSULT: CPT | Performed by: INTERNAL MEDICINE

## 2024-11-01 ENCOUNTER — HOSPITAL ENCOUNTER (OUTPATIENT)
Dept: RADIATION ONCOLOGY | Facility: HOSPITAL | Age: 54
Discharge: HOME | End: 2024-11-01

## 2024-11-01 ENCOUNTER — HOSPITAL ENCOUNTER (OUTPATIENT)
Dept: RADIATION ONCOLOGY | Facility: HOSPITAL | Age: 54
Setting detail: RADIATION/ONCOLOGY SERIES
End: 2024-11-01
Payer: COMMERCIAL

## 2024-11-01 LAB
RAD ONC ARIA COURSE ID: NORMAL
RAD ONC ARIA COURSE LAST TREATMENT DATE: NORMAL
RAD ONC ARIA COURSE START DATE: NORMAL
RAD ONC ARIA COURSE TREATMENT ELAPSED DAYS: 11
RAD ONC ARIA FIRST TREATMENT DATE: NORMAL
RAD ONC ARIA PLAN FRACTIONS TREATED TO DATE: 10
RAD ONC ARIA PLAN ID: NORMAL
RAD ONC ARIA PLAN PRESCRIBED DOSE PER FRACTION: 2 GY
RAD ONC ARIA PLAN PRIMARY REFERENCE POINT: NORMAL
RAD ONC ARIA PLAN TOTAL FRACTIONS PRESCRIBED: 25
RAD ONC ARIA PLAN TOTAL PRESCRIBED DOSE: 5000 CGY
RAD ONC ARIA REFERENCE POINT DOSAGE GIVEN TO DATE: 20 GY
RAD ONC ARIA REFERENCE POINT ID: NORMAL
RAD ONC ARIA REFERENCE POINT SESSION DOSAGE GIVEN: 2 GY

## 2024-11-01 PROCEDURE — 77014 CHG CT GUIDANCE RADIATION THERAPY FLDS PLACEMENT: CPT | Performed by: INTERNAL MEDICINE

## 2024-11-01 PROCEDURE — 77412 RADIATION TX DELIVERY LVL 3: CPT | Performed by: INTERNAL MEDICINE

## 2024-11-01 PROCEDURE — 77387 GUIDANCE FOR RADJ TX DLVR: CPT | Performed by: INTERNAL MEDICINE

## 2024-11-04 ENCOUNTER — SPECIALTY PHARMACY (OUTPATIENT)
Dept: PHARMACY | Facility: HOSPITAL | Age: 54
End: 2024-11-04
Payer: COMMERCIAL

## 2024-11-04 ENCOUNTER — HOSPITAL ENCOUNTER (OUTPATIENT)
Dept: RADIATION ONCOLOGY | Facility: HOSPITAL | Age: 54
Discharge: HOME | End: 2024-11-04
Payer: COMMERCIAL

## 2024-11-04 LAB
RAD ONC ARIA COURSE ID: NORMAL
RAD ONC ARIA COURSE LAST TREATMENT DATE: NORMAL
RAD ONC ARIA COURSE START DATE: NORMAL
RAD ONC ARIA COURSE TREATMENT ELAPSED DAYS: 14
RAD ONC ARIA FIRST TREATMENT DATE: NORMAL
RAD ONC ARIA PLAN FRACTIONS TREATED TO DATE: 11
RAD ONC ARIA PLAN ID: NORMAL
RAD ONC ARIA PLAN PRESCRIBED DOSE PER FRACTION: 2 GY
RAD ONC ARIA PLAN PRIMARY REFERENCE POINT: NORMAL
RAD ONC ARIA PLAN TOTAL FRACTIONS PRESCRIBED: 25
RAD ONC ARIA PLAN TOTAL PRESCRIBED DOSE: 5000 CGY
RAD ONC ARIA REFERENCE POINT DOSAGE GIVEN TO DATE: 22 GY
RAD ONC ARIA REFERENCE POINT ID: NORMAL
RAD ONC ARIA REFERENCE POINT SESSION DOSAGE GIVEN: 2 GY

## 2024-11-04 PROCEDURE — 77387 GUIDANCE FOR RADJ TX DLVR: CPT | Performed by: INTERNAL MEDICINE

## 2024-11-04 PROCEDURE — 77412 RADIATION TX DELIVERY LVL 3: CPT | Performed by: INTERNAL MEDICINE

## 2024-11-04 PROCEDURE — 77014 CHG CT GUIDANCE RADIATION THERAPY FLDS PLACEMENT: CPT | Performed by: INTERNAL MEDICINE

## 2024-11-04 PROCEDURE — 77427 RADIATION TX MANAGEMENT X5: CPT | Performed by: INTERNAL MEDICINE

## 2024-11-05 ENCOUNTER — HOSPITAL ENCOUNTER (OUTPATIENT)
Dept: RADIATION ONCOLOGY | Facility: HOSPITAL | Age: 54
Discharge: HOME | End: 2024-11-05

## 2024-11-05 LAB
RAD ONC ARIA COURSE ID: NORMAL
RAD ONC ARIA COURSE LAST TREATMENT DATE: NORMAL
RAD ONC ARIA COURSE START DATE: NORMAL
RAD ONC ARIA COURSE TREATMENT ELAPSED DAYS: 15
RAD ONC ARIA FIRST TREATMENT DATE: NORMAL
RAD ONC ARIA PLAN FRACTIONS TREATED TO DATE: 12
RAD ONC ARIA PLAN ID: NORMAL
RAD ONC ARIA PLAN PRESCRIBED DOSE PER FRACTION: 2 GY
RAD ONC ARIA PLAN PRIMARY REFERENCE POINT: NORMAL
RAD ONC ARIA PLAN TOTAL FRACTIONS PRESCRIBED: 25
RAD ONC ARIA PLAN TOTAL PRESCRIBED DOSE: 5000 CGY
RAD ONC ARIA REFERENCE POINT DOSAGE GIVEN TO DATE: 24 GY
RAD ONC ARIA REFERENCE POINT ID: NORMAL
RAD ONC ARIA REFERENCE POINT SESSION DOSAGE GIVEN: 2 GY

## 2024-11-05 PROCEDURE — 77412 RADIATION TX DELIVERY LVL 3: CPT | Performed by: INTERNAL MEDICINE

## 2024-11-05 PROCEDURE — 77387 GUIDANCE FOR RADJ TX DLVR: CPT | Performed by: INTERNAL MEDICINE

## 2024-11-05 PROCEDURE — 77014 CHG CT GUIDANCE RADIATION THERAPY FLDS PLACEMENT: CPT | Performed by: INTERNAL MEDICINE

## 2024-11-06 ENCOUNTER — HOSPITAL ENCOUNTER (OUTPATIENT)
Dept: RADIATION ONCOLOGY | Facility: HOSPITAL | Age: 54
Discharge: HOME | End: 2024-11-06

## 2024-11-06 LAB
RAD ONC ARIA COURSE ID: NORMAL
RAD ONC ARIA COURSE LAST TREATMENT DATE: NORMAL
RAD ONC ARIA COURSE START DATE: NORMAL
RAD ONC ARIA COURSE TREATMENT ELAPSED DAYS: 16
RAD ONC ARIA FIRST TREATMENT DATE: NORMAL
RAD ONC ARIA PLAN FRACTIONS TREATED TO DATE: 13
RAD ONC ARIA PLAN ID: NORMAL
RAD ONC ARIA PLAN PRESCRIBED DOSE PER FRACTION: 2 GY
RAD ONC ARIA PLAN PRIMARY REFERENCE POINT: NORMAL
RAD ONC ARIA PLAN TOTAL FRACTIONS PRESCRIBED: 25
RAD ONC ARIA PLAN TOTAL PRESCRIBED DOSE: 5000 CGY
RAD ONC ARIA REFERENCE POINT DOSAGE GIVEN TO DATE: 26 GY
RAD ONC ARIA REFERENCE POINT ID: NORMAL
RAD ONC ARIA REFERENCE POINT SESSION DOSAGE GIVEN: 2 GY

## 2024-11-06 PROCEDURE — 77387 GUIDANCE FOR RADJ TX DLVR: CPT | Performed by: INTERNAL MEDICINE

## 2024-11-06 PROCEDURE — 77412 RADIATION TX DELIVERY LVL 3: CPT | Performed by: INTERNAL MEDICINE

## 2024-11-06 PROCEDURE — 77014 CHG CT GUIDANCE RADIATION THERAPY FLDS PLACEMENT: CPT | Performed by: INTERNAL MEDICINE

## 2024-11-07 ENCOUNTER — HOSPITAL ENCOUNTER (OUTPATIENT)
Dept: RADIATION ONCOLOGY | Facility: HOSPITAL | Age: 54
Discharge: HOME | End: 2024-11-07

## 2024-11-07 ENCOUNTER — RADIATION ONCOLOGY WEEKLY ASSESSMENT (OUTPATIENT)
Dept: RADIATION ONCOLOGY | Facility: HOSPITAL | Age: 54
End: 2024-11-07
Payer: COMMERCIAL

## 2024-11-07 ENCOUNTER — HOSPITAL ENCOUNTER (OUTPATIENT)
Dept: OCCUPATIONAL THERAPY | Facility: HOSPITAL | Age: 54
Setting detail: THERAPIES SERIES
Discharge: HOME | End: 2024-11-07
Payer: COMMERCIAL

## 2024-11-07 VITALS
SYSTOLIC BLOOD PRESSURE: 166 MMHG | OXYGEN SATURATION: 100 % | BODY MASS INDEX: 28.62 KG/M2 | TEMPERATURE: 97.3 F | HEART RATE: 106 BPM | DIASTOLIC BLOOD PRESSURE: 90 MMHG | WEIGHT: 172 LBS

## 2024-11-07 DIAGNOSIS — Z91.89 AT RISK FOR LYMPHEDEMA: Primary | ICD-10-CM

## 2024-11-07 DIAGNOSIS — Z17.0 MALIGNANT NEOPLASM OF LOWER-OUTER QUADRANT OF RIGHT BREAST OF FEMALE, ESTROGEN RECEPTOR POSITIVE: Primary | ICD-10-CM

## 2024-11-07 DIAGNOSIS — Z17.0 MALIGNANT NEOPLASM OF LOWER-OUTER QUADRANT OF RIGHT BREAST OF FEMALE, ESTROGEN RECEPTOR POSITIVE: ICD-10-CM

## 2024-11-07 DIAGNOSIS — C50.511 MALIGNANT NEOPLASM OF LOWER-OUTER QUADRANT OF RIGHT BREAST OF FEMALE, ESTROGEN RECEPTOR POSITIVE: ICD-10-CM

## 2024-11-07 DIAGNOSIS — C50.511 MALIGNANT NEOPLASM OF LOWER-OUTER QUADRANT OF RIGHT BREAST OF FEMALE, ESTROGEN RECEPTOR POSITIVE: Primary | ICD-10-CM

## 2024-11-07 LAB
RAD ONC ARIA COURSE ID: NORMAL
RAD ONC ARIA COURSE LAST TREATMENT DATE: NORMAL
RAD ONC ARIA COURSE START DATE: NORMAL
RAD ONC ARIA COURSE TREATMENT ELAPSED DAYS: 17
RAD ONC ARIA FIRST TREATMENT DATE: NORMAL
RAD ONC ARIA PLAN FRACTIONS TREATED TO DATE: 14
RAD ONC ARIA PLAN ID: NORMAL
RAD ONC ARIA PLAN PRESCRIBED DOSE PER FRACTION: 2 GY
RAD ONC ARIA PLAN PRIMARY REFERENCE POINT: NORMAL
RAD ONC ARIA PLAN TOTAL FRACTIONS PRESCRIBED: 25
RAD ONC ARIA PLAN TOTAL PRESCRIBED DOSE: 5000 CGY
RAD ONC ARIA REFERENCE POINT DOSAGE GIVEN TO DATE: 28 GY
RAD ONC ARIA REFERENCE POINT ID: NORMAL
RAD ONC ARIA REFERENCE POINT SESSION DOSAGE GIVEN: 2 GY

## 2024-11-07 PROCEDURE — 77014 CHG CT GUIDANCE RADIATION THERAPY FLDS PLACEMENT: CPT | Performed by: INTERNAL MEDICINE

## 2024-11-07 PROCEDURE — 97535 SELF CARE MNGMENT TRAINING: CPT

## 2024-11-07 PROCEDURE — 77336 RADIATION PHYSICS CONSULT: CPT | Performed by: INTERNAL MEDICINE

## 2024-11-07 PROCEDURE — 77412 RADIATION TX DELIVERY LVL 3: CPT | Performed by: INTERNAL MEDICINE

## 2024-11-07 PROCEDURE — 77387 GUIDANCE FOR RADJ TX DLVR: CPT | Performed by: INTERNAL MEDICINE

## 2024-11-07 PROCEDURE — 93702 BIS XTRACELL FLUID ANALYSIS: CPT

## 2024-11-07 NOTE — PROGRESS NOTES
"      [facility] RADIATION ONCOLOGY  ON-TREATMENT VISIT NOTE    NAME: Dasha De Leon  YOB: 1970  MRN #: [id number]  DATE OF SERVICE: 2024  PRESCRIBING PHYSICIAN: Dr. Germain Puga    DIAGNOSIS:      ICD-10-CM ICD-9-CM   1. Malignant neoplasm of lower-outer quadrant of right breast of female, estrogen receptor positive  C50.511 174.5    Z17.0 V86.0      RADIATION THERAPY VISIT:  {Radiation Therapy Visit:44247::\"Continue radiation therapy\",\"Dosimetry plan remains acceptable\",\"Films reviewed and remains acceptable\",\"Pain assessed\",\"Pain management planned\",\"Radiation dose schedule reviewed and remains acceptable\",\"Radiation technique remains acceptable\",\"Symptoms within expected range\"}    Radiation Treatments       Active   Plans   RtSCAxChWall   Most recent treatment: Dose planned: 200 cGy (fraction 14 on 2024)   Total: Dose planned: 5,000 cGy (25 fractions)   Elapsed Days: 17      Reference Points   RtChSizerock   Most recent treatment: Dose given: 200 cGy (on 2024)   Total: Dose given: 2,800 cGy   Elapsed Days: 17                    [] Concurrent Chemo   Regimen:  n/a     PHYSICAL ASSESSMENT     ***    Vitals:    24 0845   BP: 166/90   Pulse: 106   Temp: 97.3 °F (36.3 °C)   SpO2: 100%      Wt Readings from Last 3 Encounters:   24 78 kg (172 lb)   10/28/24 77.1 kg (170 lb)   10/21/24 76.6 kg (168 lb 12.8 oz)     {ECO}    We examined the relevant areas: {YES:80526}  Findings are within the expected range for this stage of treatment: {YES:23865}    ACTION ITEMS     {Radiation Plan:50820}    Estimated Completion Date: ***      Fifi Mclean MD   Radiation Oncology  [facility]  "

## 2024-11-07 NOTE — THERAPY PROGRESS REPORT/RE-CERT
Outpatient Occupational Therapy Lymphedema Progress Note  [facility]     Patient Name: Dasha De Leon  : 1970  MRN: [id number]  Today's Date: 2024      Visit Date: 2024    Patient Active Problem List   Diagnosis    Essential hypertension    Mood disorder    Allergic rhinitis    Hyperlipidemia    Knee pain, right    Lumbar radiculopathy    Vitamin D deficiency    Lumbar spinal stenosis    Follow-up examination following surgery    Abnormality of right breast on screening mammogram    Malignant neoplasm of lower-outer quadrant of right breast of female, estrogen receptor positive    Anxiety    ASCUS of cervix with negative high risk HPV        Past Medical History:   Diagnosis Date    Abnormal Pap smear of cervix     Anxiety     Miller's cyst     RESOLVED    Breast cancer     Bulging lumbar disc     Bursitis     HX    Depression     GERD (gastroesophageal reflux disease)     H/O colposcopy with cervical biopsy     unknown pap result, biopsy was neg per pt, 2013 [facility]    History of 2019 novel coronavirus disease (COVID-19)     X2    History of eczema     HPV (human papilloma virus) infection     Hyperlipemia     Hypertension     Low back pain     Lumbar radiculopathy     Numbness and tingling of left leg     Osteoarthritis     Scoliosis     Seasonal allergies         Past Surgical History:   Procedure Laterality Date    BREAST AUGMENTATION      BREAST BIOPSY Right 2024    Procedure: AXILLARY LYMPH NODE BIOPSY/EXCISION;  Surgeon: Romina De Anda MD;  Location: [facility];  Service: General;  Laterality: Right;    BREAST RECONSTRUCTION Right 2024    Procedure: RIGHT SUBPECTORAL PLACEMENT TISSUE EXPANDER AND CORTIVA (ACELLULAR DERMAL MATRIX), COMPLEX CLOSURE RIGHT BREAST 5cm;  Surgeon: Colin Bender MD;  Location: [facility];  Service: Plastics;  Laterality: Right;    BREAST SURGERY Right 2024    Procedure: ADJACENT TISSUE REARRANGEMENT RIGHT BREAST;  Surgeon:  "Colin Bender MD;  Location: [facility] OR;  Service: Plastics;  Laterality: Right;    CARPAL TUNNEL RELEASE Right 2004    CRYOTHERAPY      1997    D & C HYSTEROSCOPY N/A 9/19/2024    Procedure: INTRAUTERINE DEVICE REMOVAL;  Surgeon: Kaleb Burciaga MD;  Location: [facility] MAIN OR;  Service: Obstetrics/Gynecology;  Laterality: N/A;    EPIDURAL BLOCK      HAND SURGERY  2004    Pisiformectomy    LUMBAR DISCECTOMY FUSION INSTRUMENTATION N/A 01/05/2024    Procedure: Lumbar 3 to lumbar 4 and lumbar 4 to lumbar 5 laminectomy with fusion and instrumentation;  Surgeon: Rigoberto Botello MD;  Location: [facility] OR;  Service: Robotics - Neuro;  Laterality: N/A;    MAMMO BILATERAL  2014    MASTECTOMY W/ SENTINEL NODE BIOPSY Right 7/11/2024    Procedure: Right MASTECTOMY WITH SENTINEL NODE BIOPSY;  Surgeon: Romina De Anda MD;  Location: [facility] OR;  Service: General;  Laterality: Right;    MASTECTOMY WITH IMMEDIATE RECONSTRUCTION Right 7/26/2024    Procedure: right mastectomy, margin excision;  Surgeon: Romina De Anda MD;  Location: [facility] OR;  Service: General;  Laterality: Right;    PAP SMEAR  20116    SHOULDER ARTHROSCOPY Left 2002,2003    SHOULDER SURGERY      \"MANIPULATION FOR FROZEN SHOULDER\"    US GUIDED LYMPH NODE BIOPSY  8/29/2024         Visit Dx:      ICD-10-CM ICD-9-CM   1. At risk for lymphedema  Z91.89 V49.89   2. Malignant neoplasm of lower-outer quadrant of right breast of female, estrogen receptor positive  C50.511 174.5    Z17.0 V86.0        Lymphedema       Row Name 11/07/24 1000             Subjective Pain    Able to rate subjective pain? yes  -RE      Pre-Treatment Pain Level 6  -RE      Post-Treatment Pain Level 6  -RE      Subjective Pain Comment axillary and tail of the breast pain  -RE         Subjective    Subjective Comments currently in radiation  -RE         L-Dex Bioimpedence Screening    L-Dex Measurement Extremity LUE  -RE      L-Dex Patient Position Standing  -RE      L-Dex UE " Dominate Side Right  -RE      L-Dex UE At Risk Side Left  -RE      L-Dex UE Pre Surgical Value No  -RE      L-Dex UE Score 3.8  -RE      L-Dex UE Baseline Score 1.7  -RE      L-Dex UE Value Change 2.1  -RE      L-Dex UE Comment WNL  -RE                User Key  (r) = Recorded By, (t) = Taken By, (c) = Cosigned By      Initials Name Provider Type    Rosi Plata OTR Occupational Therapist                    The patient had a 3-month SOZO measurement which I reviewed today. The score is WNL.  see scanned to EMR. Bioimpedance spectroscopy helps identify the   onset of lymphedema in an arm or leg before patients experience noticeable swelling. Research has   shown that 92% of patients with early detection of lymphedema using L-Dex combined with   intervention do not progress to chronic lymphedema through three years. Additionally, as of March 2023, the NCCN Guidelines® for Survivorship recommend proactive screening for lymphedema using   bioimpedance spectroscopy. Whenever possible, patients are tested for baseline L-Dex score before   cancer treatment begins and then are reassessed during regular follow-up visits using the SOZO device.   Otherwise, this can be started postoperatively and continued during regular follow-up visits. If the   patient’s L-Dex score increases above normal levels, that is a sign that lymphedema is developing and a   referral is made to occupational therapy for further evaluation and early compression treatment.   Lymphedema assessment with the SOZO L-Dex score is recommended to be done every 3 months for   the first 3 years and then every 6 months for years 4 and 5 followed by annually afterwards         OT Assessment/Plan       Row Name 11/07/24 1021          OT Assessment    Impairments Impaired lymphatic circulation  -RE     Assessment Comments Patient returns for bioimpedance reassessment.  Her last measurement was 3 months ago.  L-Dex value today is 3.8 which is within normal  limits and consistent with her baseline.  She is having some breast and axillary pain which she feels may be due to radiation or her expander or combination of both.  Today we reviewed sleeve use during and postradiation.  I recommended follow-up  in early January.  -RE     OT Diagnosis At risk for lymphedema  -RE     OT Rehab Potential Good  -RE     Patient/caregiver participated in establishment of treatment plan and goals Yes  -RE     Patient would benefit from skilled therapy intervention Yes  -RE        OT Plan    OT Frequency Other (comment)  -RE     Predicted Duration of Therapy Intervention (OT) Bioimpedance every 3 months after patient's postradiation follow-up  -RE     Planned CPT's? OT SELF CARE/MGMT/TRAIN 15 MIN: 25593;OT BIS XTRACELL FLUID ANALYSIS: 57754  -RE     OT Plan Comments Follow-up after January fourth  -RE               User Key  (r) = Recorded By, (t) = Taken By, (c) = Cosigned By      Initials Name Provider Type    RE Rosi Landers, OTR Occupational Therapist                           OT Goals       Row Name 11/07/24 1000          OT Short Term Goals    STG 1 Patient will demonstrate proper awareness of “What is Lymphedema?” and “Healthy Habits” for improved prevention, management, care of symptoms and ease of transition to self-care of condition.  -RE     STG 1 Progress Met  -RE     STG 2 Pt will demonstrate understanding of use of compression sleeve for edema prevention, exercise, radiation, and air travel.  -RE     STG 2 Progress Met  -RE     STG 3 Patient independent and compliant with initial home exercise program focused on diaphragmatic breathing, range of motion, flexibility to decrease edema and improve lymphatic flow for decreased edema and decreased risk of infection.  -RE     STG 3 Progress Met  -RE        Long Term Goals    LTG Date to Achieve 11/26/24  -RE     LTG 1 Patient will participate in bioimpedance scans every 3 months as a method of early detection of lymphedema to  allow for early intervention.  -RE     LTG 1 Progress Met;Ongoing  -RE     LTG 2 Patient's bioimpedance score to remain below 8.2 for decreased risk of stage II lymphedema.  -RE     LTG 2 Progress Met;Ongoing  -RE     LTG 3 Patient will demonstrate good understanding of post radiation skin care/massage.  -RE     LTG 3 Progress Ongoing  -RE        Time Calculation    OT Goal Re-Cert Due Date 02/07/25  -RE               User Key  (r) = Recorded By, (t) = Taken By, (c) = Cosigned By      Initials Name Provider Type    Rosi Plata OTR Occupational Therapist                    Therapy Education  Education Details: Discussed patient's current L-Dex value of 3.8 and recommended daily sleeve wear during radiation and until she is healed following radiation or at least 2 weeks.  I recommended a she fit tight bra if patient feels that additional support may help with her discomfort from her expander.  Given: Symptoms/condition management, Other (comment)  Program: Reinforced, New  How Provided: Verbal  Provided to: Patient  Level of Understanding: Teach back education performed, Verbalized  43350 - OT Self Care/Mgmt Minutes: 20                Time Calculation:   OT Start Time: 0945  OT Stop Time: 1015  OT Time Calculation (min): 30 min  Total Timed Code Minutes- OT: 20 minute(s)  Timed Charges  79263 - OT Self Care/Mgmt Minutes: 20  Untimed Charges  76238 - OT Bioimpedence Rx Minutes: 10  Total Minutes  Timed Charges Total Minutes: 20  Untimed Charges Total Minutes: 10   Total Minutes: 30     Therapy Charges for Today       Code Description Service Date Service Provider Modifiers Qty    35564939958 HC OT SELF CARE/MGMT/TRAIN EA 15 MIN 11/7/2024 Rosi Landers OTR GO 1    51401251724 HC OT BIS XTRACELL FLUID ANALYSIS 11/7/2024 Rosi Landers OTR GO 1                Dictated utilizing Dragon dictation:  Much of this encounter note is an electronic transcription/translation of spoken language to printed text. The  electronic translation of spoken language may permit erroneous, or at times, nonsensical words or phrases to be inadvertently transcribed; Although I have reviewed the note for such errors, some may still exist.        Rosi Landers, OTR  11/7/2024

## 2024-11-08 ENCOUNTER — RADIATION ONCOLOGY WEEKLY ASSESSMENT (OUTPATIENT)
Dept: RADIATION ONCOLOGY | Facility: HOSPITAL | Age: 54
End: 2024-11-08
Payer: COMMERCIAL

## 2024-11-08 ENCOUNTER — HOSPITAL ENCOUNTER (OUTPATIENT)
Dept: RADIATION ONCOLOGY | Facility: HOSPITAL | Age: 54
Discharge: HOME | End: 2024-11-08

## 2024-11-08 DIAGNOSIS — C50.511 MALIGNANT NEOPLASM OF LOWER-OUTER QUADRANT OF RIGHT BREAST OF FEMALE, ESTROGEN RECEPTOR POSITIVE: Primary | ICD-10-CM

## 2024-11-08 DIAGNOSIS — Z17.0 MALIGNANT NEOPLASM OF LOWER-OUTER QUADRANT OF RIGHT BREAST OF FEMALE, ESTROGEN RECEPTOR POSITIVE: Primary | ICD-10-CM

## 2024-11-08 LAB
RAD ONC ARIA COURSE ID: NORMAL
RAD ONC ARIA COURSE LAST TREATMENT DATE: NORMAL
RAD ONC ARIA COURSE START DATE: NORMAL
RAD ONC ARIA COURSE TREATMENT ELAPSED DAYS: 18
RAD ONC ARIA FIRST TREATMENT DATE: NORMAL
RAD ONC ARIA PLAN FRACTIONS TREATED TO DATE: 15
RAD ONC ARIA PLAN ID: NORMAL
RAD ONC ARIA PLAN PRESCRIBED DOSE PER FRACTION: 2 GY
RAD ONC ARIA PLAN PRIMARY REFERENCE POINT: NORMAL
RAD ONC ARIA PLAN TOTAL FRACTIONS PRESCRIBED: 25
RAD ONC ARIA PLAN TOTAL PRESCRIBED DOSE: 5000 CGY
RAD ONC ARIA REFERENCE POINT DOSAGE GIVEN TO DATE: 30 GY
RAD ONC ARIA REFERENCE POINT ID: NORMAL
RAD ONC ARIA REFERENCE POINT SESSION DOSAGE GIVEN: 2 GY

## 2024-11-08 PROCEDURE — 77412 RADIATION TX DELIVERY LVL 3: CPT | Performed by: INTERNAL MEDICINE

## 2024-11-08 PROCEDURE — FACE2FACE: Performed by: INTERNAL MEDICINE

## 2024-11-08 PROCEDURE — 77014 CHG CT GUIDANCE RADIATION THERAPY FLDS PLACEMENT: CPT | Performed by: INTERNAL MEDICINE

## 2024-11-08 PROCEDURE — 77387 GUIDANCE FOR RADJ TX DLVR: CPT | Performed by: INTERNAL MEDICINE

## 2024-11-08 NOTE — PROGRESS NOTES
[facility] RADIATION ONCOLOGY  ON-TREATMENT VISIT NOTE    NAME: Dasha De Leon  YOB: 1970  MRN #: [id number]  DATE OF SERVICE: 11/8/2024  PRESCRIBING PHYSICIAN: Dr. Germain Puga    DIAGNOSIS:      ICD-10-CM ICD-9-CM   1. Malignant neoplasm of lower-outer quadrant of right breast of female, estrogen receptor positive  C50.511 174.5    Z17.0 V86.0        RADIATION THERAPY VISIT:  Continue radiation therapy, Dosimetry plan remains acceptable, Films reviewed and remains acceptable, Pain assessed, Pain management planned, Radiation dose schedule reviewed and remains acceptable, Radiation technique remains acceptable, and Symptoms within expected range    Radiation Treatments       Active   Plans   RtSCAxChWall   Most recent treatment: Dose planned: 200 cGy (fraction 15 on 11/8/2024)   Total: Dose planned: 5,000 cGy (25 fractions)   Elapsed Days: 18      Reference Points   RtChWall   Most recent treatment: Dose given: 200 cGy (on 11/8/2024)   Total: Dose given: 3,000 cGy   Elapsed Days: 18                    [] Concurrent Chemo   Regimen:  n/a     PHYSICAL ASSESSMENT       There were no vitals filed for this visit.     Wt Readings from Last 3 Encounters:   11/07/24 78 kg (172 lb)   10/28/24 77.1 kg (170 lb)   10/21/24 76.6 kg (168 lb 12.8 oz)     Restricted in physically strenuous activity but ambulatory and able to carry out work of a light or sedentary nature, e.g., light house work, office work = 1    We examined the relevant areas: yes  Findings are within the expected range for this stage of treatment: yes    ACTION ITEMS     Patient tolerating treatment well and as expected for this stage in their treatment and Continue radiation therapy as planned    Estimated Completion Date: 12/3/2024    Anticipatory guidance provided.    Discussed appropriate skin care.    Discussed options for moderate pain and swelling from radiation.       Germain Puga MD  Radiation  Oncology  [facility]

## 2024-11-11 ENCOUNTER — HOSPITAL ENCOUNTER (OUTPATIENT)
Dept: RADIATION ONCOLOGY | Facility: HOSPITAL | Age: 54
Discharge: HOME | End: 2024-11-11
Payer: COMMERCIAL

## 2024-11-11 ENCOUNTER — APPOINTMENT (OUTPATIENT)
Dept: RADIATION ONCOLOGY | Facility: HOSPITAL | Age: 54
End: 2024-11-11
Payer: COMMERCIAL

## 2024-11-11 LAB
RAD ONC ARIA COURSE ID: NORMAL
RAD ONC ARIA COURSE LAST TREATMENT DATE: NORMAL
RAD ONC ARIA COURSE START DATE: NORMAL
RAD ONC ARIA COURSE TREATMENT ELAPSED DAYS: 21
RAD ONC ARIA FIRST TREATMENT DATE: NORMAL
RAD ONC ARIA PLAN FRACTIONS TREATED TO DATE: 16
RAD ONC ARIA PLAN ID: NORMAL
RAD ONC ARIA PLAN PRESCRIBED DOSE PER FRACTION: 2 GY
RAD ONC ARIA PLAN PRIMARY REFERENCE POINT: NORMAL
RAD ONC ARIA PLAN TOTAL FRACTIONS PRESCRIBED: 25
RAD ONC ARIA PLAN TOTAL PRESCRIBED DOSE: 5000 CGY
RAD ONC ARIA REFERENCE POINT DOSAGE GIVEN TO DATE: 32 GY
RAD ONC ARIA REFERENCE POINT ID: NORMAL
RAD ONC ARIA REFERENCE POINT SESSION DOSAGE GIVEN: 2 GY

## 2024-11-11 PROCEDURE — 77387 GUIDANCE FOR RADJ TX DLVR: CPT | Performed by: INTERNAL MEDICINE

## 2024-11-11 PROCEDURE — 77014 CHG CT GUIDANCE RADIATION THERAPY FLDS PLACEMENT: CPT | Performed by: INTERNAL MEDICINE

## 2024-11-11 PROCEDURE — 77412 RADIATION TX DELIVERY LVL 3: CPT | Performed by: INTERNAL MEDICINE

## 2024-11-11 PROCEDURE — 77427 RADIATION TX MANAGEMENT X5: CPT | Performed by: INTERNAL MEDICINE

## 2024-11-12 ENCOUNTER — HOSPITAL ENCOUNTER (OUTPATIENT)
Dept: RADIATION ONCOLOGY | Facility: HOSPITAL | Age: 54
Discharge: HOME | End: 2024-11-12

## 2024-11-12 LAB
RAD ONC ARIA COURSE ID: NORMAL
RAD ONC ARIA COURSE LAST TREATMENT DATE: NORMAL
RAD ONC ARIA COURSE START DATE: NORMAL
RAD ONC ARIA COURSE TREATMENT ELAPSED DAYS: 22
RAD ONC ARIA FIRST TREATMENT DATE: NORMAL
RAD ONC ARIA PLAN FRACTIONS TREATED TO DATE: 17
RAD ONC ARIA PLAN ID: NORMAL
RAD ONC ARIA PLAN PRESCRIBED DOSE PER FRACTION: 2 GY
RAD ONC ARIA PLAN PRIMARY REFERENCE POINT: NORMAL
RAD ONC ARIA PLAN TOTAL FRACTIONS PRESCRIBED: 25
RAD ONC ARIA PLAN TOTAL PRESCRIBED DOSE: 5000 CGY
RAD ONC ARIA REFERENCE POINT DOSAGE GIVEN TO DATE: 34 GY
RAD ONC ARIA REFERENCE POINT ID: NORMAL
RAD ONC ARIA REFERENCE POINT SESSION DOSAGE GIVEN: 2 GY

## 2024-11-12 PROCEDURE — 77412 RADIATION TX DELIVERY LVL 3: CPT | Performed by: INTERNAL MEDICINE

## 2024-11-12 PROCEDURE — 77387 GUIDANCE FOR RADJ TX DLVR: CPT | Performed by: INTERNAL MEDICINE

## 2024-11-12 PROCEDURE — 77014 CHG CT GUIDANCE RADIATION THERAPY FLDS PLACEMENT: CPT | Performed by: INTERNAL MEDICINE

## 2024-11-13 ENCOUNTER — HOSPITAL ENCOUNTER (OUTPATIENT)
Dept: RADIATION ONCOLOGY | Facility: HOSPITAL | Age: 54
Discharge: HOME | End: 2024-11-13

## 2024-11-13 LAB
RAD ONC ARIA COURSE ID: NORMAL
RAD ONC ARIA COURSE LAST TREATMENT DATE: NORMAL
RAD ONC ARIA COURSE START DATE: NORMAL
RAD ONC ARIA COURSE TREATMENT ELAPSED DAYS: 23
RAD ONC ARIA FIRST TREATMENT DATE: NORMAL
RAD ONC ARIA PLAN FRACTIONS TREATED TO DATE: 18
RAD ONC ARIA PLAN ID: NORMAL
RAD ONC ARIA PLAN PRESCRIBED DOSE PER FRACTION: 2 GY
RAD ONC ARIA PLAN PRIMARY REFERENCE POINT: NORMAL
RAD ONC ARIA PLAN TOTAL FRACTIONS PRESCRIBED: 25
RAD ONC ARIA PLAN TOTAL PRESCRIBED DOSE: 5000 CGY
RAD ONC ARIA REFERENCE POINT DOSAGE GIVEN TO DATE: 36 GY
RAD ONC ARIA REFERENCE POINT ID: NORMAL
RAD ONC ARIA REFERENCE POINT SESSION DOSAGE GIVEN: 2 GY

## 2024-11-13 PROCEDURE — 77014 CHG CT GUIDANCE RADIATION THERAPY FLDS PLACEMENT: CPT | Performed by: INTERNAL MEDICINE

## 2024-11-13 PROCEDURE — 77412 RADIATION TX DELIVERY LVL 3: CPT | Performed by: INTERNAL MEDICINE

## 2024-11-13 PROCEDURE — 77387 GUIDANCE FOR RADJ TX DLVR: CPT | Performed by: INTERNAL MEDICINE

## 2024-11-14 ENCOUNTER — HOSPITAL ENCOUNTER (OUTPATIENT)
Dept: RADIATION ONCOLOGY | Facility: HOSPITAL | Age: 54
Discharge: HOME | End: 2024-11-14

## 2024-11-14 ENCOUNTER — RADIATION ONCOLOGY WEEKLY ASSESSMENT (OUTPATIENT)
Dept: RADIATION ONCOLOGY | Facility: HOSPITAL | Age: 54
End: 2024-11-14
Payer: COMMERCIAL

## 2024-11-14 VITALS
HEART RATE: 96 BPM | BODY MASS INDEX: 28.19 KG/M2 | SYSTOLIC BLOOD PRESSURE: 124 MMHG | RESPIRATION RATE: 20 BRPM | WEIGHT: 169.4 LBS | OXYGEN SATURATION: 95 % | DIASTOLIC BLOOD PRESSURE: 83 MMHG

## 2024-11-14 DIAGNOSIS — C50.511 MALIGNANT NEOPLASM OF LOWER-OUTER QUADRANT OF RIGHT BREAST OF FEMALE, ESTROGEN RECEPTOR POSITIVE: Primary | ICD-10-CM

## 2024-11-14 DIAGNOSIS — Z17.0 MALIGNANT NEOPLASM OF LOWER-OUTER QUADRANT OF RIGHT BREAST OF FEMALE, ESTROGEN RECEPTOR POSITIVE: Primary | ICD-10-CM

## 2024-11-14 LAB
RAD ONC ARIA COURSE ID: NORMAL
RAD ONC ARIA COURSE LAST TREATMENT DATE: NORMAL
RAD ONC ARIA COURSE START DATE: NORMAL
RAD ONC ARIA COURSE TREATMENT ELAPSED DAYS: 24
RAD ONC ARIA FIRST TREATMENT DATE: NORMAL
RAD ONC ARIA PLAN FRACTIONS TREATED TO DATE: 19
RAD ONC ARIA PLAN ID: NORMAL
RAD ONC ARIA PLAN PRESCRIBED DOSE PER FRACTION: 2 GY
RAD ONC ARIA PLAN PRIMARY REFERENCE POINT: NORMAL
RAD ONC ARIA PLAN TOTAL FRACTIONS PRESCRIBED: 25
RAD ONC ARIA PLAN TOTAL PRESCRIBED DOSE: 5000 CGY
RAD ONC ARIA REFERENCE POINT DOSAGE GIVEN TO DATE: 38 GY
RAD ONC ARIA REFERENCE POINT ID: NORMAL
RAD ONC ARIA REFERENCE POINT SESSION DOSAGE GIVEN: 2 GY

## 2024-11-14 PROCEDURE — 77412 RADIATION TX DELIVERY LVL 3: CPT | Performed by: INTERNAL MEDICINE

## 2024-11-14 PROCEDURE — 77387 GUIDANCE FOR RADJ TX DLVR: CPT | Performed by: INTERNAL MEDICINE

## 2024-11-14 PROCEDURE — 77336 RADIATION PHYSICS CONSULT: CPT | Performed by: INTERNAL MEDICINE

## 2024-11-14 PROCEDURE — 77014 CHG CT GUIDANCE RADIATION THERAPY FLDS PLACEMENT: CPT | Performed by: INTERNAL MEDICINE

## 2024-11-14 NOTE — PROGRESS NOTES
[facility] RADIATION ONCOLOGY  ON-TREATMENT VISIT NOTE    NAME: Dasha De Leon  YOB: 1970  MRN #: [id number]  DATE OF SERVICE: 11/14/2024  PRESCRIBING PHYSICIAN: Fifi Mclean MD     DIAGNOSIS:      ICD-10-CM ICD-9-CM   1. Malignant neoplasm of lower-outer quadrant of right breast of female, estrogen receptor positive  C50.511 174.5    Z17.0 V86.0      RADIATION THERAPY VISIT:  Continue radiation therapy, Dosimetry plan remains acceptable, Films reviewed and remains acceptable, Pain assessed, Pain management planned, Radiation dose schedule reviewed and remains acceptable, Radiation technique remains acceptable, and Symptoms within expected range    Radiation Treatments       Active   Plans   RtSCAxChWall   Most recent treatment: Dose planned: 200 cGy (fraction 19 on 11/14/2024)   Total: Dose planned: 5,000 cGy (25 fractions)   Elapsed Days: 24      Reference Points   RtChWall   Most recent treatment: Dose given: 200 cGy (on 11/14/2024)   Total: Dose given: 3,800 cGy   Elapsed Days: 24                    [] Concurrent Chemo   Regimen:  n/a     PHYSICAL ASSESSMENT         Vitals:    11/14/24 0835   BP: 124/83   Pulse: 96   Resp: 20   SpO2: 95%      Wt Readings from Last 3 Encounters:   11/14/24 76.8 kg (169 lb 6.4 oz)   11/07/24 78 kg (172 lb)   10/28/24 77.1 kg (170 lb)     Restricted in physically strenuous activity but ambulatory and able to carry out work of a light or sedentary nature, e.g., light house work, office work = 1    We examined the relevant areas: yes  Findings are within the expected range for this stage of treatment: yes    ACTION ITEMS     Patient tolerating treatment well and as expected for this stage in their treatment and Continue radiation therapy as planned    Estimated Completion Date: 12/3/2024     Anticipatory guidance provided.     Discussed appropriate skin care.     Discussed options for moderate pain and swelling from radiation.         Germain Puga MD  Radiation Oncology  [facility]

## 2024-11-15 ENCOUNTER — HOSPITAL ENCOUNTER (OUTPATIENT)
Dept: RADIATION ONCOLOGY | Facility: HOSPITAL | Age: 54
Discharge: HOME | End: 2024-11-15

## 2024-11-15 ENCOUNTER — PROCEDURE VISIT (OUTPATIENT)
Dept: OBSTETRICS AND GYNECOLOGY | Age: 54
End: 2024-11-15
Payer: COMMERCIAL

## 2024-11-15 VITALS
DIASTOLIC BLOOD PRESSURE: 74 MMHG | WEIGHT: 169 LBS | BODY MASS INDEX: 28.16 KG/M2 | HEIGHT: 65 IN | SYSTOLIC BLOOD PRESSURE: 112 MMHG

## 2024-11-15 DIAGNOSIS — R87.610 ASCUS OF CERVIX WITH NEGATIVE HIGH RISK HPV: Primary | ICD-10-CM

## 2024-11-15 LAB
RAD ONC ARIA COURSE ID: NORMAL
RAD ONC ARIA COURSE LAST TREATMENT DATE: NORMAL
RAD ONC ARIA COURSE START DATE: NORMAL
RAD ONC ARIA COURSE TREATMENT ELAPSED DAYS: 25
RAD ONC ARIA FIRST TREATMENT DATE: NORMAL
RAD ONC ARIA PLAN FRACTIONS TREATED TO DATE: 20
RAD ONC ARIA PLAN ID: NORMAL
RAD ONC ARIA PLAN PRESCRIBED DOSE PER FRACTION: 2 GY
RAD ONC ARIA PLAN PRIMARY REFERENCE POINT: NORMAL
RAD ONC ARIA PLAN TOTAL FRACTIONS PRESCRIBED: 25
RAD ONC ARIA PLAN TOTAL PRESCRIBED DOSE: 5000 CGY
RAD ONC ARIA REFERENCE POINT DOSAGE GIVEN TO DATE: 40 GY
RAD ONC ARIA REFERENCE POINT ID: NORMAL
RAD ONC ARIA REFERENCE POINT SESSION DOSAGE GIVEN: 2 GY

## 2024-11-15 PROCEDURE — 77387 GUIDANCE FOR RADJ TX DLVR: CPT | Performed by: INTERNAL MEDICINE

## 2024-11-15 PROCEDURE — 77014 CHG CT GUIDANCE RADIATION THERAPY FLDS PLACEMENT: CPT | Performed by: INTERNAL MEDICINE

## 2024-11-15 PROCEDURE — 77412 RADIATION TX DELIVERY LVL 3: CPT | Performed by: INTERNAL MEDICINE

## 2024-11-15 NOTE — PROGRESS NOTES
Colposcopy    Date of procedure:  11/15/2024   Risks and benefits discussed? yes   All questions answered? yes   Consents given by: patient       Pre-op indication: ASC-US with (-) high risk HPV  times 2           Procedure documentation:  The cervix was initially viewed colposcopically through a green filter.  The cervix was next bathed in acetic acid.   The findings were as follows:    Transformation zone seen? Yes   Findings: No visible lesions  No mosaicism  No punctation   Ectocervical biopsies: taken from 10 o'clock.  Monsels solution was applied to the biopsy sites.   Endocervical curettage: performed       Physical Exam  Genitourinary:                   Colposcopic Impression: Normal appearing cervix w/o visible lesion  Adequate colposcopy  Colposcopic findings are inconsistent with cytology    4.   The patient tolerated the procedure well      Plan: Will base further treatment on pathology results  Post biopsy instructions given to patient.  Specimens labelled and sent to pathology.

## 2024-11-18 ENCOUNTER — HOSPITAL ENCOUNTER (OUTPATIENT)
Dept: RADIATION ONCOLOGY | Facility: HOSPITAL | Age: 54
Discharge: HOME | End: 2024-11-18
Payer: COMMERCIAL

## 2024-11-18 ENCOUNTER — RADIATION ONCOLOGY WEEKLY ASSESSMENT (OUTPATIENT)
Dept: RADIATION ONCOLOGY | Facility: HOSPITAL | Age: 54
End: 2024-11-18
Payer: COMMERCIAL

## 2024-11-18 VITALS
WEIGHT: 168.8 LBS | HEART RATE: 109 BPM | BODY MASS INDEX: 28.09 KG/M2 | RESPIRATION RATE: 18 BRPM | DIASTOLIC BLOOD PRESSURE: 90 MMHG | SYSTOLIC BLOOD PRESSURE: 140 MMHG | OXYGEN SATURATION: 99 %

## 2024-11-18 DIAGNOSIS — Z17.0 MALIGNANT NEOPLASM OF LOWER-OUTER QUADRANT OF RIGHT BREAST OF FEMALE, ESTROGEN RECEPTOR POSITIVE: Primary | ICD-10-CM

## 2024-11-18 DIAGNOSIS — C50.511 MALIGNANT NEOPLASM OF LOWER-OUTER QUADRANT OF RIGHT BREAST OF FEMALE, ESTROGEN RECEPTOR POSITIVE: Primary | ICD-10-CM

## 2024-11-18 LAB
RAD ONC ARIA COURSE ID: NORMAL
RAD ONC ARIA COURSE LAST TREATMENT DATE: NORMAL
RAD ONC ARIA COURSE START DATE: NORMAL
RAD ONC ARIA COURSE TREATMENT ELAPSED DAYS: 28
RAD ONC ARIA FIRST TREATMENT DATE: NORMAL
RAD ONC ARIA PLAN FRACTIONS TREATED TO DATE: 21
RAD ONC ARIA PLAN ID: NORMAL
RAD ONC ARIA PLAN PRESCRIBED DOSE PER FRACTION: 2 GY
RAD ONC ARIA PLAN PRIMARY REFERENCE POINT: NORMAL
RAD ONC ARIA PLAN TOTAL FRACTIONS PRESCRIBED: 25
RAD ONC ARIA PLAN TOTAL PRESCRIBED DOSE: 5000 CGY
RAD ONC ARIA REFERENCE POINT DOSAGE GIVEN TO DATE: 42 GY
RAD ONC ARIA REFERENCE POINT ID: NORMAL
RAD ONC ARIA REFERENCE POINT SESSION DOSAGE GIVEN: 2 GY

## 2024-11-18 PROCEDURE — 77427 RADIATION TX MANAGEMENT X5: CPT | Performed by: INTERNAL MEDICINE

## 2024-11-18 PROCEDURE — 77412 RADIATION TX DELIVERY LVL 3: CPT | Performed by: INTERNAL MEDICINE

## 2024-11-18 PROCEDURE — 77387 GUIDANCE FOR RADJ TX DLVR: CPT | Performed by: INTERNAL MEDICINE

## 2024-11-18 PROCEDURE — 77014 CHG CT GUIDANCE RADIATION THERAPY FLDS PLACEMENT: CPT | Performed by: INTERNAL MEDICINE

## 2024-11-18 PROCEDURE — FACE2FACE: Performed by: INTERNAL MEDICINE

## 2024-11-18 RX ORDER — OXYCODONE HYDROCHLORIDE 5 MG/1
5 TABLET ORAL EVERY 4 HOURS PRN
Qty: 30 TABLET | Refills: 0 | Status: SHIPPED | OUTPATIENT
Start: 2024-11-18

## 2024-11-18 NOTE — PROGRESS NOTES
[facility] RADIATION ONCOLOGY  ON-TREATMENT VISIT NOTE    NAME: Dasha De Leon  YOB: 1970  MRN #: [id number]  DATE OF SERVICE: 11/18/2024  PRESCRIBING PHYSICIAN: Dr. Germain Puga    DIAGNOSIS:      ICD-10-CM ICD-9-CM   1. Malignant neoplasm of lower-outer quadrant of right breast of female, estrogen receptor positive  C50.511 174.5    Z17.0 V86.0      RADIATION THERAPY VISIT:  Continue radiation therapy, Dosimetry plan remains acceptable, Films reviewed and remains acceptable, Pain assessed, Pain management planned, Radiation dose schedule reviewed and remains acceptable, Radiation technique remains acceptable, and Symptoms within expected range    Radiation Treatments       Active   Plans   RtSCAxChWall   Most recent treatment: Dose planned: 200 cGy (fraction 21 on 11/18/2024)   Total: Dose planned: 5,000 cGy (25 fractions)   Elapsed Days: 28      Reference Points   RtChWall   Most recent treatment: Dose given: 200 cGy (on 11/18/2024)   Total: Dose given: 4,200 cGy   Elapsed Days: 28                    [] Concurrent Chemo   Regimen:       PHYSICAL ASSESSMENT         Vitals:    11/18/24 0832   BP: 140/90   Pulse: 109   Resp: 18   SpO2: 99%      Wt Readings from Last 3 Encounters:   11/18/24 76.6 kg (168 lb 12.8 oz)   11/15/24 76.7 kg (169 lb)   11/14/24 76.8 kg (169 lb 6.4 oz)     Restricted in physically strenuous activity but ambulatory and able to carry out work of a light or sedentary nature, e.g., light house work, office work = 1    We examined the relevant areas: yes  Findings are within the expected range for this stage of treatment: yes    ACTION ITEMS     Patient tolerating treatment well and as expected for this stage in their treatment and Continue radiation therapy as planned    Estimated Completion Date: 12/3/2024    Anticipatory guidance provided.    Reviewed appropriate skin care.    Patient developing more brisk skin reaction which is causing discomfort.  Provided oxycodone prescription to help with pain.       Germain Puga MD   Radiation Oncology  [facility]

## 2024-11-19 ENCOUNTER — HOSPITAL ENCOUNTER (OUTPATIENT)
Dept: RADIATION ONCOLOGY | Facility: HOSPITAL | Age: 54
Discharge: HOME | End: 2024-11-19

## 2024-11-19 LAB
RAD ONC ARIA COURSE ID: NORMAL
RAD ONC ARIA COURSE LAST TREATMENT DATE: NORMAL
RAD ONC ARIA COURSE START DATE: NORMAL
RAD ONC ARIA COURSE TREATMENT ELAPSED DAYS: 29
RAD ONC ARIA FIRST TREATMENT DATE: NORMAL
RAD ONC ARIA PLAN FRACTIONS TREATED TO DATE: 22
RAD ONC ARIA PLAN ID: NORMAL
RAD ONC ARIA PLAN PRESCRIBED DOSE PER FRACTION: 2 GY
RAD ONC ARIA PLAN PRIMARY REFERENCE POINT: NORMAL
RAD ONC ARIA PLAN TOTAL FRACTIONS PRESCRIBED: 25
RAD ONC ARIA PLAN TOTAL PRESCRIBED DOSE: 5000 CGY
RAD ONC ARIA REFERENCE POINT DOSAGE GIVEN TO DATE: 44 GY
RAD ONC ARIA REFERENCE POINT ID: NORMAL
RAD ONC ARIA REFERENCE POINT SESSION DOSAGE GIVEN: 2 GY

## 2024-11-19 PROCEDURE — 77387 GUIDANCE FOR RADJ TX DLVR: CPT | Performed by: INTERNAL MEDICINE

## 2024-11-19 PROCEDURE — 77014 CHG CT GUIDANCE RADIATION THERAPY FLDS PLACEMENT: CPT | Performed by: INTERNAL MEDICINE

## 2024-11-19 PROCEDURE — 77412 RADIATION TX DELIVERY LVL 3: CPT | Performed by: INTERNAL MEDICINE

## 2024-11-20 ENCOUNTER — HOSPITAL ENCOUNTER (OUTPATIENT)
Dept: RADIATION ONCOLOGY | Facility: HOSPITAL | Age: 54
Discharge: HOME | End: 2024-11-20

## 2024-11-20 LAB
PATH REPORT.FINAL DX SPEC: NORMAL
PATH REPORT.GROSS SPEC: NORMAL
PATH REPORT.SITE OF ORIGIN SPEC: NORMAL
PATHOLOGIST NAME: NORMAL
PAYMENT PROCEDURE: NORMAL
RAD ONC ARIA COURSE ID: NORMAL
RAD ONC ARIA COURSE LAST TREATMENT DATE: NORMAL
RAD ONC ARIA COURSE START DATE: NORMAL
RAD ONC ARIA COURSE TREATMENT ELAPSED DAYS: 30
RAD ONC ARIA FIRST TREATMENT DATE: NORMAL
RAD ONC ARIA PLAN FRACTIONS TREATED TO DATE: 23
RAD ONC ARIA PLAN ID: NORMAL
RAD ONC ARIA PLAN PRESCRIBED DOSE PER FRACTION: 2 GY
RAD ONC ARIA PLAN PRIMARY REFERENCE POINT: NORMAL
RAD ONC ARIA PLAN TOTAL FRACTIONS PRESCRIBED: 25
RAD ONC ARIA PLAN TOTAL PRESCRIBED DOSE: 5000 CGY
RAD ONC ARIA REFERENCE POINT DOSAGE GIVEN TO DATE: 46 GY
RAD ONC ARIA REFERENCE POINT ID: NORMAL
RAD ONC ARIA REFERENCE POINT SESSION DOSAGE GIVEN: 2 GY

## 2024-11-20 PROCEDURE — 77387 GUIDANCE FOR RADJ TX DLVR: CPT | Performed by: INTERNAL MEDICINE

## 2024-11-20 PROCEDURE — 77014 CHG CT GUIDANCE RADIATION THERAPY FLDS PLACEMENT: CPT | Performed by: INTERNAL MEDICINE

## 2024-11-20 PROCEDURE — 77412 RADIATION TX DELIVERY LVL 3: CPT | Performed by: INTERNAL MEDICINE

## 2024-11-21 ENCOUNTER — HOSPITAL ENCOUNTER (OUTPATIENT)
Dept: RADIATION ONCOLOGY | Facility: HOSPITAL | Age: 54
Discharge: HOME | End: 2024-11-21

## 2024-11-21 LAB
RAD ONC ARIA COURSE ID: NORMAL
RAD ONC ARIA COURSE LAST TREATMENT DATE: NORMAL
RAD ONC ARIA COURSE START DATE: NORMAL
RAD ONC ARIA COURSE TREATMENT ELAPSED DAYS: 31
RAD ONC ARIA FIRST TREATMENT DATE: NORMAL
RAD ONC ARIA PLAN FRACTIONS TREATED TO DATE: 24
RAD ONC ARIA PLAN ID: NORMAL
RAD ONC ARIA PLAN PRESCRIBED DOSE PER FRACTION: 2 GY
RAD ONC ARIA PLAN PRIMARY REFERENCE POINT: NORMAL
RAD ONC ARIA PLAN TOTAL FRACTIONS PRESCRIBED: 25
RAD ONC ARIA PLAN TOTAL PRESCRIBED DOSE: 5000 CGY
RAD ONC ARIA REFERENCE POINT DOSAGE GIVEN TO DATE: 48 GY
RAD ONC ARIA REFERENCE POINT ID: NORMAL
RAD ONC ARIA REFERENCE POINT SESSION DOSAGE GIVEN: 2 GY

## 2024-11-21 PROCEDURE — 77014 CHG CT GUIDANCE RADIATION THERAPY FLDS PLACEMENT: CPT | Performed by: INTERNAL MEDICINE

## 2024-11-21 PROCEDURE — 77336 RADIATION PHYSICS CONSULT: CPT | Performed by: INTERNAL MEDICINE

## 2024-11-21 PROCEDURE — 77387 GUIDANCE FOR RADJ TX DLVR: CPT | Performed by: INTERNAL MEDICINE

## 2024-11-21 PROCEDURE — 77412 RADIATION TX DELIVERY LVL 3: CPT | Performed by: INTERNAL MEDICINE

## 2024-11-22 ENCOUNTER — HOSPITAL ENCOUNTER (OUTPATIENT)
Dept: RADIATION ONCOLOGY | Facility: HOSPITAL | Age: 54
Discharge: HOME | End: 2024-11-22

## 2024-11-22 LAB
RAD ONC ARIA COURSE ID: NORMAL
RAD ONC ARIA COURSE LAST TREATMENT DATE: NORMAL
RAD ONC ARIA COURSE START DATE: NORMAL
RAD ONC ARIA COURSE TREATMENT ELAPSED DAYS: 32
RAD ONC ARIA FIRST TREATMENT DATE: NORMAL
RAD ONC ARIA PLAN FRACTIONS TREATED TO DATE: 25
RAD ONC ARIA PLAN ID: NORMAL
RAD ONC ARIA PLAN PRESCRIBED DOSE PER FRACTION: 2 GY
RAD ONC ARIA PLAN PRIMARY REFERENCE POINT: NORMAL
RAD ONC ARIA PLAN TOTAL FRACTIONS PRESCRIBED: 25
RAD ONC ARIA PLAN TOTAL PRESCRIBED DOSE: 5000 CGY
RAD ONC ARIA REFERENCE POINT DOSAGE GIVEN TO DATE: 50 GY
RAD ONC ARIA REFERENCE POINT ID: NORMAL
RAD ONC ARIA REFERENCE POINT SESSION DOSAGE GIVEN: 2 GY

## 2024-11-22 PROCEDURE — 77387 GUIDANCE FOR RADJ TX DLVR: CPT | Performed by: INTERNAL MEDICINE

## 2024-11-22 PROCEDURE — 77014 CHG CT GUIDANCE RADIATION THERAPY FLDS PLACEMENT: CPT | Performed by: INTERNAL MEDICINE

## 2024-11-22 PROCEDURE — 77412 RADIATION TX DELIVERY LVL 3: CPT | Performed by: INTERNAL MEDICINE

## 2024-11-25 ENCOUNTER — RADIATION ONCOLOGY WEEKLY ASSESSMENT (OUTPATIENT)
Dept: RADIATION ONCOLOGY | Facility: HOSPITAL | Age: 54
End: 2024-11-25
Payer: COMMERCIAL

## 2024-11-25 ENCOUNTER — HOSPITAL ENCOUNTER (OUTPATIENT)
Dept: RADIATION ONCOLOGY | Facility: HOSPITAL | Age: 54
Discharge: HOME | End: 2024-11-25
Payer: COMMERCIAL

## 2024-11-25 VITALS
HEART RATE: 110 BPM | OXYGEN SATURATION: 98 % | BODY MASS INDEX: 28.29 KG/M2 | RESPIRATION RATE: 20 BRPM | SYSTOLIC BLOOD PRESSURE: 133 MMHG | DIASTOLIC BLOOD PRESSURE: 86 MMHG | WEIGHT: 170 LBS

## 2024-11-25 DIAGNOSIS — C50.511 MALIGNANT NEOPLASM OF LOWER-OUTER QUADRANT OF RIGHT BREAST OF FEMALE, ESTROGEN RECEPTOR POSITIVE: Primary | ICD-10-CM

## 2024-11-25 DIAGNOSIS — Z17.0 MALIGNANT NEOPLASM OF LOWER-OUTER QUADRANT OF RIGHT BREAST OF FEMALE, ESTROGEN RECEPTOR POSITIVE: Primary | ICD-10-CM

## 2024-11-25 LAB
RAD ONC ARIA COURSE ID: NORMAL
RAD ONC ARIA COURSE LAST TREATMENT DATE: NORMAL
RAD ONC ARIA COURSE START DATE: NORMAL
RAD ONC ARIA COURSE TREATMENT ELAPSED DAYS: 35
RAD ONC ARIA FIRST TREATMENT DATE: NORMAL
RAD ONC ARIA PLAN FRACTIONS TREATED TO DATE: 1
RAD ONC ARIA PLAN ID: NORMAL
RAD ONC ARIA PLAN PRESCRIBED DOSE PER FRACTION: 2 GY
RAD ONC ARIA PLAN PRIMARY REFERENCE POINT: NORMAL
RAD ONC ARIA PLAN TOTAL FRACTIONS PRESCRIBED: 5
RAD ONC ARIA PLAN TOTAL PRESCRIBED DOSE: 1000 CGY
RAD ONC ARIA REFERENCE POINT DOSAGE GIVEN TO DATE: 2 GY
RAD ONC ARIA REFERENCE POINT ID: NORMAL
RAD ONC ARIA REFERENCE POINT SESSION DOSAGE GIVEN: 2 GY

## 2024-11-25 PROCEDURE — 77427 RADIATION TX MANAGEMENT X5: CPT | Performed by: INTERNAL MEDICINE

## 2024-11-25 PROCEDURE — 77412 RADIATION TX DELIVERY LVL 3: CPT | Performed by: INTERNAL MEDICINE

## 2024-11-25 NOTE — PROGRESS NOTES
[facility] RADIATION ONCOLOGY  ON-TREATMENT VISIT NOTE    NAME: Dasha De Leon  YOB: 1970  MRN #: [id number]  DATE OF SERVICE: 11/25/2024  PRESCRIBING PHYSICIAN: Dr. Germain Puga    DIAGNOSIS:      ICD-10-CM ICD-9-CM   1. Malignant neoplasm of lower-outer quadrant of right breast of female, estrogen receptor positive  C50.511 174.5    Z17.0 V86.0        RADIATION THERAPY VISIT:  Continue radiation therapy, Dosimetry plan remains acceptable, Films reviewed and remains acceptable, Pain assessed, Pain management planned, Radiation dose schedule reviewed and remains acceptable, Radiation technique remains acceptable, and Symptoms within expected range    Radiation Treatments       Active   Plans   RtCWallBstLat   Most recent treatment: Dose planned: 200 cGy (fraction 1 on 11/25/2024)   Total: Dose planned: 1,000 cGy (5 fractions)   Elapsed Days: 35      Reference Points   RtChWall   Most recent treatment: Dose given: 200 cGy (on 11/22/2024)   Total: Dose given: 5,000 cGy   Elapsed Days: 32      RtChWallBst   Most recent treatment: Dose given: 200 cGy (on 11/25/2024)   Total: Dose given: 200 cGy   Elapsed Days: 35                    [] Concurrent Chemo   Regimen:       PHYSICAL ASSESSMENT         Vitals:    11/25/24 0759   BP: 133/86   Pulse: 110   Resp: 20   SpO2: 98%        Wt Readings from Last 3 Encounters:   11/25/24 77.1 kg (170 lb)   11/18/24 76.6 kg (168 lb 12.8 oz)   11/15/24 76.7 kg (169 lb)     Restricted in physically strenuous activity but ambulatory and able to carry out work of a light or sedentary nature, e.g., light house work, office work = 1    We examined the relevant areas: yes  Findings are within the expected range for this stage of treatment: yes    ACTION ITEMS     Patient tolerating treatment well and as expected for this stage in their treatment and Continue radiation therapy as planned    Estimated Completion Date: 12/3/2024    Anticipatory guidance  provided.    Reviewed appropriate skin care.    Patient developing more brisk skin reaction which is causing discomfort. Provided oxycodone prescription to help with pain.       Germain Puga MD   Radiation Oncology  [facility]

## 2024-11-26 ENCOUNTER — HOSPITAL ENCOUNTER (OUTPATIENT)
Dept: RADIATION ONCOLOGY | Facility: HOSPITAL | Age: 54
Discharge: HOME | End: 2024-11-26

## 2024-11-26 LAB
RAD ONC ARIA COURSE ID: NORMAL
RAD ONC ARIA COURSE LAST TREATMENT DATE: NORMAL
RAD ONC ARIA COURSE START DATE: NORMAL
RAD ONC ARIA COURSE TREATMENT ELAPSED DAYS: 36
RAD ONC ARIA FIRST TREATMENT DATE: NORMAL
RAD ONC ARIA PLAN FRACTIONS TREATED TO DATE: 2
RAD ONC ARIA PLAN ID: NORMAL
RAD ONC ARIA PLAN PRESCRIBED DOSE PER FRACTION: 2 GY
RAD ONC ARIA PLAN PRIMARY REFERENCE POINT: NORMAL
RAD ONC ARIA PLAN TOTAL FRACTIONS PRESCRIBED: 5
RAD ONC ARIA PLAN TOTAL PRESCRIBED DOSE: 1000 CGY
RAD ONC ARIA REFERENCE POINT DOSAGE GIVEN TO DATE: 4 GY
RAD ONC ARIA REFERENCE POINT ID: NORMAL
RAD ONC ARIA REFERENCE POINT SESSION DOSAGE GIVEN: 2 GY

## 2024-11-26 PROCEDURE — 77412 RADIATION TX DELIVERY LVL 3: CPT | Performed by: INTERNAL MEDICINE

## 2024-11-27 ENCOUNTER — HOSPITAL ENCOUNTER (OUTPATIENT)
Dept: RADIATION ONCOLOGY | Facility: HOSPITAL | Age: 54
Discharge: HOME | End: 2024-11-27

## 2024-11-27 LAB
RAD ONC ARIA COURSE ID: NORMAL
RAD ONC ARIA COURSE LAST TREATMENT DATE: NORMAL
RAD ONC ARIA COURSE START DATE: NORMAL
RAD ONC ARIA COURSE TREATMENT ELAPSED DAYS: 37
RAD ONC ARIA FIRST TREATMENT DATE: NORMAL
RAD ONC ARIA PLAN FRACTIONS TREATED TO DATE: 3
RAD ONC ARIA PLAN ID: NORMAL
RAD ONC ARIA PLAN PRESCRIBED DOSE PER FRACTION: 2 GY
RAD ONC ARIA PLAN PRIMARY REFERENCE POINT: NORMAL
RAD ONC ARIA PLAN TOTAL FRACTIONS PRESCRIBED: 5
RAD ONC ARIA PLAN TOTAL PRESCRIBED DOSE: 1000 CGY
RAD ONC ARIA REFERENCE POINT DOSAGE GIVEN TO DATE: 6 GY
RAD ONC ARIA REFERENCE POINT ID: NORMAL
RAD ONC ARIA REFERENCE POINT SESSION DOSAGE GIVEN: 2 GY

## 2024-11-27 PROCEDURE — 77412 RADIATION TX DELIVERY LVL 3: CPT | Performed by: INTERNAL MEDICINE

## 2024-11-27 PROCEDURE — 77336 RADIATION PHYSICS CONSULT: CPT | Performed by: INTERNAL MEDICINE

## 2024-12-02 ENCOUNTER — PATIENT OUTREACH (OUTPATIENT)
Dept: OTHER | Facility: HOSPITAL | Age: 54
End: 2024-12-02
Payer: COMMERCIAL

## 2024-12-02 ENCOUNTER — HOSPITAL ENCOUNTER (OUTPATIENT)
Dept: RADIATION ONCOLOGY | Facility: HOSPITAL | Age: 54
Discharge: HOME | End: 2024-12-02
Payer: COMMERCIAL

## 2024-12-02 ENCOUNTER — HOSPITAL ENCOUNTER (OUTPATIENT)
Dept: RADIATION ONCOLOGY | Facility: HOSPITAL | Age: 54
Setting detail: RADIATION/ONCOLOGY SERIES
End: 2024-12-02
Payer: COMMERCIAL

## 2024-12-02 LAB
RAD ONC ARIA COURSE ID: NORMAL
RAD ONC ARIA COURSE LAST TREATMENT DATE: NORMAL
RAD ONC ARIA COURSE START DATE: NORMAL
RAD ONC ARIA COURSE TREATMENT ELAPSED DAYS: 42
RAD ONC ARIA FIRST TREATMENT DATE: NORMAL
RAD ONC ARIA PLAN FRACTIONS TREATED TO DATE: 4
RAD ONC ARIA PLAN ID: NORMAL
RAD ONC ARIA PLAN PRESCRIBED DOSE PER FRACTION: 2 GY
RAD ONC ARIA PLAN PRIMARY REFERENCE POINT: NORMAL
RAD ONC ARIA PLAN TOTAL FRACTIONS PRESCRIBED: 5
RAD ONC ARIA PLAN TOTAL PRESCRIBED DOSE: 1000 CGY
RAD ONC ARIA REFERENCE POINT DOSAGE GIVEN TO DATE: 8 GY
RAD ONC ARIA REFERENCE POINT ID: NORMAL
RAD ONC ARIA REFERENCE POINT SESSION DOSAGE GIVEN: 2 GY

## 2024-12-02 PROCEDURE — 77412 RADIATION TX DELIVERY LVL 3: CPT | Performed by: INTERNAL MEDICINE

## 2024-12-03 ENCOUNTER — RADIATION ONCOLOGY WEEKLY ASSESSMENT (OUTPATIENT)
Dept: RADIATION ONCOLOGY | Facility: HOSPITAL | Age: 54
End: 2024-12-03
Payer: COMMERCIAL

## 2024-12-03 ENCOUNTER — HOSPITAL ENCOUNTER (OUTPATIENT)
Dept: RADIATION ONCOLOGY | Facility: HOSPITAL | Age: 54
Discharge: HOME | End: 2024-12-03
Payer: COMMERCIAL

## 2024-12-03 DIAGNOSIS — Z17.0 MALIGNANT NEOPLASM OF LOWER-OUTER QUADRANT OF RIGHT BREAST OF FEMALE, ESTROGEN RECEPTOR POSITIVE: Primary | ICD-10-CM

## 2024-12-03 DIAGNOSIS — M62.838 MUSCLE SPASM: ICD-10-CM

## 2024-12-03 DIAGNOSIS — C50.511 MALIGNANT NEOPLASM OF LOWER-OUTER QUADRANT OF RIGHT BREAST OF FEMALE, ESTROGEN RECEPTOR POSITIVE: Primary | ICD-10-CM

## 2024-12-03 DIAGNOSIS — Z17.0 MALIGNANT NEOPLASM OF LOWER-OUTER QUADRANT OF RIGHT BREAST OF FEMALE, ESTROGEN RECEPTOR POSITIVE: ICD-10-CM

## 2024-12-03 DIAGNOSIS — C50.511 MALIGNANT NEOPLASM OF LOWER-OUTER QUADRANT OF RIGHT BREAST OF FEMALE, ESTROGEN RECEPTOR POSITIVE: ICD-10-CM

## 2024-12-03 LAB
RAD ONC ARIA COURSE END DATE: NORMAL
RAD ONC ARIA COURSE ID: NORMAL
RAD ONC ARIA COURSE ID: NORMAL
RAD ONC ARIA COURSE LAST TREATMENT DATE: NORMAL
RAD ONC ARIA COURSE LAST TREATMENT DATE: NORMAL
RAD ONC ARIA COURSE START DATE: NORMAL
RAD ONC ARIA COURSE START DATE: NORMAL
RAD ONC ARIA COURSE TREATMENT ELAPSED DAYS: 43
RAD ONC ARIA COURSE TREATMENT ELAPSED DAYS: 43
RAD ONC ARIA FIRST TREATMENT DATE: NORMAL
RAD ONC ARIA FIRST TREATMENT DATE: NORMAL
RAD ONC ARIA PLAN FRACTIONS TREATED TO DATE: 25
RAD ONC ARIA PLAN FRACTIONS TREATED TO DATE: 5
RAD ONC ARIA PLAN FRACTIONS TREATED TO DATE: 5
RAD ONC ARIA PLAN ID: NORMAL
RAD ONC ARIA PLAN NAME: NORMAL
RAD ONC ARIA PLAN NAME: NORMAL
RAD ONC ARIA PLAN PRESCRIBED DOSE PER FRACTION: 2 GY
RAD ONC ARIA PLAN PRIMARY REFERENCE POINT: NORMAL
RAD ONC ARIA PLAN TOTAL FRACTIONS PRESCRIBED: 25
RAD ONC ARIA PLAN TOTAL FRACTIONS PRESCRIBED: 5
RAD ONC ARIA PLAN TOTAL FRACTIONS PRESCRIBED: 5
RAD ONC ARIA PLAN TOTAL PRESCRIBED DOSE: 1000 CGY
RAD ONC ARIA PLAN TOTAL PRESCRIBED DOSE: 1000 CGY
RAD ONC ARIA PLAN TOTAL PRESCRIBED DOSE: 5000 CGY
RAD ONC ARIA REFERENCE POINT DOSAGE GIVEN TO DATE: 10 GY
RAD ONC ARIA REFERENCE POINT DOSAGE GIVEN TO DATE: 10 GY
RAD ONC ARIA REFERENCE POINT DOSAGE GIVEN TO DATE: 50 GY
RAD ONC ARIA REFERENCE POINT ID: NORMAL
RAD ONC ARIA REFERENCE POINT SESSION DOSAGE GIVEN: 2 GY

## 2024-12-03 PROCEDURE — 77412 RADIATION TX DELIVERY LVL 3: CPT | Performed by: INTERNAL MEDICINE

## 2024-12-03 RX ORDER — CYCLOBENZAPRINE HCL 10 MG
10 TABLET ORAL 3 TIMES DAILY PRN
Qty: 90 TABLET | Refills: 3 | Status: SHIPPED | OUTPATIENT
Start: 2024-12-03

## 2024-12-03 RX ORDER — ATORVASTATIN CALCIUM 40 MG/1
40 TABLET, FILM COATED ORAL DAILY
Qty: 90 TABLET | Refills: 3 | Status: SHIPPED | OUTPATIENT
Start: 2024-12-03

## 2024-12-03 RX ORDER — OXYCODONE HYDROCHLORIDE 5 MG/1
5 TABLET ORAL EVERY 4 HOURS PRN
Qty: 30 TABLET | Refills: 0 | Status: CANCELLED | OUTPATIENT
Start: 2024-12-03

## 2024-12-03 RX ORDER — OXYCODONE HYDROCHLORIDE 5 MG/1
5 TABLET ORAL EVERY 4 HOURS PRN
Qty: 50 TABLET | Refills: 0 | Status: SHIPPED | OUTPATIENT
Start: 2024-12-03

## 2024-12-03 NOTE — PROGRESS NOTES
RADIATION THERAPY COMPLETION and DISCHARGE     Dasha De Leon completed radiation therapy on 12/03/2024 for Malignant neoplasm of lower-outer quadrant of right breast of female, estrogen receptor positive [C50.511, Z17.0]. The summary of her treatment is as follows:    TREATMENT SITE:   RtSCAxChWall START DATE:   10/21/2024   TOTAL DOSE (cGy):   5000 END DATE:   11/22/2024    DOSE/FRACTION:   200 ELAPSED DAYS:   32   TOTAL FACTIONS:   25 PHYSICIAN:    Dr. Germain Puga     TREATMENT SITE:   RtChWall Boost START DATE:   11/25/2024   TOTAL DOSE (cGy):   1000 END DATE:   12/03/2024    DOSE/FRACTION:   200 ELAPSED DAYS:   8   TOTAL FACTIONS:   5 PHYSICIAN:    Dr. Germain Puga     Dasha is scheduled for a 1-month follow-up appointment with Dr. Puga on 1/8/2025 at 8:00AM.    _______________________________________________________________________    The following instructions were provided to the patient and/or family in their printed after visit summary (AVS) as well as discussed in-person by the radiation oncology nurse or medical assistant. The patient and/or family had the opportunity to ask questions and verbalized their questions were adequately answered. Encouraged patient to contact our department with any questions or concerns.      RADIATION THERAPY DISCHARGE INSTRUCTIONS  Breast    CONGRATULATIONS! You completed 30 radiation treatments for treatment of your right breast cancer. These discharge instructions are important for you to follow until your one-month follow up appointment with your radiation oncologist. Please make sure to review these instructions and call the Radiation Oncology Department if you have any questions or concerns with symptoms you may experience. Thank you for trusting us with your cancer treatment!    DIET  Eat a regular, well balanced diet that is high in protein such as meat, eggs, cheese, and nut butters.  Drink 48 to 64 ounces of fluid daily.    MEDICATIONS  Use Tylenol  as needed to decrease breast discomfort and irritation due to swelling and skin reaction.    SKIN CARE  Wash treated skin gently with your hands using a mild, non-drying soap such as Dove® or Aveeno® until skin returns to normal  Pat skin dry - do not rub.  Keep treated skin moist with twice daily applications of Eucerin® or Aquaphor®.  Always protect your treated skin when outdoors by wearing protective clothing and applying sunscreen SPF 15 or higher at least 30 minutes before going outdoors and reapply frequently.     ACTIVITY  Fatigue is a normal side effect of radiation therapy and should improve over time  Alternate rest and activity.  Exercise such as walking may help to improve your fatigue.    FOLLOW-UP  Continue follow-up appointments with all other doctors as necessary.  Continue to have regular mammograms as ordered by your physician.  Call your radiation oncologist or nurse if you are concerned with any side effects you are experiencing.    SPECIAL INSTRUCTIONS  Perform self-breast exams monthly.  (If applicable) Continue all range of motion and mobility exercise as instructed.  (If applicable) Never allow blood draws or blood pressures to be taken on your affected arm unless in an emergency situation.  Practice careful nail care and avoid open skin to your affected arm and hand.  Continue performing the on-treatment skin care routine recommended by your radiation oncologist until your one-month follow-up appointment.    WHEN TO CALL YOUR RADIATION ONCOLOGIST OR NURSE: [phone]  You notice swelling of your affected arm or hand that is unusual or doesn't go away.  You have a fever of 100.4 OF or higher.  You have chills.  Your pain or discomfort is getting worse.  Your skin in the treatment area is getting more red or swollen, feels hard or hot, has a rash or blisters, and/or is itchy.  You see drainage (liquid) coming from your skin in the treatment area.  You see any new open areas (wounds) or  changes to your skin.  You have any questions or concerns.  _______________________________________________________________________    Completed by: Divina Moralez RN, Radiation Oncology Nurse on 12/03/24 at 07:43 EST

## 2024-12-03 NOTE — PATIENT INSTRUCTIONS
RADIATION THERAPY DISCHARGE INSTRUCTIONS  Breast    CONGRATULATIONS! You completed 30 radiation treatments for treatment of your right breast cancer. These discharge instructions are important for you to follow until your one-month follow up appointment with your radiation oncologist. Please make sure to review these instructions and call the Radiation Oncology Department if you have any questions or concerns with symptoms you may experience. Thank you for trusting us with your cancer treatment!    DIET  Eat a regular, well balanced diet that is high in protein such as meat, eggs, cheese, and nut butters.  Drink 48 to 64 ounces of fluid daily.    MEDICATIONS  Use Tylenol as needed to decrease breast discomfort and irritation due to swelling and skin reaction.    SKIN CARE  Wash treated skin gently with your hands using a mild, non-drying soap such as Dove® or Aveeno® until skin returns to normal  Pat skin dry - do not rub.  Keep treated skin moist with twice daily applications of Eucerin® or Aquaphor®.  Always protect your treated skin when outdoors by wearing protective clothing and applying sunscreen SPF 15 or higher at least 30 minutes before going outdoors and reapply frequently.     ACTIVITY  Fatigue is a normal side effect of radiation therapy and should improve over time  Alternate rest and activity.  Exercise such as walking may help to improve your fatigue.    FOLLOW-UP  Continue follow-up appointments with all other doctors as necessary.  Continue to have regular mammograms as ordered by your physician.  Call your radiation oncologist or nurse if you are concerned with any side effects you are experiencing.    SPECIAL INSTRUCTIONS  Perform self-breast exams monthly.  (If applicable) Continue all range of motion and mobility exercise as instructed.  (If applicable) Never allow blood draws or blood pressures to be taken on your affected arm unless in an emergency situation.  Practice careful nail care and  avoid open skin to your affected arm and hand.  Continue performing the on-treatment skin care routine recommended by your radiation oncologist until your one-month follow-up appointment.    WHEN TO CALL YOUR RADIATION ONCOLOGIST OR NURSE: [phone]  You notice swelling of your affected arm or hand that is unusual or doesn't go away.  You have a fever of 100.4 OF or higher.  You have chills.  Your pain or discomfort is getting worse.  Your skin in the treatment area is getting more red or swollen, feels hard or hot, has a rash or blisters, and/or is itchy.  You see drainage (liquid) coming from your skin in the treatment area.  You see any new open areas (wounds) or changes to your skin.  You have any questions or concerns.    _______________________________________________________________________    Completed by: Divina Moralez RN on 12/3/2024 at 07:43 EST

## 2024-12-04 ENCOUNTER — SPECIALTY PHARMACY (OUTPATIENT)
Dept: PHARMACY | Facility: HOSPITAL | Age: 54
End: 2024-12-04
Payer: COMMERCIAL

## 2024-12-04 ENCOUNTER — SPECIALTY PHARMACY (OUTPATIENT)
Dept: ONCOLOGY | Facility: HOSPITAL | Age: 54
End: 2024-12-04
Payer: COMMERCIAL

## 2024-12-04 ENCOUNTER — OFFICE VISIT (OUTPATIENT)
Dept: ONCOLOGY | Facility: CLINIC | Age: 54
End: 2024-12-04
Payer: COMMERCIAL

## 2024-12-04 ENCOUNTER — LAB (OUTPATIENT)
Dept: LAB | Facility: HOSPITAL | Age: 54
End: 2024-12-04
Payer: COMMERCIAL

## 2024-12-04 VITALS
DIASTOLIC BLOOD PRESSURE: 81 MMHG | RESPIRATION RATE: 16 BRPM | OXYGEN SATURATION: 98 % | HEIGHT: 66 IN | WEIGHT: 172.3 LBS | BODY MASS INDEX: 27.69 KG/M2 | TEMPERATURE: 98.3 F | HEART RATE: 106 BPM | SYSTOLIC BLOOD PRESSURE: 132 MMHG

## 2024-12-04 DIAGNOSIS — Z17.0 MALIGNANT NEOPLASM OF LOWER-OUTER QUADRANT OF RIGHT BREAST OF FEMALE, ESTROGEN RECEPTOR POSITIVE: Primary | ICD-10-CM

## 2024-12-04 DIAGNOSIS — Z17.0 MALIGNANT NEOPLASM OF LOWER-OUTER QUADRANT OF RIGHT BREAST OF FEMALE, ESTROGEN RECEPTOR POSITIVE: ICD-10-CM

## 2024-12-04 DIAGNOSIS — C50.511 MALIGNANT NEOPLASM OF LOWER-OUTER QUADRANT OF RIGHT BREAST OF FEMALE, ESTROGEN RECEPTOR POSITIVE: ICD-10-CM

## 2024-12-04 DIAGNOSIS — C50.511 MALIGNANT NEOPLASM OF LOWER-OUTER QUADRANT OF RIGHT BREAST OF FEMALE, ESTROGEN RECEPTOR POSITIVE: Primary | ICD-10-CM

## 2024-12-04 DIAGNOSIS — Z79.811 USE OF AROMATASE INHIBITORS: ICD-10-CM

## 2024-12-04 LAB
ALBUMIN SERPL-MCNC: 4 G/DL (ref 3.5–5.2)
ALBUMIN/GLOB SERPL: 1.3 G/DL
ALP SERPL-CCNC: 100 U/L (ref 39–117)
ALT SERPL W P-5'-P-CCNC: 33 U/L (ref 1–33)
ANION GAP SERPL CALCULATED.3IONS-SCNC: 10.7 MMOL/L (ref 5–15)
AST SERPL-CCNC: 37 U/L (ref 1–32)
BASOPHILS # BLD AUTO: 0.03 10*3/MM3 (ref 0–0.2)
BASOPHILS NFR BLD AUTO: 0.5 % (ref 0–1.5)
BILIRUB SERPL-MCNC: 0.3 MG/DL (ref 0–1.2)
BUN SERPL-MCNC: 18 MG/DL (ref 6–20)
BUN/CREAT SERPL: 24 (ref 7–25)
CALCIUM SPEC-SCNC: 9.3 MG/DL (ref 8.6–10.5)
CHLORIDE SERPL-SCNC: 101 MMOL/L (ref 98–107)
CO2 SERPL-SCNC: 28.3 MMOL/L (ref 22–29)
CREAT SERPL-MCNC: 0.75 MG/DL (ref 0.57–1)
DEPRECATED RDW RBC AUTO: 45.6 FL (ref 37–54)
EGFRCR SERPLBLD CKD-EPI 2021: 95.3 ML/MIN/1.73
EOSINOPHIL # BLD AUTO: 0.19 10*3/MM3 (ref 0–0.4)
EOSINOPHIL NFR BLD AUTO: 2.9 % (ref 0.3–6.2)
ERYTHROCYTE [DISTWIDTH] IN BLOOD BY AUTOMATED COUNT: 13.9 % (ref 12.3–15.4)
GLOBULIN UR ELPH-MCNC: 3 GM/DL
GLUCOSE SERPL-MCNC: 105 MG/DL (ref 65–99)
HCT VFR BLD AUTO: 39.7 % (ref 34–46.6)
HGB BLD-MCNC: 12.1 G/DL (ref 12–15.9)
IMM GRANULOCYTES # BLD AUTO: 0.08 10*3/MM3 (ref 0–0.05)
IMM GRANULOCYTES NFR BLD AUTO: 1.2 % (ref 0–0.5)
LYMPHOCYTES # BLD AUTO: 0.7 10*3/MM3 (ref 0.7–3.1)
LYMPHOCYTES NFR BLD AUTO: 10.7 % (ref 19.6–45.3)
MCH RBC QN AUTO: 27.3 PG (ref 26.6–33)
MCHC RBC AUTO-ENTMCNC: 30.5 G/DL (ref 31.5–35.7)
MCV RBC AUTO: 89.6 FL (ref 79–97)
MONOCYTES # BLD AUTO: 0.8 10*3/MM3 (ref 0.1–0.9)
MONOCYTES NFR BLD AUTO: 12.3 % (ref 5–12)
NEUTROPHILS NFR BLD AUTO: 4.73 10*3/MM3 (ref 1.7–7)
NEUTROPHILS NFR BLD AUTO: 72.4 % (ref 42.7–76)
NRBC BLD AUTO-RTO: 0 /100 WBC (ref 0–0.2)
PLATELET # BLD AUTO: 275 10*3/MM3 (ref 140–450)
PMV BLD AUTO: 8.5 FL (ref 6–12)
POTASSIUM SERPL-SCNC: 4.2 MMOL/L (ref 3.5–5.2)
PROT SERPL-MCNC: 7 G/DL (ref 6–8.5)
RBC # BLD AUTO: 4.43 10*6/MM3 (ref 3.77–5.28)
SODIUM SERPL-SCNC: 140 MMOL/L (ref 136–145)
WBC NRBC COR # BLD AUTO: 6.53 10*3/MM3 (ref 3.4–10.8)

## 2024-12-04 PROCEDURE — 80053 COMPREHEN METABOLIC PANEL: CPT

## 2024-12-04 PROCEDURE — 36415 COLL VENOUS BLD VENIPUNCTURE: CPT

## 2024-12-04 PROCEDURE — 85025 COMPLETE CBC W/AUTO DIFF WBC: CPT

## 2024-12-04 RX ORDER — ABEMACICLIB 100 MG/1
TABLET ORAL
Qty: 28 TABLET | Refills: 0 | Status: SHIPPED | OUTPATIENT
Start: 2024-12-04 | End: 2024-12-19

## 2024-12-04 NOTE — PROGRESS NOTES
Specialty Pharmacy Patient Management Program  Hematology / Oncology Initial Fill Outreach      Dasha was contacted today regarding initial fill of her Verzenio specialty medication(s).    Specialty medication(s) and dose(s) confirmed: Yes  Verzenio 100 mg-Take 1 tab once daily for 7 days then increase to 1 tab twice per day for 7 days. May increase to 150 mg BID if tolerated.     Delivery Questions      Flowsheet Row Most Recent Value   Delivery method UPS   Delivery address verified with patient/caregiver? Yes  [Ship to home address]   Delivery address Home  [Ship to home address-Ship 12/5 for delivery 12/6-$0 copay with insurance and tab ticketbroker CC ([id number])-Address Confirmed]   Number of medications in delivery 1   Medication(s) being filled and delivered Abemaciclib (Verzenio)   Doses left of specialty medications 0-New Start   Copay verified? Yes   Copay amount $0 copay with insurance and tab ticketbroker CC (ending in [phone])   Copay form of payment Credit/debit on file  [use the tab ticketbroker credit card (part of the copay card with Michelle-ends in [phone])]   Ship Date 12/5/2024   Delivery Date 12/6/2024   Signature Required No          Verzenio delivery coordinated with pt for 12/6/2024 to her home address. $0 copay with insurance and tab ticketbroker credit card. This is her first fill of the Verzenio medication. She met with [facility] Pharmacist today. No additional questions.    Follow-Up: 10 days    Arminda Baumann, Pharmacy Technician  12/4/2024  15:34 EST

## 2024-12-04 NOTE — PROGRESS NOTES
Specialty Pharmacy Patient Management Program  Per Protocol Prescription Order or Refill     Met with patient today while she was in the office, face-to-face. Reinforced education that was previously provided. Gave patient a chance to ask questions. Chemo consents and collaborative care agreement were signed at today's visit.  Patient expressed understanding and had no additional questions at this time.      Patient will be filling or currently fills medications at [facility] and is enrolled in the Patient Management Program.    Requested Prescriptions     Signed Prescriptions Disp Refills    Abemaciclib (Verzenio) 100 MG tablet 28 tablet 0     Sig: Take 1 tablet by mouth Daily for 7 days, THEN 1 tablet 2 (Two) Times a Day for 7 days. May increase to 150 mg twice daily thereafter as tolerated.     Prescription orders above were sent to the pharmacy per Collaborative Care Agreement Protocol.     Last Office Visit: 12/4/24  Next Office Visit: 12/17/24    Leatha Abbott PharmD, [facility]  Clinical Specialty Pharmacist, Oncology  12/4/2024  13:42 EST

## 2024-12-04 NOTE — PROGRESS NOTES
Specialty Pharmacy Patient Management Program  Per Protocol Prescription Order or Refill       Requested Prescriptions     Signed Prescriptions Disp Refills    Abemaciclib (Verzenio) 100 MG tablet 28 tablet 0     Sig: Take 1 tablet by mouth Daily for 7 days, THEN 1 tablet 2 (Two) Times a Day for 7 days. May increase to 150 mg twice daily thereafter as tolerated.     Prescription orders above were sent to [facility] per Collaborative Care Agreement Protocol.     Completed independent double check on medication order/RX.    Haydee Carrero Rph, BCOP  Clinical Specialty Pharmacist, Oncology  12/4/2024  13:52 EST

## 2024-12-09 ENCOUNTER — SPECIALTY PHARMACY (OUTPATIENT)
Dept: PHARMACY | Facility: HOSPITAL | Age: 54
End: 2024-12-09
Payer: COMMERCIAL

## 2024-12-09 ENCOUNTER — OFFICE VISIT (OUTPATIENT)
Dept: INTERNAL MEDICINE | Facility: CLINIC | Age: 54
End: 2024-12-09
Payer: COMMERCIAL

## 2024-12-09 VITALS
WEIGHT: 174 LBS | DIASTOLIC BLOOD PRESSURE: 76 MMHG | TEMPERATURE: 98.2 F | HEIGHT: 66 IN | BODY MASS INDEX: 27.97 KG/M2 | SYSTOLIC BLOOD PRESSURE: 132 MMHG

## 2024-12-09 DIAGNOSIS — I10 ESSENTIAL HYPERTENSION: ICD-10-CM

## 2024-12-09 DIAGNOSIS — R23.2 HOT FLASHES: Primary | ICD-10-CM

## 2024-12-09 PROCEDURE — 99214 OFFICE O/P EST MOD 30 MIN: CPT | Performed by: PHYSICIAN ASSISTANT

## 2024-12-09 NOTE — PROGRESS NOTES
Subjective   Chief Complaint   Patient presents with    Hypertension       History of Present Illness     Pt is here today for fup on her HTN. Has completed radiation. Having a lot of hot flashes. BC is ER/HR positive.      Patient Active Problem List   Diagnosis    Essential hypertension    Mood disorder    Allergic rhinitis    Hyperlipidemia    Knee pain, right    Lumbar radiculopathy    Vitamin D deficiency    Lumbar spinal stenosis    Follow-up examination following surgery    Abnormality of right breast on screening mammogram    Malignant neoplasm of lower-outer quadrant of right breast of female, estrogen receptor positive    Anxiety    ASCUS of cervix with negative high risk HPV       Allergies   Allergen Reactions    Penicillins Anaphylaxis    Hemp Seed Oil Itching     HEMP IN LOTIONS    Lisinopril Cough       Current Outpatient Medications on File Prior to Visit   Medication Sig Dispense Refill    Abemaciclib (Verzenio) 100 MG tablet Take 1 tablet by mouth Daily for 7 days, THEN 1 tablet 2 (Two) Times a Day for 7 days. May increase to 150 mg twice daily thereafter as tolerated. 28 tablet 0    ALPRAZolam (Xanax) 0.25 MG tablet Take 1 tablet by mouth 2 (Two) Times a Day As Needed for Anxiety. 30 tablet 0    anastrozole (ARIMIDEX) 1 MG tablet Take 1 tablet by mouth Daily. 30 tablet 5    atorvastatin (LIPITOR) 40 MG tablet Take 1 tablet by mouth Daily. 90 tablet 3    buPROPion XL (Wellbutrin XL) 300 MG 24 hr tablet Take 1 tablet by mouth Daily. 90 tablet 1    cyclobenzaprine (FLEXERIL) 10 MG tablet Take 1 tablet by mouth 3 (Three) Times a Day As Needed for Muscle Spasms. 90 tablet 3    escitalopram (LEXAPRO) 20 MG tablet Take 1 tablet by mouth Daily. 90 tablet 3    famotidine (PEPCID) 20 MG tablet Take 1 tablet by mouth 2 (Two) Times a Day. 180 tablet 3    furosemide (Lasix) 20 MG tablet Take 1 tablet by mouth 2 (Two) Times a Day. 90 tablet 1    levocetirizine (XYZAL) 5 MG tablet Take one tablet by mouth twice  daily for urticaria. 180 tablet 3    lidocaine (XYLOCAINE) 2 % jelly Apply  topically to the appropriate area as directed As Needed for Mild Pain. Mix in 1:1 fashion with aquaphor and apply as needed 3-4 times per day. 85 g 3    meloxicam (MOBIC) 15 MG tablet Take 1 tablet by mouth Daily. 90 tablet 0    naloxone (NARCAN) 4 MG/0.1ML nasal spray Call 911. Don't prime. [place] in 1 nostril for overdose. Repeat in 2-3 minutes in other nostril if no or minimal breathing/responsiveness. 2 each 0    olmesartan (BENICAR) 40 MG tablet Take 0.5 tablets by mouth Daily. 45 tablet 1    ondansetron (ZOFRAN) 8 MG tablet Take 1 tablet by mouth 3 (Three) Times a Day As Needed for Nausea or Vomiting. 30 tablet 5    oxyCODONE (Roxicodone) 5 MG immediate release tablet Take 1 tablet by mouth Every 4 (Four) Hours As Needed for Moderate Pain. 50 tablet 0    [DISCONTINUED] HYDROcodone-acetaminophen (NORCO) 5-325 MG per tablet Take 1 tablet by mouth Every 4 (Four) Hours As Needed (Pain). 20 tablet 0     No current facility-administered medications on file prior to visit.       Past Medical History:   Diagnosis Date    Abnormal Pap smear of cervix     Anxiety     Miller's cyst     RESOLVED    Breast cancer     Right    Bulging lumbar disc     Bursitis     HX    Depression     GERD (gastroesophageal reflux disease)     H/O colposcopy with cervical biopsy     unknown pap result, biopsy was neg per pt, 2013 [facility]    History of 2019 novel coronavirus disease (COVID-19)     X2    History of eczema     HPV (human papilloma virus) infection     Hyperlipemia     Hypertension     Low back pain     Lumbar radiculopathy     Numbness and tingling of left leg     Osteoarthritis     Scoliosis     Seasonal allergies        Family History   Problem Relation Age of Onset    Hypertension Mother     Hyperlipidemia Mother     Diverticulitis Mother     Pancreatitis Mother     Kidney disease Father     Skin cancer Father     Hypertension Father      Hyperlipidemia Father     Stroke Father     Atrial fibrillation Father     Transient ischemic attack Father     No Known Problems Sister     Depression Brother     No Known Problems Daughter     No Known Problems Son     Uterine cancer Maternal Aunt 58    Heart disease Maternal Aunt     Lung cancer Maternal Uncle     Heart attack Maternal Uncle     No Known Problems Paternal Aunt     Colon cancer Paternal Uncle 65    Liver disease Maternal Grandmother     Heart disease Maternal Grandfather     Cancer Maternal Grandfather     Heart attack Paternal Grandmother     No Known Problems Paternal Grandfather     BRCA 1/2 Neg Hx     Breast cancer Neg Hx     Endometrial cancer Neg Hx     Ovarian cancer Neg Hx     Malig Hyperthermia Neg Hx        Social History     Socioeconomic History    Marital status: Single   Tobacco Use    Smoking status: Former     Current packs/day: 0.00     Average packs/day: 1 pack/day for 20.0 years (20.0 ttl pk-yrs)     Types: Cigarettes     Start date:      Quit date:      Years since quittin.9     Passive exposure: Past    Smokeless tobacco: Never   Vaping Use    Vaping status: Never Used   Substance and Sexual Activity    Alcohol use: Yes     Alcohol/week: 2.0 - 4.0 standard drinks of alcohol     Types: 2 - 4 Glasses of wine per week     Comment: weekly    Drug use: No    Sexual activity: Not Currently     Birth control/protection: I.U.D.       Past Surgical History:   Procedure Laterality Date    BREAST AUGMENTATION      BREAST BIOPSY Right 2024    Procedure: AXILLARY LYMPH NODE BIOPSY/EXCISION;  Surgeon: Romina De Anda MD;  Location: [facility];  Service: General;  Laterality: Right;    BREAST RECONSTRUCTION Right 2024    Procedure: RIGHT SUBPECTORAL PLACEMENT TISSUE EXPANDER AND CORTIVA (ACELLULAR DERMAL MATRIX), COMPLEX CLOSURE RIGHT BREAST 5cm;  Surgeon: Colin Bender MD;  Location: [facility];  Service: Plastics;  Laterality: Right;    BREAST SURGERY  "Right 7/26/2024    Procedure: ADJACENT TISSUE REARRANGEMENT RIGHT BREAST;  Surgeon: Colin Bender MD;  Location: [facility] MA[facility] OR;  Service: Plastics;  Laterality: Right;    CARPAL TUNNEL RELEASE Right 2004    CRYOTHERAPY      1997    D & C HYSTEROSCOPY N/A 9/19/2024    Procedure: INTRAUTERINE DEVICE REMOVAL;  Surgeon: Kaleb Burciaga MD;  Location: [facility] OR;  Service: Obstetrics/Gynecology;  Laterality: N/A;    EPIDURAL BLOCK      HAND SURGERY  2004    Pisiformectomy    LUMBAR DISCECTOMY FUSION INSTRUMENTATION N/A 01/05/2024    Procedure: Lumbar 3 to lumbar 4 and lumbar 4 to lumbar 5 laminectomy with fusion and instrumentation;  Surgeon: Rigoberto Botello MD;  Location: [facility] OR;  Service: Robotics - Neuro;  Laterality: N/A;    MAMMO BILATERAL  2014    MASTECTOMY W/ SENTINEL NODE BIOPSY Right 7/11/2024    Procedure: Right MASTECTOMY WITH SENTINEL NODE BIOPSY;  Surgeon: Romina De Anda MD;  Location: [facility] OR;  Service: General;  Laterality: Right;    MASTECTOMY WITH IMMEDIATE RECONSTRUCTION Right 7/26/2024    Procedure: right mastectomy, margin excision;  Surgeon: Romina De Anda MD;  Location: [facility] OR;  Service: General;  Laterality: Right;    PAP SMEAR  [id number]    SHOULDER ARTHROSCOPY Left 2002,2003    SHOULDER SURGERY      \"MANIPULATION FOR FROZEN SHOULDER\"    US GUIDED LYMPH NODE BIOPSY  8/29/2024         The following portions of the patient's history were reviewed and updated as appropriate: problem list, allergies, current medications, past medical history, past family history, past social history, and past surgical history.    ROS    See HPI    Immunization History   Administered Date(s) Administered    COVID-19 (PFIZER) Purple Cap Monovalent 02/23/2021, 03/16/2021    Flublock Quad =>18yrs 10/16/2024    Fluzone (or Fluarix & Flulaval for VFC) >6mos 11/05/2020, 10/28/2021, 10/24/2023    Fluzone Quad >6mos (Multi-dose) 10/29/2018, 10/28/2019    Tdap 09/13/2021       Objective " "  Vitals:    12/09/24 1605   BP: 132/76   Temp: 98.2 °F (36.8 °C)   Weight: 78.9 kg (174 lb)   Height: 167.6 cm (65.98\")     Body mass index is 28.1 kg/m².  Physical Exam  Vitals reviewed.   Constitutional:       Appearance: She is well-developed.   HENT:      Head: Normocephalic and atraumatic.   Cardiovascular:      Rate and Rhythm: Normal rate and regular rhythm.      Heart sounds: Normal heart sounds, S1 normal and S2 normal.   Pulmonary:      Effort: Pulmonary effort is normal.      Breath sounds: Normal breath sounds.   Skin:     General: Skin is warm.   Neurological:      Mental Status: She is alert.   Psychiatric:         Behavior: Behavior normal.           Assessment & Plan   Diagnoses and all orders for this visit:    1. Hot flashes (Primary)  -     Fezolinetant (VEOZAH) 45 MG tablet; Take 1 tablet by mouth Daily.  Dispense: 90 tablet; Refill: 3    2. Essential hypertension     BP is well controlled. Would be great candidate for Veozah, will send in today.     Return in about 6 months (around 6/9/2025) for Lab Appt Before FUP, Annual physical.           "

## 2024-12-09 NOTE — PROGRESS NOTES
Verzenio supply from [facility] delivered to pt on 12/6/2024.    [facility] tracking # [id number]     Arminda Baumann, Pharmacy Technician  12/09/24  15:16 EST

## 2024-12-17 ENCOUNTER — SPECIALTY PHARMACY (OUTPATIENT)
Dept: PHARMACY | Facility: HOSPITAL | Age: 54
End: 2024-12-17
Payer: COMMERCIAL

## 2024-12-17 ENCOUNTER — LAB (OUTPATIENT)
Dept: LAB | Facility: HOSPITAL | Age: 54
End: 2024-12-17
Payer: COMMERCIAL

## 2024-12-17 ENCOUNTER — OFFICE VISIT (OUTPATIENT)
Dept: ONCOLOGY | Facility: CLINIC | Age: 54
End: 2024-12-17
Payer: COMMERCIAL

## 2024-12-17 VITALS
OXYGEN SATURATION: 98 % | HEART RATE: 104 BPM | BODY MASS INDEX: 27.5 KG/M2 | RESPIRATION RATE: 16 BRPM | DIASTOLIC BLOOD PRESSURE: 80 MMHG | TEMPERATURE: 98.6 F | HEIGHT: 66 IN | SYSTOLIC BLOOD PRESSURE: 128 MMHG | WEIGHT: 171.1 LBS

## 2024-12-17 DIAGNOSIS — Z17.0 MALIGNANT NEOPLASM OF LOWER-OUTER QUADRANT OF RIGHT BREAST OF FEMALE, ESTROGEN RECEPTOR POSITIVE: ICD-10-CM

## 2024-12-17 DIAGNOSIS — Z17.0 MALIGNANT NEOPLASM OF LOWER-OUTER QUADRANT OF RIGHT BREAST OF FEMALE, ESTROGEN RECEPTOR POSITIVE: Primary | ICD-10-CM

## 2024-12-17 DIAGNOSIS — C50.511 MALIGNANT NEOPLASM OF LOWER-OUTER QUADRANT OF RIGHT BREAST OF FEMALE, ESTROGEN RECEPTOR POSITIVE: Primary | ICD-10-CM

## 2024-12-17 DIAGNOSIS — C50.511 MALIGNANT NEOPLASM OF LOWER-OUTER QUADRANT OF RIGHT BREAST OF FEMALE, ESTROGEN RECEPTOR POSITIVE: ICD-10-CM

## 2024-12-17 LAB
ALBUMIN SERPL-MCNC: 3.9 G/DL (ref 3.5–5.2)
ALBUMIN/GLOB SERPL: 1.3 G/DL
ALP SERPL-CCNC: 101 U/L (ref 39–117)
ALT SERPL W P-5'-P-CCNC: 19 U/L (ref 1–33)
ANION GAP SERPL CALCULATED.3IONS-SCNC: 10.6 MMOL/L (ref 5–15)
AST SERPL-CCNC: 22 U/L (ref 1–32)
BASOPHILS # BLD AUTO: 0.03 10*3/MM3 (ref 0–0.2)
BASOPHILS NFR BLD AUTO: 0.6 % (ref 0–1.5)
BILIRUB SERPL-MCNC: 0.5 MG/DL (ref 0–1.2)
BUN SERPL-MCNC: 18 MG/DL (ref 6–20)
BUN/CREAT SERPL: 20.5 (ref 7–25)
CALCIUM SPEC-SCNC: 9.1 MG/DL (ref 8.6–10.5)
CHLORIDE SERPL-SCNC: 104 MMOL/L (ref 98–107)
CHOLEST SERPL-MCNC: 197 MG/DL (ref 0–200)
CO2 SERPL-SCNC: 24.4 MMOL/L (ref 22–29)
CREAT SERPL-MCNC: 0.88 MG/DL (ref 0.57–1)
DEPRECATED RDW RBC AUTO: 46.7 FL (ref 37–54)
EGFRCR SERPLBLD CKD-EPI 2021: 78.7 ML/MIN/1.73
EOSINOPHIL # BLD AUTO: 0.16 10*3/MM3 (ref 0–0.4)
EOSINOPHIL NFR BLD AUTO: 3.4 % (ref 0.3–6.2)
ERYTHROCYTE [DISTWIDTH] IN BLOOD BY AUTOMATED COUNT: 14.5 % (ref 12.3–15.4)
GLOBULIN UR ELPH-MCNC: 3.1 GM/DL
GLUCOSE SERPL-MCNC: 100 MG/DL (ref 65–99)
HCT VFR BLD AUTO: 38.9 % (ref 34–46.6)
HDLC SERPL-MCNC: 67 MG/DL (ref 40–60)
HGB BLD-MCNC: 11.8 G/DL (ref 12–15.9)
IMM GRANULOCYTES # BLD AUTO: 0.03 10*3/MM3 (ref 0–0.05)
IMM GRANULOCYTES NFR BLD AUTO: 0.6 % (ref 0–0.5)
LDLC SERPL CALC-MCNC: 117 MG/DL (ref 0–100)
LDLC/HDLC SERPL: 1.73 {RATIO}
LYMPHOCYTES # BLD AUTO: 0.51 10*3/MM3 (ref 0.7–3.1)
LYMPHOCYTES NFR BLD AUTO: 10.9 % (ref 19.6–45.3)
MCH RBC QN AUTO: 27.1 PG (ref 26.6–33)
MCHC RBC AUTO-ENTMCNC: 30.3 G/DL (ref 31.5–35.7)
MCV RBC AUTO: 89.2 FL (ref 79–97)
MONOCYTES # BLD AUTO: 0.53 10*3/MM3 (ref 0.1–0.9)
MONOCYTES NFR BLD AUTO: 11.3 % (ref 5–12)
NEUTROPHILS NFR BLD AUTO: 3.44 10*3/MM3 (ref 1.7–7)
NEUTROPHILS NFR BLD AUTO: 73.2 % (ref 42.7–76)
NRBC BLD AUTO-RTO: 0 /100 WBC (ref 0–0.2)
PLATELET # BLD AUTO: 295 10*3/MM3 (ref 140–450)
PMV BLD AUTO: 8.5 FL (ref 6–12)
POTASSIUM SERPL-SCNC: 4.2 MMOL/L (ref 3.5–5.2)
PROT SERPL-MCNC: 7 G/DL (ref 6–8.5)
RBC # BLD AUTO: 4.36 10*6/MM3 (ref 3.77–5.28)
SODIUM SERPL-SCNC: 139 MMOL/L (ref 136–145)
TRIGL SERPL-MCNC: 72 MG/DL (ref 0–150)
VLDLC SERPL-MCNC: 13 MG/DL (ref 5–40)
WBC NRBC COR # BLD AUTO: 4.7 10*3/MM3 (ref 3.4–10.8)

## 2024-12-17 PROCEDURE — 85025 COMPLETE CBC W/AUTO DIFF WBC: CPT

## 2024-12-17 PROCEDURE — 80061 LIPID PANEL: CPT | Performed by: INTERNAL MEDICINE

## 2024-12-17 PROCEDURE — 80053 COMPREHEN METABOLIC PANEL: CPT

## 2024-12-17 PROCEDURE — 36415 COLL VENOUS BLD VENIPUNCTURE: CPT

## 2024-12-17 PROCEDURE — 99215 OFFICE O/P EST HI 40 MIN: CPT | Performed by: INTERNAL MEDICINE

## 2024-12-17 RX ORDER — ONDANSETRON 8 MG/1
8 TABLET, FILM COATED ORAL 3 TIMES DAILY PRN
Qty: 90 TABLET | Refills: 5 | Status: SHIPPED | OUTPATIENT
Start: 2024-12-17

## 2024-12-17 RX ORDER — ABEMACICLIB 150 MG/1
150 TABLET ORAL 2 TIMES DAILY
Qty: 56 TABLET | Refills: 5 | Status: SHIPPED | OUTPATIENT
Start: 2024-12-17

## 2024-12-17 NOTE — PROGRESS NOTES
Specialty Pharmacy Patient Management Program  Per Protocol Prescription Order or Refill     Patient will be filling or currently fills medications at [facility] and is enrolled in the Patient Management Program.    Requested Prescriptions     Signed Prescriptions Disp Refills    Abemaciclib (Verzenio) 150 MG tablet 56 tablet 5     Sig: Take 1 tablet by mouth 2 (Two) Times a Day.     Prescription orders above were sent to the pharmacy per Collaborative Care Agreement Protocol.     Last Office Visit: 12/17/24  Next Office Visit: 1/6/24    Leatha Abbott PharmD, [facility]  Clinical Specialty Pharmacist, Oncology  12/17/2024  11:15 EST

## 2024-12-17 NOTE — PROGRESS NOTES
Subjective   Dasha De Leon is a 53 y.o. female.  Referred by Dr. De Anda for right breast invasive ductal carcinoma    History of Present Illness     Patient is a 53-year-old postmenopausal  lady who presented with a palpable mass in her right breast in mid April.  This was right around the time of her screening mammogram and hence she proceeded with a screening mammogram.  She denies any previous breast abnormalities or biopsies.  No family history of breast or ovarian carcinoma.    4/29/2024-bilateral screening mammogram  Breasts are entirely fatty.  Bilateral retropectoral saline implants.  Round mass measuring 16 mm with spiculated margins in the posterior one third region of the right breast at 9:00.  This is new since the prior exams.  No suspicious masses in the left breast.    5/10/2024-right breast diagnostic mammogram  Breast is entirely fatty.  Retropectoral saline implants.  Finding 1.round mass in the right breast at 9:00 previously seen on the screening mammogram, measuring 16 mm with spiculated margin.    Right breast ultrasound  Finding 1.hypoechoic mass with spiculated margins and internal vascularity measuring 15 mm at 8:00, 10 cm from the nipple.  Finding 2.irregular  Palpable hypoechoic mass measuring 4 x 6 x 4 mm at 8:30 position in the right breast, 11 cm from the nipple.    Ultrasound-guided biopsy of both these masses recommended.    5/15/2024-right breast ultrasound-guided biopsy  1.8 o'clock position right breast-invasive ductal carcinoma with apocrine features  Grade 2  ER +91 to 100% strong  OR +91 to 100% strong  HER2 negative, 1+  Ki-67 22%  2.right breast 8:30 position  Invasive ductal carcinoma with apocrine features, grade 2  ER +91 to 100% strong  OR +91 to 100% strong  HER2 negative, Ki-67 20%.    5/25/2024-bilateral breast MRI  1.2 biopsy sites of malignancy at 8:30 position in the posterior one third that measured 2.3 cm and 2.4 cm respectively.  The lesions either  about or extend to the lateral margin of the pectoral fascia that overlies the implant capsule and some extension into the pectoral muscle at this location and/or biologic capsular suspect.  Finding suspicious for axilla lymphadenopathy.  Right axilla lymph node measuring 1.4 cm.  2.no suspicious findings in the left breast.    7/11/2024-right mastectomy and sentinel lymph node biopsy  Invasive ductal carcinoma measuring 45 x 20 x 14 mm  Grade 3  Extensive lymphovascular invasion present.  Invasive carcinoma present extensively at the anterior margin  Distance to the invasive carcinoma from the posterior margin 0.9 mm  1 lymph node with macrometastasis measuring 12 mm, 2 mm extranodal extension  At least 3 sentinel lymph nodes  Total number of [place] and nonsentinel lymph nodes evaluated 5  PT2 N1a MX  Receptors repeated on the lymph node with estrogen receptor +91 to 100% strong, progesterone receptor +91 to 100% strong, HER2 negative, score 0, Ki-67 35%    Given the positive margin additional surgery performed on 7/26/2024 with ultimately negative margins.  Expander placed.    Patient presents today to discuss adjuvant therapy.    Oncotype DX recurrence score 18 with no additional benefit from chemotherapy and 5-year risk of distant metastasis being 4%.      Verzenio started 12/6/2024.    Interval history:  Ms. Fernando returns to the clinic today for follow-up.  She has been on Verzenio for about 2 weeks now.   reports nausea secondary to Verzenio requiring use of Zofran which helps.  She is also reporting dizziness.      The following portions of the patient's history were reviewed and updated as appropriate: allergies, current medications, past family history, past medical history, past social history, past surgical history, and problem list.    Past Medical History:   Diagnosis Date    Abnormal Pap smear of cervix     Anxiety     Miller's cyst     RESOLVED    Breast cancer     Right    Bulging lumbar disc      Bursitis     HX    Depression     GERD (gastroesophageal reflux disease)     H/O colposcopy with cervical biopsy     unknown pap result, biopsy was neg per pt, 2013 [facility]    History of 2019 novel coronavirus disease (COVID-19)     X2    History of eczema     HPV (human papilloma virus) infection     Hyperlipemia     Hypertension     Low back pain     Lumbar radiculopathy     Numbness and tingling of left leg     Osteoarthritis     Scoliosis     Seasonal allergies         Past Surgical History:   Procedure Laterality Date    BREAST AUGMENTATION      BREAST BIOPSY Right 9/19/2024    Procedure: AXILLARY LYMPH NODE BIOPSY/EXCISION;  Surgeon: Romina De Anda MD;  Location: [facility];  Service: General;  Laterality: Right;    BREAST RECONSTRUCTION Right 7/11/2024    Procedure: RIGHT SUBPECTORAL PLACEMENT TISSUE EXPANDER AND CORTIVA (ACELLULAR DERMAL MATRIX), COMPLEX CLOSURE RIGHT BREAST 5cm;  Surgeon: Colni Bender MD;  Location: [facility];  Service: Plastics;  Laterality: Right;    BREAST SURGERY Right 7/26/2024    Procedure: ADJACENT TISSUE REARRANGEMENT RIGHT BREAST;  Surgeon: Colin Bender MD;  Location: [facility] OR;  Service: Plastics;  Laterality: Right;    CARPAL TUNNEL RELEASE Right 2004    CRYOTHERAPY      1997    D & C HYSTEROSCOPY N/A 9/19/2024    Procedure: INTRAUTERINE DEVICE REMOVAL;  Surgeon: Kaleb Burciaga MD;  Location: [facility];  Service: Obstetrics/Gynecology;  Laterality: N/A;    EPIDURAL BLOCK      HAND SURGERY  2004    Pisiformectomy    LUMBAR DISCECTOMY FUSION INSTRUMENTATION N/A 01/05/2024    Procedure: Lumbar 3 to lumbar 4 and lumbar 4 to lumbar 5 laminectomy with fusion and instrumentation;  Surgeon: Rgioberto Botello MD;  Location: [facility] OR;  Service: Robotics - Neuro;  Laterality: N/A;    MAMMO BILATERAL  2014    MASTECTOMY W/ SENTINEL NODE BIOPSY Right 7/11/2024    Procedure: Right MASTECTOMY WITH SENTINEL NODE BIOPSY;  Surgeon: Romina De Anda MD;   "Location: [facility] MAIN OR;  Service: General;  Laterality: Right;    MASTECTOMY WITH IMMEDIATE RECONSTRUCTION Right 2024    Procedure: right mastectomy, margin excision;  Surgeon: Romina De Anda MD;  Location: [facility] MAIN OR;  Service: General;  Laterality: Right;    PAP SMEAR      SHOULDER ARTHROSCOPY Left ,    SHOULDER SURGERY      \"MANIPULATION FOR FROZEN SHOULDER\"    US GUIDED LYMPH NODE BIOPSY  2024        Family History   Problem Relation Age of Onset    Hypertension Mother     Hyperlipidemia Mother     Diverticulitis Mother     Pancreatitis Mother     Kidney disease Father     Skin cancer Father     Hypertension Father     Hyperlipidemia Father     Stroke Father     Atrial fibrillation Father     Transient ischemic attack Father     No Known Problems Sister     Depression Brother     No Known Problems Daughter     No Known Problems Son     Uterine cancer Maternal Aunt 58    Heart disease Maternal Aunt     Lung cancer Maternal Uncle     Heart attack Maternal Uncle     No Known Problems Paternal Aunt     Colon cancer Paternal Uncle 65    Liver disease Maternal Grandmother     Heart disease Maternal Grandfather     Cancer Maternal Grandfather     Heart attack Paternal Grandmother     No Known Problems Paternal Grandfather     BRCA 1/2 Neg Hx     Breast cancer Neg Hx     Endometrial cancer Neg Hx     Ovarian cancer Neg Hx     Malig Hyperthermia Neg Hx         Social History     Socioeconomic History    Marital status: Single   Tobacco Use    Smoking status: Former     Current packs/day: 0.00     Average packs/day: 1 pack/day for 20.0 years (20.0 ttl pk-yrs)     Types: Cigarettes     Start date:      Quit date: 2006     Years since quittin.9     Passive exposure: Past    Smokeless tobacco: Never   Vaping Use    Vaping status: Never Used   Substance and Sexual Activity    Alcohol use: Yes     Alcohol/week: 2.0 - 4.0 standard drinks of alcohol     Types: 2 - 4 Glasses of wine per week " "    Comment: weekly    Drug use: No    Sexual activity: Not Currently     Birth control/protection: I.U.D.        OB History          0    Para   0    Term   0       0    AB   0    Living   0         SAB   0    IAB   0    Ectopic   0    Molar        Multiple   0    Live Births                   Age at menarche-13  Age at first live childbirth-not applicable   0 para 0  0  Age at menopause-49  Oral conceptive pill use for 25 years  No use of hormone replacement therapy    Allergies   Allergen Reactions    Penicillins Anaphylaxis    Hemp Seed Oil Itching     HEMP IN LOTIONS    Lisinopril Cough      Review of Systems  Review of systems as mentioned HPI otherwise negative    Objective   Blood pressure 128/80, pulse 104, temperature 98.6 °F (37 °C), temperature source Oral, resp. rate 16, height 167.6 cm (66\"), weight 77.6 kg (171 lb 1.6 oz), SpO2 98%, not currently breastfeeding.     Physical Exam  Vitals reviewed.   Constitutional:       Appearance: She is normal weight.   HENT:      Head: Normocephalic and atraumatic.      Right Ear: External ear normal.      Left Ear: External ear normal.   Eyes:      Conjunctiva/sclera: Conjunctivae normal.   Cardiovascular:      Rate and Rhythm: Normal rate.   Pulmonary:      Effort: Pulmonary effort is normal.   Abdominal:      General: Abdomen is flat.   Musculoskeletal:         General: Normal range of motion.      Cervical back: Normal range of motion.   Skin:     General: Skin is warm.   Neurological:      General: No focal deficit present.      Mental Status: She is alert and oriented to person, place, and time.   Psychiatric:         Mood and Affect: Mood normal.         Behavior: Behavior normal.         Thought Content: Thought content normal.         Judgment: Judgment normal.       Breast Exam: Right breast status post mastectomy with expander in place.  Radiation dermatitis with erythema of the chest wall noted without excoriation or " infection    I have reexamined the patient and the results are consistent with the previously documented exam. Talia Huang MD       Results from last 7 days   Lab Units 12/17/24  0852   WBC 10*3/mm3 4.70   NEUTROS ABS 10*3/mm3 3.44   HEMOGLOBIN g/dL 11.8*   HEMATOCRIT % 38.9   PLATELETS 10*3/mm3 295     Results from last 7 days   Lab Units 12/17/24  0852   SODIUM mmol/L 139   POTASSIUM mmol/L 4.2   CHLORIDE mmol/L 104   CO2 mmol/L 24.4   BUN mg/dL 18   CREATININE mg/dL 0.88   CALCIUM mg/dL 9.1   ALBUMIN g/dL 3.9   BILIRUBIN mg/dL 0.5   ALK PHOS U/L 101   ALT (SGPT) U/L 19   AST (SGOT) U/L 22   GLUCOSE mg/dL 100*             No radiology results for the last 30 days.       Assessment & Plan       *Right breast invasive ductal carcinoma  Status post right mastectomy on 7/11/2024-pathology with 45 mm of invasive ductal carcinoma, grade 3, ER/WI strongly positive at 91 to 100%, HER2 1+, Ki-67 22%  1 out of 5 lymph nodes positive for metastatic disease and receptors on lymph node also ER positive and WI positive.  PT2 N1a MX  Oncotype DX recurrence score 18 with no additional benefit from chemotherapy and 4% risk of distant metastasis at 5 years  Given the low Oncotype DX recurrence score she will proceed with adjuvant radiation.  Subsequently she will be started on anastrozole 1 mg p.o. daily.  We also discussed Verzenio for 2 years in the adjuvant setting due to grade 3 disease as well as lymph node positive disease.  She is willing to proceed with both anastrozole as well as Verzenio.  Adverse effects of anastrozole including but not limited to hot flashes, mood changes, fatigue, insomnia, nausea, arthralgias and myalgias, decrease in bone density discussed.  Adverse effects of Verzenio including but not limited to diarrhea, nausea, fatigue, myelosuppression, increased risk of infection, risk of DVT PE discussed.  Radiation complete 12/3/2024  12/4/2024 fair tolerance to anastrozole.   12/6/2024-started  Verzenio.  12/17/2024-currently on 100 mg twice daily dose, continue this for the next week and subsequently increase the dose to 150 mg twice daily  Patient reporting nausea with Verzenio.    *Nausea  We will prescribe Zofran twice daily to help with nausea control with Verzenio    *Dizziness  Blood pressure relatively normal  Could be secondary to Verzenio  Continue to monitor closely  If it continues and may require additional cardiac workup including EKG and echocardiogram    *Anxiety and depression  Continue Wellbutrin, Lexapro  Well-controlled at this time    *Hypertension  Blood pressure BP: 128/80    Continue current medications    *Hyperlipidemia  Continue Crestor    *Back issues/pain  Status with spinal fusion  Currently on Flexeril    *Bone health  Baseline DEXA scan 8/29/2024 noted normal bone density  Previous discussion of adjuvant Zometa.  I will discuss with Dr. Huang if she would like to begin a follow-up in 2 weeks    PLAN  The patient has completed adjuvant radiation 12/3/2024  Continue anastrozole 1 mg daily  Continue Verzenio  Increase the dose of Zofran to twice daily  We discussed immediate intervention with Imodium for diarrhea secondary to Verzenio  We did discuss potential benefit of tart cherry for arthralgias if these worsen  APRN in 2 weeks and MD in 4 weeks    Patient is on medications requiring close monitoring for toxicities, experiencing nausea secondary to Verzenio.    Talia Huang MD  12/17/2024

## 2024-12-17 NOTE — PROGRESS NOTES
Specialty Pharmacy Patient Management Program  Oncology / Hematology Refill Outreach      Dasha was contacted today regarding refills of her Verzenio specialty medication(s).    Specialty medication(s) and dose(s) confirmed: Yes  Verzenio 150 mg twice per day    Refill Questions      Flowsheet Row Most Recent Value   Changes to allergies? No   Changes to medications? Yes  [Verzenio dose increased to 150 mg BID]   New conditions or infections since last clinic visit Yes   If yes, please describe  Nausea   Unplanned office visit, urgent care, ED, or hospital admission in the last 4 weeks  No   How does patient/caregiver feel medication is working? Good   Financial problems or insurance changes  No   Since the previous refill, were any specialty medication doses or scheduled injections missed or delayed?  No   Does this patient require a clinical escalation to a pharmacist? No          Delivery Questions      Flowsheet Row Most Recent Value   Delivery method UPS   Delivery address verified with patient/caregiver? Yes  [Ship to home address]   Delivery address Home  [Ship to home address-Ship 12/17 for delivery 12/18-$0 copay wtih insurance and copay card-[employer] credit card on file-Address Confirmed]   Number of medications in delivery 1   Medication(s) being filled and delivered Abemaciclib   Doses left of specialty medications 0-New dose   Copay verified? Yes   Copay amount $0 copay with insurance and manufacture CC (ending in 5606)   Copay form of payment Credit/debit on file   Ship Date 12/17/2024   Delivery Date 12/18/2024   Signature Required No          Verzenio 150 mg dose delivery coordinated with pt for 12/18/2024 to her home address. $0 copay with insurance and copay card-[employer] CC on file. Her last delivery was on 12/6/2024. No questions or concerns to report to MTM Team today. She reports no missed doses since last delivery. PDC is 48%. Pt had a delay in getting started on the medication due to  radiation.    Follow-Up: 21 Days    Arminda Baumann, Pharmacy Technician  12/17/2024  12:36 EST

## 2024-12-17 NOTE — PROGRESS NOTES
Specialty Pharmacy Patient Management Program  Per Protocol Prescription Order or Refill       Requested Prescriptions     Signed Prescriptions Disp Refills    Abemaciclib (Verzenio) 150 MG tablet 56 tablet 5     Sig: Take 1 tablet by mouth 2 (Two) Times a Day.     Prescription orders above were sent to [facility] per Collaborative Care Agreement Protocol.     Completed independent double check on medication order/RX.    Antonio Cherry, [place], BCOP  Clinical Specialty Pharmacist, Oncology  12/17/2024  11:32 EST

## 2024-12-17 NOTE — PROGRESS NOTES
Staff message rec from Dr Huang-Pt is only able to get 21 tablets of Ondansetron per fill. She will also increase her Verzenio to 150 mg twice per day.    I have submitted the PA for the increased supply of Ondansetron to [facility] through [employer]. Currently waiting for a decision.    Once the Verzenio rx is sent-I will reach out to pt to coordinate the delivery.    Talia Huang MD sent to Jessie Yousif RN; Arminda Baumann, Pharmacy Technician; Leatha Abbott, [facility]  She is reporting nausea and only able to fill 21 pills of Zofran at a time.  She is going to need 2 tablets a day daily so that we can fill at least 60 at a time that would be very helpful.    Also we can increase the dose of Verzenio to 150 mg twice daily    Arminda Baumann, Pharmacy Technician  12/17/24  10:21 EST

## 2024-12-17 NOTE — PROGRESS NOTES
Ondansetron 8 mg tabs (90 for 30 days) approved through [employer] from 12/17/2024-12/17/2025.    Test claim ran and it returned paid with a copay of $5.00.    I have left a VM for pt to let her know the higher qty is approved.    I have requested for an updated rx be sent to pharmacy.    Arminda Baumann, Pharmacy Technician  12/17/24  13:02 EST

## 2024-12-18 ENCOUNTER — SPECIALTY PHARMACY (OUTPATIENT)
Dept: PHARMACY | Facility: HOSPITAL | Age: 54
End: 2024-12-18
Payer: COMMERCIAL

## 2024-12-18 NOTE — PROGRESS NOTES
Specialty Pharmacy Note: Verzenio (abemaciclib)    Dasha De Leon is a 53 y.o. female with breast cancer was seen 12/17/24 by Dr. Huang. Per provider dictation, ramp up oral oncology regimen Verzenio (abemaciclib).Rx sent for 150 mg po bid.   Labs Review: The CMP and CBC from 12/17/24 have been reviewed. No dose adjustments are needed for the oral specialty medication(s) based on the labs.    Specialty pharmacy will continue to follow patient.    Leatha Abbott, PharmD, BCPS  12/18/2024  11:37 EST

## 2024-12-18 NOTE — PROGRESS NOTES
[facility] RADIATION ONCOLOGY  FOLLOW-UP NOTE    NAME: Dasha De Leon  YOB: 1970  MRN #: [id number]  DATE OF SERVICE: 12/19/2024  REFERRING PROVIDER: Ariana Coffman PA-C   PRIMARY CARE PROVIDER: Ariana Coffman PA-C    CHIEF COMPLAINT:  2Wk F/U    DIAGNOSIS:   Encounter Diagnosis   Name Primary?    Malignant neoplasm of lower-outer quadrant of right breast of female, estrogen receptor positive Yes      RADIATION TREATMENT COURSE:    10/21/2024- 11/22/2024: 5000 cGy in 25 fractions to right Sclav, axilla, chest wall.  11/25/2024- 12/03/2024: 1000 cGy in 5 fractions to right chest wall boost.    INTERVAL HISTORY     Dasha De Leon is a 53 y.o. female who was last seen in our office on 12/03/2024 upon of completion radiation therapy treatment.    She follows with Dr. Huang, and was last seen on 12/17/2024. Their plan is to continue anastrozole, continue Verzenio, follow up with the nurse practitioner in 2 weeks, and MD in 4 weeks.    The patient reports persistent pain at the site of her boost.  Her skin though is in improved significantly.  She has a few scattered areas of small scabs that we are monitoring.  She has no other major concerns or complaints.    IMAGING     US Guided Lymph Node Biopsy  Addendum Date: 9/5/2024    The patient's final pathology results are now available from the ultrasound-guided right axillary lymph node core biopsy procedure. There are findings consistent with metastatic ductal carcinoma. No lymphoid tissue was visible in the specimen. I would recommend appropriate oncologic and surgical management of this patient. The abnormal results were relayed to the referring clinician's office by the [facility] Breast Navigator.  Electronically Signed By-Costa Robles MD On:9/5/2024 8:31 AM    Result Date: 9/5/2024  IMPRESSION : 1. The right axillary ultrasound confirms the presence of an abnormal lymph node measuring 1.6 x  1.2 x 1.1 cm. This corresponds well to the finding seen on the CT of the chest. 2. Technically successful ultrasound-guided percutaneous right axillary lymph node core biopsy procedure as described above.  Electronically Signed ByBoni Robles MD On:8/29/2024 4:11 PM      US Axilla Right  Addendum Date: 9/5/2024    The patient's final pathology results are now available from the ultrasound-guided right axillary lymph node core biopsy procedure. There are findings consistent with metastatic ductal carcinoma. No lymphoid tissue was visible in the specimen. I would recommend appropriate oncologic and surgical management of this patient. The abnormal results were relayed to the referring clinician's office by the [facility] Breast Navigator.  Electronically Signed ByBoni Robles MD On:9/5/2024 8:31 AM    Result Date: 9/5/2024  IMPRESSION : 1. The right axillary ultrasound confirms the presence of an abnormal lymph node measuring 1.6 x 1.2 x 1.1 cm. This corresponds well to the finding seen on the CT of the chest. 2. Technically successful ultrasound-guided percutaneous right axillary lymph node core biopsy procedure as described above.  Electronically Signed By-Costa Robles MD On:8/29/2024 4:11 PM      DEXA Bone Density Axial  Result Date: 8/30/2024  1.  Normal bone mineral density.   FRAX *  WHO category: Normal. Fracture risk: Not increased. *  FRAX not reported: All T-scores at or above -1.0.   Current recommendations are that a follow-up bone density be obtained at an interval of not less than 2 years except in specific circumstances, such as after initiation or change of therapy.  This report was finalized on 8/30/2024 10:22 AM by Yehuda Ornelas M.D on Workstation: [id number]      PATHOLOGY     No recent pathology to review.    LABS     HEMATOLOGY:  WBC   Date Value Ref Range Status   12/17/2024 4.70 3.40 - 10.80 10*3/mm3 Final   01/26/2021 8.75 3.40 - 10.80 10*3/mm3 Final     RBC    Date Value Ref Range Status   12/17/2024 4.36 3.77 - 5.28 10*6/mm3 Final   01/26/2021 4.36 3.77 - 5.28 10*6/mm3 Final     Hemoglobin   Date Value Ref Range Status   12/17/2024 11.8 (L) 12.0 - 15.9 g/dL Final     Hematocrit   Date Value Ref Range Status   12/17/2024 38.9 34.0 - 46.6 % Final     Platelets   Date Value Ref Range Status   12/17/2024 295 140 - 450 10*3/mm3 Final     CHEMISTRY:  Lab Results   Component Value Date    GLUCOSE 100 (H) 12/17/2024    BUN 18 12/17/2024    CREATININE 0.88 12/17/2024    EGFRIFNONA 87 09/09/2021    EGFRIFAFRI 106 09/09/2021    BCR 20.5 12/17/2024    K 4.2 12/17/2024    CO2 24.4 12/17/2024    CALCIUM 9.1 12/17/2024    PROTENTOTREF 6.9 06/30/2023    ALBUMIN 3.9 12/17/2024    LABIL2 1.9 06/30/2023    AST 22 12/17/2024    ALT 19 12/17/2024     PROBLEM LIST     Patient Active Problem List   Diagnosis    Essential hypertension    Mood disorder    Allergic rhinitis    Hyperlipidemia    Knee pain, right    Lumbar radiculopathy    Vitamin D deficiency    Lumbar spinal stenosis    Follow-up examination following surgery    Abnormality of right breast on screening mammogram    Malignant neoplasm of lower-outer quadrant of right breast of female, estrogen receptor positive    Anxiety    ASCUS of cervix with negative high risk HPV      CURRENT MEDICATIONS     Current Outpatient Medications   Medication Instructions    ALPRAZolam (XANAX) 0.25 mg, Oral, 2 Times Daily PRN    anastrozole (ARIMIDEX) 1 mg, Oral, Daily    atorvastatin (LIPITOR) 40 mg, Oral, Daily    buPROPion XL (WELLBUTRIN XL) 300 mg, Oral, Daily    cyclobenzaprine (FLEXERIL) 10 mg, Oral, 3 Times Daily PRN    escitalopram (LEXAPRO) 20 mg, Oral, Daily    famotidine (PEPCID) 20 mg, Oral, 2 Times Daily    Fezolinetant (VEOZAH) 45 mg, Oral, Every 24 Hours    furosemide (LASIX) 20 mg, Oral, 2 Times Daily    levocetirizine (XYZAL) 5 MG tablet Take one tablet by mouth twice daily for urticaria.    lidocaine (XYLOCAINE) 2 % jelly Topical,  As Needed, Mix in 1:1 fashion with aquaphor and apply as needed 3-4 times per day.    meloxicam (MOBIC) 15 mg, Oral, Daily    naloxone (NARCAN) 4 MG/0.1ML nasal spray Call 911. Don't prime. [place] in 1 nostril for overdose. Repeat in 2-3 minutes in other nostril if no or minimal breathing/responsiveness.    olmesartan (BENICAR) 20 mg, Oral, Daily    ondansetron (ZOFRAN) 8 mg, Oral, 3 Times Daily PRN    oxyCODONE (ROXICODONE) 5 mg, Oral, Every 4 Hours PRN    Verzenio 150 mg, Oral, 2 Times Daily      ALLERGIES     Allergies   Allergen Reactions    Penicillins Anaphylaxis    Hemp Seed Oil Itching     HEMP IN LOTIONS    Lisinopril Cough       REVIEW OF SYSTEMS     Review of Systems   Constitutional:  Negative for activity change and fatigue.   Gastrointestinal:  Positive for nausea.      There were no vitals filed for this visit.   Physical Exam  Constitutional:       General: She is not in acute distress.  HENT:      Head: Normocephalic and atraumatic.   Pulmonary:      Effort: Pulmonary effort is normal. No respiratory distress.   Chest:      Comments: Improving redness over the right reconstructed chest wall. Small scabs along the anterior surface of the chest wall.   Neurological:      Mental Status: She is alert and oriented to person, place, and time. Mental status is at baseline.   Psychiatric:         Mood and Affect: Mood normal.         Behavior: Behavior normal.             ECOG:  Restricted in physically strenuous activity but ambulatory and able to carry out work of a light or sedentary nature, e.g., light house work, office work = 1      ASSESSMENT AND PLAN     ASSESSMENT:      Dasha De Leon is a 53 y.o. female who presents with history of stage IIA (pT2, pN1a(sn), cM0, G3, ER+, WV+, HER2-) right breast invasive ductal carcinoma. She is sp mastectomy and SLNB on 7/11/2024. She underwent re-resection for a positive margin on 7/26/2024.  She underwent reresection of a biopsy-proven positive lymph node  on 9/19/2024.  She completed adjuvant radiation on 12/3/2024 as above.  She returns for routine follow-up.    Diagnoses and all orders for this visit:    1. Malignant neoplasm of lower-outer quadrant of right breast of female, estrogen receptor positive (Primary)       PLAN:      Orders:  -Continue surveillance and management with medical oncology  -Return for follow-up in 6 months or sooner as clinically indicated    We reviewed the patient's posttreatment response.  She is recovering appropriately.  She has had some persistent discomfort in the area of her boost treatment.  She also has small scattered scabs and improving redness over the chest wall.  Anticipatory guidance was provided.  She is already being seen by lymphedema clinic.  She has started abemaciclib and anastrozole.  We discussed the importance of continued monitoring and management with medical oncology.  We discussed plans for follow-up in our office in 6 months or sooner as clinically indicated.  The patient is encouraged to reach out with questions or concerns prior to her next appointment.    I spent 20 minutes caring for Dasha on this date of service. This time includes time spent by me in the following activities:preparing for the visit, reviewing tests, obtaining and/or reviewing a separately obtained history, performing a medically appropriate examination and/or evaluation , counseling and educating the patient/family/caregiver, documenting information in the medical record, and independently interpreting results and communicating that information with the patient/family/caregiver    FOLLOW UP     No follow-ups on file.     CC: Ariana Coffman PA-C Ford, Erika P, PA-C

## 2024-12-19 ENCOUNTER — OFFICE VISIT (OUTPATIENT)
Dept: RADIATION ONCOLOGY | Facility: HOSPITAL | Age: 54
End: 2024-12-19
Payer: COMMERCIAL

## 2024-12-19 DIAGNOSIS — C50.511 MALIGNANT NEOPLASM OF LOWER-OUTER QUADRANT OF RIGHT BREAST OF FEMALE, ESTROGEN RECEPTOR POSITIVE: Primary | ICD-10-CM

## 2024-12-19 DIAGNOSIS — Z17.0 MALIGNANT NEOPLASM OF LOWER-OUTER QUADRANT OF RIGHT BREAST OF FEMALE, ESTROGEN RECEPTOR POSITIVE: Primary | ICD-10-CM

## 2024-12-30 RX ORDER — MELOXICAM 15 MG/1
15 TABLET ORAL DAILY
Qty: 90 TABLET | Refills: 0 | Status: SHIPPED | OUTPATIENT
Start: 2024-12-30

## 2025-01-09 ENCOUNTER — OFFICE VISIT (OUTPATIENT)
Dept: ONCOLOGY | Facility: CLINIC | Age: 55
End: 2025-01-09
Payer: COMMERCIAL

## 2025-01-09 ENCOUNTER — LAB (OUTPATIENT)
Dept: OTHER | Facility: HOSPITAL | Age: 55
End: 2025-01-09
Payer: COMMERCIAL

## 2025-01-09 VITALS
TEMPERATURE: 98.3 F | HEART RATE: 105 BPM | BODY MASS INDEX: 27.72 KG/M2 | WEIGHT: 172.5 LBS | OXYGEN SATURATION: 97 % | RESPIRATION RATE: 16 BRPM | SYSTOLIC BLOOD PRESSURE: 132 MMHG | DIASTOLIC BLOOD PRESSURE: 76 MMHG | HEIGHT: 66 IN

## 2025-01-09 DIAGNOSIS — C50.511 MALIGNANT NEOPLASM OF LOWER-OUTER QUADRANT OF RIGHT BREAST OF FEMALE, ESTROGEN RECEPTOR POSITIVE: ICD-10-CM

## 2025-01-09 DIAGNOSIS — C50.511 MALIGNANT NEOPLASM OF LOWER-OUTER QUADRANT OF RIGHT BREAST OF FEMALE, ESTROGEN RECEPTOR POSITIVE: Primary | ICD-10-CM

## 2025-01-09 DIAGNOSIS — Z17.0 MALIGNANT NEOPLASM OF LOWER-OUTER QUADRANT OF RIGHT BREAST OF FEMALE, ESTROGEN RECEPTOR POSITIVE: ICD-10-CM

## 2025-01-09 DIAGNOSIS — Z17.0 MALIGNANT NEOPLASM OF LOWER-OUTER QUADRANT OF RIGHT BREAST OF FEMALE, ESTROGEN RECEPTOR POSITIVE: Primary | ICD-10-CM

## 2025-01-09 LAB
ALBUMIN SERPL-MCNC: 4 G/DL (ref 3.5–5.2)
ALBUMIN/GLOB SERPL: 1.2 G/DL
ALP SERPL-CCNC: 112 U/L (ref 39–117)
ALT SERPL W P-5'-P-CCNC: 15 U/L (ref 1–33)
ANION GAP SERPL CALCULATED.3IONS-SCNC: 9.4 MMOL/L (ref 5–15)
AST SERPL-CCNC: 19 U/L (ref 1–32)
BASOPHILS # BLD AUTO: 0.06 10*3/MM3 (ref 0–0.2)
BASOPHILS NFR BLD AUTO: 1.7 % (ref 0–1.5)
BILIRUB SERPL-MCNC: 0.2 MG/DL (ref 0–1.2)
BUN SERPL-MCNC: 19 MG/DL (ref 6–20)
BUN/CREAT SERPL: 19.8 (ref 7–25)
CALCIUM SPEC-SCNC: 9.5 MG/DL (ref 8.6–10.5)
CHLORIDE SERPL-SCNC: 104 MMOL/L (ref 98–107)
CO2 SERPL-SCNC: 27.6 MMOL/L (ref 22–29)
CREAT SERPL-MCNC: 0.96 MG/DL (ref 0.57–1)
DEPRECATED RDW RBC AUTO: 54 FL (ref 37–54)
EGFRCR SERPLBLD CKD-EPI 2021: 70.5 ML/MIN/1.73
EOSINOPHIL # BLD AUTO: 0.08 10*3/MM3 (ref 0–0.4)
EOSINOPHIL NFR BLD AUTO: 2.2 % (ref 0.3–6.2)
ERYTHROCYTE [DISTWIDTH] IN BLOOD BY AUTOMATED COUNT: 16.1 % (ref 12.3–15.4)
GLOBULIN UR ELPH-MCNC: 3.3 GM/DL
GLUCOSE SERPL-MCNC: 98 MG/DL (ref 65–99)
HCT VFR BLD AUTO: 34.7 % (ref 34–46.6)
HGB BLD-MCNC: 10.5 G/DL (ref 12–15.9)
IMM GRANULOCYTES # BLD AUTO: 0.05 10*3/MM3 (ref 0–0.05)
IMM GRANULOCYTES NFR BLD AUTO: 1.4 % (ref 0–0.5)
LYMPHOCYTES # BLD AUTO: 0.56 10*3/MM3 (ref 0.7–3.1)
LYMPHOCYTES NFR BLD AUTO: 15.7 % (ref 19.6–45.3)
MCH RBC QN AUTO: 27.8 PG (ref 26.6–33)
MCHC RBC AUTO-ENTMCNC: 30.3 G/DL (ref 31.5–35.7)
MCV RBC AUTO: 91.8 FL (ref 79–97)
MONOCYTES # BLD AUTO: 0.45 10*3/MM3 (ref 0.1–0.9)
MONOCYTES NFR BLD AUTO: 12.6 % (ref 5–12)
NEUTROPHILS NFR BLD AUTO: 2.36 10*3/MM3 (ref 1.7–7)
NEUTROPHILS NFR BLD AUTO: 66.4 % (ref 42.7–76)
NRBC BLD AUTO-RTO: 0 /100 WBC (ref 0–0.2)
PLATELET # BLD AUTO: 300 10*3/MM3 (ref 140–450)
PMV BLD AUTO: 9.1 FL (ref 6–12)
POTASSIUM SERPL-SCNC: 3.8 MMOL/L (ref 3.5–5.2)
PROT SERPL-MCNC: 7.3 G/DL (ref 6–8.5)
RBC # BLD AUTO: 3.78 10*6/MM3 (ref 3.77–5.28)
SODIUM SERPL-SCNC: 141 MMOL/L (ref 136–145)
WBC NRBC COR # BLD AUTO: 3.56 10*3/MM3 (ref 3.4–10.8)

## 2025-01-09 PROCEDURE — 80053 COMPREHEN METABOLIC PANEL: CPT | Performed by: INTERNAL MEDICINE

## 2025-01-09 PROCEDURE — 85025 COMPLETE CBC W/AUTO DIFF WBC: CPT | Performed by: INTERNAL MEDICINE

## 2025-01-09 PROCEDURE — 36415 COLL VENOUS BLD VENIPUNCTURE: CPT

## 2025-01-09 NOTE — PROGRESS NOTES
Subjective   Dasha De Leon is a 54 y.o. female.  Referred by Dr. De Anda for right breast invasive ductal carcinoma    History of Present Illness     Patient is a 53-year-old postmenopausal  lady who presented with a palpable mass in her right breast in mid April.  This was right around the time of her screening mammogram and hence she proceeded with a screening mammogram.  She denies any previous breast abnormalities or biopsies.  No family history of breast or ovarian carcinoma.    4/29/2024-bilateral screening mammogram  Breasts are entirely fatty.  Bilateral retropectoral saline implants.  Round mass measuring 16 mm with spiculated margins in the posterior one third region of the right breast at 9:00.  This is new since the prior exams.  No suspicious masses in the left breast.    5/10/2024-right breast diagnostic mammogram  Breast is entirely fatty.  Retropectoral saline implants.  Finding 1.round mass in the right breast at 9:00 previously seen on the screening mammogram, measuring 16 mm with spiculated margin.    Right breast ultrasound  Finding 1.hypoechoic mass with spiculated margins and internal vascularity measuring 15 mm at 8:00, 10 cm from the nipple.  Finding 2.irregular  Palpable hypoechoic mass measuring 4 x 6 x 4 mm at 8:30 position in the right breast, 11 cm from the nipple.    Ultrasound-guided biopsy of both these masses recommended.    5/15/2024-right breast ultrasound-guided biopsy  1.8 o'clock position right breast-invasive ductal carcinoma with apocrine features  Grade 2  ER +91 to 100% strong  OR +91 to 100% strong  HER2 negative, 1+  Ki-67 22%  2.right breast 8:30 position  Invasive ductal carcinoma with apocrine features, grade 2  ER +91 to 100% strong  OR +91 to 100% strong  HER2 negative, Ki-67 20%.    5/25/2024-bilateral breast MRI  1.2 biopsy sites of malignancy at 8:30 position in the posterior one third that measured 2.3 cm and 2.4 cm respectively.  The lesions either  about or extend to the lateral margin of the pectoral fascia that overlies the implant capsule and some extension into the pectoral muscle at this location and/or biologic capsular suspect.  Finding suspicious for axilla lymphadenopathy.  Right axilla lymph node measuring 1.4 cm.  2.no suspicious findings in the left breast.    7/11/2024-right mastectomy and sentinel lymph node biopsy  Invasive ductal carcinoma measuring 45 x 20 x 14 mm  Grade 3  Extensive lymphovascular invasion present.  Invasive carcinoma present extensively at the anterior margin  Distance to the invasive carcinoma from the posterior margin 0.9 mm  1 lymph node with macrometastasis measuring 12 mm, 2 mm extranodal extension  At least 3 sentinel lymph nodes  Total number of [place] and nonsentinel lymph nodes evaluated 5  PT2 N1a MX  Receptors repeated on the lymph node with estrogen receptor +91 to 100% strong, progesterone receptor +91 to 100% strong, HER2 negative, score 0, Ki-67 35%    Given the positive margin additional surgery performed on 7/26/2024 with ultimately negative margins.  Expander placed.    Patient presents today to discuss adjuvant therapy.    Oncotype DX recurrence score 18 with no additional benefit from chemotherapy and 5-year risk of distant metastasis being 4%.      Verzenio started 12/6/2024.    Interval history:  Ms. Fernando returns today for follow-up.  She continues on Verzenio and currently on the full dose of 150 mg twice daily.  Reports 2-3 bowel movements every day.  Has not needed to use Imodium.  She is also reporting acute onset pain of the right chest wall with started last night  She has used some leftover oxycodone from surgery and has also used Mobic which has helped some with the pain.  Denies any trauma to the area.  No other complaints at this time  1/9/2025-CBC reviewed and WBC 3.56, hemoglobin 10.5, platelets 300,000, absolute neutrophil count 2.36  CMP within normal limits with a creatinine of  0.96    The following portions of the patient's history were reviewed and updated as appropriate: allergies, current medications, past family history, past medical history, past social history, past surgical history, and problem list.    Past Medical History:   Diagnosis Date    Abnormal Pap smear of cervix     Anxiety     Miller's cyst     RESOLVED    Breast cancer     Right    Bulging lumbar disc     Bursitis     HX    Depression     GERD (gastroesophageal reflux disease)     H/O colposcopy with cervical biopsy     unknown pap result, biopsy was neg per pt, 2013 [facility]    History of 2019 novel coronavirus disease (COVID-19)     X2    History of eczema     HPV (human papilloma virus) infection     Hyperlipemia     Hypertension     Low back pain     Lumbar radiculopathy     Numbness and tingling of left leg     Osteoarthritis     Scoliosis     Seasonal allergies         Past Surgical History:   Procedure Laterality Date    BREAST AUGMENTATION      BREAST BIOPSY Right 9/19/2024    Procedure: AXILLARY LYMPH NODE BIOPSY/EXCISION;  Surgeon: Romina De Anda MD;  Location: [facility];  Service: General;  Laterality: Right;    BREAST RECONSTRUCTION Right 7/11/2024    Procedure: RIGHT SUBPECTORAL PLACEMENT TISSUE EXPANDER AND CORTIVA (ACELLULAR DERMAL MATRIX), COMPLEX CLOSURE RIGHT BREAST 5cm;  Surgeon: Colin Bender MD;  Location: [facility] OR;  Service: Plastics;  Laterality: Right;    BREAST SURGERY Right 7/26/2024    Procedure: ADJACENT TISSUE REARRANGEMENT RIGHT BREAST;  Surgeon: Colin Bender MD;  Location: [facility];  Service: Plastics;  Laterality: Right;    CARPAL TUNNEL RELEASE Right 2004    CRYOTHERAPY      1997    D & C HYSTEROSCOPY N/A 9/19/2024    Procedure: INTRAUTERINE DEVICE REMOVAL;  Surgeon: Kaleb Burciaga MD;  Location: [facility];  Service: Obstetrics/Gynecology;  Laterality: N/A;    EPIDURAL BLOCK      HAND SURGERY  2004    Pisiformectomy    LUMBAR DISCECTOMY FUSION  "INSTRUMENTATION N/A 01/05/2024    Procedure: Lumbar 3 to lumbar 4 and lumbar 4 to lumbar 5 laminectomy with fusion and instrumentation;  Surgeon: Rigoberto Botello MD;  Location: [facility];  Service: Robotics - Neuro;  Laterality: N/A;    MAMMO BILATERAL  2014    MASTECTOMY W/ SENTINEL NODE BIOPSY Right 7/11/2024    Procedure: Right MASTECTOMY WITH SENTINEL NODE BIOPSY;  Surgeon: Romina De Anda MD;  Location: [facility] OR;  Service: General;  Laterality: Right;    MASTECTOMY WITH IMMEDIATE RECONSTRUCTION Right 7/26/2024    Procedure: right mastectomy, margin excision;  Surgeon: Romina De Anda MD;  Location: [facility];  Service: General;  Laterality: Right;    PAP SMEAR  [id number]    SHOULDER ARTHROSCOPY Left 2002,2003    SHOULDER SURGERY      \"MANIPULATION FOR FROZEN SHOULDER\"    US GUIDED LYMPH NODE BIOPSY  8/29/2024        Family History   Problem Relation Age of Onset    Hypertension Mother     Hyperlipidemia Mother     Diverticulitis Mother     Pancreatitis Mother     Kidney disease Father     Skin cancer Father     Hypertension Father     Hyperlipidemia Father     Stroke Father     Atrial fibrillation Father     Transient ischemic attack Father     No Known Problems Sister     Depression Brother     No Known Problems Daughter     No Known Problems Son     Uterine cancer Maternal Aunt 58    Heart disease Maternal Aunt     Lung cancer Maternal Uncle     Heart attack Maternal Uncle     No Known Problems Paternal Aunt     Colon cancer Paternal Uncle 65    Liver disease Maternal Grandmother     Heart disease Maternal Grandfather     Cancer Maternal Grandfather     Heart attack Paternal Grandmother     No Known Problems Paternal Grandfather     BRCA 1/2 Neg Hx     Breast cancer Neg Hx     Endometrial cancer Neg Hx     Ovarian cancer Neg Hx     Malig Hyperthermia Neg Hx         Social History     Socioeconomic History    Marital status: Single   Tobacco Use    Smoking status: Former     Current packs/day: 0.00    " " Average packs/day: 1 pack/day for 20.0 years (20.0 ttl pk-yrs)     Types: Cigarettes     Start date:      Quit date:      Years since quittin.0     Passive exposure: Past    Smokeless tobacco: Never   Vaping Use    Vaping status: Never Used   Substance and Sexual Activity    Alcohol use: Yes     Alcohol/week: 2.0 - 4.0 standard drinks of alcohol     Types: 2 - 4 Glasses of wine per week     Comment: weekly    Drug use: No    Sexual activity: Not Currently     Birth control/protection: I.U.D.        OB History          0    Para   0    Term   0       0    AB   0    Living   0         SAB   0    IAB   0    Ectopic   0    Molar        Multiple   0    Live Births                   Age at menarche-13  Age at first live childbirth-not applicable   0 para 0  0  Age at menopause-49  Oral conceptive pill use for 25 years  No use of hormone replacement therapy    Allergies   Allergen Reactions    Penicillins Anaphylaxis    Hemp Seed Oil Itching     HEMP IN LOTIONS    Lisinopril Cough      Review of Systems  Review of systems as mentioned HPI otherwise negative    Objective   Blood pressure 132/76, pulse 105, temperature 98.3 °F (36.8 °C), temperature source Infrared, resp. rate 16, height 167.6 cm (65.98\"), weight 78.2 kg (172 lb 8 oz), SpO2 97%, not currently breastfeeding.     Physical Exam  Vitals reviewed.   Constitutional:       Appearance: She is normal weight.   HENT:      Head: Normocephalic and atraumatic.      Right Ear: External ear normal.      Left Ear: External ear normal.   Eyes:      Conjunctiva/sclera: Conjunctivae normal.   Cardiovascular:      Rate and Rhythm: Normal rate.   Pulmonary:      Effort: Pulmonary effort is normal.   Abdominal:      General: Abdomen is flat.   Musculoskeletal:         General: Normal range of motion.      Cervical back: Normal range of motion.   Skin:     General: Skin is warm.   Neurological:      General: No focal deficit present.      " Mental Status: She is alert and oriented to person, place, and time.   Psychiatric:         Mood and Affect: Mood normal.         Behavior: Behavior normal.         Thought Content: Thought content normal.         Judgment: Judgment normal.     Breast Exam: Right breast status post mastectomy with expander in place.  Radiation dermatitis with erythema of the chest wall noted without excoriation or infection    I have reexamined the patient and the results are consistent with the previously documented exam. Talia Huang MD                           No radiology results for the last 30 days.       Assessment & Plan       *Right breast invasive ductal carcinoma  Status post right mastectomy on 7/11/2024-pathology with 45 mm of invasive ductal carcinoma, grade 3, ER/SD strongly positive at 91 to 100%, HER2 1+, Ki-67 22%  1 out of 5 lymph nodes positive for metastatic disease and receptors on lymph node also ER positive and SD positive.  PT2 N1a MX  Oncotype DX recurrence score 18 with no additional benefit from chemotherapy and 4% risk of distant metastasis at 5 years  Given the low Oncotype DX recurrence score she will proceed with adjuvant radiation.  Subsequently she will be started on anastrozole 1 mg p.o. daily.  We also discussed Verzenio for 2 years in the adjuvant setting due to grade 3 disease as well as lymph node positive disease.  She is willing to proceed with both anastrozole as well as Verzenio.  Adverse effects of anastrozole including but not limited to hot flashes, mood changes, fatigue, insomnia, nausea, arthralgias and myalgias, decrease in bone density discussed.  Adverse effects of Verzenio including but not limited to diarrhea, nausea, fatigue, myelosuppression, increased risk of infection, risk of DVT PE discussed.  Radiation complete 12/3/2024  12/4/2024 fair tolerance to anastrozole.   12/6/2024-started Verzenio.  1/9/2025-patient continues on full dose of Verzenio 150 mg twice daily and  labs reviewed and stable to proceed with the same.    *Right sided rib pain  Acute onset on 1/8/2025  Unclear etiology  If the symptoms do not resolve then plan proceed with imaging in the next 4 to 6 weeks.    *Diarrhea  Secondary to Verzenio  Tolerable  Creatinine normal  Continue to monitor  She can use Imodium if needed.    *Nausea  Continue Zofran twice daily to help with nausea control with Verzenio    *Dizziness  Blood pressure relatively normal  Could be secondary to Verzenio  Continue to monitor closely  If it continues and may require additional cardiac workup including EKG and echocardiogram  Solved    *Anxiety and depression  Continue Wellbutrin, Lexapro  Well-controlled at this time    *Hypertension  Blood pressure BP: 132/76    Continue current medications    *Hyperlipidemia  Continue Crestor    *Back issues/pain  Status with spinal fusion  Continue Flexeril    *Bone health  Baseline DEXA scan 8/29/2024 noted normal bone density  Discussed Zometa.  Dental clearance form provided to patient today    PLAN  The patient has completed adjuvant radiation 12/3/2024  Continue anastrozole 1 mg daily  Continue Verzenio  Continue Zofran  Imodium as needed  Right-sided rib pain, if worsens then proceed with bone scan even if no resolution will proceed with bone scan  As needed 4 weeks and MD in 8 weeks    Patient is on medications requiring close monitoring for toxicities, experiencing diarrhea, nausea secondary to Verzenio.    Talia Huang MD  01/09/2025

## 2025-01-13 ENCOUNTER — SPECIALTY PHARMACY (OUTPATIENT)
Dept: PHARMACY | Facility: HOSPITAL | Age: 55
End: 2025-01-13
Payer: COMMERCIAL

## 2025-01-13 NOTE — PROGRESS NOTES
Specialty Pharmacy Note: Verzenio (abemaciclib)    Dasha De Leon is a 54 y.o. female with breast cancer was seen 1/9/25 by Dr. Huang. Per provider dictation, no changes to oral oncology regimen Verzenio (abemaciclib).  Labs Review: The CMP and CBC from 1/9/25 have been reviewed. No dose adjustments are needed for the oral specialty medication(s) based on the labs.    Specialty pharmacy will continue to follow patient.    Leatha Abbott, LanD, BCPS  1/13/2025  09:55 EST

## 2025-01-13 NOTE — PROGRESS NOTES
Dasha has new insurance with [employer] for 2025. I have submitted the PA for Verzenio to [employer] through covermymeds. Currently waiting for a decision.    Arminda Baumann, Pharmacy Technician  01/13/25  11:36 EST

## 2025-01-14 ENCOUNTER — SPECIALTY PHARMACY (OUTPATIENT)
Dept: PHARMACY | Facility: HOSPITAL | Age: 55
End: 2025-01-14
Payer: COMMERCIAL

## 2025-01-14 NOTE — PROGRESS NOTES
Specialty Pharmacy Patient Management Program  Oncology / Hematology Refill Outreach      Dasha was contacted today regarding refills of her Verzenio specialty medication(s).    Specialty medication(s) and dose(s) confirmed: Yes  Verzenio 150 mg twice per day.    Refill Questions      Flowsheet Row Most Recent Value   Changes to allergies? No   Changes to medications? No   New conditions or infections since last clinic visit No   Unplanned office visit, urgent care, ED, or hospital admission in the last 4 weeks  No   How does patient/caregiver feel medication is working? Good   Financial problems or insurance changes  No   Since the previous refill, were any specialty medication doses or scheduled injections missed or delayed?  No   Does this patient require a clinical escalation to a pharmacist? No          Delivery Questions      Flowsheet Row Most Recent Value   Delivery method UPS   Delivery address verified with patient/caregiver? Yes  [Ship to home address]   Delivery address Home  [Ship to home address-Ship 1/14 for delivery 1/15-$0 copay with insurance and copay card-Address Confirmed]   Number of medications in delivery 1   Medication(s) being filled and delivered Abemaciclib (Verzenio)   Doses left of specialty medications 1 dose for tonight   Copay verified? Yes   Copay amount $0 copay with insurance and copay card   Copay form of payment No copayment ($0)   Ship Date 1/14/2025   Delivery Date 1/15/2025   Signature Required No          Verzenio delivery coordinated with pt for 1/15/2025 to her home address. $0 copay with insurance and copay card. Her last delivery was on 12/18/2024. No questions or concerns to report to MTM Team today. She reports no missed doses since last delivery. PDC is 72%.    Follow-Up: 21 Days    Arminda Baumann, Pharmacy Technician  1/14/2025  16:41 EST

## 2025-01-17 ENCOUNTER — PATIENT OUTREACH (OUTPATIENT)
Dept: OTHER | Facility: HOSPITAL | Age: 55
End: 2025-01-17
Payer: COMMERCIAL

## 2025-01-17 NOTE — PROGRESS NOTES
Called MsCarlos Moises to see how she was doing. Left a message with my contact information and asked her to call back at her convenience. Will also mail out survivorship information.

## 2025-01-21 ENCOUNTER — SPECIALTY PHARMACY (OUTPATIENT)
Dept: PHARMACY | Facility: HOSPITAL | Age: 55
End: 2025-01-21
Payer: COMMERCIAL

## 2025-01-21 NOTE — PROGRESS NOTES
Pt's Verzenio delivery has been delayed by [employer]. This is now marked to be delivered today, 1/21/2025.    UPS Tracking # [id number]     I will continue to follow for delivery confirmation.    Arminda Baumann, Pharmacy Technician  01/21/25  08:35 EST

## 2025-01-22 ENCOUNTER — SPECIALTY PHARMACY (OUTPATIENT)
Dept: PHARMACY | Facility: HOSPITAL | Age: 55
End: 2025-01-22
Payer: COMMERCIAL

## 2025-01-22 NOTE — PROGRESS NOTES
Pt's Verzenio still has not been delivered by [employer]. I have requested a new supply be sent from [facility] to her today for delivery tomorrow.     left for pt with the above information.    Arminda Baumann, Pharmacy Technician  01/22/25  10:23 EST

## 2025-01-23 ENCOUNTER — SPECIALTY PHARMACY (OUTPATIENT)
Dept: PHARMACY | Facility: HOSPITAL | Age: 55
End: 2025-01-23
Payer: COMMERCIAL

## 2025-01-23 NOTE — PROGRESS NOTES
Verzenio supply from [facility] delivered to pt on 1/23/2025 at 9;44 pm.    UPS tracking # [id number]     Arminda Baumann, Pharmacy Technician  01/23/25  14:13 EST

## 2025-01-27 NOTE — PROGRESS NOTES
Radiation Treatment Summary Note      Patient Name: Dasha De Leon  : 1970    Attending Provider: Germain Puga MD      Diagnosis:     ICD-10-CM ICD-9-CM   1. Malignant neoplasm of lower-outer quadrant of right breast of female, estrogen receptor positive  C50.511 174.5    Z17.0 V86.0       Radiation Start Date: 10/21/2024    Radiation Completion Date: 12/3/2024      Prescription:     Sequential    Site: PTV Right Chest wall and RNI  Laterality: Right  Total Dose: 5000 cGy  Dose per Fraction: 200 cGy  Total Fractions: 25  Daily or BID: Daily  Modality: Photon  Technique: 3DCRT  Bolus: No       THEN      2.   Site: PTV Scar Boost  Laterality: Right  Total Dose: 1000 cGy  Dose per Fraction: 200 cGy  Total Fractions: 5  Daily or BID: Daily  Modality: Electron  Technique: 3DCRT  Bolus: Yes, describe: 0.5 cm    Final Delivered Dose Deviated From Initially Prescribed Dose: No    Concurrent Chemotherapy: No    Patient Tolerated Treatment Without Unexpected Side Effects/Complications: Yes    ECOG: Restricted in physically strenuous activity but ambulatory and able to carry out work of a light or sedentary nature, e.g., light house work, office work = 1    Pain Management Plan: Opioid or other pain medication prescribed by other provider    Follow-Up Plan: 4-6 weeks    Imaging Ordered for Follow-Up: Yes, describe: Pet Med Onc        Germain Puga MD

## 2025-01-30 DIAGNOSIS — F39 MOOD DISORDER: ICD-10-CM

## 2025-01-30 RX ORDER — BUPROPION HYDROCHLORIDE 300 MG/1
300 TABLET ORAL DAILY
Qty: 90 TABLET | Refills: 1 | Status: CANCELLED | OUTPATIENT
Start: 2025-01-30

## 2025-01-30 RX ORDER — MELOXICAM 15 MG/1
15 TABLET ORAL DAILY
Qty: 90 TABLET | Refills: 0 | Status: SHIPPED | OUTPATIENT
Start: 2025-01-30

## 2025-01-30 RX ORDER — BUPROPION HYDROCHLORIDE 300 MG/1
300 TABLET ORAL DAILY
Qty: 90 TABLET | Refills: 1 | Status: SHIPPED | OUTPATIENT
Start: 2025-01-30

## 2025-02-06 ENCOUNTER — HOSPITAL ENCOUNTER (OUTPATIENT)
Dept: CT IMAGING | Facility: HOSPITAL | Age: 55
Discharge: HOME | End: 2025-02-06
Payer: COMMERCIAL

## 2025-02-06 ENCOUNTER — OFFICE VISIT (OUTPATIENT)
Dept: ONCOLOGY | Facility: CLINIC | Age: 55
End: 2025-02-06
Payer: COMMERCIAL

## 2025-02-06 ENCOUNTER — LAB (OUTPATIENT)
Dept: OTHER | Facility: HOSPITAL | Age: 55
End: 2025-02-06
Payer: COMMERCIAL

## 2025-02-06 VITALS
SYSTOLIC BLOOD PRESSURE: 110 MMHG | HEIGHT: 66 IN | BODY MASS INDEX: 27.26 KG/M2 | HEART RATE: 112 BPM | OXYGEN SATURATION: 99 % | WEIGHT: 169.6 LBS | TEMPERATURE: 98 F | DIASTOLIC BLOOD PRESSURE: 75 MMHG

## 2025-02-06 DIAGNOSIS — Z17.0 MALIGNANT NEOPLASM OF LOWER-OUTER QUADRANT OF RIGHT BREAST OF FEMALE, ESTROGEN RECEPTOR POSITIVE: ICD-10-CM

## 2025-02-06 DIAGNOSIS — I26.99 ACUTE PULMONARY EMBOLISM, UNSPECIFIED PULMONARY EMBOLISM TYPE, UNSPECIFIED WHETHER ACUTE COR PULMONALE PRESENT: ICD-10-CM

## 2025-02-06 DIAGNOSIS — C50.511 MALIGNANT NEOPLASM OF LOWER-OUTER QUADRANT OF RIGHT BREAST OF FEMALE, ESTROGEN RECEPTOR POSITIVE: Primary | ICD-10-CM

## 2025-02-06 DIAGNOSIS — C50.511 MALIGNANT NEOPLASM OF LOWER-OUTER QUADRANT OF RIGHT BREAST OF FEMALE, ESTROGEN RECEPTOR POSITIVE: ICD-10-CM

## 2025-02-06 DIAGNOSIS — Z17.0 MALIGNANT NEOPLASM OF LOWER-OUTER QUADRANT OF RIGHT BREAST OF FEMALE, ESTROGEN RECEPTOR POSITIVE: Primary | ICD-10-CM

## 2025-02-06 LAB
ALBUMIN SERPL-MCNC: 3.6 G/DL (ref 3.5–5.2)
ALBUMIN/GLOB SERPL: 0.9 G/DL
ALP SERPL-CCNC: 110 U/L (ref 39–117)
ALT SERPL W P-5'-P-CCNC: 13 U/L (ref 1–33)
ANION GAP SERPL CALCULATED.3IONS-SCNC: 8.2 MMOL/L (ref 5–15)
AST SERPL-CCNC: 17 U/L (ref 1–32)
BASOPHILS # BLD AUTO: 0.06 10*3/MM3 (ref 0–0.2)
BASOPHILS NFR BLD AUTO: 1.2 % (ref 0–1.5)
BILIRUB SERPL-MCNC: 0.3 MG/DL (ref 0–1.2)
BUN SERPL-MCNC: 12 MG/DL (ref 6–20)
BUN/CREAT SERPL: 14.8 (ref 7–25)
CALCIUM SPEC-SCNC: 9.6 MG/DL (ref 8.6–10.5)
CHLORIDE SERPL-SCNC: 102 MMOL/L (ref 98–107)
CO2 SERPL-SCNC: 29.8 MMOL/L (ref 22–29)
CREAT SERPL-MCNC: 0.81 MG/DL (ref 0.57–1)
DEPRECATED RDW RBC AUTO: 53.2 FL (ref 37–54)
EGFRCR SERPLBLD CKD-EPI 2021: 86.4 ML/MIN/1.73
EOSINOPHIL # BLD AUTO: 0.16 10*3/MM3 (ref 0–0.4)
EOSINOPHIL NFR BLD AUTO: 3.2 % (ref 0.3–6.2)
ERYTHROCYTE [DISTWIDTH] IN BLOOD BY AUTOMATED COUNT: 15.9 % (ref 12.3–15.4)
GLOBULIN UR ELPH-MCNC: 3.9 GM/DL
GLUCOSE SERPL-MCNC: 90 MG/DL (ref 65–99)
HCT VFR BLD AUTO: 34.7 % (ref 34–46.6)
HGB BLD-MCNC: 11 G/DL (ref 12–15.9)
IMM GRANULOCYTES # BLD AUTO: 0.03 10*3/MM3 (ref 0–0.05)
IMM GRANULOCYTES NFR BLD AUTO: 0.6 % (ref 0–0.5)
LYMPHOCYTES # BLD AUTO: 0.58 10*3/MM3 (ref 0.7–3.1)
LYMPHOCYTES NFR BLD AUTO: 11.5 % (ref 19.6–45.3)
MCH RBC QN AUTO: 28.9 PG (ref 26.6–33)
MCHC RBC AUTO-ENTMCNC: 31.7 G/DL (ref 31.5–35.7)
MCV RBC AUTO: 91.1 FL (ref 79–97)
MONOCYTES # BLD AUTO: 0.54 10*3/MM3 (ref 0.1–0.9)
MONOCYTES NFR BLD AUTO: 10.7 % (ref 5–12)
NEUTROPHILS NFR BLD AUTO: 3.66 10*3/MM3 (ref 1.7–7)
NEUTROPHILS NFR BLD AUTO: 72.8 % (ref 42.7–76)
NRBC BLD AUTO-RTO: 0 /100 WBC (ref 0–0.2)
PLAT MORPH BLD: NORMAL
PLATELET # BLD AUTO: 364 10*3/MM3 (ref 140–450)
PMV BLD AUTO: 8.7 FL (ref 6–12)
POTASSIUM SERPL-SCNC: 3.6 MMOL/L (ref 3.5–5.2)
PROT SERPL-MCNC: 7.5 G/DL (ref 6–8.5)
RBC # BLD AUTO: 3.81 10*6/MM3 (ref 3.77–5.28)
RBC MORPH BLD: NORMAL
SODIUM SERPL-SCNC: 140 MMOL/L (ref 136–145)
WBC MORPH BLD: NORMAL
WBC NRBC COR # BLD AUTO: 5.03 10*3/MM3 (ref 3.4–10.8)

## 2025-02-06 PROCEDURE — 25510000001 IOPAMIDOL PER 1 ML: Performed by: INTERNAL MEDICINE

## 2025-02-06 PROCEDURE — 85025 COMPLETE CBC W/AUTO DIFF WBC: CPT | Performed by: INTERNAL MEDICINE

## 2025-02-06 PROCEDURE — 71275 CT ANGIOGRAPHY CHEST: CPT

## 2025-02-06 PROCEDURE — 36415 COLL VENOUS BLD VENIPUNCTURE: CPT

## 2025-02-06 PROCEDURE — 74177 CT ABD & PELVIS W/CONTRAST: CPT

## 2025-02-06 PROCEDURE — 80053 COMPREHEN METABOLIC PANEL: CPT | Performed by: INTERNAL MEDICINE

## 2025-02-06 PROCEDURE — 85007 BL SMEAR W/DIFF WBC COUNT: CPT | Performed by: INTERNAL MEDICINE

## 2025-02-06 RX ORDER — IOPAMIDOL 755 MG/ML
100 INJECTION, SOLUTION INTRAVASCULAR
Status: COMPLETED | OUTPATIENT
Start: 2025-02-06 | End: 2025-02-06

## 2025-02-06 RX ADMIN — IOPAMIDOL 95 ML: 755 INJECTION, SOLUTION INTRAVENOUS at 12:34

## 2025-02-06 NOTE — ADDENDUM NOTE
Addended by: JAISON MORFIN on: 2/6/2025 02:09 PM     Modules accepted: Orders    
Dr Raya [phone]

## 2025-02-06 NOTE — NURSING NOTE
Dr Huang called and spoke to patient. States she has a small blood clot in lung. Okay to discharge home. Calling in Eloquis to pharmacy. Patient left ambulatory. No signs or symptoms of distress.

## 2025-02-06 NOTE — PROGRESS NOTES
Subjective   Dasha De Leon is a 54 y.o. female.  Referred by Dr. De Anda for right breast invasive ductal carcinoma    History of Present Illness     Patient is a 53-year-old postmenopausal  lady who presented with a palpable mass in her right breast in mid April.  This was right around the time of her screening mammogram and hence she proceeded with a screening mammogram.  She denies any previous breast abnormalities or biopsies.  No family history of breast or ovarian carcinoma.    4/29/2024-bilateral screening mammogram  Breasts are entirely fatty.  Bilateral retropectoral saline implants.  Round mass measuring 16 mm with spiculated margins in the posterior one third region of the right breast at 9:00.  This is new since the prior exams.  No suspicious masses in the left breast.    5/10/2024-right breast diagnostic mammogram  Breast is entirely fatty.  Retropectoral saline implants.  Finding 1.round mass in the right breast at 9:00 previously seen on the screening mammogram, measuring 16 mm with spiculated margin.    Right breast ultrasound  Finding 1.hypoechoic mass with spiculated margins and internal vascularity measuring 15 mm at 8:00, 10 cm from the nipple.  Finding 2.irregular  Palpable hypoechoic mass measuring 4 x 6 x 4 mm at 8:30 position in the right breast, 11 cm from the nipple.    Ultrasound-guided biopsy of both these masses recommended.    5/15/2024-right breast ultrasound-guided biopsy  1.8 o'clock position right breast-invasive ductal carcinoma with apocrine features  Grade 2  ER +91 to 100% strong  CA +91 to 100% strong  HER2 negative, 1+  Ki-67 22%  2.right breast 8:30 position  Invasive ductal carcinoma with apocrine features, grade 2  ER +91 to 100% strong  CA +91 to 100% strong  HER2 negative, Ki-67 20%.    5/25/2024-bilateral breast MRI  1.2 biopsy sites of malignancy at 8:30 position in the posterior one third that measured 2.3 cm and 2.4 cm respectively.  The lesions either  about or extend to the lateral margin of the pectoral fascia that overlies the implant capsule and some extension into the pectoral muscle at this location and/or biologic capsular suspect.  Finding suspicious for axilla lymphadenopathy.  Right axilla lymph node measuring 1.4 cm.  2.no suspicious findings in the left breast.    7/11/2024-right mastectomy and sentinel lymph node biopsy  Invasive ductal carcinoma measuring 45 x 20 x 14 mm  Grade 3  Extensive lymphovascular invasion present.  Invasive carcinoma present extensively at the anterior margin  Distance to the invasive carcinoma from the posterior margin 0.9 mm  1 lymph node with macrometastasis measuring 12 mm, 2 mm extranodal extension  At least 3 sentinel lymph nodes  Total number of [place] and nonsentinel lymph nodes evaluated 5  PT2 N1a MX  Receptors repeated on the lymph node with estrogen receptor +91 to 100% strong, progesterone receptor +91 to 100% strong, HER2 negative, score 0, Ki-67 35%    Given the positive margin additional surgery performed on 7/26/2024 with ultimately negative margins.  Expander placed.    Patient presents today to discuss adjuvant therapy.    Oncotype DX recurrence score 18 with no additional benefit from chemotherapy and 5-year risk of distant metastasis being 4%.      Verzenio started 12/6/2024.    Interval history:  Ms. Fernando returns today for follow-up.  There was a 1 week delay in receiving the shipment of Verzenio and had a little break due to that.  But she has been back on the Verzenio full dose 150 mg twice daily for the past 2 to 3 weeks.  She reports that since she started back she has experienced severe nausea.  Has not had any diarrhea.  She is also reporting that the right rib pain which she reported at her last visit has gotten worse and persistent and she is unable to take a deep breath or cough.  She is also reporting being dyspneic with minimal exertion.  Continues on anastrozole and tolerating that  well.      The following portions of the patient's history were reviewed and updated as appropriate: allergies, current medications, past family history, past medical history, past social history, past surgical history, and problem list.    Past Medical History:   Diagnosis Date    Abnormal Pap smear of cervix     Anxiety     Miller's cyst     RESOLVED    Breast cancer     Right    Bulging lumbar disc     Bursitis     HX    Depression     GERD (gastroesophageal reflux disease)     H/O colposcopy with cervical biopsy     unknown pap result, biopsy was neg per pt, 2013 [facility]    History of 2019 novel coronavirus disease (COVID-19)     X2    History of eczema     HPV (human papilloma virus) infection     Hyperlipemia     Hypertension     Low back pain     Lumbar radiculopathy     Numbness and tingling of left leg     Osteoarthritis     Scoliosis     Seasonal allergies         Past Surgical History:   Procedure Laterality Date    BREAST AUGMENTATION      BREAST BIOPSY Right 9/19/2024    Procedure: AXILLARY LYMPH NODE BIOPSY/EXCISION;  Surgeon: Romnia De Anda MD;  Location: [facility];  Service: General;  Laterality: Right;    BREAST RECONSTRUCTION Right 7/11/2024    Procedure: RIGHT SUBPECTORAL PLACEMENT TISSUE EXPANDER AND CORTIVA (ACELLULAR DERMAL MATRIX), COMPLEX CLOSURE RIGHT BREAST 5cm;  Surgeon: Colin Bender MD;  Location: [facility] OR;  Service: Plastics;  Laterality: Right;    BREAST SURGERY Right 7/26/2024    Procedure: ADJACENT TISSUE REARRANGEMENT RIGHT BREAST;  Surgeon: Colin Bender MD;  Location: [facility] OR;  Service: Plastics;  Laterality: Right;    CARPAL TUNNEL RELEASE Right 2004    CRYOTHERAPY      1997    D & C HYSTEROSCOPY N/A 9/19/2024    Procedure: INTRAUTERINE DEVICE REMOVAL;  Surgeon: Kaleb Burciaga MD;  Location: [facility];  Service: Obstetrics/Gynecology;  Laterality: N/A;    EPIDURAL BLOCK      HAND SURGERY  2004    Pisiformectomy    LUMBAR DISCECTOMY  "FUSION INSTRUMENTATION N/A 01/05/2024    Procedure: Lumbar 3 to lumbar 4 and lumbar 4 to lumbar 5 laminectomy with fusion and instrumentation;  Surgeon: Rigoberto Botello MD;  Location: [facility];  Service: Robotics - Neuro;  Laterality: N/A;    MAMMO BILATERAL  2014    MASTECTOMY W/ SENTINEL NODE BIOPSY Right 7/11/2024    Procedure: Right MASTECTOMY WITH SENTINEL NODE BIOPSY;  Surgeon: Romina De Anda MD;  Location: [facility];  Service: General;  Laterality: Right;    MASTECTOMY WITH IMMEDIATE RECONSTRUCTION Right 7/26/2024    Procedure: right mastectomy, margin excision;  Surgeon: Romina De Anda MD;  Location: [facility];  Service: General;  Laterality: Right;    PAP SMEAR  [id number]    SHOULDER ARTHROSCOPY Left 2002,2003    SHOULDER SURGERY      \"MANIPULATION FOR FROZEN SHOULDER\"    US GUIDED LYMPH NODE BIOPSY  8/29/2024        Family History   Problem Relation Age of Onset    Hypertension Mother     Hyperlipidemia Mother     Diverticulitis Mother     Pancreatitis Mother     Kidney disease Father     Skin cancer Father     Hypertension Father     Hyperlipidemia Father     Stroke Father     Atrial fibrillation Father     Transient ischemic attack Father     No Known Problems Sister     Depression Brother     No Known Problems Daughter     No Known Problems Son     Uterine cancer Maternal Aunt 58    Heart disease Maternal Aunt     Lung cancer Maternal Uncle     Heart attack Maternal Uncle     No Known Problems Paternal Aunt     Colon cancer Paternal Uncle 65    Liver disease Maternal Grandmother     Heart disease Maternal Grandfather     Cancer Maternal Grandfather     Heart attack Paternal Grandmother     No Known Problems Paternal Grandfather     BRCA 1/2 Neg Hx     Breast cancer Neg Hx     Endometrial cancer Neg Hx     Ovarian cancer Neg Hx     Malig Hyperthermia Neg Hx         Social History     Socioeconomic History    Marital status: Single   Tobacco Use    Smoking status: Former     Current packs/day: " "0.00     Average packs/day: 1 pack/day for 20.0 years (20.0 ttl pk-yrs)     Types: Cigarettes     Start date:      Quit date:      Years since quittin.1     Passive exposure: Past    Smokeless tobacco: Never   Vaping Use    Vaping status: Never Used   Substance and Sexual Activity    Alcohol use: Yes     Alcohol/week: 2.0 - 4.0 standard drinks of alcohol     Types: 2 - 4 Glasses of wine per week     Comment: weekly    Drug use: No    Sexual activity: Not Currently     Birth control/protection: I.U.D.        OB History          0    Para   0    Term   0       0    AB   0    Living   0         SAB   0    IAB   0    Ectopic   0    Molar        Multiple   0    Live Births                   Age at menarche-13  Age at first live childbirth-not applicable   0 para 0  0  Age at menopause-49  Oral conceptive pill use for 25 years  No use of hormone replacement therapy    Allergies   Allergen Reactions    Penicillins Anaphylaxis    Hemp Seed Oil Itching     HEMP IN LOTIONS    Lisinopril Cough      Review of Systems  Review of systems as mentioned HPI otherwise negative    Objective   Blood pressure 110/75, pulse 112, temperature 98 °F (36.7 °C), temperature source Oral, height 168 cm (66.14\"), weight 76.9 kg (169 lb 9.6 oz), SpO2 99%, not currently breastfeeding.     Physical Exam  Vitals reviewed.   Constitutional:       Appearance: She is normal weight.   HENT:      Head: Normocephalic and atraumatic.      Right Ear: External ear normal.      Left Ear: External ear normal.   Eyes:      Conjunctiva/sclera: Conjunctivae normal.   Cardiovascular:      Rate and Rhythm: Normal rate.   Pulmonary:      Effort: Pulmonary effort is normal.   Abdominal:      General: Abdomen is flat.   Musculoskeletal:         General: Normal range of motion.      Cervical back: Normal range of motion.   Skin:     General: Skin is warm.   Neurological:      General: No focal deficit present.      Mental " Status: She is alert and oriented to person, place, and time.   Psychiatric:         Mood and Affect: Mood normal.         Behavior: Behavior normal.         Thought Content: Thought content normal.         Judgment: Judgment normal.       Breast Exam: Right breast status post mastectomy with expander in place.  Radiation dermatitis with erythema of the chest wall noted without excoriation or infection    I have reexamined the patient and the results are consistent with the previously documented exam. Talia Huang MD       Results from last 7 days   Lab Units 02/06/25  0927   WBC 10*3/mm3 5.03   NEUTROS ABS 10*3/mm3 3.66   HEMOGLOBIN g/dL 11.0*   HEMATOCRIT % 34.7   PLATELETS 10*3/mm3 364                     No radiology results for the last 30 days.       Assessment & Plan       *Right breast invasive ductal carcinoma  Status post right mastectomy on 7/11/2024-pathology with 45 mm of invasive ductal carcinoma, grade 3, ER/FL strongly positive at 91 to 100%, HER2 1+, Ki-67 22%  1 out of 5 lymph nodes positive for metastatic disease and receptors on lymph node also ER positive and FL positive.  PT2 N1a MX  8/12/2024-CT of the chest abdomen pelvis-single mildly enlarged right axillary lymph node suspicious for sharona metastasis.  No evidence of distant metastatic disease in the patient with recent right mastectomy.  Mild hepatic steatosis.  IUD present.  Duodenal diverticulum present.  9/19/2024-abnormal lymph node excision.  1 lymph node involved by micrometastatic carcinoma measuring 9.5 mm, greater than 2 mm extranodal extension.  Oncotype DX recurrence score 18 with no additional benefit from chemotherapy and 4% risk of distant metastasis at 5 years  Given the low Oncotype DX recurrence score she will proceed with adjuvant radiation.  Started anastrozole November 2024  Radiation complete 12/3/2024  12/4/2024 fair tolerance to anastrozole.   12/6/2024-started Verzenio.  1/9/2025-patient continues on full dose of  Verzenio 150 mg twice daily and labs reviewed and stable to proceed with the same.  2/6/2025-patient reports severe right-sided rib pain as well as dyspnea, tachycardia and severe nausea.  There is a small risk of thrombosis with Verzenio and hence proceed with obtaining a CT angiogram of the chest to rule out any underlying pulmonary embolism as well as ruling out any metastatic disease given the ongoing pain on the right side.  Patient also requesting that we do restaging CT of the abdomen and bone scan as she is concerned about the lymph node positive disease.  Due to this reason we will proceed with CT of the abdomen and bone scan alongside with a CT chest.    *Right sided rib pain  Acute onset on 1/8/2025  Persistent and worse  Proceed with CT chest.    *Diarrhea  Secondary to Verzenio  Tolerable  Creatinine normal  Continue to monitor  She can use Imodium if needed.    *Nausea  Severe secondary to Verzenio  Continue Zofran and Compazine as needed    *Dizziness  Blood pressure relatively normal  Could be secondary to Verzenio  Continue to monitor closely  If it continues and may require additional cardiac workup including EKG and echocardiogram  Resolved    *Anxiety and depression  Continue Wellbutrin, Lexapro  Well-controlled at this time    *Hypertension  Blood pressure BP: 110/75    Continue current medications  Noted to be tachycardic with a heart rate of 112.    *Hyperlipidemia  Continue Crestor    *Back issues/pain  Status with spinal fusion  Continue Flexeril    *Bone health  Baseline DEXA scan 8/29/2024 noted normal bone density  Discussed Zometa.  Dental clearance form provided to patient today    PLAN  The patient has completed adjuvant radiation 12/3/2024  Continue anastrozole 1 mg daily  Continue Verzenio  CT of the chest abdomen and pelvis as well as bone scan.  Continue Zofran  Imodium as needed, currently no diarrhea  We would have to hold Verzenio if she has ongoing symptoms with negative  scans.  Take a break for 2 weeks and resume at 100 mg twice daily dose of Verzenio.  Follow-up on the scan results.    Patient is on medications requiring close monitoring for toxicities, experiencing nausea secondary to Verzenio.    Talia Huang MD  02/06/2025

## 2025-02-07 ENCOUNTER — SPECIALTY PHARMACY (OUTPATIENT)
Dept: PHARMACY | Facility: HOSPITAL | Age: 55
End: 2025-02-07
Payer: COMMERCIAL

## 2025-02-07 NOTE — PROGRESS NOTES
Specialty Pharmacy Note: Verzenio (abemaciclib)    Dasha De Leon is a 54 y.o. female with breast cancer was seen 2/6/25 by Dr. Huang. Per provider dictation, no changes to oral oncology regimen Verzenio (abemaciclib) for now.  If scans are negative, will hold for 2 weeks and resume at lower dose.  Labs Review: The CMP and CBC from 2/6/25 have been reviewed. No dose adjustments are needed for the oral specialty medication(s) based on the labs.    Specialty pharmacy will continue to follow patient.    Antonio Cherry, PharmD, [facility]  Clinical Oncology Pharmacist    2/7/2025  13:41 EST

## 2025-02-10 DIAGNOSIS — C50.511 MALIGNANT NEOPLASM OF LOWER-OUTER QUADRANT OF RIGHT BREAST OF FEMALE, ESTROGEN RECEPTOR POSITIVE: Primary | ICD-10-CM

## 2025-02-10 DIAGNOSIS — Z17.0 MALIGNANT NEOPLASM OF LOWER-OUTER QUADRANT OF RIGHT BREAST OF FEMALE, ESTROGEN RECEPTOR POSITIVE: Primary | ICD-10-CM

## 2025-02-10 RX ORDER — HYDROCODONE BITARTRATE AND ACETAMINOPHEN 5; 325 MG/1; MG/1
1 TABLET ORAL EVERY 6 HOURS PRN
Qty: 90 TABLET | Refills: 0 | Status: SHIPPED | OUTPATIENT
Start: 2025-02-10

## 2025-02-11 NOTE — PROGRESS NOTES
Colorectal Surgery Progress Note  [facility]  POD#1      Subjective:  no gas/BM.  No N/V.  Tolerating liquids. Pain relatively controlled.  Encouraged mobilization.  Scopolamine patch in place.      Vitals:  Vitals:    12/05/24 1509 12/05/24 1957 12/05/24 2318 12/06/24 0300   BP: 117/69 115/64 95/52 112/74   BP Location:  Right arm Right arm Right arm   Pulse: 79 70 73    Resp: 15 15 15 16   Temp:  98.6  F (37  C) 98.2  F (36.8  C) 98.5  F (36.9  C)   TempSrc:  Oral Oral Oral   SpO2:    98%   Weight:       Height:         I/O:  I/O last 3 completed shifts:  In: 1886 [P.O.:236; I.V.:1650]  Out: 1290 [Urine:1270; Blood:20]    Physical Exam:  Gen: AAOx3, NAD  Pulm: Non-labored breathing  Abd: Soft, non-distended, appropriately tender, no guarding/rebound   Incision C/D/I with glue  Ext:  Warm and well-perfused    BMP  Recent Labs   Lab 12/06/24  0616 12/05/24  0626   * 98     CBC  Recent Labs   Lab 12/06/24  0648 12/05/24  1445   WBC 10.7  --    HGB 12.3*  --    HCT 36.7*  --     356         ASSESSMENT: This is a 38 year old male with Crohns disease located at the TI with multifocal stricturing and narrowing, has been on Infliximab, now s/p Lap Right colectomy with KONO-S anastomosis on 12/5/2024.      Neuro/Pain: tylenol, oxy, dilaudid IV prn. Can add robaxin as needed.   CV: no acute concerns at this time  PULM: encourage IS.  GI/FEN:   - FLD - possible transition to regular diet later today  - IVF @ 50 - stop later today  - replete electrolytes prn  - ambulate  : voiding spontaneously  Heme: Hgb 12.3  ID: no acute concerns at this time  Endocrine: no acute concerns at this time    Activity: as tolerated.  Ppx: heparin TID - transition to Lovenox  Dispo:  Pending return of bowel function.  1-3 days.  Will need lovenox ppx x30 days at discharge.  Pt should wait 4 weeks after surgery before restarting remicade.     Clinically Significant Risk Factors                        Encounter Date:02/12/2025        Patient ID: Dasha De Leon is a 54 y.o. female.      Chief Complaint:      History of Present Illness  54 years old very pleasant woman with hypertension, hyperlipidemia, breast cancer invasive ductal carcinoma status post surgical resection and chemoradiation who is currently on Verzenio and anastrozole.  She recently noted worsening dyspnea with minimal exertion.  CT chest showed small eccentric thrombus at the proximal right interlobar artery adjacent to the right middle lobe pulmonary artery.  Nonocclusive thrombus also noted within segmental right lower lobe pulmonary arteries.  She was started on anticoagulation.    Current cardiac medications include Eliquis, atorvastatin and olmesartan.    The following portions of the patient's history were reviewed and updated as appropriate: allergies, current medications, past family history, past medical history, past social history, past surgical history, and problem list.    Review of Systems   Constitutional: Positive for malaise/fatigue. Negative for chills, fever, weight gain and weight loss.   Cardiovascular:  Negative for chest pain, leg swelling, palpitations and syncope.   Respiratory:  Positive for shortness of breath.    Hematologic/Lymphatic: Negative for bleeding problem.   Gastrointestinal:  Positive for nausea. Negative for vomiting.   Neurological:  Positive for dizziness (occasional). Negative for light-headedness and numbness.         Current Outpatient Medications:     Abemaciclib (Verzenio) 150 MG tablet, Take 1 tablet by mouth 2 (Two) Times a Day., Disp: 56 tablet, Rfl: 5    ALPRAZolam (Xanax) 0.25 MG tablet, Take 1 tablet by mouth 2 (Two) Times a Day As Needed for Anxiety., Disp: 30 tablet, Rfl: 0    anastrozole (ARIMIDEX) 1 MG tablet, Take 1 tablet by mouth Daily., Disp: 30 tablet, Rfl: 5    apixaban (ELIQUIS) 5 MG tablet tablet, Take 2 tablets by mouth 2 (Two) Times a Day for 7 days, THEN 1 tablet 2 (Two)                    Gricel Vides PA-C ..................12/6/2024   7:45 AM  Colon and Rectal Surgery    Patient was seen and discussed with Dr. Rodriguez    The above plan of care was performed and communicated to me by Dr. Osorio    I spent 25 minute face-to-face or coordinating care of Karlos Terrazas. Over 50% of our time on the unit was spent counseling the patient and/or coordinating care as documented in the assessment and plan.     59902 post op hospital visit     Times a Day, Disp: 74 tablet, Rfl: 0    atorvastatin (LIPITOR) 40 MG tablet, Take 1 tablet by mouth Daily., Disp: 90 tablet, Rfl: 3    buPROPion XL (Wellbutrin XL) 300 MG 24 hr tablet, Take 1 tablet by mouth Daily., Disp: 90 tablet, Rfl: 1    cyclobenzaprine (FLEXERIL) 10 MG tablet, Take 1 tablet by mouth 3 (Three) Times a Day As Needed for Muscle Spasms., Disp: 90 tablet, Rfl: 3    escitalopram (LEXAPRO) 20 MG tablet, Take 1 tablet by mouth Daily., Disp: 90 tablet, Rfl: 3    famotidine (PEPCID) 20 MG tablet, Take 1 tablet by mouth 2 (Two) Times a Day., Disp: 180 tablet, Rfl: 3    furosemide (Lasix) 20 MG tablet, Take 1 tablet by mouth 2 (Two) Times a Day., Disp: 90 tablet, Rfl: 1    HYDROcodone-acetaminophen (NORCO) 5-325 MG per tablet, Take 1 tablet by mouth Every 6 (Six) Hours As Needed for Moderate Pain., Disp: 90 tablet, Rfl: 0    levocetirizine (XYZAL) 5 MG tablet, Take one tablet by mouth twice daily for urticaria., Disp: 180 tablet, Rfl: 3    meloxicam (MOBIC) 15 MG tablet, Take 1 tablet by mouth Daily., Disp: 90 tablet, Rfl: 0    naloxone (NARCAN) 4 MG/0.1ML nasal spray, Call 911. Don't prime. [place] in 1 nostril for overdose. Repeat in 2-3 minutes in other nostril if no or minimal breathing/responsiveness., Disp: 2 each, Rfl: 0    olmesartan (BENICAR) 40 MG tablet, Take 0.5 tablets by mouth Daily., Disp: 45 tablet, Rfl: 1    ondansetron (ZOFRAN) 8 MG tablet, Take 1 tablet by mouth 3 (Three) Times a Day As Needed for Nausea or Vomiting., Disp: 90 tablet, Rfl: 5    oxyCODONE (Roxicodone) 5 MG immediate release tablet, Take 1 tablet by mouth Every 4 (Four) Hours As Needed for Moderate Pain., Disp: 50 tablet, Rfl: 0    Current outpatient and discharge medications have been reconciled for the patient.  Reviewed by: Jayden Nguyen MD       Allergies   Allergen Reactions    Penicillins Anaphylaxis    Hemp Seed Oil Itching     HEMP IN LOTIONS    Lisinopril Cough       Family History   Problem Relation Age of Onset     Hypertension Mother     Hyperlipidemia Mother     Diverticulitis Mother     Pancreatitis Mother     Kidney disease Father     Skin cancer Father     Hypertension Father     Hyperlipidemia Father     Stroke Father     Atrial fibrillation Father     Transient ischemic attack Father     No Known Problems Sister     Depression Brother     No Known Problems Daughter     No Known Problems Son     Uterine cancer Maternal Aunt 58    Heart disease Maternal Aunt     Lung cancer Maternal Uncle     Heart attack Maternal Uncle     No Known Problems Paternal Aunt     Colon cancer Paternal Uncle 65    Liver disease Maternal Grandmother     Heart disease Maternal Grandfather     Cancer Maternal Grandfather     Heart attack Paternal Grandmother     No Known Problems Paternal Grandfather     BRCA 1/2 Neg Hx     Breast cancer Neg Hx     Endometrial cancer Neg Hx     Ovarian cancer Neg Hx     Malig Hyperthermia Neg Hx        Past Surgical History:   Procedure Laterality Date    BREAST AUGMENTATION      BREAST BIOPSY Right 9/19/2024    Procedure: AXILLARY LYMPH NODE BIOPSY/EXCISION;  Surgeon: Romina De Anda MD;  [facility];  Service: General;  Laterality: Right;    BREAST RECONSTRUCTION Right 7/11/2024    Procedure: RIGHT SUBPECTORAL PLACEMENT TISSUE EXPANDER AND CORTIVA (ACELLULAR DERMAL MATRIX), COMPLEX CLOSURE RIGHT BREAST 5cm;  Surgeon: Colin Bender MD;  Location: [facility] OR;  Service: Plastics;  Laterality: Right;    BREAST SURGERY Right 7/26/2024    Procedure: ADJACENT TISSUE REARRANGEMENT RIGHT BREAST;  Surgeon: Colin Bender MD;  Location: [facility];  Service: Plastics;  Laterality: Right;    CARPAL TUNNEL RELEASE Right 2004    CRYOTHERAPY      1997    D & C HYSTEROSCOPY N/A 9/19/2024    Procedure: INTRAUTERINE DEVICE REMOVAL;  Surgeon: Kaleb Burciaga MD;  Location: [facility] OR;  Service: Obstetrics/Gynecology;  Laterality: N/A;    EPIDURAL BLOCK      HAND SURGERY  2004     "Pisiformectomy    LUMBAR DISCECTOMY FUSION INSTRUMENTATION N/A 01/05/2024    Procedure: Lumbar 3 to lumbar 4 and lumbar 4 to lumbar 5 laminectomy with fusion and instrumentation;  Surgeon: Rigoberto Botello MD;  Location: [facility];  Service: Robotics - Neuro;  Laterality: N/A;    MAMMO BILATERAL  2014    MASTECTOMY W/ SENTINEL NODE BIOPSY Right 7/11/2024    Procedure: Right MASTECTOMY WITH SENTINEL NODE BIOPSY;  Surgeon: Romina De Anda MD;  Location: [facility] OR;  Service: General;  Laterality: Right;    MASTECTOMY WITH IMMEDIATE RECONSTRUCTION Right 7/26/2024    Procedure: right mastectomy, margin excision;  Surgeon: Romina De nAda MD;  Location: [facility] OR;  Service: General;  Laterality: Right;    PAP SMEAR  20116    SHOULDER ARTHROSCOPY Left 2002,2003    SHOULDER SURGERY      \"MANIPULATION FOR FROZEN SHOULDER\"    US GUIDED LYMPH NODE BIOPSY  8/29/2024       Past Medical History:   Diagnosis Date    Abnormal Pap smear of cervix     Anxiety     Miller's cyst     RESOLVED    Breast cancer     Right    Bulging lumbar disc     Bursitis     HX    Depression     GERD (gastroesophageal reflux disease)     H/O colposcopy with cervical biopsy     unknown pap result, biopsy was neg per pt, 2013 [facility]    History of 2019 novel coronavirus disease (COVID-19)     X2    History of eczema     HPV (human papilloma virus) infection     Hyperlipemia     Hypertension     Low back pain     Lumbar radiculopathy     Numbness and tingling of left leg     Osteoarthritis     Scoliosis     Seasonal allergies        Family History   Problem Relation Age of Onset    Hypertension Mother     Hyperlipidemia Mother     Diverticulitis Mother     Pancreatitis Mother     Kidney disease Father     Skin cancer Father     Hypertension Father     Hyperlipidemia Father     Stroke Father     Atrial fibrillation Father     Transient ischemic attack Father     No Known Problems Sister     Depression Brother     No Known Problems Daughter     " "No Known Problems Son     Uterine cancer Maternal Aunt 58    Heart disease Maternal Aunt     Lung cancer Maternal Uncle     Heart attack Maternal Uncle     No Known Problems Paternal Aunt     Colon cancer Paternal Uncle 65    Liver disease Maternal Grandmother     Heart disease Maternal Grandfather     Cancer Maternal Grandfather     Heart attack Paternal Grandmother     No Known Problems Paternal Grandfather     BRCA 1/2 Neg Hx     Breast cancer Neg Hx     Endometrial cancer Neg Hx     Ovarian cancer Neg Hx     Malig Hyperthermia Neg Hx        Social History     Socioeconomic History    Marital status: Single   Tobacco Use    Smoking status: Former     Current packs/day: 0.00     Average packs/day: 1 pack/day for 20.0 years (20.0 ttl pk-yrs)     Types: Cigarettes     Start date:      Quit date:      Years since quittin.1     Passive exposure: Past    Smokeless tobacco: Never   Vaping Use    Vaping status: Never Used   Substance and Sexual Activity    Alcohol use: Yes     Alcohol/week: 2.0 - 4.0 standard drinks of alcohol     Types: 2 - 4 Glasses of wine per week     Comment: weekly    Drug use: No    Sexual activity: Not Currently     Birth control/protection: I.U.D.               Objective:       Physical Exam    /67 (BP Location: Left arm, Patient Position: Sitting, Cuff Size: Adult)   Pulse 96   Ht 167.6 cm (66\")   Wt 76.2 kg (168 lb)   LMP  (LMP Unknown)   SpO2 97%   BMI 27.12 kg/m²   The patient is alert, oriented and in no distress.    Vital signs as noted above.    Head and neck revealed no carotid bruits or jugular venous distension.  No thyromegaly or lymphadenopathy is present.    Lungs clear.  No wheezing.  Breath sounds are normal bilaterally.    Heart normal first and second heart sounds.  No murmur..  No pericardial rub is present.  No gallop is present.    Abdomen soft and nontender.  No organomegaly is present.    Extremities revealed good peripheral pulses without any pedal " edema.    Skin warm and dry.    Musculoskeletal system is grossly normal.    CNS grossly normal.           Diagnosis Plan   1. Single subsegmental pulmonary embolism without acute cor pulmonale        2. Malignant neoplasm of lower-outer quadrant of right breast of female, estrogen receptor positive        3. Essential hypertension        4. Mixed hyperlipidemia        5. Mood disorder        6. Spinal stenosis of lumbar region with neurogenic claudication        LAB RESULTS (LAST 7 DAYS)    CBC  Results from last 7 days   Lab Units 02/06/25  0927   WBC 10*3/mm3 5.03   RBC 10*6/mm3 3.81   HEMOGLOBIN g/dL 11.0*   HEMATOCRIT % 34.7   MCV fL 91.1   PLATELETS 10*3/mm3 364       BMP  Results from last 7 days   Lab Units 02/06/25  0927   SODIUM mmol/L 140   POTASSIUM mmol/L 3.6   CHLORIDE mmol/L 102   CO2 mmol/L 29.8*   BUN mg/dL 12   CREATININE mg/dL 0.81   GLUCOSE mg/dL 90       CMP   Results from last 7 days   Lab Units 02/06/25  0927   SODIUM mmol/L 140   POTASSIUM mmol/L 3.6   CHLORIDE mmol/L 102   CO2 mmol/L 29.8*   BUN mg/dL 12   CREATININE mg/dL 0.81   GLUCOSE mg/dL 90   ALBUMIN g/dL 3.6   BILIRUBIN mg/dL 0.3   ALK PHOS U/L 110   AST (SGOT) U/L 17   ALT (SGPT) U/L 13         BNP        TROPONIN        CoAg        Creatinine Clearance  Estimated Creatinine Clearance: 82.9 mL/min (by C-G formula based on SCr of 0.81 mg/dL).    ABG        Radiology  No radiology results for the last day    EKG  Procedures    Stress test      Echocardiogram      Cardiac catheterization  No results found for this or any previous visit.          Assessment and Plan       Diagnoses and all orders for this visit:    1.  Pulmonary embolism (Primary)  Multiple risk factors including breast cancer, being on Verzenio.  Segmental nonocclusive PE with subacute to chronic appearance.  CT scan did not show any evidence of right heart strain.  Obtain an echocardiogram to assess diastolic function and RV function  Continue anticoagulation with  Eliquis.  Currently no complaints of lower extremity discomfort: We will defer lower extremity venous duplex at this time while she is on Eliquis.    2. Malignant neoplasm of lower-outer quadrant of right breast of female, estrogen receptor positive  Status post surgical resection, chemoradiation.  Currently on Verzenio and anastrozole    3. Essential hypertension  Blood pressure is well-controlled on olmesartan  Starting Toprol-XL for tachycardia    4. Mixed hyperlipidemia  Continue high intensity statin  , HDL 67, triglycerides 72 and total cholesterol 197  Goal LDL is less than 100  Doubling the dose of atorvastatin.    5. Mood disorder  Currently on Xanax, escitalopram and bupropion    6. Spinal stenosis of lumbar region with neurogenic claudication  Status post spinal surgery  Continue pain control as needed  She takes meloxicam

## 2025-02-12 ENCOUNTER — OFFICE VISIT (OUTPATIENT)
Dept: CARDIOLOGY | Facility: CLINIC | Age: 55
End: 2025-02-12
Payer: COMMERCIAL

## 2025-02-12 VITALS
BODY MASS INDEX: 27 KG/M2 | SYSTOLIC BLOOD PRESSURE: 134 MMHG | HEIGHT: 66 IN | WEIGHT: 168 LBS | HEART RATE: 96 BPM | OXYGEN SATURATION: 97 % | DIASTOLIC BLOOD PRESSURE: 67 MMHG

## 2025-02-12 DIAGNOSIS — M48.062 SPINAL STENOSIS OF LUMBAR REGION WITH NEUROGENIC CLAUDICATION: ICD-10-CM

## 2025-02-12 DIAGNOSIS — E78.2 MIXED HYPERLIPIDEMIA: ICD-10-CM

## 2025-02-12 DIAGNOSIS — I10 ESSENTIAL HYPERTENSION: ICD-10-CM

## 2025-02-12 DIAGNOSIS — C50.511 MALIGNANT NEOPLASM OF LOWER-OUTER QUADRANT OF RIGHT BREAST OF FEMALE, ESTROGEN RECEPTOR POSITIVE: ICD-10-CM

## 2025-02-12 DIAGNOSIS — Z17.0 MALIGNANT NEOPLASM OF LOWER-OUTER QUADRANT OF RIGHT BREAST OF FEMALE, ESTROGEN RECEPTOR POSITIVE: ICD-10-CM

## 2025-02-12 DIAGNOSIS — F39 MOOD DISORDER: ICD-10-CM

## 2025-02-12 DIAGNOSIS — I26.93 SINGLE SUBSEGMENTAL PULMONARY EMBOLISM WITHOUT ACUTE COR PULMONALE: Primary | ICD-10-CM

## 2025-02-12 DIAGNOSIS — R06.02 SOB (SHORTNESS OF BREATH): ICD-10-CM

## 2025-02-12 RX ORDER — METOPROLOL SUCCINATE 25 MG/1
25 TABLET, EXTENDED RELEASE ORAL DAILY
Qty: 90 TABLET | Refills: 3 | Status: SHIPPED | OUTPATIENT
Start: 2025-02-12

## 2025-02-12 RX ORDER — ATORVASTATIN CALCIUM 40 MG/1
80 TABLET, FILM COATED ORAL DAILY
Qty: 180 TABLET | Refills: 3 | Status: SHIPPED | OUTPATIENT
Start: 2025-02-12

## 2025-02-14 ENCOUNTER — SPECIALTY PHARMACY (OUTPATIENT)
Dept: PHARMACY | Facility: HOSPITAL | Age: 55
End: 2025-02-14
Payer: COMMERCIAL

## 2025-02-14 ENCOUNTER — PATIENT ROUNDING (BHMG ONLY) (OUTPATIENT)
Dept: CARDIOLOGY | Facility: CLINIC | Age: 55
End: 2025-02-14
Payer: COMMERCIAL

## 2025-02-14 NOTE — PROGRESS NOTES
Pt in queue today for refill call on her Verzenio. Pt was seen by Dr Huang on 2/6/2025 and per her note:  We would  have to hold Verzenio if she has ongoing symptoms with negative scans.  Take a break for 2 weeks and resume at 100 mg twice daily dose of  Verzenio.     Her last delivery was on 1/23/2025. She has scans on 2/18 and will see Dr Huang on 2/20.    I have moved the refill task to 2/20/25    Arminda Baumann, Pharmacy Technician  02/14/25  09:24 EST

## 2025-02-18 ENCOUNTER — HOSPITAL ENCOUNTER (OUTPATIENT)
Dept: NUCLEAR MEDICINE | Facility: HOSPITAL | Age: 55
Discharge: HOME | End: 2025-02-18
Payer: COMMERCIAL

## 2025-02-18 DIAGNOSIS — Z17.0 MALIGNANT NEOPLASM OF LOWER-OUTER QUADRANT OF RIGHT BREAST OF FEMALE, ESTROGEN RECEPTOR POSITIVE: ICD-10-CM

## 2025-02-18 DIAGNOSIS — C50.511 MALIGNANT NEOPLASM OF LOWER-OUTER QUADRANT OF RIGHT BREAST OF FEMALE, ESTROGEN RECEPTOR POSITIVE: ICD-10-CM

## 2025-02-18 PROCEDURE — 78306 BONE IMAGING WHOLE BODY: CPT

## 2025-02-18 PROCEDURE — A9503 TC99M MEDRONATE: HCPCS | Performed by: INTERNAL MEDICINE

## 2025-02-18 PROCEDURE — 34310000005 TECHNETIUM MEDRONATE KIT: Performed by: INTERNAL MEDICINE

## 2025-02-18 RX ORDER — TC 99M MEDRONATE 20 MG/10ML
21.6 INJECTION, POWDER, LYOPHILIZED, FOR SOLUTION INTRAVENOUS
Status: COMPLETED | OUTPATIENT
Start: 2025-02-18 | End: 2025-02-18

## 2025-02-18 RX ADMIN — Medication 21.6 MILLICURIE: at 09:50

## 2025-02-20 ENCOUNTER — SPECIALTY PHARMACY (OUTPATIENT)
Dept: PHARMACY | Facility: HOSPITAL | Age: 55
End: 2025-02-20
Payer: COMMERCIAL

## 2025-02-20 ENCOUNTER — TELEMEDICINE (OUTPATIENT)
Dept: ONCOLOGY | Facility: CLINIC | Age: 55
End: 2025-02-20
Payer: COMMERCIAL

## 2025-02-20 DIAGNOSIS — Z17.0 MALIGNANT NEOPLASM OF LOWER-OUTER QUADRANT OF RIGHT BREAST OF FEMALE, ESTROGEN RECEPTOR POSITIVE: Primary | ICD-10-CM

## 2025-02-20 DIAGNOSIS — C50.511 MALIGNANT NEOPLASM OF LOWER-OUTER QUADRANT OF RIGHT BREAST OF FEMALE, ESTROGEN RECEPTOR POSITIVE: Primary | ICD-10-CM

## 2025-02-20 DIAGNOSIS — Z79.811 USE OF AROMATASE INHIBITORS: ICD-10-CM

## 2025-02-20 DIAGNOSIS — I26.99 ACUTE PULMONARY EMBOLISM, UNSPECIFIED PULMONARY EMBOLISM TYPE, UNSPECIFIED WHETHER ACUTE COR PULMONALE PRESENT: ICD-10-CM

## 2025-02-20 RX ORDER — ABEMACICLIB 100 MG/1
100 TABLET ORAL 2 TIMES DAILY
Qty: 28 TABLET | Refills: 0 | Status: SHIPPED | OUTPATIENT
Start: 2025-02-20

## 2025-02-20 NOTE — PROGRESS NOTES
Specialty Pharmacy Patient Management Program  Oncology / Hematology Refill Outreach      Dasha was contacted today regarding refills of her Verzenio specialty medication(s).    Specialty medication(s) and dose(s) confirmed: Yes  Verzenio 100 mg twice per day for 2 weeks then increase to 150 mg twice per day.    Refill Questions      Flowsheet Row Most Recent Value   Changes to allergies? No   Changes to medications? Yes  [Verzenio dose reduced to 100 mg BID]   New conditions or infections since last clinic visit No   Unplanned office visit, urgent care, ED, or hospital admission in the last 4 weeks  No   How does patient/caregiver feel medication is working? Good   Financial problems or insurance changes  No   Since the previous refill, were any specialty medication doses or scheduled injections missed or delayed?  Yes   If yes, please provide the amount 2 weeks   Why were doses missed? She held due to scans   Does this patient require a clinical escalation to a pharmacist? No          Delivery Questions      Flowsheet Row Most Recent Value   Delivery method UPS   Delivery address verified with patient/caregiver? Yes  [Ship to home address]   Delivery address Home  [Ship to home address-Ship 2/20 for delivery 2/21-$0 copay with insurance and copay card-Address Confirmed]   Number of medications in delivery 1   Medication(s) being filled and delivered Abemaciclib (Verzenio)   Doses left of specialty medications 0-New dose   Copay verified? Yes   Copay amount $0 copay with insurance and copay card   Copay form of payment No copayment ($0)   Delivery Date Selection 02/21/25   Signature Required No          Verzenio delivery coordinated with pt for 2/21/2025 to her home address. $0 copay with insurance and copay card. Her last delivery was on 1/15/2025 of the Verzenio 150 mg. No questions or concerns to report to MTM Team today. She reports 2 weeks of missed doses since last delivery. PDC is 74%.    Follow-Up: 14  days    Arminda Baumann, Pharmacy Technician  2/20/2025  14:34 EST

## 2025-02-20 NOTE — PROGRESS NOTES
Subjective   Dasha De Leon is a 54 y.o. female.  Referred by Dr. De Anda for right breast invasive ductal carcinoma    History of Present Illness     Patient is a 54-year-old postmenopausal  lady who presented with a palpable mass in her right breast in mid April.  This was right around the time of her screening mammogram and hence she proceeded with a screening mammogram.  She denies any previous breast abnormalities or biopsies.  No family history of breast or ovarian carcinoma.    4/29/2024-bilateral screening mammogram  Breasts are entirely fatty.  Bilateral retropectoral saline implants.  Round mass measuring 16 mm with spiculated margins in the posterior one third region of the right breast at 9:00.  This is new since the prior exams.  No suspicious masses in the left breast.    5/10/2024-right breast diagnostic mammogram  Breast is entirely fatty.  Retropectoral saline implants.  Finding 1.round mass in the right breast at 9:00 previously seen on the screening mammogram, measuring 16 mm with spiculated margin.    Right breast ultrasound  Finding 1.hypoechoic mass with spiculated margins and internal vascularity measuring 15 mm at 8:00, 10 cm from the nipple.  Finding 2.irregular  Palpable hypoechoic mass measuring 4 x 6 x 4 mm at 8:30 position in the right breast, 11 cm from the nipple.    Ultrasound-guided biopsy of both these masses recommended.    5/15/2024-right breast ultrasound-guided biopsy  1.8 o'clock position right breast-invasive ductal carcinoma with apocrine features  Grade 2  ER +91 to 100% strong  AK +91 to 100% strong  HER2 negative, 1+  Ki-67 22%  2.right breast 8:30 position  Invasive ductal carcinoma with apocrine features, grade 2  ER +91 to 100% strong  AK +91 to 100% strong  HER2 negative, Ki-67 20%.    5/25/2024-bilateral breast MRI  1.2 biopsy sites of malignancy at 8:30 position in the posterior one third that measured 2.3 cm and 2.4 cm respectively.  The lesions either  about or extend to the lateral margin of the pectoral fascia that overlies the implant capsule and some extension into the pectoral muscle at this location and/or biologic capsular suspect.  Finding suspicious for axilla lymphadenopathy.  Right axilla lymph node measuring 1.4 cm.  2.no suspicious findings in the left breast.    7/11/2024-right mastectomy and sentinel lymph node biopsy  Invasive ductal carcinoma measuring 45 x 20 x 14 mm  Grade 3  Extensive lymphovascular invasion present.  Invasive carcinoma present extensively at the anterior margin  Distance to the invasive carcinoma from the posterior margin 0.9 mm  1 lymph node with macrometastasis measuring 12 mm, 2 mm extranodal extension  At least 3 sentinel lymph nodes  Total number of [place] and nonsentinel lymph nodes evaluated 5  PT2 N1a MX  Receptors repeated on the lymph node with estrogen receptor +91 to 100% strong, progesterone receptor +91 to 100% strong, HER2 negative, score 0, Ki-67 35%    Given the positive margin additional surgery performed on 7/26/2024 with ultimately negative margins.  Expander placed.    Patient presents today to discuss adjuvant therapy.    Oncotype DX recurrence score 18 with no additional benefit from chemotherapy and 5-year risk of distant metastasis being 4%.      Verzenio started 12/6/2024.    Patient was seen on 1/9/2025 when she complained of right side chest pain which had just started and she was going to call the office back if the symptoms persisted or got worse.    On 2/6/2025 she presented back for routine follow-up while on Verzenio and she complained of ongoing right sided chest pain and also reported worsening dyspnea.    2/6/2025-CT angiogram of the chest with very significant streak artifact from the right chest wall tissue expander and there was heterogeneous admixture of contrast at the pulmonary arteries particularly on the right.  Despite all the limitations there are findings suspicious for right  pulmonary thromboemboli.  Small eccentric thrombus at the proximal right interlobar artery adjacent to the right middle lobe pulmonary artery.  There is also likely a thrombus.  Likely nonocclusive thrombus within the segmental right lower lobe pulmonary arteries.    Dense consolidation in the right apex and some of the lateral peripheral subpleural opacities of the right upper lobe likely represent radiation pneumonitis and fibrosis but there is also mixed density and groundglass opacities at the right lower lobe which are suspicious for sequela of pulmonary thromboemboli.  Tiny right pleural effusion.  No left lung opacities and there is no left pleural effusion or pericardial effusion.    Abdomen and pelvis CT without any evidence of metastatic disease.  Moderately extensive abdominal aortic atherosclerotic changes without aneurysmal dilatation.    Patient was advised to stop Verzenio on 2/6/2025 after diagnosis of the pulmonary embolism.    Interval history:  Ms. Fernando returns today for follow-up.  She reports that the breathing has improved some.  She denies any recent travel, denies any family history of DVT or PE, denies any recent surgeries or immobilization or COVID-19 infection.  She was started on Eliquis on 2/6/2025 and continues on the same.  She continues on anastrozole and has not stopped that.  She has remained off of Verzenio since 2/6/2025.  She was relieved to hear about the CT scans not suggestive of any evidence of metastatic disease.      The following portions of the patient's history were reviewed and updated as appropriate: allergies, current medications, past family history, past medical history, past social history, past surgical history, and problem list.    Past Medical History:   Diagnosis Date    Abnormal Pap smear of cervix     Anxiety     Miller's cyst     RESOLVED    Breast cancer     Right    Bulging lumbar disc     Bursitis     HX    Depression     GERD (gastroesophageal reflux  disease)     H/O colposcopy with cervical biopsy     unknown pap result, biopsy was neg per pt, 2013 [facility]    History of 2019 novel coronavirus disease (COVID-19)     X2    History of eczema     HPV (human papilloma virus) infection     Hyperlipemia     Hypertension     Low back pain     Lumbar radiculopathy     Numbness and tingling of left leg     Osteoarthritis     Scoliosis     Seasonal allergies         Past Surgical History:   Procedure Laterality Date    BREAST AUGMENTATION      BREAST BIOPSY Right 9/19/2024    Procedure: AXILLARY LYMPH NODE BIOPSY/EXCISION;  Surgeon: Romina De Anda MD;  Location: [facility] OR;  Service: General;  Laterality: Right;    BREAST RECONSTRUCTION Right 7/11/2024    Procedure: RIGHT SUBPECTORAL PLACEMENT TISSUE EXPANDER AND CORTIVA (ACELLULAR DERMAL MATRIX), COMPLEX CLOSURE RIGHT BREAST 5cm;  Surgeon: Colin Bender MD;  Location: [facility] OR;  Service: Plastics;  Laterality: Right;    BREAST SURGERY Right 7/26/2024    Procedure: ADJACENT TISSUE REARRANGEMENT RIGHT BREAST;  Surgeon: Colin Bender MD;  Location: [facility] OR;  Service: Plastics;  Laterality: Right;    CARPAL TUNNEL RELEASE Right 2004    CRYOTHERAPY      1997    D & C HYSTEROSCOPY N/A 9/19/2024    Procedure: INTRAUTERINE DEVICE REMOVAL;  Surgeon: Kaleb Burciaga MD;  Location: [facility] MAIN OR;  Service: Obstetrics/Gynecology;  Laterality: N/A;    EPIDURAL BLOCK      HAND SURGERY  2004    Pisiformectomy    LUMBAR DISCECTOMY FUSION INSTRUMENTATION N/A 01/05/2024    Procedure: Lumbar 3 to lumbar 4 and lumbar 4 to lumbar 5 laminectomy with fusion and instrumentation;  Surgeon: Rigoberto Botello MD;  Location: [facility] OR;  Service: Robotics - Neuro;  Laterality: N/A;    MAMMO BILATERAL  2014    MASTECTOMY W/ SENTINEL NODE BIOPSY Right 7/11/2024    Procedure: Right MASTECTOMY WITH SENTINEL NODE BIOPSY;  Surgeon: Romina De Anda MD;  Location: [facility] OR;  Service: General;  Laterality:  "Right;    MASTECTOMY WITH IMMEDIATE RECONSTRUCTION Right 2024    Procedure: right mastectomy, margin excision;  Surgeon: Romina De Anda MD;  Location: [facility] OR;  Service: General;  Laterality: Right;    PAP SMEAR      SHOULDER ARTHROSCOPY Left ,    SHOULDER SURGERY      \"MANIPULATION FOR FROZEN SHOULDER\"    US GUIDED LYMPH NODE BIOPSY  2024        Family History   Problem Relation Age of Onset    Hypertension Mother     Hyperlipidemia Mother     Diverticulitis Mother     Pancreatitis Mother     Kidney disease Father     Skin cancer Father     Hypertension Father     Hyperlipidemia Father     Stroke Father     Atrial fibrillation Father     Transient ischemic attack Father     No Known Problems Sister     Depression Brother     No Known Problems Daughter     No Known Problems Son     Uterine cancer Maternal Aunt 58    Heart disease Maternal Aunt     Lung cancer Maternal Uncle     Heart attack Maternal Uncle     No Known Problems Paternal Aunt     Colon cancer Paternal Uncle 65    Liver disease Maternal Grandmother     Heart disease Maternal Grandfather     Cancer Maternal Grandfather     Heart attack Paternal Grandmother     No Known Problems Paternal Grandfather     BRCA 1/2 Neg Hx     Breast cancer Neg Hx     Endometrial cancer Neg Hx     Ovarian cancer Neg Hx     Malig Hyperthermia Neg Hx         Social History     Socioeconomic History    Marital status: Single   Tobacco Use    Smoking status: Former     Current packs/day: 0.00     Average packs/day: 1 pack/day for 20.0 years (20.0 ttl pk-yrs)     Types: Cigarettes     Start date:      Quit date: 2006     Years since quittin.1     Passive exposure: Past    Smokeless tobacco: Never   Vaping Use    Vaping status: Never Used   Substance and Sexual Activity    Alcohol use: Yes     Alcohol/week: 2.0 - 4.0 standard drinks of alcohol     Types: 2 - 4 Glasses of wine per week     Comment: weekly    Drug use: No    Sexual activity: Not " Currently     Birth control/protection: I.U.D.        OB History          0    Para   0    Term   0       0    AB   0    Living   0         SAB   0    IAB   0    Ectopic   0    Molar        Multiple   0    Live Births                   Age at menarche-13  Age at first live childbirth-not applicable   0 para 0  0  Age at menopause-49  Oral conceptive pill use for 25 years  No use of hormone replacement therapy    Allergies   Allergen Reactions    Penicillins Anaphylaxis    Hemp Seed Oil Itching     HEMP IN LOTIONS    Lisinopril Cough      Review of Systems  Review of systems as mentioned HPI otherwise negative    Objective   not currently breastfeeding.     Physical Exam  Vitals reviewed.   Constitutional:       Appearance: She is normal weight.   HENT:      Head: Normocephalic and atraumatic.      Right Ear: External ear normal.      Left Ear: External ear normal.   Eyes:      Conjunctiva/sclera: Conjunctivae normal.   Cardiovascular:      Rate and Rhythm: Normal rate.   Pulmonary:      Effort: Pulmonary effort is normal.   Abdominal:      General: Abdomen is flat.   Musculoskeletal:         General: Normal range of motion.      Cervical back: Normal range of motion.   Skin:     General: Skin is warm.   Neurological:      General: No focal deficit present.      Mental Status: She is alert and oriented to person, place, and time.   Psychiatric:         Mood and Affect: Mood normal.         Behavior: Behavior normal.         Thought Content: Thought content normal.         Judgment: Judgment normal.       Breast Exam: Right breast status post mastectomy with expander in place.  Radiation dermatitis with erythema of the chest wall noted without excoriation or infection    I have reexamined the patient and the results are consistent with the previously documented exam. Talia Huang MD                             NM Bone Scan Whole Body    Result Date: 2025  No scintigraphic evidence  of osseous metastatic disease.   This report was finalized on 2/19/2025 1:07 PM by Dr. Marty Bragg M.D on Workstation: [id number]          Assessment & Plan       *Right breast invasive ductal carcinoma  Status post right mastectomy on 7/11/2024-pathology with 45 mm of invasive ductal carcinoma, grade 3, ER/NJ strongly positive at 91 to 100%, HER2 1+, Ki-67 22%  1 out of 5 lymph nodes positive for metastatic disease and receptors on lymph node also ER positive and NJ positive.  PT2 N1a MX  8/12/2024-CT of the chest abdomen pelvis-single mildly enlarged right axillary lymph node suspicious for sharona metastasis.  No evidence of distant metastatic disease in the patient with recent right mastectomy.  Mild hepatic steatosis.  IUD present.  Duodenal diverticulum present.  9/19/2024-abnormal lymph node excision.  1 lymph node involved by macrometastatic carcinoma measuring 9.5 mm, greater than 2 mm extranodal extension.  Oncotype DX recurrence score 18 with no additional benefit from chemotherapy and 4% risk of distant metastasis at 5 years  Given the low Oncotype DX recurrence score she will proceed with adjuvant radiation.  Started anastrozole November 2024  Radiation complete 12/3/2024  12/4/2024 fair tolerance to anastrozole.   12/6/2024-started Verzenio.  1/9/2025-patient continues on full dose of Verzenio 150 mg twice daily and labs reviewed and stable to proceed with the same.  2/6/2025-patient reports severe right-sided rib pain as well as dyspnea, tachycardia and severe nausea.  2/6/2025-CT angiogram of the chest with right sided pulmonary emboli.  There is also significant streak artifact and also radiation-induced changes of the right apex.  No evidence of metastatic disease.  2/6/2025-CT abdomen and pelvis without any evidence of metastatic disease  2/18/2025-bone scan without any evidence of metastatic disease.  Verzenio was held since 2/6/2025.  She continues on anastrozole  Today we had a long  discussion regarding the increased risk of DVT and PE associated with Verzenio.  However given that the tumor was 45 mm high-grade and 2 lymph nodes were involved with malignancy the risk of recurrence is high enough that I would recommend that she continue with Verzenio and continue anticoagulation for 2 years while she is on Verzenio.  I however did explain to the patient that there is no data about how we should proceed if someone developed a DVT or PE on Verzenio.  She does understand that there is a increased risk of DVT and PE with Verzenio.  We will resume Verzenio at 100 mg twice daily and increase it to 150 mg twice daily.    *Right sided rib pain  Acute onset on 1/8/2025  Secondary to pulmonary embolism and infarction.  Improving.  Continue Norco as needed    *Diarrhea  Secondary to Verzenio  Tolerable  Creatinine normal  Continue to monitor  Verzenio has been held since 2/6/2025  Recommend resuming Verzenio 2/20/2025 but we will start back at 100 mg twice daily and increase to 150 mg twice daily if tolerated.    *Nausea  Severe secondary to Verzenio  Improved.    *Dizziness  Blood pressure relatively normal  Could be secondary to Verzenio  Continue to monitor closely  If it continues and may require additional cardiac workup including EKG and echocardiogram  Resolved    *Anxiety and depression  Continue Wellbutrin, Lexapro  Well-controlled at this time    *Hypertension  Blood pressure BP: (not recorded)    Continue current medications  Noted to be tachycardic with a heart rate of 112.    *Hyperlipidemia  Continue Crestor    *Back issues/pain  Status with spinal fusion  Continue Flexeril    *Bone health  Baseline DEXA scan 8/29/2024 noted normal bone density  Discussed Zometa.  Dental clearance form provided to patient today    *Fatigue  Patient reports severe fatigue Secondary to anastrozole and Verzenio.  We discussed utilizing Ritalin and referral to supportive oncology.  She wants to think about this  and let me know.      *Pulmonary embolism  Started on Eliquis 10 mg twice daily and currently on Eliquis 5 mg twice daily  Plan to continue anticoagulation for 2 years for as long as she is on the Verzenio  Likely secondary to Verzenio  Will obtain thrombophilia workup  She has seen Dr. Nguyen with cardiology at [facility] and scheduled for an echocardiogram.    PLAN  The patient has completed adjuvant radiation 12/3/2024  Continue anastrozole 1 mg daily  Started Verzenio 12/6/2024, held between 2/6/2025 to 2/20/2025 resume Verzenio 2/20/2025  Continue Eliquis for the duration of Verzenio  Continue Zofran  Imodium as needed, currently no diarrhea  APRN in 3 weeks and MD in 5 to 6 weeks.    Patient is on medications requiring close monitoring for toxicities, pulmonary embolism secondary to Verzenio.    Reviewed the CT of the chest abdomen and pelvis images independently and interpreted them independently, reviewed bone scan images independently and interpreted them independently.  Discussed with Dr. Corey with radiology regarding the scans.    You have chosen to receive care through a telehealth visit.  Do you consent to use a video/audio connection for your medical care today? Yes     I was located at the [facility] office at the time of the video visit and she was located at her office.        Talia Huang MD  02/20/2025

## 2025-02-20 NOTE — PROGRESS NOTES
Staff message rec from [facility] Pharmacist-Dr Huang would like to restart pts Verzenio at 100 mg bid for 2 weeks then increase to 150 mg bid.    Pt has no 100 mg on hand. I will coordinate this to be delivered to her tomorrow, 2/21/2025.    Leatha Abbott, [facility] sent to Nany Cherry, [facility]; Jolanta Carrero, [facility]; Arminda Baumann, Pharmacy Technician  Double check me please.          Previous Messages       ----- Message -----  From: Talia Huang MD  Sent: 2/20/2025   1:53 PM EST  To: Jessie Yousif RN; Leatha Abbott, [facility]    I would like to restart her Verzenio.  This has been on hold since 2/6/2025.  Would like to start her at 100 mg twice daily x 2 weeks and increase it to 150 mg twice daily subsequently.  She will require an APRN visit in 3 weeks and MD in 5 to 6 weeks.      Thank you    Arminda Baumann, Pharmacy Technician  02/20/25  14:29 EST

## 2025-02-20 NOTE — PROGRESS NOTES
Specialty Pharmacy Patient Management Program  Per Protocol Prescription Order or Refill       Requested Prescriptions     Signed Prescriptions Disp Refills    Abemaciclib (Verzenio) 100 MG tablet 28 tablet 0     Sig: Take 1 tablet by mouth 2 (Two) Times a Day. Take for 2 weeks then increase to 150 mg strength.     Prescription orders above were sent to [facility] per Collaborative Care Agreement Protocol.     Completed independent double check on medication order/RX.    Antonio Cherry PharmD, BCOP  Clinical Specialty Pharmacist, Oncology  2/20/2025  14:23 EST

## 2025-02-20 NOTE — PROGRESS NOTES
Specialty Pharmacy Patient Management Program  Per Protocol Prescription Order or Refill     Patient will be filling or currently fills medications at [facility] and is enrolled in the Patient Management Program.    Requested Prescriptions     Pending Prescriptions Disp Refills    Abemaciclib (Verzenio) 100 MG tablet 28 tablet 0     Sig: Take 1 tablet by mouth 2 (Two) Times a Day. Take for 2 weeks then increase to 150 mg strength.     Prescription orders above were sent to the pharmacy per Collaborative Care Agreement Protocol.     Last Office Visit: 2/20/25  Next Office Visit: jamilah Abbott PharmD, [facility]  Clinical Specialty Pharmacist, Oncology  2/20/2025  13:57 EST

## 2025-02-21 ENCOUNTER — SPECIALTY PHARMACY (OUTPATIENT)
Dept: PHARMACY | Facility: HOSPITAL | Age: 55
End: 2025-02-21
Payer: COMMERCIAL

## 2025-02-21 NOTE — PROGRESS NOTES
Verzenio 100 mg supply from [facility] delivered to pt on 2/21/2025 at 10:35 am.    UPS tracking # [id number]     Arminda Baumann, Pharmacy Technician  02/21/25  12:11 EST

## 2025-02-26 ENCOUNTER — SPECIALTY PHARMACY (OUTPATIENT)
Dept: PHARMACY | Facility: HOSPITAL | Age: 55
End: 2025-02-26
Payer: COMMERCIAL

## 2025-02-26 NOTE — PROGRESS NOTES
Specialty Pharmacy Patient Management Program  Clinical Outreach     I called Dasha De Leon on 2/26/2025 14:14 EST who is enrolled in the Oncology Patient Management program offered by [facility].  Patient did not answer today. I left a voice message with my call back number, [phone].    Leatha Abbott, PharmD, [facility]  Clinical Specialty Pharmacist, Oncology  2/26/2025  14:14 EST

## 2025-02-27 ENCOUNTER — SPECIALTY PHARMACY (OUTPATIENT)
Dept: PHARMACY | Facility: HOSPITAL | Age: 55
End: 2025-02-27
Payer: COMMERCIAL

## 2025-02-27 NOTE — PROGRESS NOTES
Specialty Pharmacy Patient Management Program  Clinical Outreach     I called Dasha De Leon on 2/27/2025 09:53 EST who is enrolled in the Oncology Patient Management program offered by [facility].  Patient did not answer today. I left a voice message with my call back number, [phone].    Leatha Abbott, PharmD, [facility]  Clinical Specialty Pharmacist, Oncology  2/27/2025  09:53 EST

## 2025-02-27 NOTE — PROGRESS NOTES
Specialty Pharmacy Patient Management Program  Oncology Reassessment     Dasha De Leon was referred by an their provider to the Oncology Patient Management program offered by [facility] for breast cancer. A follow-up outreach was conducted, including assessment of continued therapy appropriateness, medication adherence, and side effect incidence and management for Verzenio.    Changes to Insurance Coverage or Financial Support  none    Relevant Past Medical History and Comorbidities  Relevant medical history and concomitant health conditions were discussed with the patient. The patient's chart has been reviewed for relevant past medical history and comorbid health conditions and updated as necessary.   Past Medical History:   Diagnosis Date    Abnormal Pap smear of cervix     Anxiety     Miller's cyst     RESOLVED    Breast cancer     Right    Bulging lumbar disc     Bursitis     HX    Depression     GERD (gastroesophageal reflux disease)     H/O colposcopy with cervical biopsy     unknown pap result, biopsy was neg per pt, 2013 [facility]    History of 2019 novel coronavirus disease (COVID-19)     X2    History of eczema     HPV (human papilloma virus) infection     Hyperlipemia     Hypertension     Low back pain     Lumbar radiculopathy     Numbness and tingling of left leg     Osteoarthritis     Scoliosis     Seasonal allergies      Social History     Socioeconomic History    Marital status: Single   Tobacco Use    Smoking status: Former     Current packs/day: 0.00     Average packs/day: 1 pack/day for 20.0 years (20.0 ttl pk-yrs)     Types: Cigarettes     Start date:      Quit date:      Years since quittin.1     Passive exposure: Past    Smokeless tobacco: Never   Vaping Use    Vaping status: Never Used   Substance and Sexual Activity    Alcohol use: Yes     Alcohol/week: 2.0 - 4.0 standard drinks of alcohol     Types: 2 - 4 Glasses of wine per week     Comment: weekly     Drug use: No    Sexual activity: Not Currently     Birth control/protection: I.U.D.     Problem list reviewed by Leatha Abbott RPH on 2/27/2025 at 12:18 PM    Hospitalizations and Urgent Care Since Last Assessment  ED Visits, Admissions, or Hospitalizations: no  Urgent Office Visits: no    Allergies  Known allergies and reactions were discussed with the patient. The patient's chart has been reviewed for allergy information and updated as necessary.   Allergies   Allergen Reactions    Penicillins Anaphylaxis    Hemp Seed Oil Itching     HEMP IN LOTIONS    Lisinopril Cough     Allergies reviewed by Leatha Abbott RPH on 2/27/2025 at 12:18 PM    Relevant Laboratory Values  Relevant laboratory values were discussed with the patient. The following specialty medication dose adjustment(s) are recommended: No dose adjustments are needed for the oral specialty medication(s) based on the labs.    Lab Results   Component Value Date    GLUCOSE 90 02/06/2025    CALCIUM 9.6 02/06/2025     02/06/2025    K 3.6 02/06/2025    CO2 29.8 (H) 02/06/2025     02/06/2025    BUN 12 02/06/2025    CREATININE 0.81 02/06/2025    EGFRIFAFRI 106 09/09/2021    EGFRIFNONA 87 09/09/2021    BCR 14.8 02/06/2025    ANIONGAP 8.2 02/06/2025     Lab Results   Component Value Date    WBC 5.03 02/06/2025    RBC 3.81 02/06/2025    HGB 11.0 (L) 02/06/2025    HCT 34.7 02/06/2025    MCV 91.1 02/06/2025    MCH 28.9 02/06/2025    MCHC 31.7 02/06/2025    RDW 15.9 (H) 02/06/2025    RDWSD 53.2 02/06/2025    MPV 8.7 02/06/2025     02/06/2025    NEUTRORELPCT 72.8 02/06/2025    LYMPHORELPCT 11.5 (L) 02/06/2025    MONORELPCT 10.7 02/06/2025    EOSRELPCT 3.2 02/06/2025    BASORELPCT 1.2 02/06/2025    AUTOIGPER 0.6 (H) 02/06/2025    NEUTROABS 3.66 02/06/2025    LYMPHSABS 0.58 (L) 02/06/2025    MONOSABS 0.54 02/06/2025    EOSABS 0.16 02/06/2025    BASOSABS 0.06 02/06/2025    AUTOIGNUM 0.03 02/06/2025    NRBC 0.0 02/06/2025       Current Medication  List  This medication list has been reviewed with the patient and evaluated for any interactions or necessary modifications/recommendations, and updated to include all prescription medications, OTC medications, and supplements the patient is currently taking.  This list reflects what is contained in the patient's profile, which has also been marked as reviewed to communicate to other providers it is the most up to date version of the patient's current medication therapy.     Current Outpatient Medications:     Abemaciclib (Verzenio) 100 MG tablet, Take 1 tablet by mouth 2 (Two) Times a Day. Take for 2 weeks then increase to 150 mg strength., Disp: 28 tablet, Rfl: 0    Abemaciclib (Verzenio) 150 MG tablet, Take 1 tablet by mouth 2 (Two) Times a Day., Disp: 56 tablet, Rfl: 5    ALPRAZolam (Xanax) 0.25 MG tablet, Take 1 tablet by mouth 2 (Two) Times a Day As Needed for Anxiety., Disp: 30 tablet, Rfl: 0    anastrozole (ARIMIDEX) 1 MG tablet, Take 1 tablet by mouth Daily., Disp: 30 tablet, Rfl: 5    apixaban (ELIQUIS) 5 MG tablet tablet, Take 2 tablets by mouth 2 (Two) Times a Day for 7 days, THEN 1 tablet 2 (Two) Times a Day, Disp: 74 tablet, Rfl: 0    atorvastatin (LIPITOR) 40 MG tablet, Take 2 tablets by mouth Daily., Disp: 180 tablet, Rfl: 3    buPROPion XL (Wellbutrin XL) 300 MG 24 hr tablet, Take 1 tablet by mouth Daily., Disp: 90 tablet, Rfl: 1    cyclobenzaprine (FLEXERIL) 10 MG tablet, Take 1 tablet by mouth 3 (Three) Times a Day As Needed for Muscle Spasms., Disp: 90 tablet, Rfl: 3    escitalopram (LEXAPRO) 20 MG tablet, Take 1 tablet by mouth Daily., Disp: 90 tablet, Rfl: 3    famotidine (PEPCID) 20 MG tablet, Take 1 tablet by mouth 2 (Two) Times a Day., Disp: 180 tablet, Rfl: 3    furosemide (Lasix) 20 MG tablet, Take 1 tablet by mouth 2 (Two) Times a Day., Disp: 90 tablet, Rfl: 1    HYDROcodone-acetaminophen (NORCO) 5-325 MG per tablet, Take 1 tablet by mouth Every 6 (Six) Hours As Needed for Moderate Pain.,  Disp: 90 tablet, Rfl: 0    levocetirizine (XYZAL) 5 MG tablet, Take one tablet by mouth twice daily for urticaria., Disp: 180 tablet, Rfl: 3    meloxicam (MOBIC) 15 MG tablet, Take 1 tablet by mouth Daily., Disp: 90 tablet, Rfl: 0    metoprolol succinate XL (TOPROL-XL) 25 MG 24 hr tablet, Take 1 tablet by mouth Daily., Disp: 90 tablet, Rfl: 3    naloxone (NARCAN) 4 MG/0.1ML nasal spray, Call 911. Don't prime. [place] in 1 nostril for overdose. Repeat in 2-3 minutes in other nostril if no or minimal breathing/responsiveness., Disp: 2 each, Rfl: 0    olmesartan (BENICAR) 40 MG tablet, Take 0.5 tablets by mouth Daily., Disp: 45 tablet, Rfl: 1    ondansetron (ZOFRAN) 8 MG tablet, Take 1 tablet by mouth 3 (Three) Times a Day As Needed for Nausea or Vomiting., Disp: 90 tablet, Rfl: 5    oxyCODONE (Roxicodone) 5 MG immediate release tablet, Take 1 tablet by mouth Every 4 (Four) Hours As Needed for Moderate Pain., Disp: 50 tablet, Rfl: 0  No current facility-administered medications for this visit.    Medicines reviewed by Leatha Abbott RP on 2/27/2025 at 12:18 PM    Drug Interactions  Assessed medication list for interactions, no significant drug interactions noted.   Advised patient to call the clinic if any new medications are started so we can assess for drug-drug interactions.  Drug-food interactions discussed: eating grapefruit and drinking grapefruit juice    Adverse Drug Reactions  Medication tolerability: Patient reported common adverse drug reaction  Medication plan: Continue therapy with normal follow-up  Plan for ADR Management: patient to call the office for worsening nausea    Adherence, Self-Administration, and Current Therapy Problems  Adherence related to the patient's specialty therapy was discussed with the patient. The Adherence segment of this outreach has been reviewed and updated.     Adherence Questions  Linked Medication(s) Assessed: Abemaciclib (Verzenio)  On average, how many doses/injections  does the patient miss per month?: 0 (verzenio was on hold for a few weeks. Re-started at the 100 mg strength with plans to increase back to 150 mg if tolerated.)  What are the identified reasons for non-adherence or missed doses? : no problems identified  What is the estimated medication adherence level?: %  Based on the patient/caregiver response and refill history, does this patient require an MTP to track adherence improvements?: no    Additional Barriers to Patient Self-Administration: no  Methods for Supporting Patient Self-Administration: no  Patient has had no issues obtaining medication from pharmacy.    Open Medication Therapy Problems  No medication therapy recommendations to display    Goals of Therapy  Goals related to the patient's specialty therapy were discussed with the patient. The Patient Goals segment of this outreach has been reviewed and updated.   Goals Addressed Today        Specialty Pharmacy General Goal      Complete 2 years of adjuvant treatment and progression free survival based on scans.  12/4/24- radiation complete, patient to start Verzenio ramp up dosing.  2/27/25: recent scans show stable disease              Quality of Life Assessment   Quality of Life related to the patient's enrollment in the patient management program and services provided was discussed with the patient. The QOL segment of this outreach has been reviewed and updated.  Quality of Life Improvement Scale: 6-A little better    Discussed aforementioned material with patient over the phone.     Reassessment Plan & Follow-Up  1. Medication Therapy Changes: none  2. Related Plans, Therapy Recommendations, or Issues to Be Addressed: none  3. Pharmacist to perform regular assessments no more than (6) months from the previous assessment.   4. Care Coordinator to set up future refill outreaches, coordinate prescription delivery, and escalate clinical questions to pharmacist.    Attestation  Therapeutic  appropriateness: Appropriate   I attest the patient was actively involved in and has agreed to the above plan of care.  If the prescribed therapy is at any point deemed not appropriate based on the current or future assessments, a consultation will be initiated with the patient's specialty care provider to determine the best course of action. The revised plan of therapy will be documented along with any required assessments and/or additional patient education provided.     Leatha Abbott PharmD, [facility]  Clinical Specialty Pharmacist, Oncology  2/27/2025  12:20 EST

## 2025-03-03 ENCOUNTER — SPECIALTY PHARMACY (OUTPATIENT)
Dept: PHARMACY | Facility: HOSPITAL | Age: 55
End: 2025-03-03
Payer: COMMERCIAL

## 2025-03-04 DIAGNOSIS — Z17.0 MALIGNANT NEOPLASM OF LOWER-OUTER QUADRANT OF RIGHT BREAST OF FEMALE, ESTROGEN RECEPTOR POSITIVE: ICD-10-CM

## 2025-03-04 DIAGNOSIS — C50.511 MALIGNANT NEOPLASM OF LOWER-OUTER QUADRANT OF RIGHT BREAST OF FEMALE, ESTROGEN RECEPTOR POSITIVE: ICD-10-CM

## 2025-03-04 RX ORDER — ANASTROZOLE 1 MG/1
1 TABLET ORAL DAILY
Qty: 90 TABLET | Refills: 3 | Status: SHIPPED | OUTPATIENT
Start: 2025-03-04

## 2025-03-06 ENCOUNTER — HOSPITAL ENCOUNTER (OUTPATIENT)
Dept: CARDIOLOGY | Facility: HOSPITAL | Age: 55
Discharge: HOME | End: 2025-03-06
Admitting: INTERNAL MEDICINE
Payer: COMMERCIAL

## 2025-03-06 ENCOUNTER — SPECIALTY PHARMACY (OUTPATIENT)
Dept: PHARMACY | Facility: HOSPITAL | Age: 55
End: 2025-03-06
Payer: COMMERCIAL

## 2025-03-06 VITALS
HEIGHT: 66 IN | DIASTOLIC BLOOD PRESSURE: 56 MMHG | BODY MASS INDEX: 27 KG/M2 | WEIGHT: 168 LBS | SYSTOLIC BLOOD PRESSURE: 148 MMHG

## 2025-03-06 DIAGNOSIS — I50.32 CHRONIC HEART FAILURE WITH PRESERVED EJECTION FRACTION (HFPEF): Primary | ICD-10-CM

## 2025-03-06 LAB
AV MEAN PRESS GRAD SYS DOP V1V2: 3.2 MMHG
AV VMAX SYS DOP: 113.7 CM/SEC
BH CV ECHO LEFT VENTRICLE GLOBAL LONGITUDINAL STRAIN: -17 %
BH CV ECHO MEAS - AO MAX PG: 5.2 MMHG
BH CV ECHO MEAS - AO ROOT DIAM: 2.9 CM
BH CV ECHO MEAS - AO V2 VTI: 26.1 CM
BH CV ECHO MEAS - AVA(I,D): 2.24 CM2
BH CV ECHO MEAS - EDV(CUBED): 56.8 ML
BH CV ECHO MEAS - EDV(MOD-SP2): 55.3 ML
BH CV ECHO MEAS - EDV(MOD-SP4): 54.6 ML
BH CV ECHO MEAS - EF(MOD-SP2): 45.5 %
BH CV ECHO MEAS - EF(MOD-SP4): 41.1 %
BH CV ECHO MEAS - ESV(CUBED): 19.8 ML
BH CV ECHO MEAS - ESV(MOD-SP2): 30.2 ML
BH CV ECHO MEAS - ESV(MOD-SP4): 32.2 ML
BH CV ECHO MEAS - FS: 29.6 %
BH CV ECHO MEAS - IVS/LVPW: 1.04 CM
BH CV ECHO MEAS - IVSD: 1.07 CM
BH CV ECHO MEAS - LA DIMENSION: 2.9 CM
BH CV ECHO MEAS - LAT PEAK E' VEL: 9 CM/SEC
BH CV ECHO MEAS - LV MASS(C)D: 129 GRAMS
BH CV ECHO MEAS - LV MAX PG: 3.9 MMHG
BH CV ECHO MEAS - LV MEAN PG: 2 MMHG
BH CV ECHO MEAS - LV V1 MAX: 99.3 CM/SEC
BH CV ECHO MEAS - LV V1 VTI: 21.5 CM
BH CV ECHO MEAS - LVIDD: 3.8 CM
BH CV ECHO MEAS - LVIDS: 2.7 CM
BH CV ECHO MEAS - LVOT AREA: 2.7 CM2
BH CV ECHO MEAS - LVOT DIAM: 1.86 CM
BH CV ECHO MEAS - LVPWD: 1.04 CM
BH CV ECHO MEAS - MED PEAK E' VEL: 4.4 CM/SEC
BH CV ECHO MEAS - MV A DUR: 0.14 SEC
BH CV ECHO MEAS - MV A MAX VEL: 83.9 CM/SEC
BH CV ECHO MEAS - MV DEC SLOPE: 261.3 CM/SEC2
BH CV ECHO MEAS - MV DEC TIME: 0.23 SEC
BH CV ECHO MEAS - MV E MAX VEL: 59.1 CM/SEC
BH CV ECHO MEAS - MV E/A: 0.7
BH CV ECHO MEAS - MV MAX PG: 3.6 MMHG
BH CV ECHO MEAS - MV MEAN PG: 1.18 MMHG
BH CV ECHO MEAS - MV V2 VTI: 23.8 CM
BH CV ECHO MEAS - MVA(VTI): 2.45 CM2
BH CV ECHO MEAS - PA ACC TIME: 0.06 SEC
BH CV ECHO MEAS - PA V2 MAX: 101 CM/SEC
BH CV ECHO MEAS - PULM A REVS DUR: 0.12 SEC
BH CV ECHO MEAS - PULM A REVS VEL: 56.5 CM/SEC
BH CV ECHO MEAS - PULM DIAS VEL: 33.3 CM/SEC
BH CV ECHO MEAS - PULM S/D: 1.45
BH CV ECHO MEAS - PULM SYS VEL: 48.5 CM/SEC
BH CV ECHO MEAS - RAP SYSTOLE: 3 MMHG
BH CV ECHO MEAS - RV MAX PG: 1.78 MMHG
BH CV ECHO MEAS - RV V1 MAX: 66.7 CM/SEC
BH CV ECHO MEAS - RV V1 VTI: 11.2 CM
BH CV ECHO MEAS - RVSP: 22 MMHG
BH CV ECHO MEAS - SV(LVOT): 58.3 ML
BH CV ECHO MEAS - SV(MOD-SP2): 25.2 ML
BH CV ECHO MEAS - SV(MOD-SP4): 22.5 ML
BH CV ECHO MEAS - TAPSE (>1.6): 1.66 CM
BH CV ECHO MEAS - TR MAX PG: 19 MMHG
BH CV ECHO MEAS - TR MAX VEL: 218 CM/SEC
BH CV ECHO MEASUREMENTS AVERAGE E/E' RATIO: 8.82
LV EF BIPLANE MOD: 50 %

## 2025-03-06 PROCEDURE — 93306 TTE W/DOPPLER COMPLETE: CPT | Performed by: INTERNAL MEDICINE

## 2025-03-06 PROCEDURE — 93356 MYOCRD STRAIN IMG SPCKL TRCK: CPT

## 2025-03-06 PROCEDURE — 93306 TTE W/DOPPLER COMPLETE: CPT

## 2025-03-06 PROCEDURE — 93356 MYOCRD STRAIN IMG SPCKL TRCK: CPT | Performed by: INTERNAL MEDICINE

## 2025-03-06 NOTE — PROGRESS NOTES
Spoke with pt about her Verzenio supply. She has a couple days left of the 100 mg dose and thinks she has 2 weeks of the 150 mg on hand at home. She will check and call me back in the next couple days.    Arminda Baumann, Pharmacy Technician  03/06/25  12:56 EST

## 2025-03-07 ENCOUNTER — LAB (OUTPATIENT)
Dept: LAB | Facility: HOSPITAL | Age: 55
End: 2025-03-07
Payer: COMMERCIAL

## 2025-03-07 DIAGNOSIS — Z17.0 MALIGNANT NEOPLASM OF LOWER-OUTER QUADRANT OF RIGHT BREAST OF FEMALE, ESTROGEN RECEPTOR POSITIVE: ICD-10-CM

## 2025-03-07 DIAGNOSIS — I50.32 CHRONIC HEART FAILURE WITH PRESERVED EJECTION FRACTION (HFPEF): ICD-10-CM

## 2025-03-07 DIAGNOSIS — Z79.811 USE OF AROMATASE INHIBITORS: ICD-10-CM

## 2025-03-07 DIAGNOSIS — I26.99 ACUTE PULMONARY EMBOLISM, UNSPECIFIED PULMONARY EMBOLISM TYPE, UNSPECIFIED WHETHER ACUTE COR PULMONALE PRESENT: ICD-10-CM

## 2025-03-07 DIAGNOSIS — C50.511 MALIGNANT NEOPLASM OF LOWER-OUTER QUADRANT OF RIGHT BREAST OF FEMALE, ESTROGEN RECEPTOR POSITIVE: ICD-10-CM

## 2025-03-07 LAB
ALBUMIN SERPL-MCNC: 3.8 G/DL (ref 3.5–5.2)
ALBUMIN/GLOB SERPL: 1.1 G/DL
ALP SERPL-CCNC: 118 U/L (ref 39–117)
ALT SERPL W P-5'-P-CCNC: 10 U/L (ref 1–33)
ANION GAP SERPL CALCULATED.3IONS-SCNC: 10.2 MMOL/L (ref 5–15)
AST SERPL-CCNC: 16 U/L (ref 1–32)
BASOPHILS # BLD AUTO: 0.03 10*3/MM3 (ref 0–0.2)
BASOPHILS NFR BLD AUTO: 0.5 % (ref 0–1.5)
BILIRUB SERPL-MCNC: 0.2 MG/DL (ref 0–1.2)
BUN SERPL-MCNC: 20 MG/DL (ref 6–20)
BUN/CREAT SERPL: 24.1 (ref 7–25)
CALCIUM SPEC-SCNC: 9.2 MG/DL (ref 8.6–10.5)
CHLORIDE SERPL-SCNC: 104 MMOL/L (ref 98–107)
CO2 SERPL-SCNC: 26.8 MMOL/L (ref 22–29)
CREAT SERPL-MCNC: 0.83 MG/DL (ref 0.57–1)
DEPRECATED RDW RBC AUTO: 47.9 FL (ref 37–54)
EGFRCR SERPLBLD CKD-EPI 2021: 83.9 ML/MIN/1.73
EOSINOPHIL # BLD AUTO: 0.34 10*3/MM3 (ref 0–0.4)
EOSINOPHIL NFR BLD AUTO: 6.1 % (ref 0.3–6.2)
ERYTHROCYTE [DISTWIDTH] IN BLOOD BY AUTOMATED COUNT: 14.1 % (ref 12.3–15.4)
GLOBULIN UR ELPH-MCNC: 3.4 GM/DL
GLUCOSE SERPL-MCNC: 96 MG/DL (ref 65–99)
HCT VFR BLD AUTO: 33.2 % (ref 34–46.6)
HGB BLD-MCNC: 10.6 G/DL (ref 12–15.9)
IMM GRANULOCYTES # BLD AUTO: 0.05 10*3/MM3 (ref 0–0.05)
IMM GRANULOCYTES NFR BLD AUTO: 0.9 % (ref 0–0.5)
LYMPHOCYTES # BLD AUTO: 0.77 10*3/MM3 (ref 0.7–3.1)
LYMPHOCYTES NFR BLD AUTO: 13.8 % (ref 19.6–45.3)
MCH RBC QN AUTO: 29.3 PG (ref 26.6–33)
MCHC RBC AUTO-ENTMCNC: 31.9 G/DL (ref 31.5–35.7)
MCV RBC AUTO: 91.7 FL (ref 79–97)
MONOCYTES # BLD AUTO: 0.58 10*3/MM3 (ref 0.1–0.9)
MONOCYTES NFR BLD AUTO: 10.4 % (ref 5–12)
NEUTROPHILS NFR BLD AUTO: 3.8 10*3/MM3 (ref 1.7–7)
NEUTROPHILS NFR BLD AUTO: 68.3 % (ref 42.7–76)
NRBC BLD AUTO-RTO: 0 /100 WBC (ref 0–0.2)
NT-PROBNP SERPL-MCNC: 130 PG/ML (ref 0–900)
PLATELET # BLD AUTO: 364 10*3/MM3 (ref 140–450)
PMV BLD AUTO: 9.5 FL (ref 6–12)
POTASSIUM SERPL-SCNC: 3.9 MMOL/L (ref 3.5–5.2)
PROT SERPL-MCNC: 7.2 G/DL (ref 6–8.5)
RBC # BLD AUTO: 3.62 10*6/MM3 (ref 3.77–5.28)
SODIUM SERPL-SCNC: 141 MMOL/L (ref 136–145)
WBC NRBC COR # BLD AUTO: 5.57 10*3/MM3 (ref 3.4–10.8)

## 2025-03-07 PROCEDURE — 85613 RUSSELL VIPER VENOM DILUTED: CPT

## 2025-03-07 PROCEDURE — 86146 BETA-2 GLYCOPROTEIN ANTIBODY: CPT

## 2025-03-07 PROCEDURE — 85025 COMPLETE CBC W/AUTO DIFF WBC: CPT

## 2025-03-07 PROCEDURE — 85732 THROMBOPLASTIN TIME PARTIAL: CPT

## 2025-03-07 PROCEDURE — 83880 ASSAY OF NATRIURETIC PEPTIDE: CPT

## 2025-03-07 PROCEDURE — 36415 COLL VENOUS BLD VENIPUNCTURE: CPT

## 2025-03-07 PROCEDURE — 81241 F5 GENE: CPT

## 2025-03-07 PROCEDURE — 85705 THROMBOPLASTIN INHIBITION: CPT

## 2025-03-07 PROCEDURE — 86147 CARDIOLIPIN ANTIBODY EA IG: CPT

## 2025-03-07 PROCEDURE — 80053 COMPREHEN METABOLIC PANEL: CPT

## 2025-03-07 PROCEDURE — 85670 THROMBIN TIME PLASMA: CPT

## 2025-03-08 LAB
APTT SCREEN TO CONFIRM RATIO: 0.83 RATIO (ref 0–1.34)
CARDIOLIPIN IGA SER IA-ACNC: <9 APL U/ML (ref 0–11)
CARDIOLIPIN IGG SER IA-ACNC: <9 GPL U/ML (ref 0–14)
CARDIOLIPIN IGM SER IA-ACNC: <9 MPL U/ML (ref 0–12)
CONFIRM APTT/NORMAL: 43.6 SEC (ref 0–47.6)
DRVVT SCREEN TO CONFIRM RATIO: 0.8 RATIO (ref 0.8–1.2)
LA 2 SCREEN W REFLEX-IMP: ABNORMAL
MIXING DRVVT: 48.2 SEC (ref 0–40.4)
SCREEN APTT: 38.9 SEC (ref 0–43.5)
SCREEN DRVVT: 60.7 SEC (ref 0–47)
THROMBIN TIME: 16.8 SEC (ref 0–23)

## 2025-03-10 LAB
B2 GLYCOPROT1 IGA SER-ACNC: <9 GPI IGA UNITS (ref 0–25)
B2 GLYCOPROT1 IGG SER-ACNC: <9 GPI IGG UNITS (ref 0–20)
B2 GLYCOPROT1 IGM SER-ACNC: <9 GPI IGM UNITS (ref 0–32)
F5 GENE MUT ANL BLD/T: NORMAL

## 2025-03-12 ENCOUNTER — SPECIALTY PHARMACY (OUTPATIENT)
Dept: PHARMACY | Facility: HOSPITAL | Age: 55
End: 2025-03-12
Payer: COMMERCIAL

## 2025-03-12 NOTE — PROGRESS NOTES
Specialty Pharmacy Patient Management Program  Oncology / Hematology Refill Outreach      Dasha was contacted today regarding refills of her Verzenio specialty medication(s).    Specialty medication(s) and dose(s) confirmed: Yes  Verzenio  Refill Questions      Flowsheet Row Most Recent Value   Changes to allergies? No   Changes to medications? Yes  [Verzenio dose increased to 150 mg twice per day last week.]   New conditions or infections since last clinic visit No   Unplanned office visit, urgent care, ED, or hospital admission in the last 4 weeks  No   How does patient/caregiver feel medication is working? Good   Financial problems or insurance changes  No   Since the previous refill, were any specialty medication doses or scheduled injections missed or delayed?  No   Does this patient require a clinical escalation to a pharmacist? No          Delivery Questions      Flowsheet Row Most Recent Value   Delivery method [employer]   Delivery address verified with patient/caregiver? Yes  [Ship to home address]   Delivery address Home  [Ship to home address-Ship 3/17 for delivery 3/18-$0 copay with insurance covering 100%-Address Confirmed]   Number of medications in delivery 1   Medication(s) being filled and delivered Abemaciclib  [150 mg]   Doses left of specialty medications 7 days   Copay verified? Yes   Copay amount $0 copay with insurance and copay card   Copay form of payment No copayment ($0)   Delivery Date Selection 03/18/25   Signature Required No          Verzenio delivery coordinated with pt for 3/18/2025 to her home address. $0 copay with insurance covering 100%. Her last delivery was on 2/21/2025 of the 100 mg dose. No questions or concerns to report to MTM Team today. She reports no missed doses since last delivery. PDC is 61%.    Follow-Up: 21 Days    Arminda Baumann, Pharmacy Technician  3/12/2025  12:41 EDT

## 2025-03-13 ENCOUNTER — OFFICE VISIT (OUTPATIENT)
Dept: ONCOLOGY | Facility: CLINIC | Age: 55
End: 2025-03-13
Payer: COMMERCIAL

## 2025-03-13 ENCOUNTER — LAB (OUTPATIENT)
Dept: LAB | Facility: HOSPITAL | Age: 55
End: 2025-03-13
Payer: COMMERCIAL

## 2025-03-13 VITALS
HEIGHT: 66 IN | SYSTOLIC BLOOD PRESSURE: 113 MMHG | WEIGHT: 167.2 LBS | HEART RATE: 77 BPM | TEMPERATURE: 98.1 F | BODY MASS INDEX: 26.87 KG/M2 | DIASTOLIC BLOOD PRESSURE: 71 MMHG | OXYGEN SATURATION: 100 %

## 2025-03-13 DIAGNOSIS — K21.9 GASTROESOPHAGEAL REFLUX DISEASE, UNSPECIFIED WHETHER ESOPHAGITIS PRESENT: ICD-10-CM

## 2025-03-13 DIAGNOSIS — K52.1 CHEMOTHERAPY INDUCED DIARRHEA: ICD-10-CM

## 2025-03-13 DIAGNOSIS — C50.511 MALIGNANT NEOPLASM OF LOWER-OUTER QUADRANT OF RIGHT BREAST OF FEMALE, ESTROGEN RECEPTOR POSITIVE: Primary | ICD-10-CM

## 2025-03-13 DIAGNOSIS — T45.1X5A CHEMOTHERAPY INDUCED DIARRHEA: ICD-10-CM

## 2025-03-13 DIAGNOSIS — Z79.811 USE OF AROMATASE INHIBITORS: ICD-10-CM

## 2025-03-13 DIAGNOSIS — I26.99 ACUTE PULMONARY EMBOLISM, UNSPECIFIED PULMONARY EMBOLISM TYPE, UNSPECIFIED WHETHER ACUTE COR PULMONALE PRESENT: ICD-10-CM

## 2025-03-13 DIAGNOSIS — Z17.0 MALIGNANT NEOPLASM OF LOWER-OUTER QUADRANT OF RIGHT BREAST OF FEMALE, ESTROGEN RECEPTOR POSITIVE: Primary | ICD-10-CM

## 2025-03-13 DIAGNOSIS — Z17.0 MALIGNANT NEOPLASM OF LOWER-OUTER QUADRANT OF RIGHT BREAST OF FEMALE, ESTROGEN RECEPTOR POSITIVE: ICD-10-CM

## 2025-03-13 DIAGNOSIS — C50.511 MALIGNANT NEOPLASM OF LOWER-OUTER QUADRANT OF RIGHT BREAST OF FEMALE, ESTROGEN RECEPTOR POSITIVE: ICD-10-CM

## 2025-03-13 LAB
ALBUMIN SERPL-MCNC: 3.6 G/DL (ref 3.5–5.2)
ALBUMIN/GLOB SERPL: 1.1 G/DL
ALP SERPL-CCNC: 101 U/L (ref 39–117)
ALT SERPL W P-5'-P-CCNC: 13 U/L (ref 1–33)
ANION GAP SERPL CALCULATED.3IONS-SCNC: 10 MMOL/L (ref 5–15)
AST SERPL-CCNC: 18 U/L (ref 1–32)
BASOPHILS # BLD AUTO: 0.03 10*3/MM3 (ref 0–0.2)
BASOPHILS NFR BLD AUTO: 0.6 % (ref 0–1.5)
BILIRUB SERPL-MCNC: 0.2 MG/DL (ref 0–1.2)
BUN SERPL-MCNC: 21 MG/DL (ref 6–20)
BUN/CREAT SERPL: 18.4 (ref 7–25)
CALCIUM SPEC-SCNC: 9.3 MG/DL (ref 8.6–10.5)
CHLORIDE SERPL-SCNC: 106 MMOL/L (ref 98–107)
CO2 SERPL-SCNC: 26 MMOL/L (ref 22–29)
CREAT SERPL-MCNC: 1.14 MG/DL (ref 0.57–1)
DEPRECATED RDW RBC AUTO: 50 FL (ref 37–54)
EGFRCR SERPLBLD CKD-EPI 2021: 57.3 ML/MIN/1.73
EOSINOPHIL # BLD AUTO: 0.25 10*3/MM3 (ref 0–0.4)
EOSINOPHIL NFR BLD AUTO: 5 % (ref 0.3–6.2)
ERYTHROCYTE [DISTWIDTH] IN BLOOD BY AUTOMATED COUNT: 14.4 % (ref 12.3–15.4)
GLOBULIN UR ELPH-MCNC: 3.2 GM/DL
GLUCOSE SERPL-MCNC: 89 MG/DL (ref 65–99)
HCT VFR BLD AUTO: 33.3 % (ref 34–46.6)
HGB BLD-MCNC: 10.3 G/DL (ref 12–15.9)
IMM GRANULOCYTES # BLD AUTO: 0.04 10*3/MM3 (ref 0–0.05)
IMM GRANULOCYTES NFR BLD AUTO: 0.8 % (ref 0–0.5)
LYMPHOCYTES # BLD AUTO: 0.6 10*3/MM3 (ref 0.7–3.1)
LYMPHOCYTES NFR BLD AUTO: 12 % (ref 19.6–45.3)
MCH RBC QN AUTO: 29.5 PG (ref 26.6–33)
MCHC RBC AUTO-ENTMCNC: 30.9 G/DL (ref 31.5–35.7)
MCV RBC AUTO: 95.4 FL (ref 79–97)
MONOCYTES # BLD AUTO: 0.42 10*3/MM3 (ref 0.1–0.9)
MONOCYTES NFR BLD AUTO: 8.4 % (ref 5–12)
NEUTROPHILS NFR BLD AUTO: 3.65 10*3/MM3 (ref 1.7–7)
NEUTROPHILS NFR BLD AUTO: 73.2 % (ref 42.7–76)
NRBC BLD AUTO-RTO: 0 /100 WBC (ref 0–0.2)
PLATELET # BLD AUTO: 289 10*3/MM3 (ref 140–450)
PMV BLD AUTO: 8.8 FL (ref 6–12)
POTASSIUM SERPL-SCNC: 3.8 MMOL/L (ref 3.5–5.2)
PROT SERPL-MCNC: 6.8 G/DL (ref 6–8.5)
RBC # BLD AUTO: 3.49 10*6/MM3 (ref 3.77–5.28)
SODIUM SERPL-SCNC: 142 MMOL/L (ref 136–145)
WBC NRBC COR # BLD AUTO: 4.99 10*3/MM3 (ref 3.4–10.8)

## 2025-03-13 PROCEDURE — 36415 COLL VENOUS BLD VENIPUNCTURE: CPT

## 2025-03-13 PROCEDURE — 80053 COMPREHEN METABOLIC PANEL: CPT

## 2025-03-13 PROCEDURE — 85025 COMPLETE CBC W/AUTO DIFF WBC: CPT

## 2025-03-13 RX ORDER — PANTOPRAZOLE SODIUM 40 MG/1
40 TABLET, DELAYED RELEASE ORAL DAILY
Qty: 30 TABLET | Refills: 1 | Status: SHIPPED | OUTPATIENT
Start: 2025-03-13

## 2025-03-13 NOTE — PROGRESS NOTES
Subjective   Dasha De Leon is a 54 y.o. female.  Referred by Dr. DeA nda for right breast invasive ductal carcinoma    History of Present Illness     Patient is a 54-year-old postmenopausal  lady who presented with a palpable mass in her right breast in mid April.  This was right around the time of her screening mammogram and hence she proceeded with a screening mammogram.  She denies any previous breast abnormalities or biopsies.  No family history of breast or ovarian carcinoma.    4/29/2024-bilateral screening mammogram  Breasts are entirely fatty.  Bilateral retropectoral saline implants.  Round mass measuring 16 mm with spiculated margins in the posterior one third region of the right breast at 9:00.  This is new since the prior exams.  No suspicious masses in the left breast.    5/10/2024-right breast diagnostic mammogram  Breast is entirely fatty.  Retropectoral saline implants.  Finding 1.round mass in the right breast at 9:00 previously seen on the screening mammogram, measuring 16 mm with spiculated margin.    Right breast ultrasound  Finding 1.hypoechoic mass with spiculated margins and internal vascularity measuring 15 mm at 8:00, 10 cm from the nipple.  Finding 2.irregular  Palpable hypoechoic mass measuring 4 x 6 x 4 mm at 8:30 position in the right breast, 11 cm from the nipple.    Ultrasound-guided biopsy of both these masses recommended.    5/15/2024-right breast ultrasound-guided biopsy  1.8 o'clock position right breast-invasive ductal carcinoma with apocrine features  Grade 2  ER +91 to 100% strong  UT +91 to 100% strong  HER2 negative, 1+  Ki-67 22%  2.right breast 8:30 position  Invasive ductal carcinoma with apocrine features, grade 2  ER +91 to 100% strong  UT +91 to 100% strong  HER2 negative, Ki-67 20%.    5/25/2024-bilateral breast MRI  1.2 biopsy sites of malignancy at 8:30 position in the posterior one third that measured 2.3 cm and 2.4 cm respectively.  The lesions either  about or extend to the lateral margin of the pectoral fascia that overlies the implant capsule and some extension into the pectoral muscle at this location and/or biologic capsular suspect.  Finding suspicious for axilla lymphadenopathy.  Right axilla lymph node measuring 1.4 cm.  2.no suspicious findings in the left breast.    7/11/2024-right mastectomy and sentinel lymph node biopsy  Invasive ductal carcinoma measuring 45 x 20 x 14 mm  Grade 3  Extensive lymphovascular invasion present.  Invasive carcinoma present extensively at the anterior margin  Distance to the invasive carcinoma from the posterior margin 0.9 mm  1 lymph node with macrometastasis measuring 12 mm, 2 mm extranodal extension  At least 3 sentinel lymph nodes  Total number of [place] and nonsentinel lymph nodes evaluated 5  PT2 N1a MX  Receptors repeated on the lymph node with estrogen receptor +91 to 100% strong, progesterone receptor +91 to 100% strong, HER2 negative, score 0, Ki-67 35%    Given the positive margin additional surgery performed on 7/26/2024 with ultimately negative margins.  Expander placed.    Patient presents today to discuss adjuvant therapy.    Oncotype DX recurrence score 18 with no additional benefit from chemotherapy and 5-year risk of distant metastasis being 4%.      Verzenio started 12/6/2024.    Patient was seen on 1/9/2025 when she complained of right side chest pain which had just started and she was going to call the office back if the symptoms persisted or got worse.    On 2/6/2025 she presented back for routine follow-up while on Verzenio and she complained of ongoing right sided chest pain and also reported worsening dyspnea.    2/6/2025-CT angiogram of the chest with very significant streak artifact from the right chest wall tissue expander and there was heterogeneous admixture of contrast at the pulmonary arteries particularly on the right.  Despite all the limitations there are findings suspicious for right  pulmonary thromboemboli.  Small eccentric thrombus at the proximal right interlobar artery adjacent to the right middle lobe pulmonary artery.  There is also likely a thrombus.  Likely nonocclusive thrombus within the segmental right lower lobe pulmonary arteries.    Dense consolidation in the right apex and some of the lateral peripheral subpleural opacities of the right upper lobe likely represent radiation pneumonitis and fibrosis but there is also mixed density and groundglass opacities at the right lower lobe which are suspicious for sequela of pulmonary thromboemboli.  Tiny right pleural effusion.  No left lung opacities and there is no left pleural effusion or pericardial effusion.    Abdomen and pelvis CT without any evidence of metastatic disease.  Moderately extensive abdominal aortic atherosclerotic changes without aneurysmal dilatation.    Patient was advised to stop Verzenio on 2/6/2025 after diagnosis of the pulmonary embolism.    Interval history:  The patient is a 54 y.o. female with the above history, who returns to the office today for follow-up and review.  She continues on Verzenio 150 mg twice daily.  Unfortunately, she reports she really is struggling to tolerate this.  She has profound fatigue.  She reports exacerbation of her GERD and reflux.  She has been taking Pepcid twice daily.  She also reports fairly significant diarrhea despite maximum Imodium.  We have discussed Lomotil though she declines additional medications at this time.  She does continue on anticoagulation with overall excellent tolerance without signs or symptoms of bleeding      The following portions of the patient's history were reviewed and updated as appropriate: allergies, current medications, past family history, past medical history, past social history, past surgical history, and problem list.    Past Medical History:   Diagnosis Date    Abnormal Pap smear of cervix     Anxiety     Miller's cyst     RESOLVED    Breast  cancer     Right    Bulging lumbar disc     Bursitis     HX    Depression     GERD (gastroesophageal reflux disease)     H/O colposcopy with cervical biopsy     unknown pap result, biopsy was neg per pt, 2013 [facility]    History of 2019 novel coronavirus disease (COVID-19)     X2    History of eczema     HPV (human papilloma virus) infection     Hyperlipemia     Hypertension     Low back pain     Lumbar radiculopathy     Numbness and tingling of left leg     Osteoarthritis     Scoliosis     Seasonal allergies         Past Surgical History:   Procedure Laterality Date    BREAST AUGMENTATION      BREAST BIOPSY Right 9/19/2024    Procedure: AXILLARY LYMPH NODE BIOPSY/EXCISION;  Surgeon: Romina De Anda MD;  Location: [facility];  Service: General;  Laterality: Right;    BREAST RECONSTRUCTION Right 7/11/2024    Procedure: RIGHT SUBPECTORAL PLACEMENT TISSUE EXPANDER AND CORTIVA (ACELLULAR DERMAL MATRIX), COMPLEX CLOSURE RIGHT BREAST 5cm;  Surgeon: Colin Bender MD;  Location: [facility];  Service: Plastics;  Laterality: Right;    BREAST SURGERY Right 7/26/2024    Procedure: ADJACENT TISSUE REARRANGEMENT RIGHT BREAST;  Surgeon: Colin Bender MD;  Location: [facility] OR;  Service: Plastics;  Laterality: Right;    CARPAL TUNNEL RELEASE Right 2004    CRYOTHERAPY      1997    D & C HYSTEROSCOPY N/A 9/19/2024    Procedure: INTRAUTERINE DEVICE REMOVAL;  Surgeon: Kaleb Burciaga MD;  Location: [facility] OR;  Service: Obstetrics/Gynecology;  Laterality: N/A;    EPIDURAL BLOCK      HAND SURGERY  2004    Pisiformectomy    LUMBAR DISCECTOMY FUSION INSTRUMENTATION N/A 01/05/2024    Procedure: Lumbar 3 to lumbar 4 and lumbar 4 to lumbar 5 laminectomy with fusion and instrumentation;  Surgeon: Rigoberto Botello MD;  Location: [facility];  Service: Robotics - Neuro;  Laterality: N/A;    MAMMO BILATERAL  2014    MASTECTOMY W/ SENTINEL NODE BIOPSY Right 7/11/2024    Procedure: Right MASTECTOMY WITH  "SENTINEL NODE BIOPSY;  Surgeon: Romina De Anda MD;  Location: [facility] MAIN OR;  Service: General;  Laterality: Right;    MASTECTOMY WITH IMMEDIATE RECONSTRUCTION Right 2024    Procedure: right mastectomy, margin excision;  Surgeon: Romina De Anda MD;  Location: [facility] MAIN OR;  Service: General;  Laterality: Right;    PAP SMEAR      SHOULDER ARTHROSCOPY Left ,    SHOULDER SURGERY      \"MANIPULATION FOR FROZEN SHOULDER\"    US GUIDED LYMPH NODE BIOPSY  2024        Family History   Problem Relation Age of Onset    Hypertension Mother     Hyperlipidemia Mother     Diverticulitis Mother     Pancreatitis Mother     Kidney disease Father     Skin cancer Father     Hypertension Father     Hyperlipidemia Father     Stroke Father     Atrial fibrillation Father     Transient ischemic attack Father     No Known Problems Sister     Depression Brother     No Known Problems Daughter     No Known Problems Son     Uterine cancer Maternal Aunt 58    Heart disease Maternal Aunt     Lung cancer Maternal Uncle     Heart attack Maternal Uncle     No Known Problems Paternal Aunt     Colon cancer Paternal Uncle 65    Liver disease Maternal Grandmother     Heart disease Maternal Grandfather     Cancer Maternal Grandfather     Heart attack Paternal Grandmother     No Known Problems Paternal Grandfather     BRCA 1/2 Neg Hx     Breast cancer Neg Hx     Endometrial cancer Neg Hx     Ovarian cancer Neg Hx     Malig Hyperthermia Neg Hx         Social History     Socioeconomic History    Marital status: Single   Tobacco Use    Smoking status: Former     Current packs/day: 0.00     Average packs/day: 1 pack/day for 20.0 years (20.0 ttl pk-yrs)     Types: Cigarettes     Start date:      Quit date:      Years since quittin.2     Passive exposure: Past    Smokeless tobacco: Never   Vaping Use    Vaping status: Never Used   Substance and Sexual Activity    Alcohol use: Yes     Alcohol/week: 2.0 - 4.0 standard drinks of " "alcohol     Types: 2 - 4 Glasses of wine per week     Comment: weekly    Drug use: No    Sexual activity: Not Currently     Birth control/protection: I.U.D.        OB History          0    Para   0    Term   0       0    AB   0    Living   0         SAB   0    IAB   0    Ectopic   0    Molar        Multiple   0    Live Births                   Age at menarche-13  Age at first live childbirth-not applicable   0 para 0  0  Age at menopause-49  Oral conceptive pill use for 25 years  No use of hormone replacement therapy    Allergies   Allergen Reactions    Penicillins Anaphylaxis    Hemp Seed Oil Itching     HEMP IN LOTIONS    Lisinopril Cough      Review of Systems  Review of systems as mentioned HPI otherwise negative    Objective   Blood pressure 113/71, pulse 77, temperature 98.1 °F (36.7 °C), temperature source Oral, height 167.6 cm (65.98\"), weight 75.8 kg (167 lb 3.2 oz), SpO2 100%, not currently breastfeeding.     Physical Exam  Vitals reviewed.   Constitutional:       Appearance: She is normal weight.   HENT:      Head: Normocephalic and atraumatic.      Right Ear: External ear normal.      Left Ear: External ear normal.   Eyes:      Conjunctiva/sclera: Conjunctivae normal.   Cardiovascular:      Rate and Rhythm: Normal rate.   Pulmonary:      Effort: Pulmonary effort is normal.   Abdominal:      General: Abdomen is flat.   Musculoskeletal:         General: Normal range of motion.      Cervical back: Normal range of motion.   Skin:     General: Skin is warm.   Neurological:      General: No focal deficit present.      Mental Status: She is alert and oriented to person, place, and time.   Psychiatric:         Mood and Affect: Mood normal.         Behavior: Behavior normal.         Thought Content: Thought content normal.         Judgment: Judgment normal.     Breast Exam: Right breast status post mastectomy with expander in place.  Radiation dermatitis with erythema of the chest " wall noted without excoriation or infection    I have reexamined the patient and the results are consistent with the previously documented exam. Jessiemala Clark, APRN       Results from last 7 days   Lab Units 03/13/25  1028 03/07/25  1558   WBC 10*3/mm3 4.99 5.57   NEUTROS ABS 10*3/mm3 3.65 3.80   HEMOGLOBIN g/dL 10.3* 10.6*   HEMATOCRIT % 33.3* 33.2*   PLATELETS 10*3/mm3 289 364     Results from last 7 days   Lab Units 03/07/25  1558   SODIUM mmol/L 141   POTASSIUM mmol/L 3.9   CHLORIDE mmol/L 104   CO2 mmol/L 26.8   BUN mg/dL 20   CREATININE mg/dL 0.83   CALCIUM mg/dL 9.2   ALBUMIN g/dL 3.8   BILIRUBIN mg/dL 0.2   ALK PHOS U/L 118*   ALT (SGPT) U/L 10   AST (SGOT) U/L 16   GLUCOSE mg/dL 96           NM Bone Scan Whole Body  Result Date: 2/19/2025  No scintigraphic evidence of osseous metastatic disease.   This report was finalized on 2/19/2025 1:07 PM by Dr. Marty Bragg M.D on Workstation: [id number]       Assessment & Plan       *Right breast invasive ductal carcinoma  Status post right mastectomy on 7/11/2024-pathology with 45 mm of invasive ductal carcinoma, grade 3, ER/VA strongly positive at 91 to 100%, HER2 1+, Ki-67 22%  1 out of 5 lymph nodes positive for metastatic disease and receptors on lymph node also ER positive and VA positive.  PT2 N1a MX  8/12/2024-CT of the chest abdomen pelvis-single mildly enlarged right axillary lymph node suspicious for sharona metastasis.  No evidence of distant metastatic disease in the patient with recent right mastectomy.  Mild hepatic steatosis.  IUD present.  Duodenal diverticulum present.  9/19/2024-abnormal lymph node excision.  1 lymph node involved by macrometastatic carcinoma measuring 9.5 mm, greater than 2 mm extranodal extension.  Oncotype DX recurrence score 18 with no additional benefit from chemotherapy and 4% risk of distant metastasis at 5 years  Given the low Oncotype DX recurrence score she will proceed with adjuvant radiation.  Started  anastrozole November 2024  Radiation complete 12/3/2024  12/4/2024 fair tolerance to anastrozole.   12/6/2024-started Verzenio.  1/9/2025-patient continues on full dose of Verzenio 150 mg twice daily and labs reviewed and stable to proceed with the same.  2/6/2025-patient reports severe right-sided rib pain as well as dyspnea, tachycardia and severe nausea.  2/6/2025-CT angiogram of the chest with right sided pulmonary emboli.  There is also significant streak artifact and also radiation-induced changes of the right apex.  No evidence of metastatic disease.  2/6/2025-CT abdomen and pelvis without any evidence of metastatic disease  2/18/2025-bone scan without any evidence of metastatic disease.  Verzenio was held since 2/6/2025.  She continues on anastrozole  Today we had a long discussion regarding the increased risk of DVT and PE associated with Verzenio.  However given that the tumor was 45 mm high-grade and 2 lymph nodes were involved with malignancy the risk of recurrence is high enough that I would recommend that she continue with Verzenio and continue anticoagulation for 2 years while she is on Verzenio.  I however did explain to the patient that there is no data about how we should proceed if someone developed a DVT or PE on Verzenio.  She does understand that there is a increased risk of DVT and PE with Verzenio.  2/20/2025 resume Verzenio at 100 mg twice daily and increase it to 150 mg twice daily.  Patient seen in follow-up 3/13/2025 reporting she is struggling to tolerate 150 mg twice daily.  GERD, diarrhea and profound fatigue.  Will reduce to 100 mg twice daily.    *Right sided rib pain  Acute onset on 1/8/2025  Secondary to pulmonary embolism and infarction.  Improving.  Continue Norco as needed  Notes overall improvement in her right chest wall pain    *Diarrhea  Secondary to Verzenio  Tolerable  Creatinine normal  Continue to monitor  Verzenio has been held since 2/6/2025  Recommend resuming  Verzenio 2/20/2025 but we will start back at 100 mg twice daily and increase to 150 mg twice daily if tolerated.  Unfortunately,'s severe exacerbation of diarrhea with resumption of Verzenio 150 mg twice daily.  She has had to call into work several days because of this.  We will dose reduce Verzenio    *Nausea  Severe secondary to Verzenio  Improved.  Currently exacerbated by GERD    *GERD  Uncontrolled with famotidine twice daily  Reviewed with [facility] pharmacy, no interaction for PPI with Verzenio.  Therefore begin Protonix 40 mg daily    *Dizziness  Blood pressure relatively normal  Could be secondary to Verzenio  Continue to monitor closely  If it continues and may require additional cardiac workup including EKG and echocardiogram  Resolved    *Anxiety and depression  Continue Wellbutrin, Lexapro  Well-controlled at this time    *Hypertension  Blood pressure BP: 113/71    Continue current medications  Noted to be tachycardic with a heart rate of 112.    *Hyperlipidemia  Continue Crestor    *Back issues/pain  Status with spinal fusion  Continue Flexeril  Encouraged to contact Dr. Botello regarding intermittent popping and pain in her back    *Bone health  Baseline DEXA scan 8/29/2024 noted normal bone density  Discussed Zometa.  Dental clearance form provided to patient today    *Fatigue  Patient reports severe fatigue Secondary to anastrozole and Verzenio.  We discussed utilizing Ritalin and referral to supportive oncology.  She wants to think about this and let me know.      *Pulmonary embolism  Started on Eliquis 10 mg twice daily and currently on Eliquis 5 mg twice daily  Plan to continue anticoagulation for 2 years for as long as she is on the Verzenio  Likely secondary to Verzenio  Will obtain thrombophilia workup  She has seen Dr. Nguyen with cardiology at [facility] and scheduled for an echocardiogram.    PLAN  The patient has completed adjuvant radiation 12/3/2024  Continue anastrozole 1 mg daily  Reduce  Verzenio to 100 mg twice daily due to diarrhea, fatigue and GERD  Begin Protonix 40 mg daily  Continue Pepcid as needed  Continue Eliquis for the duration of Verzenio  Continue Zofran  Imodium as needed, currently no diarrhea  Return in 2 weeks for CBC, CMP, MD follow-up with Dr. Huang    Patient is on medications requiring close monitoring for toxicities, pulmonary embolism secondary to Verzenio.  Plan of care was reviewed with Dr. Huang who is in agreement    I spent 43 minutes caring for Dasha on this date of service. This time includes time spent by me in the following activities: preparing for the visit, reviewing tests, obtaining and/or reviewing a separately obtained history, performing a medically appropriate examination and/or evaluation, counseling and educating the patient/family/caregiver, ordering medications, tests, or procedures, referring and communicating with other health care professionals, documenting information in the medical record, and care coordination.       Jessie Clark, APRN  03/13/2025

## 2025-03-17 ENCOUNTER — SPECIALTY PHARMACY (OUTPATIENT)
Dept: PHARMACY | Facility: HOSPITAL | Age: 55
End: 2025-03-17
Payer: COMMERCIAL

## 2025-03-17 DIAGNOSIS — Z17.0 MALIGNANT NEOPLASM OF LOWER-OUTER QUADRANT OF RIGHT BREAST OF FEMALE, ESTROGEN RECEPTOR POSITIVE: ICD-10-CM

## 2025-03-17 DIAGNOSIS — C50.511 MALIGNANT NEOPLASM OF LOWER-OUTER QUADRANT OF RIGHT BREAST OF FEMALE, ESTROGEN RECEPTOR POSITIVE: ICD-10-CM

## 2025-03-17 RX ORDER — ABEMACICLIB 100 MG/1
100 TABLET ORAL 2 TIMES DAILY
Qty: 56 TABLET | Refills: 5 | Status: SHIPPED | OUTPATIENT
Start: 2025-03-17

## 2025-03-17 NOTE — PROGRESS NOTES
Voicemail rec from pt-she called stating that the NP has changed her Verzenio dose to 100 mg.    Pt was seen by Jessie GRISSOM NP on 3/13/2025 and per her note-Verzenio will be reduced to 100 mg twice per day.    Reduce Verzenio to 100 mg twice daily due to diarrhea, fatigue and GERD    I have requested for a new rx to be sent and delivery will be coordinated. I have canceled the previous fill of the 150 mg that was coordinated with pt last week.    Arminda Baumann, Pharmacy Technician  03/17/25  09:48 EDT

## 2025-03-17 NOTE — PROGRESS NOTES
Specialty Pharmacy Patient Management Program  Per Protocol Prescription Order or Refill       Requested Prescriptions     Signed Prescriptions Disp Refills    Abemaciclib (Verzenio) 100 MG tablet 56 tablet 5     Sig: Take 1 tablet by mouth 2 (Two) Times a Day.     Prescription orders above were sent to [facility] per Collaborative Care Agreement Protocol.     Completed independent double check on medication order/RX.    Antonio Cherry, [place], BCOP  Clinical Specialty Pharmacist, Oncology  3/17/2025  09:48 EDT

## 2025-03-17 NOTE — PROGRESS NOTES
Specialty Pharmacy Patient Management Program  Oncology / Hematology Refill Outreach      Dasha was contacted today regarding refills of her Verzenio 100 mg specialty medication(s).    Specialty medication(s) and dose(s) confirmed: Yes  Verzenio 100 mg twice per day    Refill Questions      Flowsheet Row Most Recent Value   Changes to allergies? No   Changes to medications? Yes  [Verzenio dose decreased to 100 mg twice per day]   New conditions or infections since last clinic visit Yes   If yes, please describe  Pt reported to NP on 3/13 diarrhea, fatigue and GERD issues. Dose reduced due to this   Unplanned office visit, urgent care, ED, or hospital admission in the last 4 weeks  No   How does patient/caregiver feel medication is working? Fair   Financial problems or insurance changes  No   Since the previous refill, were any specialty medication doses or scheduled injections missed or delayed?  No   Does this patient require a clinical escalation to a pharmacist? No          Delivery Questions      Flowsheet Row Most Recent Value   Delivery method UPS   Delivery address verified with patient/caregiver? Yes  [Ship to home address]   Delivery address Home  [Ship to home address-Ship 3/17 for delivery 3/18-$0 copay with insurance and copay card-Address Confirmed]   Number of medications in delivery 1   Medication(s) being filled and delivered Abemaciclib (Verzenio)  [100 mg]   Doses left of specialty medications Just a couple   Copay verified? Yes   Copay amount $0 copay with insurance and copay card   Copay form of payment No copayment ($0)   Delivery Date Selection 03/18/25   Signature Required No          Verzenio 100 mg delivery coordinated with pt for 3/18/2025 to her home address. $0 copay with insurance and copay card. Her last delivery was on 2/21/2025 but was only a 2 week supply. No questions or concerns to report to MTM Team today. She reports no missed doses since last delivery. PDC is 61%. Since starting  Verzenio-Pt has had delays and dose changes.     Follow-Up: 21 Days    Arminda Baumann, Pharmacy Technician  3/17/2025  09:54 EDT

## 2025-03-17 NOTE — PROGRESS NOTES
Specialty Pharmacy Patient Management Program  Per Protocol Prescription Order or Refill     Patient will be filling or currently fills medications at [facility] and is enrolled in the Patient Management Program.    Requested Prescriptions     Signed Prescriptions Disp Refills    Abemaciclib (Verzenio) 100 MG tablet 56 tablet 5     Sig: Take 1 tablet by mouth 2 (Two) Times a Day.     Prescription orders above were sent to the pharmacy per Collaborative Care Agreement Protocol.     Leatha Abbott PharmD, [facility]  Clinical Specialty Pharmacist, Oncology  3/17/2025  09:40 EDT

## 2025-03-18 ENCOUNTER — SPECIALTY PHARMACY (OUTPATIENT)
Dept: PHARMACY | Facility: HOSPITAL | Age: 55
End: 2025-03-18
Payer: COMMERCIAL

## 2025-03-18 NOTE — PROGRESS NOTES
Specialty Pharmacy Patient Management Program       Verzenio 100 mg supply from [facility] delivered to pt on 3/18/2025 at 10:24 am.    UPS tracking # [id number]     Arminda Baumann, Pharmacy Technician  03/18/25  13:34 EDT

## 2025-03-20 DIAGNOSIS — I10 ESSENTIAL HYPERTENSION: ICD-10-CM

## 2025-03-20 RX ORDER — OLMESARTAN MEDOXOMIL 40 MG/1
20 TABLET ORAL DAILY
Qty: 45 TABLET | Refills: 1 | Status: SHIPPED | OUTPATIENT
Start: 2025-03-20

## 2025-03-20 RX ORDER — FAMOTIDINE 20 MG/1
20 TABLET, FILM COATED ORAL 2 TIMES DAILY
Qty: 180 TABLET | Refills: 3 | Status: SHIPPED | OUTPATIENT
Start: 2025-03-20

## 2025-03-28 ENCOUNTER — LAB (OUTPATIENT)
Dept: LAB | Facility: HOSPITAL | Age: 55
End: 2025-03-28
Payer: COMMERCIAL

## 2025-03-28 ENCOUNTER — OFFICE VISIT (OUTPATIENT)
Dept: ONCOLOGY | Facility: CLINIC | Age: 55
End: 2025-03-28
Payer: COMMERCIAL

## 2025-03-28 VITALS
SYSTOLIC BLOOD PRESSURE: 109 MMHG | HEART RATE: 77 BPM | BODY MASS INDEX: 27.43 KG/M2 | HEIGHT: 66 IN | OXYGEN SATURATION: 95 % | WEIGHT: 170.7 LBS | DIASTOLIC BLOOD PRESSURE: 70 MMHG | TEMPERATURE: 98.4 F | RESPIRATION RATE: 17 BRPM

## 2025-03-28 DIAGNOSIS — Z17.0 MALIGNANT NEOPLASM OF LOWER-OUTER QUADRANT OF RIGHT BREAST OF FEMALE, ESTROGEN RECEPTOR POSITIVE: ICD-10-CM

## 2025-03-28 DIAGNOSIS — Z79.811 USE OF AROMATASE INHIBITORS: ICD-10-CM

## 2025-03-28 DIAGNOSIS — R53.83 OTHER FATIGUE: ICD-10-CM

## 2025-03-28 DIAGNOSIS — C50.511 MALIGNANT NEOPLASM OF LOWER-OUTER QUADRANT OF RIGHT BREAST OF FEMALE, ESTROGEN RECEPTOR POSITIVE: ICD-10-CM

## 2025-03-28 DIAGNOSIS — C50.511 MALIGNANT NEOPLASM OF LOWER-OUTER QUADRANT OF RIGHT BREAST OF FEMALE, ESTROGEN RECEPTOR POSITIVE: Primary | ICD-10-CM

## 2025-03-28 DIAGNOSIS — Z17.0 MALIGNANT NEOPLASM OF LOWER-OUTER QUADRANT OF RIGHT BREAST OF FEMALE, ESTROGEN RECEPTOR POSITIVE: Primary | ICD-10-CM

## 2025-03-28 LAB
ALBUMIN SERPL-MCNC: 3.5 G/DL (ref 3.5–5.2)
ALBUMIN/GLOB SERPL: 1.1 G/DL
ALP SERPL-CCNC: 101 U/L (ref 39–117)
ALT SERPL W P-5'-P-CCNC: 11 U/L (ref 1–33)
ANION GAP SERPL CALCULATED.3IONS-SCNC: 9.4 MMOL/L (ref 5–15)
AST SERPL-CCNC: 15 U/L (ref 1–32)
BASOPHILS # BLD AUTO: 0.06 10*3/MM3 (ref 0–0.2)
BASOPHILS NFR BLD AUTO: 1.8 % (ref 0–1.5)
BILIRUB SERPL-MCNC: 0.3 MG/DL (ref 0–1.2)
BUN SERPL-MCNC: 16 MG/DL (ref 6–20)
BUN/CREAT SERPL: 18.2 (ref 7–25)
CALCIUM SPEC-SCNC: 9 MG/DL (ref 8.6–10.5)
CHLORIDE SERPL-SCNC: 104 MMOL/L (ref 98–107)
CO2 SERPL-SCNC: 26.6 MMOL/L (ref 22–29)
CREAT SERPL-MCNC: 0.88 MG/DL (ref 0.57–1)
DEPRECATED RDW RBC AUTO: 51.1 FL (ref 37–54)
EGFRCR SERPLBLD CKD-EPI 2021: 78.2 ML/MIN/1.73
EOSINOPHIL # BLD AUTO: 0.1 10*3/MM3 (ref 0–0.4)
EOSINOPHIL NFR BLD AUTO: 3 % (ref 0.3–6.2)
ERYTHROCYTE [DISTWIDTH] IN BLOOD BY AUTOMATED COUNT: 14.7 % (ref 12.3–15.4)
GLOBULIN UR ELPH-MCNC: 3.1 GM/DL
GLUCOSE SERPL-MCNC: 92 MG/DL (ref 65–99)
HCT VFR BLD AUTO: 30.8 % (ref 34–46.6)
HGB BLD-MCNC: 9.5 G/DL (ref 12–15.9)
IMM GRANULOCYTES # BLD AUTO: 0.01 10*3/MM3 (ref 0–0.05)
IMM GRANULOCYTES NFR BLD AUTO: 0.3 % (ref 0–0.5)
LYMPHOCYTES # BLD AUTO: 0.64 10*3/MM3 (ref 0.7–3.1)
LYMPHOCYTES NFR BLD AUTO: 19.1 % (ref 19.6–45.3)
MCH RBC QN AUTO: 29.5 PG (ref 26.6–33)
MCHC RBC AUTO-ENTMCNC: 30.8 G/DL (ref 31.5–35.7)
MCV RBC AUTO: 95.7 FL (ref 79–97)
MONOCYTES # BLD AUTO: 0.46 10*3/MM3 (ref 0.1–0.9)
MONOCYTES NFR BLD AUTO: 13.7 % (ref 5–12)
NEUTROPHILS NFR BLD AUTO: 2.08 10*3/MM3 (ref 1.7–7)
NEUTROPHILS NFR BLD AUTO: 62.1 % (ref 42.7–76)
NRBC BLD AUTO-RTO: 0 /100 WBC (ref 0–0.2)
PLATELET # BLD AUTO: 364 10*3/MM3 (ref 140–450)
PMV BLD AUTO: 8.6 FL (ref 6–12)
POTASSIUM SERPL-SCNC: 3.9 MMOL/L (ref 3.5–5.2)
PROT SERPL-MCNC: 6.6 G/DL (ref 6–8.5)
RBC # BLD AUTO: 3.22 10*6/MM3 (ref 3.77–5.28)
SODIUM SERPL-SCNC: 140 MMOL/L (ref 136–145)
WBC NRBC COR # BLD AUTO: 3.35 10*3/MM3 (ref 3.4–10.8)

## 2025-03-28 PROCEDURE — 80053 COMPREHEN METABOLIC PANEL: CPT

## 2025-03-28 PROCEDURE — 85025 COMPLETE CBC W/AUTO DIFF WBC: CPT

## 2025-03-28 PROCEDURE — 36415 COLL VENOUS BLD VENIPUNCTURE: CPT

## 2025-03-28 NOTE — PROGRESS NOTES
Subjective   Dasha De Leon is a 54 y.o. female.  Referred by Dr. De Anda for right breast invasive ductal carcinoma    History of Present Illness     Patient is a 54-year-old postmenopausal  lady who presented with a palpable mass in her right breast in mid April.  This was right around the time of her screening mammogram and hence she proceeded with a screening mammogram.  She denies any previous breast abnormalities or biopsies.  No family history of breast or ovarian carcinoma.    4/29/2024-bilateral screening mammogram  Breasts are entirely fatty.  Bilateral retropectoral saline implants.  Round mass measuring 16 mm with spiculated margins in the posterior one third region of the right breast at 9:00.  This is new since the prior exams.  No suspicious masses in the left breast.    5/10/2024-right breast diagnostic mammogram  Breast is entirely fatty.  Retropectoral saline implants.  Finding 1.round mass in the right breast at 9:00 previously seen on the screening mammogram, measuring 16 mm with spiculated margin.    Right breast ultrasound  Finding 1.hypoechoic mass with spiculated margins and internal vascularity measuring 15 mm at 8:00, 10 cm from the nipple.  Finding 2.irregular  Palpable hypoechoic mass measuring 4 x 6 x 4 mm at 8:30 position in the right breast, 11 cm from the nipple.    Ultrasound-guided biopsy of both these masses recommended.    5/15/2024-right breast ultrasound-guided biopsy  1.8 o'clock position right breast-invasive ductal carcinoma with apocrine features  Grade 2  ER +91 to 100% strong  NC +91 to 100% strong  HER2 negative, 1+  Ki-67 22%  2.right breast 8:30 position  Invasive ductal carcinoma with apocrine features, grade 2  ER +91 to 100% strong  NC +91 to 100% strong  HER2 negative, Ki-67 20%.    5/25/2024-bilateral breast MRI  1.2 biopsy sites of malignancy at 8:30 position in the posterior one third that measured 2.3 cm and 2.4 cm respectively.  The lesions either  about or extend to the lateral margin of the pectoral fascia that overlies the implant capsule and some extension into the pectoral muscle at this location and/or biologic capsular suspect.  Finding suspicious for axilla lymphadenopathy.  Right axilla lymph node measuring 1.4 cm.  2.no suspicious findings in the left breast.    7/11/2024-right mastectomy and sentinel lymph node biopsy  Invasive ductal carcinoma measuring 45 x 20 x 14 mm  Grade 3  Extensive lymphovascular invasion present.  Invasive carcinoma present extensively at the anterior margin  Distance to the invasive carcinoma from the posterior margin 0.9 mm  1 lymph node with macrometastasis measuring 12 mm, 2 mm extranodal extension  At least 3 sentinel lymph nodes  Total number of [place] and nonsentinel lymph nodes evaluated 5  PT2 N1a MX  Receptors repeated on the lymph node with estrogen receptor +91 to 100% strong, progesterone receptor +91 to 100% strong, HER2 negative, score 0, Ki-67 35%    Given the positive margin additional surgery performed on 7/26/2024 with ultimately negative margins.  Expander placed.    Patient presents today to discuss adjuvant therapy.    Oncotype DX recurrence score 18 with no additional benefit from chemotherapy and 5-year risk of distant metastasis being 4%.      Verzenio started 12/6/2024.    Patient was seen on 1/9/2025 when she complained of right side chest pain which had just started and she was going to call the office back if the symptoms persisted or got worse.    On 2/6/2025 she presented back for routine follow-up while on Verzenio and she complained of ongoing right sided chest pain and also reported worsening dyspnea.    2/6/2025-CT angiogram of the chest with very significant streak artifact from the right chest wall tissue expander and there was heterogeneous admixture of contrast at the pulmonary arteries particularly on the right.  Despite all the limitations there are findings suspicious for right  pulmonary thromboemboli.  Small eccentric thrombus at the proximal right interlobar artery adjacent to the right middle lobe pulmonary artery.  There is also likely a thrombus.  Likely nonocclusive thrombus within the segmental right lower lobe pulmonary arteries.    Dense consolidation in the right apex and some of the lateral peripheral subpleural opacities of the right upper lobe likely represent radiation pneumonitis and fibrosis but there is also mixed density and groundglass opacities at the right lower lobe which are suspicious for sequela of pulmonary thromboemboli.  Tiny right pleural effusion.  No left lung opacities and there is no left pleural effusion or pericardial effusion.    Abdomen and pelvis CT without any evidence of metastatic disease.  Moderately extensive abdominal aortic atherosclerotic changes without aneurysmal dilatation.    Patient was advised to stop Verzenio on 2/6/2025 after diagnosis of the pulmonary embolism.    Interval history:  The patient is a 54 y.o. female with the above history, who returns to the office today for follow-up and review.   At her last visit the dose of Verzenio had been decreased to 100 mg twice daily due to severe diarrhea and fatigue.  Remi reports tolerating this dose much better.  The diarrhea and abdominal discomfort have resolved.  However she still continues with severe fatigue and requesting medication management for the severe fatigue.  She continues on Eliquis and has been compliant with this.  At the right mastectomy site she had severe hemorrhage last night and took a while to control the bleeding.  She does not have an appointment with Dr. Bender for another 2 weeks.  She is scheduled for reconstructive surgery on 4/28/2025.      The following portions of the patient's history were reviewed and updated as appropriate: allergies, current medications, past family history, past medical history, past social history, past surgical history, and problem  list.    Past Medical History:   Diagnosis Date    Abnormal Pap smear of cervix     Anxiety     Miller's cyst     RESOLVED    Breast cancer     Right    Bulging lumbar disc     Bursitis     HX    Depression     GERD (gastroesophageal reflux disease)     H/O colposcopy with cervical biopsy     unknown pap result, biopsy was neg per pt, 2013 [facility]    History of 2019 novel coronavirus disease (COVID-19)     X2    History of eczema     HPV (human papilloma virus) infection     Hyperlipemia     Hypertension     Low back pain     Lumbar radiculopathy     Numbness and tingling of left leg     Osteoarthritis     Scoliosis     Seasonal allergies         Past Surgical History:   Procedure Laterality Date    BREAST AUGMENTATION      BREAST BIOPSY Right 9/19/2024    Procedure: AXILLARY LYMPH NODE BIOPSY/EXCISION;  Surgeon: Romina De Anda MD;  Location: [facility] OR;  Service: General;  Laterality: Right;    BREAST RECONSTRUCTION Right 7/11/2024    Procedure: RIGHT SUBPECTORAL PLACEMENT TISSUE EXPANDER AND CORTIVA (ACELLULAR DERMAL MATRIX), COMPLEX CLOSURE RIGHT BREAST 5cm;  Surgeon: Colin Bender MD;  Location: [facility] OR;  Service: Plastics;  Laterality: Right;    BREAST SURGERY Right 7/26/2024    Procedure: ADJACENT TISSUE REARRANGEMENT RIGHT BREAST;  Surgeon: Colin Bender MD;  Location: [facility] OR;  Service: Plastics;  Laterality: Right;    CARPAL TUNNEL RELEASE Right 2004    CRYOTHERAPY      1997    D & C HYSTEROSCOPY N/A 9/19/2024    Procedure: INTRAUTERINE DEVICE REMOVAL;  Surgeon: Kaleb Burciaga MD;  Location: [facility] OR;  Service: Obstetrics/Gynecology;  Laterality: N/A;    EPIDURAL BLOCK      HAND SURGERY  2004    Pisiformectomy    LUMBAR DISCECTOMY FUSION INSTRUMENTATION N/A 01/05/2024    Procedure: Lumbar 3 to lumbar 4 and lumbar 4 to lumbar 5 laminectomy with fusion and instrumentation;  Surgeon: Rigoberto Botello MD;  Location: [facility] OR;  Service: Robotics - Neuro;   "Laterality: N/A;    MAMMO BILATERAL  2014    MASTECTOMY W/ SENTINEL NODE BIOPSY Right 2024    Procedure: Right MASTECTOMY WITH SENTINEL NODE BIOPSY;  Surgeon: Romina De Anda MD;  Location: [facility] OR;  Service: General;  Laterality: Right;    MASTECTOMY WITH IMMEDIATE RECONSTRUCTION Right 2024    Procedure: right mastectomy, margin excision;  Surgeon: Romina De Anda MD;  Location: [facility] OR;  Service: General;  Laterality: Right;    PAP SMEAR      SHOULDER ARTHROSCOPY Left ,    SHOULDER SURGERY      \"MANIPULATION FOR FROZEN SHOULDER\"    US GUIDED LYMPH NODE BIOPSY  2024        Family History   Problem Relation Age of Onset    Hypertension Mother     Hyperlipidemia Mother     Diverticulitis Mother     Pancreatitis Mother     Kidney disease Father     Skin cancer Father     Hypertension Father     Hyperlipidemia Father     Stroke Father     Atrial fibrillation Father     Transient ischemic attack Father     No Known Problems Sister     Depression Brother     No Known Problems Daughter     No Known Problems Son     Uterine cancer Maternal Aunt 58    Heart disease Maternal Aunt     Lung cancer Maternal Uncle     Heart attack Maternal Uncle     No Known Problems Paternal Aunt     Colon cancer Paternal Uncle 65    Liver disease Maternal Grandmother     Heart disease Maternal Grandfather     Cancer Maternal Grandfather     Heart attack Paternal Grandmother     No Known Problems Paternal Grandfather     BRCA 1/2 Neg Hx     Breast cancer Neg Hx     Endometrial cancer Neg Hx     Ovarian cancer Neg Hx     Malig Hyperthermia Neg Hx         Social History     Socioeconomic History    Marital status: Single   Tobacco Use    Smoking status: Former     Current packs/day: 0.00     Average packs/day: 1 pack/day for 20.0 years (20.0 ttl pk-yrs)     Types: Cigarettes     Start date:      Quit date: 2006     Years since quittin.2     Passive exposure: Past    Smokeless tobacco: Never   Vaping " "Use    Vaping status: Never Used   Substance and Sexual Activity    Alcohol use: Yes     Alcohol/week: 2.0 - 4.0 standard drinks of alcohol     Types: 2 - 4 Glasses of wine per week     Comment: weekly    Drug use: No    Sexual activity: Not Currently     Birth control/protection: I.U.D.        OB History          0    Para   0    Term   0       0    AB   0    Living   0         SAB   0    IAB   0    Ectopic   0    Molar        Multiple   0    Live Births                   Age at menarche-13  Age at first live childbirth-not applicable   0 para 0  0  Age at menopause-49  Oral conceptive pill use for 25 years  No use of hormone replacement therapy    Allergies   Allergen Reactions    Penicillins Anaphylaxis    Hemp Seed Oil Itching     HEMP IN LOTIONS    Lisinopril Cough      Review of Systems  Review of systems as mentioned HPI otherwise negative    Objective   Blood pressure 109/70, pulse 77, temperature 98.4 °F (36.9 °C), temperature source Oral, resp. rate 17, height 167.6 cm (65.98\"), weight 77.4 kg (170 lb 11.2 oz), SpO2 95%, not currently breastfeeding.     Physical Exam  Vitals reviewed.   Constitutional:       Appearance: She is normal weight.   HENT:      Head: Normocephalic and atraumatic.      Right Ear: External ear normal.      Left Ear: External ear normal.   Eyes:      Conjunctiva/sclera: Conjunctivae normal.   Cardiovascular:      Rate and Rhythm: Normal rate.   Pulmonary:      Effort: Pulmonary effort is normal.   Abdominal:      General: Abdomen is flat.   Musculoskeletal:         General: Normal range of motion.      Cervical back: Normal range of motion.   Skin:     General: Skin is warm.   Neurological:      General: No focal deficit present.      Mental Status: She is alert and oriented to person, place, and time.   Psychiatric:         Mood and Affect: Mood normal.         Behavior: Behavior normal.         Thought Content: Thought content normal.         Judgment: " Judgment normal.       Breast Exam: Right breast status post mastectomy with expander in place.  Radiation dermatitis with erythema of the chest wall noted without excoriation or infection    I have reexamined the patient and the results are consistent with the previously documented exam. Talia Huang MD       Results from last 7 days   Lab Units 03/28/25  1012   WBC 10*3/mm3 3.35*   NEUTROS ABS 10*3/mm3 2.08   HEMOGLOBIN g/dL 9.5*   HEMATOCRIT % 30.8*   PLATELETS 10*3/mm3 364                 No radiology results for the last 30 days.     Assessment & Plan       *Right breast invasive ductal carcinoma  Status post right mastectomy on 7/11/2024-pathology with 45 mm of invasive ductal carcinoma, grade 3, ER/WV strongly positive at 91 to 100%, HER2 1+, Ki-67 22%  1 out of 5 lymph nodes positive for metastatic disease and receptors on lymph node also ER positive and WV positive.  PT2 N1a MX  8/12/2024-CT of the chest abdomen pelvis-single mildly enlarged right axillary lymph node suspicious for sharona metastasis.  No evidence of distant metastatic disease in the patient with recent right mastectomy.  Mild hepatic steatosis.  IUD present.  Duodenal diverticulum present.  9/19/2024-abnormal lymph node excision.  1 lymph node involved by macrometastatic carcinoma measuring 9.5 mm, greater than 2 mm extranodal extension.  Oncotype DX recurrence score 18 with no additional benefit from chemotherapy and 4% risk of distant metastasis at 5 years  Given the low Oncotype DX recurrence score she will proceed with adjuvant radiation.  Started anastrozole November 2024  Radiation complete 12/3/2024  12/4/2024 fair tolerance to anastrozole.   12/6/2024-started Verzenio.  1/9/2025-patient continues on full dose of Verzenio 150 mg twice daily and labs reviewed and stable to proceed with the same.  2/6/2025-patient reports severe right-sided rib pain as well as dyspnea, tachycardia and severe nausea.  2/6/2025-CT angiogram of the  chest with right sided pulmonary emboli.  There is also significant streak artifact and also radiation-induced changes of the right apex.  No evidence of metastatic disease.  2/6/2025-CT abdomen and pelvis without any evidence of metastatic disease  2/18/2025-bone scan without any evidence of metastatic disease.  Verzenio was held since 2/6/2025.  She continues on anastrozole  Today we had a long discussion regarding the increased risk of DVT and PE associated with Verzenio.  However given that the tumor was 45 mm high-grade and 2 lymph nodes were involved with malignancy the risk of recurrence is high enough that I would recommend that she continue with Verzenio and continue anticoagulation for 2 years while she is on Verzenio.  I however did explain to the patient that there is no data about how we should proceed if someone developed a DVT or PE on Verzenio.  She does understand that there is a increased risk of DVT and PE with Verzenio.  2/20/2025 resumed Verzenio at 100 mg twice daily and subsequently increased to 150 mg  3/15/2025-dose of Verzenio decreased to 100 mg twice daily.  3/28/2025-patient tolerating the 100 mg twice daily of Verzenio well with resolution of diarrhea and abdominal pain however she continues to experience severe fatigue.    *Severe fatigue  Secondary to endocrine therapy as well as Verzenio.  Patient will be referred to supportive oncology to discuss Ritalin.    *Right sided rib pain  Acute onset on 1/8/2025  Secondary to pulmonary embolism and infarction.  Notes overall improvement in her right chest wall pain  Requesting refill on hydrocodone, this will be sent.    *Diarrhea  Improved significantly with dose reduction.  Continue Verzenio 100 mg twice daily  Continue Imodium as needed    *Nausea  Severe secondary to Verzenio  Exacerbated by GERD  Improved with Protonix  Continue Protonix    *GERD  Started Protonix 40 mg daily  Improved    *Dizziness  Resolved  Blood pressure  normal    *Anxiety and depression  Continue Wellbutrin, Lexapro  Well-controlled at this time    *Hypertension  Blood pressure BP: 109/70    Continue current medications  Heart rate improved at 77    *Hyperlipidemia  Continue Crestor    *Back issues/pain  Status with spinal fusion  Continue Flexeril  Encouraged to contact Dr. Botello regarding intermittent popping and pain in her back    *Bone health  Baseline DEXA scan 8/29/2024 noted normal bone density  Discussed Zometa.  Dental clearance form provided to patient today      *Pulmonary embolism  Started on Eliquis 10 mg twice daily and currently on Eliquis 5 mg twice daily  Plan to continue anticoagulation for 2 years for as long as she is on the Verzenio  Likely secondary to Verzenio  Thrombophilia workup negative    *Right chest wall hemorrhage  Patient had severe bleeding on 3/27/2025 and hemoglobin has dropped to 9.8.  Recommend that she contact Dr. Bender's office regarding the same.  She is also scheduled for reconstructive surgery 4/28/2025  She would have to hold the Verzenio 1 week before and 1 week after  Also recommend that she be on Lovenox while she is holding Eliquis.  I recommend that she discuss this with Dr. Amaro as well.    PLAN  The patient has completed adjuvant radiation 12/3/2024  Continue anastrozole 1 mg daily  Continue Verzenio 100 mg twice daily  Continue Protonix 40 mg daily  Continue Pepcid as needed  Referral to supportive oncology to discuss Ritalin for fatigue  Continue Eliquis for the duration of Verzenio  Continue Zofran  Imodium as needed, currently no diarrhea  Recommend holding Eliquis 3 days prior to reconstructive surgery, patient would have to be on Lovenox leading up to surgery.  Recommend discussing with Dr. Bender regarding the hemorrhage of the right chest wall as well as duration for which she would recommend holding anticoagulation.     Patient is on medications requiring close monitoring for toxicities,  pulmonary embolism secondary to Verzenio.  Also with severe fatigue related to endocrine therapy and Verzenio.          Talia Huang MD  03/28/2025

## 2025-03-31 ENCOUNTER — SPECIALTY PHARMACY (OUTPATIENT)
Dept: PHARMACY | Facility: HOSPITAL | Age: 55
End: 2025-03-31
Payer: COMMERCIAL

## 2025-03-31 DIAGNOSIS — Z17.0 MALIGNANT NEOPLASM OF LOWER-OUTER QUADRANT OF RIGHT BREAST OF FEMALE, ESTROGEN RECEPTOR POSITIVE: ICD-10-CM

## 2025-03-31 DIAGNOSIS — C50.511 MALIGNANT NEOPLASM OF LOWER-OUTER QUADRANT OF RIGHT BREAST OF FEMALE, ESTROGEN RECEPTOR POSITIVE: ICD-10-CM

## 2025-03-31 RX ORDER — HYDROCODONE BITARTRATE AND ACETAMINOPHEN 5; 325 MG/1; MG/1
1 TABLET ORAL EVERY 6 HOURS PRN
Qty: 90 TABLET | Refills: 0 | Status: ON HOLD | OUTPATIENT
Start: 2025-03-31

## 2025-03-31 NOTE — PROGRESS NOTES
Specialty Pharmacy Note: Verzenio (abemaciclib)    Dasha De Leon is a 54 y.o. female with breast cancer was seen 3/28/25 by Dr. Huang. Per provider dictation, no changes to oral oncology regimen Verzenio (abemaciclib).  Labs Review: The CMP and CBC from 3/28/25 have been reviewed. No dose adjustments are needed for the oral specialty medication(s) based on the labs.    Specialty pharmacy will continue to follow patient.    Leatha Abbott, PharmD, BCPS  3/31/2025  09:42 EDT

## 2025-04-06 ENCOUNTER — APPOINTMENT (OUTPATIENT)
Dept: GENERAL RADIOLOGY | Facility: HOSPITAL | Age: 55
End: 2025-04-06
Payer: COMMERCIAL

## 2025-04-06 ENCOUNTER — HOSPITAL ENCOUNTER (INPATIENT)
Facility: HOSPITAL | Age: 55
LOS: 2 days | Discharge: HOME | End: 2025-04-09
Attending: EMERGENCY MEDICINE | Admitting: INTERNAL MEDICINE
Payer: COMMERCIAL

## 2025-04-06 DIAGNOSIS — Z85.3 HISTORY OF BREAST CANCER: ICD-10-CM

## 2025-04-06 DIAGNOSIS — Z17.0 MALIGNANT NEOPLASM OF LOWER-OUTER QUADRANT OF RIGHT BREAST OF FEMALE, ESTROGEN RECEPTOR POSITIVE: ICD-10-CM

## 2025-04-06 DIAGNOSIS — R06.00 DYSPNEA, UNSPECIFIED TYPE: ICD-10-CM

## 2025-04-06 DIAGNOSIS — S21.101A OPEN WOUND OF RIGHT CHEST WALL, INITIAL ENCOUNTER: Primary | ICD-10-CM

## 2025-04-06 DIAGNOSIS — D64.9 ANEMIA, UNSPECIFIED TYPE: ICD-10-CM

## 2025-04-06 DIAGNOSIS — C50.511 MALIGNANT NEOPLASM OF LOWER-OUTER QUADRANT OF RIGHT BREAST OF FEMALE, ESTROGEN RECEPTOR POSITIVE: ICD-10-CM

## 2025-04-06 LAB
ALBUMIN SERPL-MCNC: 3.5 G/DL (ref 3.5–5.2)
ALBUMIN/GLOB SERPL: 0.9 G/DL
ALP SERPL-CCNC: 124 U/L (ref 39–117)
ALT SERPL W P-5'-P-CCNC: 14 U/L (ref 1–33)
ANION GAP SERPL CALCULATED.3IONS-SCNC: 14.2 MMOL/L (ref 5–15)
AST SERPL-CCNC: 18 U/L (ref 1–32)
BILIRUB SERPL-MCNC: 0.5 MG/DL (ref 0–1.2)
BLASTS NFR BLD MANUAL: 1 % (ref 0–0)
BUN SERPL-MCNC: 11 MG/DL (ref 6–20)
BUN/CREAT SERPL: 11.3 (ref 7–25)
CALCIUM SPEC-SCNC: 9.4 MG/DL (ref 8.6–10.5)
CHLORIDE SERPL-SCNC: 98 MMOL/L (ref 98–107)
CO2 SERPL-SCNC: 23.8 MMOL/L (ref 22–29)
CREAT SERPL-MCNC: 0.97 MG/DL (ref 0.57–1)
D-LACTATE SERPL-SCNC: 1.4 MMOL/L (ref 0.5–2)
D-LACTATE SERPL-SCNC: 2.1 MMOL/L (ref 0.5–2)
DEPRECATED RDW RBC AUTO: 44.6 FL (ref 37–54)
EGFRCR SERPLBLD CKD-EPI 2021: 69.6 ML/MIN/1.73
EOSINOPHIL # BLD MANUAL: 0.06 10*3/MM3 (ref 0–0.4)
EOSINOPHIL NFR BLD MANUAL: 1 % (ref 0.3–6.2)
ERYTHROCYTE [DISTWIDTH] IN BLOOD BY AUTOMATED COUNT: 13.9 % (ref 12.3–15.4)
GEN 5 1HR TROPONIN T REFLEX: 7 NG/L
GLOBULIN UR ELPH-MCNC: 3.9 GM/DL
GLUCOSE SERPL-MCNC: 102 MG/DL (ref 65–99)
HCT VFR BLD AUTO: 29.6 % (ref 34–46.6)
HGB BLD-MCNC: 9.7 G/DL (ref 12–15.9)
HOLD SPECIMEN: NORMAL
HOLD SPECIMEN: NORMAL
LYMPHOCYTES # BLD MANUAL: 0.5 10*3/MM3 (ref 0.7–3.1)
LYMPHOCYTES NFR BLD MANUAL: 16 % (ref 5–12)
MCH RBC QN AUTO: 29.5 PG (ref 26.6–33)
MCHC RBC AUTO-ENTMCNC: 32.8 G/DL (ref 31.5–35.7)
MCV RBC AUTO: 90 FL (ref 79–97)
MONOCYTES # BLD: 0.89 10*3/MM3 (ref 0.1–0.9)
MYELOCYTES NFR BLD MANUAL: 1 % (ref 0–0)
NEUTROPHILS # BLD AUTO: 4 10*3/MM3 (ref 1.7–7)
NEUTROPHILS NFR BLD MANUAL: 72 % (ref 42.7–76)
NRBC BLD AUTO-RTO: 0 /100 WBC (ref 0–0.2)
NT-PROBNP SERPL-MCNC: 647 PG/ML (ref 0–900)
PLAT MORPH BLD: NORMAL
PLATELET # BLD AUTO: 406 10*3/MM3 (ref 140–450)
PMV BLD AUTO: 8.8 FL (ref 6–12)
POTASSIUM SERPL-SCNC: 3.7 MMOL/L (ref 3.5–5.2)
PROT SERPL-MCNC: 7.4 G/DL (ref 6–8.5)
QT INTERVAL: 367 MS
QTC INTERVAL: 444 MS
RBC # BLD AUTO: 3.29 10*6/MM3 (ref 3.77–5.28)
RBC MORPH BLD: NORMAL
SODIUM SERPL-SCNC: 136 MMOL/L (ref 136–145)
TROPONIN T NUMERIC DELTA: -1 NG/L
TROPONIN T SERPL HS-MCNC: 8 NG/L
VARIANT LYMPHS NFR BLD MANUAL: 9 % (ref 19.6–45.3)
WBC NRBC COR # BLD AUTO: 5.56 10*3/MM3 (ref 3.4–10.8)
WHOLE BLOOD HOLD COAG: NORMAL
WHOLE BLOOD HOLD SPECIMEN: NORMAL

## 2025-04-06 PROCEDURE — 25010000002 MORPHINE PER 10 MG: Performed by: EMERGENCY MEDICINE

## 2025-04-06 PROCEDURE — G0378 HOSPITAL OBSERVATION PER HR: HCPCS

## 2025-04-06 PROCEDURE — 85025 COMPLETE CBC W/AUTO DIFF WBC: CPT | Performed by: EMERGENCY MEDICINE

## 2025-04-06 PROCEDURE — 25010000002 CEFAZOLIN PER 500 MG: Performed by: INTERNAL MEDICINE

## 2025-04-06 PROCEDURE — 99285 EMERGENCY DEPT VISIT HI MDM: CPT

## 2025-04-06 PROCEDURE — 71045 X-RAY EXAM CHEST 1 VIEW: CPT

## 2025-04-06 PROCEDURE — 25010000002 VANCOMYCIN 10 G RECONSTITUTED SOLUTION: Performed by: INTERNAL MEDICINE

## 2025-04-06 PROCEDURE — 25810000003 SODIUM CHLORIDE 0.9 % SOLUTION: Performed by: INTERNAL MEDICINE

## 2025-04-06 PROCEDURE — 84484 ASSAY OF TROPONIN QUANT: CPT | Performed by: EMERGENCY MEDICINE

## 2025-04-06 PROCEDURE — 36415 COLL VENOUS BLD VENIPUNCTURE: CPT | Performed by: EMERGENCY MEDICINE

## 2025-04-06 PROCEDURE — 25010000002 CEFAZOLIN PER 500 MG: Performed by: EMERGENCY MEDICINE

## 2025-04-06 PROCEDURE — 87040 BLOOD CULTURE FOR BACTERIA: CPT | Performed by: EMERGENCY MEDICINE

## 2025-04-06 PROCEDURE — 83605 ASSAY OF LACTIC ACID: CPT | Performed by: EMERGENCY MEDICINE

## 2025-04-06 PROCEDURE — 93005 ELECTROCARDIOGRAM TRACING: CPT | Performed by: EMERGENCY MEDICINE

## 2025-04-06 PROCEDURE — 83880 ASSAY OF NATRIURETIC PEPTIDE: CPT | Performed by: EMERGENCY MEDICINE

## 2025-04-06 PROCEDURE — 93010 ELECTROCARDIOGRAM REPORT: CPT | Performed by: INTERNAL MEDICINE

## 2025-04-06 PROCEDURE — 85007 BL SMEAR W/DIFF WBC COUNT: CPT | Performed by: EMERGENCY MEDICINE

## 2025-04-06 PROCEDURE — 80053 COMPREHEN METABOLIC PANEL: CPT | Performed by: EMERGENCY MEDICINE

## 2025-04-06 RX ORDER — ACETAMINOPHEN 325 MG/1
650 TABLET ORAL EVERY 4 HOURS PRN
Status: DISCONTINUED | OUTPATIENT
Start: 2025-04-06 | End: 2025-04-09 | Stop reason: HOSPADM

## 2025-04-06 RX ORDER — ONDANSETRON 4 MG/1
4 TABLET, ORALLY DISINTEGRATING ORAL EVERY 6 HOURS PRN
Status: DISCONTINUED | OUTPATIENT
Start: 2025-04-06 | End: 2025-04-09 | Stop reason: HOSPADM

## 2025-04-06 RX ORDER — ATORVASTATIN CALCIUM 20 MG/1
80 TABLET, FILM COATED ORAL DAILY
Status: DISCONTINUED | OUTPATIENT
Start: 2025-04-06 | End: 2025-04-08

## 2025-04-06 RX ORDER — SODIUM CHLORIDE 0.9 % (FLUSH) 0.9 %
10 SYRINGE (ML) INJECTION AS NEEDED
Status: DISCONTINUED | OUTPATIENT
Start: 2025-04-06 | End: 2025-04-09 | Stop reason: HOSPADM

## 2025-04-06 RX ORDER — CYCLOBENZAPRINE HCL 10 MG
10 TABLET ORAL 3 TIMES DAILY PRN
Status: DISCONTINUED | OUTPATIENT
Start: 2025-04-06 | End: 2025-04-09 | Stop reason: HOSPADM

## 2025-04-06 RX ORDER — ONDANSETRON 2 MG/ML
4 INJECTION INTRAMUSCULAR; INTRAVENOUS EVERY 6 HOURS PRN
Status: DISCONTINUED | OUTPATIENT
Start: 2025-04-06 | End: 2025-04-09 | Stop reason: HOSPADM

## 2025-04-06 RX ORDER — CETIRIZINE HYDROCHLORIDE 10 MG/1
10 TABLET ORAL DAILY
Status: DISCONTINUED | OUTPATIENT
Start: 2025-04-06 | End: 2025-04-08

## 2025-04-06 RX ORDER — LOSARTAN POTASSIUM 100 MG/1
100 TABLET ORAL
Status: DISCONTINUED | OUTPATIENT
Start: 2025-04-06 | End: 2025-04-08

## 2025-04-06 RX ORDER — BUPROPION HYDROCHLORIDE 300 MG/1
300 TABLET ORAL DAILY
Status: DISCONTINUED | OUTPATIENT
Start: 2025-04-06 | End: 2025-04-08

## 2025-04-06 RX ORDER — AMOXICILLIN 250 MG
2 CAPSULE ORAL 2 TIMES DAILY PRN
Status: DISCONTINUED | OUTPATIENT
Start: 2025-04-06 | End: 2025-04-09 | Stop reason: HOSPADM

## 2025-04-06 RX ORDER — PANTOPRAZOLE SODIUM 40 MG/1
40 TABLET, DELAYED RELEASE ORAL DAILY
Status: DISCONTINUED | OUTPATIENT
Start: 2025-04-06 | End: 2025-04-08

## 2025-04-06 RX ORDER — METOPROLOL SUCCINATE 25 MG/1
25 TABLET, EXTENDED RELEASE ORAL DAILY
Status: DISCONTINUED | OUTPATIENT
Start: 2025-04-06 | End: 2025-04-09 | Stop reason: HOSPADM

## 2025-04-06 RX ORDER — VANCOMYCIN/0.9 % SOD CHLORIDE 1.5G/250ML
1500 PLASTIC BAG, INJECTION (ML) INTRAVENOUS ONCE
Status: COMPLETED | OUTPATIENT
Start: 2025-04-06 | End: 2025-04-06

## 2025-04-06 RX ORDER — BISACODYL 5 MG/1
5 TABLET, DELAYED RELEASE ORAL DAILY PRN
Status: DISCONTINUED | OUTPATIENT
Start: 2025-04-06 | End: 2025-04-09 | Stop reason: HOSPADM

## 2025-04-06 RX ORDER — FUROSEMIDE 20 MG/1
20 TABLET ORAL
Status: DISCONTINUED | OUTPATIENT
Start: 2025-04-06 | End: 2025-04-09 | Stop reason: HOSPADM

## 2025-04-06 RX ORDER — BISACODYL 10 MG
10 SUPPOSITORY, RECTAL RECTAL DAILY PRN
Status: DISCONTINUED | OUTPATIENT
Start: 2025-04-06 | End: 2025-04-09 | Stop reason: HOSPADM

## 2025-04-06 RX ORDER — MORPHINE SULFATE 2 MG/ML
2 INJECTION, SOLUTION INTRAMUSCULAR; INTRAVENOUS EVERY 4 HOURS PRN
Status: DISCONTINUED | OUTPATIENT
Start: 2025-04-06 | End: 2025-04-09 | Stop reason: HOSPADM

## 2025-04-06 RX ORDER — ACETAMINOPHEN 650 MG/1
650 SUPPOSITORY RECTAL EVERY 4 HOURS PRN
Status: DISCONTINUED | OUTPATIENT
Start: 2025-04-06 | End: 2025-04-09 | Stop reason: HOSPADM

## 2025-04-06 RX ORDER — ACETAMINOPHEN 160 MG/5ML
650 SOLUTION ORAL EVERY 4 HOURS PRN
Status: DISCONTINUED | OUTPATIENT
Start: 2025-04-06 | End: 2025-04-09 | Stop reason: HOSPADM

## 2025-04-06 RX ORDER — ESCITALOPRAM OXALATE 20 MG/1
20 TABLET ORAL DAILY
Status: DISCONTINUED | OUTPATIENT
Start: 2025-04-06 | End: 2025-04-08

## 2025-04-06 RX ORDER — ALPRAZOLAM 0.25 MG
0.25 TABLET ORAL 2 TIMES DAILY PRN
Status: DISCONTINUED | OUTPATIENT
Start: 2025-04-06 | End: 2025-04-09 | Stop reason: HOSPADM

## 2025-04-06 RX ORDER — HYDROCODONE BITARTRATE AND ACETAMINOPHEN 5; 325 MG/1; MG/1
1 TABLET ORAL EVERY 6 HOURS PRN
Refills: 0 | Status: DISCONTINUED | OUTPATIENT
Start: 2025-04-06 | End: 2025-04-09 | Stop reason: HOSPADM

## 2025-04-06 RX ORDER — POLYETHYLENE GLYCOL 3350 17 G/17G
17 POWDER, FOR SOLUTION ORAL DAILY PRN
Status: DISCONTINUED | OUTPATIENT
Start: 2025-04-06 | End: 2025-04-09 | Stop reason: HOSPADM

## 2025-04-06 RX ORDER — ANASTROZOLE 1 MG/1
1 TABLET ORAL DAILY
Status: DISCONTINUED | OUTPATIENT
Start: 2025-04-06 | End: 2025-04-08

## 2025-04-06 RX ORDER — MORPHINE SULFATE 2 MG/ML
2 INJECTION, SOLUTION INTRAMUSCULAR; INTRAVENOUS ONCE
Status: COMPLETED | OUTPATIENT
Start: 2025-04-06 | End: 2025-04-06

## 2025-04-06 RX ADMIN — VANCOMYCIN HYDROCHLORIDE 1500 MG: 10 INJECTION, POWDER, LYOPHILIZED, FOR SOLUTION INTRAVENOUS at 20:55

## 2025-04-06 RX ADMIN — CEFAZOLIN 1000 MG: 1 INJECTION, POWDER, FOR SOLUTION INTRAMUSCULAR; INTRAVENOUS at 15:50

## 2025-04-06 RX ADMIN — BUPROPION HYDROCHLORIDE 300 MG: 300 TABLET, EXTENDED RELEASE ORAL at 19:09

## 2025-04-06 RX ADMIN — MORPHINE SULFATE 2 MG: 2 INJECTION, SOLUTION INTRAMUSCULAR; INTRAVENOUS at 15:25

## 2025-04-06 RX ADMIN — ANASTROZOLE 1 MG: 1 TABLET, FILM COATED ORAL at 19:10

## 2025-04-06 RX ADMIN — CETIRIZINE HYDROCHLORIDE 10 MG: 10 TABLET ORAL at 19:09

## 2025-04-06 RX ADMIN — HYDROCODONE BITARTRATE AND ACETAMINOPHEN 1 TABLET: 5; 325 TABLET ORAL at 19:58

## 2025-04-06 RX ADMIN — CEFAZOLIN 1000 MG: 1 INJECTION, POWDER, FOR SOLUTION INTRAMUSCULAR; INTRAVENOUS at 22:42

## 2025-04-06 RX ADMIN — ESCITALOPRAM 20 MG: 20 TABLET, FILM COATED ORAL at 19:10

## 2025-04-06 RX ADMIN — PANTOPRAZOLE SODIUM 40 MG: 40 TABLET, DELAYED RELEASE ORAL at 19:10

## 2025-04-06 RX ADMIN — ATORVASTATIN CALCIUM 80 MG: 80 TABLET, FILM COATED ORAL at 19:10

## 2025-04-06 RX ADMIN — METOPROLOL SUCCINATE 25 MG: 25 TABLET, EXTENDED RELEASE ORAL at 19:10

## 2025-04-06 RX ADMIN — FUROSEMIDE 20 MG: 20 TABLET ORAL at 19:10

## 2025-04-06 NOTE — ED PROVIDER NOTES
EMERGENCY DEPARTMENT ENCOUNTER  Room Number:  27/27  PCP: Ariana Coffman PA-C  Independent Historians: Patient and Family      HPI:  Chief Complaint: had concerns including Shortness of Breath and Post-op Problem.     A complete HPI/ROS/PMH/PSH/SH/FH are unobtainable due to: None    Chronic or social conditions impacting patient care (Social Determinants of Health): None      Context: Dasha De Leon is a 54 y.o. female with a medical history of breast cancer, hypertension, PE who presents to the ED c/o acute problems with her right breast spacer.  The patient reports that her spacer fell out this morning.  She states that she recently had some swelling at the site and then an open wound developed.  She reports that the spacer fell out today.  She does report chronic shortness of breath with her cancer treatment.  She states she has good days and bad days.  She does report today is one of the days that she is having shortness of breath with exertion.  She states that if her breast implant did not fall out she would not of come to the hospital for her shortness of breath today.  She denies any chest pain.  She denies fevers.  She states she is compliant with her blood thinners and she last took her last dose last night.  The patient does report she had fevers for 3 days last week      Review of prior external notes (non-ED) -and- Review of prior external test results outside of this encounter:  Oncology note dated 3/28/2025 notes that on March 27 she had severe bleeding and her hemoglobin dropped down to 9.8 from the right chest wall hemorrhage.  She was scheduled for reconstructive surgery on April 28, 2025.  She would need to hold Verzenio 1 week before and 1 week after surgery.  They also recommend initiating Lovenox when she is off Eliquis.    Prescription drug monitoring program review:         PAST MEDICAL HISTORY  Active Ambulatory Problems     Diagnosis Date Noted    Essential hypertension 12/27/2016     Mood disorder 12/27/2016    Allergic rhinitis 12/27/2016    Hyperlipidemia 10/29/2019    Knee pain, right 04/24/2019    Lumbar radiculopathy 05/28/2020    Vitamin D deficiency 09/13/2021    Lumbar spinal stenosis 01/05/2024    Follow-up examination following surgery 01/18/2024    Abnormality of right breast on screening mammogram 05/06/2024    Malignant neoplasm of lower-outer quadrant of right breast of female, estrogen receptor positive 05/22/2024    Anxiety 06/17/2024    ASCUS of cervix with negative high risk HPV 10/07/2024     Resolved Ambulatory Problems     Diagnosis Date Noted    IUD (intrauterine device) in place 09/22/2016     Past Medical History:   Diagnosis Date    Abnormal Pap smear of cervix     Baker's cyst     Breast cancer     Bulging lumbar disc     Bursitis     Depression     GERD (gastroesophageal reflux disease)     H/O colposcopy with cervical biopsy     History of 2019 novel coronavirus disease (COVID-19)     History of eczema     HPV (human papilloma virus) infection     Hyperlipemia     Hypertension     Low back pain     Numbness and tingling of left leg     Osteoarthritis     Scoliosis     Seasonal allergies          PAST SURGICAL HISTORY  Past Surgical History:   Procedure Laterality Date    BREAST AUGMENTATION      BREAST BIOPSY Right 9/19/2024    Procedure: AXILLARY LYMPH NODE BIOPSY/EXCISION;  Surgeon: Romina De Anda MD;  Location: [facility] OR;  Service: General;  Laterality: Right;    BREAST RECONSTRUCTION Right 7/11/2024    Procedure: RIGHT SUBPECTORAL PLACEMENT TISSUE EXPANDER AND CORTIVA (ACELLULAR DERMAL MATRIX), COMPLEX CLOSURE RIGHT BREAST 5cm;  Surgeon: Colin Bender MD;  Location: [facility] OR;  Service: Plastics;  Laterality: Right;    BREAST SURGERY Right 7/26/2024    Procedure: ADJACENT TISSUE REARRANGEMENT RIGHT BREAST;  Surgeon: Colin Bender MD;  Location: [facility] OR;  Service: Plastics;  Laterality: Right;    CARPAL TUNNEL RELEASE Right 2004     "CRYOTHERAPY      1997    D & C HYSTEROSCOPY N/A 9/19/2024    Procedure: INTRAUTERINE DEVICE REMOVAL;  Surgeon: Kaleb Burciaga MD;  Location: [facility] MAIN OR;  Service: Obstetrics/Gynecology;  Laterality: N/A;    EPIDURAL BLOCK      HAND SURGERY  2004    Pisiformectomy    LUMBAR DISCECTOMY FUSION INSTRUMENTATION N/A 01/05/2024    Procedure: Lumbar 3 to lumbar 4 and lumbar 4 to lumbar 5 laminectomy with fusion and instrumentation;  Surgeon: Rigoberto Botello MD;  Location: [facility] MAIN OR;  Service: Robotics - Neuro;  Laterality: N/A;    MAMMO BILATERAL  2014    MASTECTOMY W/ SENTINEL NODE BIOPSY Right 7/11/2024    Procedure: Right MASTECTOMY WITH SENTINEL NODE BIOPSY;  Surgeon: Romina De Anda MD;  Location: [facility] MAIN OR;  Service: General;  Laterality: Right;    MASTECTOMY WITH IMMEDIATE RECONSTRUCTION Right 7/26/2024    Procedure: right mastectomy, margin excision;  Surgeon: Romina De Anda MD;  Location: [facility] MAIN OR;  Service: General;  Laterality: Right;    PAP SMEAR  [id number]    SHOULDER ARTHROSCOPY Left 2002,2003    SHOULDER SURGERY      \"MANIPULATION FOR FROZEN SHOULDER\"    US GUIDED LYMPH NODE BIOPSY  8/29/2024         FAMILY HISTORY  Family History   Problem Relation Age of Onset    Hypertension Mother     Hyperlipidemia Mother     Diverticulitis Mother     Pancreatitis Mother     Kidney disease Father     Skin cancer Father     Hypertension Father     Hyperlipidemia Father     Stroke Father     Atrial fibrillation Father     Transient ischemic attack Father     No Known Problems Sister     Depression Brother     No Known Problems Daughter     No Known Problems Son     Uterine cancer Maternal Aunt 58    Heart disease Maternal Aunt     Lung cancer Maternal Uncle     Heart attack Maternal Uncle     No Known Problems Paternal Aunt     Colon cancer Paternal Uncle 65    Liver disease Maternal Grandmother     Heart disease Maternal Grandfather     Cancer Maternal Grandfather     Heart attack Paternal Grandmother     " No Known Problems Paternal Grandfather     BRCA 1/2 Neg Hx     Breast cancer Neg Hx     Endometrial cancer Neg Hx     Ovarian cancer Neg Hx     Malig Hyperthermia Neg Hx          SOCIAL HISTORY  Social History     Socioeconomic History    Marital status: Single   Tobacco Use    Smoking status: Former     Current packs/day: 0.00     Average packs/day: 1 pack/day for 20.0 years (20.0 ttl pk-yrs)     Types: Cigarettes     Start date:      Quit date:      Years since quittin.2     Passive exposure: Past    Smokeless tobacco: Never   Vaping Use    Vaping status: Never Used   Substance and Sexual Activity    Alcohol use: Yes     Alcohol/week: 2.0 - 4.0 standard drinks of alcohol     Types: 2 - 4 Glasses of wine per week     Comment: weekly    Drug use: No    Sexual activity: Not Currently     Birth control/protection: I.U.D.         ALLERGIES  Penicillins, Hemp seed oil, and Lisinopril      REVIEW OF SYSTEMS  Review of Systems  Included in HPI  All systems reviewed and negative except for those discussed in HPI.      PHYSICAL EXAM    I have reviewed the triage vital signs and nursing notes.    ED Triage Vitals [25 1346]   Temp Heart Rate Resp BP SpO2   98 °F (36.7 °C) (!) 122 24 -- 97 %      Temp src Heart Rate Source Patient Position BP Location FiO2 (%)   -- -- -- -- --       Physical Exam  GENERAL: Awake, alert, no acute distress  SKIN: Warm, dry  HENT: Normocephalic, atraumatic  EYES: no scleral icterus  CV: regular rhythm, regular rate  RESPIRATORY: normal effort, lungs clear  ABDOMEN: soft, nontender, nondistended  MUSCULOSKELETAL: no deformity.  Right chest wall with erythema and warmth.  She has a large open wound on the inferior aspect of her upper chest with visualized fat.  There is no drainage.  NEURO: alert, moves all extremities, follows commands            LAB RESULTS  Recent Results (from the past 24 hours)   Comprehensive Metabolic Panel    Collection Time: 25  2:09 PM     Specimen: Arm, Left; Blood   Result Value Ref Range    Glucose 102 (H) 65 - 99 mg/dL    BUN 11 6 - 20 mg/dL    Creatinine 0.97 0.57 - 1.00 mg/dL    Sodium 136 136 - 145 mmol/L    Potassium 3.7 3.5 - 5.2 mmol/L    Chloride 98 98 - 107 mmol/L    CO2 23.8 22.0 - 29.0 mmol/L    Calcium 9.4 8.6 - 10.5 mg/dL    Total Protein 7.4 6.0 - 8.5 g/dL    Albumin 3.5 3.5 - 5.2 g/dL    ALT (SGPT) 14 1 - 33 U/L    AST (SGOT) 18 1 - 32 U/L    Alkaline Phosphatase 124 (H) 39 - 117 U/L    Total Bilirubin 0.5 0.0 - 1.2 mg/dL    Globulin 3.9 gm/dL    A/G Ratio 0.9 g/dL    BUN/Creatinine Ratio 11.3 7.0 - 25.0    Anion Gap 14.2 5.0 - 15.0 mmol/L    eGFR 69.6 >60.0 mL/min/1.73   BNP    Collection Time: 04/06/25  2:09 PM    Specimen: Arm, Left; Blood   Result Value Ref Range    proBNP 647.0 0.0 - 900.0 pg/mL   High Sensitivity Troponin T    Collection Time: 04/06/25  2:09 PM    Specimen: Arm, Left; Blood   Result Value Ref Range    HS Troponin T 8 <14 ng/L   Green Top (Gel)    Collection Time: 04/06/25  2:09 PM   Result Value Ref Range    Extra Tube Hold for add-ons.    Lavender Top    Collection Time: 04/06/25  2:09 PM   Result Value Ref Range    Extra Tube hold for add-on    Gold Top - SST    Collection Time: 04/06/25  2:09 PM   Result Value Ref Range    Extra Tube Hold for add-ons.    Light Blue Top    Collection Time: 04/06/25  2:09 PM   Result Value Ref Range    Extra Tube Hold for add-ons.    CBC Auto Differential    Collection Time: 04/06/25  2:09 PM    Specimen: Arm, Left; Blood   Result Value Ref Range    WBC 5.56 3.40 - 10.80 10*3/mm3    RBC 3.29 (L) 3.77 - 5.28 10*6/mm3    Hemoglobin 9.7 (L) 12.0 - 15.9 g/dL    Hematocrit 29.6 (L) 34.0 - 46.6 %    MCV 90.0 79.0 - 97.0 fL    MCH 29.5 26.6 - 33.0 pg    MCHC 32.8 31.5 - 35.7 g/dL    RDW 13.9 12.3 - 15.4 %    RDW-SD 44.6 37.0 - 54.0 fl    MPV 8.8 6.0 - 12.0 fL    Platelets 406 140 - 450 10*3/mm3    nRBC 0.0 0.0 - 0.2 /100 WBC   Lactic Acid, Plasma    Collection Time: 04/06/25  2:09 PM     Specimen: Arm, Left; Blood   Result Value Ref Range    Lactate 2.1 (C) 0.5 - 2.0 mmol/L   Manual Differential    Collection Time: 04/06/25  2:09 PM    Specimen: Arm, Left; Blood   Result Value Ref Range    Neutrophil % 72.0 42.7 - 76.0 %    Lymphocyte % 9.0 (L) 19.6 - 45.3 %    Monocyte % 16.0 (H) 5.0 - 12.0 %    Eosinophil % 1.0 0.3 - 6.2 %    Myelocyte % 1.0 (H) 0.0 - 0.0 %    Blasts % 1.0 (H) 0.0 - 0.0 %    Neutrophils Absolute 4.00 1.70 - 7.00 10*3/mm3    Lymphocytes Absolute 0.50 (L) 0.70 - 3.10 10*3/mm3    Monocytes Absolute 0.89 0.10 - 0.90 10*3/mm3    Eosinophils Absolute 0.06 0.00 - 0.40 10*3/mm3    RBC Morphology Normal Normal    Platelet Morphology Normal Normal   ECG 12 Lead ED Triage Standing Order; SOA    Collection Time: 04/06/25  2:31 PM   Result Value Ref Range    QT Interval 367 ms    QTC Interval 444 ms         RADIOLOGY  XR Chest 1 View  Result Date: 4/6/2025  XR CHEST 1 VW-  HISTORY: Shortness of air.  COMPARISON: CT chest 2/6/2025  FINDINGS: A single view of the chest was obtained.  Support Devices:  None. Cardiac Silhouette/Mediastinum/Kristine: The cardiac silhouette is normal in size. There is calcific aortic atherosclerosis. Lungs/Pleural Spaces: There is right basilar airspace opacity, better appreciated on recent CT, possibly reflecting pneumonia and/or treatment-related changes. There is a question trace right pleural effusion. Recommend follow-up CT to document complete resolution. Chest Wall/Diaphragm/Upper Abdomen: There is asymmetric elevation of the right hemidiaphragm. There is dextroscoliosis.  This report was finalized on 4/6/2025 2:44 PM by Dr. Tiki Watkins M.D on Workstation: [id number]          MEDICATIONS GIVEN IN ER  Medications   sodium chloride 0.9 % flush 10 mL (has no administration in time range)   ceFAZolin 1000 mg IVPB in 100 mL NS (MBP) (has no administration in time range)   morphine injection 2 mg (2 mg Intravenous Given 4/6/25 1525)         ORDERS PLACED DURING  THIS VISIT:  Orders Placed This Encounter   Procedures    Blood Culture - Blood,    Blood Culture - Blood,    XR Chest 1 View    [place] Draw    Comprehensive Metabolic Panel    BNP    High Sensitivity Troponin T    CBC Auto Differential    Lactic Acid, Plasma    High Sensitivity Troponin T 1Hr    Manual Differential    STAT Lactic Acid, Reflex    NPO Diet NPO Type: Strict NPO    Undress & Gown    Continuous Pulse Oximetry    Vital Signs    IP General Consult (Use specialty-specific consult if known)    [facility] (on-call MD unless specified) Details    Oxygen Therapy- Nasal Cannula; Titrate 1-6 LPM Per SpO2; 90 - 95%    ECG 12 Lead ED Triage Standing Order; SOA    Insert Peripheral IV    Initiate Observation Status    CBC & Differential    Green Top (Gel)    Lavender Top    Gold Top - SST    Light Blue Top         OUTPATIENT MEDICATION MANAGEMENT:  Current Facility-Administered Medications Ordered in Epic   Medication Dose Route Frequency Provider Last Rate Last Admin    ceFAZolin 1000 mg IVPB in 100 mL NS (MBP)  1,000 mg Intravenous Once Christo Vargas MD        sodium chloride 0.9 % flush 10 mL  10 mL Intravenous PRN Christo Vargas MD         Current Outpatient Medications Ordered in Epic   Medication Sig Dispense Refill    Abemaciclib (Verzenio) 100 MG tablet Take 1 tablet by mouth 2 (Two) Times a Day. 56 tablet 5    ALPRAZolam (Xanax) 0.25 MG tablet Take 1 tablet by mouth 2 (Two) Times a Day As Needed for Anxiety. 30 tablet 0    anastrozole (ARIMIDEX) 1 MG tablet Take 1 tablet by mouth Daily. 90 tablet 3    apixaban (ELIQUIS) 5 MG tablet tablet Take 1 tablet by mouth 2 (Two) Times a Day. 180 tablet 1    atorvastatin (LIPITOR) 40 MG tablet Take 2 tablets by mouth Daily. 180 tablet 3    buPROPion XL (Wellbutrin XL) 300 MG 24 hr tablet Take 1 tablet by mouth Daily. 90 tablet 1    cyclobenzaprine (FLEXERIL) 10 MG tablet Take 1 tablet by mouth 3 (Three) Times a Day As Needed for Muscle Spasms. 90 tablet 3     empagliflozin (Jardiance) 10 MG tablet tablet Take 1 tablet by mouth Daily. 90 tablet 3    escitalopram (LEXAPRO) 20 MG tablet Take 1 tablet by mouth Daily. 90 tablet 3    famotidine (PEPCID) 20 MG tablet Take 1 tablet by mouth 2 (Two) Times a Day. 180 tablet 3    furosemide (Lasix) 20 MG tablet Take 1 tablet by mouth 2 (Two) Times a Day. 90 tablet 1    HYDROcodone-acetaminophen (NORCO) 5-325 MG per tablet Take 1 tablet by mouth Every 6 (Six) Hours As Needed for Moderate Pain. 90 tablet 0    levocetirizine (XYZAL) 5 MG tablet Take one tablet by mouth twice daily for urticaria. 180 tablet 3    meloxicam (MOBIC) 15 MG tablet Take 1 tablet by mouth Daily. 90 tablet 0    metoprolol succinate XL (TOPROL-XL) 25 MG 24 hr tablet Take 1 tablet by mouth Daily. 90 tablet 3    naloxone (NARCAN) 4 MG/0.1ML nasal spray Call 911. Don't prime. [place] in 1 nostril for overdose. Repeat in 2-3 minutes in other nostril if no or minimal breathing/responsiveness. 2 each 0    olmesartan (BENICAR) 40 MG tablet Take 0.5 tablets by mouth Daily. 45 tablet 1    ondansetron (ZOFRAN) 8 MG tablet Take 1 tablet by mouth 3 (Three) Times a Day As Needed for Nausea or Vomiting. 90 tablet 5    oxyCODONE (Roxicodone) 5 MG immediate release tablet Take 1 tablet by mouth Every 4 (Four) Hours As Needed for Moderate Pain. 50 tablet 0    pantoprazole (Protonix) 40 MG EC tablet Take 1 tablet by mouth Daily. 30 tablet 1         PROCEDURES  Procedures            PROGRESS, DATA ANALYSIS, CONSULTS, AND MEDICAL DECISION MAKING  All labs have been independently interpreted by me.  All radiology studies have been reviewed by me. All EKG's have been independently viewed and interpreted by me.  Discussion below represents my analysis of pertinent findings related to patient's condition, differential diagnosis, treatment plan and final disposition.    Differential diagnosis includes but is not limited to cellulitis, abscess, deep space tissue infection, hematoma,  anemia, PE, acute coronary syndrome, acute aortic syndrome.    Clinical Scores:                                       ED Course as of 04/06/25 1533   Sun Apr 06, 2025   1430 XR Chest 1 View  My independent interpretation of the imaging study is no dense consolidation [TR]   1439 EKG PROCEDURE    EKG time: 1431  Rhythm/Rate: Normal sinus, rate 87  P waves and OR: Normal P, normal OR  QRS, axis: Narrow QRS, normal axis  ST and T waves: No acute    Independently Interpreted by me  Not significantly changed compared to prior 7/1/2024   [TR]   1500 Discussed with Dr. Bender.  He agrees to consult.  He requests hospitalist admission. [TR]   1518 I reviewed the workup and findings with the patient and family at the bedside.  Answered all questions.  Plan admission for further management.  They are agreeable. [TR]   1531 Discussing with Dr Mcbride with [facility]. She agrees to admit. [TR]      ED Course User Index  [TR] Christo Vargas MD             AS OF 15:33 EDT VITALS:    BP - 109/52  HR - 87  TEMP - 98 °F (36.7 °C)  O2 SATS - 97%    COMPLEXITY OF CARE  The patient requires admission.      DIAGNOSIS  Final diagnoses:   Open wound of right chest wall, initial encounter   Anemia, unspecified type   Dyspnea, unspecified type         DISPOSITION  ED Disposition       ED Disposition   Decision to Admit    Condition   --    Comment   Level of Care: Telemetry [5]   Diagnosis: Open chest wound, right, initial encounter [[id number]]   Admitting Physician: BEL MCBRIDE [[id number]]   Attending Physician: BEL MCBRIDE [[id number]]   Is patient appropriate for Inpatient Observation Unit?: Yes [1]                  Please note that portions of this document were completed with a voice recognition program.    Note Disclaimer: At [facility], we believe that sharing information builds trust and better relationships. You are receiving this note because you recently visited [facility]. It is possible you will see [facility]  information before a provider has talked with you about it. This kind of information can be easy to misunderstand. To help you fully understand what it means for your health, we urge you to discuss this note with your provider.         Christo Vargas MD  04/06/25 153

## 2025-04-06 NOTE — H&P
HISTORY AND PHYSICAL   [facility]        Date of Admission: 2025  Patient Identification:  Name: Dasha De Leon  Age: 54 y.o.  Sex: female  :  1970  MRN: [id number]                     Primary Care Physician: Ariana Coffman PA-C    Chief Complaint:  54 year old female presented to the emergency room after her right breast expander fell out this morning; she has had some swelling in the area and developed an open wound; she has had intermittent fever and chills; she has a history of breast cancer and is followed by oncology; her surgery was in July of last year; she has had some shortness of breath with exertion; she was seen by oncology last week and surgery for a chest wall hematoma was planned for later this month;     History of Present Illness:   As above    Past Medical History:  Past Medical History:   Diagnosis Date    Abnormal Pap smear of cervix     Anxiety     Miller's cyst     RESOLVED    Breast cancer     Right    Bulging lumbar disc     Bursitis     HX    Depression     GERD (gastroesophageal reflux disease)     H/O colposcopy with cervical biopsy     unknown pap result, biopsy was neg per pt, 2013 [facility]    History of 2019 novel coronavirus disease (COVID-19)     X2    History of eczema     HPV (human papilloma virus) infection     Hyperlipemia     Hypertension     Low back pain     Lumbar radiculopathy     Numbness and tingling of left leg     Osteoarthritis     Scoliosis     Seasonal allergies      Past Surgical History:  Past Surgical History:   Procedure Laterality Date    BREAST AUGMENTATION      BREAST BIOPSY Right 2024    Procedure: AXILLARY LYMPH NODE BIOPSY/EXCISION;  Surgeon: Romina De Anda MD;  Location: [facility];  Service: General;  Laterality: Right;    BREAST RECONSTRUCTION Right 2024    Procedure: RIGHT SUBPECTORAL PLACEMENT TISSUE EXPANDER AND CORTIVA (ACELLULAR DERMAL MATRIX), COMPLEX CLOSURE RIGHT BREAST 5cm;  Surgeon: Yulia  "Colin Lee MD;  Location: [facility] MAIN OR;  Service: Plastics;  Laterality: Right;    BREAST SURGERY Right 7/26/2024    Procedure: ADJACENT TISSUE REARRANGEMENT RIGHT BREAST;  Surgeon: Colin Bender MD;  Location: [facility] MAIN OR;  Service: Plastics;  Laterality: Right;    CARPAL TUNNEL RELEASE Right 2004    CRYOTHERAPY      1997    D & C HYSTEROSCOPY N/A 9/19/2024    Procedure: INTRAUTERINE DEVICE REMOVAL;  Surgeon: Kaleb Burciaga MD;  Location: [facility] MAIN OR;  Service: Obstetrics/Gynecology;  Laterality: N/A;    EPIDURAL BLOCK      HAND SURGERY  2004    Pisiformectomy    LUMBAR DISCECTOMY FUSION INSTRUMENTATION N/A 01/05/2024    Procedure: Lumbar 3 to lumbar 4 and lumbar 4 to lumbar 5 laminectomy with fusion and instrumentation;  Surgeon: Rigoberto Botello MD;  Location: [facility] MAIN OR;  Service: Robotics - Neuro;  Laterality: N/A;    MAMMO BILATERAL  2014    MASTECTOMY W/ SENTINEL NODE BIOPSY Right 7/11/2024    Procedure: Right MASTECTOMY WITH SENTINEL NODE BIOPSY;  Surgeon: Romina De Anda MD;  Location: [facility] MAIN OR;  Service: General;  Laterality: Right;    MASTECTOMY WITH IMMEDIATE RECONSTRUCTION Right 7/26/2024    Procedure: right mastectomy, margin excision;  Surgeon: Romina De Anda MD;  Location: [facility] OR;  Service: General;  Laterality: Right;    PAP SMEAR  [id number]    SHOULDER ARTHROSCOPY Left 2002,2003    SHOULDER SURGERY      \"MANIPULATION FOR FROZEN SHOULDER\"    US GUIDED LYMPH NODE BIOPSY  8/29/2024      Home Meds:  Medications Prior to Admission   Medication Sig Dispense Refill Last Dose/Taking    Abemaciclib (Verzenio) 100 MG tablet Take 1 tablet by mouth 2 (Two) Times a Day. 56 tablet 5 Past Month    ALPRAZolam (Xanax) 0.25 MG tablet Take 1 tablet by mouth 2 (Two) Times a Day As Needed for Anxiety. 30 tablet 0 4/5/2025    anastrozole (ARIMIDEX) 1 MG tablet Take 1 tablet by mouth Daily. 90 tablet 3 4/5/2025    apixaban (ELIQUIS) 5 MG tablet tablet Take 1 tablet by mouth 2 (Two) Times a " Day. 180 tablet 1 4/5/2025    atorvastatin (LIPITOR) 40 MG tablet Take 2 tablets by mouth Daily. 180 tablet 3 4/5/2025    buPROPion XL (Wellbutrin XL) 300 MG 24 hr tablet Take 1 tablet by mouth Daily. 90 tablet 1 4/5/2025    cyclobenzaprine (FLEXERIL) 10 MG tablet Take 1 tablet by mouth 3 (Three) Times a Day As Needed for Muscle Spasms. 90 tablet 3 4/5/2025    empagliflozin (Jardiance) 10 MG tablet tablet Take 1 tablet by mouth Daily. 90 tablet 3 4/5/2025    escitalopram (LEXAPRO) 20 MG tablet Take 1 tablet by mouth Daily. 90 tablet 3 4/5/2025    famotidine (PEPCID) 20 MG tablet Take 1 tablet by mouth 2 (Two) Times a Day. 180 tablet 3 4/5/2025    furosemide (Lasix) 20 MG tablet Take 1 tablet by mouth 2 (Two) Times a Day. 90 tablet 1 4/5/2025    HYDROcodone-acetaminophen (NORCO) 5-325 MG per tablet Take 1 tablet by mouth Every 6 (Six) Hours As Needed for Moderate Pain. 90 tablet 0 4/5/2025    levocetirizine (XYZAL) 5 MG tablet Take one tablet by mouth twice daily for urticaria. 180 tablet 3 4/5/2025    meloxicam (MOBIC) 15 MG tablet Take 1 tablet by mouth Daily. 90 tablet 0 4/5/2025    metoprolol succinate XL (TOPROL-XL) 25 MG 24 hr tablet Take 1 tablet by mouth Daily. 90 tablet 3 4/5/2025    olmesartan (BENICAR) 40 MG tablet Take 0.5 tablets by mouth Daily. 45 tablet 1 4/5/2025    ondansetron (ZOFRAN) 8 MG tablet Take 1 tablet by mouth 3 (Three) Times a Day As Needed for Nausea or Vomiting. 90 tablet 5 4/5/2025    pantoprazole (Protonix) 40 MG EC tablet Take 1 tablet by mouth Daily. 30 tablet 1 4/5/2025    naloxone (NARCAN) 4 MG/0.1ML nasal spray Call 911. Don't prime. [place] in 1 nostril for overdose. Repeat in 2-3 minutes in other nostril if no or minimal breathing/responsiveness. 2 each 0     oxyCODONE (Roxicodone) 5 MG immediate release tablet Take 1 tablet by mouth Every 4 (Four) Hours As Needed for Moderate Pain. 50 tablet 0        Allergies:  Allergies   Allergen Reactions    Penicillins Anaphylaxis    Hemp  Seed Oil Itching     HEMP IN LOTIONS    Lisinopril Cough     Immunizations:  Immunization History   Administered Date(s) Administered    COVID-19 (PFIZER) Purple Cap Monovalent 2021, 2021    Flublock Quad =>18yrs 10/16/2024    Fluzone (or Fluarix & Flulaval for VFC) >6mos 2020, 10/28/2021, 10/24/2023    Fluzone Quad >6mos (Multi-dose) 10/29/2018, 10/28/2019    Tdap 2021     Social History:   Social History     Social History Narrative    Not on file     Social History     Socioeconomic History    Marital status: Single   Tobacco Use    Smoking status: Former     Current packs/day: 0.00     Average packs/day: 1 pack/day for 20.0 years (20.0 ttl pk-yrs)     Types: Cigarettes     Start date:      Quit date:      Years since quittin.2     Passive exposure: Past    Smokeless tobacco: Never   Vaping Use    Vaping status: Never Used   Substance and Sexual Activity    Alcohol use: Yes     Alcohol/week: 2.0 - 4.0 standard drinks of alcohol     Types: 2 - 4 Glasses of wine per week     Comment: weekly    Drug use: No    Sexual activity: Not Currently     Birth control/protection: I.U.D.       Family History:  Family History   Problem Relation Age of Onset    Hypertension Mother     Hyperlipidemia Mother     Diverticulitis Mother     Pancreatitis Mother     Kidney disease Father     Skin cancer Father     Hypertension Father     Hyperlipidemia Father     Stroke Father     Atrial fibrillation Father     Transient ischemic attack Father     No Known Problems Sister     Depression Brother     No Known Problems Daughter     No Known Problems Son     Uterine cancer Maternal Aunt 58    Heart disease Maternal Aunt     Lung cancer Maternal Uncle     Heart attack Maternal Uncle     No Known Problems Paternal Aunt     Colon cancer Paternal Uncle 65    Liver disease Maternal Grandmother     Heart disease Maternal Grandfather     Cancer Maternal Grandfather     Heart attack Paternal Grandmother     No  "Known Problems Paternal Grandfather     BRCA 1/2 Neg Hx     Breast cancer Neg Hx     Endometrial cancer Neg Hx     Ovarian cancer Neg Hx     Malig Hyperthermia Neg Hx         Review of Systems  See history of present illness and past medical history.  Patient denies headache, dizziness, syncope, falls, trauma, change in vision, change in hearing, change in taste, changes in weight, changes in appetite, focal weakness, numbness, or paresthesia.  Patient denies chest pain, palpitations, dyspnea, orthopnea, PND, cough, sinus pressure, rhinorrhea, epistaxis, hemoptysis, nausea, vomiting,hematemesis, diarrhea, constipation or hematochezia.  Denies cold or heat intolerance, polydipsia, polyuria, polyphagia. Denies hematuria, pyuria, dysuria, hesitancy, frequency or urgency. Denies consumption of raw and under cooked meats foods or change in water source.       Objective:  T Max 24 hrs: Temp (24hrs), Av.6 °F (37 °C), Min:98 °F (36.7 °C), Max:99.1 °F (37.3 °C)    Vitals Ranges:   Temp:  [98 °F (36.7 °C)-99.1 °F (37.3 °C)] 99.1 °F (37.3 °C)  Heart Rate:  [] 88  Resp:  [22-24] 22  BP: (109-126)/(52-64) 126/64      Exam:  /64 (BP Location: Left arm, Patient Position: Lying)   Pulse 88   Temp 99.1 °F (37.3 °C) (Oral)   Resp 22   Ht 167.6 cm (66\")   Wt 74.5 kg (164 lb 3.9 oz)   LMP  (LMP Unknown)   SpO2 97%   BMI 26.51 kg/m²     General Appearance:    Alert, cooperative, no distress, appears stated age   Head:    Normocephalic, without obvious abnormality, atraumatic   Eyes:    PERRL, conjunctivae/corneas clear, EOM's intact, both eyes   Ears:    Normal external ear canals, both ears   Nose:   Nares normal, septum midline, mucosa normal, no drainage    or sinus tenderness   Throat:   Lips, mucosa, and tongue normal   Neck:   Supple, symmetrical, trachea midline, no adenopathy;     thyroid:  no enlargement/tenderness/nodules; no carotid    bruit or JVD   Back:     Symmetric, no curvature, ROM normal, no " CVA tenderness   Lungs:     Clear to auscultation bilaterally,     Chest Wall:    Right chest wall with open wound, necrotic edges, erythema    Heart:    Regular rate and rhythm, S1 and S2 normal, no murmur, rub   or gallop   Abdomen:     Soft, nontender, bowel sounds active all four quadrants,     no masses, no hepatomegaly, no splenomegaly   Extremities:   Extremities normal, atraumatic, no cyanosis or edema                       .    Data Review:  Labs in chart were reviewed.  WBC   Date Value Ref Range Status   04/06/2025 5.56 3.40 - 10.80 10*3/mm3 Final     Hemoglobin   Date Value Ref Range Status   04/06/2025 9.7 (L) 12.0 - 15.9 g/dL Final     Hematocrit   Date Value Ref Range Status   04/06/2025 29.6 (L) 34.0 - 46.6 % Final     Platelets   Date Value Ref Range Status   04/06/2025 406 140 - 450 10*3/mm3 Final     Sodium   Date Value Ref Range Status   04/06/2025 136 136 - 145 mmol/L Final     Potassium   Date Value Ref Range Status   04/06/2025 3.7 3.5 - 5.2 mmol/L Final     Chloride   Date Value Ref Range Status   04/06/2025 98 98 - 107 mmol/L Final     CO2   Date Value Ref Range Status   04/06/2025 23.8 22.0 - 29.0 mmol/L Final     BUN   Date Value Ref Range Status   04/06/2025 11 6 - 20 mg/dL Final     Creatinine   Date Value Ref Range Status   04/06/2025 0.97 0.57 - 1.00 mg/dL Final     Glucose   Date Value Ref Range Status   04/06/2025 102 (H) 65 - 99 mg/dL Final     Calcium   Date Value Ref Range Status   04/06/2025 9.4 8.6 - 10.5 mg/dL Final     AST (SGOT)   Date Value Ref Range Status   04/06/2025 18 1 - 32 U/L Final     ALT (SGPT)   Date Value Ref Range Status   04/06/2025 14 1 - 33 U/L Final     Alkaline Phosphatase   Date Value Ref Range Status   04/06/2025 124 (H) 39 - 117 U/L Final                Imaging Results (All)       Procedure Component Value Units Date/Time    XR Chest 1 View [[id number]] Collected: 04/06/25 1439     Updated: 04/06/25 1447    Narrative:      XR CHEST 1 VW-     HISTORY:  Shortness of air.     COMPARISON: CT chest 2/6/2025     FINDINGS: A single view of the chest was obtained.     Support Devices:  None.  Cardiac Silhouette/Mediastinum/Kristine: The cardiac silhouette is normal in  size. There is calcific aortic atherosclerosis.  Lungs/Pleural Spaces: There is right basilar airspace opacity, better  appreciated on recent CT, possibly reflecting pneumonia and/or  treatment-related changes. There is a question trace right pleural  effusion. Recommend follow-up CT to document complete resolution.  Chest Wall/Diaphragm/Upper Abdomen: There is asymmetric elevation of the  right hemidiaphragm. There is dextroscoliosis.     This report was finalized on 4/6/2025 2:44 PM by Dr. Tiki Watkins M.D  on Workstation: [id number]                 Assessment:  Active Hospital Problems    Diagnosis  POA    **Open chest wound, right, initial encounter [S21.101A]  Yes      Resolved Hospital Problems   No resolved problems to display.   Breast cancer  Hypertensin  hyperlipidemia    Plan:  Ask plastic surgery to see her  Ask id to see her  Oncology to see  Hold eliquis  Trend labs  Dw patient, ed provider    Tiffanie Mcbride MD  4/6/2025  18:32 EDT

## 2025-04-06 NOTE — PROGRESS NOTES
"[facility] Clinical Pharmacy Services: Vancomycin Pharmacokinetic Initial Consult Note    Dasha De Leon is a 54 y.o. female who is on day 1 of pharmacy to dose vancomycin.    Indication: Skin and Soft Tissue  Consulting Provider: Dr. Mcbride  Planned Duration of Therapy: 5 days  Loading Dose Ordered or Given: 1500 mg on 4/6 at 2000  Culture/Source:   4/6 blood cultures in process  Target: -600 mg/L.hr   Other Antimicrobials: cefazolin 1 g iv every 8 hours    Vitals/Labs  Ht: 167.6 cm (66\"); Wt: 74.5 kg (164 lb 3.9 oz)  Temp Readings from Last 1 Encounters:   04/06/25 99.1 °F (37.3 °C) (Oral)    Estimated Creatinine Clearance: 68.5 mL/min (by C-G formula based on SCr of 0.97 mg/dL).     Results from last 7 days   Lab Units 04/06/25  1409   CREATININE mg/dL 0.97   WBC 10*3/mm3 5.56     Assessment/Plan:    Vancomycin Dose:   750 mg IV every  12  hours  Predictive AUC level for the dose ordered is 455 mg/L.hr, which is within the target of 400-600 mg/L.hr  Vanc Trough has been ordered for 4/8 at 0830     Pharmacy will follow patient's kidney function and will adjust doses and obtain levels as necessary. Thank you for involving pharmacy in this patient's care. Please contact pharmacy with any questions or concerns.                           Pedrito Huang III, [facility]  Clinical Pharmacist    "

## 2025-04-06 NOTE — ED NOTES
"Nursing report ED to floor  Dasha De Leon  54 y.o.  female    HPI :  HPI  Stated Reason for Visit: soa, post-op problem  History Obtained From: patient    Chief Complaint  Chief Complaint   Patient presents with    Shortness of Breath    Post-op Problem       Admitting doctor:   Tiffanie Mcbride MD    Admitting diagnosis:   The primary encounter diagnosis was Open wound of right chest wall, initial encounter. Diagnoses of Anemia, unspecified type and Dyspnea, unspecified type were also pertinent to this visit.    Code status:   Current Code Status       Date Active Code Status Order ID Comments User Context       Prior            Allergies:   Penicillins, Hemp seed oil, and Lisinopril    Isolation:   No active isolations    Intake and Output  No intake or output data in the 24 hours ending 04/06/25 1535    Weight:       04/06/25  1531   Weight: 74.8 kg (165 lb)       Most recent vitals:   Vitals:    04/06/25 1346 04/06/25 1437 04/06/25 1519 04/06/25 1531   BP:  109/52     BP Location:  Left arm     Patient Position:  Lying     Pulse: (!) 122  87    Resp: 24      Temp: 98 °F (36.7 °C)      SpO2: 97%      Weight:    74.8 kg (165 lb)   Height:    167.6 cm (66\")       Active LDAs/IV Access:   Lines, Drains & Airways       Active LDAs       Name Placement date Placement time Site Days    Peripheral IV 04/06/25 1518 Left Antecubital 04/06/25  1518  Antecubital  less than 1                    Labs (abnormal labs have a star):   Labs Reviewed   COMPREHENSIVE METABOLIC PANEL - Abnormal; Notable for the following components:       Result Value    Glucose 102 (*)     Alkaline Phosphatase 124 (*)     All other components within normal limits    Narrative:     GFR Categories in Chronic Kidney Disease (CKD)      GFR Category          GFR (mL/min/1.73)    Interpretation  G1                     90 or greater         Normal or high (1)  G2                      60-89                Mild decrease (1)  G3a                   " 45-59                Mild to moderate decrease  G3b                   30-44                Moderate to severe decrease  G4                    15-29                Severe decrease  G5                    14 or less           Kidney failure          (1)In the absence of evidence of kidney disease, neither GFR category G1 or G2 fulfill the criteria for CKD.    eGFR calculation 2021 CKD-EPI creatinine equation, which does not include race as a factor   CBC WITH AUTO DIFFERENTIAL - Abnormal; Notable for the following components:    RBC 3.29 (*)     Hemoglobin 9.7 (*)     Hematocrit 29.6 (*)     All other components within normal limits   LACTIC ACID, PLASMA - Abnormal; Notable for the following components:    Lactate 2.1 (*)     All other components within normal limits   MANUAL DIFFERENTIAL - Abnormal; Notable for the following components:    Lymphocyte % 9.0 (*)     Monocyte % 16.0 (*)     Myelocyte % 1.0 (*)     Blasts % 1.0 (*)     Lymphocytes Absolute 0.50 (*)     All other components within normal limits   BNP (IN-HOUSE) - Normal    Narrative:     This assay is used as an aid in the diagnosis of individuals suspected of having heart failure. It can be used as an aid in the diagnosis of acute decompensated heart failure (ADHF) in patients presenting with signs and symptoms of ADHF to the emergency department (ED). In addition, NT-proBNP of <300 pg/mL indicates ADHF is not likely.    Age Range Result Interpretation  NT-proBNP Concentration (pg/mL:      <50             Positive            >450                   Gray                 300-450                    Negative             <300    50-75           Positive            >900                  Gray                300-900                  Negative            <300      >75             Positive            >1800                  Gray                300-1800                  Negative            <300   TROPONIN - Normal    Narrative:     High Sensitive Troponin T Reference  Range:  <14.0 ng/L- Negative Female for AMI  <22.0 ng/L- Negative Male for AMI  >=14 - Abnormal Female indicating possible myocardial injury.  >=22 - Abnormal Male indicating possible myocardial injury.   Clinicians would have to utilize clinical acumen, EKG, Troponin, and serial changes to determine if it is an Acute Myocardial Infarction or myocardial injury due to an underlying chronic condition.        BLOOD CULTURE   BLOOD CULTURE   RAINBOW DRAW    Narrative:     The following orders were created for panel order [place] Draw.  Procedure                               Abnormality         Status                     ---------                               -----------         ------                     Green Top (Gel)[[id number]]                                  Final result               Lavender Top[[id number]]                                     Final result               Gold Top - SST[[id number]]                                   Final result               Light Blue Top[[id number]]                                   Final result                 Please view results for these tests on the individual orders.   HIGH SENSITIVITIY TROPONIN T 1HR   LACTIC ACID, REFLEX   CBC AND DIFFERENTIAL    Narrative:     The following orders were created for panel order CBC & Differential.  Procedure                               Abnormality         Status                     ---------                               -----------         ------                     CBC Auto Differential[[id number]]        Abnormal            Final result                 Please view results for these tests on the individual orders.   GREEN TOP   LAVENDER TOP   GOLD TOP - SST   LIGHT BLUE TOP       EKG:   ECG 12 Lead ED Triage Standing Order; SOA   Preliminary Result   HEART RATE=87  bpm   RR Dkormmxz=176  ms   OR Fctblgkp=953  ms   P Horizontal Axis=-10  deg   P Front Axis=28  deg   QRSD Interval=83  ms   QT Kfnvxqvx=685  ms   UHfB=213  ms   QRS Axis=4  deg    T Wave Axis=6  deg   - BORDERLINE ECG -   Sinus rhythm   Low voltage, precordial leads   LVH by voltage   Date and Time of Study:2025 14:31:41          Meds given in ED:   Medications   sodium chloride 0.9 % flush 10 mL (has no administration in time range)   ceFAZolin 1000 mg IVPB in 100 mL NS (MBP) (has no administration in time range)   morphine injection 2 mg (2 mg Intravenous Given 25 1525)       Imaging results:  No radiology results for the last day    Ambulatory status:   - ad breanne    Social issues:   Social History     Socioeconomic History    Marital status: Single   Tobacco Use    Smoking status: Former     Current packs/day: 0.00     Average packs/day: 1 pack/day for 20.0 years (20.0 ttl pk-yrs)     Types: Cigarettes     Start date:      Quit date:      Years since quittin.2     Passive exposure: Past    Smokeless tobacco: Never   Vaping Use    Vaping status: Never Used   Substance and Sexual Activity    Alcohol use: Yes     Alcohol/week: 2.0 - 4.0 standard drinks of alcohol     Types: 2 - 4 Glasses of wine per week     Comment: weekly    Drug use: No    Sexual activity: Not Currently     Birth control/protection: I.U.D.       Peripheral Neurovascular  Peripheral Neurovascular (Adult)  Peripheral Neurovascular WDL: WDL    Neuro Cognitive  Neuro Cognitive (Adult)  Cognitive/Neuro/Behavioral WDL: WDL    Learning  Learning Assessment  Learning Readiness and Ability: no barriers identified    Respiratory  Respiratory WDL  Respiratory WDL: .WDL except, all  Rhythm/Pattern, Respiratory: shortness of breath    Abdominal Pain       Pain Assessments  Pain (Adult)  (0-10) Pain Rating: Rest: 8  (0-10) Pain Rating: Activity: 8  Pain Location: chest    NIH Stroke Scale       Wesley Crespo RN  25 15:35 EDT

## 2025-04-06 NOTE — ED NOTES
Pt arrives for shortness of breath. Pt had her breast reconstruction surgery and her right side implant fell out this morning. Pt has the implant at triage. Pt dyson palomares open wound where the implant used to be

## 2025-04-06 NOTE — PLAN OF CARE
Problem: Adult Inpatient Plan of Care  Goal: Plan of Care Review  Outcome: Progressing  Goal: Patient-Specific Goal (Individualized)  Outcome: Progressing  Goal: Absence of Hospital-Acquired Illness or Injury  Outcome: Progressing  Intervention: Prevent and Manage VTE (Venous Thromboembolism) Risk  Description: Assess for VTE (venous thromboembolism) risk.Promote early mobilization; encourage both active and passive leg exercises, if unable to ambulate.Initiate and maintain compression or other therapy, as indicated, based on identified risk in accordance with organizational protocol and provider order.Recognize the patient's individual risk for bleeding before initiating pharmacologic thromboprophylaxis.  Recent Flowsheet Documentation  Taken 4/6/2025 1623 by Remi Willoughby RN  VTE Prevention/Management:   SCDs (sequential compression devices) off   patient refused intervention  Goal: Optimal Comfort and Wellbeing  Outcome: Progressing  Goal: Readiness for Transition of Care  Outcome: Progressing  Intervention: Mutually Develop Transition Plan  Description: Identify available resources for support (e.g., family, friends, community).Identify and address barriers to ongoing treatment and home management (e.g., environmental, financial).Provide opportunities to practice self-management skills.Assess and monitor emotional readiness for transition.Establish or reconnect linkage with outpatient providers or community-based services.  Recent Flowsheet Documentation  Taken 4/6/2025 1730 by Remi Willoughby, RN  Transportation Anticipated: family or friend will provide  Patient/Family Anticipated Services at Transition: none  Patient/Family Anticipates Transition to: home with family  Taken 4/6/2025 1723 by Remi Willoughby, RN  Equipment Currently Used at Home: walker, standard     Problem: Infection  Goal: Absence of Infection Signs and Symptoms  Outcome: Progressing     Problem: Electrolyte Imbalance  Goal: Electrolyte  Balance  Outcome: Progressing     Problem: Pain Acute  Goal: Optimal Pain Control and Function  Outcome: Progressing  Goal: Optimal Pain Control and Function  Outcome: Progressing     Problem: Nausea and Vomiting  Goal: Nausea and Vomiting Relief  Outcome: Progressing     Problem: Sepsis/Septic Shock  Goal: Optimal Coping  Outcome: Progressing  Goal: Absence of Bleeding  Outcome: Progressing  Goal: Blood Glucose Level Within Target Range  Outcome: Progressing  Goal: Absence of Infection Signs and Symptoms  Outcome: Progressing  Goal: Optimal Nutrition Delivery  Outcome: Progressing   Goal Outcome Evaluation:

## 2025-04-06 NOTE — NURSING NOTE
Received this patient in her room from the ED. Performed initial assessment. Oriented this patient to her room.

## 2025-04-07 PROBLEM — S21.101A: Status: ACTIVE | Noted: 2025-04-07

## 2025-04-07 LAB
ANION GAP SERPL CALCULATED.3IONS-SCNC: 11.1 MMOL/L (ref 5–15)
BUN SERPL-MCNC: 12 MG/DL (ref 6–20)
BUN/CREAT SERPL: 15.6 (ref 7–25)
CALCIUM SPEC-SCNC: 8.7 MG/DL (ref 8.6–10.5)
CHLORIDE SERPL-SCNC: 103 MMOL/L (ref 98–107)
CO2 SERPL-SCNC: 24.9 MMOL/L (ref 22–29)
CREAT SERPL-MCNC: 0.77 MG/DL (ref 0.57–1)
DEPRECATED RDW RBC AUTO: 47.3 FL (ref 37–54)
EGFRCR SERPLBLD CKD-EPI 2021: 91.8 ML/MIN/1.73
ERYTHROCYTE [DISTWIDTH] IN BLOOD BY AUTOMATED COUNT: 14 % (ref 12.3–15.4)
FERRITIN SERPL-MCNC: 632 NG/ML (ref 13–150)
FOLATE SERPL-MCNC: 10.1 NG/ML (ref 4.78–24.2)
GLUCOSE SERPL-MCNC: 90 MG/DL (ref 65–99)
HCT VFR BLD AUTO: 27.6 % (ref 34–46.6)
HGB BLD-MCNC: 9 G/DL (ref 12–15.9)
IRON 24H UR-MRATE: 42 MCG/DL (ref 37–145)
IRON SATN MFR SERPL: 22 % (ref 20–50)
MCH RBC QN AUTO: 30 PG (ref 26.6–33)
MCHC RBC AUTO-ENTMCNC: 32.6 G/DL (ref 31.5–35.7)
MCV RBC AUTO: 92 FL (ref 79–97)
PLATELET # BLD AUTO: 327 10*3/MM3 (ref 140–450)
PMV BLD AUTO: 8.9 FL (ref 6–12)
POTASSIUM SERPL-SCNC: 3.1 MMOL/L (ref 3.5–5.2)
RBC # BLD AUTO: 3 10*6/MM3 (ref 3.77–5.28)
SODIUM SERPL-SCNC: 139 MMOL/L (ref 136–145)
TIBC SERPL-MCNC: 194 MCG/DL (ref 298–536)
TRANSFERRIN SERPL-MCNC: 130 MG/DL (ref 200–360)
VIT B12 BLD-MCNC: 573 PG/ML (ref 211–946)
WBC NRBC COR # BLD AUTO: 4.1 10*3/MM3 (ref 3.4–10.8)

## 2025-04-07 PROCEDURE — 25010000002 CEFAZOLIN PER 500 MG: Performed by: INTERNAL MEDICINE

## 2025-04-07 PROCEDURE — 84466 ASSAY OF TRANSFERRIN: CPT | Performed by: INTERNAL MEDICINE

## 2025-04-07 PROCEDURE — 25010000002 VANCOMYCIN 1 G RECONSTITUTED SOLUTION 1 EACH VIAL: Performed by: INTERNAL MEDICINE

## 2025-04-07 PROCEDURE — 99222 1ST HOSP IP/OBS MODERATE 55: CPT | Performed by: INTERNAL MEDICINE

## 2025-04-07 PROCEDURE — 82746 ASSAY OF FOLIC ACID SERUM: CPT | Performed by: INTERNAL MEDICINE

## 2025-04-07 PROCEDURE — 25810000003 SODIUM CHLORIDE 0.9 % SOLUTION 250 ML FLEX CONT: Performed by: INTERNAL MEDICINE

## 2025-04-07 PROCEDURE — 82728 ASSAY OF FERRITIN: CPT | Performed by: INTERNAL MEDICINE

## 2025-04-07 PROCEDURE — 85027 COMPLETE CBC AUTOMATED: CPT | Performed by: INTERNAL MEDICINE

## 2025-04-07 PROCEDURE — 99223 1ST HOSP IP/OBS HIGH 75: CPT | Performed by: INTERNAL MEDICINE

## 2025-04-07 PROCEDURE — 80048 BASIC METABOLIC PNL TOTAL CA: CPT | Performed by: INTERNAL MEDICINE

## 2025-04-07 PROCEDURE — 82607 VITAMIN B-12: CPT | Performed by: INTERNAL MEDICINE

## 2025-04-07 PROCEDURE — 83540 ASSAY OF IRON: CPT | Performed by: INTERNAL MEDICINE

## 2025-04-07 PROCEDURE — 25010000002 VANCOMYCIN 750 MG RECONSTITUTED SOLUTION 1 EACH VIAL: Performed by: INTERNAL MEDICINE

## 2025-04-07 RX ORDER — POTASSIUM CHLORIDE 1500 MG/1
40 TABLET, EXTENDED RELEASE ORAL EVERY 4 HOURS
Status: COMPLETED | OUTPATIENT
Start: 2025-04-07 | End: 2025-04-07

## 2025-04-07 RX ORDER — ENOXAPARIN SODIUM 100 MG/ML
1 INJECTION SUBCUTANEOUS ONCE
Status: DISCONTINUED | OUTPATIENT
Start: 2025-04-07 | End: 2025-04-08

## 2025-04-07 RX ORDER — SODIUM HYPOCHLORITE 1.25 MG/ML
SOLUTION TOPICAL 2 TIMES DAILY
Status: DISCONTINUED | OUTPATIENT
Start: 2025-04-07 | End: 2025-04-09

## 2025-04-07 RX ORDER — SODIUM HYPOCHLORITE 1.25 MG/ML
SOLUTION TOPICAL 2 TIMES DAILY
Status: DISCONTINUED | OUTPATIENT
Start: 2025-04-07 | End: 2025-04-07

## 2025-04-07 RX ADMIN — HYDROCODONE BITARTRATE AND ACETAMINOPHEN 1 TABLET: 5; 325 TABLET ORAL at 16:46

## 2025-04-07 RX ADMIN — ATORVASTATIN CALCIUM 80 MG: 80 TABLET, FILM COATED ORAL at 20:15

## 2025-04-07 RX ADMIN — CYCLOBENZAPRINE HYDROCHLORIDE 10 MG: 10 TABLET, FILM COATED ORAL at 20:14

## 2025-04-07 RX ADMIN — CEFAZOLIN 2000 MG: 2 INJECTION, POWDER, FOR SOLUTION INTRAMUSCULAR; INTRAVENOUS at 13:43

## 2025-04-07 RX ADMIN — HYDROCODONE BITARTRATE AND ACETAMINOPHEN 1 TABLET: 5; 325 TABLET ORAL at 04:20

## 2025-04-07 RX ADMIN — CEFAZOLIN 1000 MG: 1 INJECTION, POWDER, FOR SOLUTION INTRAMUSCULAR; INTRAVENOUS at 05:59

## 2025-04-07 RX ADMIN — HYDROCODONE BITARTRATE AND ACETAMINOPHEN 1 TABLET: 5; 325 TABLET ORAL at 10:05

## 2025-04-07 RX ADMIN — POTASSIUM CHLORIDE 40 MEQ: 1500 TABLET, EXTENDED RELEASE ORAL at 20:14

## 2025-04-07 RX ADMIN — POTASSIUM CHLORIDE 40 MEQ: 1500 TABLET, EXTENDED RELEASE ORAL at 16:34

## 2025-04-07 RX ADMIN — ACETAMINOPHEN 650 MG: 325 TABLET, FILM COATED ORAL at 14:01

## 2025-04-07 RX ADMIN — VANCOMYCIN HYDROCHLORIDE 750 MG: 750 INJECTION, POWDER, LYOPHILIZED, FOR SOLUTION INTRAVENOUS at 08:16

## 2025-04-07 RX ADMIN — SODIUM CHLORIDE 1000 MG: 9 INJECTION, SOLUTION INTRAVENOUS at 18:10

## 2025-04-07 RX ADMIN — DAKIN'S SOLUTION 0.125% (QUARTER STRENGTH): 0.12 SOLUTION at 11:18

## 2025-04-07 RX ADMIN — ESCITALOPRAM 20 MG: 20 TABLET, FILM COATED ORAL at 20:15

## 2025-04-07 RX ADMIN — DAKIN'S SOLUTION 0.125% (QUARTER STRENGTH): 0.12 SOLUTION at 19:35

## 2025-04-07 RX ADMIN — CETIRIZINE HYDROCHLORIDE 10 MG: 10 TABLET ORAL at 20:15

## 2025-04-07 RX ADMIN — FUROSEMIDE 20 MG: 20 TABLET ORAL at 18:10

## 2025-04-07 RX ADMIN — POTASSIUM CHLORIDE 40 MEQ: 1500 TABLET, EXTENDED RELEASE ORAL at 12:09

## 2025-04-07 RX ADMIN — ACETAMINOPHEN 650 MG: 325 TABLET, FILM COATED ORAL at 20:14

## 2025-04-07 RX ADMIN — BUPROPION HYDROCHLORIDE 300 MG: 300 TABLET, EXTENDED RELEASE ORAL at 20:28

## 2025-04-07 RX ADMIN — CEFAZOLIN 2000 MG: 2 INJECTION, POWDER, FOR SOLUTION INTRAMUSCULAR; INTRAVENOUS at 22:18

## 2025-04-07 RX ADMIN — HYDROCODONE BITARTRATE AND ACETAMINOPHEN 1 TABLET: 5; 325 TABLET ORAL at 22:23

## 2025-04-07 RX ADMIN — PANTOPRAZOLE SODIUM 40 MG: 40 TABLET, DELAYED RELEASE ORAL at 20:27

## 2025-04-07 RX ADMIN — ANASTROZOLE 1 MG: 1 TABLET, FILM COATED ORAL at 20:27

## 2025-04-07 NOTE — PLAN OF CARE
Goal Outcome Evaluation:  Plan of Care Reviewed With: patient        Progress: improving  Outcome Evaluation: Received pt from [address] [place] [address], [facility], up ad breanne, family at bedside. Dressing present on right breast upon arrival to unit. Abx given, replacing potassium, surgery scheduled for tomorrow, consent signed.

## 2025-04-07 NOTE — NURSING NOTE
CWON note: duplicate consult. Pt was seen and evaluated by plastic surgery this morning. They have ordered wound care/ dressing changes BID and plan to return to the OR for debridement and possible closure. Will defer care and management of wound to plastics.

## 2025-04-07 NOTE — CONSULTS
Referring Provider: No ref. provider found      Subjective   History of present illness: This is a very nice 54-year-old with a history of breast cancer who underwent right mastectomy and sentinel biopsy in July 2024.  She was ER/SD positive with HER2 negative.  Margins ended up being positive requiring additional surgery on July 26, 2024 with ultimately negative margins and expanders were placed.  She had been placed on Vernzio but this had been discontinued due to possible PE.  She had radiation to the right chest as well and apparently developed some radiation necrosis.  Ultimately this progressed over especially last month and she ended up developing increasing pain, worsening wound necrosis, fevers as high as 103 and bloody drainage.  Her expander fell out yesterday prompting readmission.  She was started on vancomycin and cefazolin and Dr. Bender is planning I&D.  She has a remote history of anaphylaxis to penicillin but has tolerated cefazolin in the past      Physical Exam:   Vital Signs   Temp:  [97.5 °F (36.4 °C)-99.1 °F (37.3 °C)] 97.5 °F (36.4 °C)  Heart Rate:  [] 77  Resp:  [16-24] 16  BP: (109-126)/(52-65) 120/65    GENERAL: Awake and alert, in no acute distress.   HEENT: Oropharynx is clear. Hearing is grossly normal.   EYES: . No conjunctival injection. No lid lag.   LUNGS:normal respiratory effort.   SKIN: Right vasectomy site is dehisced with necrosis, foul odor and  PSYCHIATRIC: Appropriate mood, affect, insight, and judgment.     Results Review:  White count 4.1, hemoglobin 9, platelets 327  Creatinine 0.77  Liver function test normal  Blood cultures no growth to date  Chest x-ray independently interpreted: Possible right lower lobe infiltrate      A/p  1.  Right breast mastectomy incision wound necrosis/infection    Continue vancomycin for an AUC of 400-600 along with cefazolin which will increase to 2 g IV every 8 hours to provide broad coverage against skin bacteria.  Vancomycin dose  has been increased to 1 g IV every 12 hours which gives predicted AUC goal and will plan to check vancomycin trough tomorrow evening.  Would request that any suspicious fluid or tissue be cultured and that will help guide antibiotic decisions going forward.  Ultimately hope to discharge on oral antibiotics           Thank you for this consult.  We will continue to follow along and tailor antibiotics as the patient's clinical course evolves.

## 2025-04-07 NOTE — CONSULTS
.     REASON FOR CONSULTATION:     Provide an opinion on any further workup or treatment  Right breast invasive ductal carcinoma, grade 3, ER/AK positive and HER2 negative  Wound necrosis                                 REQUESTING PHYSICIAN: No ref. provider found  RECORDS OBTAINED:  Records of the patient's history including those obtained from the referring provider were reviewed and summarized in detail.    HISTORY OF PRESENT ILLNESS:  The patient is a 54 y.o. year old female  who is here for follow-up with the above-mentioned history.    aDsha is a 54-year-old postmenopausal lady who had right breast invasive ductal carcinoma requiring right mastectomy on 7/11/2024 with pathology showing 45 mm of invasive ductal carcinoma, grade 3, ER/AK strongly positive at 91 to 100% and HER2 1+ with a Ki-67 of 22%.  1 out of 5 lymph nodes were positive for metastatic disease.  CT scans of the chest abdomen and pelvis were performed showing an additional abnormal lymph node requiring excision of this lymph node on 9/19/2024.  Oncotype DX recurrence score was low at 18 and hence patient proceeded with adjuvant radiation.   She was started on anastrozole in November 2024.  She started Verzenio in December 2024.  Subsequently developed pulmonary embolism secondary to Verzenio for which she was started on Eliquis with the plans to maintain Eliquis for as long as she is on CDK 4 6 inhibitors.  Verzenio briefly held but resumed again on 2/20/2025.  She was admitted to the hospital with concerns of bleeding from the right breast and worsening of the breakdown resulting in expander falling out.  I had seen her in clinic in March 2025 when she complained of bleeding from the right chest wall and hemoglobin had dropped to 9.8.  She was strongly encouraged to contact Dr. Bender's office to be seen again and further management.    She has been seen by Dr. Bender in the hospital and recommending wet-to-dry dressings to the right  side twice daily.  Plans noted for wound debridement in OR.    Vital signs are stable.  Hemoglobin 9.0 WBC and platelets normal.        Past Medical History:   Diagnosis Date    Abnormal Pap smear of cervix     Anxiety     Miller's cyst     RESOLVED    Breast cancer     Right    Bulging lumbar disc     Bursitis     HX    Depression     GERD (gastroesophageal reflux disease)     H/O colposcopy with cervical biopsy     unknown pap result, biopsy was neg per pt, 2013 [facility]    History of 2019 novel coronavirus disease (COVID-19)     X2    History of eczema     HPV (human papilloma virus) infection     Hyperlipemia     Hypertension     Low back pain     Lumbar radiculopathy     Numbness and tingling of left leg     Osteoarthritis     Scoliosis     Seasonal allergies      Past Surgical History:   Procedure Laterality Date    BREAST AUGMENTATION      BREAST BIOPSY Right 9/19/2024    Procedure: AXILLARY LYMPH NODE BIOPSY/EXCISION;  Surgeon: Romina De Anda MD;  Location: [facility];  Service: General;  Laterality: Right;    BREAST RECONSTRUCTION Right 7/11/2024    Procedure: RIGHT SUBPECTORAL PLACEMENT TISSUE EXPANDER AND CORTIVA (ACELLULAR DERMAL MATRIX), COMPLEX CLOSURE RIGHT BREAST 5cm;  Surgeon: Colin Bender MD;  Location: [facility] OR;  Service: Plastics;  Laterality: Right;    BREAST SURGERY Right 7/26/2024    Procedure: ADJACENT TISSUE REARRANGEMENT RIGHT BREAST;  Surgeon: Colin Bender MD;  Location: [facility] OR;  Service: Plastics;  Laterality: Right;    CARPAL TUNNEL RELEASE Right 2004    CRYOTHERAPY      1997    D & C HYSTEROSCOPY N/A 9/19/2024    Procedure: INTRAUTERINE DEVICE REMOVAL;  Surgeon: Kaleb Burciaga MD;  Location: [facility];  Service: Obstetrics/Gynecology;  Laterality: N/A;    EPIDURAL BLOCK      HAND SURGERY  2004    Pisiformectomy    LUMBAR DISCECTOMY FUSION INSTRUMENTATION N/A 01/05/2024    Procedure: Lumbar 3 to lumbar 4 and lumbar 4 to lumbar 5 laminectomy  "with fusion and instrumentation;  Surgeon: Rigoberto Botello MD;  Location: [facility] MAIN OR;  Service: Robotics - Neuro;  Laterality: N/A;    MAMMO BILATERAL  2014    MASTECTOMY W/ SENTINEL NODE BIOPSY Right 7/11/2024    Procedure: Right MASTECTOMY WITH SENTINEL NODE BIOPSY;  Surgeon: Romina De Anda MD;  Location: [facility] MAIN OR;  Service: General;  Laterality: Right;    MASTECTOMY WITH IMMEDIATE RECONSTRUCTION Right 7/26/2024    Procedure: right mastectomy, margin excision;  Surgeon: Romina De Anda MD;  Location: [facility] OR;  Service: General;  Laterality: Right;    PAP SMEAR  20116    SHOULDER ARTHROSCOPY Left 2002,2003    SHOULDER SURGERY      \"MANIPULATION FOR FROZEN SHOULDER\"    US GUIDED LYMPH NODE BIOPSY  8/29/2024       MEDICATIONS    Current Facility-Administered Medications:     acetaminophen (TYLENOL) tablet 650 mg, 650 mg, Oral, Q4H PRN **OR** acetaminophen (TYLENOL) 160 MG/5ML oral solution 650 mg, 650 mg, Oral, Q4H PRN **OR** acetaminophen (TYLENOL) suppository 650 mg, 650 mg, Rectal, Q4H PRN, Tiffanie Mcbride MD    ALPRAZolam (XANAX) tablet 0.25 mg, 0.25 mg, Oral, BID PRN, Tiffanie Mcbride MD    anastrozole (ARIMIDEX) tablet 1 mg, 1 mg, Oral, Daily, Tiffanie Mcbride MD, 1 mg at 04/06/25 1910    atorvastatin (LIPITOR) tablet 80 mg, 80 mg, Oral, Daily, Tiffanie Mcbride MD, 80 mg at 04/06/25 1910    sennosides-docusate (PERICOLACE) 8.6-50 MG per tablet 2 tablet, 2 tablet, Oral, BID PRN **AND** polyethylene glycol (MIRALAX) packet 17 g, 17 g, Oral, Daily PRN **AND** bisacodyl (DULCOLAX) EC tablet 5 mg, 5 mg, Oral, Daily PRN **AND** bisacodyl (DULCOLAX) suppository 10 mg, 10 mg, Rectal, Daily PRN, Tiffanie Mcbride MD    buPROPion XL (WELLBUTRIN XL) 24 hr tablet 300 mg, 300 mg, Oral, Daily, Tiffanie Mcbride MD, 300 mg at 04/06/25 1909    Calcium Replacement - Follow Nurse / BPA Driven Protocol, , Not Applicable, PRN, Marty Cordova MD    ceFAZolin 2000 mg IVPB " in 100 mL NS (MBP), 2,000 mg, Intravenous, Q8H, Chase Wood MD    cetirizine (zyrTEC) tablet 10 mg, 10 mg, Oral, Daily, Tiffanie Mcbride MD, 10 mg at 04/06/25 1909    cyclobenzaprine (FLEXERIL) tablet 10 mg, 10 mg, Oral, TID PRN, Tiffanie Mcbride MD    enoxaparin sodium (LOVENOX) syringe 70 mg, 1 mg/kg, Subcutaneous, Once, Marty Cordova MD    escitalopram (LEXAPRO) tablet 20 mg, 20 mg, Oral, Daily, Tiffanie Mcbride MD, 20 mg at 04/06/25 1910    furosemide (LASIX) tablet 20 mg, 20 mg, Oral, BID Diuretics, Tiffanie Mcbride MD, 20 mg at 04/06/25 1910    HYDROcodone-acetaminophen (NORCO) 5-325 MG per tablet 1 tablet, 1 tablet, Oral, Q6H PRN, Tiffanie Mcbride MD, 1 tablet at 04/07/25 1005    losartan (COZAAR) tablet 100 mg, 100 mg, Oral, Q24H, Tiffanie Mcbride MD    Magnesium Standard Dose Replacement - Follow Nurse / BPA Driven Protocol, , Not Applicable, PRN, Marty Cordova MD    melatonin tablet 2.5 mg, 2.5 mg, Oral, Nightly PRN, Tiffanie Mcbride MD    metoprolol succinate XL (TOPROL-XL) 24 hr tablet 25 mg, 25 mg, Oral, Daily, Tiffanie Mcbride MD, 25 mg at 04/06/25 1910    morphine injection 2 mg, 2 mg, Intravenous, Q4H PRN, Tiffanie Mcbride MD    ondansetron ODT (ZOFRAN-ODT) disintegrating tablet 4 mg, 4 mg, Oral, Q6H PRN **OR** ondansetron (ZOFRAN) injection 4 mg, 4 mg, Intravenous, Q6H PRN, Tiffanie Mcbride MD    pantoprazole (PROTONIX) EC tablet 40 mg, 40 mg, Oral, Daily, Tiffanie Mcbride MD, 40 mg at 04/06/25 1910    Pharmacy to dose vancomycin, , Not Applicable, Continuous PRN, Tiffanie Mcbride MD    Phosphorus Replacement - Follow Nurse / BPA Driven Protocol, , Not Applicable, PRN, Marty Cordova MD    Potassium Replacement - Follow Nurse / BPA Driven Protocol, , Not Applicable, PRN, Marty Cordova MD    sodium chloride 0.9 % flush 10 mL, 10 mL, Intravenous, PRN, Christo Vargas  MD [facility]    sodium hypochlorite (DAKIN'S 1/4 STRENGTH) 0.125 % topical solution 0.125% solution, , Topical, BID, Colin Bender MD, Given at 25 1118    vancomycin (VANCOCIN) 1,000 mg in sodium chloride 0.9 % 250 mL IVPB-VTB, 1,000 mg, Intravenous, Q12H, Rachel Garcia MD    ALLERGIES:     Allergies   Allergen Reactions    Penicillins Anaphylaxis    Hemp Seed Oil Itching     HEMP IN LOTIONS    Lisinopril Cough       SOCIAL HISTORY:       Social History     Socioeconomic History    Marital status: Single   Tobacco Use    Smoking status: Former     Current packs/day: 0.00     Average packs/day: 1 pack/day for 20.0 years (20.0 ttl pk-yrs)     Types: Cigarettes     Start date:      Quit date:      Years since quittin.2     Passive exposure: Past    Smokeless tobacco: Never   Vaping Use    Vaping status: Never Used   Substance and Sexual Activity    Alcohol use: Yes     Alcohol/week: 2.0 - 4.0 standard drinks of alcohol     Types: 2 - 4 Glasses of wine per week     Comment: weekly    Drug use: No    Sexual activity: Not Currently     Birth control/protection: I.U.D.         FAMILY HISTORY:  Family History   Problem Relation Age of Onset    Hypertension Mother     Hyperlipidemia Mother     Diverticulitis Mother     Pancreatitis Mother     Kidney disease Father     Skin cancer Father     Hypertension Father     Hyperlipidemia Father     Stroke Father     Atrial fibrillation Father     Transient ischemic attack Father     No Known Problems Sister     Depression Brother     No Known Problems Daughter     No Known Problems Son     Uterine cancer Maternal Aunt 58    Heart disease Maternal Aunt     Lung cancer Maternal Uncle     Heart attack Maternal Uncle     No Known Problems Paternal Aunt     Colon cancer Paternal Uncle 65    Liver disease Maternal Grandmother     Heart disease Maternal Grandfather     Cancer Maternal Grandfather     Heart attack Paternal Grandmother     No Known Problems Paternal  Grandfather     BRCA 1/2 Neg Hx     Breast cancer Neg Hx     Endometrial cancer Neg Hx     Ovarian cancer Neg Hx     Malig Hyperthermia Neg Hx        REVIEW OF SYSTEMS:  Review of Systems   Constitutional:  Positive for fever.   HENT: Negative.     Eyes: Negative.    Respiratory: Negative.     Cardiovascular: Negative.    Gastrointestinal: Negative.    Neurological: Negative.    Hematological: Negative.    Psychiatric/Behavioral: Negative.                Vitals:    04/06/25 1932 04/07/25 0009 04/07/25 0557 04/07/25 0735   BP: 122/56 113/64  120/65   BP Location: Left arm Left arm  Left arm   Patient Position: Lying Lying  Lying   Pulse: 90 65  77   Resp: 18 16  16   Temp: 98.4 °F (36.9 °C) 97.5 °F (36.4 °C)  97.5 °F (36.4 °C)   TempSrc: Oral Oral  Oral   SpO2: 96% 98%  100%   Weight:   74.5 kg (164 lb 3.9 oz)    Height:             3/28/2025    10:24 AM   Current Status   ECOG score 0      PHYSICAL EXAM:      CONSTITUTIONAL:  Vital signs reviewed.  No distress, looks comfortable.  EYES:  Conjunctivae and lids unremarkable.  PERRLA  EARS,NOSE,MOUTH,THROAT:  Ears and nose appear unremarkable.  Lips, teeth, gums appear unremarkable.  RESPIRATORY:  Normal respiratory effort.  Lungs clear to auscultation bilaterally.  CARDIOVASCULAR:  Normal S1, S2.  No murmurs rubs or gallops.  No significant lower extremity edema.  GASTROINTESTINAL: Abdomen appears unremarkable.  Nontender.  No hepatomegaly.  No splenomegaly.  LYMPHATIC:  No cervical, supraclavicular, axillary lymphadenopathy.  MUSCULOSKELETAL:  Unremarkable gait and station.  Unremarkable digits/nails.  No cyanosis or clubbing.  SKIN:  Warm.  No rashes.  PSYCHIATRIC:  Normal judgment and insight.  Normal mood and affect.      RECENT LABS:        WBC   Date Value Ref Range Status   04/07/2025 4.10 3.40 - 10.80 10*3/mm3 Final   04/06/2025 5.56 3.40 - 10.80 10*3/mm3 Final     Hemoglobin   Date Value Ref Range Status   04/07/2025 9.0 (L) 12.0 - 15.9 g/dL Final    04/06/2025 9.7 (L) 12.0 - 15.9 g/dL Final     Platelets   Date Value Ref Range Status   04/07/2025 327 140 - 450 10*3/mm3 Final   04/06/2025 406 140 - 450 10*3/mm3 Final       Assessment & Plan   [unfilled]      Dasha De Leon     *Right breast invasive ductal carcinoma  Status post right mastectomy, sentinel lymph node biopsy with 2 out of 5 lymph nodes positive for metastatic disease  Completed adjuvant radiation to the right chest wall  Continue anastrozole  Hold Verzenio due to ongoing issues with right chest wall infection/wound necrosis and expander issues.    *Anemia  Most likely secondary to recent hemorrhage alongside with Verzenio.  Obtain iron studies, B12 and folic acid    *Pulmonary embolism  Secondary to Verzenio  She was on Eliquis as an outpatient  Now started on Lovenox 1 mg/kg twice daily in anticipation of upcoming surgery this week.  Recommend continuing Lovenox.    *Right chest wall wound and hemorrhage  Evaluated by Dr. Bender in the hospital  Plans noted for surgery.  Expander has fallen out.  The plan was initially for latissimus flap reconstruction salvage later this month  Now in the hospital and plans noted for debridement and possible closure.  Delights most flap closure will be delayed.  Infectious diseases has been consulted and they are recommending vancomycin and cefazolin.    Recommendations  Continue anastrozole  Hold Verzenio  Management of the right chest wall wound per plastics  Continue Lovenox while patient is in the hospital  Will transition to Eliquis once cleared by Dr. Bedner.  More likely than not anticipate holding Verzenio even after hospitalization to allow for wound healing.  Iron studies B12 and folate today

## 2025-04-07 NOTE — CONSULTS
[facility]   Consult Note    Patient Name: Dasha De Leon  : 1970  MRN: [id number]  Primary Care Physician:  Ariana Coffman PA-C  Referring Physician: No ref. provider found  Date of admission: 2025    General MD Inpatient Consult  Consult performed by: Colin Bender MD  Consult ordered by: Christo Vargas MD        Subjective   Subjective     Reason for Consult/ Chief Complaint: right breast dehiscence    History of Present Illness  Dasha De Leon is a 54 y.o. female with history of right mastectomy and tissue expander reconstruction last summer for breast cancer.  Had a positive margin requiring additional skin removal.  Received radiation over the .  She had a small area of superficial mastectomy skin necrosis after radiation and volume was removed from the expander and planned for latissimus flap reconstruction salvage later this month.  Since I have last seen her she has had bleeding from the right breast and worsening of the breakdown resulting in the expander falling out.  Has generally been feeling fatigued.  Was started on eliquis for PE in February.    Review of Systems   Constitutional:  Positive for activity change, appetite change and fatigue. Negative for chills and fever.   HENT: Negative.     Eyes: Negative.    Respiratory:  Positive for chest tightness and shortness of breath.    Cardiovascular: Negative.    Gastrointestinal: Negative.    Endocrine: Negative.    Skin:  Positive for color change and wound.   Neurological: Negative.         Personal History     Past Medical History:   Diagnosis Date    Abnormal Pap smear of cervix     Anxiety     Miller's cyst     RESOLVED    Breast cancer     Right    Bulging lumbar disc     Bursitis     HX    Depression     GERD (gastroesophageal reflux disease)     H/O colposcopy with cervical biopsy     unknown pap result, biopsy was neg per pt, 2013 [facility]    History of 2019 novel coronavirus disease (COVID-19)     X2     History of eczema     HPV (human papilloma virus) infection     Hyperlipemia     Hypertension     Low back pain     Lumbar radiculopathy     Numbness and tingling of left leg     Osteoarthritis     Scoliosis     Seasonal allergies        Past Surgical History:   Procedure Laterality Date    BREAST AUGMENTATION      BREAST BIOPSY Right 9/19/2024    Procedure: AXILLARY LYMPH NODE BIOPSY/EXCISION;  Surgeon: Romina De Anda MD;  Location: [facility];  Service: General;  Laterality: Right;    BREAST RECONSTRUCTION Right 7/11/2024    Procedure: RIGHT SUBPECTORAL PLACEMENT TISSUE EXPANDER AND CORTIVA (ACELLULAR DERMAL MATRIX), COMPLEX CLOSURE RIGHT BREAST 5cm;  Surgeon: Colin Bender MD;  Location: [facility] OR;  Service: Plastics;  Laterality: Right;    BREAST SURGERY Right 7/26/2024    Procedure: ADJACENT TISSUE REARRANGEMENT RIGHT BREAST;  Surgeon: Colin Bender MD;  Location: [facility] OR;  Service: Plastics;  Laterality: Right;    CARPAL TUNNEL RELEASE Right 2004    CRYOTHERAPY      1997    D & C HYSTEROSCOPY N/A 9/19/2024    Procedure: INTRAUTERINE DEVICE REMOVAL;  Surgeon: Kaleb Burciaga MD;  Location: [facility] OR;  Service: Obstetrics/Gynecology;  Laterality: N/A;    EPIDURAL BLOCK      HAND SURGERY  2004    Pisiformectomy    LUMBAR DISCECTOMY FUSION INSTRUMENTATION N/A 01/05/2024    Procedure: Lumbar 3 to lumbar 4 and lumbar 4 to lumbar 5 laminectomy with fusion and instrumentation;  Surgeon: Rigoberto Botello MD;  Location: [facility];  Service: Robotics - Neuro;  Laterality: N/A;    MAMMO BILATERAL  2014    MASTECTOMY W/ SENTINEL NODE BIOPSY Right 7/11/2024    Procedure: Right MASTECTOMY WITH SENTINEL NODE BIOPSY;  Surgeon: Romina De Anda MD;  Location: [facility] OR;  Service: General;  Laterality: Right;    MASTECTOMY WITH IMMEDIATE RECONSTRUCTION Right 7/26/2024    Procedure: right mastectomy, margin excision;  Surgeon: Romina De Anda MD;  Location: [facility];  Service:  "General;  Laterality: Right;    PAP SMEAR  [id number]    SHOULDER ARTHROSCOPY Left 2002,2003    SHOULDER SURGERY      \"MANIPULATION FOR FROZEN SHOULDER\"    US GUIDED LYMPH NODE BIOPSY  8/29/2024       Family History: family history includes Atrial fibrillation in her father; Cancer in her maternal grandfather; Colon cancer (age of onset: 65) in her paternal uncle; Depression in her brother; Diverticulitis in her mother; Heart attack in her maternal uncle and paternal grandmother; Heart disease in her maternal aunt and maternal grandfather; Hyperlipidemia in her father and mother; Hypertension in her father and mother; Kidney disease in her father; Liver disease in her maternal grandmother; Lung cancer in her maternal uncle; No Known Problems in her daughter, paternal aunt, paternal grandfather, sister, and son; Pancreatitis in her mother; Skin cancer in her father; Stroke in her father; Transient ischemic attack in her father; Uterine cancer (age of onset: 58) in her maternal aunt. Otherwise pertinent FHx was reviewed and not pertinent to current issue.    Social History:  reports that she quit smoking about 19 years ago. Her smoking use included cigarettes. She started smoking about 39 years ago. She has a 20 pack-year smoking history. She has been exposed to tobacco smoke. She has never used smokeless tobacco. She reports current alcohol use of about 2.0 - 4.0 standard drinks of alcohol per week. She reports that she does not use drugs.    Home Medications:   ALPRAZolam, Abemaciclib, HYDROcodone-acetaminophen, anastrozole, apixaban, atorvastatin, buPROPion XL, cyclobenzaprine, empagliflozin, escitalopram, famotidine, furosemide, levocetirizine, meloxicam, metoprolol succinate XL, naloxone, olmesartan, ondansetron, oxyCODONE, and pantoprazole    Allergies:  Allergies   Allergen Reactions    Penicillins Anaphylaxis    Hemp Seed Oil Itching     HEMP IN LOTIONS    Lisinopril Cough       Objective    Objective "     Vitals:  Temp:  [97.5 °F (36.4 °C)-99.1 °F (37.3 °C)] 97.5 °F (36.4 °C)  Heart Rate:  [] 65  Resp:  [16-24] 16  BP: (109-126)/(52-64) 113/64  Flow (L/min) (Oxygen Therapy):  [2] 2    Physical Exam  Constitutional:       General: She is not in acute distress.     Appearance: She is ill-appearing.   HENT:      Head: Normocephalic and atraumatic.   Eyes:      Extraocular Movements: Extraocular movements intact.      Pupils: Pupils are equal, round, and reactive to light.   Cardiovascular:      Rate and Rhythm: Normal rate and regular rhythm.   Pulmonary:      Effort: Pulmonary effort is normal.   Abdominal:      General: Abdomen is flat.      Palpations: Abdomen is soft.   Musculoskeletal:      Cervical back: Normal range of motion and neck supple.   Skin:     Comments: Right breast with dehiscence of mastectomy incision with large area of necrosis, no purulent drainage but foul odor   Neurological:      General: No focal deficit present.      Mental Status: She is alert and oriented to person, place, and time.         Result Review    Result Review:  I have personally reviewed the results from the time of this admission to 4/7/2025 08:04 EDT and agree with these findings:  [x]  Laboratory list / accordion  Glucose 90 65 - 99 mg/dL   BUN 12 6 - 20 mg/dL   Creatinine 0.77 0.57 - 1.00 mg/dL   Sodium 139 136 - 145 mmol/L   Potassium 3.1 Low  3.5 - 5.2 mmol/L   Chloride 103 98 - 107 mmol/L   CO2 24.9 22.0 - 29.0 mmol/L   Calcium 8.7 8.6 - 10.5 mg/dL   BUN/Creatinine Ratio 15.6 7.0 - 25.0   Anion Gap 11.1 5.0 - 15.0 mmol/L   eGFR 91.8 >60.0 mL/min/1.73       WBC 4.10 3.40 - 10.80 10*3/mm3   RBC 3.00 Low  3.77 - 5.28 10*6/mm3   Hemoglobin 9.0 Low  12.0 - 15.9 g/dL   Hematocrit 27.6 Low  34.0 - 46.6 %   MCV 92.0 79.0 - 97.0 fL   MCH 30.0 26.6 - 33.0 pg   MCHC 32.6 31.5 - 35.7 g/dL   RDW 14.0 12.3 - 15.4 %   RDW-SD 47.3 37.0 - 54.0 fl   MPV 8.9 6.0 - 12.0 fL   Platelets 327 140 - 450 10*3/mm3     []   Microbiology  [x]  Radiology  XR CHEST 1 VW-      HISTORY: Shortness of air.      COMPARISON: CT chest 2/6/2025      FINDINGS: A single view of the chest was obtained.      Support Devices:  None.   Cardiac Silhouette/Mediastinum/Kristine: The cardiac silhouette is normal in   size. There is calcific aortic atherosclerosis.   Lungs/Pleural Spaces: There is right basilar airspace opacity, better   appreciated on recent CT, possibly reflecting pneumonia and/or   treatment-related changes. There is a question trace right pleural   effusion. Recommend follow-up CT to document complete resolution.   Chest Wall/Diaphragm/Upper Abdomen: There is asymmetric elevation of the   right hemidiaphragm. There is dextroscoliosis.   []  EKG/Telemetry   []  Cardiology/Vascular   []  Pathology  []  Old records  []  Other:  Most notable findings include: anemia, no leukocytosis      Assessment & Plan   Assessment / Plan     Brief Patient Summary:  Dasha De Leon is a 54 y.o. female with dehiscence of breast reconstruction, pe.     Active Hospital Problems:  Active Hospital Problems    Diagnosis     **Open chest wound, right, initial encounter      Plan:   - dakins wet to dry to right breast twice daily  - will plan for OR for wound debridement and possible closure, discussed with patient that her recon salvage with latis flap will need to be delayed and goal now is just a closed wound  - anticoag per primary but request lovenox until OR plans finalized       Colin Bender MD

## 2025-04-07 NOTE — PROGRESS NOTES
"[facility] Clinical Pharmacy Services: Vancomycin Pharmacokinetic Initial Consult Note    Dasha De Leon is a 54 y.o. female who is on day 2 of pharmacy to dose vancomycin.    Indication: Skin and Soft Tissue  Consulting Provider: Dr. Mcbride  Planned Duration of Therapy: 5 days  Loading Dose Ordered or Given: 1500 mg on 4/6 at 2000  Culture/Source:   4/6 blood cultures in process  Target: -600 mg/L.hr   Other Antimicrobials: cefazolin 1 g iv every 8 hours    Vitals/Labs  Ht: 167.6 cm (66\"); Wt: 74.5 kg (164 lb 3.9 oz)  Temp Readings from Last 1 Encounters:   04/07/25 97.5 °F (36.4 °C) (Oral)    Estimated Creatinine Clearance: 86.2 mL/min (by C-G formula based on SCr of 0.77 mg/dL).     Results from last 7 days   Lab Units 04/07/25  0511 04/06/25  1409   CREATININE mg/dL 0.77 0.97   WBC 10*3/mm3 4.10 5.56     Assessment/Plan:    Vancomycin Dose:   Current regimen provides a subtherapeutic AUC. Will increase vanc to 1000 mg IV every  12  hours  Predictive AUC level for the dose ordered is 516 mg/L.hr, which is within the target of 400-600 mg/L.hr  Vanc Trough has been ordered for 4/8 at 1700    Pharmacy will follow patient's kidney function and will adjust doses and obtain levels as necessary. Thank you for involving pharmacy in this patient's care. Please contact pharmacy with any questions or concerns.                           Bebo Starks [facility]  Clinical Pharmacist      "

## 2025-04-07 NOTE — PLAN OF CARE
Problem: Adult Inpatient Plan of Care  Goal: Absence of Hospital-Acquired Illness or Injury  Intervention: Identify and Manage Fall Risk  Recent Flowsheet Documentation  Taken 4/7/2025 0002 by Myah Muñiz RN  Safety Promotion/Fall Prevention:   nonskid shoes/slippers when out of bed   safety round/check completed   assistive device/personal items within reach  Taken 4/6/2025 2202 by Myah Muñiz, RN  Safety Promotion/Fall Prevention:   assistive device/personal items within reach   room organization consistent   safety round/check completed  Taken 4/6/2025 2000 by Myah Muñiz, RN  Safety Promotion/Fall Prevention:   assistive device/personal items within reach   nonskid shoes/slippers when out of bed   safety round/check completed   Goal Outcome Evaluation:

## 2025-04-08 ENCOUNTER — ANESTHESIA (OUTPATIENT)
Dept: PERIOP | Facility: HOSPITAL | Age: 55
End: 2025-04-08
Payer: COMMERCIAL

## 2025-04-08 ENCOUNTER — ANESTHESIA EVENT (OUTPATIENT)
Dept: PERIOP | Facility: HOSPITAL | Age: 55
End: 2025-04-08
Payer: COMMERCIAL

## 2025-04-08 PROBLEM — D63.0 ANEMIA IN NEOPLASTIC DISEASE: Status: ACTIVE | Noted: 2025-04-08

## 2025-04-08 LAB
ANION GAP SERPL CALCULATED.3IONS-SCNC: 8.7 MMOL/L (ref 5–15)
BUN SERPL-MCNC: 9 MG/DL (ref 6–20)
BUN/CREAT SERPL: 11.4 (ref 7–25)
CALCIUM SPEC-SCNC: 9 MG/DL (ref 8.6–10.5)
CHLORIDE SERPL-SCNC: 108 MMOL/L (ref 98–107)
CO2 SERPL-SCNC: 21.3 MMOL/L (ref 22–29)
CREAT SERPL-MCNC: 0.79 MG/DL (ref 0.57–1)
DEPRECATED RDW RBC AUTO: 48.6 FL (ref 37–54)
EGFRCR SERPLBLD CKD-EPI 2021: 89 ML/MIN/1.73
ERYTHROCYTE [DISTWIDTH] IN BLOOD BY AUTOMATED COUNT: 14.3 % (ref 12.3–15.4)
GLUCOSE SERPL-MCNC: 96 MG/DL (ref 65–99)
HCT VFR BLD AUTO: 29.4 % (ref 34–46.6)
HGB BLD-MCNC: 9.3 G/DL (ref 12–15.9)
MCH RBC QN AUTO: 29.2 PG (ref 26.6–33)
MCHC RBC AUTO-ENTMCNC: 31.6 G/DL (ref 31.5–35.7)
MCV RBC AUTO: 92.5 FL (ref 79–97)
PLATELET # BLD AUTO: 396 10*3/MM3 (ref 140–450)
PMV BLD AUTO: 9.3 FL (ref 6–12)
POTASSIUM SERPL-SCNC: 4.3 MMOL/L (ref 3.5–5.2)
POTASSIUM SERPL-SCNC: 4.9 MMOL/L (ref 3.5–5.2)
RBC # BLD AUTO: 3.18 10*6/MM3 (ref 3.77–5.28)
SODIUM SERPL-SCNC: 138 MMOL/L (ref 136–145)
WBC NRBC COR # BLD AUTO: 3.47 10*3/MM3 (ref 3.4–10.8)

## 2025-04-08 PROCEDURE — 25010000002 VANCOMYCIN 1 G RECONSTITUTED SOLUTION 1 EACH VIAL: Performed by: INTERNAL MEDICINE

## 2025-04-08 PROCEDURE — 25010000002 LIDOCAINE 1% - EPINEPHRINE 1:100000 1 %-1:100000 SOLUTION 30 ML VIAL: Performed by: PLASTIC SURGERY

## 2025-04-08 PROCEDURE — 80048 BASIC METABOLIC PNL TOTAL CA: CPT | Performed by: HOSPITALIST

## 2025-04-08 PROCEDURE — 25810000003 LACTATED RINGERS PER 1000 ML: Performed by: ANESTHESIOLOGY

## 2025-04-08 PROCEDURE — 25010000002 HYDROMORPHONE PER 4 MG: Performed by: NURSE ANESTHETIST, CERTIFIED REGISTERED

## 2025-04-08 PROCEDURE — 25010000002 PROPOFOL 10 MG/ML EMULSION: Performed by: NURSE ANESTHETIST, CERTIFIED REGISTERED

## 2025-04-08 PROCEDURE — 25010000002 GENTAMICIN PER 80 MG: Performed by: PLASTIC SURGERY

## 2025-04-08 PROCEDURE — 25810000003 SODIUM CHLORIDE 0.9 % SOLUTION 250 ML FLEX CONT: Performed by: INTERNAL MEDICINE

## 2025-04-08 PROCEDURE — 25010000002 VANCOMYCIN 1 G RECONSTITUTED SOLUTION 1 EACH VIAL: Performed by: PLASTIC SURGERY

## 2025-04-08 PROCEDURE — 85027 COMPLETE CBC AUTOMATED: CPT | Performed by: HOSPITALIST

## 2025-04-08 PROCEDURE — 25810000003 SODIUM CHLORIDE 0.9 % SOLUTION 250 ML FLEX CONT: Performed by: NURSE ANESTHETIST, CERTIFIED REGISTERED

## 2025-04-08 PROCEDURE — 25010000002 DEXAMETHASONE SODIUM PHOSPHATE 20 MG/5ML SOLUTION: Performed by: NURSE ANESTHETIST, CERTIFIED REGISTERED

## 2025-04-08 PROCEDURE — 25010000002 FAMOTIDINE 10 MG/ML SOLUTION: Performed by: ANESTHESIOLOGY

## 2025-04-08 PROCEDURE — 0JB60ZZ EXCISION OF CHEST SUBCUTANEOUS TISSUE AND FASCIA, OPEN APPROACH: ICD-10-PCS | Performed by: PLASTIC SURGERY

## 2025-04-08 PROCEDURE — 25010000002 KETOROLAC TROMETHAMINE PER 15 MG: Performed by: NURSE ANESTHETIST, CERTIFIED REGISTERED

## 2025-04-08 PROCEDURE — 25010000002 CEFAZOLIN PER 500 MG: Performed by: INTERNAL MEDICINE

## 2025-04-08 PROCEDURE — 99232 SBSQ HOSP IP/OBS MODERATE 35: CPT | Performed by: INTERNAL MEDICINE

## 2025-04-08 PROCEDURE — 25010000002 ENOXAPARIN PER 10 MG: Performed by: HOSPITALIST

## 2025-04-08 PROCEDURE — 25010000002 LIDOCAINE 2% SOLUTION: Performed by: NURSE ANESTHETIST, CERTIFIED REGISTERED

## 2025-04-08 PROCEDURE — 25010000002 PHENYLEPHRINE 10 MG/ML SOLUTION 5 ML VIAL: Performed by: NURSE ANESTHETIST, CERTIFIED REGISTERED

## 2025-04-08 PROCEDURE — 25010000002 HYDROMORPHONE 1 MG/ML SOLUTION: Performed by: NURSE ANESTHETIST, CERTIFIED REGISTERED

## 2025-04-08 PROCEDURE — 84132 ASSAY OF SERUM POTASSIUM: CPT | Performed by: HOSPITALIST

## 2025-04-08 PROCEDURE — 88304 TISSUE EXAM BY PATHOLOGIST: CPT | Performed by: PLASTIC SURGERY

## 2025-04-08 PROCEDURE — 25010000002 FENTANYL CITRATE (PF) 50 MCG/ML SOLUTION: Performed by: NURSE ANESTHETIST, CERTIFIED REGISTERED

## 2025-04-08 PROCEDURE — 25010000002 ONDANSETRON PER 1 MG: Performed by: NURSE ANESTHETIST, CERTIFIED REGISTERED

## 2025-04-08 PROCEDURE — 25010000002 CEFAZOLIN PER 500 MG: Performed by: PLASTIC SURGERY

## 2025-04-08 PROCEDURE — 25810000003 SODIUM CHLORIDE 0.9 % SOLUTION 250 ML FLEX CONT: Performed by: PLASTIC SURGERY

## 2025-04-08 RX ORDER — FENTANYL CITRATE 50 UG/ML
50 INJECTION, SOLUTION INTRAMUSCULAR; INTRAVENOUS
Status: DISCONTINUED | OUTPATIENT
Start: 2025-04-08 | End: 2025-04-08 | Stop reason: HOSPADM

## 2025-04-08 RX ORDER — DROPERIDOL 2.5 MG/ML
0.62 INJECTION, SOLUTION INTRAMUSCULAR; INTRAVENOUS
Status: DISCONTINUED | OUTPATIENT
Start: 2025-04-08 | End: 2025-04-08 | Stop reason: HOSPADM

## 2025-04-08 RX ORDER — DEXAMETHASONE SODIUM PHOSPHATE 4 MG/ML
INJECTION, SOLUTION INTRA-ARTICULAR; INTRALESIONAL; INTRAMUSCULAR; INTRAVENOUS; SOFT TISSUE AS NEEDED
Status: DISCONTINUED | OUTPATIENT
Start: 2025-04-08 | End: 2025-04-08 | Stop reason: SURG

## 2025-04-08 RX ORDER — PANTOPRAZOLE SODIUM 40 MG/1
40 TABLET, DELAYED RELEASE ORAL NIGHTLY
Status: DISCONTINUED | OUTPATIENT
Start: 2025-04-08 | End: 2025-04-09 | Stop reason: HOSPADM

## 2025-04-08 RX ORDER — BUPROPION HYDROCHLORIDE 300 MG/1
300 TABLET ORAL NIGHTLY
Status: DISCONTINUED | OUTPATIENT
Start: 2025-04-08 | End: 2025-04-09 | Stop reason: HOSPADM

## 2025-04-08 RX ORDER — MAGNESIUM HYDROXIDE 1200 MG/15ML
LIQUID ORAL AS NEEDED
Status: DISCONTINUED | OUTPATIENT
Start: 2025-04-08 | End: 2025-04-08 | Stop reason: HOSPADM

## 2025-04-08 RX ORDER — FLUMAZENIL 0.1 MG/ML
0.2 INJECTION INTRAVENOUS AS NEEDED
Status: DISCONTINUED | OUTPATIENT
Start: 2025-04-08 | End: 2025-04-08 | Stop reason: HOSPADM

## 2025-04-08 RX ORDER — LABETALOL HYDROCHLORIDE 5 MG/ML
5 INJECTION, SOLUTION INTRAVENOUS
Status: DISCONTINUED | OUTPATIENT
Start: 2025-04-08 | End: 2025-04-08 | Stop reason: HOSPADM

## 2025-04-08 RX ORDER — ATORVASTATIN CALCIUM 20 MG/1
80 TABLET, FILM COATED ORAL NIGHTLY
Status: DISCONTINUED | OUTPATIENT
Start: 2025-04-08 | End: 2025-04-09 | Stop reason: HOSPADM

## 2025-04-08 RX ORDER — HYDROCODONE BITARTRATE AND ACETAMINOPHEN 5; 325 MG/1; MG/1
1 TABLET ORAL ONCE AS NEEDED
Status: DISCONTINUED | OUTPATIENT
Start: 2025-04-08 | End: 2025-04-08 | Stop reason: HOSPADM

## 2025-04-08 RX ORDER — ATROPINE SULFATE 0.4 MG/ML
0.4 INJECTION, SOLUTION INTRAMUSCULAR; INTRAVENOUS; SUBCUTANEOUS ONCE AS NEEDED
Status: DISCONTINUED | OUTPATIENT
Start: 2025-04-08 | End: 2025-04-08 | Stop reason: HOSPADM

## 2025-04-08 RX ORDER — NALOXONE HCL 0.4 MG/ML
0.2 VIAL (ML) INJECTION AS NEEDED
Status: DISCONTINUED | OUTPATIENT
Start: 2025-04-08 | End: 2025-04-08 | Stop reason: HOSPADM

## 2025-04-08 RX ORDER — GINSENG 100 MG
CAPSULE ORAL AS NEEDED
Status: DISCONTINUED | OUTPATIENT
Start: 2025-04-08 | End: 2025-04-08 | Stop reason: HOSPADM

## 2025-04-08 RX ORDER — EPHEDRINE SULFATE 50 MG/ML
5 INJECTION, SOLUTION INTRAVENOUS ONCE AS NEEDED
Status: DISCONTINUED | OUTPATIENT
Start: 2025-04-08 | End: 2025-04-08 | Stop reason: HOSPADM

## 2025-04-08 RX ORDER — KETOROLAC TROMETHAMINE 30 MG/ML
INJECTION, SOLUTION INTRAMUSCULAR; INTRAVENOUS AS NEEDED
Status: DISCONTINUED | OUTPATIENT
Start: 2025-04-08 | End: 2025-04-08 | Stop reason: SURG

## 2025-04-08 RX ORDER — LIDOCAINE HYDROCHLORIDE 20 MG/ML
INJECTION, SOLUTION INFILTRATION; PERINEURAL AS NEEDED
Status: DISCONTINUED | OUTPATIENT
Start: 2025-04-08 | End: 2025-04-08 | Stop reason: SURG

## 2025-04-08 RX ORDER — PROMETHAZINE HYDROCHLORIDE 25 MG/1
25 SUPPOSITORY RECTAL ONCE AS NEEDED
Status: DISCONTINUED | OUTPATIENT
Start: 2025-04-08 | End: 2025-04-08 | Stop reason: HOSPADM

## 2025-04-08 RX ORDER — FAMOTIDINE 10 MG/ML
20 INJECTION, SOLUTION INTRAVENOUS ONCE
Status: COMPLETED | OUTPATIENT
Start: 2025-04-08 | End: 2025-04-08

## 2025-04-08 RX ORDER — PROMETHAZINE HYDROCHLORIDE 25 MG/1
25 TABLET ORAL ONCE AS NEEDED
Status: DISCONTINUED | OUTPATIENT
Start: 2025-04-08 | End: 2025-04-08 | Stop reason: HOSPADM

## 2025-04-08 RX ORDER — ONDANSETRON 2 MG/ML
4 INJECTION INTRAMUSCULAR; INTRAVENOUS ONCE AS NEEDED
Status: DISCONTINUED | OUTPATIENT
Start: 2025-04-08 | End: 2025-04-08 | Stop reason: HOSPADM

## 2025-04-08 RX ORDER — ESCITALOPRAM OXALATE 20 MG/1
20 TABLET ORAL NIGHTLY
Status: DISCONTINUED | OUTPATIENT
Start: 2025-04-08 | End: 2025-04-09 | Stop reason: HOSPADM

## 2025-04-08 RX ORDER — ENOXAPARIN SODIUM 100 MG/ML
1 INJECTION SUBCUTANEOUS EVERY 12 HOURS
Status: DISCONTINUED | OUTPATIENT
Start: 2025-04-08 | End: 2025-04-09 | Stop reason: HOSPADM

## 2025-04-08 RX ORDER — FENTANYL CITRATE 50 UG/ML
50 INJECTION, SOLUTION INTRAMUSCULAR; INTRAVENOUS ONCE AS NEEDED
Status: DISCONTINUED | OUTPATIENT
Start: 2025-04-08 | End: 2025-04-08 | Stop reason: HOSPADM

## 2025-04-08 RX ORDER — IPRATROPIUM BROMIDE AND ALBUTEROL SULFATE 2.5; .5 MG/3ML; MG/3ML
3 SOLUTION RESPIRATORY (INHALATION) ONCE AS NEEDED
Status: DISCONTINUED | OUTPATIENT
Start: 2025-04-08 | End: 2025-04-08 | Stop reason: HOSPADM

## 2025-04-08 RX ORDER — ACETAMINOPHEN 500 MG
1000 TABLET ORAL ONCE
Status: COMPLETED | OUTPATIENT
Start: 2025-04-08 | End: 2025-04-08

## 2025-04-08 RX ORDER — HYDRALAZINE HYDROCHLORIDE 20 MG/ML
5 INJECTION INTRAMUSCULAR; INTRAVENOUS
Status: DISCONTINUED | OUTPATIENT
Start: 2025-04-08 | End: 2025-04-08 | Stop reason: HOSPADM

## 2025-04-08 RX ORDER — CETIRIZINE HYDROCHLORIDE 10 MG/1
10 TABLET ORAL NIGHTLY
Status: DISCONTINUED | OUTPATIENT
Start: 2025-04-08 | End: 2025-04-09 | Stop reason: HOSPADM

## 2025-04-08 RX ORDER — SODIUM CHLORIDE 0.9 % (FLUSH) 0.9 %
3-10 SYRINGE (ML) INJECTION AS NEEDED
Status: DISCONTINUED | OUTPATIENT
Start: 2025-04-08 | End: 2025-04-08 | Stop reason: HOSPADM

## 2025-04-08 RX ORDER — HYDROMORPHONE HYDROCHLORIDE 1 MG/ML
0.5 INJECTION, SOLUTION INTRAMUSCULAR; INTRAVENOUS; SUBCUTANEOUS
Status: DISCONTINUED | OUTPATIENT
Start: 2025-04-08 | End: 2025-04-08 | Stop reason: HOSPADM

## 2025-04-08 RX ORDER — LOSARTAN POTASSIUM 100 MG/1
100 TABLET ORAL NIGHTLY
Status: DISCONTINUED | OUTPATIENT
Start: 2025-04-08 | End: 2025-04-09 | Stop reason: HOSPADM

## 2025-04-08 RX ORDER — DIPHENHYDRAMINE HYDROCHLORIDE 50 MG/ML
12.5 INJECTION, SOLUTION INTRAMUSCULAR; INTRAVENOUS
Status: DISCONTINUED | OUTPATIENT
Start: 2025-04-08 | End: 2025-04-08 | Stop reason: HOSPADM

## 2025-04-08 RX ORDER — ANASTROZOLE 1 MG/1
1 TABLET ORAL NIGHTLY
Status: DISCONTINUED | OUTPATIENT
Start: 2025-04-08 | End: 2025-04-09 | Stop reason: HOSPADM

## 2025-04-08 RX ORDER — LIDOCAINE HYDROCHLORIDE 10 MG/ML
0.5 INJECTION, SOLUTION INFILTRATION; PERINEURAL ONCE AS NEEDED
Status: DISCONTINUED | OUTPATIENT
Start: 2025-04-08 | End: 2025-04-08 | Stop reason: HOSPADM

## 2025-04-08 RX ORDER — ONDANSETRON 2 MG/ML
INJECTION INTRAMUSCULAR; INTRAVENOUS AS NEEDED
Status: DISCONTINUED | OUTPATIENT
Start: 2025-04-08 | End: 2025-04-08 | Stop reason: SURG

## 2025-04-08 RX ORDER — SODIUM CHLORIDE, SODIUM LACTATE, POTASSIUM CHLORIDE, CALCIUM CHLORIDE 600; 310; 30; 20 MG/100ML; MG/100ML; MG/100ML; MG/100ML
9 INJECTION, SOLUTION INTRAVENOUS CONTINUOUS
Status: ACTIVE | OUTPATIENT
Start: 2025-04-08 | End: 2025-04-09

## 2025-04-08 RX ORDER — OXYCODONE AND ACETAMINOPHEN 7.5; 325 MG/1; MG/1
1 TABLET ORAL EVERY 4 HOURS PRN
Status: DISCONTINUED | OUTPATIENT
Start: 2025-04-08 | End: 2025-04-08 | Stop reason: HOSPADM

## 2025-04-08 RX ORDER — SODIUM CHLORIDE 0.9 % (FLUSH) 0.9 %
3 SYRINGE (ML) INJECTION EVERY 12 HOURS SCHEDULED
Status: DISCONTINUED | OUTPATIENT
Start: 2025-04-08 | End: 2025-04-08 | Stop reason: HOSPADM

## 2025-04-08 RX ORDER — PROPOFOL 10 MG/ML
VIAL (ML) INTRAVENOUS AS NEEDED
Status: DISCONTINUED | OUTPATIENT
Start: 2025-04-08 | End: 2025-04-08 | Stop reason: SURG

## 2025-04-08 RX ADMIN — HYDROMORPHONE HYDROCHLORIDE 0.5 MG: 1 INJECTION, SOLUTION INTRAMUSCULAR; INTRAVENOUS; SUBCUTANEOUS at 09:14

## 2025-04-08 RX ADMIN — CETIRIZINE HYDROCHLORIDE 10 MG: 10 TABLET, FILM COATED ORAL at 21:08

## 2025-04-08 RX ADMIN — LOSARTAN POTASSIUM 100 MG: 100 TABLET, FILM COATED ORAL at 21:09

## 2025-04-08 RX ADMIN — ENOXAPARIN SODIUM 70 MG: 100 INJECTION SUBCUTANEOUS at 21:09

## 2025-04-08 RX ADMIN — CYCLOBENZAPRINE HYDROCHLORIDE 10 MG: 10 TABLET, FILM COATED ORAL at 16:17

## 2025-04-08 RX ADMIN — ACETAMINOPHEN 1000 MG: 500 TABLET, FILM COATED ORAL at 08:50

## 2025-04-08 RX ADMIN — CYCLOBENZAPRINE HYDROCHLORIDE 10 MG: 10 TABLET, FILM COATED ORAL at 23:31

## 2025-04-08 RX ADMIN — FENTANYL CITRATE 50 MCG: 50 INJECTION, SOLUTION INTRAMUSCULAR; INTRAVENOUS at 11:19

## 2025-04-08 RX ADMIN — ONDANSETRON 4 MG: 2 INJECTION, SOLUTION INTRAMUSCULAR; INTRAVENOUS at 10:11

## 2025-04-08 RX ADMIN — DEXAMETHASONE SODIUM PHOSPHATE 8 MG: 4 INJECTION, SOLUTION INTRAMUSCULAR; INTRAVENOUS at 09:14

## 2025-04-08 RX ADMIN — PHENYLEPHRINE HYDROCHLORIDE 100 MCG: 10 INJECTION, SOLUTION INTRAVENOUS at 09:41

## 2025-04-08 RX ADMIN — PANTOPRAZOLE SODIUM 40 MG: 40 TABLET, DELAYED RELEASE ORAL at 21:09

## 2025-04-08 RX ADMIN — ATORVASTATIN CALCIUM 80 MG: 20 TABLET, FILM COATED ORAL at 21:08

## 2025-04-08 RX ADMIN — HYDROCODONE BITARTRATE AND ACETAMINOPHEN 1 TABLET: 5; 325 TABLET ORAL at 21:09

## 2025-04-08 RX ADMIN — HYDROCODONE BITARTRATE AND ACETAMINOPHEN 1 TABLET: 5; 325 TABLET ORAL at 13:24

## 2025-04-08 RX ADMIN — SODIUM CHLORIDE 1000 MG: 9 INJECTION, SOLUTION INTRAVENOUS at 05:40

## 2025-04-08 RX ADMIN — CEFAZOLIN 2000 MG: 2 INJECTION, POWDER, FOR SOLUTION INTRAMUSCULAR; INTRAVENOUS at 17:54

## 2025-04-08 RX ADMIN — HYDROCODONE BITARTRATE AND ACETAMINOPHEN 1 TABLET: 5; 325 TABLET ORAL at 05:12

## 2025-04-08 RX ADMIN — ANASTROZOLE 1 MG: 1 TABLET, COATED ORAL at 21:09

## 2025-04-08 RX ADMIN — HYDROMORPHONE HYDROCHLORIDE 0.5 MG: 1 INJECTION, SOLUTION INTRAMUSCULAR; INTRAVENOUS; SUBCUTANEOUS at 11:34

## 2025-04-08 RX ADMIN — ESCITALOPRAM 20 MG: 20 TABLET, FILM COATED ORAL at 21:09

## 2025-04-08 RX ADMIN — SODIUM CHLORIDE, POTASSIUM CHLORIDE, SODIUM LACTATE AND CALCIUM CHLORIDE 9 ML/HR: 600; 310; 30; 20 INJECTION, SOLUTION INTRAVENOUS at 08:50

## 2025-04-08 RX ADMIN — HYDROMORPHONE HYDROCHLORIDE 0.5 MG: 1 INJECTION, SOLUTION INTRAMUSCULAR; INTRAVENOUS; SUBCUTANEOUS at 09:30

## 2025-04-08 RX ADMIN — HYDROMORPHONE HYDROCHLORIDE 0.5 MG: 1 INJECTION, SOLUTION INTRAMUSCULAR; INTRAVENOUS; SUBCUTANEOUS at 11:20

## 2025-04-08 RX ADMIN — LIDOCAINE HYDROCHLORIDE 60 MG: 20 INJECTION, SOLUTION INFILTRATION; PERINEURAL at 09:08

## 2025-04-08 RX ADMIN — FAMOTIDINE 20 MG: 10 INJECTION, SOLUTION INTRAVENOUS at 08:50

## 2025-04-08 RX ADMIN — CEFAZOLIN 2000 MG: 2 INJECTION, POWDER, FOR SOLUTION INTRAMUSCULAR; INTRAVENOUS at 05:09

## 2025-04-08 RX ADMIN — PROPOFOL 160 MG: 10 INJECTION, EMULSION INTRAVENOUS at 09:08

## 2025-04-08 RX ADMIN — KETOROLAC TROMETHAMINE 30 MG: 30 INJECTION, SOLUTION INTRAMUSCULAR; INTRAVENOUS at 10:11

## 2025-04-08 RX ADMIN — METOPROLOL SUCCINATE 25 MG: 25 TABLET, EXTENDED RELEASE ORAL at 06:26

## 2025-04-08 RX ADMIN — FUROSEMIDE 20 MG: 20 TABLET ORAL at 17:54

## 2025-04-08 RX ADMIN — HYDROMORPHONE HYDROCHLORIDE 0.5 MG: 1 INJECTION, SOLUTION INTRAMUSCULAR; INTRAVENOUS; SUBCUTANEOUS at 10:51

## 2025-04-08 RX ADMIN — BUPROPION HYDROCHLORIDE 300 MG: 300 TABLET, EXTENDED RELEASE ORAL at 21:09

## 2025-04-08 RX ADMIN — CEFAZOLIN 2000 MG: 2 INJECTION, POWDER, FOR SOLUTION INTRAMUSCULAR; INTRAVENOUS at 23:27

## 2025-04-08 RX ADMIN — SODIUM CHLORIDE 1000 MG: 9 INJECTION, SOLUTION INTRAVENOUS at 19:25

## 2025-04-08 NOTE — PROGRESS NOTES
ID note for wound necrosis/infection  S: Seen in PACU after surgery  C/o some postop pain  AF.      Physical Exam:   Vital Signs   Temp:  [96.6 °F (35.9 °C)-97.8 °F (36.6 °C)] 97.8 °F (36.6 °C)  Heart Rate:  [66-80] 75  Resp:  [16-20] 18  BP: (102-133)/(63-76) 133/76    GENERAL: Awake and alert, in no acute distress.   HEENT: Oropharynx is clear. Hearing is grossly normal.   EYES: . No conjunctival injection. No lid lag.   LUNGS:normal respiratory effort.   SKIN: no cutaneous eruptions in exposed areas  PSYCHIATRIC: Appropriate mood, affect, insight, and judgment.     Results Review:  White count 3.47, hgb 9.3, plt 396  Creatinine 0.79     Blood cultures no growth to date       A/p  1.  Right breast mastectomy incision wound necrosis/infection  2.  Right breast invasive ductal carcinoma, verzenio on hold    S/p I and D 4/5 with Dr Bender  Continue vancomycin for an AUC of 400-600 along with cefazolin 2 g IV every 8 hours to provide broad coverage against skin bacteria.    Check vanc level tonight  Tailor abx based on OR findings, cx. Bcx NGTD  Ultimately hope to discharge on oral antibiotics

## 2025-04-08 NOTE — PROGRESS NOTES
SUBJECTIVE:   Feeling a bit better, no new complaints.      OBJECTIVE:  Vitals:    04/08/25 0552   BP: 112/68   Pulse: 73   Resp: 20   Temp: 97.2 °F (36.2 °C)   SpO2: 97%     Physical Exam   Constitutional  No distress.     Eyes  Pupils are equal, round, and reactive to light.     General -             Right: Right eye extraocular movements are normal.     Cardiovascular: Normal rate and regular rhythm.     Pulmonary/Chest  Effort normal.     Skin    Left breast with erythema, wound, packing in place       PLAN:  Radiation injury right breast, wound breakdown  - to OR today for debridement and wound closure  - ok to resume lovenox after surgery

## 2025-04-08 NOTE — PROGRESS NOTES
"[facility] Clinical Pharmacy Services: Vancomycin Pharmacokinetic  Consult Note    Dasha De Leon is a 54 y.o. female who is on day 3 of pharmacy to dose vancomycin.    Indication: Skin and Soft Tissue  Consulting Provider: Dr. Mcbride  Planned Duration of Therapy: 5 days   Current Dose Ordered or Given:   Vancomycin 1000mg iv q 12h   Culture/Source:   4/6 blood cultures    NG x 24h  Target: -600 mg/L.hr   Other Antimicrobials: cefazolin 1 g iv every 8 hours    Vitals/Labs  Ht: 167.6 cm (66\"); Wt: 74.5 kg (164 lb 3.9 oz)  Temp Readings from Last 1 Encounters:   04/07/25 97.5 °F (36.4 °C) (Oral)    Estimated Creatinine Clearance: 86.2 mL/min (by C-G formula based on SCr of 0.77 mg/dL).     Results from last 7 days   Lab Units 04/07/25  0511 04/06/25  1409   CREATININE mg/dL 0.77 0.97   WBC 10*3/mm3 4.10 5.56        Assessment/Plan:    Vancomycin Dose:   Continue vancomycin to 1000 mg IV every  12  hours  Predictive AUC level for the dose ordered is 533 mg/L.hr, which is within the target of 400-600 mg/L.hr  Vanc Trough has been ordered for 4/9 at 0530    Pharmacy will follow patient's kidney function and will adjust doses and obtain levels as necessary. Thank you for involving pharmacy in this patient's care. Please contact pharmacy with any questions or concerns.          Hermelinda Jimenez, [facility]    Clinical Pharmacist        "

## 2025-04-08 NOTE — PROGRESS NOTES
Name: Dasha De Leon ADMIT: 2025   : 1970  PCP: Ariana Coffman PA-C    MRN: [id number] LOS: 1 days   AGE/SEX: 54 y.o. female  ROOM: [facility]     Subjective   Subjective   No events.  Having some pain in the surgical site.  Seen after returning to the floor       Objective   Objective   Vital Signs  Temp:  [96.6 °F (35.9 °C)-98.1 °F (36.7 °C)] 97 °F (36.1 °C)  Heart Rate:  [66-97] 81  Resp:  [12-20] 16  BP: (102-144)/(61-76) 118/64  SpO2:  [93 %-100 %] 98 %  on  Flow (L/min) (Oxygen Therapy):  [2-3] 2;   Device (Oxygen Therapy): nasal cannula  Body mass index is 26.4 kg/m².  Physical Exam  Vitals reviewed.   Constitutional:       General: She is not in acute distress.  Cardiovascular:      Rate and Rhythm: Normal rate and regular rhythm.   Pulmonary:      Effort: Pulmonary effort is normal.   Chest:      Chest wall: Tenderness present.   Musculoskeletal:      Right lower leg: No edema.      Left lower leg: No edema.   Neurological:      Mental Status: She is alert and oriented to person, place, and time.       Results Review     I reviewed the patient's new clinical results.  Results from last 7 days   Lab Units 25  0519 25  0511 25  1409   WBC 10*3/mm3 3.47 4.10 5.56   HEMOGLOBIN g/dL 9.3* 9.0* 9.7*   PLATELETS 10*3/mm3 396 327 406     Results from last 7 days   Lab Units 25  0519 25  0017 25  0511 25  1409   SODIUM mmol/L 138  --  139 136   POTASSIUM mmol/L 4.9 4.3 3.1* 3.7   CHLORIDE mmol/L 108*  --  103 98   CO2 mmol/L 21.3*  --  24.9 23.8   BUN mg/dL 9  --  12 11   CREATININE mg/dL 0.79  --  0.77 0.97   GLUCOSE mg/dL 96  --  90 102*   EGFR mL/min/1.73 89.0  --  91.8 69.6     Results from last 7 days   Lab Units 25  1409   ALBUMIN g/dL 3.5   BILIRUBIN mg/dL 0.5   ALK PHOS U/L 124*   AST (SGOT) U/L 18   ALT (SGPT) U/L 14     Results from last 7 days   Lab Units 25  0519 25  0511 25  1409   CALCIUM mg/dL 9.0 8.7 9.4   ALBUMIN g/dL  " --   --  3.5     Results from last 7 days   Lab Units 04/06/25  1800 04/06/25  1409   LACTATE mmol/L 1.4 2.1*     No results found for: \"HGBA1C\", \"POCGLU\"    No radiology results for the last day    I have personally reviewed all medications:  Scheduled Medications  anastrozole, 1 mg, Oral, Nightly  atorvastatin, 80 mg, Oral, Nightly  buPROPion XL, 300 mg, Oral, Nightly  ceFAZolin, 2,000 mg, Intravenous, Q8H  cetirizine, 10 mg, Oral, Nightly  enoxaparin sodium, 1 mg/kg, Subcutaneous, Once  escitalopram, 20 mg, Oral, Nightly  furosemide, 20 mg, Oral, BID Diuretics  losartan, 100 mg, Oral, Nightly  metoprolol succinate XL, 25 mg, Oral, Daily  pantoprazole, 40 mg, Oral, Nightly  sodium hypochlorite, , Topical, BID  vancomycin, 1,000 mg, Intravenous, Q12H    Infusions  lactated ringers, 9 mL/hr, Last Rate: 9 mL/hr (04/08/25 0900)  Pharmacy to dose vancomycin,     Diet  Diet: Regular/House; Fluid Consistency: Thin (IDDSI 0)    I have personally reviewed:  [x]  Laboratory   [x]  Microbiology   []  Radiology   []  EKG/Telemetry  []  Cardiology/Vascular   []  Pathology    []  Records       Assessment/Plan     Active Hospital Problems    Diagnosis  POA    **Open chest wound, right, initial encounter [S21.101A]  Yes    Anemia in neoplastic disease [D63.0]  Yes    Malignant neoplasm of lower-outer quadrant of right breast of female, estrogen receptor positive [C50.511, Z17.0]  Not Applicable    Essential hypertension [I10]  Yes      Resolved Hospital Problems   No resolved problems to display.       54 y.o. female with history of breast cancer status postmastectomy and radiation admitted with open chest wound after wound dehiscence and extrusion of spacer.  Now status post debridement of right breast and subcutaneous tissue with adjacent tissue rearrangement    Seen immediately upon return to the floor.  Doing pretty well so far.  Antibiotics per ID.  Follow-up on any surgical wound cultures.  Labs are relatively unremarkable " from an infectious standpoint    Anemia stable.  Likely related to neoplasm.  Iron stores are adequate.  Reviewed labs.  Will recheck in a.m.    History of DVT: Eliquis held.  Restart Lovenox tonight    Continue replacing electrolytes per protocol      Dispo: Home when cleared by all    Marty Cordova MD  [facility]  04/08/25  15:19 EDT

## 2025-04-08 NOTE — ANESTHESIA PROCEDURE NOTES
Airway  Date/Time: 4/8/2025 9:09 AM    Indications and Patient Condition    Preoxygenated: yes    Mask difficulty assessment: 1 - vent by mask    Final Airway Details    Final airway type: supraglottic airway      Successful airway: LMA  Size: 4   Number of attempts at approach: 1  Assessment: lips, teeth, and gum same as pre-op

## 2025-04-08 NOTE — ANESTHESIA POSTPROCEDURE EVALUATION
"Patient: Dasha De Leon    Procedure Summary       Date: 04/08/25 Room / Location: [facility] OR 09 / [facility] MAIN OR    Anesthesia Start: 0900 Anesthesia Stop: 1030    Procedures:       RIGHT BREAST DEBRIDMENT (Right)      ADJACENT REARRANFEMENT (Right: Chest) Diagnosis:     Surgeons: Colin Bender MD Provider: Remi Ruffin MD    Anesthesia Type: general ASA Status: 3            Anesthesia Type: general    Vitals  Vitals Value Taken Time   /67 04/08/25 11:15   Temp 36.6 °C (97.9 °F) 04/08/25 10:35   Pulse 89 04/08/25 11:23   Resp 14 04/08/25 11:00   SpO2 97 % 04/08/25 11:23   Vitals shown include unfiled device data.        Post Anesthesia Care and Evaluation    Level of consciousness: awake and alert  Pain management: adequate    Airway patency: patent  Anesthetic complications: No anesthetic complications  PONV Status: none  Cardiovascular status: acceptable  Respiratory status: acceptable  Hydration status: acceptable    Comments: /67   Pulse 86   Temp 36.6 °C (97.9 °F) (Oral)   Resp 14   Ht 167.6 cm (66\")   Wt 74.2 kg (163 lb 9.3 oz)   LMP  (LMP Unknown)   SpO2 100%   BMI 26.40 kg/m²       "

## 2025-04-08 NOTE — PROGRESS NOTES
"    Name: Dasha De Leon ADMIT: 2025   : 1970  PCP: Ariana Coffman PA-C    MRN: [id number] LOS: 0 days   AGE/SEX: 54 y.o. female  ROOM: [facility]     Subjective   Subjective   Patient is abit down but agreeable with plans. No new issues       Objective   Objective   Vital Signs  Temp:  [96.6 °F (35.9 °C)-97.7 °F (36.5 °C)] 96.6 °F (35.9 °C)  Heart Rate:  [65-80] 74  Resp:  [16] 16  BP: (102-122)/(63-66) 102/65  SpO2:  [94 %-100 %] 94 %  on  Flow (L/min) (Oxygen Therapy):  [2] 2;   Device (Oxygen Therapy): room air  Body mass index is 26.51 kg/m².  Physical Exam  Vitals reviewed.   Constitutional:       General: She is not in acute distress.     Appearance: She is ill-appearing.   Cardiovascular:      Rate and Rhythm: Normal rate.   Pulmonary:      Effort: No respiratory distress.      Breath sounds: Normal breath sounds. No wheezing.   Musculoskeletal:      Right lower leg: No edema.      Left lower leg: No edema.   Skin:     General: Skin is warm.   Neurological:      Mental Status: She is alert.       Results Review     I reviewed the patient's new clinical results.  Results from last 7 days   Lab Units 25  0511 25  1409   WBC 10*3/mm3 4.10 5.56   HEMOGLOBIN g/dL 9.0* 9.7*   PLATELETS 10*3/mm3 327 406     Results from last 7 days   Lab Units 25  0511 25  1409   SODIUM mmol/L 139 136   POTASSIUM mmol/L 3.1* 3.7   CHLORIDE mmol/L 103 98   CO2 mmol/L 24.9 23.8   BUN mg/dL 12 11   CREATININE mg/dL 0.77 0.97   GLUCOSE mg/dL 90 102*   EGFR mL/min/1.73 91.8 69.6     Results from last 7 days   Lab Units 25  1409   ALBUMIN g/dL 3.5   BILIRUBIN mg/dL 0.5   ALK PHOS U/L 124*   AST (SGOT) U/L 18   ALT (SGPT) U/L 14     Results from last 7 days   Lab Units 25  0511 25  1409   CALCIUM mg/dL 8.7 9.4   ALBUMIN g/dL  --  3.5     Results from last 7 days   Lab Units 25  1800 25  1409   LACTATE mmol/L 1.4 2.1*     No results found for: \"HGBA1C\", \"POCGLU\"    No " radiology results for the last day    I have personally reviewed all medications:  Scheduled Medications  anastrozole, 1 mg, Oral, Daily  atorvastatin, 80 mg, Oral, Daily  buPROPion XL, 300 mg, Oral, Daily  ceFAZolin, 2,000 mg, Intravenous, Q8H  cetirizine, 10 mg, Oral, Daily  enoxaparin sodium, 1 mg/kg, Subcutaneous, Once  escitalopram, 20 mg, Oral, Daily  furosemide, 20 mg, Oral, BID Diuretics  losartan, 100 mg, Oral, Q24H  metoprolol succinate XL, 25 mg, Oral, Daily  pantoprazole, 40 mg, Oral, Daily  sodium hypochlorite, , Topical, BID  vancomycin, 1,000 mg, Intravenous, Q12H    Infusions  Pharmacy to dose vancomycin,     Diet  Diet: Cardiac; Healthy Heart (2-3 Na+); Fluid Consistency: Thin (IDDSI 0)  NPO Diet NPO Type: Sips with Meds    I have personally reviewed:  [x]  Laboratory   [x]  Microbiology   [x]  Radiology   [x]  EKG/Telemetry  [x]  Cardiology/Vascular   []  Pathology    []  Records       Assessment/Plan     Active Hospital Problems    Diagnosis  POA    **Open chest wound, right, initial encounter [S21.101A]  Yes    Open wound of chest wall, right, initial encounter [S21.101A]  Yes      Resolved Hospital Problems   No resolved problems to display.       54 y.o. female with breast cancer s/p mastectomy, chemo/XRT admitted with Open chest wound, right, initial encounter.    Plastic surgery planning debridement tomorrow. COnt IV abx for now, ID following to guide care. Cont pain ctrl    Anemia: Follow closely perioperatively. Added anemia workup    Mild hypoK: Replacing per protocol    DVT: On ELiquis, currently held. Will give Lovenox x 1 today and resume postop when OK with surgery    No indication for tele at this point. Can transfer to med/surg/oncology floor      Dispo TBD postop      Marty Cordova MD  [facility]  04/07/25  21:45 EDT

## 2025-04-08 NOTE — PROGRESS NOTES
Continued Stay Note  [facility]     Patient Name: Dasha De Leon  MRN: [id number]  Today's Date: 4/8/2025    Admit Date: 4/6/2025    Plan: Home no needs   Discharge Plan       Row Name 04/08/25 1539       Plan    Plan Home no needs    Plan Comments Introduced self and role of CCP. Patient confirmed DC plan is to return to home. Stated she is independent with ADL's and uses no DMEs. Stated she will assist if needed. Denies any needs/equipment.                   Discharge Codes    No documentation.                 Expected Discharge Date and Time       Expected Discharge Date Expected Discharge Time    Apr 10, 2025               Kirstin Fritz RN

## 2025-04-08 NOTE — PLAN OF CARE
Goal Outcome Evaluation:  Plan of Care Reviewed With: patient        Progress: improving  Outcome Evaluation: Alert and oriented. Afebrile.  Tylenol given and Norco given for pain. Occasional cough noted. IV line reinserted. Right chest wound dressing reinforced with ABD pad. IV Fluids at KVO rate, due  Abx given. Up ad breanne, voided freely. NPO from midnight. Skin prep with chlohexidine wipes done. Slept in between care.

## 2025-04-08 NOTE — PAYOR COMM NOTE
"Dasha Vivas (54 y.o. Female)     PLEASE SEE ATTACHED FOR INPT AUTH     PT WAS OBS ON 4/6  CHANGED TO INPT ON 4/7    REF #   [id number]    PLEASE CALL WICHO CANTU RN/ DEPT @ [phone]  OR FAX  DEPARTMENT @  [phone]    THANK YOU   WICHO CANTU RN  [facility]          Date of Birth   1970    Social Security Number       Address   [address] [place] [id number]    Home Phone   [phone]    MRN   [id number]       [facility]    Marital Status   Single                            Admission Date   4/6/2025    Admission Type   Emergency    Admitting Provider   Tiffanie Mcbride MD    Attending Provider   Marty Cordova MD    Department, Room/Bed   [facility] MAIN OR, [facility] Main OR/MAIN OR       Discharge Date       Discharge Disposition       Discharge Destination                                 Attending Provider: Marty Cordova MD    Allergies: Penicillins, Hemp Seed Oil, Lisinopril    Isolation: None   Infection: None   Code Status: CPR    Ht: 167.6 cm (66\")   Wt: 74.2 kg (163 lb 9.3 oz)    Admission Cmt: None   Principal Problem: Open chest wound, right, initial encounter [S21.101A]                   Active Insurance as of 4/6/2025       Primary Coverage       Payor Plan Insurance Group Employer/Plan Group    [employer] [id number]       Payor Plan Address Payor Plan Phone Number Payor Plan Fax Number Effective Dates    [address] [phone]  1/1/2018 - None Entered    [place] [address]         Subscriber Name Subscriber Birth Date Member ID       DASHA VIVAS 1970 [id number]                     Emergency Contacts        (Rel.) Home Phone Work Phone Mobile Phone    Paula Vivas (Mother) [phone] -- [phone]              [employer]: NPI [id number]  Tax ID [id number]     History & Physical        Tiffanie Mcbride MD at 04/06/25 " 1832          HISTORY AND PHYSICAL   [facility]        Date of Admission: 2025  Patient Identification:  Name: Dasha De Leon  Age: 54 y.o.  Sex: female  :  1970  MRN: [id number]                     Primary Care Physician: Ariana Coffman PA-C    Chief Complaint:  54 year old female presented to the emergency room after her right breast expander fell out this morning; she has had some swelling in the area and developed an open wound; she has had intermittent fever and chills; she has a history of breast cancer and is followed by oncology; her surgery was in July of last year; she has had some shortness of breath with exertion; she was seen by oncology last week and surgery for a chest wall hematoma was planned for later this month;     History of Present Illness:   As above    Past Medical History:  Past Medical History:   Diagnosis Date    Abnormal Pap smear of cervix     Anxiety     Miller's cyst     RESOLVED    Breast cancer     Right    Bulging lumbar disc     Bursitis     HX    Depression     GERD (gastroesophageal reflux disease)     H/O colposcopy with cervical biopsy     unknown pap result, biopsy was neg per pt, 2013 [facility]    History of 2019 novel coronavirus disease (COVID-19)     X2    History of eczema     HPV (human papilloma virus) infection     Hyperlipemia     Hypertension     Low back pain     Lumbar radiculopathy     Numbness and tingling of left leg     Osteoarthritis     Scoliosis     Seasonal allergies      Past Surgical History:  Past Surgical History:   Procedure Laterality Date    BREAST AUGMENTATION      BREAST BIOPSY Right 2024    Procedure: AXILLARY LYMPH NODE BIOPSY/EXCISION;  Surgeon: Romina De Anda MD;  Location: [facility];  Service: General;  Laterality: Right;    BREAST RECONSTRUCTION Right 2024    Procedure: RIGHT SUBPECTORAL PLACEMENT TISSUE EXPANDER AND CORTIVA (ACELLULAR DERMAL MATRIX), COMPLEX CLOSURE RIGHT BREAST 5cm;  Surgeon:  "Colin Bender MD;  Location: [facility] MAIN OR;  Service: Plastics;  Laterality: Right;    BREAST SURGERY Right 7/26/2024    Procedure: ADJACENT TISSUE REARRANGEMENT RIGHT BREAST;  Surgeon: Colin Bender MD;  Location: [facility] MAIN OR;  Service: Plastics;  Laterality: Right;    CARPAL TUNNEL RELEASE Right 2004    CRYOTHERAPY      1997    D & C HYSTEROSCOPY N/A 9/19/2024    Procedure: INTRAUTERINE DEVICE REMOVAL;  Surgeon: Kaleb Burciaga MD;  Location: [facility] MAIN OR;  Service: Obstetrics/Gynecology;  Laterality: N/A;    EPIDURAL BLOCK      HAND SURGERY  2004    Pisiformectomy    LUMBAR DISCECTOMY FUSION INSTRUMENTATION N/A 01/05/2024    Procedure: Lumbar 3 to lumbar 4 and lumbar 4 to lumbar 5 laminectomy with fusion and instrumentation;  Surgeon: Rigoberto Botello MD;  Location: [facility] OR;  Service: Robotics - Neuro;  Laterality: N/A;    MAMMO BILATERAL  2014    MASTECTOMY W/ SENTINEL NODE BIOPSY Right 7/11/2024    Procedure: Right MASTECTOMY WITH SENTINEL NODE BIOPSY;  Surgeon: Romina De Anda MD;  Location: [facility] OR;  Service: General;  Laterality: Right;    MASTECTOMY WITH IMMEDIATE RECONSTRUCTION Right 7/26/2024    Procedure: right mastectomy, margin excision;  Surgeon: Romina De Anda MD;  Location: [facility] OR;  Service: General;  Laterality: Right;    PAP SMEAR  [id number]    SHOULDER ARTHROSCOPY Left 2002,2003    SHOULDER SURGERY      \"MANIPULATION FOR FROZEN SHOULDER\"    US GUIDED LYMPH NODE BIOPSY  8/29/2024      Home Meds:  Medications Prior to Admission   Medication Sig Dispense Refill Last Dose/Taking    Abemaciclib (Verzenio) 100 MG tablet Take 1 tablet by mouth 2 (Two) Times a Day. 56 tablet 5 Past Month    ALPRAZolam (Xanax) 0.25 MG tablet Take 1 tablet by mouth 2 (Two) Times a Day As Needed for Anxiety. 30 tablet 0 4/5/2025    anastrozole (ARIMIDEX) 1 MG tablet Take 1 tablet by mouth Daily. 90 tablet 3 4/5/2025    apixaban (ELIQUIS) 5 MG tablet tablet Take 1 tablet by mouth 2 " (Two) Times a Day. 180 tablet 1 4/5/2025    atorvastatin (LIPITOR) 40 MG tablet Take 2 tablets by mouth Daily. 180 tablet 3 4/5/2025    buPROPion XL (Wellbutrin XL) 300 MG 24 hr tablet Take 1 tablet by mouth Daily. 90 tablet 1 4/5/2025    cyclobenzaprine (FLEXERIL) 10 MG tablet Take 1 tablet by mouth 3 (Three) Times a Day As Needed for Muscle Spasms. 90 tablet 3 4/5/2025    empagliflozin (Jardiance) 10 MG tablet tablet Take 1 tablet by mouth Daily. 90 tablet 3 4/5/2025    escitalopram (LEXAPRO) 20 MG tablet Take 1 tablet by mouth Daily. 90 tablet 3 4/5/2025    famotidine (PEPCID) 20 MG tablet Take 1 tablet by mouth 2 (Two) Times a Day. 180 tablet 3 4/5/2025    furosemide (Lasix) 20 MG tablet Take 1 tablet by mouth 2 (Two) Times a Day. 90 tablet 1 4/5/2025    HYDROcodone-acetaminophen (NORCO) 5-325 MG per tablet Take 1 tablet by mouth Every 6 (Six) Hours As Needed for Moderate Pain. 90 tablet 0 4/5/2025    levocetirizine (XYZAL) 5 MG tablet Take one tablet by mouth twice daily for urticaria. 180 tablet 3 4/5/2025    meloxicam (MOBIC) 15 MG tablet Take 1 tablet by mouth Daily. 90 tablet 0 4/5/2025    metoprolol succinate XL (TOPROL-XL) 25 MG 24 hr tablet Take 1 tablet by mouth Daily. 90 tablet 3 4/5/2025    olmesartan (BENICAR) 40 MG tablet Take 0.5 tablets by mouth Daily. 45 tablet 1 4/5/2025    ondansetron (ZOFRAN) 8 MG tablet Take 1 tablet by mouth 3 (Three) Times a Day As Needed for Nausea or Vomiting. 90 tablet 5 4/5/2025    pantoprazole (Protonix) 40 MG EC tablet Take 1 tablet by mouth Daily. 30 tablet 1 4/5/2025    naloxone (NARCAN) 4 MG/0.1ML nasal spray Call 911. Don't prime. [place] in 1 nostril for overdose. Repeat in 2-3 minutes in other nostril if no or minimal breathing/responsiveness. 2 each 0     oxyCODONE (Roxicodone) 5 MG immediate release tablet Take 1 tablet by mouth Every 4 (Four) Hours As Needed for Moderate Pain. 50 tablet 0        Allergies:  Allergies   Allergen Reactions    Penicillins  Anaphylaxis    Hemp Seed Oil Itching     HEMP IN LOTIONS    Lisinopril Cough     Immunizations:  Immunization History   Administered Date(s) Administered    COVID-19 (PFIZER) Purple Cap Monovalent 2021, 2021    Flublock Quad =>18yrs 10/16/2024    Fluzone (or Fluarix & Flulaval for VFC) >6mos 2020, 10/28/2021, 10/24/2023    Fluzone Quad >6mos (Multi-dose) 10/29/2018, 10/28/2019    Tdap 2021     Social History:   Social History     Social History Narrative    Not on file     Social History     Socioeconomic History    Marital status: Single   Tobacco Use    Smoking status: Former     Current packs/day: 0.00     Average packs/day: 1 pack/day for 20.0 years (20.0 ttl pk-yrs)     Types: Cigarettes     Start date:      Quit date:      Years since quittin.2     Passive exposure: Past    Smokeless tobacco: Never   Vaping Use    Vaping status: Never Used   Substance and Sexual Activity    Alcohol use: Yes     Alcohol/week: 2.0 - 4.0 standard drinks of alcohol     Types: 2 - 4 Glasses of wine per week     Comment: weekly    Drug use: No    Sexual activity: Not Currently     Birth control/protection: I.U.D.       Family History:  Family History   Problem Relation Age of Onset    Hypertension Mother     Hyperlipidemia Mother     Diverticulitis Mother     Pancreatitis Mother     Kidney disease Father     Skin cancer Father     Hypertension Father     Hyperlipidemia Father     Stroke Father     Atrial fibrillation Father     Transient ischemic attack Father     No Known Problems Sister     Depression Brother     No Known Problems Daughter     No Known Problems Son     Uterine cancer Maternal Aunt 58    Heart disease Maternal Aunt     Lung cancer Maternal Uncle     Heart attack Maternal Uncle     No Known Problems Paternal Aunt     Colon cancer Paternal Uncle 65    Liver disease Maternal Grandmother     Heart disease Maternal Grandfather     Cancer Maternal Grandfather     Heart attack Paternal  "Grandmother     No Known Problems Paternal Grandfather     BRCA 1/2 Neg Hx     Breast cancer Neg Hx     Endometrial cancer Neg Hx     Ovarian cancer Neg Hx     Malig Hyperthermia Neg Hx         Review of Systems  See history of present illness and past medical history.  Patient denies headache, dizziness, syncope, falls, trauma, change in vision, change in hearing, change in taste, changes in weight, changes in appetite, focal weakness, numbness, or paresthesia.  Patient denies chest pain, palpitations, dyspnea, orthopnea, PND, cough, sinus pressure, rhinorrhea, epistaxis, hemoptysis, nausea, vomiting,hematemesis, diarrhea, constipation or hematochezia.  Denies cold or heat intolerance, polydipsia, polyuria, polyphagia. Denies hematuria, pyuria, dysuria, hesitancy, frequency or urgency. Denies consumption of raw and under cooked meats foods or change in water source.       Objective:  T Max 24 hrs: Temp (24hrs), Av.6 °F (37 °C), Min:98 °F (36.7 °C), Max:99.1 °F (37.3 °C)    Vitals Ranges:   Temp:  [98 °F (36.7 °C)-99.1 °F (37.3 °C)] 99.1 °F (37.3 °C)  Heart Rate:  [] 88  Resp:  [22-24] 22  BP: (109-126)/(52-64) 126/64      Exam:  /64 (BP Location: Left arm, Patient Position: Lying)   Pulse 88   Temp 99.1 °F (37.3 °C) (Oral)   Resp 22   Ht 167.6 cm (66\")   Wt 74.5 kg (164 lb 3.9 oz)   LMP  (LMP Unknown)   SpO2 97%   BMI 26.51 kg/m²     General Appearance:    Alert, cooperative, no distress, appears stated age   Head:    Normocephalic, without obvious abnormality, atraumatic   Eyes:    PERRL, conjunctivae/corneas clear, EOM's intact, both eyes   Ears:    Normal external ear canals, both ears   Nose:   Nares normal, septum midline, mucosa normal, no drainage    or sinus tenderness   Throat:   Lips, mucosa, and tongue normal   Neck:   Supple, symmetrical, trachea midline, no adenopathy;     thyroid:  no enlargement/tenderness/nodules; no carotid    bruit or JVD   Back:     Symmetric, no " curvature, ROM normal, no CVA tenderness   Lungs:     Clear to auscultation bilaterally,     Chest Wall:    Right chest wall with open wound, necrotic edges, erythema    Heart:    Regular rate and rhythm, S1 and S2 normal, no murmur, rub   or gallop   Abdomen:     Soft, nontender, bowel sounds active all four quadrants,     no masses, no hepatomegaly, no splenomegaly   Extremities:   Extremities normal, atraumatic, no cyanosis or edema                       .    Data Review:  Labs in chart were reviewed.  WBC   Date Value Ref Range Status   04/06/2025 5.56 3.40 - 10.80 10*3/mm3 Final     Hemoglobin   Date Value Ref Range Status   04/06/2025 9.7 (L) 12.0 - 15.9 g/dL Final     Hematocrit   Date Value Ref Range Status   04/06/2025 29.6 (L) 34.0 - 46.6 % Final     Platelets   Date Value Ref Range Status   04/06/2025 406 140 - 450 10*3/mm3 Final     Sodium   Date Value Ref Range Status   04/06/2025 136 136 - 145 mmol/L Final     Potassium   Date Value Ref Range Status   04/06/2025 3.7 3.5 - 5.2 mmol/L Final     Chloride   Date Value Ref Range Status   04/06/2025 98 98 - 107 mmol/L Final     CO2   Date Value Ref Range Status   04/06/2025 23.8 22.0 - 29.0 mmol/L Final     BUN   Date Value Ref Range Status   04/06/2025 11 6 - 20 mg/dL Final     Creatinine   Date Value Ref Range Status   04/06/2025 0.97 0.57 - 1.00 mg/dL Final     Glucose   Date Value Ref Range Status   04/06/2025 102 (H) 65 - 99 mg/dL Final     Calcium   Date Value Ref Range Status   04/06/2025 9.4 8.6 - 10.5 mg/dL Final     AST (SGOT)   Date Value Ref Range Status   04/06/2025 18 1 - 32 U/L Final     ALT (SGPT)   Date Value Ref Range Status   04/06/2025 14 1 - 33 U/L Final     Alkaline Phosphatase   Date Value Ref Range Status   04/06/2025 124 (H) 39 - 117 U/L Final                Imaging Results (All)       Procedure Component Value Units Date/Time    XR Chest 1 View [[id number]] Collected: 04/06/25 1439     Updated: 04/06/25 1447    Narrative:      XR  CHEST 1 VW-     HISTORY: Shortness of air.     COMPARISON: CT chest 2/6/2025     FINDINGS: A single view of the chest was obtained.     Support Devices:  None.  Cardiac Silhouette/Mediastinum/Kristine: The cardiac silhouette is normal in  size. There is calcific aortic atherosclerosis.  Lungs/Pleural Spaces: There is right basilar airspace opacity, better  appreciated on recent CT, possibly reflecting pneumonia and/or  treatment-related changes. There is a question trace right pleural  effusion. Recommend follow-up CT to document complete resolution.  Chest Wall/Diaphragm/Upper Abdomen: There is asymmetric elevation of the  right hemidiaphragm. There is dextroscoliosis.     This report was finalized on 4/6/2025 2:44 PM by Dr. Tiki Watkins M.D  on Workstation: [id number]                 Assessment:  Active Hospital Problems    Diagnosis  POA    **Open chest wound, right, initial encounter [S21.101A]  Yes      Resolved Hospital Problems   No resolved problems to display.   Breast cancer  Hypertensin  hyperlipidemia    Plan:  Ask plastic surgery to see her  Ask id to see her  Oncology to see  [facility]  Trend labs  Dw patient, ed provider    Tiffanie Mcbride MD  4/6/2025  18:32 EDT      Electronically signed by Tiffanie Mcbride MD at 04/06/25 1837          Emergency Department Notes        Wesley Crespo, RN at 04/06/25 1535          Nursing report ED to floor  Dasha De Leon  54 y.o.  female    HPI :  HPI  Stated Reason for Visit: soa, post-op problem  History Obtained From: patient    Chief Complaint  Chief Complaint   Patient presents with    Shortness of Breath    Post-op Problem       Admitting doctor:   Tiffanie Mcbride MD    Admitting diagnosis:   The primary encounter diagnosis was Open wound of right chest wall, initial encounter. Diagnoses of Anemia, unspecified type and Dyspnea, unspecified type were also pertinent to this visit.    Code status:   Current Code Status       Date Active  "Code Status Order ID Comments User Context       Prior            Allergies:   Penicillins, Hemp seed oil, and Lisinopril    Isolation:   No active isolations    Intake and Output  No intake or output data in the 24 hours ending 04/06/25 1535    Weight:       04/06/25  1531   Weight: 74.8 kg (165 lb)       Most recent vitals:   Vitals:    04/06/25 1346 04/06/25 1437 04/06/25 1519 04/06/25 1531   BP:  109/52     BP Location:  Left arm     Patient Position:  Lying     Pulse: (!) 122  87    Resp: 24      Temp: 98 °F (36.7 °C)      SpO2: 97%      Weight:    74.8 kg (165 lb)   Height:    167.6 cm (66\")       Active LDAs/IV Access:   Lines, Drains & Airways       Active LDAs       Name Placement date Placement time Site Days    Peripheral IV 04/06/25 1518 Left Antecubital 04/06/25  1518  Antecubital  less than 1                    Labs (abnormal labs have a star):   Labs Reviewed   COMPREHENSIVE METABOLIC PANEL - Abnormal; Notable for the following components:       Result Value    Glucose 102 (*)     Alkaline Phosphatase 124 (*)     All other components within normal limits    Narrative:     GFR Categories in Chronic Kidney Disease (CKD)      GFR Category          GFR (mL/min/1.73)    Interpretation  G1                     90 or greater         Normal or high (1)  G2                      60-89                Mild decrease (1)  G3a                   45-59                Mild to moderate decrease  G3b                   30-44                Moderate to severe decrease  G4                    15-29                Severe decrease  G5                    14 or less           Kidney failure          (1)In the absence of evidence of kidney disease, neither GFR category G1 or G2 fulfill the criteria for CKD.    eGFR calculation 2021 CKD-EPI creatinine equation, which does not include race as a factor   CBC WITH AUTO DIFFERENTIAL - Abnormal; Notable for the following components:    RBC 3.29 (*)     Hemoglobin 9.7 (*)     Hematocrit " 29.6 (*)     All other components within normal limits   LACTIC ACID, PLASMA - Abnormal; Notable for the following components:    Lactate 2.1 (*)     All other components within normal limits   MANUAL DIFFERENTIAL - Abnormal; Notable for the following components:    Lymphocyte % 9.0 (*)     Monocyte % 16.0 (*)     Myelocyte % 1.0 (*)     Blasts % 1.0 (*)     Lymphocytes Absolute 0.50 (*)     All other components within normal limits   BNP (IN-HOUSE) - Normal    Narrative:     This assay is used as an aid in the diagnosis of individuals suspected of having heart failure. It can be used as an aid in the diagnosis of acute decompensated heart failure (ADHF) in patients presenting with signs and symptoms of ADHF to the emergency department (ED). In addition, NT-proBNP of <300 pg/mL indicates ADHF is not likely.    Age Range Result Interpretation  NT-proBNP Concentration (pg/mL:      <50             Positive            >450                   Gray                 300-450                    Negative             <300    50-75           Positive            >900                  Gray                300-900                  Negative            <300      >75             Positive            >1800                  Gray                300-1800                  Negative            <300   TROPONIN - Normal    Narrative:     High Sensitive Troponin T Reference Range:  <14.0 ng/L- Negative Female for AMI  <22.0 ng/L- Negative Male for AMI  >=14 - Abnormal Female indicating possible myocardial injury.  >=22 - Abnormal Male indicating possible myocardial injury.   Clinicians would have to utilize clinical acumen, EKG, Troponin, and serial changes to determine if it is an Acute Myocardial Infarction or myocardial injury due to an underlying chronic condition.        BLOOD CULTURE   BLOOD CULTURE   RAINBOW DRAW    Narrative:     The following orders were created for panel order [place] Draw.  Procedure                                Abnormality         Status                     ---------                               -----------         ------                     Green Top (Gel)[[id number]]                                  Final result               Lavender Top[[id number]]                                     Final result               Gold Top - SST[[id number]]                                   Final result               Light Blue Top[[id number]]                                   Final result                 Please view results for these tests on the individual orders.   HIGH SENSITIVITIY TROPONIN T 1HR   LACTIC ACID, REFLEX   CBC AND DIFFERENTIAL    Narrative:     The following orders were created for panel order CBC & Differential.  Procedure                               Abnormality         Status                     ---------                               -----------         ------                     CBC Auto Differential[[id number]]        Abnormal            Final result                 Please view results for these tests on the individual orders.   GREEN TOP   LAVENDER TOP   GOLD TOP - SST   LIGHT BLUE TOP       EKG:   ECG 12 Lead ED Triage Standing Order; SOA   Preliminary Result   HEART RATE=87  bpm   RR Ieqbsxsl=432  ms   NJ Mtizvcef=062  ms   P Horizontal Axis=-10  deg   P Front Axis=28  deg   QRSD Interval=83  ms   QT [place]il=799  ms   SZuT=382  ms   QRS Axis=4  deg   T Wave Axis=6  deg   - BORDERLINE ECG -   Sinus rhythm   Low voltage, precordial leads   LVH by voltage   Date and Time of Study:2025-04-06 14:31:41          Meds given in ED:   Medications   sodium chloride 0.9 % flush 10 mL (has no administration in time range)   ceFAZolin 1000 mg IVPB in 100 mL NS (MBP) (has no administration in time range)   morphine injection 2 mg (2 mg Intravenous Given 4/6/25 1525)       Imaging results:  No radiology results for the last day    Ambulatory status:   - ad breanne    Social issues:   Social History     Socioeconomic History    Marital  status: Single   Tobacco Use    Smoking status: Former     Current packs/day: 0.00     Average packs/day: 1 pack/day for 20.0 years (20.0 ttl pk-yrs)     Types: Cigarettes     Start date:      Quit date:      Years since quittin.2     Passive exposure: Past    Smokeless tobacco: Never   Vaping Use    Vaping status: Never Used   Substance and Sexual Activity    Alcohol use: Yes     Alcohol/week: 2.0 - 4.0 standard drinks of alcohol     Types: 2 - 4 Glasses of wine per week     Comment: weekly    Drug use: No    Sexual activity: Not Currently     Birth control/protection: I.U.D.       Peripheral Neurovascular  Peripheral Neurovascular (Adult)  Peripheral Neurovascular WDL: WDL    Neuro Cognitive  Neuro Cognitive (Adult)  Cognitive/Neuro/Behavioral WDL: WDL    Learning  Learning Assessment  Learning Readiness and Ability: no barriers identified    Respiratory  Respiratory WDL  Respiratory WDL: .WDL except, all  Rhythm/Pattern, Respiratory: shortness of breath    Abdominal Pain       Pain Assessments  Pain (Adult)  (0-10) Pain Rating: Rest: 8  (0-10) Pain Rating: Activity: 8  Pain Location: chest    NIH Stroke Scale       Wesley Crespo RN  25 15:35 EDT     Electronically signed by Wesley Crespo RN at 25 1535       Christo Vargas MD at 25 1357           EMERGENCY DEPARTMENT ENCOUNTER  Room Number:    PCP: Ariana Coffman PAG[facility]  Independent Historians: Patient and Family      HPI:  Chief Complaint: had concerns including Shortness of Breath and Post-op Problem.     A complete HPI/ROS/PMH/PSH/SH/FH are unobtainable due to: None    Chronic or social conditions impacting patient care (Social Determinants of Health): None      Context: Dasha De Leon is a 54 y.o. female with a medical history of breast cancer, hypertension, PE who presents to the ED c/o acute problems with her right breast spacer.  The patient reports that her spacer fell out this morning.  She states that she  recently had some swelling at the site and then an open wound developed.  She reports that the spacer fell out today.  She does report chronic shortness of breath with her cancer treatment.  She states she has good days and bad days.  She does report today is one of the days that she is having shortness of breath with exertion.  She states that if her breast implant did not fall out she would not of come to the hospital for her shortness of breath today.  She denies any chest pain.  She denies fevers.  She states she is compliant with her blood thinners and she last took her last dose last night.  The patient does report she had fevers for 3 days last week      Review of prior external notes (non-ED) -and- Review of prior external test results outside of this encounter:  Oncology note dated 3/28/2025 notes that on March 27 she had severe bleeding and her hemoglobin dropped down to 9.8 from the right chest wall hemorrhage.  She was scheduled for reconstructive surgery on April 28, 2025.  She would need to hold Verzenio 1 week before and 1 week after surgery.  They also recommend initiating Lovenox when she is off Eliquis.    Prescription drug monitoring program review:         PAST MEDICAL HISTORY  Active Ambulatory Problems     Diagnosis Date Noted    Essential hypertension 12/27/2016    Mood disorder 12/27/2016    Allergic rhinitis 12/27/2016    Hyperlipidemia 10/29/2019    Knee pain, right 04/24/2019    Lumbar radiculopathy 05/28/2020    Vitamin D deficiency 09/13/2021    Lumbar spinal stenosis 01/05/2024    Follow-up examination following surgery 01/18/2024    Abnormality of right breast on screening mammogram 05/06/2024    Malignant neoplasm of lower-outer quadrant of right breast of female, estrogen receptor positive 05/22/2024    Anxiety 06/17/2024    ASCUS of cervix with negative high risk HPV 10/07/2024     Resolved Ambulatory Problems     Diagnosis Date Noted    IUD (intrauterine device) in place 09/22/2016      Past Medical History:   Diagnosis Date    Abnormal Pap smear of cervix     Baker's cyst     Breast cancer     Bulging lumbar disc     Bursitis     Depression     GERD (gastroesophageal reflux disease)     H/O colposcopy with cervical biopsy     History of 2019 novel coronavirus disease (COVID-19)     History of eczema     HPV (human papilloma virus) infection     Hyperlipemia     Hypertension     Low back pain     Numbness and tingling of left leg     Osteoarthritis     Scoliosis     Seasonal allergies          PAST SURGICAL HISTORY  Past Surgical History:   Procedure Laterality Date    BREAST AUGMENTATION      BREAST BIOPSY Right 9/19/2024    Procedure: AXILLARY LYMPH NODE BIOPSY/EXCISION;  Surgeon: Romina De Anda MD;  Location: [facility];  Service: General;  Laterality: Right;    BREAST RECONSTRUCTION Right 7/11/2024    Procedure: RIGHT SUBPECTORAL PLACEMENT TISSUE EXPANDER AND CORTIVA (ACELLULAR DERMAL MATRIX), COMPLEX CLOSURE RIGHT BREAST 5cm;  Surgeon: Colin Bender MD;  Location: [facility];  Service: Plastics;  Laterality: Right;    BREAST SURGERY Right 7/26/2024    Procedure: ADJACENT TISSUE REARRANGEMENT RIGHT BREAST;  Surgeon: Colin Bender MD;  Location: [facility] OR;  Service: Plastics;  Laterality: Right;    CARPAL TUNNEL RELEASE Right 2004    CRYOTHERAPY      1997    D & C HYSTEROSCOPY N/A 9/19/2024    Procedure: INTRAUTERINE DEVICE REMOVAL;  Surgeon: Kaleb Burciaga MD;  Location: [facility];  Service: Obstetrics/Gynecology;  Laterality: N/A;    EPIDURAL BLOCK      HAND SURGERY  2004    Pisiformectomy    LUMBAR DISCECTOMY FUSION INSTRUMENTATION N/A 01/05/2024    Procedure: Lumbar 3 to lumbar 4 and lumbar 4 to lumbar 5 laminectomy with fusion and instrumentation;  Surgeon: Rigoberto Botello MD;  Location: [facility];  Service: Robotics - Neuro;  Laterality: N/A;    MAMMO BILATERAL  2014    MASTECTOMY W/ SENTINEL NODE BIOPSY Right 7/11/2024    Procedure: Right  "MASTECTOMY WITH SENTINEL NODE BIOPSY;  Surgeon: Romina De Anda MD;  Location: [facility] MAIN OR;  Service: General;  Laterality: Right;    MASTECTOMY WITH IMMEDIATE RECONSTRUCTION Right 2024    Procedure: right mastectomy, margin excision;  Surgeon: Romina De Anda MD;  Location: [facility] MAIN OR;  Service: General;  Laterality: Right;    PAP SMEAR      SHOULDER ARTHROSCOPY Left ,    SHOULDER SURGERY      \"MANIPULATION FOR FROZEN SHOULDER\"    US GUIDED LYMPH NODE BIOPSY  2024         FAMILY HISTORY  Family History   Problem Relation Age of Onset    Hypertension Mother     Hyperlipidemia Mother     Diverticulitis Mother     Pancreatitis Mother     Kidney disease Father     Skin cancer Father     Hypertension Father     Hyperlipidemia Father     Stroke Father     Atrial fibrillation Father     Transient ischemic attack Father     No Known Problems Sister     Depression Brother     No Known Problems Daughter     No Known Problems Son     Uterine cancer Maternal Aunt 58    Heart disease Maternal Aunt     Lung cancer Maternal Uncle     Heart attack Maternal Uncle     No Known Problems Paternal Aunt     Colon cancer Paternal Uncle 65    Liver disease Maternal Grandmother     Heart disease Maternal Grandfather     Cancer Maternal Grandfather     Heart attack Paternal Grandmother     No Known Problems Paternal Grandfather     BRCA 1/2 Neg Hx     Breast cancer Neg Hx     Endometrial cancer Neg Hx     Ovarian cancer Neg Hx     Malig Hyperthermia Neg Hx          SOCIAL HISTORY  Social History     Socioeconomic History    Marital status: Single   Tobacco Use    Smoking status: Former     Current packs/day: 0.00     Average packs/day: 1 pack/day for 20.0 years (20.0 ttl pk-yrs)     Types: Cigarettes     Start date:      Quit date: 2006     Years since quittin.2     Passive exposure: Past    Smokeless tobacco: Never   Vaping Use    Vaping status: Never Used   Substance and Sexual Activity    Alcohol use: " Yes     Alcohol/week: 2.0 - 4.0 standard drinks of alcohol     Types: 2 - 4 Glasses of wine per week     Comment: weekly    Drug use: No    Sexual activity: Not Currently     Birth control/protection: I.U.D.         ALLERGIES  Penicillins, Hemp seed oil, and Lisinopril      REVIEW OF SYSTEMS  Review of Systems  Included in HPI  All systems reviewed and negative except for those discussed in HPI.      PHYSICAL EXAM    I have reviewed the triage vital signs and nursing notes.    ED Triage Vitals [04/06/25 1346]   Temp Heart Rate Resp BP SpO2   98 °F (36.7 °C) (!) 122 24 -- 97 %      Temp src Heart Rate Source Patient Position BP Location FiO2 (%)   -- -- -- -- --       Physical Exam  GENERAL: Awake, alert, no acute distress  SKIN: Warm, dry  HENT: Normocephalic, atraumatic  EYES: no scleral icterus  CV: regular rhythm, regular rate  RESPIRATORY: normal effort, lungs clear  ABDOMEN: soft, nontender, nondistended  MUSCULOSKELETAL: no deformity.  Right chest wall with erythema and warmth.  She has a large open wound on the inferior aspect of her upper chest with visualized fat.  There is no drainage.  NEURO: alert, moves all extremities, follows commands            LAB RESULTS  Recent Results (from the past 24 hours)   Comprehensive Metabolic Panel    Collection Time: 04/06/25  2:09 PM    Specimen: Arm, Left; Blood   Result Value Ref Range    Glucose 102 (H) 65 - 99 mg/dL    BUN 11 6 - 20 mg/dL    Creatinine 0.97 0.57 - 1.00 mg/dL    Sodium 136 136 - 145 mmol/L    Potassium 3.7 3.5 - 5.2 mmol/L    Chloride 98 98 - 107 mmol/L    CO2 23.8 22.0 - 29.0 mmol/L    Calcium 9.4 8.6 - 10.5 mg/dL    Total Protein 7.4 6.0 - 8.5 g/dL    Albumin 3.5 3.5 - 5.2 g/dL    ALT (SGPT) 14 1 - 33 U/L    AST (SGOT) 18 1 - 32 U/L    Alkaline Phosphatase 124 (H) 39 - 117 U/L    Total Bilirubin 0.5 0.0 - 1.2 mg/dL    Globulin 3.9 gm/dL    A/G Ratio 0.9 g/dL    BUN/Creatinine Ratio 11.3 7.0 - 25.0    Anion Gap 14.2 5.0 - 15.0 mmol/L    eGFR 69.6  >60.0 mL/min/1.73   BNP    Collection Time: 04/06/25  2:09 PM    Specimen: Arm, Left; Blood   Result Value Ref Range    proBNP 647.0 0.0 - 900.0 pg/mL   High Sensitivity Troponin T    Collection Time: 04/06/25  2:09 PM    Specimen: Arm, Left; Blood   Result Value Ref Range    HS Troponin T 8 <14 ng/L   Green Top (Gel)    Collection Time: 04/06/25  2:09 PM   Result Value Ref Range    Extra Tube Hold for add-ons.    Lavender Top    Collection Time: 04/06/25  2:09 PM   Result Value Ref Range    Extra Tube hold for add-on    Gold Top - SST    Collection Time: 04/06/25  2:09 PM   Result Value Ref Range    Extra Tube Hold for add-ons.    Light Blue Top    Collection Time: 04/06/25  2:09 PM   Result Value Ref Range    Extra Tube Hold for add-ons.    CBC Auto Differential    Collection Time: 04/06/25  2:09 PM    Specimen: Arm, Left; Blood   Result Value Ref Range    WBC 5.56 3.40 - 10.80 10*3/mm3    RBC 3.29 (L) 3.77 - 5.28 10*6/mm3    Hemoglobin 9.7 (L) 12.0 - 15.9 g/dL    Hematocrit 29.6 (L) 34.0 - 46.6 %    MCV 90.0 79.0 - 97.0 fL    MCH 29.5 26.6 - 33.0 pg    MCHC 32.8 31.5 - 35.7 g/dL    RDW 13.9 12.3 - 15.4 %    RDW-SD 44.6 37.0 - 54.0 fl    MPV 8.8 6.0 - 12.0 fL    Platelets 406 140 - 450 10*3/mm3    nRBC 0.0 0.0 - 0.2 /100 WBC   Lactic Acid, Plasma    Collection Time: 04/06/25  2:09 PM    Specimen: Arm, Left; Blood   Result Value Ref Range    Lactate 2.1 (C) 0.5 - 2.0 mmol/L   Manual Differential    Collection Time: 04/06/25  2:09 PM    Specimen: Arm, Left; Blood   Result Value Ref Range    Neutrophil % 72.0 42.7 - 76.0 %    Lymphocyte % 9.0 (L) 19.6 - 45.3 %    Monocyte % 16.0 (H) 5.0 - 12.0 %    Eosinophil % 1.0 0.3 - 6.2 %    Myelocyte % 1.0 (H) 0.0 - 0.0 %    Blasts % 1.0 (H) 0.0 - 0.0 %    Neutrophils Absolute 4.00 1.70 - 7.00 10*3/mm3    Lymphocytes Absolute 0.50 (L) 0.70 - 3.10 10*3/mm3    Monocytes Absolute 0.89 0.10 - 0.90 10*3/mm3    Eosinophils Absolute 0.06 0.00 - 0.40 10*3/mm3    RBC Morphology Normal  Normal    Platelet Morphology Normal Normal   ECG 12 Lead ED Triage Standing Order; SOA    Collection Time: 04/06/25  2:31 PM   Result Value Ref Range    QT Interval 367 ms    QTC Interval 444 ms         RADIOLOGY  XR Chest 1 View  Result Date: 4/6/2025  XR CHEST 1 VW-  HISTORY: Shortness of air.  COMPARISON: CT chest 2/6/2025  FINDINGS: A single view of the chest was obtained.  Support Devices:  None. Cardiac Silhouette/Mediastinum/Kristine: The cardiac silhouette is normal in size. There is calcific aortic atherosclerosis. Lungs/Pleural Spaces: There is right basilar airspace opacity, better appreciated on recent CT, possibly reflecting pneumonia and/or treatment-related changes. There is a question trace right pleural effusion. Recommend follow-up CT to document complete resolution. Chest Wall/Diaphragm/Upper Abdomen: There is asymmetric elevation of the right hemidiaphragm. There is dextroscoliosis.  This report was finalized on 4/6/2025 2:44 PM by Dr. Tiki Watkins M.D on Workstation: [id number]          MEDICATIONS GIVEN IN ER  Medications   sodium chloride 0.9 % flush 10 mL (has no administration in time range)   ceFAZolin 1000 mg IVPB in 100 mL NS (MBP) (has no administration in time range)   morphine injection 2 mg (2 mg Intravenous Given 4/6/25 1525)         ORDERS PLACED DURING THIS VISIT:  Orders Placed This Encounter   Procedures    Blood Culture - Blood,    Blood Culture - Blood,    XR Chest 1 View    [place] Draw    Comprehensive Metabolic Panel    BNP    High Sensitivity Troponin T    CBC Auto Differential    Lactic Acid, Plasma    High Sensitivity Troponin T 1Hr    Manual Differential    STAT Lactic Acid, Reflex    NPO Diet NPO Type: Strict NPO    Undress & Gown    Continuous Pulse Oximetry    Vital Signs    IP General Consult (Use specialty-specific consult if known)    [facility] (on-call MD unless specified) Details    Oxygen Therapy- Nasal Cannula; Titrate 1-6 LPM Per SpO2; 90 - 95%    ECG 12 Lead ED  Triage Standing Order; SOA    Insert Peripheral IV    Initiate Observation Status    CBC & Differential    Green Top (Gel)    Lavender Top    Gold Top - SST    Light Blue Top         OUTPATIENT MEDICATION MANAGEMENT:  Current Facility-Administered Medications Ordered in Epic   Medication Dose Route Frequency Provider Last Rate Last Admin    ceFAZolin 1000 mg IVPB in 100 mL NS (MBP)  1,000 mg Intravenous Once Christo Vargas MD        sodium chloride 0.9 % flush 10 mL  10 mL Intravenous PRN Christo Vargas MD         Current Outpatient Medications Ordered in Epic   Medication Sig Dispense Refill    Abemaciclib (Verzenio) 100 MG tablet Take 1 tablet by mouth 2 (Two) Times a Day. 56 tablet 5    ALPRAZolam (Xanax) 0.25 MG tablet Take 1 tablet by mouth 2 (Two) Times a Day As Needed for Anxiety. 30 tablet 0    anastrozole (ARIMIDEX) 1 MG tablet Take 1 tablet by mouth Daily. 90 tablet 3    apixaban (ELIQUIS) 5 MG tablet tablet Take 1 tablet by mouth 2 (Two) Times a Day. 180 tablet 1    atorvastatin (LIPITOR) 40 MG tablet Take 2 tablets by mouth Daily. 180 tablet 3    buPROPion XL (Wellbutrin XL) 300 MG 24 hr tablet Take 1 tablet by mouth Daily. 90 tablet 1    cyclobenzaprine (FLEXERIL) 10 MG tablet Take 1 tablet by mouth 3 (Three) Times a Day As Needed for Muscle Spasms. 90 tablet 3    empagliflozin (Jardiance) 10 MG tablet tablet Take 1 tablet by mouth Daily. 90 tablet 3    escitalopram (LEXAPRO) 20 MG tablet Take 1 tablet by mouth Daily. 90 tablet 3    famotidine (PEPCID) 20 MG tablet Take 1 tablet by mouth 2 (Two) Times a Day. 180 tablet 3    furosemide (Lasix) 20 MG tablet Take 1 tablet by mouth 2 (Two) Times a Day. 90 tablet 1    HYDROcodone-acetaminophen (NORCO) 5-325 MG per tablet Take 1 tablet by mouth Every 6 (Six) Hours As Needed for Moderate Pain. 90 tablet 0    levocetirizine (XYZAL) 5 MG tablet Take one tablet by mouth twice daily for urticaria. 180 tablet 3    meloxicam (MOBIC) 15 MG tablet Take 1 tablet by  mouth Daily. 90 tablet 0    metoprolol succinate XL (TOPROL-XL) 25 MG 24 hr tablet Take 1 tablet by mouth Daily. 90 tablet 3    naloxone (NARCAN) 4 MG/0.1ML nasal spray Call 911. Don't prime. Driver in 1 nostril for overdose. Repeat in 2-3 minutes in other nostril if no or minimal breathing/responsiveness. 2 each 0    olmesartan (BENICAR) 40 MG tablet Take 0.5 tablets by mouth Daily. 45 tablet 1    ondansetron (ZOFRAN) 8 MG tablet Take 1 tablet by mouth 3 (Three) Times a Day As Needed for Nausea or Vomiting. 90 tablet 5    oxyCODONE (Roxicodone) 5 MG immediate release tablet Take 1 tablet by mouth Every 4 (Four) Hours As Needed for Moderate Pain. 50 tablet 0    pantoprazole (Protonix) 40 MG EC tablet Take 1 tablet by mouth Daily. 30 tablet 1         PROCEDURES  Procedures            PROGRESS, DATA ANALYSIS, CONSULTS, AND MEDICAL DECISION MAKING  All labs have been independently interpreted by me.  All radiology studies have been reviewed by me. All EKG's have been independently viewed and interpreted by me.  Discussion below represents my analysis of pertinent findings related to patient's condition, differential diagnosis, treatment plan and final disposition.    Differential diagnosis includes but is not limited to cellulitis, abscess, deep space tissue infection, hematoma, anemia, PE, acute coronary syndrome, acute aortic syndrome.    Clinical Scores:                                       ED Course as of 04/06/25 1533   Sun Apr 06, 2025   1430 XR Chest 1 View  My independent interpretation of the imaging study is no dense consolidation [TR]   1439 EKG PROCEDURE    EKG time: 1431  Rhythm/Rate: Normal sinus, rate 87  P waves and TX: Normal P, normal TX  QRS, axis: Narrow QRS, normal axis  ST and T waves: No acute    Independently Interpreted by me  Not significantly changed compared to prior 7/1/2024   [TR]   1500 Discussed with Dr. Bender.  He agrees to consult.  He requests hospitalist admission. [TR]   1517 I  reviewed the workup and findings with the patient and family at the bedside.  Answered all questions.  Plan admission for further management.  They are agreeable. [TR]   1531 Discussing with Dr Mcbride with [facility]. She agrees to admit. [TR]      ED Course User Index  [TR] Christo Vargas MD             AS OF 15:33 EDT VITALS:    BP - 109/52  HR - 87  TEMP - 98 °F (36.7 °C)  O2 SATS - 97%    COMPLEXITY OF CARE  The patient requires admission.      DIAGNOSIS  Final diagnoses:   Open wound of right chest wall, initial encounter   Anemia, unspecified type   Dyspnea, unspecified type         DISPOSITION  ED Disposition       ED Disposition   Decision to Admit    Condition   --    Comment   Level of Care: Telemetry [5]   Diagnosis: Open chest wound, right, initial encounter [[id number]]   Admitting Physician: BEL MCBRIDE [[id number]]   Attending Physician: BEL MCRBIDE [[id number]]   Is patient appropriate for Inpatient Observation Unit?: Yes [1]                  Please note that portions of this document were completed with a voice recognition program.    Note Disclaimer: At [facility], we believe that sharing information builds trust and better relationships. You are receiving this note because you recently visited [facility]. It is possible you will see health information before a provider has talked with you about it. This kind of information can be easy to misunderstand. To help you fully understand what it means for your health, we urge you to discuss this note with your provider.         Christo Vargas MD  04/06/25 1533      Electronically signed by Christo Vargas MD at 04/06/25 1533       Guillermina Harrington RN at 04/06/25 1344          Pt arrives for shortness of breath. Pt had her breast reconstruction surgery and her right side implant fell out this morning. Pt has the implant at triage. Pt dyson palomares open wound where the implant used to be     Electronically signed by Guillermina Harrington, KAREN at 04/06/25  1351       Current Facility-Administered Medications   Medication Dose Route Frequency Provider Last Rate Last Admin    [Transfer Hold] acetaminophen (TYLENOL) tablet 650 mg  650 mg Oral Q4H PRN Tiffanie Mcbride MD   650 mg at 04/07/25 2014    Or    [Transfer Hold] acetaminophen (TYLENOL) 160 MG/5ML oral solution 650 mg  650 mg Oral Q4H PRN Tiffanie Mcbride MD        Or    [Transfer Hold] acetaminophen (TYLENOL) suppository 650 mg  650 mg Rectal Q4H PRN Tiffanie Mcbride MD        [Transfer Hold] ALPRAZolam (XANAX) tablet 0.25 mg  0.25 mg Oral BID PRN Tiffanie Mcbride MD        [Transfer Hold] anastrozole (ARIMIDEX) tablet 1 mg  1 mg Oral Daily Tiffanie Mcbride MD   1 mg at 04/07/25 2027    [Transfer Hold] atorvastatin (LIPITOR) tablet 80 mg  80 mg Oral Daily Tiffanie Mcbride MD   80 mg at 04/07/25 2015    bacitracin 500 UNIT/GM ointment    PRN Colin Bender MD   1 Application at 04/08/25 1012    [Transfer Hold] sennosides-docusate (PERICOLACE) 8.6-50 MG per tablet 2 tablet  2 tablet Oral BID PRN Tiffanie Mcbride MD        And    [Transfer Hold] polyethylene glycol (MIRALAX) packet 17 g  17 g Oral Daily PRN Tiffanie Mcbride MD        And    [Transfer Hold] bisacodyl (DULCOLAX) EC tablet 5 mg  5 mg Oral Daily PRN Tiffanie Mcbride MD        And    [Transfer Hold] bisacodyl (DULCOLAX) suppository 10 mg  10 mg Rectal Daily PRN Tiffanie Mcbride MD        bupivacaine-EPINEPHrine PF 25 mL, lidocaine 1% - EPINEPHrine [id number] 25 mL mixture    PRN Colin Bender MD   50 mL at 04/08/25 0941    [Transfer Hold] buPROPion XL (WELLBUTRIN XL) 24 hr tablet 300 mg  300 mg Oral Daily Tiffanie Mcbride MD   300 mg at 04/07/25 2028    [Transfer Hold] Calcium Replacement - Follow Nurse / BPA Driven Protocol   Not Applicable PRMarty De Jesus MD        ceFAZolin 2000 mg IVPB in 100 mL NS (MBP)  2,000 mg Intravenous Q8H Chase Wood,  MD   2,000 mg at 04/08/25 0509    [Transfer Hold] cetirizine (zyrTEC) tablet 10 mg  10 mg Oral Daily Tiffanie Mcbride MD   10 mg at 04/07/25 2015    [Transfer Hold] cyclobenzaprine (FLEXERIL) tablet 10 mg  10 mg Oral TID PRN Tiffanie Mcbride MD   10 mg at 04/07/25 2014    [Transfer Hold] enoxaparin sodium (LOVENOX) syringe 70 mg  1 mg/kg Subcutaneous Once Marty Cordova MD        [Transfer Hold] escitalopram (LEXAPRO) tablet 20 mg  20 mg Oral Daily Tiffanie Mcbride MD   20 mg at 04/07/25 2015    fentaNYL citrate (PF) (SUBLIMAZE) injection 50 mcg  50 mcg Intravenous Once PRN Remi Ruffin MD        [Transfer Hold] furosemide (LASIX) tablet 20 mg  20 mg Oral BID Diuretics Tiffanie Mcbride MD   20 mg at 04/07/25 1810    [Transfer Hold] HYDROcodone-acetaminophen (NORCO) 5-325 MG per tablet 1 tablet  1 tablet Oral Q6H PRN Tiffanie Mcbride MD   1 tablet at 04/08/25 0512    lactated ringers infusion  9 mL/hr Intravenous Continuous Remi Ruffin MD 9 mL/hr at 04/08/25 0900 Currently Infusing at 04/08/25 0900    lidocaine (XYLOCAINE) 1 % injection 0.5 mL  0.5 mL Intradermal Once PRN Remi Ruffin MD        losartan (COZAAR) tablet 100 mg  100 mg Oral Q24H Tiffanie Mcbride MD        [Transfer Hold] Magnesium Standard Dose Replacement - Follow Nurse / BPA Driven Protocol   Not Applicable PRN Marty Cordova MD        [Transfer Hold] melatonin tablet 2.5 mg  2.5 mg Oral Nightly PRN Tiffanie Mcbride MD        metoprolol succinate XL (TOPROL-XL) 24 hr tablet 25 mg  25 mg Oral Daily Tiffanie Mcbride MD   25 mg at 04/08/25 0626    [Transfer Hold] morphine injection 2 mg  2 mg Intravenous Q4H PRN Tiffanie Mcbride MD        [Transfer Hold] ondansetron ODT (ZOFRAN-ODT) disintegrating tablet 4 mg  4 mg Oral Q6H PRN Tiffanie Mcbride MD        Or    [Transfer Hold] ondansetron (ZOFRAN) injection 4 mg  4 mg Intravenous Q6H PRN Tiffanie Mcbride  MD Stuart        [Transfer Hold] pantoprazole (PROTONIX) EC tablet 40 mg  40 mg Oral Daily Tiffanie Mcbride MD   40 mg at 04/07/25 2027    Pharmacy to dose vancomycin   Not Applicable Continuous PRN Tiffanie Mcbride MD        [Transfer Hold] Phosphorus Replacement - Follow Nurse / BPA Driven Protocol   Not Applicable PRN Marty Cordova MD        Potassium Replacement - Follow Nurse / BPA Driven Protocol   Not Applicable PRN Marty Cordova MD        sodium chloride (NS) irrigation solution    PRN Colin Bender MD   1,000 mL at 04/08/25 0943    [Transfer Hold] sodium chloride 0.9 % flush 10 mL  10 mL Intravenous PRN Christo Vargas MD        sodium chloride 0.9 % flush 3 mL  3 mL Intravenous Q12H Remi Ruffin MD        sodium chloride 0.9 % flush 3-10 mL  3-10 mL Intravenous PRN Remi Ruffin MD        sodium chloride 1,000 mL with ceFAZolin 1 g, gentamicin 80 mg irrigation    PRN Colin Bender MD   Given at 04/08/25 0942    [Transfer Hold] sodium hypochlorite (DAKIN'S 1/4 STRENGTH) 0.125 % topical solution 0.125% solution   Topical BID Colin Bender MD   Given at 04/07/25 1935    sterile water irrigation solution    PRN Colin Bender MD   500 mL at 04/08/25 0943    vancomycin (VANCOCIN) 1,000 mg in sodium chloride 0.9 % 250 mL IVPB-VTB  1,000 mg Intravenous Q12H Rachel Garcia  mL/hr at 04/08/25 0540 1,000 mg at 04/08/25 0540     Facility-Administered Medications Ordered in Other Encounters   Medication Dose Route Frequency Provider Last Rate Last Admin    dexAMETHasone sodium phosphate injection   Intravenous PRN Wander Hernandez CRNA   8 mg at 04/08/25 0914    HYDROmorphone (DILAUDID) injection   Intravenous PRN Wander Hernandez CRNA   0.5 mg at 04/08/25 0930    lidocaine (XYLOCAINE) 2% injection   Intravenous PRN Wander Hernandez CRNA   60 mg at 04/08/25 0908    phenylephrine (RAMIRO-SYNEPHRINE) 50 mg in sodium chloride 0.9 %  250 mL infusion   Intravenous Continuous PRN Wander Hernandez, CRNA   100 mcg at 25 0941    propofol (DIPRIVAN) injection   Intravenous PRN Wander Hernandez CRNA   160 mg at 25 0908        Physician Progress Notes (last 48 hours)        Colin Bender MD at 25 0629          SUBJECTIVE:   Feeling a bit better, no new complaints.      OBJECTIVE:  Vitals:    25 0552   BP: 112/68   Pulse: 73   Resp: 20   Temp: 97.2 °F (36.2 °C)   SpO2: 97%     Physical Exam   Constitutional  No distress.     Eyes  Pupils are equal, round, and reactive to light.     General -             Right: Right eye extraocular movements are normal.     Cardiovascular: Normal rate and regular rhythm.     Pulmonary/Chest  Effort normal.     Skin    Left breast with erythema, wound, packing in place       PLAN:  Radiation injury right breast, wound breakdown  - to OR today for debridement and wound closure  - ok to resume lovenox after surgery      Electronically signed by Colin Bender MD at 25 0664       Marty Cordova MD at 25 0023              Name: Dasha De Leon ADMIT: 2025   : 1970  PCP: Ariana Coffman PA-C    MRN: [id number] LOS: 0 days   AGE/SEX: 54 y.o. female  ROOM: [facility]     Subjective   Subjective   Patient is abit down but agreeable with plans. No new issues      Objective   Objective   Vital Signs  Temp:  [96.6 °F (35.9 °C)-97.7 °F (36.5 °C)] 96.6 °F (35.9 °C)  Heart Rate:  [65-80] 74  Resp:  [16] 16  BP: (102-122)/(63-66) 102/65  SpO2:  [94 %-100 %] 94 %  on  Flow (L/min) (Oxygen Therapy):  [2] 2;   Device (Oxygen Therapy): room air  Body mass index is 26.51 kg/m².  Physical Exam  Vitals reviewed.   Constitutional:       General: She is not in acute distress.     Appearance: She is ill-appearing.   Cardiovascular:      Rate and Rhythm: Normal rate.   Pulmonary:      Effort: No respiratory distress.      Breath sounds: Normal breath sounds. No wheezing.  "  Musculoskeletal:      Right lower leg: No edema.      Left lower leg: No edema.   Skin:     General: Skin is warm.   Neurological:      Mental Status: She is alert.       Results Review     I reviewed the patient's new clinical results.  Results from last 7 days   Lab Units 04/07/25  0511 04/06/25  1409   WBC 10*3/mm3 4.10 5.56   HEMOGLOBIN g/dL 9.0* 9.7*   PLATELETS 10*3/mm3 327 406     Results from last 7 days   Lab Units 04/07/25  0511 04/06/25  1409   SODIUM mmol/L 139 136   POTASSIUM mmol/L 3.1* 3.7   CHLORIDE mmol/L 103 98   CO2 mmol/L 24.9 23.8   BUN mg/dL 12 11   CREATININE mg/dL 0.77 0.97   GLUCOSE mg/dL 90 102*   EGFR mL/min/1.73 91.8 69.6     Results from last 7 days   Lab Units 04/06/25  1409   ALBUMIN g/dL 3.5   BILIRUBIN mg/dL 0.5   ALK PHOS U/L 124*   AST (SGOT) U/L 18   ALT (SGPT) U/L 14     Results from last 7 days   Lab Units 04/07/25  0511 04/06/25  1409   CALCIUM mg/dL 8.7 9.4   ALBUMIN g/dL  --  3.5     Results from last 7 days   Lab Units 04/06/25  1800 04/06/25  1409   LACTATE mmol/L 1.4 2.1*     No results found for: \"HGBA1C\", \"POCGLU\"    No radiology results for the last day    I have personally reviewed all medications:  Scheduled Medications  anastrozole, 1 mg, Oral, Daily  atorvastatin, 80 mg, Oral, Daily  buPROPion XL, 300 mg, Oral, Daily  ceFAZolin, 2,000 mg, Intravenous, Q8H  cetirizine, 10 mg, Oral, Daily  enoxaparin sodium, 1 mg/kg, Subcutaneous, Once  escitalopram, 20 mg, Oral, Daily  furosemide, 20 mg, Oral, BID Diuretics  losartan, 100 mg, Oral, Q24H  metoprolol succinate XL, 25 mg, Oral, Daily  pantoprazole, 40 mg, Oral, Daily  sodium hypochlorite, , Topical, BID  vancomycin, 1,000 mg, Intravenous, Q12H    Infusions  Pharmacy to dose vancomycin,     Diet  Diet: Cardiac; Healthy Heart (2-3 Na+); Fluid Consistency: Thin (IDDSI 0)  NPO Diet NPO Type: Sips with Meds    I have personally reviewed:  [x]  Laboratory   [x]  Microbiology   [x]  Radiology   [x]  EKG/Telemetry  [x]  " Cardiology/Vascular   []  Pathology    []  Records      Assessment/Plan     Active Hospital Problems    Diagnosis  POA    **Open chest wound, right, initial encounter [S21.101A]  Yes    Open wound of chest wall, right, initial encounter [S21.101A]  Yes      Resolved Hospital Problems   No resolved problems to display.       54 y.o. female with breast cancer s/p mastectomy, chemo/XRT admitted with Open chest wound, right, initial encounter.    Plastic surgery planning debridement tomorrow. COnt IV abx for now, ID following to guide care. Cont pain ctrl    Anemia: Follow closely perioperatively. Added anemia workup    Mild hypoK: Replacing per protocol    DVT: On ELiquis, currently held. Will give Lovenox x 1 today and resume postop when OK with surgery    No indication for tele at this point. Can transfer to med/surg/oncology floor      Dispo TBD postop      Marty Cordova MD  [facility]  04/07/25  21:45 EDT    Electronically signed by Marty Cordova MD at 04/07/25 2149          Consult Notes (last 48 hours)        Talia Huang MD at 04/07/25 1119        Consult Orders    1. Inpatient Hematology & Oncology Consult [[id number]] ordered by Tiffanie Mcbride MD at 04/06/25 1831                   Subjective  .     REASON FOR CONSULTATION:     Provide an opinion on any further workup or treatment  Right breast invasive ductal carcinoma, grade 3, ER/DE positive and HER2 negative  Wound necrosis                                 REQUESTING PHYSICIAN: No ref. provider found  RECORDS OBTAINED:  Records of the patient's history including those obtained from the referring provider were reviewed and summarized in detail.    HISTORY OF PRESENT ILLNESS:  The patient is a 54 y.o. year old female  who is here for follow-up with the above-mentioned history.    Dasha is a 54-year-old postmenopausal lady who had right breast invasive ductal carcinoma requiring right mastectomy on  7/11/2024 with pathology showing 45 mm of invasive ductal carcinoma, grade 3, ER/ID strongly positive at 91 to 100% and HER2 1+ with a Ki-67 of 22%.  1 out of 5 lymph nodes were positive for metastatic disease.  CT scans of the chest abdomen and pelvis were performed showing an additional abnormal lymph node requiring excision of this lymph node on 9/19/2024.  Oncotype DX recurrence score was low at 18 and hence patient proceeded with adjuvant radiation.   She was started on anastrozole in November 2024.  She started Verzenio in December 2024.  Subsequently developed pulmonary embolism secondary to Verzenio for which she was started on Eliquis with the plans to maintain Eliquis for as long as she is on CDK 4 6 inhibitors.  Verzenio briefly held but resumed again on 2/20/2025.  She was admitted to the hospital with concerns of bleeding from the right breast and worsening of the breakdown resulting in expander falling out.  I had seen her in clinic in March 2025 when she complained of bleeding from the right chest wall and hemoglobin had dropped to 9.8.  She was strongly encouraged to contact Dr. Bender's office to be seen again and further management.    She has been seen by Dr. Bender in the hospital and recommending wet-to-dry dressings to the right side twice daily.  Plans noted for wound debridement in OR.    Vital signs are stable.  Hemoglobin 9.0 WBC and platelets normal.        Past Medical History:   Diagnosis Date    Abnormal Pap smear of cervix     Anxiety     Miller's cyst     RESOLVED    Breast cancer     Right    Bulging lumbar disc     Bursitis     HX    Depression     GERD (gastroesophageal reflux disease)     H/O colposcopy with cervical biopsy     unknown pap result, biopsy was neg per pt, 2013 [facility]    History of 2019 novel coronavirus disease (COVID-19)     X2    History of eczema     HPV (human papilloma virus) infection     Hyperlipemia     Hypertension     Low back pain     Lumbar  "radiculopathy     Numbness and tingling of left leg     Osteoarthritis     Scoliosis     Seasonal allergies      Past Surgical History:   Procedure Laterality Date    BREAST AUGMENTATION      BREAST BIOPSY Right 9/19/2024    Procedure: AXILLARY LYMPH NODE BIOPSY/EXCISION;  Surgeon: Romina De Anda MD;  Location: [facility];  Service: General;  Laterality: Right;    BREAST RECONSTRUCTION Right 7/11/2024    Procedure: RIGHT SUBPECTORAL PLACEMENT TISSUE EXPANDER AND CORTIVA (ACELLULAR DERMAL MATRIX), COMPLEX CLOSURE RIGHT BREAST 5cm;  Surgeon: Colin Bender MD;  Location: [facility] OR;  Service: Plastics;  Laterality: Right;    BREAST SURGERY Right 7/26/2024    Procedure: ADJACENT TISSUE REARRANGEMENT RIGHT BREAST;  Surgeon: Colin Bender MD;  Location: [facility] OR;  Service: Plastics;  Laterality: Right;    CARPAL TUNNEL RELEASE Right 2004    CRYOTHERAPY      1997    D & C HYSTEROSCOPY N/A 9/19/2024    Procedure: INTRAUTERINE DEVICE REMOVAL;  Surgeon: Kaleb Burciaga MD;  Location: [facility];  Service: Obstetrics/Gynecology;  Laterality: N/A;    EPIDURAL BLOCK      HAND SURGERY  2004    Pisiformectomy    LUMBAR DISCECTOMY FUSION INSTRUMENTATION N/A 01/05/2024    Procedure: Lumbar 3 to lumbar 4 and lumbar 4 to lumbar 5 laminectomy with fusion and instrumentation;  Surgeon: Rigoberto Botello MD;  Location: [facility];  Service: Robotics - Neuro;  Laterality: N/A;    MAMMO BILATERAL  2014    MASTECTOMY W/ SENTINEL NODE BIOPSY Right 7/11/2024    Procedure: Right MASTECTOMY WITH SENTINEL NODE BIOPSY;  Surgeon: Romina De Anda MD;  Location: [facility] OR;  Service: General;  Laterality: Right;    MASTECTOMY WITH IMMEDIATE RECONSTRUCTION Right 7/26/2024    Procedure: right mastectomy, margin excision;  Surgeon: Romina De Anda MD;  Location: [facility];  Service: General;  Laterality: Right;    PAP SMEAR  20116    SHOULDER ARTHROSCOPY Left 2002,2003    SHOULDER SURGERY      \"MANIPULATION " "FOR FROZEN SHOULDER\"    US GUIDED LYMPH NODE BIOPSY  8/29/2024       MEDICATIONS    Current Facility-Administered Medications:     acetaminophen (TYLENOL) tablet 650 mg, 650 mg, Oral, Q4H PRN **OR** acetaminophen (TYLENOL) 160 MG/5ML oral solution 650 mg, 650 mg, Oral, Q4H PRN **OR** acetaminophen (TYLENOL) suppository 650 mg, 650 mg, Rectal, Q4H PRN, Tiffanie Mcbride MD    ALPRAZolam (XANAX) tablet 0.25 mg, 0.25 mg, Oral, BID PRN, Tiffanie Mcbride MD    anastrozole (ARIMIDEX) tablet 1 mg, 1 mg, Oral, Daily, Tiffanie Mcbride MD, 1 mg at 04/06/25 1910    atorvastatin (LIPITOR) tablet 80 mg, 80 mg, Oral, Daily, Tiffanie Mcbride MD, 80 mg at 04/06/25 1910    sennosides-docusate (PERICOLACE) 8.6-50 MG per tablet 2 tablet, 2 tablet, Oral, BID PRN **AND** polyethylene glycol (MIRALAX) packet 17 g, 17 g, Oral, Daily PRN **AND** bisacodyl (DULCOLAX) EC tablet 5 mg, 5 mg, Oral, Daily PRN **AND** bisacodyl (DULCOLAX) suppository 10 mg, 10 mg, Rectal, Daily PRN, Tiffanie Mcbride MD    buPROPion XL (WELLBUTRIN XL) 24 hr tablet 300 mg, 300 mg, Oral, Daily, Tiffanie Mcbride MD, 300 mg at 04/06/25 1909    Calcium Replacement - Follow Nurse / BPA Driven Protocol, , Not Applicable, PRN, Marty Cordova MD    ceFAZolin 2000 mg IVPB in 100 mL NS (MBP), 2,000 mg, Intravenous, Q8H, Chase Wood MD    cetirizine (zyrTEC) tablet 10 mg, 10 mg, Oral, Daily, Tiffanie Mcbride MD, 10 mg at 04/06/25 1909    cyclobenzaprine (FLEXERIL) tablet 10 mg, 10 mg, Oral, TID PRN, Tiffanie Mcbride MD    enoxaparin sodium (LOVENOX) syringe 70 mg, 1 mg/kg, Subcutaneous, Once, Marty Cordova MD    escitalopram (LEXAPRO) tablet 20 mg, 20 mg, Oral, Daily, Tiffanie Mcbride MD, 20 mg at 04/06/25 1910    furosemide (LASIX) tablet 20 mg, 20 mg, Oral, BID Diuretics, Tiffanie Mcbride MD, 20 mg at 04/06/25 1910    HYDROcodone-acetaminophen (NORCO) 5-325 MG per tablet 1 " tablet, 1 tablet, Oral, Q6H PRN, Tiffanie Mcbride MD, 1 tablet at 04/07/25 1005    losartan (COZAAR) tablet 100 mg, 100 mg, Oral, Q24H, Tiffanie Mcbride MD    Magnesium Standard Dose Replacement - Follow Nurse / BPA Driven Protocol, , Not Applicable, PRN, Marty Cordova MD    melatonin tablet 2.5 mg, 2.5 mg, Oral, Nightly PRN, Tiffanie Mcbride MD    metoprolol succinate XL (TOPROL-XL) 24 hr tablet 25 mg, 25 mg, Oral, Daily, Tiffanie Mcbride MD, 25 mg at 04/06/25 1910    morphine injection 2 mg, 2 mg, Intravenous, Q4H PRN, Tiffanie Mcbride MD    ondansetron ODT (ZOFRAN-ODT) disintegrating tablet 4 mg, 4 mg, Oral, Q6H PRN **OR** ondansetron (ZOFRAN) injection 4 mg, 4 mg, Intravenous, Q6H PRN, Tiffanie Mcbride MD    pantoprazole (PROTONIX) EC tablet 40 mg, 40 mg, Oral, Daily, Tiffanie Mcbride MD, 40 mg at 04/06/25 1910    Pharmacy to dose vancomycin, , Not Applicable, Continuous PRN, Tiffanie Mcbride MD    Phosphorus Replacement - Follow Nurse / BPA Driven Protocol, , Not Applicable, PRN, Marty Cordova MD    Potassium Replacement - Follow Nurse / BPA Driven Protocol, , Not Applicable, PRN, Marty Cordova MD    sodium chloride 0.9 % flush 10 mL, 10 mL, Intravenous, PRN, Christo Vargas MD    sodium hypochlorite (DAKIN'S 1/4 STRENGTH) 0.125 % topical solution 0.125% solution, , Topical, BID, Colin Bender MD, Given at 04/07/25 1118    vancomycin (VANCOCIN) 1,000 mg in sodium chloride 0.9 % 250 mL IVPB-VTB, 1,000 mg, Intravenous, Q12H, Rachel Garcia MD    ALLERGIES:     Allergies   Allergen Reactions    Penicillins Anaphylaxis    Hemp Seed Oil Itching     HEMP IN LOTIONS    Lisinopril Cough       SOCIAL HISTORY:       Social History     Socioeconomic History    Marital status: Single   Tobacco Use    Smoking status: Former     Current packs/day: 0.00     Average packs/day: 1 pack/day for 20.0 years (20.0 ttl pk-yrs)     Types:  Cigarettes     Start date:      Quit date:      Years since quittin.2     Passive exposure: Past    Smokeless tobacco: Never   Vaping Use    Vaping status: Never Used   Substance and Sexual Activity    Alcohol use: Yes     Alcohol/week: 2.0 - 4.0 standard drinks of alcohol     Types: 2 - 4 Glasses of wine per week     Comment: weekly    Drug use: No    Sexual activity: Not Currently     Birth control/protection: I.U.D.         FAMILY HISTORY:  Family History   Problem Relation Age of Onset    Hypertension Mother     Hyperlipidemia Mother     Diverticulitis Mother     Pancreatitis Mother     Kidney disease Father     Skin cancer Father     Hypertension Father     Hyperlipidemia Father     Stroke Father     Atrial fibrillation Father     Transient ischemic attack Father     No Known Problems Sister     Depression Brother     No Known Problems Daughter     No Known Problems Son     Uterine cancer Maternal Aunt 58    Heart disease Maternal Aunt     Lung cancer Maternal Uncle     Heart attack Maternal Uncle     No Known Problems Paternal Aunt     Colon cancer Paternal Uncle 65    Liver disease Maternal Grandmother     Heart disease Maternal Grandfather     Cancer Maternal Grandfather     Heart attack Paternal Grandmother     No Known Problems Paternal Grandfather     BRCA 1/2 Neg Hx     Breast cancer Neg Hx     Endometrial cancer Neg Hx     Ovarian cancer Neg Hx     Malig Hyperthermia Neg Hx        REVIEW OF SYSTEMS:  Review of Systems   Constitutional:  Positive for fever.   HENT: Negative.     Eyes: Negative.    Respiratory: Negative.     Cardiovascular: Negative.    Gastrointestinal: Negative.    Neurological: Negative.    Hematological: Negative.    Psychiatric/Behavioral: Negative.           Objective     Vitals:    25 1932 25 0009 25 0557 25 0735   BP: 122/56 113/64  120/65   BP Location: Left arm Left arm  Left arm   Patient Position: Lying Lying  Lying   Pulse: 90 65  77    Resp: 18 16  16   Temp: 98.4 °F (36.9 °C) 97.5 °F (36.4 °C)  97.5 °F (36.4 °C)   TempSrc: Oral Oral  Oral   SpO2: 96% 98%  100%   Weight:   74.5 kg (164 lb 3.9 oz)    Height:             3/28/2025    10:24 AM   Current Status   ECOG score 0      PHYSICAL EXAM:      CONSTITUTIONAL:  Vital signs reviewed.  No distress, looks comfortable.  EYES:  Conjunctivae and lids unremarkable.  PERRLA  EARS,NOSE,MOUTH,THROAT:  Ears and nose appear unremarkable.  Lips, teeth, gums appear unremarkable.  RESPIRATORY:  Normal respiratory effort.  Lungs clear to auscultation bilaterally.  CARDIOVASCULAR:  Normal S1, S2.  No murmurs rubs or gallops.  No significant lower extremity edema.  GASTROINTESTINAL: Abdomen appears unremarkable.  Nontender.  No hepatomegaly.  No splenomegaly.  LYMPHATIC:  No cervical, supraclavicular, axillary lymphadenopathy.  MUSCULOSKELETAL:  Unremarkable gait and station.  Unremarkable digits/nails.  No cyanosis or clubbing.  SKIN:  Warm.  No rashes.  PSYCHIATRIC:  Normal judgment and insight.  Normal mood and affect.      RECENT LABS:        WBC   Date Value Ref Range Status   04/07/2025 4.10 3.40 - 10.80 10*3/mm3 Final   04/06/2025 5.56 3.40 - 10.80 10*3/mm3 Final     Hemoglobin   Date Value Ref Range Status   04/07/2025 9.0 (L) 12.0 - 15.9 g/dL Final   04/06/2025 9.7 (L) 12.0 - 15.9 g/dL Final     Platelets   Date Value Ref Range Status   04/07/2025 327 140 - 450 10*3/mm3 Final   04/06/2025 406 140 - 450 10*3/mm3 Final       Assessment & Plan   [unfilled]      Dasha De Leon     *Right breast invasive ductal carcinoma  Status post right mastectomy, sentinel lymph node biopsy with 2 out of 5 lymph nodes positive for metastatic disease  Completed adjuvant radiation to the right chest wall  Continue anastrozole  Hold Verzenio due to ongoing issues with right chest wall infection/wound necrosis and expander issues.    *Anemia  Most likely secondary to recent hemorrhage alongside with Verzenio.  Obtain  iron studies, B12 and folic acid    *Pulmonary embolism  Secondary to Verzenio  She was on Eliquis as an outpatient  Now started on Lovenox 1 mg/kg twice daily in anticipation of upcoming surgery this week.  Recommend continuing Lovenox.    *Right chest wall wound and hemorrhage  Evaluated by Dr. Bender in the hospital  Plans noted for surgery.  Expander has fallen out.  The plan was initially for latissimus flap reconstruction salvage later this month  Now in the hospital and plans noted for debridement and possible closure.  Delights most flap closure will be delayed.  Infectious diseases has been consulted and they are recommending vancomycin and cefazolin.    Recommendations  Continue anastrozole  Hold Verzenio  Management of the right chest wall wound per plastics  Continue Lovenox while patient is in the hospital  Will transition to Eliquis once cleared by Dr. Bender.  More likely than not anticipate holding Verzenio even after hospitalization to allow for wound healing.  Iron studies B12 and folate today                   Electronically signed by Talia Huang MD at 04/07/25 1240       Chase Wood MD at 04/07/25 1005        Consult Orders    1. Inpatient Infectious Diseases Consult [[id number]] ordered by Tiffanie Mcbride MD at 04/06/25 1831                 Referring Provider: No ref. provider found      Subjective   History of present illness: This is a very nice 54-year-old with a history of breast cancer who underwent right mastectomy and sentinel biopsy in July 2024.  She was ER/OR positive with HER2 negative.  Margins ended up being positive requiring additional surgery on July 26, 2024 with ultimately negative margins and expanders were placed.  She had been placed on Vernzio but this had been discontinued due to possible PE.  She had radiation to the right chest as well and apparently developed some radiation necrosis.  Ultimately this progressed over especially last month  and she ended up developing increasing pain, worsening wound necrosis, fevers as high as 103 and bloody drainage.  Her expander fell out yesterday prompting readmission.  She was started on vancomycin and cefazolin and Dr. Bender is planning I&D.  She has a remote history of anaphylaxis to penicillin but has tolerated cefazolin in the past      Physical Exam:   Vital Signs   Temp:  [97.5 °F (36.4 °C)-99.1 °F (37.3 °C)] 97.5 °F (36.4 °C)  Heart Rate:  [] 77  Resp:  [16-24] 16  BP: (109-126)/(52-65) 120/65    GENERAL: Awake and alert, in no acute distress.   HEENT: Oropharynx is clear. Hearing is grossly normal.   EYES: . No conjunctival injection. No lid lag.   LUNGS:normal respiratory effort.   SKIN: Right vasectomy site is dehisced with necrosis, foul odor and  PSYCHIATRIC: Appropriate mood, affect, insight, and judgment.     Results Review:  White count 4.1, hemoglobin 9, platelets 327  Creatinine 0.77  Liver function test normal  Blood cultures no growth to date  Chest x-ray independently interpreted: Possible right lower lobe infiltrate      A/p  1.  Right breast mastectomy incision wound necrosis/infection    Continue vancomycin for an AUC of 400-600 along with cefazolin which will increase to 2 g IV every 8 hours to provide broad coverage against skin bacteria.  Vancomycin dose has been increased to 1 g IV every 12 hours which gives predicted AUC goal and will plan to check vancomycin trough tomorrow evening.  Would request that any suspicious fluid or tissue be cultured and that will help guide antibiotic decisions going forward.  Ultimately hope to discharge on oral antibiotics           Thank you for this consult.  We will continue to follow along and tailor antibiotics as the patient's clinical course evolves.              Electronically signed by Chase Wood MD at 04/07/25 1013       Colin Bender MD at 04/07/25 0803        Consult Orders    1. General MD Inpatient Consult  [[id number]] ordered by Christo Vargas MD                 [facility]   Consult Note    Patient Name: Dasha De Leon  : 1970  MRN: [id number]  Primary Care Physician:  Ariana Coffman PA-C  Referring Physician: No ref. provider found  Date of admission: 2025    General MD Inpatient Consult  Consult performed by: Colin Bender MD  Consult ordered by: Christo Vargas MD        Subjective   Subjective     Reason for Consult/ Chief Complaint: right breast dehiscence    History of Present Illness  Dasha De Leon is a 54 y.o. female with history of right mastectomy and tissue expander reconstruction last summer for breast cancer.  Had a positive margin requiring additional skin removal.  Received radiation over the .  She had a small area of superficial mastectomy skin necrosis after radiation and volume was removed from the expander and planned for latissimus flap reconstruction salvage later this month.  Since I have last seen her she has had bleeding from the right breast and worsening of the breakdown resulting in the expander falling out.  Has generally been feeling fatigued.  Was started on eliquis for PE in February.    Review of Systems   Constitutional:  Positive for activity change, appetite change and fatigue. Negative for chills and fever.   HENT: Negative.     Eyes: Negative.    Respiratory:  Positive for chest tightness and shortness of breath.    Cardiovascular: Negative.    Gastrointestinal: Negative.    Endocrine: Negative.    Skin:  Positive for color change and wound.   Neurological: Negative.         Personal History     Past Medical History:   Diagnosis Date    Abnormal Pap smear of cervix     Anxiety     Miller's cyst     RESOLVED    Breast cancer     Right    Bulging lumbar disc     Bursitis     HX    Depression     GERD (gastroesophageal reflux disease)     H/O colposcopy with cervical biopsy     unknown pap result, biopsy was neg per pt, 2013 [facility]     History of 2019 novel coronavirus disease (COVID-19)     X2    History of eczema     HPV (human papilloma virus) infection     Hyperlipemia     Hypertension     Low back pain     Lumbar radiculopathy     Numbness and tingling of left leg     Osteoarthritis     Scoliosis     Seasonal allergies        Past Surgical History:   Procedure Laterality Date    BREAST AUGMENTATION      BREAST BIOPSY Right 9/19/2024    Procedure: AXILLARY LYMPH NODE BIOPSY/EXCISION;  Surgeon: Romina De Anda MD;  Location: [facility] MAIN OR;  Service: General;  Laterality: Right;    BREAST RECONSTRUCTION Right 7/11/2024    Procedure: RIGHT SUBPECTORAL PLACEMENT TISSUE EXPANDER AND CORTIVA (ACELLULAR DERMAL MATRIX), COMPLEX CLOSURE RIGHT BREAST 5cm;  Surgeon: Colin Bender MD;  Location: [facility] MAIN OR;  Service: Plastics;  Laterality: Right;    BREAST SURGERY Right 7/26/2024    Procedure: ADJACENT TISSUE REARRANGEMENT RIGHT BREAST;  Surgeon: Colin Bender MD;  Location: [facility] MAIN OR;  Service: Plastics;  Laterality: Right;    CARPAL TUNNEL RELEASE Right 2004    CRYOTHERAPY      1997    D & C HYSTEROSCOPY N/A 9/19/2024    Procedure: INTRAUTERINE DEVICE REMOVAL;  Surgeon: Kaleb Burciaga MD;  Location: [facility] MAIN OR;  Service: Obstetrics/Gynecology;  Laterality: N/A;    EPIDURAL BLOCK      HAND SURGERY  2004    Pisiformectomy    LUMBAR DISCECTOMY FUSION INSTRUMENTATION N/A 01/05/2024    Procedure: Lumbar 3 to lumbar 4 and lumbar 4 to lumbar 5 laminectomy with fusion and instrumentation;  Surgeon: Rigoberto Botello MD;  Location: [facility] MAIN OR;  Service: Robotics - Neuro;  Laterality: N/A;    MAMMO BILATERAL  2014    MASTECTOMY W/ SENTINEL NODE BIOPSY Right 7/11/2024    Procedure: Right MASTECTOMY WITH SENTINEL NODE BIOPSY;  Surgeon: Romina De Anda MD;  Location: [facility] MAIN OR;  Service: General;  Laterality: Right;    MASTECTOMY WITH IMMEDIATE RECONSTRUCTION Right 7/26/2024    Procedure: right mastectomy, margin excision;   "Surgeon: Romina De Anda MD;  Location: [facility] OR;  Service: General;  Laterality: Right;    PAP SMEAR  20116    SHOULDER ARTHROSCOPY Left 2002,2003    SHOULDER SURGERY      \"MANIPULATION FOR FROZEN SHOULDER\"    US GUIDED LYMPH NODE BIOPSY  8/29/2024       Family History: family history includes Atrial fibrillation in her father; Cancer in her maternal grandfather; Colon cancer (age of onset: 65) in her paternal uncle; Depression in her brother; Diverticulitis in her mother; Heart attack in her maternal uncle and paternal grandmother; Heart disease in her maternal aunt and maternal grandfather; Hyperlipidemia in her father and mother; Hypertension in her father and mother; Kidney disease in her father; Liver disease in her maternal grandmother; Lung cancer in her maternal uncle; No Known Problems in her daughter, paternal aunt, paternal grandfather, sister, and son; Pancreatitis in her mother; Skin cancer in her father; Stroke in her father; Transient ischemic attack in her father; Uterine cancer (age of onset: 58) in her maternal aunt. Otherwise pertinent FHx was reviewed and not pertinent to current issue.    Social History:  reports that she quit smoking about 19 years ago. Her smoking use included cigarettes. She started smoking about 39 years ago. She has a 20 pack-year smoking history. She has been exposed to tobacco smoke. She has never used smokeless tobacco. She reports current alcohol use of about 2.0 - 4.0 standard drinks of alcohol per week. She reports that she does not use drugs.    Home Medications:   ALPRAZolam, Abemaciclib, HYDROcodone-acetaminophen, anastrozole, apixaban, atorvastatin, buPROPion XL, cyclobenzaprine, empagliflozin, escitalopram, famotidine, furosemide, levocetirizine, meloxicam, metoprolol succinate XL, naloxone, olmesartan, ondansetron, oxyCODONE, and pantoprazole    Allergies:  Allergies   Allergen Reactions    Penicillins Anaphylaxis    Hemp Seed Oil Itching     HEMP IN " LOTIONS    Lisinopril Cough       Objective    Objective     Vitals:  Temp:  [97.5 °F (36.4 °C)-99.1 °F (37.3 °C)] 97.5 °F (36.4 °C)  Heart Rate:  [] 65  Resp:  [16-24] 16  BP: (109-126)/(52-64) 113/64  Flow (L/min) (Oxygen Therapy):  [2] 2    Physical Exam  Constitutional:       General: She is not in acute distress.     Appearance: She is ill-appearing.   HENT:      Head: Normocephalic and atraumatic.   Eyes:      Extraocular Movements: Extraocular movements intact.      Pupils: Pupils are equal, round, and reactive to light.   Cardiovascular:      Rate and Rhythm: Normal rate and regular rhythm.   Pulmonary:      Effort: Pulmonary effort is normal.   Abdominal:      General: Abdomen is flat.      Palpations: Abdomen is soft.   Musculoskeletal:      Cervical back: Normal range of motion and neck supple.   Skin:     Comments: Right breast with dehiscence of mastectomy incision with large area of necrosis, no purulent drainage but foul odor   Neurological:      General: No focal deficit present.      Mental Status: She is alert and oriented to person, place, and time.         Result Review    Result Review:  I have personally reviewed the results from the time of this admission to 4/7/2025 08:04 EDT and agree with these findings:  [x]  Laboratory list / accordion  Glucose 90 65 - 99 mg/dL   BUN 12 6 - 20 mg/dL   Creatinine 0.77 0.57 - 1.00 mg/dL   Sodium 139 136 - 145 mmol/L   Potassium 3.1 Low  3.5 - 5.2 mmol/L   Chloride 103 98 - 107 mmol/L   CO2 24.9 22.0 - 29.0 mmol/L   Calcium 8.7 8.6 - 10.5 mg/dL   BUN/Creatinine Ratio 15.6 7.0 - 25.0   Anion Gap 11.1 5.0 - 15.0 mmol/L   eGFR 91.8 >60.0 mL/min/1.73       WBC 4.10 3.40 - 10.80 10*3/mm3   RBC 3.00 Low  3.77 - 5.28 10*6/mm3   Hemoglobin 9.0 Low  12.0 - 15.9 g/dL   Hematocrit 27.6 Low  34.0 - 46.6 %   MCV 92.0 79.0 - 97.0 fL   MCH 30.0 26.6 - 33.0 pg   MCHC 32.6 31.5 - 35.7 g/dL   RDW 14.0 12.3 - 15.4 %   RDW-SD 47.3 37.0 - 54.0 fl   MPV 8.9 6.0 - 12.0 fL    Platelets 327 140 - 450 10*3/mm3     []  Microbiology  [x]  Radiology  XR CHEST 1 VW-      HISTORY: Shortness of air.      COMPARISON: CT chest 2/6/2025      FINDINGS: A single view of the chest was obtained.      Support Devices:  None.   Cardiac Silhouette/Mediastinum/Kristine: The cardiac silhouette is normal in   size. There is calcific aortic atherosclerosis.   Lungs/Pleural Spaces: There is right basilar airspace opacity, better   appreciated on recent CT, possibly reflecting pneumonia and/or   treatment-related changes. There is a question trace right pleural   effusion. Recommend follow-up CT to document complete resolution.   Chest Wall/Diaphragm/Upper Abdomen: There is asymmetric elevation of the   right hemidiaphragm. There is dextroscoliosis.   []  EKG/Telemetry   []  Cardiology/Vascular   []  Pathology  []  Old records  []  Other:  Most notable findings include: anemia, no leukocytosis      Assessment & Plan   Assessment / Plan     Brief Patient Summary:  Dasha De Leon is a 54 y.o. female with dehiscence of breast reconstruction, pe.     Active Hospital Problems:  Active Hospital Problems    Diagnosis     **Open chest wound, right, initial encounter      Plan:   - dakins wet to dry to right breast twice daily  - will plan for OR for wound debridement and possible closure, discussed with patient that her recon salvage with latis flap will need to be delayed and goal now is just a closed wound  - anticoag per primary but request lovenox until OR plans finalized       Colin Bender MD      Electronically signed by Colin Bender MD at 04/07/25 4384

## 2025-04-08 NOTE — PLAN OF CARE
Goal Outcome Evaluation:  Plan of Care Reviewed With: patient        Progress: improving  Outcome Evaluation: Pt returned from OR, incision is CDI, GT drain x1 with minimal output, pt is up with assist to the bathroom, PO pain meds given with some relief, IS to 1500, IV KVO for abx, no c/o N/V.

## 2025-04-08 NOTE — ANESTHESIA PREPROCEDURE EVALUATION
Anesthesia Evaluation     Patient summary reviewed and Nursing notes reviewed   no history of anesthetic complications:   NPO Solid Status: > 8 hours  NPO Liquid Status: > 8 hours           Airway   Mallampati: II  TM distance: >3 FB  Neck ROM: full  No difficulty expected and Small opening  Dental - normal exam     Pulmonary    (+) a smoker Former, Abstained day of surgery,  (-) asthma, sleep apnea, rhonchi, decreased breath sounds, wheezes  Cardiovascular   Exercise tolerance: good (4-7 METS)    Rhythm: regular  Rate: normal    (+) hypertension, hyperlipidemia  (-) CAD, dysrhythmias, angina, VERAS, murmur      Neuro/Psych  (+) psychiatric history Anxiety  (-) seizures, CVA  GI/Hepatic/Renal/Endo    (+) GERD  (-) liver disease, no renal disease, diabetes, no thyroid disorder    Musculoskeletal     Abdominal     Abdomen: soft.   Substance History      OB/GYN          Other   arthritis,   history of cancer (R breast  now with infected expander) remission                Anesthesia Plan    ASA 3     general     intravenous induction     Anesthetic plan, risks, benefits, and alternatives have been provided, discussed and informed consent has been obtained with: patient.    CODE STATUS:    Code Status (Patient has no pulse and is not breathing): CPR (Attempt to Resuscitate)  Medical Interventions (Patient has pulse or is breathing): Full

## 2025-04-08 NOTE — OP NOTE
Pre-Operative Diagnosis: mastectomy dehiscence    Post-Operative Diagnosis: Same    Procedure Performed:   Debridement right breast skin and subcutaneous tissue 10x4cm  Adjacent tissue rearrangement right breast 100cm sq    Surgeon: ANGELICA Bender MD     Assistant: None    Anesthesia: General    Estimated Blood Loss:  50    Specimens:  right breast skin    Complications: None    Indications: Has had breakdown of the right breast mastectomy skin and loss of the tissue expander. She has a large wound as a result.  Discussed the need to debride the nonviable tissue and close the skin to allow healing.    We discussed risks, benefits and alternatives including but not limited to: bleeding, infection, asymmetry, poor or slow wound healing, need for further surgery, possible recurrence.  The patient elected to proceed.    Description of Procedure: The patient was met in the preoperative holding area.  All questions were answered and informed consent was assured.      Right chest was marked and transferred to the OR.      After induction of appropriate anesthesia, a timeout was performed correctly identifying the patient, operative site, and procedure to be performed.  All present were in agreement.    Local anesthetic infiltrated.  And the chest was prepped and draped.  The wound edge was excised with scalpel back to healthy bleeding edges.  The remaining ADM was sharply debrided with Bovie and removed.   The biofilm layer was bluntly removed and pocket irrigated.     In order to achieve closure dissection was performed through he IMF into the upper abdomen to recruit this more lax skin onto the breast footprint.  The space was copiously irrigated and made hemostatic.  This skin flap was then rotated superiorly to meet the superior flap and anchored with 2-0 pds.  A drain was placed.  Further closure performed with 3-0 monocryl deep dermal and 3-0 nylon running sutures.  Placed in well padded ace wrap.    The patient  was then aroused from anesthesia with ease and transferred to the postoperative care area in good condition. All sponge, needle, and instrument counts were correct.

## 2025-04-09 ENCOUNTER — READMISSION MANAGEMENT (OUTPATIENT)
Dept: CALL CENTER | Facility: HOSPITAL | Age: 55
End: 2025-04-09
Payer: COMMERCIAL

## 2025-04-09 VITALS
SYSTOLIC BLOOD PRESSURE: 128 MMHG | TEMPERATURE: 98.2 F | DIASTOLIC BLOOD PRESSURE: 72 MMHG | HEART RATE: 89 BPM | RESPIRATION RATE: 16 BRPM | WEIGHT: 163.58 LBS | HEIGHT: 66 IN | OXYGEN SATURATION: 98 % | BODY MASS INDEX: 26.29 KG/M2

## 2025-04-09 LAB
ANION GAP SERPL CALCULATED.3IONS-SCNC: 7.6 MMOL/L (ref 5–15)
BUN SERPL-MCNC: 6 MG/DL (ref 6–20)
BUN/CREAT SERPL: 8.6 (ref 7–25)
CALCIUM SPEC-SCNC: 8.6 MG/DL (ref 8.6–10.5)
CHLORIDE SERPL-SCNC: 107 MMOL/L (ref 98–107)
CO2 SERPL-SCNC: 23.4 MMOL/L (ref 22–29)
CREAT SERPL-MCNC: 0.7 MG/DL (ref 0.57–1)
CYTO UR: NORMAL
DEPRECATED RDW RBC AUTO: 44.8 FL (ref 37–54)
EGFRCR SERPLBLD CKD-EPI 2021: 102.9 ML/MIN/1.73
ERYTHROCYTE [DISTWIDTH] IN BLOOD BY AUTOMATED COUNT: 13.7 % (ref 12.3–15.4)
GLUCOSE SERPL-MCNC: 132 MG/DL (ref 65–99)
HCT VFR BLD AUTO: 24.9 % (ref 34–46.6)
HGB BLD-MCNC: 8.1 G/DL (ref 12–15.9)
LAB AP CASE REPORT: NORMAL
MCH RBC QN AUTO: 29.6 PG (ref 26.6–33)
MCHC RBC AUTO-ENTMCNC: 32.5 G/DL (ref 31.5–35.7)
MCV RBC AUTO: 90.9 FL (ref 79–97)
PATH REPORT.FINAL DX SPEC: NORMAL
PATH REPORT.GROSS SPEC: NORMAL
PLATELET # BLD AUTO: 372 10*3/MM3 (ref 140–450)
PMV BLD AUTO: 9.2 FL (ref 6–12)
POTASSIUM SERPL-SCNC: 3.8 MMOL/L (ref 3.5–5.2)
RBC # BLD AUTO: 2.74 10*6/MM3 (ref 3.77–5.28)
SODIUM SERPL-SCNC: 138 MMOL/L (ref 136–145)
VANCOMYCIN TROUGH SERPL-MCNC: 12.8 MCG/ML (ref 5–20)
WBC NRBC COR # BLD AUTO: 5.24 10*3/MM3 (ref 3.4–10.8)

## 2025-04-09 PROCEDURE — 25810000003 SODIUM CHLORIDE 0.9 % SOLUTION 250 ML FLEX CONT: Performed by: PLASTIC SURGERY

## 2025-04-09 PROCEDURE — 25010000002 CEFAZOLIN PER 500 MG: Performed by: PLASTIC SURGERY

## 2025-04-09 PROCEDURE — 99232 SBSQ HOSP IP/OBS MODERATE 35: CPT | Performed by: INTERNAL MEDICINE

## 2025-04-09 PROCEDURE — 85027 COMPLETE CBC AUTOMATED: CPT | Performed by: HOSPITALIST

## 2025-04-09 PROCEDURE — 80202 ASSAY OF VANCOMYCIN: CPT | Performed by: INTERNAL MEDICINE

## 2025-04-09 PROCEDURE — 25010000002 VANCOMYCIN 1 G RECONSTITUTED SOLUTION 1 EACH VIAL: Performed by: PLASTIC SURGERY

## 2025-04-09 PROCEDURE — 25010000002 ENOXAPARIN PER 10 MG: Performed by: HOSPITALIST

## 2025-04-09 PROCEDURE — 80048 BASIC METABOLIC PNL TOTAL CA: CPT | Performed by: HOSPITALIST

## 2025-04-09 RX ORDER — HYDROCODONE BITARTRATE AND ACETAMINOPHEN 5; 325 MG/1; MG/1
1-2 TABLET ORAL EVERY 6 HOURS PRN
Qty: 15 TABLET | Refills: 0 | Status: SHIPPED | OUTPATIENT
Start: 2025-04-09

## 2025-04-09 RX ORDER — DOXYCYCLINE 100 MG/1
100 CAPSULE ORAL 2 TIMES DAILY
Qty: 12 CAPSULE | Refills: 0 | Status: SHIPPED | OUTPATIENT
Start: 2025-04-09 | End: 2025-04-15

## 2025-04-09 RX ORDER — CEPHALEXIN 500 MG/1
500 CAPSULE ORAL 4 TIMES DAILY
Qty: 24 CAPSULE | Refills: 0 | Status: SHIPPED | OUTPATIENT
Start: 2025-04-09 | End: 2025-04-15

## 2025-04-09 RX ADMIN — HYDROCODONE BITARTRATE AND ACETAMINOPHEN 1 TABLET: 5; 325 TABLET ORAL at 02:56

## 2025-04-09 RX ADMIN — HYDROCODONE BITARTRATE AND ACETAMINOPHEN 1 TABLET: 5; 325 TABLET ORAL at 08:45

## 2025-04-09 RX ADMIN — CEFAZOLIN 2000 MG: 2 INJECTION, POWDER, FOR SOLUTION INTRAMUSCULAR; INTRAVENOUS at 07:50

## 2025-04-09 RX ADMIN — FUROSEMIDE 20 MG: 20 TABLET ORAL at 08:37

## 2025-04-09 RX ADMIN — SODIUM CHLORIDE 1000 MG: 9 INJECTION, SOLUTION INTRAVENOUS at 08:36

## 2025-04-09 RX ADMIN — HYDROCODONE BITARTRATE AND ACETAMINOPHEN 1 TABLET: 5; 325 TABLET ORAL at 14:52

## 2025-04-09 RX ADMIN — METOPROLOL SUCCINATE 25 MG: 25 TABLET, EXTENDED RELEASE ORAL at 08:37

## 2025-04-09 RX ADMIN — ENOXAPARIN SODIUM 70 MG: 100 INJECTION SUBCUTANEOUS at 08:37

## 2025-04-09 NOTE — PLAN OF CARE
Goal Outcome Evaluation:  Plan of Care Reviewed With: patient        Progress: improving  Pt is discharging home today. She is to follow up with Dr. Bender in one week. Pt verbalized understanding of discharge instructions, education and teaching.

## 2025-04-09 NOTE — PROGRESS NOTES
"[facility] Clinical Pharmacy Services: Vancomycin Pharmacokinetic  Consult Note    Dasha De Leon is a 54 y.o. female who is on day 4 of pharmacy to dose vancomycin.    Indication: Skin and Soft Tissue  Consulting Provider: Dr. Mcbride  Planned Duration of Therapy: 5 days   Current Dose Ordered or Given:   Vancomycin 1000mg iv q 12h   Culture/Source:   4/6 blood cultures    NG td  Target: -600 mg/L.hr   Other Antimicrobials: cefazolin 1 g iv every 8 hours    Vitals/Labs  Ht: 167.6 cm (66\"); Wt: 74.2 kg (163 lb 9.3 oz)  Temp Readings from Last 1 Encounters:   04/07/25 97.5 °F (36.4 °C) (Oral)    Estimated Creatinine Clearance: 86.2 mL/min (by C-G formula based on SCr of 0.77 mg/dL).     Results from last 7 days   Lab Units 04/09/25  0520 04/08/25  0519 04/07/25  0511   CREATININE mg/dL 0.70 0.79 0.77   WBC 10*3/mm3 5.24 3.47 4.10      Lab Results   Component Value Date    [facility] 12.80 04/09/2025         Assessment/Plan:    Vancomycin Dose:   Continue vancomycin to 1000 mg IV every  12  hours  Predictive AUC level for the dose ordered is 424 mg/L.hr,  and the trough is therapeutic at 12.8 mcg/ml   No new levels ordered unless consult duration is extended.    Renal function is stable.     Pharmacy will follow patient's kidney function and will adjust doses and obtain levels as necessary. Thank you for involving pharmacy in this patient's care. Please contact pharmacy with any questions or concerns.          Hermelinda Jimenez, [facility]    Clinical Pharmacist          "

## 2025-04-09 NOTE — PLAN OF CARE
Problem: Adult Inpatient Plan of Care  Goal: Plan of Care Review  Outcome: Progressing  Flowsheets (Taken 4/9/2025 0603)  Progress: improving  Outcome Evaluation: alert and oriented, dressing to Rt breast incision CDI, small amount of serous sanguinous drainage, medicated iwth Norco with good pain relief, falls precaution maintained, stand by assist, ambulated, voiding, same IV antibiotics continued, VSS  Plan of Care Reviewed With: patient   Goal Outcome Evaluation:  Plan of Care Reviewed With: patient        Progress: improving  Outcome Evaluation: alert and oriented, dressing to Rt breast incision CDI, small amount of serous sanguinous drainage, medicated iwth Norco with good pain relief, falls precaution maintained, stand by assist, ambulated, voiding, same IV antibiotics continued, VSS

## 2025-04-09 NOTE — DISCHARGE SUMMARY
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        Hospital Course     Ms. De Leon is a 54 y.o. female with a history of recent DVT, breast cancer and hypertension who has had a chronic wound in her right chest wall following radiation who presented to the hospital after her right breast tissue expander fell out of her wound.  She has been having intermittent fevers and chills prior to admission.  See H&P for full details.  She was admitted and started on IV antibiotics with plastic surgery consulting.  They recommended debridement of her wound with flap closure.  She underwent that procedure yesterday and tolerated it pretty well.  She is having the expected postoperative pain and soreness but otherwise is doing well.  Blood cultures on admission were negative.  I do not see where any operative wound cultures were obtained.  ID has been following and has had her on cefazolin and vancomycin but she is being transitioned to Keflex and doxycycline at discharge.  She will continue wound care as per plastic surgery recommendations.  They have cleared her for DC today.  Oncology saw her and recommends holding her Verzenio at this time until instructed to resume it.  She can resume her Eliquis after discharge.  She has been covered with therapeutic dose Lovenox postoperatively due to history of recent DVT.  She is on  hydrocodone chronically at home.  I wrote her for a few extra pills since she is having to take it a little more often since surgery.  She can follow-up with oncology or with plastic surgery for additional pain management.        Day of Discharge     Subjective:  Complains of chest soreness.  Very much wants to discharge home    Physical Exam:  Temp:  [96.9 °F (36.1 °C)-98.2 °F (36.8 °C)] 98.2 °F (36.8 °C)  Heart Rate:  [] 89  Resp:  [16-18] 16  BP: (125-163)/(67-86) 128/72  Body mass index is 26.4 kg/m².  Physical Exam  Vitals reviewed.   Constitutional:       General: She is not in acute distress.     Appearance: She is not ill-appearing.   Cardiovascular:      Rate and Rhythm: Normal rate.   Pulmonary:      Effort: No respiratory distress.   Musculoskeletal:      Right lower leg: No edema.      Left lower leg: No edema.   Skin:     General: Skin is warm.   Neurological:      Mental Status: She is alert and oriented to person, place, and time.         Consultants     Consult Orders (all) (From admission, onward)       Start     Ordered    04/06/25 1831  Inpatient Infectious Diseases Consult  Once        Specialty:  Infectious Diseases  Provider:  Rachel Garcia MD    04/06/25 1831    04/06/25 1831  Inpatient Hematology & Oncology Consult  Once        Specialty:  Hematology and Oncology  Provider:  Talia Huang MD    04/06/25 1831    04/06/25 1515  A (on-call MD unless specified) Details  Once        Specialty:  Hospitalist  Provider:  Tiffanei Mcbride MD    04/06/25 1515    04/06/25 1440  General MD Inpatient Consult  Once        Provider:  Colin Bender MD    04/06/25 1439                  Procedures     RIGHT BREAST DEBRIDMENT, ADJACENT REARRANFEMENT    Imaging Results (All)       Procedure Component Value Units Date/Time    XR Chest 1 View [[id number]] Collected: 04/06/25 1439     Updated: 04/06/25 1447    Narrative:      XR CHEST 1 VW-     HISTORY: Shortness of air.     COMPARISON: CT  "chest 2/6/2025     FINDINGS: A single view of the chest was obtained.     Support Devices:  None.  Cardiac Silhouette/Mediastinum/Kristine: The cardiac silhouette is normal in  size. There is calcific aortic atherosclerosis.  Lungs/Pleural Spaces: There is right basilar airspace opacity, better  appreciated on recent CT, possibly reflecting pneumonia and/or  treatment-related changes. There is a question trace right pleural  effusion. Recommend follow-up CT to document complete resolution.  Chest Wall/Diaphragm/Upper Abdomen: There is asymmetric elevation of the  right hemidiaphragm. There is dextroscoliosis.     This report was finalized on 4/6/2025 2:44 PM by Dr. Tiki Watkins M.D  on Workstation: [id number]                 Pertinent Labs     Results from last 7 days   Lab Units 04/09/25 0520 04/08/25 0519 04/07/25 0511 04/06/25  1409   WBC 10*3/mm3 5.24 3.47 4.10 5.56   HEMOGLOBIN g/dL 8.1* 9.3* 9.0* 9.7*   PLATELETS 10*3/mm3 372 396 327 406     Results from last 7 days   Lab Units 04/09/25  0520 04/08/25  0519 04/08/25  0017 04/07/25  0511 04/06/25  1409   SODIUM mmol/L 138 138  --  139 136   POTASSIUM mmol/L 3.8 4.9 4.3 3.1* 3.7   CHLORIDE mmol/L 107 108*  --  103 98   CO2 mmol/L 23.4 21.3*  --  24.9 23.8   BUN mg/dL 6 9  --  12 11   CREATININE mg/dL 0.70 0.79  --  0.77 0.97   GLUCOSE mg/dL 132* 96  --  90 102*   EGFR mL/min/1.73 102.9 89.0  --  91.8 69.6     Results from last 7 days   Lab Units 04/06/25  1409   ALBUMIN g/dL 3.5   BILIRUBIN mg/dL 0.5   ALK PHOS U/L 124*   AST (SGOT) U/L 18   ALT (SGPT) U/L 14     Results from last 7 days   Lab Units 04/09/25  0520 04/08/25  0519 04/07/25  0511 04/06/25  1409   CALCIUM mg/dL 8.6 9.0 8.7 9.4   ALBUMIN g/dL  --   --   --  3.5       Results from last 7 days   Lab Units 04/06/25  1519 04/06/25  1409   HSTROP T ng/L 7 8   PROBNP pg/mL  --  647.0           Invalid input(s): \"LDLCALC\"  Results from last 7 days   Lab Units 04/06/25  1419 04/06/25  1409   BLOODCX  No " growth at 3 days No growth at 3 days         Test Results Pending at Discharge     Pending Results       None              Discharge Details        Discharge Medications        PAUSE taking these medications        Instructions Start Date   Verzenio 100 MG tablet  Wait to take this until your doctor or other care provider tells you to start again.  Generic drug: Abemaciclib   100 mg, Oral, 2 Times Daily             New Medications        Instructions Start Date   cephalexin 500 MG capsule  Commonly known as: KEFLEX   500 mg, Oral, 4 Times Daily      doxycycline 100 MG capsule  Commonly known as: VIBRAMYCIN   100 mg, Oral, 2 Times Daily             Changes to Medications        Instructions Start Date   HYDROcodone-acetaminophen 5-325 MG per tablet  Commonly known as: NORCO  What changed: how much to take   1-2 tablets, Oral, Every 6 Hours PRN             Continue These Medications        Instructions Start Date   ALPRAZolam 0.25 MG tablet  Commonly known as: Xanax   0.25 mg, Oral, 2 Times Daily PRN      anastrozole 1 MG tablet  Commonly known as: ARIMIDEX   1 mg, Oral, Daily      atorvastatin 40 MG tablet  Commonly known as: LIPITOR   80 mg, Oral, Daily      buPROPion  MG 24 hr tablet  Commonly known as: Wellbutrin XL   300 mg, Oral, Daily      cyclobenzaprine 10 MG tablet  Commonly known as: FLEXERIL   10 mg, Oral, 3 Times Daily PRN      Eliquis 5 MG tablet tablet  Generic drug: apixaban   5 mg, Oral, 2 Times Daily      escitalopram 20 MG tablet  Commonly known as: LEXAPRO   20 mg, Oral, Daily      famotidine 20 MG tablet  Commonly known as: PEPCID   20 mg, Oral, 2 Times Daily      furosemide 20 MG tablet  Commonly known as: Lasix   20 mg, Oral, 2 Times Daily      Jardiance 10 MG tablet tablet  Generic drug: empagliflozin   10 mg, Oral, Daily      levocetirizine 5 MG tablet  Commonly known as: XYZAL   Take one tablet by mouth twice daily for urticaria.      meloxicam 15 MG tablet  Commonly known as: MOBIC    15 mg, Oral, Daily      metoprolol succinate XL 25 MG 24 hr tablet  Commonly known as: TOPROL-XL   25 mg, Oral, Daily      olmesartan 40 MG tablet  Commonly known as: BENICAR   20 mg, Oral, Daily      ondansetron 8 MG tablet  Commonly known as: ZOFRAN   8 mg, Oral, 3 Times Daily PRN      pantoprazole 40 MG EC tablet  Commonly known as: Protonix   40 mg, Oral, Daily             Stop These Medications      naloxone 4 MG/0.1ML nasal spray  Commonly known as: NARCAN     oxyCODONE 5 MG immediate release tablet  Commonly known as: Roxicodone              Allergies   Allergen Reactions    Penicillins Anaphylaxis    Hemp Seed Oil Itching     HEMP IN LOTIONS    Lisinopril Cough       Discharge Disposition:  Home or Self Care      Discharge Diet:  Diet Order   Procedures    Diet: Regular/House; Fluid Consistency: Thin (IDDSI 0)       Discharge Activity:       CODE STATUS:    Code Status and Medical Interventions: CPR (Attempt to Resuscitate); Full   Ordered at: 04/08/25 1215     Code Status (Patient has no pulse and is not breathing):    CPR (Attempt to Resuscitate)     Medical Interventions (Patient has pulse or is breathing):    Full     Level Of Support Discussed With:    Patient       Future Appointments   Date Time Provider Department Center   4/11/2025 10:00 AM Ana Peterson APRN [facility] None   4/23/2025 11:30 AM LAB CHAIR 2 [facility]  [facility] [place]   4/23/2025 12:00 PM Talia Huang MD [facility] [place]   5/5/2025  9:00 AM Radha Castillo PA [facility]  [facility]   6/16/2025  8:00 AM Ariana Coffman PA-C [facility] [place]   6/19/2025  8:00 AM Germain Puga MD [facility] None     Additional Instructions for the Follow-ups that You Need to Schedule       Discharge Follow-up with Specialty: Dr. Bender next week per his recommendations   As directed      Specialty: Dr. Bender next week per his recommendations               Follow-up Information       Ariana Coffman PA-C .    Specialties:  Physician Assistant, Internal Medicine, Family Medicine  Contact information:  [facility]i  [address] [address]  [phone]                             Additional Instructions for the Follow-ups that You Need to Schedule       Discharge Follow-up with Specialty: Dr. Bender next week per his recommendations   As directed      Specialty: Dr. Bender next week per his recommendations            Time Spent on Discharge:  Greater than 30 minutes      Marty Cordova MD  [facility]  04/09/25  14:31 EDT

## 2025-04-09 NOTE — PROGRESS NOTES
REASON FOR FOLLOWUP/CHIEF COMPLAINT:    Breast cancer    HISTORY OF PRESENT ILLNESS:   Status post debridement of the right skin and subcutaneous tissue on 4/9/2025.  Having some postoperative pain.  Continues on antibiotics.  Remains afebrile.    Past Medical History, Past Surgical History, Social History, Family History have been reviewed and are without significant changes except as mentioned.    Review of Systems   Review of Systems  Of systems as mentioned HPI otherwise negative    Medications:  The current medication list was reviewed in the EMR    ALLERGIES:    Allergies   Allergen Reactions    Penicillins Anaphylaxis    Hemp Seed Oil Itching     HEMP IN LOTIONS    Lisinopril Cough              Vitals:    04/08/25 2100 04/09/25 0049 04/09/25 0430 04/09/25 0951   BP: 163/86 147/71 132/67 128/72   BP Location: Left arm Left arm Left arm Left arm   Patient Position: Sitting Lying Lying Lying   Pulse: 97 100 96 89   Resp: 18 16 16 16   Temp: 97.7 °F (36.5 °C) 97.3 °F (36.3 °C) 98 °F (36.7 °C) 98.2 °F (36.8 °C)   TempSrc: Oral Oral Oral Oral   SpO2: 96% 92% 93% 98%   Weight:       Height:         Physical Exam    CONSTITUTIONAL:  Vital signs reviewed.  No distress, looks comfortable.  EYES:  Conjunctivae and lids unremarkable.  PERRLA  EARS,NOSE,MOUTH,THROAT:  Ears and nose appear unremarkable.  Lips, teeth, gums appear unremarkable.  RESPIRATORY:  Normal respiratory effort.  Lungs clear to auscultation bilaterally.  CARDIOVASCULAR:  Normal S1, S2.  No murmurs rubs or gallops.  No significant lower extremity edema.  GASTROINTESTINAL: Abdomen appears unremarkable.  Nontender.  No hepatomegaly.  No splenomegaly.  NEURO: cranial nerves 2-12 grossly intact.  No focal deficits.  Appears to have equal strength all 4 extremities.  MUSCULOSKELETAL:  Unremarkable digits/nails.  No cyanosis or clubbing.  SKIN:  Warm.  No rashes.  PSYCHIATRIC:  Normal judgment and insight.  Normal mood and affect.       RECENT  LABS:  WBC   Date Value Ref Range Status   04/09/2025 5.24 3.40 - 10.80 10*3/mm3 Final   04/08/2025 3.47 3.40 - 10.80 10*3/mm3 Final   04/07/2025 4.10 3.40 - 10.80 10*3/mm3 Final   04/06/2025 5.56 3.40 - 10.80 10*3/mm3 Final     Hemoglobin   Date Value Ref Range Status   04/09/2025 8.1 (L) 12.0 - 15.9 g/dL Final   04/08/2025 9.3 (L) 12.0 - 15.9 g/dL Final   04/07/2025 9.0 (L) 12.0 - 15.9 g/dL Final   04/06/2025 9.7 (L) 12.0 - 15.9 g/dL Final     Platelets   Date Value Ref Range Status   04/09/2025 372 140 - 450 10*3/mm3 Final   04/08/2025 396 140 - 450 10*3/mm3 Final   04/07/2025 327 140 - 450 10*3/mm3 Final   04/06/2025 406 140 - 450 10*3/mm3 Final       ASSESSMENT/PLAN:  Dasha De Leon P688/1     *Right breast invasive ductal carcinoma  Status post right mastectomy, sentinel lymph node biopsy with 2 out of 5 lymph nodes positive for metastatic disease  Completed adjuvant radiation to the right chest wall  Continue anastrozole  Hold Verzenio due to ongoing issues with right chest wall infection/wound necrosis and expander issues.     *Anemia  Hemoglobin 8.1, likely drop secondary to surgery.  Vitamin B12 normal at 573, ferritin 632, iron saturation 22%, TIBC 194     *Pulmonary embolism  Secondary to Verzenio  She was on Eliquis as an outpatient  Now started on Lovenox 1 mg/kg twice daily in anticipation of upcoming surgery this week.  Recommend continuing Lovenox.  Resume Eliquis once cleared by plastics.     *Right chest wall wound and hemorrhage  Evaluated by Dr. Bender in the hospital  Plans noted for surgery.  Expander has fallen out.  The plan was initially for latissimus flap reconstruction salvage later this month  Now in the hospital and plans noted for debridement and possible closure.  Delights most flap closure will be delayed.  Infectious diseases has been consulted and they are recommending vancomycin and cefazolin.  Continues on vancomycin and cefazolin.     Recommendations  Continue  anastrozole  Hold Verzenio  Management of the right chest wall wound per plastics  Continue Lovenox while patient is in the hospital  Will transition to Eliquis once cleared by Dr. Bender.  More likely than not anticipate holding Verzenio even after hospitalization to allow for wound healing.  Monitor H&H.

## 2025-04-09 NOTE — OUTREACH NOTE
Prep Survey      Flowsheet Row Responses   [facility] patient discharged from? [place]   Is LACE score < 7 ? No   Eligibility [facility]   Date of Admission 04/06/25   Date of Discharge 04/09/25   Discharge Disposition Home or Self Care   Discharge diagnosis Open chest wound, right, RIGHT BREAST DEBRIDMENT, Hx Breast CA   Does the patient have one of the following disease processes/diagnoses(primary or secondary)? General Surgery   Does the patient have Home health ordered? No   Is there a DME ordered? No   Prep survey completed? Yes            Monica GUPTA - Registered Nurse

## 2025-04-09 NOTE — PROGRESS NOTES
ID note for wound necrosis/infection  S:   C/o some postop pain which is different than pain she had pta  AF.  Tolerating abx    Physical Exam:   Vital Signs   Temp:  [96.9 °F (36.1 °C)-98.2 °F (36.8 °C)] 98.2 °F (36.8 °C)  Heart Rate:  [] 89  Resp:  [12-18] 16  BP: (117-163)/(61-86) 128/72    GENERAL: Awake and alert, in no acute distress.   HEENT: Oropharynx is clear. Hearing is grossly normal.   EYES: . No conjunctival injection. No lid lag.   LUNGS:normal respiratory effort.   SKIN: no cutaneous eruptions in exposed areas  PSYCHIATRIC: Appropriate mood, affect, insight, and judgment.     Results Review:  White count 5.2  Creatinine 0.7     Blood cultures no growth to date       A/p  1.  Right breast mastectomy incision wound necrosis/infection  2.  Right breast invasive ductal carcinoma, verzenio on hold    S/p I and D 4/5 with Dr Bender  Continue vancomycin for an AUC of 400-600 along with cefazolin 2 g IV every 8 hours to provide broad coverage against skin bacteria.    Bcx NGTD  Ok to dc when okay with others on doxycycline 100mg po q12 and cephalexin 500mg po q6 thru 4/15

## 2025-04-10 ENCOUNTER — TRANSITIONAL CARE MANAGEMENT TELEPHONE ENCOUNTER (OUTPATIENT)
Dept: CALL CENTER | Facility: HOSPITAL | Age: 55
End: 2025-04-10
Payer: COMMERCIAL

## 2025-04-10 NOTE — PROGRESS NOTES
Case Management Discharge Note      Final Note: Discharged home on PO Abx. Kirstin Fritz RN         Selected Continued Care - Discharged on 4/9/2025 Admission date: 4/6/2025 - Discharge disposition: Home or Self Care         Transportation Services  Private: Car    Final Discharge Disposition Code: 01 - home or self-care

## 2025-04-10 NOTE — PROGRESS NOTES
SUBJECTIVE:   Feeling a bit better, has walked and voided.    OBJECTIVE:  Vitals:    04/09/25 0951   BP: 128/72   Pulse: 89   Resp: 16   Temp: 98.2 °F (36.8 °C)   SpO2: 98%     Physical Exam   Cardiovascular: Normal rate.     Pulmonary/Chest  Effort normal.     Right breast incision c/d/i, no fluid collection, mild erythema, drain output thin serosanguinous    PLAN:  Right breast wound breakdown  - ok for discharge from surgical perspective  - minimal activity  - f/u with me 1 wk

## 2025-04-10 NOTE — OUTREACH NOTE
Call Center TCM Note      Flowsheet Row Responses   [facility] patient discharged from? [place]   Does the patient have one of the following disease processes/diagnoses(primary or secondary)? General Surgery   TCM attempt successful? No  [mother deferred call to patient d/t not knowing all the info]   Unsuccessful attempts Attempt 1            Radha Mederos Registered Nurse    4/10/2025, 10:37 EDT

## 2025-04-10 NOTE — OUTREACH NOTE
Call Center TCM Note      Flowsheet Row Responses   [facility] patient discharged from? [place]   Does the patient have one of the following disease processes/diagnoses(primary or secondary)? General Surgery   TCM attempt successful? Yes   Call start time 1439   Call end time 1444   Discharge diagnosis Open chest wound, right, RIGHT BREAST DEBRIDMENT, Hx Breast CA   Meds reviewed with patient/caregiver? Yes   Is the patient having any side effects they believe may be caused by any medication additions or changes? No   Does the patient have all medications related to this admission filled (includes all antibiotics, pain medications, etc.) Yes   Is the patient taking all medications as directed (includes completed medication regime)? Yes   Does the patient have an appointment with their PCP within 7-14 days of discharge? No   Nursing Interventions Patient desires to follow up with specialty only, Patient declined scheduling/rescheduling appointment at this time   Has home health visited the patient within 72 hours of discharge? N/A   Psychosocial issues? No   Comments Pt has a drain, will monitor and measure drainage   Did the patient receive a copy of their discharge instructions? Yes   Nursing interventions Reviewed instructions with patient   What is the patient's perception of their health status since discharge? Improving   Nursing interventions Nurse provided patient education   Is the patient /caregiver able to teach back basic post-op care? Lifting as instructed by MD in discharge instructions, Keep incision areas clean,dry and protected, No tub bath, swimming, or hot tub until instructed by MD, Take showers only when approved by MD-sponge bathe until then   Is the patient/caregiver able to teach back signs and symptoms of incisional infection? Increased redness, swelling or pain at the incisonal site, Increased drainage or bleeding   Is the patient/caregiver able to teach back steps to recovery at  home? Set small, achievable goals for return to baseline health   Is the patient/caregiver able to teach back the hierarchy of who to call/visit for symptoms/problems? PCP, Specialist, Home health nurse, Urgent Care, ED, 911 Yes   TCM call completed? Yes   Call end time 1444   Would this patient benefit from a Referral to [facility]? No   Is the patient interested in additional calls from an ambulatory ? No            Radha GUPTA - Registered Nurse    4/10/2025, 14:44 EDT

## 2025-04-11 LAB
BACTERIA SPEC AEROBE CULT: NORMAL
BACTERIA SPEC AEROBE CULT: NORMAL

## 2025-04-17 ENCOUNTER — PATIENT OUTREACH (OUTPATIENT)
Dept: CASE MANAGEMENT | Facility: OTHER | Age: 55
End: 2025-04-17
Payer: COMMERCIAL

## 2025-04-17 NOTE — OUTREACH NOTE
AMBULATORY CASE MANAGEMENT NOTE    Names and Relationships of Patient/Support Persons: Contact: Dasha De Leon; Relationship: Self -     Patient Outreach    Ms. De Leon was hospitalized 4/6/2025 - 4/09/2025 at [facility] for open right chest wound, s/p right breast mastectomy and radiation for invasive ductal carcinoma. Patient was taking Eliquis for DVT and suffered from blood loss. The wound site was debrided with flap closure.  Ms. De Leon was discharged home with prescriptions for cephalexin and doxycycline which she has completed.   Today Ms. De Leon rates her pain at 6/10 and is taking hydrocodone 5/325 mg, two at bedtime. Pain management discussed, patient hesitant to take pain medication more often for fear of addiction. Pain management strategy discussed, patient agreeable to try increasing her hydrocodone to better control her pain. Ms. De Leon has continued to take Anastrozole as prescribed. Signs and symptoms of infection reviewed as well as need for bowel regimen for pain medication related constipation. Signs and symptoms of infection reviewed with patient. Role of oncology RN-ACM explained patient is agreeable to service.    Education Documentation  Symptom Management - pain, constipation, infection, taught by Casandra Zepeda, KAREN at 4/17/2025  2:32 PM.  Learner: Patient  Readiness: Acceptance  Method: Explanation  Response: Verbalizes Understanding    Pain Management, taught by Casandra Zepeda RN at 4/17/2025  2:32 PM.  Learner: Patient  Readiness: Acceptance  Method: Explanation  Response: Verbalizes Understanding          Casandra GARCIA  Ambulatory Case Management    4/17/2025, 14:35 EDT

## 2025-04-17 NOTE — PLAN OF CARE
Problem: Cancer Treatment Phase  Goal: Manage Fatigue (Tiredness)  Intervention: My Fatigue Management To Do List  Description: Why is this important?Cancer treatment and its side effects can drain your energy. It can keep you from doing things you would like to do.There are many things that you can do to manage fatigue.  Flowsheets (Taken 4/17/2025 1430)  My Fatigue Management To Do List:   do not eat or exercise right before bedtime   eat healthy   go outside each day if weather allows  Goal: Keep Pain Under Control  Intervention: My Pain Control To Do List  Description: Why is this important?Day-to-day life can be hard when you have pain.Even a small change in emotion or a physical problem can make pain better or worse.Coping with pain depends on how the mind and body reacts to pain.Pain medicine is just one piece of the treatment puzzle.There are many tools to help manage pain. A combination of them can be used to best meet your needs.  Flowsheets (Taken 4/17/2025 1430)  My Pain Control To Do List:   call for prescription refill 3 days before needed   keep track of prescription refills   plan exercise or activity when pain is best controlled   track times pain is worst and when it is best   track what makes the pain worse and what makes it better  Goal: Optimal Care Coordination of Patient With Cancer: Treatment Phase  Intervention: Develop Infection Prevention or Management Plan  Flowsheets (Taken 4/17/2025 1430)  Develop Infection Prevention or Management Plan:   fever monitored   infection prevention strategies encouraged   signs/symptoms of infection assessed

## 2025-04-18 ENCOUNTER — HOSPITAL ENCOUNTER (OUTPATIENT)
Facility: HOSPITAL | Age: 55
Discharge: HOME | End: 2025-04-20
Attending: EMERGENCY MEDICINE | Admitting: HOSPITALIST
Payer: COMMERCIAL

## 2025-04-18 ENCOUNTER — APPOINTMENT (OUTPATIENT)
Dept: CT IMAGING | Facility: HOSPITAL | Age: 55
End: 2025-04-18
Payer: COMMERCIAL

## 2025-04-18 ENCOUNTER — READMISSION MANAGEMENT (OUTPATIENT)
Dept: CALL CENTER | Facility: HOSPITAL | Age: 55
End: 2025-04-18
Payer: COMMERCIAL

## 2025-04-18 DIAGNOSIS — S21.101A: ICD-10-CM

## 2025-04-18 DIAGNOSIS — D64.9 CHRONIC ANEMIA: ICD-10-CM

## 2025-04-18 DIAGNOSIS — S20.211A HEMATOMA, CHEST WALL, RIGHT, INITIAL ENCOUNTER: Primary | ICD-10-CM

## 2025-04-18 PROBLEM — T14.8XXA BLEEDING FROM WOUND: Status: ACTIVE | Noted: 2025-04-18

## 2025-04-18 PROBLEM — D62 ACUTE POSTHEMORRHAGIC ANEMIA: Status: ACTIVE | Noted: 2025-04-18

## 2025-04-18 PROBLEM — Z87.828: Status: ACTIVE | Noted: 2025-04-18

## 2025-04-18 LAB
ABO GROUP BLD: NORMAL
ALBUMIN SERPL-MCNC: 3.2 G/DL (ref 3.5–5.2)
ALBUMIN SERPL-MCNC: 3.5 G/DL (ref 3.5–5.2)
ALBUMIN/GLOB SERPL: 1.2 G/DL
ALBUMIN/GLOB SERPL: 1.2 G/DL
ALP SERPL-CCNC: 103 U/L (ref 39–117)
ALP SERPL-CCNC: 111 U/L (ref 39–117)
ALT SERPL W P-5'-P-CCNC: 16 U/L (ref 1–33)
ALT SERPL W P-5'-P-CCNC: 20 U/L (ref 1–33)
ANION GAP SERPL CALCULATED.3IONS-SCNC: 11.4 MMOL/L (ref 5–15)
ANION GAP SERPL CALCULATED.3IONS-SCNC: 9.6 MMOL/L (ref 5–15)
APTT PPP: 35.9 SECONDS (ref 22.7–35.4)
AST SERPL-CCNC: 23 U/L (ref 1–32)
AST SERPL-CCNC: 27 U/L (ref 1–32)
BASOPHILS # BLD AUTO: 0.14 10*3/MM3 (ref 0–0.2)
BASOPHILS NFR BLD AUTO: 1.4 % (ref 0–1.5)
BILIRUB SERPL-MCNC: <0.2 MG/DL (ref 0–1.2)
BILIRUB SERPL-MCNC: <0.2 MG/DL (ref 0–1.2)
BLD GP AB SCN SERPL QL: NEGATIVE
BUN SERPL-MCNC: 15 MG/DL (ref 6–20)
BUN SERPL-MCNC: 15 MG/DL (ref 6–20)
BUN/CREAT SERPL: 19.7 (ref 7–25)
BUN/CREAT SERPL: 20.5 (ref 7–25)
CALCIUM SPEC-SCNC: 8.7 MG/DL (ref 8.6–10.5)
CALCIUM SPEC-SCNC: 9 MG/DL (ref 8.6–10.5)
CHLORIDE SERPL-SCNC: 103 MMOL/L (ref 98–107)
CHLORIDE SERPL-SCNC: 105 MMOL/L (ref 98–107)
CO2 SERPL-SCNC: 23.6 MMOL/L (ref 22–29)
CO2 SERPL-SCNC: 25.4 MMOL/L (ref 22–29)
CREAT SERPL-MCNC: 0.73 MG/DL (ref 0.57–1)
CREAT SERPL-MCNC: 0.76 MG/DL (ref 0.57–1)
D DIMER PPP FEU-MCNC: 0.72 MCGFEU/ML (ref 0–0.54)
DEPRECATED RDW RBC AUTO: 47.3 FL (ref 37–54)
DEPRECATED RDW RBC AUTO: 48.6 FL (ref 37–54)
EGFRCR SERPLBLD CKD-EPI 2021: 93.3 ML/MIN/1.73
EGFRCR SERPLBLD CKD-EPI 2021: 97.9 ML/MIN/1.73
EOSINOPHIL # BLD AUTO: 0.18 10*3/MM3 (ref 0–0.4)
EOSINOPHIL NFR BLD AUTO: 1.8 % (ref 0.3–6.2)
ERYTHROCYTE [DISTWIDTH] IN BLOOD BY AUTOMATED COUNT: 14.2 % (ref 12.3–15.4)
ERYTHROCYTE [DISTWIDTH] IN BLOOD BY AUTOMATED COUNT: 14.5 % (ref 12.3–15.4)
FERRITIN SERPL-MCNC: 274 NG/ML (ref 13–150)
GLOBULIN UR ELPH-MCNC: 2.7 GM/DL
GLOBULIN UR ELPH-MCNC: 2.9 GM/DL
GLUCOSE SERPL-MCNC: 105 MG/DL (ref 65–99)
GLUCOSE SERPL-MCNC: 117 MG/DL (ref 65–99)
HCT VFR BLD AUTO: 24.8 % (ref 34–46.6)
HCT VFR BLD AUTO: 28.1 % (ref 34–46.6)
HGB BLD-MCNC: 7.7 G/DL (ref 12–15.9)
HGB BLD-MCNC: 9 G/DL (ref 12–15.9)
IMM GRANULOCYTES # BLD AUTO: 0.21 10*3/MM3 (ref 0–0.05)
IMM GRANULOCYTES NFR BLD AUTO: 2.1 % (ref 0–0.5)
INR PPP: 1.27 (ref 0.9–1.1)
IRON 24H UR-MRATE: 32 MCG/DL (ref 37–145)
IRON SATN MFR SERPL: 12 % (ref 20–50)
LYMPHOCYTES # BLD AUTO: 1.31 10*3/MM3 (ref 0.7–3.1)
LYMPHOCYTES NFR BLD AUTO: 13.2 % (ref 19.6–45.3)
MCH RBC QN AUTO: 28.6 PG (ref 26.6–33)
MCH RBC QN AUTO: 29.1 PG (ref 26.6–33)
MCHC RBC AUTO-ENTMCNC: 31 G/DL (ref 31.5–35.7)
MCHC RBC AUTO-ENTMCNC: 32 G/DL (ref 31.5–35.7)
MCV RBC AUTO: 90.9 FL (ref 79–97)
MCV RBC AUTO: 92.2 FL (ref 79–97)
MONOCYTES # BLD AUTO: 1.5 10*3/MM3 (ref 0.1–0.9)
MONOCYTES NFR BLD AUTO: 15.1 % (ref 5–12)
NEUTROPHILS NFR BLD AUTO: 6.62 10*3/MM3 (ref 1.7–7)
NEUTROPHILS NFR BLD AUTO: 66.4 % (ref 42.7–76)
NRBC BLD AUTO-RTO: 0 /100 WBC (ref 0–0.2)
PLATELET # BLD AUTO: 544 10*3/MM3 (ref 140–450)
PLATELET # BLD AUTO: 593 10*3/MM3 (ref 140–450)
PMV BLD AUTO: 8.9 FL (ref 6–12)
PMV BLD AUTO: 9.1 FL (ref 6–12)
POTASSIUM SERPL-SCNC: 3.6 MMOL/L (ref 3.5–5.2)
POTASSIUM SERPL-SCNC: 4.1 MMOL/L (ref 3.5–5.2)
PROT SERPL-MCNC: 5.9 G/DL (ref 6–8.5)
PROT SERPL-MCNC: 6.4 G/DL (ref 6–8.5)
PROTHROMBIN TIME: 15.9 SECONDS (ref 11.7–14.2)
RBC # BLD AUTO: 2.69 10*6/MM3 (ref 3.77–5.28)
RBC # BLD AUTO: 3.09 10*6/MM3 (ref 3.77–5.28)
RH BLD: NEGATIVE
SODIUM SERPL-SCNC: 138 MMOL/L (ref 136–145)
SODIUM SERPL-SCNC: 140 MMOL/L (ref 136–145)
T&S EXPIRATION DATE: NORMAL
TIBC SERPL-MCNC: 256 MCG/DL (ref 298–536)
TRANSFERRIN SERPL-MCNC: 172 MG/DL (ref 200–360)
WBC NRBC COR # BLD AUTO: 9.96 10*3/MM3 (ref 3.4–10.8)
WBC NRBC COR # BLD AUTO: 9.97 10*3/MM3 (ref 3.4–10.8)

## 2025-04-18 PROCEDURE — 99285 EMERGENCY DEPT VISIT HI MDM: CPT

## 2025-04-18 PROCEDURE — G0378 HOSPITAL OBSERVATION PER HR: HCPCS

## 2025-04-18 PROCEDURE — 86901 BLOOD TYPING SEROLOGIC RH(D): CPT

## 2025-04-18 PROCEDURE — 86850 RBC ANTIBODY SCREEN: CPT | Performed by: INTERNAL MEDICINE

## 2025-04-18 PROCEDURE — 83540 ASSAY OF IRON: CPT | Performed by: INTERNAL MEDICINE

## 2025-04-18 PROCEDURE — 86900 BLOOD TYPING SEROLOGIC ABO: CPT

## 2025-04-18 PROCEDURE — 86901 BLOOD TYPING SEROLOGIC RH(D): CPT | Performed by: INTERNAL MEDICINE

## 2025-04-18 PROCEDURE — 96375 TX/PRO/DX INJ NEW DRUG ADDON: CPT

## 2025-04-18 PROCEDURE — 96374 THER/PROPH/DIAG INJ IV PUSH: CPT

## 2025-04-18 PROCEDURE — 85014 HEMATOCRIT: CPT | Performed by: HOSPITALIST

## 2025-04-18 PROCEDURE — 85027 COMPLETE CBC AUTOMATED: CPT | Performed by: NURSE PRACTITIONER

## 2025-04-18 PROCEDURE — 85018 HEMOGLOBIN: CPT | Performed by: HOSPITALIST

## 2025-04-18 PROCEDURE — 71260 CT THORAX DX C+: CPT

## 2025-04-18 PROCEDURE — 84466 ASSAY OF TRANSFERRIN: CPT | Performed by: INTERNAL MEDICINE

## 2025-04-18 PROCEDURE — 82728 ASSAY OF FERRITIN: CPT | Performed by: INTERNAL MEDICINE

## 2025-04-18 PROCEDURE — 25510000001 IOPAMIDOL 61 % SOLUTION: Performed by: EMERGENCY MEDICINE

## 2025-04-18 PROCEDURE — 85379 FIBRIN DEGRADATION QUANT: CPT | Performed by: INTERNAL MEDICINE

## 2025-04-18 PROCEDURE — 80053 COMPREHEN METABOLIC PANEL: CPT | Performed by: EMERGENCY MEDICINE

## 2025-04-18 PROCEDURE — P9016 RBC LEUKOCYTES REDUCED: HCPCS

## 2025-04-18 PROCEDURE — 96376 TX/PRO/DX INJ SAME DRUG ADON: CPT

## 2025-04-18 PROCEDURE — 86923 COMPATIBILITY TEST ELECTRIC: CPT

## 2025-04-18 PROCEDURE — 25010000002 ONDANSETRON PER 1 MG: Performed by: EMERGENCY MEDICINE

## 2025-04-18 PROCEDURE — 85025 COMPLETE CBC W/AUTO DIFF WBC: CPT | Performed by: EMERGENCY MEDICINE

## 2025-04-18 PROCEDURE — 25010000002 MORPHINE PER 10 MG: Performed by: HOSPITALIST

## 2025-04-18 PROCEDURE — 99233 SBSQ HOSP IP/OBS HIGH 50: CPT | Performed by: INTERNAL MEDICINE

## 2025-04-18 PROCEDURE — 85730 THROMBOPLASTIN TIME PARTIAL: CPT | Performed by: EMERGENCY MEDICINE

## 2025-04-18 PROCEDURE — 86900 BLOOD TYPING SEROLOGIC ABO: CPT | Performed by: INTERNAL MEDICINE

## 2025-04-18 PROCEDURE — 80053 COMPREHEN METABOLIC PANEL: CPT | Performed by: NURSE PRACTITIONER

## 2025-04-18 PROCEDURE — 36430 TRANSFUSION BLD/BLD COMPNT: CPT

## 2025-04-18 PROCEDURE — 25010000002 MORPHINE PER 10 MG: Performed by: EMERGENCY MEDICINE

## 2025-04-18 PROCEDURE — 85610 PROTHROMBIN TIME: CPT | Performed by: EMERGENCY MEDICINE

## 2025-04-18 RX ORDER — PANTOPRAZOLE SODIUM 40 MG/1
40 TABLET, DELAYED RELEASE ORAL DAILY
Status: DISCONTINUED | OUTPATIENT
Start: 2025-04-18 | End: 2025-04-20 | Stop reason: HOSPADM

## 2025-04-18 RX ORDER — ANASTROZOLE 1 MG/1
1 TABLET ORAL DAILY
Status: DISCONTINUED | OUTPATIENT
Start: 2025-04-18 | End: 2025-04-20 | Stop reason: HOSPADM

## 2025-04-18 RX ORDER — HYDROCODONE BITARTRATE AND ACETAMINOPHEN 5; 325 MG/1; MG/1
1 TABLET ORAL EVERY 6 HOURS PRN
Refills: 0 | Status: DISCONTINUED | OUTPATIENT
Start: 2025-04-18 | End: 2025-04-20 | Stop reason: HOSPADM

## 2025-04-18 RX ORDER — ACETAMINOPHEN 160 MG/5ML
650 SOLUTION ORAL EVERY 4 HOURS PRN
Status: DISCONTINUED | OUTPATIENT
Start: 2025-04-18 | End: 2025-04-20 | Stop reason: HOSPADM

## 2025-04-18 RX ORDER — ALPRAZOLAM 0.25 MG
0.25 TABLET ORAL 2 TIMES DAILY PRN
Status: DISCONTINUED | OUTPATIENT
Start: 2025-04-18 | End: 2025-04-20 | Stop reason: HOSPADM

## 2025-04-18 RX ORDER — ACETAMINOPHEN 325 MG/1
650 TABLET ORAL EVERY 4 HOURS PRN
Status: DISCONTINUED | OUTPATIENT
Start: 2025-04-18 | End: 2025-04-20 | Stop reason: HOSPADM

## 2025-04-18 RX ORDER — LOSARTAN POTASSIUM 50 MG/1
50 TABLET ORAL
Status: DISCONTINUED | OUTPATIENT
Start: 2025-04-18 | End: 2025-04-20 | Stop reason: HOSPADM

## 2025-04-18 RX ORDER — SODIUM CHLORIDE 9 MG/ML
40 INJECTION, SOLUTION INTRAVENOUS AS NEEDED
Status: DISCONTINUED | OUTPATIENT
Start: 2025-04-18 | End: 2025-04-20 | Stop reason: HOSPADM

## 2025-04-18 RX ORDER — MORPHINE SULFATE 2 MG/ML
2 INJECTION, SOLUTION INTRAMUSCULAR; INTRAVENOUS EVERY 4 HOURS PRN
Status: DISCONTINUED | OUTPATIENT
Start: 2025-04-18 | End: 2025-04-20 | Stop reason: HOSPADM

## 2025-04-18 RX ORDER — ONDANSETRON 2 MG/ML
4 INJECTION INTRAMUSCULAR; INTRAVENOUS ONCE
Status: COMPLETED | OUTPATIENT
Start: 2025-04-18 | End: 2025-04-18

## 2025-04-18 RX ORDER — BISACODYL 10 MG
10 SUPPOSITORY, RECTAL RECTAL DAILY PRN
Status: DISCONTINUED | OUTPATIENT
Start: 2025-04-18 | End: 2025-04-20 | Stop reason: HOSPADM

## 2025-04-18 RX ORDER — ONDANSETRON 2 MG/ML
4 INJECTION INTRAMUSCULAR; INTRAVENOUS EVERY 6 HOURS PRN
Status: DISCONTINUED | OUTPATIENT
Start: 2025-04-18 | End: 2025-04-20 | Stop reason: HOSPADM

## 2025-04-18 RX ORDER — MORPHINE SULFATE 2 MG/ML
4 INJECTION, SOLUTION INTRAMUSCULAR; INTRAVENOUS ONCE
Status: COMPLETED | OUTPATIENT
Start: 2025-04-18 | End: 2025-04-18

## 2025-04-18 RX ORDER — NITROGLYCERIN 0.4 MG/1
0.4 TABLET SUBLINGUAL
Status: DISCONTINUED | OUTPATIENT
Start: 2025-04-18 | End: 2025-04-19

## 2025-04-18 RX ORDER — FAMOTIDINE 20 MG/1
20 TABLET, FILM COATED ORAL 2 TIMES DAILY PRN
Status: DISCONTINUED | OUTPATIENT
Start: 2025-04-18 | End: 2025-04-20 | Stop reason: HOSPADM

## 2025-04-18 RX ORDER — ESCITALOPRAM OXALATE 20 MG/1
20 TABLET ORAL DAILY
Status: DISCONTINUED | OUTPATIENT
Start: 2025-04-18 | End: 2025-04-20 | Stop reason: HOSPADM

## 2025-04-18 RX ORDER — METOPROLOL SUCCINATE 25 MG/1
25 TABLET, EXTENDED RELEASE ORAL DAILY
Status: DISCONTINUED | OUTPATIENT
Start: 2025-04-18 | End: 2025-04-20 | Stop reason: HOSPADM

## 2025-04-18 RX ORDER — IOPAMIDOL 612 MG/ML
100 INJECTION, SOLUTION INTRAVASCULAR
Status: COMPLETED | OUTPATIENT
Start: 2025-04-18 | End: 2025-04-18

## 2025-04-18 RX ORDER — POLYETHYLENE GLYCOL 3350 17 G/17G
17 POWDER, FOR SOLUTION ORAL DAILY PRN
Status: DISCONTINUED | OUTPATIENT
Start: 2025-04-18 | End: 2025-04-20 | Stop reason: HOSPADM

## 2025-04-18 RX ORDER — ACETAMINOPHEN 650 MG/1
650 SUPPOSITORY RECTAL EVERY 4 HOURS PRN
Status: DISCONTINUED | OUTPATIENT
Start: 2025-04-18 | End: 2025-04-20 | Stop reason: HOSPADM

## 2025-04-18 RX ORDER — BUPROPION HYDROCHLORIDE 300 MG/1
300 TABLET ORAL DAILY
Status: DISCONTINUED | OUTPATIENT
Start: 2025-04-18 | End: 2025-04-20 | Stop reason: HOSPADM

## 2025-04-18 RX ORDER — SODIUM CHLORIDE 0.9 % (FLUSH) 0.9 %
10 SYRINGE (ML) INJECTION AS NEEDED
Status: DISCONTINUED | OUTPATIENT
Start: 2025-04-18 | End: 2025-04-20 | Stop reason: HOSPADM

## 2025-04-18 RX ORDER — SODIUM CHLORIDE 0.9 % (FLUSH) 0.9 %
10 SYRINGE (ML) INJECTION EVERY 12 HOURS SCHEDULED
Status: DISCONTINUED | OUTPATIENT
Start: 2025-04-18 | End: 2025-04-20 | Stop reason: HOSPADM

## 2025-04-18 RX ORDER — AMOXICILLIN 250 MG
2 CAPSULE ORAL 2 TIMES DAILY PRN
Status: DISCONTINUED | OUTPATIENT
Start: 2025-04-18 | End: 2025-04-20 | Stop reason: HOSPADM

## 2025-04-18 RX ORDER — BISACODYL 5 MG/1
5 TABLET, DELAYED RELEASE ORAL DAILY PRN
Status: DISCONTINUED | OUTPATIENT
Start: 2025-04-18 | End: 2025-04-20 | Stop reason: HOSPADM

## 2025-04-18 RX ORDER — CYCLOBENZAPRINE HCL 10 MG
10 TABLET ORAL 3 TIMES DAILY PRN
Status: DISCONTINUED | OUTPATIENT
Start: 2025-04-18 | End: 2025-04-20 | Stop reason: HOSPADM

## 2025-04-18 RX ORDER — ATORVASTATIN CALCIUM 80 MG/1
80 TABLET, FILM COATED ORAL DAILY
Status: DISCONTINUED | OUTPATIENT
Start: 2025-04-18 | End: 2025-04-20 | Stop reason: HOSPADM

## 2025-04-18 RX ADMIN — MORPHINE SULFATE 4 MG: 2 INJECTION, SOLUTION INTRAMUSCULAR; INTRAVENOUS at 01:42

## 2025-04-18 RX ADMIN — IOPAMIDOL 75 ML: 612 INJECTION, SOLUTION INTRAVENOUS at 02:07

## 2025-04-18 RX ADMIN — ESCITALOPRAM 20 MG: 20 TABLET, FILM COATED ORAL at 11:02

## 2025-04-18 RX ADMIN — ATORVASTATIN CALCIUM 80 MG: 80 TABLET, FILM COATED ORAL at 11:02

## 2025-04-18 RX ADMIN — ANASTROZOLE 1 MG: 1 TABLET, FILM COATED ORAL at 11:02

## 2025-04-18 RX ADMIN — MORPHINE SULFATE 2 MG: 2 INJECTION, SOLUTION INTRAMUSCULAR; INTRAVENOUS at 21:02

## 2025-04-18 RX ADMIN — PANTOPRAZOLE SODIUM 40 MG: 40 TABLET, DELAYED RELEASE ORAL at 11:02

## 2025-04-18 RX ADMIN — Medication 10 ML: at 08:25

## 2025-04-18 RX ADMIN — HYDROCODONE BITARTRATE AND ACETAMINOPHEN 1 TABLET: 5; 325 TABLET ORAL at 08:30

## 2025-04-18 RX ADMIN — ONDANSETRON 4 MG: 2 INJECTION, SOLUTION INTRAMUSCULAR; INTRAVENOUS at 01:41

## 2025-04-18 RX ADMIN — Medication 10 ML: at 21:05

## 2025-04-18 RX ADMIN — HYDROCODONE BITARTRATE AND ACETAMINOPHEN 1 TABLET: 5; 325 TABLET ORAL at 14:53

## 2025-04-18 RX ADMIN — BUPROPION HYDROCHLORIDE 300 MG: 300 TABLET, EXTENDED RELEASE ORAL at 11:02

## 2025-04-18 NOTE — H&P
Patient Identification:  Name: Dasha De Leon  Age: 54 y.o.  Sex: female  :  1970  MRN: [id number]         Primary Care Physician: Ariana Coffman PA-C    Chief Complaint   Patient presents with    Post-op Problem     Subjective   Patient is a 54 y.o. female with a history of DVT in February, breast cancer, hypertension was here earlier the month after her right breast expander fell out. She had debridement and had IV antibiotics. She was discharged on Keflex and doxycycline. She states she has been doing fine and has a drain in place however yesterday started having a lot of bloody output. No fevers or chills. No erythema. She has had some lightheadedness. She has been evaluated by surgery and is on schedule for OR tomorrow morning.    Past Medical History:   Diagnosis Date    Abnormal Pap smear of cervix     Anemia in neoplastic disease 2025    Anxiety     Baker's cyst     RESOLVED    Breast cancer     Right    Bulging lumbar disc     Bursitis     HX    Depression     GERD (gastroesophageal reflux disease)     H/O colposcopy with cervical biopsy     unknown pap result, biopsy was neg per pt, 2013 [facility]    History of 2019 novel coronavirus disease (COVID-19)     X2    History of eczema     HPV (human papilloma virus) infection     Hyperlipemia     Hypertension     Low back pain     Lumbar radiculopathy     Numbness and tingling of left leg     Osteoarthritis     Scoliosis     Seasonal allergies      Past Surgical History:   Procedure Laterality Date    BREAST AUGMENTATION      BREAST BIOPSY Right 2024    Procedure: AXILLARY LYMPH NODE BIOPSY/EXCISION;  Surgeon: Romina De Anda MD;  Location: [facility] OR;  Service: General;  Laterality: Right;    BREAST RECONSTRUCTION Right 2024    Procedure: RIGHT SUBPECTORAL PLACEMENT TISSUE EXPANDER AND CORTIVA (ACELLULAR DERMAL MATRIX), COMPLEX CLOSURE RIGHT BREAST 5cm;  Surgeon: Colin Bender MD;  Location: [facility] OR;  Service:  "Plastics;  Laterality: Right;    BREAST SURGERY Right 7/26/2024    Procedure: ADJACENT TISSUE REARRANGEMENT RIGHT BREAST;  Surgeon: Colin Bender MD;  Location[facility] MAIN OR;  Service: Plastics;  Laterality: Right;    BREAST SURGERY Right 4/8/2025    Procedure: ADJACENT REARRANFEMENT;  Surgeon: Colin Bender MD;  Location: [facility] MAIN OR;  Service: Plastics;  Laterality: Right;    CARPAL TUNNEL RELEASE Right 2004    CRYOTHERAPY      1997    D & C HYSTEROSCOPY N/A 9/19/2024    Procedure: INTRAUTERINE DEVICE REMOVAL;  Surgeon: Kaleb Burciaga MD;  Location: [facility] MAIN OR;  Service: Obstetrics/Gynecology;  Laterality: N/A;    EPIDURAL BLOCK      HAND SURGERY  2004    Pisiformectomy    INCISION AND DRAINAGE TRUNK Right 4/8/2025    Procedure: RIGHT BREAST DEBRIDMENT;  Surgeon: Colin Bender MD;  Location: [facility] MAIN OR;  Service: Plastics;  Laterality: Right;    LUMBAR DISCECTOMY FUSION INSTRUMENTATION N/A 01/05/2024    Procedure: Lumbar 3 to lumbar 4 and lumbar 4 to lumbar 5 laminectomy with fusion and instrumentation;  Surgeon: Rigoberto Botello MD;  Location: [facility] OR;  Service: Robotics - Neuro;  Laterality: N/A;    MAMMO BILATERAL  2014    MASTECTOMY W/ SENTINEL NODE BIOPSY Right 7/11/2024    Procedure: Right MASTECTOMY WITH SENTINEL NODE BIOPSY;  Surgeon: Romina De Anda MD;  Location: [facility] MAIN OR;  Service: General;  Laterality: Right;    MASTECTOMY WITH IMMEDIATE RECONSTRUCTION Right 7/26/2024    Procedure: right mastectomy, margin excision;  Surgeon: Romina De Anda MD;  Location: [facility] MAIN OR;  Service: General;  Laterality: Right;    PAP SMEAR  [id number]    SHOULDER ARTHROSCOPY Left 2002,2003    SHOULDER SURGERY      \"MANIPULATION FOR FROZEN SHOULDER\"    US GUIDED LYMPH NODE BIOPSY  8/29/2024     Documented medications reviewed    Family History   Problem Relation Age of Onset    Hypertension Mother     Hyperlipidemia Mother     Diverticulitis Mother     Pancreatitis Mother     " Kidney disease Father     Skin cancer Father     Hypertension Father     Hyperlipidemia Father     Stroke Father     Atrial fibrillation Father     Transient ischemic attack Father     No Known Problems Sister     Depression Brother     No Known Problems Daughter     No Known Problems Son     Uterine cancer Maternal Aunt 58    Heart disease Maternal Aunt     Lung cancer Maternal Uncle     Heart attack Maternal Uncle     No Known Problems Paternal Aunt     Colon cancer Paternal Uncle 65    Liver disease Maternal Grandmother     Heart disease Maternal Grandfather     Cancer Maternal Grandfather     Heart attack Paternal Grandmother     No Known Problems Paternal Grandfather     BRCA 1/2 Neg Hx     Breast cancer Neg Hx     Endometrial cancer Neg Hx     Ovarian cancer Neg Hx     Malig Hyperthermia Neg Hx      Social History     Tobacco Use    Smoking status: Former     Current packs/day: 0.00     Average packs/day: 1 pack/day for 20.0 years (20.0 ttl pk-yrs)     Types: Cigarettes     Start date:      Quit date:      Years since quittin.3     Passive exposure: Past    Smokeless tobacco: Never   Vaping Use    Vaping status: Never Used   Substance Use Topics    Alcohol use: Yes     Alcohol/week: 2.0 - 4.0 standard drinks of alcohol     Types: 2 - 4 Glasses of wine per week     Comment: weekly    Drug use: No     Objective      Vital Signs  Temp:  [97.6 °F (36.4 °C)-98.1 °F (36.7 °C)] 98 °F (36.7 °C)  Heart Rate:  [] 90  Resp:  [16-20] 18  BP: (114-165)/(57-85) 129/77  Body mass index is 25.82 kg/m².    Physical Exam  Constitutional:       General: She is not in acute distress.     Appearance: She is not toxic-appearing.      Comments: Bloody output in suction bulb drain   HENT:      Head: Normocephalic and atraumatic.   Cardiovascular:      Rate and Rhythm: Normal rate and regular rhythm.   Pulmonary:      Effort: Pulmonary effort is normal. No respiratory distress.      Breath sounds: Normal breath  sounds. No wheezing or rhonchi.   Abdominal:      General: Bowel sounds are normal.      Palpations: Abdomen is soft.      Tenderness: There is no abdominal tenderness. There is no guarding or rebound.   Musculoskeletal:         General: No swelling.      Right lower leg: No edema.      Left lower leg: No edema.   Skin:     General: Skin is warm and dry.   Neurological:      General: No focal deficit present.      Mental Status: She is alert and oriented to person, place, and time.   Psychiatric:         Mood and Affect: Mood normal.         Behavior: Behavior normal.         Assessment & Plan   Active Hospital Problems    Diagnosis  POA    **Bleeding from wound [T14.8XXA]  Yes    H/O chest wound [Z87.828]  Not Applicable    Acute posthemorrhagic anemia [D62]  Unknown    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     54 y.o. female    Right chest hematoma  Acute blood loss anemia  Plastic surgery plans evacuation tomorrow morning  Hematology consult for anticoagulation management  Monitor hemoglobin serially and transfuse as needed anticipate she may need to have a transfusion as hemoglobin most recently is down to 7.7 when she is symptomatic. BP is stable    History of PE (secondary to Verzenio)-hold anticoagulation, hematology/oncology to see  Hyperlipidemia statin  Hypertension continue medications except diuretics (holding parameters)    [x] Reconcile medications once PTA list complete.    Discussed with patient and nursing staff.    Estuardo Del Cid MD  [facility]  04/18/25 10:03 EDT

## 2025-04-18 NOTE — PROGRESS NOTES
REASON FOR FOLLOWUP/CHIEF COMPLAINT:    Breast cancer    HISTORY OF PRESENT ILLNESS:   Status post debridement of the right skin and subcutaneous tissue on 4/9/2025.  Having some postoperative pain.  Continues on antibiotics.  Remains afebrile.    Patient admitted 4/18/2025 again with previous history of DVT in February, subsequent issues and treatment for breast cancer with a expander that dislodged.  She had debridement and IV antibiotic therapy discharged on Keflex and doxycycline.  Again we had seen her 4/9/25 the patient now admitted with excessive bleeding began at home 4/18/25 show with lightheadedness.        4//18/25  T98. 8 degrees, pulse 90, respirations 18, /77  Discussed history above with patient who had been doing well until this a.m. when drainage became suddenly excessive, soaking her bandages and feeling her drain with bloody discharge  H&H 7.7 and 24.8, white count of 9970, platelet count of 544,000, BUN/creatinine 15 and 0.73, AST 23, ALT 16, total bilirubin less than 0.2, PT/INR of 1.27  CT of chest demonstrated removal of the right-sided tissue expander, mixed density collection within the operative bed thought to be hematoma, soft tissue air.  She had patchy infiltrates in the right lung base improved from previous, no obvious acute pulmonary thromboembolus seen.    Past Medical History, Past Surgical History, Social History, Family History have been reviewed and are without significant changes except as mentioned.    Review of Systems   Review of Systems  Of systems as mentioned HPI otherwise negative    Medications:  The current medication list was reviewed in the EMR    ALLERGIES:    Allergies   Allergen Reactions    Penicillins Anaphylaxis    Hemp Seed Oil Itching     HEMP IN LOTIONS    Lisinopril Cough              Vitals:    04/18/25 0233 04/18/25 0301 04/18/25 0353 04/18/25 0724   BP:  114/58 121/61 129/77   BP Location:   Left arm Left arm   Patient Position:   Lying  Lying   Pulse: 88 89 81 90   Resp:  16 18 18   Temp:   98.1 °F (36.7 °C) 98 °F (36.7 °C)   TempSrc:   Oral Oral   SpO2: 94% 96% 96%    Weight:       Height:         Physical Exam    CONSTITUTIONAL:  Vital signs reviewed.  No distress, looks comfortable.  EYES:  Conjunctivae and lids unremarkable.  PERRLA  EARS,NOSE,MOUTH,THROAT:  Ears and nose appear unremarkable.  Lips, teeth, gums appear unremarkable.  RESPIRATORY:  Normal respiratory effort.  Lungs clear to auscultation bilaterally.  CARDIOVASCULAR:  Normal S1, S2.  No murmurs rubs or gallops.  No significant lower extremity edema.  GASTROINTESTINAL: Abdomen appears unremarkable.  Nontender.  No hepatomegaly.  No splenomegaly.  NEURO: cranial nerves 2-12 grossly intact.  No focal deficits.  Appears to have equal strength all 4 extremities.  MUSCULOSKELETAL:  Unremarkable digits/nails.  No cyanosis or clubbing.  SKIN:  Warm.  No rashes.  PSYCHIATRIC:  Normal judgment and insight.  Normal mood and affect.       RECENT LABS:  WBC   Date Value Ref Range Status   04/18/2025 9.97 3.40 - 10.80 10*3/mm3 Final   04/18/2025 9.96 3.40 - 10.80 10*3/mm3 Final     Hemoglobin   Date Value Ref Range Status   04/18/2025 7.7 (L) 12.0 - 15.9 g/dL Final   04/18/2025 9.0 (L) 12.0 - 15.9 g/dL Final     Platelets   Date Value Ref Range Status   04/18/2025 544 (H) 140 - 450 10*3/mm3 Final   04/18/2025 593 (H) 140 - 450 10*3/mm3 Final       ASSESSMENT/PLAN:  Dasha De Leon S523/1     *Right breast invasive ductal carcinoma  Status post right mastectomy, sentinel lymph node biopsy with 2 out of 5 lymph nodes positive for metastatic disease  Completed adjuvant radiation to the right chest wall  Continue anastrozole  Hold Verzenio due to ongoing issues with right chest wall infection/wound necrosis and expander issues.     *Anemia  Hemoglobin 8.1, likely drop secondary to surgery.  Vitamin B12 normal at 573, ferritin 632, iron saturation 22%, TIBC 194  Reassessment 4/9/25-now repeat  4/18/25 H&H 9.0 and 8.1  Readmission 4/18/2025 with bleeding from the wound-H&H is 7.7 and 24.8, platelet count of 544,000-transfusion and iron studies ongoing     *Pulmonary embolism  Secondary to Verzenio  She was on Eliquis as an outpatient just prior to admission 4/18/25.  No clear evidence of pulmonary emboli on repeat assessment-CT of chest 4/18/25, D-dimer planned     *Right chest wall wound and hemorrhage  Evaluated by Dr. Bender in the hospital  Plans noted for surgery.  Expander has fallen out.  The plan was initially for latissimus flap reconstruction salvage later this month  Now in the hospital and plans noted for debridement and possible closure.  Delights most flap closure will be delayed.  Follow-up CT chest 4/18/25-mucin collection within the operative bed thought to reflect hematoma     Recommendations  Continue anastrozole  Again hold Verzenio  Management of the right chest wall wound -surgical assessment ongoing  Hold Eliquis  Transfusion today-2 units packed RBCs  Monitor H&H.

## 2025-04-18 NOTE — ED PROVIDER NOTES
EMERGENCY DEPARTMENT ENCOUNTER  Room Number:  01/01  PCP: Ariana Coffman PA-C  Independent Historians: Patient      HPI:  Chief Complaint: had concerns including Post-op Problem.     A complete HPI/ROS/PMH/PSH/SH/FH are unobtainable due to: None    Chronic or social conditions impacting patient care (Social Determinants of Health): None      Context: Dasha De Leon is a 54 y.o. female with a medical history of breast cancer, hypertension, and depression presents emergency department today with bleeding from surgical wound on the right breast.  She reports that she has had clear/yellow output in the GT drain since surgery 2 weeks ago but today started having dark red blood in the GT drain.  She says she is emptied it once already and its about half full again which is about 150 cc of blood.  She reports that she is having bleeding from the incision site as well.  She says she is also having pain in this area.  She denies any specific injury or trauma.  She denies any precipitating event.  She is anticoagulated on Eliquis.  She follows with Dr. Bender with plastic surgery.      Review of prior external notes (non-ED) -and- Review of prior external test results outside of this encounter:   Patient was admitted to the hospital on 4/6/2025 and discharged on 4/9/2025 for an open wound to the chest wall.  Patient has a chronic wound in her right chest wall from radiation and presented after her breast tissue expander fell out of the wound.  She was admitted on IV antibiotics and plastic surgery consultation.  They recommended debridement of the wound and flap closure.  She underwent that procedure and tolerated well.  Blood cultures were negative.  She was transition to Keflex and doxycycline on discharge.  Continued wound care with plastic surgery recommendations.  Resumed Eliquis after discharge.    PAST MEDICAL HISTORY  Active Ambulatory Problems     Diagnosis Date Noted    Essential hypertension 12/27/2016     Mood disorder 12/27/2016    Allergic rhinitis 12/27/2016    Hyperlipidemia 10/29/2019    Knee pain, right 04/24/2019    Lumbar radiculopathy 05/28/2020    Vitamin D deficiency 09/13/2021    Lumbar spinal stenosis 01/05/2024    Follow-up examination following surgery 01/18/2024    Abnormality of right breast on screening mammogram 05/06/2024    Malignant neoplasm of lower-outer quadrant of right breast of female, estrogen receptor positive 05/22/2024    Anxiety 06/17/2024    ASCUS of cervix with negative high risk HPV 10/07/2024    Open chest wound, right, initial encounter 04/06/2025    Open wound of chest wall, right, initial encounter 04/07/2025    Anemia in neoplastic disease 04/08/2025     Resolved Ambulatory Problems     Diagnosis Date Noted    IUD (intrauterine device) in place 09/22/2016     Past Medical History:   Diagnosis Date    Abnormal Pap smear of cervix     Baker's cyst     Breast cancer     Bulging lumbar disc     Bursitis     Depression     GERD (gastroesophageal reflux disease)     H/O colposcopy with cervical biopsy     History of 2019 novel coronavirus disease (COVID-19)     History of eczema     HPV (human papilloma virus) infection     Hyperlipemia     Hypertension     Low back pain     Numbness and tingling of left leg     Osteoarthritis     Scoliosis     Seasonal allergies          PAST SURGICAL HISTORY  Past Surgical History:   Procedure Laterality Date    BREAST AUGMENTATION      BREAST BIOPSY Right 9/19/2024    Procedure: AXILLARY LYMPH NODE BIOPSY/EXCISION;  Surgeon: Romina De Anda MD;  Location: [facility];  Service: General;  Laterality: Right;    BREAST RECONSTRUCTION Right 7/11/2024    Procedure: RIGHT SUBPECTORAL PLACEMENT TISSUE EXPANDER AND CORTIVA (ACELLULAR DERMAL MATRIX), COMPLEX CLOSURE RIGHT BREAST 5cm;  Surgeon: Colin Bender MD;  Location: [facility];  Service: Plastics;  Laterality: Right;    BREAST SURGERY Right 7/26/2024    Procedure: ADJACENT TISSUE  "REARRANGEMENT RIGHT BREAST;  Surgeon: Colin Bender MD;  Location: [facility] MAIN OR;  Service: Plastics;  Laterality: Right;    BREAST SURGERY Right 4/8/2025    Procedure: ADJACENT REARRANFEMENT;  Surgeon: Colin Bender MD;  Location: [facility] MAIN OR;  Service: Plastics;  Laterality: Right;    CARPAL TUNNEL RELEASE Right 2004    CRYOTHERAPY      1997    D & C HYSTEROSCOPY N/A 9/19/2024    Procedure: INTRAUTERINE DEVICE REMOVAL;  Surgeon: Kaleb Burciaga MD;  Location: [facility] MAIN OR;  Service: Obstetrics/Gynecology;  Laterality: N/A;    EPIDURAL BLOCK      HAND SURGERY  2004    Pisiformectomy    INCISION AND DRAINAGE TRUNK Right 4/8/2025    Procedure: RIGHT BREAST DEBRIDMENT;  Surgeon: Colin Bender MD;  Location: [facility] MAIN OR;  Service: Plastics;  Laterality: Right;    LUMBAR DISCECTOMY FUSION INSTRUMENTATION N/A 01/05/2024    Procedure: Lumbar 3 to lumbar 4 and lumbar 4 to lumbar 5 laminectomy with fusion and instrumentation;  Surgeon: Rigoberto Botello MD;  Location: [facility] OR;  Service: Robotics - Neuro;  Laterality: N/A;    MAMMO BILATERAL  2014    MASTECTOMY W/ SENTINEL NODE BIOPSY Right 7/11/2024    Procedure: Right MASTECTOMY WITH SENTINEL NODE BIOPSY;  Surgeon: Romina De Anda MD;  Location: [facility] OR;  Service: General;  Laterality: Right;    MASTECTOMY WITH IMMEDIATE RECONSTRUCTION Right 7/26/2024    Procedure: right mastectomy, margin excision;  Surgeon: Romina De Anda MD;  Location: [facility] MAIN OR;  Service: General;  Laterality: Right;    PAP SMEAR  [id number]    SHOULDER ARTHROSCOPY Left 2002,2003    SHOULDER SURGERY      \"MANIPULATION FOR FROZEN SHOULDER\"    US GUIDED LYMPH NODE BIOPSY  8/29/2024         FAMILY HISTORY  Family History   Problem Relation Age of Onset    Hypertension Mother     Hyperlipidemia Mother     Diverticulitis Mother     Pancreatitis Mother     Kidney disease Father     Skin cancer Father     Hypertension Father     Hyperlipidemia Father     Stroke " Father     Atrial fibrillation Father     Transient ischemic attack Father     No Known Problems Sister     Depression Brother     No Known Problems Daughter     No Known Problems Son     Uterine cancer Maternal Aunt 58    Heart disease Maternal Aunt     Lung cancer Maternal Uncle     Heart attack Maternal Uncle     No Known Problems Paternal Aunt     Colon cancer Paternal Uncle 65    Liver disease Maternal Grandmother     Heart disease Maternal Grandfather     Cancer Maternal Grandfather     Heart attack Paternal Grandmother     No Known Problems Paternal Grandfather     BRCA 1/2 Neg Hx     Breast cancer Neg Hx     Endometrial cancer Neg Hx     Ovarian cancer Neg Hx     Malig Hyperthermia Neg Hx          SOCIAL HISTORY  Social History     Socioeconomic History    Marital status: Single   Tobacco Use    Smoking status: Former     Current packs/day: 0.00     Average packs/day: 1 pack/day for 20.0 years (20.0 ttl pk-yrs)     Types: Cigarettes     Start date:      Quit date:      Years since quittin.3     Passive exposure: Past    Smokeless tobacco: Never   Vaping Use    Vaping status: Never Used   Substance and Sexual Activity    Alcohol use: Yes     Alcohol/week: 2.0 - 4.0 standard drinks of alcohol     Types: 2 - 4 Glasses of wine per week     Comment: weekly    Drug use: No    Sexual activity: Not Currently     Birth control/protection: I.U.D.         ALLERGIES  Penicillins, Hemp seed oil, and Lisinopril      REVIEW OF SYSTEMS  Included in HPI  All systems reviewed and negative except for those discussed in HPI.      PHYSICAL EXAM    I have reviewed the triage vital signs and nursing notes.    ED Triage Vitals   Temp Heart Rate Resp BP SpO2   25 0110 25 0110 25 0110 25 0110 25 0112   97.6 °F (36.4 °C) (!) 136 20 165/85 98 %      Temp src Heart Rate Source Patient Position BP Location FiO2 (%)   25 0110 25 0110 25 0110 25 011 --   Oral Monitor Lying  Right arm        Physical Exam  Constitutional:       General: She is not in acute distress.     Appearance: Normal appearance.   HENT:      Head: Normocephalic and atraumatic.      Nose: Nose normal.      Mouth/Throat:      Mouth: Mucous membranes are moist.   Eyes:      Extraocular Movements: Extraocular movements intact.      Pupils: Pupils are equal, round, and reactive to light.   Cardiovascular:      Rate and Rhythm: Normal rate and regular rhythm.      Pulses: Normal pulses.      Heart sounds: Normal heart sounds.   Pulmonary:      Effort: Pulmonary effort is normal. No respiratory distress.      Breath sounds: Normal breath sounds.   Abdominal:      General: Abdomen is flat. There is no distension.      Palpations: Abdomen is soft.      Tenderness: There is no abdominal tenderness.   Musculoskeletal:         General: Normal range of motion.      Cervical back: Normal range of motion and neck supple.   Skin:     General: Skin is warm and dry.      Capillary Refill: Capillary refill takes less than 2 seconds.      Coloration: Skin is pale.      Comments: Sutures in place along the anterior mid right breast.  There is a small area with some scant bleeding.  No gross hemorrhage.  50 cc of dark red bloody output in the GT drain.  No erythema, warmth, purulence, or signs of infection   Neurological:      General: No focal deficit present.      Mental Status: She is alert and oriented to person, place, and time.   Psychiatric:         Mood and Affect: Mood normal.         Behavior: Behavior normal.         LAB RESULTS  Recent Results (from the past 24 hours)   Comprehensive Metabolic Panel    Collection Time: 04/18/25  1:36 AM    Specimen: Blood   Result Value Ref Range    Glucose 117 (H) 65 - 99 mg/dL    BUN 15 6 - 20 mg/dL    Creatinine 0.76 0.57 - 1.00 mg/dL    Sodium 138 136 - 145 mmol/L    Potassium 3.6 3.5 - 5.2 mmol/L    Chloride 103 98 - 107 mmol/L    CO2 23.6 22.0 - 29.0 mmol/L    Calcium 9.0 8.6 - 10.5  mg/dL    Total Protein 6.4 6.0 - 8.5 g/dL    Albumin 3.5 3.5 - 5.2 g/dL    ALT (SGPT) 20 1 - 33 U/L    AST (SGOT) 27 1 - 32 U/L    Alkaline Phosphatase 111 39 - 117 U/L    Total Bilirubin <0.2 0.0 - 1.2 mg/dL    Globulin 2.9 gm/dL    A/G Ratio 1.2 g/dL    BUN/Creatinine Ratio 19.7 7.0 - 25.0    Anion Gap 11.4 5.0 - 15.0 mmol/L    eGFR 93.3 >60.0 mL/min/1.73   Protime-INR    Collection Time: 04/18/25  1:36 AM    Specimen: Blood   Result Value Ref Range    Protime 15.9 (H) 11.7 - 14.2 Seconds    INR 1.27 (H) 0.90 - 1.10   aPTT    Collection Time: 04/18/25  1:36 AM    Specimen: Blood   Result Value Ref Range    PTT 35.9 (H) 22.7 - 35.4 seconds   CBC Auto Differential    Collection Time: 04/18/25  1:36 AM    Specimen: Blood   Result Value Ref Range    WBC 9.96 3.40 - 10.80 10*3/mm3    RBC 3.09 (L) 3.77 - 5.28 10*6/mm3    Hemoglobin 9.0 (L) 12.0 - 15.9 g/dL    Hematocrit 28.1 (L) 34.0 - 46.6 %    MCV 90.9 79.0 - 97.0 fL    MCH 29.1 26.6 - 33.0 pg    MCHC 32.0 31.5 - 35.7 g/dL    RDW 14.2 12.3 - 15.4 %    RDW-SD 47.3 37.0 - 54.0 fl    MPV 9.1 6.0 - 12.0 fL    Platelets 593 (H) 140 - 450 10*3/mm3    Neutrophil % 66.4 42.7 - 76.0 %    Lymphocyte % 13.2 (L) 19.6 - 45.3 %    Monocyte % 15.1 (H) 5.0 - 12.0 %    Eosinophil % 1.8 0.3 - 6.2 %    Basophil % 1.4 0.0 - 1.5 %    Immature Grans % 2.1 (H) 0.0 - 0.5 %    Neutrophils, Absolute 6.62 1.70 - 7.00 10*3/mm3    Lymphocytes, Absolute 1.31 0.70 - 3.10 10*3/mm3    Monocytes, Absolute 1.50 (H) 0.10 - 0.90 10*3/mm3    Eosinophils, Absolute 0.18 0.00 - 0.40 10*3/mm3    Basophils, Absolute 0.14 0.00 - 0.20 10*3/mm3    Immature Grans, Absolute 0.21 (H) 0.00 - 0.05 10*3/mm3    nRBC 0.0 0.0 - 0.2 /100 WBC         RADIOLOGY  CT Chest With Contrast Diagnostic  Result Date: 4/18/2025  CT OF THE CHEST WITH CONTRAST  HISTORY: Bleeding from surgical wound  COMPARISON: February 6, 2025  TECHNIQUE: Axial CT imaging was obtained through the thorax. IV contrast was administered.  FINDINGS: Exam  was not optimized for assessment of the pulmonary arteries. No obvious acute pulmonary thromboembolus is seen. There are coronary artery calcifications. Thoracic aorta is normal in caliber. No dissection is seen. Thyroid gland, trachea, and esophagus appear unremarkable. On prior exam, the patient was noted to have right-sided pulmonary infiltrates. These appear improved when compared to prior exam. There does appear to be residual parenchymal scarring within the right lung. There are new peripheral opacities within the left lung. These may be infectious or inflammatory. Mediastinal lymph nodes do not appear pathologically enlarged. Images through the upper abdomen do not demonstrate any acute abnormalities. The patient does have a trace right pleural effusion. The patient has undergone removal of the previously identified right-sided tissue expander. There is a surgical drain noted within the operative bed. There is a mixed density collection which is noted within the right chest wall, concerning for hematoma. There are also bubbles of gas, in keeping with recent surgery. Left-sided breast implant is noted. No acute osseous abnormalities are seen.       1. The patient has undergone removal of the right-sided tissue expander. There is a mixed density collection within the operative bed, favored to reflect hematoma. Surgical drain is noted within the operative bed. There is also expected soft tissue air. 2. On prior exam, the patient was noted to have patchy infiltrates within the right lung these appear improved when compared to prior exam. However, the patient has some peripheral patchy opacities within the left lung, which are new when compared to prior exam, which may be infectious or inflammatory. Short-term follow-up exam to document resolution is recommended.  Radiation dose reduction techniques were utilized, including automated exposure control and exposure modulation based on body size.   This report was  finalized on 4/18/2025 2:19 AM by Dr. Fely Carr M.D on Workstation: [id number]          MEDICATIONS GIVEN IN ER  Medications   morphine injection 4 mg (4 mg Intravenous Given 4/18/25 0142)   ondansetron (ZOFRAN) injection 4 mg (4 mg Intravenous Given 4/18/25 0141)   iopamidol (ISOVUE-300) 61 % injection 100 mL (75 mL Intravenous Given 4/18/25 0207)         OUTPATIENT MEDICATION MANAGEMENT:  No current Epic-ordered facility-administered medications on file.     Current Outpatient Medications Ordered in Epic   Medication Sig Dispense Refill    [Paused] Abemaciclib (Verzenio) 100 MG tablet Take 1 tablet by mouth 2 (Two) Times a Day. 56 tablet 5    ALPRAZolam (Xanax) 0.25 MG tablet Take 1 tablet by mouth 2 (Two) Times a Day As Needed for Anxiety. 30 tablet 0    anastrozole (ARIMIDEX) 1 MG tablet Take 1 tablet by mouth Daily. 90 tablet 3    apixaban (ELIQUIS) 5 MG tablet tablet Take 1 tablet by mouth 2 (Two) Times a Day. 180 tablet 1    atorvastatin (LIPITOR) 40 MG tablet Take 2 tablets by mouth Daily. 180 tablet 3    buPROPion XL (Wellbutrin XL) 300 MG 24 hr tablet Take 1 tablet by mouth Daily. 90 tablet 1    cyclobenzaprine (FLEXERIL) 10 MG tablet Take 1 tablet by mouth 3 (Three) Times a Day As Needed for Muscle Spasms. 90 tablet 3    empagliflozin (Jardiance) 10 MG tablet tablet Take 1 tablet by mouth Daily. 90 tablet 3    escitalopram (LEXAPRO) 20 MG tablet Take 1 tablet by mouth Daily. 90 tablet 3    famotidine (PEPCID) 20 MG tablet Take 1 tablet by mouth 2 (Two) Times a Day. 180 tablet 3    furosemide (Lasix) 20 MG tablet Take 1 tablet by mouth 2 (Two) Times a Day. 90 tablet 1    HYDROcodone-acetaminophen (NORCO) 5-325 MG per tablet Take 1-2 tablets by mouth Every 6 (Six) Hours As Needed for Moderate Pain. 15 tablet 0    levocetirizine (XYZAL) 5 MG tablet Take one tablet by mouth twice daily for urticaria. 180 tablet 3    meloxicam (MOBIC) 15 MG tablet Take 1 tablet by mouth Daily. 90 tablet 0     metoprolol succinate XL (TOPROL-XL) 25 MG 24 hr tablet Take 1 tablet by mouth Daily. 90 tablet 3    olmesartan (BENICAR) 40 MG tablet Take 0.5 tablets by mouth Daily. 45 tablet 1    ondansetron (ZOFRAN) 8 MG tablet Take 1 tablet by mouth 3 (Three) Times a Day As Needed for Nausea or Vomiting. 90 tablet 5    pantoprazole (Protonix) 40 MG EC tablet Take 1 tablet by mouth Daily. 30 tablet 1           PROGRESS, DATA ANALYSIS, CONSULTS, AND MEDICAL DECISION MAKING  ORDERS PLACED DURING THIS VISIT:  Orders Placed This Encounter   Procedures    CT Chest With Contrast Diagnostic    Comprehensive Metabolic Panel    Protime-INR    aPTT    CBC Auto Differential    Initiate Observation Status    CBC & Differential       All labs have been independently interpreted by me.  All radiology studies have been reviewed by me. All EKG's have been independently viewed and interpreted by me.  Discussion below represents my analysis of pertinent findings related to patient's condition, differential diagnosis, treatment plan and final disposition.    Differential diagnosis includes but is not limited to:   My differential diagnosis for chest pain includes but is not limited to:  Hematoma, wound dehiscence, abscess    ED Course:  ED Course as of 04/18/25 0316   Fri Apr 18, 2025 0117 I discussed the case with Dr. Sidhu and he agrees to evaluate the patient at the bedside.    [CC]   0209 Hemoglobin(!): 9.0  At baseline [CC]   0225 CT Chest With Contrast Diagnostic  Mixed density fluid collection in the right breast, suspect hematoma [CC]   0241 I rechecked the patient.  I discussed the patient's labs, radiology findings (including all incidental findings), diagnosis, and plan for admission. The patient understands and agrees with the plan. [CC]   0241 Spoke with MAYURI Roman with [facility].  Reviewed history, exam, results, treatments.  She agrees admit the patient to Dr. Torrez.   [CC]   0256 Spoke with MAYURI Roman with [facility].  Reviewed  history, exam, results, treatments.  She agrees admit the patient to Dr. Torrez.   [CC]      ED Course User Index  [CC] Sheri Espinosa PA-C           AS OF 03:16 EDT VITALS:    BP - 114/58  HR - 89  TEMP - 97.6 °F (36.4 °C) (Oral)  O2 SATS - 96%        MDM:  54-year-old female presents emergency department today with bleeding from her surgical site.  She recently had a revision surgery on her right breast after an expander was removed.  She has had a GT drain in place but it started draining blood rather than serous drainage today.  On arrival here in the emergency department vitals are reassuring, she is afebrile.  On my exam the patient is pale but otherwise well-appearing.  Her incision has some scant oozing of blood from the middle of the incision.  It is closed and sutures are intact.  There is no signs of wound dehiscence.  The GT drain has about 50 cc of dark red blood in the drain.  She was evaluated with labs which are generally reassuring.  She does have an anemia at baseline which is unchanged from prior.  CT of the chest showed a mixed density fluid collection suspected to be a hematoma which would be consistent with recent surgery.  Although it is not terribly concerning for patient to have some bleeding from a hematoma postoperatively I am concerned that she has had about 200 cc of output and is on Eliquis.  Believe the patient would benefit from observation given her chronic anemia and anticoagulation.  She is agreeable   The plan is stable at time of admission.      COMPLEXITY OF CARE  The patient requires admission.        DIAGNOSIS  Final diagnoses:   Chronic anemia   Hematoma, chest wall, right, initial encounter   Hemorrhage from open wound of right chest wall, initial encounter         DISPOSITION  ED Disposition       ED Disposition   Decision to Admit    Condition   --    Comment   Level of Care: Telemetry [5]   Diagnosis: Bleeding from wound [412805]   Admitting Physician: MIKE  MILLY BUSCH [[id number]]   Attending Physician: MILLY MCKEON [[id number]]   Is patient appropriate for Inpatient Observation Unit?: Yes [1]                      Please note that portions of this document were completed with a voice recognition program.    Note Disclaimer: At [facility], we believe that sharing information builds trust and better relationships. You are receiving this note because you recently visited [facility]. It is possible you will see health information before a provider has talked with you about it. This kind of information can be easy to misunderstand. To help you fully understand what it means for your health, we urge you to discuss this note with your provider.     Sheri Espinosa PA-C  04/18/25 0320

## 2025-04-18 NOTE — PLAN OF CARE
Goal Outcome Evaluation:  Plan of Care Reviewed With: patient        Progress: no change  Outcome Evaluation: Transfer from  this afternoon. 2 units PRBC ordered; first unit currently transfusing. Continuous cardiac monitoring. NPO at midnight for surgery. VSS.

## 2025-04-18 NOTE — ED NOTES
Nursing report ED to floor  Dasha De Leon  54 y.o.  female    HPI :  HPI  Stated Reason for Visit: pt had surgery on tuesday to replace an implant after a mystecomy and was changing the dressing today and bright red blood started coming into the deepali drain and now is coming from my breast.    Chief Complaint  Chief Complaint   Patient presents with    Post-op Problem       Admitting doctor:   Cleve Torrez MD    Admitting diagnosis:   The primary encounter diagnosis was Hematoma, chest wall, right, initial encounter. Diagnoses of Chronic anemia and Hemorrhage from open wound of right chest wall, initial encounter were also pertinent to this visit.    Code status:   Current Code Status       Date Active Code Status Order ID Comments User Context       Prior            Allergies:   Penicillins, Hemp seed oil, and Lisinopril    Isolation:   No active isolations    Intake and Output    Intake/Output Summary (Last 24 hours) at 4/18/2025 0304  Last data filed at 4/18/2025 0257  Gross per 24 hour   Intake --   Output 70 ml   Net -70 ml       Weight:       04/18/25  0112   Weight: 72.6 kg (160 lb)       Most recent vitals:   Vitals:    04/18/25 0138 04/18/25 0231 04/18/25 0233 04/18/25 0301   BP:  126/57  114/58   BP Location:       Patient Position:       Pulse: 85  88 89   Resp:  17     Temp:       TempSrc:       SpO2: 96% 94% 94% 96%   Weight:       Height:           Active LDAs/IV Access:   Lines, Drains & Airways       Active LDAs       Name Placement date Placement time Site Days    Peripheral IV 04/18/25 0136 20 G Left Antecubital 04/18/25  0136  Antecubital  less than 1    Closed/Suction Drain Right Breast Bulb 15 Fr. 04/08/25  1011  Breast  9                    Labs (abnormal labs have a star):   Labs Reviewed   COMPREHENSIVE METABOLIC PANEL - Abnormal; Notable for the following components:       Result Value    Glucose 117 (*)     All other components within normal limits    Narrative:     GFR Categories  in Chronic Kidney Disease (CKD)      GFR Category          GFR (mL/min/1.73)    Interpretation  G1                     90 or greater         Normal or high (1)  G2                      60-89                Mild decrease (1)  G3a                   45-59                Mild to moderate decrease  G3b                   30-44                Moderate to severe decrease  G4                    15-29                Severe decrease  G5                    14 or less           Kidney failure          (1)In the absence of evidence of kidney disease, neither GFR category G1 or G2 fulfill the criteria for CKD.    eGFR calculation 2021 CKD-EPI creatinine equation, which does not include race as a factor   PROTIME-INR - Abnormal; Notable for the following components:    Protime 15.9 (*)     INR 1.27 (*)     All other components within normal limits   APTT - Abnormal; Notable for the following components:    PTT 35.9 (*)     All other components within normal limits   CBC WITH AUTO DIFFERENTIAL - Abnormal; Notable for the following components:    RBC 3.09 (*)     Hemoglobin 9.0 (*)     Hematocrit 28.1 (*)     Platelets 593 (*)     Lymphocyte % 13.2 (*)     Monocyte % 15.1 (*)     Immature Grans % 2.1 (*)     Monocytes, Absolute 1.50 (*)     Immature Grans, Absolute 0.21 (*)     All other components within normal limits   CBC AND DIFFERENTIAL    Narrative:     The following orders were created for panel order CBC & Differential.  Procedure                               Abnormality         Status                     ---------                               -----------         ------                     CBC Auto Differential[[id number]]        Abnormal            Final result                 Please view results for these tests on the individual orders.       EKG:   No orders to display       Meds given in ED:   Medications   morphine injection 4 mg (4 mg Intravenous Given 4/18/25 0142)   ondansetron (ZOFRAN) injection 4 mg (4 mg  Intravenous Given 25 0141)   iopamidol (ISOVUE-300) 61 % injection 100 mL (75 mL Intravenous Given 25 0207)       Imaging results:  CT Chest With Contrast Diagnostic  Result Date: 2025   1. The patient has undergone removal of the right-sided tissue expander. There is a mixed density collection within the operative bed, favored to reflect hematoma. Surgical drain is noted within the operative bed. There is also expected soft tissue air. 2. On prior exam, the patient was noted to have patchy infiltrates within the right lung these appear improved when compared to prior exam. However, the patient has some peripheral patchy opacities within the left lung, which are new when compared to prior exam, which may be infectious or inflammatory. Short-term follow-up exam to document resolution is recommended.  Radiation dose reduction techniques were utilized, including automated exposure control and exposure modulation based on body size.   This report was finalized on 2025 2:19 AM by Dr. Fely Carr M.D on Workstation: [employer]        Ambulatory status:   - standby assist    Social issues:   Social History     Socioeconomic History    Marital status: Single   Tobacco Use    Smoking status: Former     Current packs/day: 0.00     Average packs/day: 1 pack/day for 20.0 years (20.0 ttl pk-yrs)     Types: Cigarettes     Start date:      Quit date: 2006     Years since quittin.3     Passive exposure: Past    Smokeless tobacco: Never   Vaping Use    Vaping status: Never Used   Substance and Sexual Activity    Alcohol use: Yes     Alcohol/week: 2.0 - 4.0 standard drinks of alcohol     Types: 2 - 4 Glasses of wine per week     Comment: weekly    Drug use: No    Sexual activity: Not Currently     Birth control/protection: I.U.D.       Peripheral Neurovascular  Peripheral Neurovascular (Adult)  Peripheral Neurovascular WDL: WDL, capillary refill  Capillary Refill, General: less than/equal to 3  secs    Neuro Cognitive  Neuro Cognitive (Adult)  Cognitive/Neuro/Behavioral WDL: WDL, level of consciousness, orientation  Level of Consciousness: Alert  Orientation: oriented x 4    Learning       Respiratory  Respiratory  Airway WDL: WDL  Respiratory WDL  Respiratory WDL: WDL, rhythm/pattern  Rhythm/Pattern, Respiratory: no shortness of breath reported, depth regular, pattern regular, unlabored    Abdominal Pain       Pain Assessments  Pain (Adult)  (0-10) Pain Rating: Rest: 5  (0-10) Pain Rating: Activity: 8  Pain Management Interventions: pain medication given  Response to Pain Interventions: interventions effective per patient    NIH Stroke Scale       Luba Roque RN  04/18/25 03:04 EDT

## 2025-04-18 NOTE — CONSULTS
[facility]   Consult Note    Patient Name: Dasha De Leon  : 1970  MRN: [id number]  Primary Care Physician:  Ariana Coffman PA-C  Referring Physician: No ref. provider found  Date of admission: 2025    Inpatient Plastic Surgery Consult  Consult performed by: Colin Bender MD  Consult ordered by: Estuardo Del Cid MD        Subjective   Subjective     Reason for Consult/ Chief Complaint: right breast wound bleeding    History of Present Illness  Dasha De Leon is a 54 y.o. female who has had loss of her right sided tissue expander reconstruction.  About 2 weeks ago had debridement of the wound and closure .  Last night had bleeding into the drainage and has noticed some swelling in the right breast site.  She is still feeling quite rundown but does not report any acute change in how she is feeling.  Currently normal heart rate but was tachycardic with admission.    Review of Systems     Personal History     Past Medical History:   Diagnosis Date    Abnormal Pap smear of cervix     Anemia in neoplastic disease 2025    Anxiety     Baker's cyst     RESOLVED    Breast cancer     Right    Bulging lumbar disc     Bursitis     HX    Depression     GERD (gastroesophageal reflux disease)     H/O colposcopy with cervical biopsy     unknown pap result, biopsy was neg per pt, 2013 [facility]    History of 2019 novel coronavirus disease (COVID-19)     X2    History of eczema     HPV (human papilloma virus) infection     Hyperlipemia     Hypertension     Low back pain     Lumbar radiculopathy     Numbness and tingling of left leg     Osteoarthritis     Scoliosis     Seasonal allergies        Past Surgical History:   Procedure Laterality Date    BREAST AUGMENTATION      BREAST BIOPSY Right 2024    Procedure: AXILLARY LYMPH NODE BIOPSY/EXCISION;  Surgeon: Romina De Anda MD;  Location: [facility];  Service: General;  Laterality: Right;    BREAST RECONSTRUCTION Right 2024     "Procedure: RIGHT SUBPECTORAL PLACEMENT TISSUE EXPANDER AND CORTIVA (ACELLULAR DERMAL MATRIX), COMPLEX CLOSURE RIGHT BREAST 5cm;  Surgeon: Colin Bender MD;  Location: [facility] MAIN OR;  Service: Plastics;  Laterality: Right;    BREAST SURGERY Right 7/26/2024    Procedure: ADJACENT TISSUE REARRANGEMENT RIGHT BREAST;  Surgeon: Colin Bender MD;  Location: [facility] MAIN OR;  Service: Plastics;  Laterality: Right;    BREAST SURGERY Right 4/8/2025    Procedure: ADJACENT REARRANFEMENT;  Surgeon: Colin Bender MD;  Location: [facility] MAIN OR;  Service: Plastics;  Laterality: Right;    CARPAL TUNNEL RELEASE Right 2004    CRYOTHERAPY      1997    D & C HYSTEROSCOPY N/A 9/19/2024    Procedure: INTRAUTERINE DEVICE REMOVAL;  Surgeon: Kaleb Burciaga MD;  Location: [facility] OR;  Service: Obstetrics/Gynecology;  Laterality: N/A;    EPIDURAL BLOCK      HAND SURGERY  2004    Pisiformectomy    INCISION AND DRAINAGE TRUNK Right 4/8/2025    Procedure: RIGHT BREAST DEBRIDMENT;  Surgeon: Colin Bender MD;  Location: [facility] OR;  Service: Plastics;  Laterality: Right;    LUMBAR DISCECTOMY FUSION INSTRUMENTATION N/A 01/05/2024    Procedure: Lumbar 3 to lumbar 4 and lumbar 4 to lumbar 5 laminectomy with fusion and instrumentation;  Surgeon: Rigoberto Botello MD;  Location: [facility];  Service: Robotics - Neuro;  Laterality: N/A;    MAMMO BILATERAL  2014    MASTECTOMY W/ SENTINEL NODE BIOPSY Right 7/11/2024    Procedure: Right MASTECTOMY WITH SENTINEL NODE BIOPSY;  Surgeon: Romina De Anda MD;  Location: [facility] OR;  Service: General;  Laterality: Right;    MASTECTOMY WITH IMMEDIATE RECONSTRUCTION Right 7/26/2024    Procedure: right mastectomy, margin excision;  Surgeon: Romina De Anda MD;  Location: [facility] OR;  Service: General;  Laterality: Right;    PAP SMEAR  [id number]    SHOULDER ARTHROSCOPY Left 2002,2003    SHOULDER SURGERY      \"MANIPULATION FOR FROZEN SHOULDER\"    US GUIDED LYMPH NODE " BIOPSY  8/29/2024       Family History: family history includes Atrial fibrillation in her father; Cancer in her maternal grandfather; Colon cancer (age of onset: 65) in her paternal uncle; Depression in her brother; Diverticulitis in her mother; Heart attack in her maternal uncle and paternal grandmother; Heart disease in her maternal aunt and maternal grandfather; Hyperlipidemia in her father and mother; Hypertension in her father and mother; Kidney disease in her father; Liver disease in her maternal grandmother; Lung cancer in her maternal uncle; No Known Problems in her daughter, paternal aunt, paternal grandfather, sister, and son; Pancreatitis in her mother; Skin cancer in her father; Stroke in her father; Transient ischemic attack in her father; Uterine cancer (age of onset: 58) in her maternal aunt. Otherwise pertinent FHx was reviewed and not pertinent to current issue.    Social History:  reports that she quit smoking about 19 years ago. Her smoking use included cigarettes. She started smoking about 39 years ago. She has a 20 pack-year smoking history. She has been exposed to tobacco smoke. She has never used smokeless tobacco. She reports current alcohol use of about 2.0 - 4.0 standard drinks of alcohol per week. She reports that she does not use drugs.    Home Medications:   ALPRAZolam, Abemaciclib, HYDROcodone-acetaminophen, anastrozole, apixaban, atorvastatin, buPROPion XL, cyclobenzaprine, empagliflozin, escitalopram, famotidine, furosemide, levocetirizine, meloxicam, metoprolol succinate XL, olmesartan, ondansetron, and pantoprazole    Allergies:  Allergies   Allergen Reactions    Penicillins Anaphylaxis    Hemp Seed Oil Itching     HEMP IN LOTIONS    Lisinopril Cough       Objective    Objective     Vitals:  Temp:  [97.6 °F (36.4 °C)-98.1 °F (36.7 °C)] 98 °F (36.7 °C)  Heart Rate:  [] 90  Resp:  [16-20] 18  BP: (114-165)/(57-85) 129/77    Physical Exam  Constitutional:       General: She is  not in acute distress.     Appearance: She is ill-appearing. She is not toxic-appearing or diaphoretic.   HENT:      Head: Normocephalic and atraumatic.   Eyes:      Extraocular Movements: Extraocular movements intact.      Pupils: Pupils are equal, round, and reactive to light.   Cardiovascular:      Rate and Rhythm: Normal rate and regular rhythm.   Pulmonary:      Effort: Pulmonary effort is normal.   Abdominal:      General: Abdomen is flat.      Palpations: Abdomen is soft.   Musculoskeletal:      Cervical back: Normal range of motion.   Skin:     General: Skin is warm and dry.      Comments: Right breast site with intact incision with palpable fluid collection below the skin, drain has bloody output   Neurological:      General: No focal deficit present.      Mental Status: She is alert and oriented to person, place, and time.         Result Review    Result Review:  I have personally reviewed the results from the time of this admission to 4/18/2025 08:12 EDT and agree with these findings:  [x]  Laboratory list / accordion  []  Microbiology  []  Radiology  []  EKG/Telemetry   []  Cardiology/Vascular   []  Pathology  []  Old records  []  Other:  Most notable findings include: Hemoglobin 7.7      Assessment & Plan   Assessment / Plan     Brief Patient Summary:  Dasha De Leon is a 54 y.o. female with right chest hematoma following debridement.  She is on Eliquis.    Active Hospital Problems:  Active Hospital Problems    Diagnosis     **Bleeding from wound     H/O chest wound     Anemia in neoplastic disease     Essential hypertension      Plan:   - Will need evacuation of hematoma, request NPO midnight tonight  - Recommend heme-onc consultation for anticoagulation management     Colin Bender MD

## 2025-04-18 NOTE — ED PROVIDER NOTES
MD ATTESTATION NOTE    SHARED VISIT: This visit was performed by BOTH a physician and an APC. The substantive portion of the medical decision making was performed by this attesting physician who made or approved the management plan and takes responsibility for patient management. All studies documented in the APC note (if performed) were independently interpreted by me.    The AIDEE and I have discussed this patient's history, physical exam, MDM, and treatment plan.  I have reviewed the documentation and personally had a face to face interaction with the patient. The attached note describes my personal findings.      Dasha De Leon is a 54 y.o. female who presents to the ED c/o acute bleeding from surgical wound and increased blood output in GT drain.  Patient has history of chronic wound in the right chest wall from radiation after breast cancer treatment.  Yesterday breast tissue expander fell out.  She was admitted and plastic surgery debrided and performed flap closure.  Patient really tolerated it well.  Patient was changing her dressings evening and noted that there was some bleeding.  On Eliquis for previous history of DVT.    On exam:  GENERAL: not distressed  HENT: nares patent  EYES: no scleral icterus  CV: regular rhythm, regular rate  RESPIRATORY: normal effort  ABDOMEN: soft  MUSCULOSKELETAL: no deformity  NEURO: alert, moves all extremities, follows commands  SKIN: warm, dry, GT drain with bloody output, soft dressing to surgical wound not appreciate active bleeding    Labs  Recent Results (from the past 24 hours)   Comprehensive Metabolic Panel    Collection Time: 04/18/25  1:36 AM    Specimen: Blood   Result Value Ref Range    Glucose 117 (H) 65 - 99 mg/dL    BUN 15 6 - 20 mg/dL    Creatinine 0.76 0.57 - 1.00 mg/dL    Sodium 138 136 - 145 mmol/L    Potassium 3.6 3.5 - 5.2 mmol/L    Chloride 103 98 - 107 mmol/L    CO2 23.6 22.0 - 29.0 mmol/L    Calcium 9.0 8.6 - 10.5 mg/dL    Total Protein 6.4 6.0  - 8.5 g/dL    Albumin 3.5 3.5 - 5.2 g/dL    ALT (SGPT) 20 1 - 33 U/L    AST (SGOT) 27 1 - 32 U/L    Alkaline Phosphatase 111 39 - 117 U/L    Total Bilirubin <0.2 0.0 - 1.2 mg/dL    Globulin 2.9 gm/dL    A/G Ratio 1.2 g/dL    BUN/Creatinine Ratio 19.7 7.0 - 25.0    Anion Gap 11.4 5.0 - 15.0 mmol/L    eGFR 93.3 >60.0 mL/min/1.73   Protime-INR    Collection Time: 04/18/25  1:36 AM    Specimen: Blood   Result Value Ref Range    Protime 15.9 (H) 11.7 - 14.2 Seconds    INR 1.27 (H) 0.90 - 1.10   aPTT    Collection Time: 04/18/25  1:36 AM    Specimen: Blood   Result Value Ref Range    PTT 35.9 (H) 22.7 - 35.4 seconds   CBC Auto Differential    Collection Time: 04/18/25  1:36 AM    Specimen: Blood   Result Value Ref Range    WBC 9.96 3.40 - 10.80 10*3/mm3    RBC 3.09 (L) 3.77 - 5.28 10*6/mm3    Hemoglobin 9.0 (L) 12.0 - 15.9 g/dL    Hematocrit 28.1 (L) 34.0 - 46.6 %    MCV 90.9 79.0 - 97.0 fL    MCH 29.1 26.6 - 33.0 pg    MCHC 32.0 31.5 - 35.7 g/dL    RDW 14.2 12.3 - 15.4 %    RDW-SD 47.3 37.0 - 54.0 fl    MPV 9.1 6.0 - 12.0 fL    Platelets 593 (H) 140 - 450 10*3/mm3    Neutrophil % 66.4 42.7 - 76.0 %    Lymphocyte % 13.2 (L) 19.6 - 45.3 %    Monocyte % 15.1 (H) 5.0 - 12.0 %    Eosinophil % 1.8 0.3 - 6.2 %    Basophil % 1.4 0.0 - 1.5 %    Immature Grans % 2.1 (H) 0.0 - 0.5 %    Neutrophils, Absolute 6.62 1.70 - 7.00 10*3/mm3    Lymphocytes, Absolute 1.31 0.70 - 3.10 10*3/mm3    Monocytes, Absolute 1.50 (H) 0.10 - 0.90 10*3/mm3    Eosinophils, Absolute 0.18 0.00 - 0.40 10*3/mm3    Basophils, Absolute 0.14 0.00 - 0.20 10*3/mm3    Immature Grans, Absolute 0.21 (H) 0.00 - 0.05 10*3/mm3    nRBC 0.0 0.0 - 0.2 /100 WBC   CBC (No Diff)    Collection Time: 04/18/25  5:39 AM    Specimen: Arm, Left; Blood   Result Value Ref Range    WBC 9.97 3.40 - 10.80 10*3/mm3    RBC 2.69 (L) 3.77 - 5.28 10*6/mm3    Hemoglobin 7.7 (L) 12.0 - 15.9 g/dL    Hematocrit 24.8 (L) 34.0 - 46.6 %    MCV 92.2 79.0 - 97.0 fL    MCH 28.6 26.6 - 33.0 pg    MCHC  31.0 (L) 31.5 - 35.7 g/dL    RDW 14.5 12.3 - 15.4 %    RDW-SD 48.6 37.0 - 54.0 fl    MPV 8.9 6.0 - 12.0 fL    Platelets 544 (H) 140 - 450 10*3/mm3   Comprehensive Metabolic Panel    Collection Time: 04/18/25  5:39 AM    Specimen: Arm, Left; Blood   Result Value Ref Range    Glucose 105 (H) 65 - 99 mg/dL    BUN 15 6 - 20 mg/dL    Creatinine 0.73 0.57 - 1.00 mg/dL    Sodium 140 136 - 145 mmol/L    Potassium 4.1 3.5 - 5.2 mmol/L    Chloride 105 98 - 107 mmol/L    CO2 25.4 22.0 - 29.0 mmol/L    Calcium 8.7 8.6 - 10.5 mg/dL    Total Protein 5.9 (L) 6.0 - 8.5 g/dL    Albumin 3.2 (L) 3.5 - 5.2 g/dL    ALT (SGPT) 16 1 - 33 U/L    AST (SGOT) 23 1 - 32 U/L    Alkaline Phosphatase 103 39 - 117 U/L    Total Bilirubin <0.2 0.0 - 1.2 mg/dL    Globulin 2.7 gm/dL    A/G Ratio 1.2 g/dL    BUN/Creatinine Ratio 20.5 7.0 - 25.0    Anion Gap 9.6 5.0 - 15.0 mmol/L    eGFR 97.9 >60.0 mL/min/1.73       Radiology  CT Chest With Contrast Diagnostic  Result Date: 4/18/2025  CT OF THE CHEST WITH CONTRAST  HISTORY: Bleeding from surgical wound  COMPARISON: February 6, 2025  TECHNIQUE: Axial CT imaging was obtained through the thorax. IV contrast was administered.  FINDINGS: Exam was not optimized for assessment of the pulmonary arteries. No obvious acute pulmonary thromboembolus is seen. There are coronary artery calcifications. Thoracic aorta is normal in caliber. No dissection is seen. Thyroid gland, trachea, and esophagus appear unremarkable. On prior exam, the patient was noted to have right-sided pulmonary infiltrates. These appear improved when compared to prior exam. There does appear to be residual parenchymal scarring within the right lung. There are new peripheral opacities within the left lung. These may be infectious or inflammatory. Mediastinal lymph nodes do not appear pathologically enlarged. Images through the upper abdomen do not demonstrate any acute abnormalities. The patient does have a trace right pleural effusion. The  patient has undergone removal of the previously identified right-sided tissue expander. There is a surgical drain noted within the operative bed. There is a mixed density collection which is noted within the right chest wall, concerning for hematoma. There are also bubbles of gas, in keeping with recent surgery. Left-sided breast implant is noted. No acute osseous abnormalities are seen.       1. The patient has undergone removal of the right-sided tissue expander. There is a mixed density collection within the operative bed, favored to reflect hematoma. Surgical drain is noted within the operative bed. There is also expected soft tissue air. 2. On prior exam, the patient was noted to have patchy infiltrates within the right lung these appear improved when compared to prior exam. However, the patient has some peripheral patchy opacities within the left lung, which are new when compared to prior exam, which may be infectious or inflammatory. Short-term follow-up exam to document resolution is recommended.  Radiation dose reduction techniques were utilized, including automated exposure control and exposure modulation based on body size.   This report was finalized on 4/18/2025 2:19 AM by Dr. Fely Carr M.D on Workstation: [id number]        Medications given in the ED:  Medications   nitroglycerin (NITROSTAT) SL tablet 0.4 mg (has no administration in time range)   sodium chloride 0.9 % flush 10 mL (has no administration in time range)   sodium chloride 0.9 % flush 10 mL (has no administration in time range)   sodium chloride 0.9 % infusion 40 mL (has no administration in time range)   acetaminophen (TYLENOL) tablet 650 mg (has no administration in time range)     Or   acetaminophen (TYLENOL) 160 MG/5ML oral solution 650 mg (has no administration in time range)     Or   acetaminophen (TYLENOL) suppository 650 mg (has no administration in time range)   famotidine (PEPCID) tablet 20 mg (has no administration in  time range)   sennosides-docusate (PERICOLACE) 8.6-50 MG per tablet 2 tablet (has no administration in time range)     And   polyethylene glycol (MIRALAX) packet 17 g (has no administration in time range)     And   bisacodyl (DULCOLAX) EC tablet 5 mg (has no administration in time range)     And   bisacodyl (DULCOLAX) suppository 10 mg (has no administration in time range)   ondansetron (ZOFRAN) injection 4 mg (has no administration in time range)   melatonin tablet 5 mg (has no administration in time range)   morphine injection 4 mg (4 mg Intravenous Given 4/18/25 0142)   ondansetron (ZOFRAN) injection 4 mg (4 mg Intravenous Given 4/18/25 0141)   iopamidol (ISOVUE-300) 61 % injection 100 mL (75 mL Intravenous Given 4/18/25 0207)       Orders placed during this visit:  Orders Placed This Encounter   Procedures    CT Chest With Contrast Diagnostic    Comprehensive Metabolic Panel    Protime-INR    aPTT    CBC Auto Differential    CBC (No Diff)    Comprehensive Metabolic Panel    Diet: Cardiac; Healthy Heart (2-3 Na+); Fluid Consistency: Thin (IDDSI 0)    Vital Signs    Telemetry - Place Orders & Notify Provider of Results When Patient Experiences Acute Chest Pain, Dysrhythmia or Respiratory Distress    May Be Off Telemetry for Tests    Notify Provider (With Default Parameters)    Up With Assistance    Intake & Output    Weigh Patient    Oral Care    Place Sequential Compression Device    Maintain Sequential Compression Device    Saline Lock & Maintain IV Access    Continuous Pulse Oximetry    Code Status and Medical Interventions: CPR (Attempt to Resuscitate); Full Support    Inpatient General Surgery Consult    Oxygen Therapy- Nasal Cannula; Titrate 1-6 LPM Per SpO2; 90 - 95%    Telemetry Scan    Telemetry Scan    Insert Peripheral IV    Initiate Observation Status    Fall Precautions    CBC & Differential       Medical Decision Making:  ED Course as of 04/18/25 0624   Fri Apr 18, 2025 0117 I discussed the case  with Dr. Sidhu and he agrees to evaluate the patient at the bedside.    [CC]   0209 Hemoglobin(!): 9.0  At baseline [CC]   0225 CT Chest With Contrast Diagnostic  Mixed density fluid collection in the right breast, suspect hematoma [CC]   0241 I rechecked the patient.  I discussed the patient's labs, radiology findings (including all incidental findings), diagnosis, and plan for admission. The patient understands and agrees with the plan. [CC]   0241 Spoke with MAYURI Roman with [facility].  Reviewed history, exam, results, treatments.  She agrees admit the patient to Dr. Torrez.   [CC]   0256 Spoke with MAYURI Roman with [facility].  Reviewed history, exam, results, treatments.  She agrees admit the patient to Dr. Torrez.   [CC]      ED Course User Index  [CC] Sheri Espinosa, SOLANGE       Clinical Scores:                                   Differential diagnosis:  Hematoma, cellulitis, abscess    Diagnosis  Final diagnoses:   Chronic anemia   Hematoma, chest wall, right, initial encounter   Hemorrhage from open wound of right chest wall, initial encounter          Prince Sidhu MD  04/18/25 9895

## 2025-04-18 NOTE — CASE MANAGEMENT/SOCIAL WORK
Discharge Planning Assessment  [facility]     Patient Name: Dasha De Leon  MRN: [id number]  Today's Date: 4/18/2025    Admit Date: 4/18/2025    Plan: Home   Discharge Needs Assessment       Row Name 04/18/25 0826       Living Environment    People in Home parent(s)    Current Living Arrangements home    Potentially Unsafe Housing Conditions none    In the past 12 months has the electric, gas, oil, or water company threatened to shut off services in your home? No    Primary Care Provided by self    Provides Primary Care For no one    Family Caregiver if Needed parent(s)    Quality of Family Relationships helpful;involved;supportive    Able to Return to Prior Arrangements yes       Resource/Environmental Concerns    Resource/Environmental Concerns none    Transportation Concerns none       Transportation Needs    In the past 12 months, has lack of transportation kept you from medical appointments or from getting medications? no    In the past 12 months, has lack of transportation kept you from meetings, work, or from getting things needed for daily living? No       Transition Planning    Patient/Family Anticipates Transition to home    Patient/Family Anticipated Services at Transition none    Transportation Anticipated family or friend will provide;car, drives self       Discharge Needs Assessment    Equipment Currently Used at Home none    Concerns to be Addressed denies needs/concerns at this time    Do you want help finding or keeping work or a job? I do not need or want help    Do you want help with school or training? For example, starting or completing job training or getting a high school diploma, GED or equivalent No    Anticipated Changes Related to Illness none    Equipment Needed After Discharge none    Provided Post Acute Provider List? N/A    Provided Post Acute Provider Quality & Resource List? N/A                   Discharge Plan       Row Name 04/18/25 0827       Plan    Plan Home    Plan  Comments S/w pt and her mother Paula at bedside.  Pt gave permission to discuss DC planning w/ mother present.  Facesheet info corrected.  Pt lives in a house w/ her mother who can assist if needed.   Pt is IADLs and can drive.  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  Pt does not use any home DME and no hx of HH or SNF.  Pt plans to return home upon DC and denies any DC needs at this time.  CCP will continue to follow and assist if needed ...........Kirstin NEW/ SARAH                  Selected Continued Care - Episodes Includes continued care and service providers with selected services from the active episodes listed below             Demographic Summary       Row Name 04/18/25 0825       General Information    Admission Type observation    Arrived From home    Referral Source admission list    Reason for Consult discharge planning    Preferred Language English                   Functional Status       Row Name 04/18/25 0825       Functional Status    Usual Activity Tolerance good    Current Activity Tolerance good       Functional Status, IADL    Medications independent    Meal Preparation independent    Housekeeping independent    Laundry independent    Shopping independent    If for any reason you need help with day-to-day activities such as bathing, preparing meals, shopping, managing finances, etc., do you get the help you need? I don't need any help       Mental Status    General Appearance WDL WDL       Mental Status Summary    Recent Changes in Mental Status/Cognitive Functioning no changes       Employment/    Employment Status employed full-time                               Kirstin Evans RN

## 2025-04-18 NOTE — OUTREACH NOTE
General Surgery Week 2 Survey      Flowsheet Row Responses   [facility] patient discharged from? [facility]   Does the patient have one of the following disease processes/diagnoses(primary or secondary)? General Surgery   Week 2 attempt successful? No   Unsuccessful attempts Attempt 1   Revoke Readmitted            Temitope H - Registered Nurse

## 2025-04-19 ENCOUNTER — ANESTHESIA (OUTPATIENT)
Dept: PERIOP | Facility: HOSPITAL | Age: 55
End: 2025-04-19
Payer: COMMERCIAL

## 2025-04-19 ENCOUNTER — ANESTHESIA EVENT (OUTPATIENT)
Dept: PERIOP | Facility: HOSPITAL | Age: 55
End: 2025-04-19
Payer: COMMERCIAL

## 2025-04-19 LAB
ANION GAP SERPL CALCULATED.3IONS-SCNC: 9.7 MMOL/L (ref 5–15)
BH BB BLOOD EXPIRATION DATE: NORMAL
BH BB BLOOD EXPIRATION DATE: NORMAL
BH BB BLOOD TYPE BARCODE: 9500
BH BB BLOOD TYPE BARCODE: 9500
BH BB DISPENSE STATUS: NORMAL
BH BB DISPENSE STATUS: NORMAL
BH BB PRODUCT CODE: NORMAL
BH BB PRODUCT CODE: NORMAL
BH BB UNIT NUMBER: NORMAL
BH BB UNIT NUMBER: NORMAL
BUN SERPL-MCNC: 9 MG/DL (ref 6–20)
BUN/CREAT SERPL: 12 (ref 7–25)
CALCIUM SPEC-SCNC: 9 MG/DL (ref 8.6–10.5)
CHLORIDE SERPL-SCNC: 102 MMOL/L (ref 98–107)
CO2 SERPL-SCNC: 25.3 MMOL/L (ref 22–29)
CREAT SERPL-MCNC: 0.75 MG/DL (ref 0.57–1)
CROSSMATCH INTERPRETATION: NORMAL
CROSSMATCH INTERPRETATION: NORMAL
DEPRECATED RDW RBC AUTO: 47.4 FL (ref 37–54)
EGFRCR SERPLBLD CKD-EPI 2021: 94.7 ML/MIN/1.73
ERYTHROCYTE [DISTWIDTH] IN BLOOD BY AUTOMATED COUNT: 14.2 % (ref 12.3–15.4)
GLUCOSE SERPL-MCNC: 125 MG/DL (ref 65–99)
HCT VFR BLD AUTO: 30.5 % (ref 34–46.6)
HCT VFR BLD AUTO: 33.8 % (ref 34–46.6)
HGB BLD-MCNC: 10.8 G/DL (ref 12–15.9)
HGB BLD-MCNC: 9.8 G/DL (ref 12–15.9)
MAGNESIUM SERPL-MCNC: 2.3 MG/DL (ref 1.6–2.6)
MCH RBC QN AUTO: 29.2 PG (ref 26.6–33)
MCHC RBC AUTO-ENTMCNC: 32 G/DL (ref 31.5–35.7)
MCV RBC AUTO: 91.4 FL (ref 79–97)
PHOSPHATE SERPL-MCNC: 4.3 MG/DL (ref 2.5–4.5)
PLATELET # BLD AUTO: 508 10*3/MM3 (ref 140–450)
PMV BLD AUTO: 9 FL (ref 6–12)
POTASSIUM SERPL-SCNC: 4.5 MMOL/L (ref 3.5–5.2)
RBC # BLD AUTO: 3.7 10*6/MM3 (ref 3.77–5.28)
SODIUM SERPL-SCNC: 137 MMOL/L (ref 136–145)
UNIT  ABO: NORMAL
UNIT  ABO: NORMAL
UNIT  RH: NORMAL
UNIT  RH: NORMAL
WBC NRBC COR # BLD AUTO: 11.07 10*3/MM3 (ref 3.4–10.8)

## 2025-04-19 PROCEDURE — 25010000002 HYDROMORPHONE PER 4 MG: Performed by: NURSE ANESTHETIST, CERTIFIED REGISTERED

## 2025-04-19 PROCEDURE — 99232 SBSQ HOSP IP/OBS MODERATE 35: CPT | Performed by: INTERNAL MEDICINE

## 2025-04-19 PROCEDURE — 25010000002 FENTANYL CITRATE (PF) 50 MCG/ML SOLUTION: Performed by: NURSE ANESTHETIST, CERTIFIED REGISTERED

## 2025-04-19 PROCEDURE — G0378 HOSPITAL OBSERVATION PER HR: HCPCS

## 2025-04-19 PROCEDURE — 25010000002 CLINDAMYCIN 300 MG/50ML SOLUTION: Performed by: NURSE ANESTHETIST, CERTIFIED REGISTERED

## 2025-04-19 PROCEDURE — 25010000002 DEXAMETHASONE PER 1 MG: Performed by: NURSE ANESTHETIST, CERTIFIED REGISTERED

## 2025-04-19 PROCEDURE — 25010000002 PROPOFOL 10 MG/ML EMULSION: Performed by: NURSE ANESTHETIST, CERTIFIED REGISTERED

## 2025-04-19 PROCEDURE — 25010000002 LIDOCAINE 1 % SOLUTION: Performed by: NURSE ANESTHETIST, CERTIFIED REGISTERED

## 2025-04-19 PROCEDURE — 84100 ASSAY OF PHOSPHORUS: CPT | Performed by: PLASTIC SURGERY

## 2025-04-19 PROCEDURE — 25810000003 LACTATED RINGERS PER 1000 ML: Performed by: ANESTHESIOLOGY

## 2025-04-19 PROCEDURE — 25010000002 GENTAMICIN PER 80 MG: Performed by: PLASTIC SURGERY

## 2025-04-19 PROCEDURE — 83735 ASSAY OF MAGNESIUM: CPT | Performed by: PLASTIC SURGERY

## 2025-04-19 PROCEDURE — 25010000002 HYDROMORPHONE 1 MG/ML SOLUTION: Performed by: NURSE ANESTHETIST, CERTIFIED REGISTERED

## 2025-04-19 PROCEDURE — 25010000002 LIDOCAINE 1% - EPINEPHRINE 1:100000 1 %-1:100000 SOLUTION 10 ML VIAL: Performed by: PLASTIC SURGERY

## 2025-04-19 PROCEDURE — 25010000002 ONDANSETRON PER 1 MG: Performed by: NURSE ANESTHETIST, CERTIFIED REGISTERED

## 2025-04-19 PROCEDURE — 25010000002 MORPHINE PER 10 MG: Performed by: PLASTIC SURGERY

## 2025-04-19 PROCEDURE — 80048 BASIC METABOLIC PNL TOTAL CA: CPT | Performed by: PLASTIC SURGERY

## 2025-04-19 PROCEDURE — 25010000002 PHENYLEPHRINE 10 MG/ML SOLUTION: Performed by: NURSE ANESTHETIST, CERTIFIED REGISTERED

## 2025-04-19 PROCEDURE — 85027 COMPLETE CBC AUTOMATED: CPT | Performed by: PLASTIC SURGERY

## 2025-04-19 RX ORDER — PHENYLEPHRINE HYDROCHLORIDE 10 MG/ML
INJECTION INTRAVENOUS AS NEEDED
Status: DISCONTINUED | OUTPATIENT
Start: 2025-04-19 | End: 2025-04-19 | Stop reason: SURG

## 2025-04-19 RX ORDER — OXYCODONE AND ACETAMINOPHEN 7.5; 325 MG/1; MG/1
1 TABLET ORAL EVERY 4 HOURS PRN
Status: DISCONTINUED | OUTPATIENT
Start: 2025-04-19 | End: 2025-04-19 | Stop reason: HOSPADM

## 2025-04-19 RX ORDER — EPHEDRINE SULFATE 50 MG/ML
5 INJECTION, SOLUTION INTRAVENOUS ONCE AS NEEDED
Status: DISCONTINUED | OUTPATIENT
Start: 2025-04-19 | End: 2025-04-19 | Stop reason: HOSPADM

## 2025-04-19 RX ORDER — CLINDAMYCIN IN PERCENT DEXTROSE 6 MG/ML
INJECTION, SOLUTION INTRAVENOUS AS NEEDED
Status: DISCONTINUED | OUTPATIENT
Start: 2025-04-19 | End: 2025-04-19 | Stop reason: SURG

## 2025-04-19 RX ORDER — PROMETHAZINE HYDROCHLORIDE 25 MG/1
25 SUPPOSITORY RECTAL ONCE AS NEEDED
Status: DISCONTINUED | OUTPATIENT
Start: 2025-04-19 | End: 2025-04-19 | Stop reason: HOSPADM

## 2025-04-19 RX ORDER — DEXAMETHASONE SODIUM PHOSPHATE 4 MG/ML
INJECTION, SOLUTION INTRA-ARTICULAR; INTRALESIONAL; INTRAMUSCULAR; INTRAVENOUS; SOFT TISSUE AS NEEDED
Status: DISCONTINUED | OUTPATIENT
Start: 2025-04-19 | End: 2025-04-19 | Stop reason: SURG

## 2025-04-19 RX ORDER — FENTANYL CITRATE 50 UG/ML
50 INJECTION, SOLUTION INTRAMUSCULAR; INTRAVENOUS ONCE AS NEEDED
Status: DISCONTINUED | OUTPATIENT
Start: 2025-04-19 | End: 2025-04-19 | Stop reason: HOSPADM

## 2025-04-19 RX ORDER — DIPHENHYDRAMINE HYDROCHLORIDE 50 MG/ML
12.5 INJECTION, SOLUTION INTRAMUSCULAR; INTRAVENOUS
Status: DISCONTINUED | OUTPATIENT
Start: 2025-04-19 | End: 2025-04-19 | Stop reason: HOSPADM

## 2025-04-19 RX ORDER — FENTANYL CITRATE 50 UG/ML
INJECTION, SOLUTION INTRAMUSCULAR; INTRAVENOUS AS NEEDED
Status: DISCONTINUED | OUTPATIENT
Start: 2025-04-19 | End: 2025-04-19 | Stop reason: SURG

## 2025-04-19 RX ORDER — ONDANSETRON 2 MG/ML
4 INJECTION INTRAMUSCULAR; INTRAVENOUS ONCE AS NEEDED
Status: DISCONTINUED | OUTPATIENT
Start: 2025-04-19 | End: 2025-04-19 | Stop reason: HOSPADM

## 2025-04-19 RX ORDER — SODIUM CHLORIDE 0.9 % (FLUSH) 0.9 %
3-10 SYRINGE (ML) INJECTION AS NEEDED
Status: DISCONTINUED | OUTPATIENT
Start: 2025-04-19 | End: 2025-04-19 | Stop reason: HOSPADM

## 2025-04-19 RX ORDER — SODIUM CHLORIDE 0.9 % (FLUSH) 0.9 %
3 SYRINGE (ML) INJECTION EVERY 12 HOURS SCHEDULED
Status: DISCONTINUED | OUTPATIENT
Start: 2025-04-19 | End: 2025-04-19 | Stop reason: HOSPADM

## 2025-04-19 RX ORDER — NALOXONE HCL 0.4 MG/ML
0.2 VIAL (ML) INJECTION AS NEEDED
Status: DISCONTINUED | OUTPATIENT
Start: 2025-04-19 | End: 2025-04-19 | Stop reason: HOSPADM

## 2025-04-19 RX ORDER — BACITRACIN ZINC 500 [USP'U]/G
OINTMENT TOPICAL AS NEEDED
Status: DISCONTINUED | OUTPATIENT
Start: 2025-04-19 | End: 2025-04-19 | Stop reason: HOSPADM

## 2025-04-19 RX ORDER — PROMETHAZINE HYDROCHLORIDE 25 MG/1
25 TABLET ORAL ONCE AS NEEDED
Status: DISCONTINUED | OUTPATIENT
Start: 2025-04-19 | End: 2025-04-19 | Stop reason: HOSPADM

## 2025-04-19 RX ORDER — DROPERIDOL 2.5 MG/ML
0.62 INJECTION, SOLUTION INTRAMUSCULAR; INTRAVENOUS
Status: DISCONTINUED | OUTPATIENT
Start: 2025-04-19 | End: 2025-04-19 | Stop reason: HOSPADM

## 2025-04-19 RX ORDER — SODIUM CHLORIDE, SODIUM LACTATE, POTASSIUM CHLORIDE, CALCIUM CHLORIDE 600; 310; 30; 20 MG/100ML; MG/100ML; MG/100ML; MG/100ML
9 INJECTION, SOLUTION INTRAVENOUS CONTINUOUS
Status: ACTIVE | OUTPATIENT
Start: 2025-04-19 | End: 2025-04-20

## 2025-04-19 RX ORDER — FLUMAZENIL 0.1 MG/ML
0.2 INJECTION INTRAVENOUS AS NEEDED
Status: DISCONTINUED | OUTPATIENT
Start: 2025-04-19 | End: 2025-04-19 | Stop reason: HOSPADM

## 2025-04-19 RX ORDER — ONDANSETRON 2 MG/ML
INJECTION INTRAMUSCULAR; INTRAVENOUS AS NEEDED
Status: DISCONTINUED | OUTPATIENT
Start: 2025-04-19 | End: 2025-04-19 | Stop reason: SURG

## 2025-04-19 RX ORDER — LIDOCAINE HYDROCHLORIDE 10 MG/ML
0.5 INJECTION, SOLUTION INFILTRATION; PERINEURAL ONCE AS NEEDED
Status: DISCONTINUED | OUTPATIENT
Start: 2025-04-19 | End: 2025-04-19 | Stop reason: HOSPADM

## 2025-04-19 RX ORDER — PROPOFOL 10 MG/ML
VIAL (ML) INTRAVENOUS AS NEEDED
Status: DISCONTINUED | OUTPATIENT
Start: 2025-04-19 | End: 2025-04-19 | Stop reason: SURG

## 2025-04-19 RX ORDER — HYDROMORPHONE HYDROCHLORIDE 1 MG/ML
0.5 INJECTION, SOLUTION INTRAMUSCULAR; INTRAVENOUS; SUBCUTANEOUS
Status: DISCONTINUED | OUTPATIENT
Start: 2025-04-19 | End: 2025-04-19 | Stop reason: HOSPADM

## 2025-04-19 RX ORDER — HYDRALAZINE HYDROCHLORIDE 20 MG/ML
5 INJECTION INTRAMUSCULAR; INTRAVENOUS
Status: DISCONTINUED | OUTPATIENT
Start: 2025-04-19 | End: 2025-04-19 | Stop reason: HOSPADM

## 2025-04-19 RX ORDER — ATROPINE SULFATE 0.4 MG/ML
0.4 INJECTION, SOLUTION INTRAMUSCULAR; INTRAVENOUS; SUBCUTANEOUS ONCE AS NEEDED
Status: DISCONTINUED | OUTPATIENT
Start: 2025-04-19 | End: 2025-04-19 | Stop reason: HOSPADM

## 2025-04-19 RX ORDER — FENTANYL CITRATE 50 UG/ML
50 INJECTION, SOLUTION INTRAMUSCULAR; INTRAVENOUS
Status: DISCONTINUED | OUTPATIENT
Start: 2025-04-19 | End: 2025-04-19 | Stop reason: HOSPADM

## 2025-04-19 RX ORDER — LIDOCAINE HYDROCHLORIDE 10 MG/ML
INJECTION, SOLUTION INFILTRATION; PERINEURAL AS NEEDED
Status: DISCONTINUED | OUTPATIENT
Start: 2025-04-19 | End: 2025-04-19 | Stop reason: SURG

## 2025-04-19 RX ORDER — LABETALOL HYDROCHLORIDE 5 MG/ML
5 INJECTION, SOLUTION INTRAVENOUS
Status: DISCONTINUED | OUTPATIENT
Start: 2025-04-19 | End: 2025-04-19 | Stop reason: HOSPADM

## 2025-04-19 RX ORDER — IPRATROPIUM BROMIDE AND ALBUTEROL SULFATE 2.5; .5 MG/3ML; MG/3ML
3 SOLUTION RESPIRATORY (INHALATION) ONCE AS NEEDED
Status: DISCONTINUED | OUTPATIENT
Start: 2025-04-19 | End: 2025-04-19 | Stop reason: HOSPADM

## 2025-04-19 RX ORDER — HYDROCODONE BITARTRATE AND ACETAMINOPHEN 5; 325 MG/1; MG/1
1 TABLET ORAL ONCE AS NEEDED
Status: DISCONTINUED | OUTPATIENT
Start: 2025-04-19 | End: 2025-04-19 | Stop reason: HOSPADM

## 2025-04-19 RX ORDER — MIDAZOLAM HYDROCHLORIDE 1 MG/ML
1 INJECTION, SOLUTION INTRAMUSCULAR; INTRAVENOUS
Status: DISCONTINUED | OUTPATIENT
Start: 2025-04-19 | End: 2025-04-19 | Stop reason: HOSPADM

## 2025-04-19 RX ADMIN — CLINDAMYCIN PHOSPHATE 900 MG: 300 INJECTION, SOLUTION INTRAVENOUS at 07:43

## 2025-04-19 RX ADMIN — SODIUM CHLORIDE, POTASSIUM CHLORIDE, SODIUM LACTATE AND CALCIUM CHLORIDE 9 ML/HR: 600; 310; 30; 20 INJECTION, SOLUTION INTRAVENOUS at 07:21

## 2025-04-19 RX ADMIN — DEXAMETHASONE SODIUM PHOSPHATE 8 MG: 4 INJECTION, SOLUTION INTRA-ARTICULAR; INTRALESIONAL; INTRAMUSCULAR; INTRAVENOUS; SOFT TISSUE at 07:35

## 2025-04-19 RX ADMIN — ANASTROZOLE 1 MG: 1 TABLET, FILM COATED ORAL at 10:10

## 2025-04-19 RX ADMIN — LIDOCAINE HYDROCHLORIDE 60 MG: 10 INJECTION, SOLUTION INFILTRATION; PERINEURAL at 07:35

## 2025-04-19 RX ADMIN — ONDANSETRON 4 MG: 2 INJECTION, SOLUTION INTRAMUSCULAR; INTRAVENOUS at 07:35

## 2025-04-19 RX ADMIN — PROPOFOL 200 MG: 10 INJECTION, EMULSION INTRAVENOUS at 07:35

## 2025-04-19 RX ADMIN — HYDROMORPHONE HYDROCHLORIDE 0.5 MG: 1 INJECTION, SOLUTION INTRAMUSCULAR; INTRAVENOUS; SUBCUTANEOUS at 08:46

## 2025-04-19 RX ADMIN — BUPROPION HYDROCHLORIDE 300 MG: 300 TABLET, EXTENDED RELEASE ORAL at 10:10

## 2025-04-19 RX ADMIN — LOSARTAN POTASSIUM 50 MG: 50 TABLET, FILM COATED ORAL at 10:11

## 2025-04-19 RX ADMIN — MORPHINE SULFATE 2 MG: 2 INJECTION, SOLUTION INTRAMUSCULAR; INTRAVENOUS at 22:14

## 2025-04-19 RX ADMIN — FENTANYL CITRATE 50 MCG: 50 INJECTION, SOLUTION INTRAMUSCULAR; INTRAVENOUS at 09:20

## 2025-04-19 RX ADMIN — ATORVASTATIN CALCIUM 80 MG: 80 TABLET, FILM COATED ORAL at 15:17

## 2025-04-19 RX ADMIN — HYDROMORPHONE HYDROCHLORIDE 0.5 MG: 1 INJECTION, SOLUTION INTRAMUSCULAR; INTRAVENOUS; SUBCUTANEOUS at 08:03

## 2025-04-19 RX ADMIN — HYDROCODONE BITARTRATE AND ACETAMINOPHEN 1 TABLET: 5; 325 TABLET ORAL at 19:23

## 2025-04-19 RX ADMIN — PHENYLEPHRINE HYDROCHLORIDE 100 MCG: 10 INJECTION INTRAVENOUS at 07:53

## 2025-04-19 RX ADMIN — ESCITALOPRAM 20 MG: 20 TABLET, FILM COATED ORAL at 10:11

## 2025-04-19 RX ADMIN — Medication 10 ML: at 22:14

## 2025-04-19 RX ADMIN — HYDROMORPHONE HYDROCHLORIDE 0.5 MG: 1 INJECTION, SOLUTION INTRAMUSCULAR; INTRAVENOUS; SUBCUTANEOUS at 08:35

## 2025-04-19 RX ADMIN — FENTANYL CITRATE 50 MCG: 50 INJECTION, SOLUTION INTRAMUSCULAR; INTRAVENOUS at 08:55

## 2025-04-19 RX ADMIN — PANTOPRAZOLE SODIUM 40 MG: 40 TABLET, DELAYED RELEASE ORAL at 10:10

## 2025-04-19 RX ADMIN — METOPROLOL SUCCINATE 25 MG: 25 TABLET, EXTENDED RELEASE ORAL at 06:02

## 2025-04-19 RX ADMIN — FENTANYL CITRATE 50 MCG: 50 INJECTION, SOLUTION INTRAMUSCULAR; INTRAVENOUS at 07:40

## 2025-04-19 RX ADMIN — HYDROCODONE BITARTRATE AND ACETAMINOPHEN 1 TABLET: 5; 325 TABLET ORAL at 12:30

## 2025-04-19 RX ADMIN — Medication 10 ML: at 10:10

## 2025-04-19 NOTE — ANESTHESIA PROCEDURE NOTES
Airway  Reason: elective    Date/Time: 4/19/2025 7:36 AM  Airway not difficult    General Information and Staff    Patient location during procedure: OR  Anesthesiologist: Andrew Brown MD  CRNA/CAA: Mariela Hagan CRNA    Indications and Patient Condition  Indications for airway management: airway protection    Preoxygenated: yes    Mask difficulty assessment: 0 - not attempted    Final Airway Details    Final airway type: supraglottic airway      Successful airway: unique  Size: 4   Number of attempts at approach: 1  Assessment: lips, teeth, and gum same as pre-op

## 2025-04-19 NOTE — DISCHARGE PLACEMENT REQUEST
"Dasha Vivas (54 y.o. Female)       Date of Birth   1970    Social Security Number       Address   [address] [place], [place] [address]    Home Phone   [phone]    MRN   [id number]       [facility]    Marital Status   Single                            Admission Date   4/18/2025    Admission Type   Emergency    Admitting Provider   Estuardo Del Cid MD    Attending Provider   Estuardo Del Cid MD    Department, Room/Bed   [address], P382/1       Discharge Date       Discharge Disposition       Discharge Destination                                 Attending Provider: Estuardo Del Cid MD    Allergies: Penicillins, Hemp Seed Oil, Lisinopril    Isolation: None   Infection: None   Code Status: CPR    Ht: 167.6 cm (66\")   Wt: 72.6 kg (160 lb)    Admission Cmt: None   Principal Problem: Bleeding from wound [T14.8XXA]                   Active Insurance as of 4/18/2025       Primary Coverage       Payor Plan Insurance Group Employer/Plan Group    [employer] [id number]       Payor Plan Address Payor Plan Phone Number Payor Plan Fax Number Effective Dates    [address] [phone]  1/1/2018 - None Entered    [facility] [id number]         Subscriber Name Subscriber Birth Date Member ID       DASHA VIVAS 1970 [id number]                     Emergency Contacts        (Rel.) Home Phone Work Phone Mobile Phone    Paula Vivas (Mother) [phone] -- [phone]                "

## 2025-04-19 NOTE — PLAN OF CARE
Goal Outcome Evaluation:      Patient A/O x4. Received surgery this AM, right breast wound dressing and wound vac intact. PRN Norco for pain. Tolerating diet. Ad breanne. Room air

## 2025-04-19 NOTE — PROGRESS NOTES
Name: Dasha De Leon ADMIT: 2025   : 1970  PCP: Ariana Coffman PA-C    MRN: [id number] LOS: 0 days   AGE/SEX: 54 y.o. female  ROOM: [facility]     Subjective   Subjective   No complaints. Had surgery this morning.     Objective   Objective   Vital Signs  Temp:  [97.6 °F (36.4 °C)-98.6 °F (37 °C)] 97.9 °F (36.6 °C)  Heart Rate:  [77-98] 94  Resp:  [14-18] 15  BP: ()/(46-87) 138/72  SpO2:  [89 %-100 %] 94 %  on  Flow (L/min) (Oxygen Therapy):  [1-2] 1;   Device (Oxygen Therapy): nasal cannula  Body mass index is 25.82 kg/m².    Physical Exam  Constitutional:       General: She is not in acute distress.     Appearance: She is not toxic-appearing.   HENT:      Head: Normocephalic and atraumatic.   Cardiovascular:      Rate and Rhythm: Normal rate and regular rhythm.   Pulmonary:      Effort: Pulmonary effort is normal. No respiratory distress.      Breath sounds: Normal breath sounds. No wheezing or rhonchi.   Abdominal:      General: Bowel sounds are normal.      Palpations: Abdomen is soft.      Tenderness: There is no abdominal tenderness. There is no guarding or rebound.   Skin:     General: Skin is warm and dry.   Neurological:      General: No focal deficit present.      Mental Status: She is alert and oriented to person, place, and time.   Psychiatric:         Mood and Affect: Mood normal.         Behavior: Behavior normal.     Results Review  I reviewed the patient's new clinical results.  Results from last 7 days   Lab Units 25  1002 25  2349 25  0539 25  0136   WBC 10*3/mm3 11.07*  --  9.97 9.96   HEMOGLOBIN g/dL 10.8* 9.8* 7.7* 9.0*   PLATELETS 10*3/mm3 508*  --  544* 593*     Results from last 7 days   Lab Units 25  1002 25  0539 25  0136   SODIUM mmol/L 137 140 138   POTASSIUM mmol/L 4.5 4.1 3.6   CHLORIDE mmol/L 102 105 103   CO2 mmol/L 25.3 25.4 23.6   BUN mg/dL 9 15 15   CREATININE mg/dL 0.75 0.73 0.76   GLUCOSE mg/dL 125* 105* 117*     Lab  "Results   Component Value Date    ANIONGAP 9.7 04/19/2025     Estimated Creatinine Clearance: 87.4 mL/min (by C-G formula based on SCr of 0.75 mg/dL).   Lab Results   Component Value Date    EGFR 94.7 04/19/2025     Results from last 7 days   Lab Units 04/18/25  0539 04/18/25  0136   ALBUMIN g/dL 3.2* 3.5   BILIRUBIN mg/dL <0.2 <0.2   ALK PHOS U/L 103 111   AST (SGOT) U/L 23 27   ALT (SGPT) U/L 16 20     Results from last 7 days   Lab Units 04/19/25  1002 04/18/25  0539 04/18/25  0136   CALCIUM mg/dL 9.0 8.7 9.0   ALBUMIN g/dL  --  3.2* 3.5   MAGNESIUM mg/dL 2.3  --   --    PHOSPHORUS mg/dL 4.3  --   --        No results found for: \"HGBA1C\", \"POCGLU\"    CT Chest With Contrast Diagnostic  Result Date: 4/18/2025   1. The patient has undergone removal of the right-sided tissue expander. There is a mixed density collection within the operative bed, favored to reflect hematoma. Surgical drain is noted within the operative bed. There is also expected soft tissue air. 2. On prior exam, the patient was noted to have patchy infiltrates within the right lung these appear improved when compared to prior exam. However, the patient has some peripheral patchy opacities within the left lung, which are new when compared to prior exam, which may be infectious or inflammatory. Short-term follow-up exam to document resolution is recommended.  Radiation dose reduction techniques were utilized, including automated exposure control and exposure modulation based on body size.   This report was finalized on 4/18/2025 2:19 AM by Dr. Fely Carr M.D on Workstation: [id number]        Scheduled Meds  anastrozole, 1 mg, Oral, Daily  atorvastatin, 80 mg, Oral, Daily  buPROPion XL, 300 mg, Oral, Daily  escitalopram, 20 mg, Oral, Daily  losartan, 50 mg, Oral, Q24H  metoprolol succinate XL, 25 mg, Oral, Daily  pantoprazole, 40 mg, Oral, Daily  sodium chloride, 10 mL, Intravenous, Q12H    Continuous Infusions  lactated ringers, 9 mL/hr, Last " Rate: 9 mL/hr (04/19/25 0729)    PRN Meds    acetaminophen **OR** acetaminophen **OR** acetaminophen    ALPRAZolam    senna-docusate sodium **AND** polyethylene glycol **AND** bisacodyl **AND** bisacodyl    cyclobenzaprine    famotidine    HYDROcodone-acetaminophen    melatonin    Morphine    ondansetron    sodium chloride    sodium chloride    lactated ringers, 9 mL/hr, Last Rate: 9 mL/hr (04/19/25 0729)    Diet  Diet: Cardiac; Healthy Heart (2-3 Na+); Fluid Consistency: Thin (IDDSI 0)       Assessment/Plan     Active Hospital Problems    Diagnosis  POA    **Bleeding from wound [T14.8XXA]  Yes    H/O chest wound [Z87.828]  Not Applicable    Acute posthemorrhagic anemia [D62]  Unknown    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     Patient is a 54 y.o. female     Right chest hematoma  Acute blood loss anemia  Hematoma evacuation and wound VAC placement 04/19/25   Hemoglobin improved after 2 units PRBC  BP stable. 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    Discussed with patient and nursing staff    Estuardo Del Cid MD  [facility]  04/19/25 13:33 EDT

## 2025-04-19 NOTE — PROGRESS NOTES
SUBJECTIVE:   Feeling well, drain output has slowed    OBJECTIVE:  Vitals:    04/19/25 0647   BP: 151/52   Pulse: 92   Resp: 16   Temp: 98.6 °F (37 °C)   SpO2: 95%     Physical Exam  Resting in bed, nad  Rr  No dyspnea  Right breast fluid collection, skin viable, bloody drain output    PLAN:  Right breast hematoma  - to OR today for hematoma evacuation

## 2025-04-19 NOTE — ANESTHESIA PREPROCEDURE EVALUATION
Anesthesia Evaluation     Patient summary reviewed and Nursing notes reviewed                Airway   Mallampati: II  Dental      Pulmonary    (+) a smoker Former,  Cardiovascular     ECG reviewed  Rhythm: regular  Rate: normal    (+) hypertension, hyperlipidemia      Neuro/Psych  (+) numbness, psychiatric history Anxiety and Depression  GI/Hepatic/Renal/Endo    (+) GERD    Musculoskeletal     Abdominal    Substance History   (+) alcohol use     OB/GYN negative ob/gyn ROS         Other   arthritis,   history of cancer (right breast s/p resection)                Anesthesia Plan    ASA 3     MAC     intravenous induction     Anesthetic plan, risks, benefits, and alternatives have been provided, discussed and informed consent has been obtained with: patient.    CODE STATUS:    Code Status (Patient has no pulse and is not breathing): CPR (Attempt to Resuscitate)  Medical Interventions (Patient has pulse or is breathing): Full Support

## 2025-04-19 NOTE — ANESTHESIA POSTPROCEDURE EVALUATION
"Patient: Dasha De Leon    Procedure Summary       Date: 04/19/25 Room / Location: [facility] OR 09 / [facility] MAIN OR    Anesthesia Start: 0729 Anesthesia Stop: 0829    Procedure: EVACUATION HEMATOMA RIGHT BREAST, WOUND VAC PLACEMENT (Right) Diagnosis:     Surgeons: Colin Bender MD Provider: Andrew Brown MD    Anesthesia Type: MAC ASA Status: 3            Anesthesia Type: MAC    Vitals  Vitals Value Taken Time   /58 04/19/25 09:15   Temp 36.9 °C (98.4 °F) 04/19/25 08:24   Pulse 89 04/19/25 09:25   Resp 15 04/19/25 08:45   SpO2 97 % 04/19/25 09:25   Vitals shown include unfiled device data.        Post Anesthesia Care and Evaluation    Patient location during evaluation: bedside  Patient participation: complete - patient participated  Level of consciousness: sleepy but conscious  Pain score: 0  Pain management: adequate    Airway patency: patent  Anesthetic complications: No anesthetic complications    Cardiovascular status: acceptable  Respiratory status: acceptable  Hydration status: acceptable    Comments: /87   Pulse 90   Temp 36.9 °C (98.4 °F) (Oral)   Resp 15   Ht 167.6 cm (66\")   Wt 72.6 kg (160 lb)   LMP  (LMP Unknown)   SpO2 98%   BMI 25.82 kg/m²       "

## 2025-04-19 NOTE — PROGRESS NOTES
REASON FOR FOLLOWUP/CHIEF COMPLAINT:    Breast cancer    HISTORY OF PRESENT ILLNESS:   Status post debridement of the right skin and subcutaneous tissue on 4/9/2025.  Having some postoperative pain.  Continues on antibiotics.  Remains afebrile.    Patient admitted 4/18/2025 again with previous history of DVT in February, subsequent issues and treatment for breast cancer with a expander that dislodged.  She had debridement and IV antibiotic therapy discharged on Keflex and doxycycline.  Again we had seen her 4/9/25 the patient now admitted with excessive bleeding began at home 4/18/25 show with lightheadedness.        4//18/25  T98. 8 degrees, pulse 90, respirations 18, /77  Discussed history above with patient who had been doing well until this a.m. when drainage became suddenly excessive, soaking her bandages and feeling her drain with bloody discharge  H&H 7.7 and 24.8, white count of 9970, platelet count of 544,000, BUN/creatinine 15 and 0.73, AST 23, ALT 16, total bilirubin less than 0.2, PT/INR of 1.27  CT of chest demonstrated removal of the right-sided tissue expander, mixed density collection within the operative bed thought to be hematoma, soft tissue air.  She had patchy infiltrates in the right lung base improved from previous, no obvious acute pulmonary thromboembolus seen.    4/19/2025-received 2 units of PRBC and hemoglobin has improved to 10.8.  Dr. Bender took her to the OR today for hematoma evacuation and placed a right chest wall wound VAC.  She still somewhat drowsy from the procedure this morning.  She remains off anticoagulation at this time.    Past Medical History, Past Surgical History, Social History, Family History have been reviewed and are without significant changes except as mentioned.    Review of Systems   Review of Systems  Of systems as mentioned HPI otherwise negative    Medications:  The current medication list was reviewed in the EMR    ALLERGIES:    Allergies    Allergen Reactions    Penicillins Anaphylaxis    Hemp Seed Oil Itching     HEMP IN LOTIONS    Lisinopril Cough              Vitals:    04/19/25 0830 04/19/25 0837 04/19/25 0845 04/19/25 0953   BP: 132/70 136/74 110/87 119/66   BP Location:    Left arm   Patient Position:    Lying   Pulse: 91 89 90 98   Resp: 16 14 15 15   Temp:    98.2 °F (36.8 °C)   TempSrc:    Oral   SpO2: 100% 100% 98% (!) 89%   Weight:       Height:         Physical Exam    CONSTITUTIONAL:  Vital signs reviewed.  No distress, looks comfortable.  EYES:  Conjunctivae and lids unremarkable.  PERRLA  EARS,NOSE,MOUTH,THROAT:  Ears and nose appear unremarkable.  Lips, teeth, gums appear unremarkable.  RESPIRATORY:  Normal respiratory effort.  Lungs clear to auscultation bilaterally.  CARDIOVASCULAR:  Normal S1, S2.  No murmurs rubs or gallops.  No significant lower extremity edema.  GASTROINTESTINAL: Abdomen appears unremarkable.  Nontender.  No hepatomegaly.  No splenomegaly.  NEURO: cranial nerves 2-12 grossly intact.  No focal deficits.  Appears to have equal strength all 4 extremities.  MUSCULOSKELETAL:  Unremarkable digits/nails.  No cyanosis or clubbing.  SKIN: Right chest wall wound VAC present.  PSYCHIATRIC:  Normal judgment and insight.  Normal mood and affect.       RECENT LABS:  WBC   Date Value Ref Range Status   04/19/2025 11.07 (H) 3.40 - 10.80 10*3/mm3 Final   04/18/2025 9.97 3.40 - 10.80 10*3/mm3 Final   04/18/2025 9.96 3.40 - 10.80 10*3/mm3 Final     Hemoglobin   Date Value Ref Range Status   04/19/2025 10.8 (L) 12.0 - 15.9 g/dL Final   04/18/2025 9.8 (L) 12.0 - 15.9 g/dL Final   04/18/2025 7.7 (L) 12.0 - 15.9 g/dL Final   04/18/2025 9.0 (L) 12.0 - 15.9 g/dL Final     Platelets   Date Value Ref Range Status   04/19/2025 508 (H) 140 - 450 10*3/mm3 Final   04/18/2025 544 (H) 140 - 450 10*3/mm3 Final   04/18/2025 593 (H) 140 - 450 10*3/mm3 Final       ASSESSMENT/PLAN:  Dasha De Leon P382/1     *Right breast invasive ductal  carcinoma  Status post right mastectomy, sentinel lymph node biopsy with 2 out of 5 lymph nodes positive for metastatic disease  Completed adjuvant radiation to the right chest wall  Continue anastrozole  Hold Verzenio     *Anemia  Hemoglobin 8.1, likely drop secondary to surgery.  Vitamin B12 normal at 573, ferritin 632, iron saturation 22%, TIBC 194  Reassessment 4/9/25-now repeat 4/18/25 H&H 9.0 and 8.1  Readmission 4/18/2025 with bleeding from the wound-H&H is 7.7 and 24.8, platelet count of 544,000-transfusion and iron studies ongoing  4/19/2025-hemoglobin improved to 10.8 after 2 units of PRBC being transfused on 4/18/2025.  Platelets stable at 508,000 and WBC slightly elevated at 11.07.     *Pulmonary embolism  Secondary to Verzenio  She was on Eliquis as an outpatient just prior to admission 4/18/25.  No clear evidence of pulmonary emboli on repeat assessment-CT of chest 4/18/25, D-dimer planned  D-dimer remains elevated at 0.72.  With there being no clear evidence of pulmonary embolism on the recent scans and also ongoing issues with bleeding continue to hold anticoagulation.  Could consider prophylactic dose of anticoagulation down the line.     *Right chest wall wound and hemorrhage  Evaluated by Dr. Bender in the hospital  Plans noted for surgery.  Expander has fallen out.  The plan was initially for latissimus flap reconstruction salvage later this month  Now in the hospital and plans noted for debridement and possible closure.  Delights most flap closure will be delayed.  Follow-up CT chest 4/18/25-mucin collection within the operative bed thought to reflect hematoma  4/19/2025-hematoma evacuation of the right chest wall and placement of wound VAC performed Dr. Bender.  Monitor H&H.     Recommendations  Continue anastrozole  Continue to hold Verzenio  Management of the right chest wall wound -surgical assessment ongoing  Hold anticoagulation.  Monitor H&H.

## 2025-04-19 NOTE — OP NOTE
Pre-Operative Diagnosis: right breast hematoma    Post-Operative Diagnosis: Same    Procedure Performed:   Hematoma evacuation right chest  Right chest wound vac placement [id number]    Surgeon: ANGELICA Bender MD    Assistant: None    Anesthesia: General    Estimated Blood Loss:  approx 300cc old hematoma, minimal intra-op blood loss    Specimens: None    Complications:  wound dehiscence and need for wound vac placement    Indications: Has had hematoma of the right chest after resumption of anticoagulation. Discussed need for evacuation and control of any bleeding.    We discussed risks, benefits and alternatives including but not limited to: bleeding, infection, asymmetry, poor or slow wound healing, need for further surgery, possible recurrence.  The patient elected to proceed.    Description of Procedure: The patient was met in the preoperative holding area.  All questions were answered and informed consent was assured.      Right chest marked and transferred to or.    After induction of appropriate anesthesia, a timeout was performed correctly identifying the patient, operative site, and procedure to be performed.  All present were in agreement.    Local anesthetic infiltrated in the lateral portion of the incision and chest prepped and draped.  Sutures from the lateral incision were removed and the remaining running suture clamped to maintain closure of the medial portion of the incision.  Blunt dissection used to enter the mastectomy space and large old clot evacuated.  Unfortunately the pressure on the incision caused dehiscence of the remaining medial portion of the incision.     The pocket was copiously irrigated at there was no area of obviously bleeding.  Some small areas of bleeding were made hemostatic with cautery.  Due to the stiff edematous nature of the mastectomy skin primary closure was not possible without extreme tension and high likelihood of repeat dehiscence.  Silver sponge wound vac was  placed in the wound and seal and adequate suction created. The previous drain was removed.  Placed in padded ace wrap.    The patient was then aroused from anesthesia with ease and transferred to the postoperative care area in good condition. All sponge, needle, and instrument counts were correct.

## 2025-04-20 ENCOUNTER — READMISSION MANAGEMENT (OUTPATIENT)
Dept: CALL CENTER | Facility: HOSPITAL | Age: 55
End: 2025-04-20
Payer: COMMERCIAL

## 2025-04-20 VITALS
OXYGEN SATURATION: 96 % | TEMPERATURE: 98.2 F | SYSTOLIC BLOOD PRESSURE: 140 MMHG | RESPIRATION RATE: 16 BRPM | WEIGHT: 160 LBS | HEART RATE: 79 BPM | HEIGHT: 66 IN | DIASTOLIC BLOOD PRESSURE: 68 MMHG | BODY MASS INDEX: 25.71 KG/M2

## 2025-04-20 LAB
ANION GAP SERPL CALCULATED.3IONS-SCNC: 9 MMOL/L (ref 5–15)
BUN SERPL-MCNC: 9 MG/DL (ref 6–20)
BUN/CREAT SERPL: 14.5 (ref 7–25)
CALCIUM SPEC-SCNC: 9.3 MG/DL (ref 8.6–10.5)
CHLORIDE SERPL-SCNC: 104 MMOL/L (ref 98–107)
CO2 SERPL-SCNC: 26 MMOL/L (ref 22–29)
CREAT SERPL-MCNC: 0.62 MG/DL (ref 0.57–1)
DEPRECATED RDW RBC AUTO: 46.9 FL (ref 37–54)
EGFRCR SERPLBLD CKD-EPI 2021: 106 ML/MIN/1.73
ERYTHROCYTE [DISTWIDTH] IN BLOOD BY AUTOMATED COUNT: 14.1 % (ref 12.3–15.4)
GLUCOSE SERPL-MCNC: 120 MG/DL (ref 65–99)
HCT VFR BLD AUTO: 32.2 % (ref 34–46.6)
HGB BLD-MCNC: 10.5 G/DL (ref 12–15.9)
MAGNESIUM SERPL-MCNC: 2.4 MG/DL (ref 1.6–2.6)
MCH RBC QN AUTO: 29.7 PG (ref 26.6–33)
MCHC RBC AUTO-ENTMCNC: 32.6 G/DL (ref 31.5–35.7)
MCV RBC AUTO: 91.2 FL (ref 79–97)
PHOSPHATE SERPL-MCNC: 3.4 MG/DL (ref 2.5–4.5)
PLATELET # BLD AUTO: 453 10*3/MM3 (ref 140–450)
PMV BLD AUTO: 9 FL (ref 6–12)
POTASSIUM SERPL-SCNC: 4.2 MMOL/L (ref 3.5–5.2)
RBC # BLD AUTO: 3.53 10*6/MM3 (ref 3.77–5.28)
SODIUM SERPL-SCNC: 139 MMOL/L (ref 136–145)
WBC NRBC COR # BLD AUTO: 9.48 10*3/MM3 (ref 3.4–10.8)

## 2025-04-20 PROCEDURE — G0378 HOSPITAL OBSERVATION PER HR: HCPCS

## 2025-04-20 PROCEDURE — 85027 COMPLETE CBC AUTOMATED: CPT | Performed by: PLASTIC SURGERY

## 2025-04-20 PROCEDURE — 84100 ASSAY OF PHOSPHORUS: CPT | Performed by: PLASTIC SURGERY

## 2025-04-20 PROCEDURE — 80048 BASIC METABOLIC PNL TOTAL CA: CPT | Performed by: PLASTIC SURGERY

## 2025-04-20 PROCEDURE — 99232 SBSQ HOSP IP/OBS MODERATE 35: CPT | Performed by: INTERNAL MEDICINE

## 2025-04-20 PROCEDURE — 83735 ASSAY OF MAGNESIUM: CPT | Performed by: PLASTIC SURGERY

## 2025-04-20 RX ADMIN — LOSARTAN POTASSIUM 50 MG: 50 TABLET, FILM COATED ORAL at 08:26

## 2025-04-20 RX ADMIN — ATORVASTATIN CALCIUM 80 MG: 80 TABLET, FILM COATED ORAL at 08:27

## 2025-04-20 RX ADMIN — ESCITALOPRAM 20 MG: 20 TABLET, FILM COATED ORAL at 08:26

## 2025-04-20 RX ADMIN — METOPROLOL SUCCINATE 25 MG: 25 TABLET, EXTENDED RELEASE ORAL at 08:26

## 2025-04-20 RX ADMIN — ANASTROZOLE 1 MG: 1 TABLET, FILM COATED ORAL at 08:26

## 2025-04-20 RX ADMIN — PANTOPRAZOLE SODIUM 40 MG: 40 TABLET, DELAYED RELEASE ORAL at 08:26

## 2025-04-20 RX ADMIN — Medication 10 ML: at 08:27

## 2025-04-20 RX ADMIN — BUPROPION HYDROCHLORIDE 300 MG: 300 TABLET, EXTENDED RELEASE ORAL at 08:26

## 2025-04-20 RX ADMIN — HYDROCODONE BITARTRATE AND ACETAMINOPHEN 1 TABLET: 5; 325 TABLET ORAL at 14:30

## 2025-04-20 RX ADMIN — HYDROCODONE BITARTRATE AND ACETAMINOPHEN 1 TABLET: 5; 325 TABLET ORAL at 08:26

## 2025-04-20 NOTE — CASE MANAGEMENT/SOCIAL WORK
Continued Stay Note  [facility]     Patient Name: Dasha De Leon  MRN: [id number]  Today's Date: 4/20/2025    Admit Date: 4/18/2025    Plan: Home via private auto with [facility] and [employer] for wound vac (waiting for wound vac approval)   Discharge Plan       Row Name 04/20/25 1008       Plan    Plan Home via private auto with [facility] and [employer] for wound vac (waiting for wound vac approval)    Patient/Family in Agreement with Plan yes    Plan Comments Staff RN contacted [facility] this AM and faxed MD order for wound vac to [facility]. Faxed face sheet, MD order, OP note and H&P to [facility] as requested by Paige/Tommy. Waiting to hear back from [facility] about approval for home wound vac. Noted in EPIC pt has been accepted by [facility] to follow for home care. CCP to follow...........JW                   Discharge Codes    No documentation.                       Julianna Mccarty, RN

## 2025-04-20 NOTE — OUTREACH NOTE
Prep Survey      Flowsheet Row Responses   [facility] patient discharged from? [place]   Is LACE score < 7 ? Yes   Eligibility [facility]   Date of Admission 04/18/25   Date of Discharge 04/20/25   Discharge Disposition Home-Health Care Sv   Discharge diagnosis Bleeding from wound, EVACUATION HEMATOMA RIGHT BREAST, WOUND VAC PLACEMENT   Does the patient have one of the following disease processes/diagnoses(primary or secondary)? General Surgery   Does the patient have Home health ordered? Yes   What is the Home health agency?  Merged with [facility]   Is there a DME ordered? Yes   What DME was ordered? wound vac   Prep survey completed? Yes            Monica GUPTA - Registered Nurse

## 2025-04-20 NOTE — DISCHARGE SUMMARY
Date of Discharge:  4/20/2025    PCP: Ariana Coffman PA-C    Discharge Diagnosis:   Active Hospital Problems    Diagnosis  POA    **Bleeding from wound [T14.8XXA]  Yes    H/O chest wound [Z87.828]  Not Applicable    Acute posthemorrhagic anemia [D62]  Unknown    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     Procedure(s):  EVACUATION HEMATOMA RIGHT BREAST, WOUND VAC PLACEMENT     Consults       Date and Time Order Name Status Description    4/18/2025  9:44 AM Hematology & Oncology Inpatient Consult      4/18/2025  7:20 AM Inpatient Plastic Surgery Consult Completed     4/18/2025  4:39 AM Inpatient General Surgery Consult Completed     4/6/2025  6:31 PM Inpatient Hematology & Oncology Consult Completed     4/6/2025  6:31 PM Inpatient Infectious Diseases Consult Completed     4/6/2025  2:39 PM General MD Inpatient Consult Completed           Hospital Course  Patient is a 54 y.o. female with a history of DVT in February, breast cancer, hypertension was here earlier the month after her right breast expander fell out. She had debridement and had IV antibiotics. She was discharged on Keflex and doxycycline. She states she has been doing fine and has a drain in place however yesterday started having a lot of bloody output. No fevers or chills. No erythema. She has had some lightheadedness.     She was admitted for acute blood loss anemia and received 2 units PRBC. Hemoglobin has improved. On 4/19/2025 she underwent hematoma evacuation and right chest wound VAC placement. Hemoglobin remained stable this morning and she will be discharged today with a wound VAC. Plastic surgery office is going to contact her for wound VAC change planning (weekly) and possible  flap for coverage if poor secondary healing.    She was seen by oncology also. They recommended that she continued to hold Verzenio.     I discussed the patient's findings and my recommendations with patient and nursing staff.    Temp:  [97.1 °F (36.2 °C)-98.8 °F (37.1 °C)] 97.1 °F (36.2 °C)  Heart Rate:  [82-94] 82  Resp:  [15-18] 16  BP: (119-148)/(62-78) 148/78  Body mass index is 25.82 kg/m².    Physical Exam  Constitutional:       General: She is not in acute distress.     Appearance: She is not toxic-appearing.   HENT:      Head: Normocephalic and atraumatic.   Cardiovascular:      Rate and Rhythm: Normal rate and regular rhythm.   Pulmonary:      Effort: Pulmonary effort is normal. No respiratory distress.      Breath sounds: Normal breath sounds.   Abdominal:      General: Bowel sounds are normal.      Palpations: Abdomen is soft.      Tenderness: There is no abdominal tenderness.   Musculoskeletal:         General: No swelling.   Skin:     General: Skin is warm and dry.   Neurological:      General: No focal deficit present.      Mental Status: She is alert and oriented to person, place, and time.   Psychiatric:         Mood and Affect: Mood normal.         Behavior: Behavior normal.       Disposition: Home or Self Care       Discharge Medications        PAUSE taking these medications        Instructions Start Date   Verzenio 100 MG tablet  Wait to take this until your doctor or other care provider tells you to start again.  Generic drug: Abemaciclib   100 mg, Oral, 2 Times Daily             Continue These Medications        Instructions Start Date   ALPRAZolam 0.25 MG tablet  Commonly known as: Xanax   0.25 mg, Oral, 2 Times Daily PRN      anastrozole 1 MG tablet  Commonly known as: ARIMIDEX   1 mg, Oral, Daily      atorvastatin 40 MG tablet  Commonly known as: LIPITOR   80 mg, Oral, Daily      buPROPion  MG 24 hr tablet  Commonly known as: Wellbutrin XL   300 mg, Oral, Daily      cyclobenzaprine 10  MG tablet  Commonly known as: FLEXERIL   10 mg, Oral, 3 Times Daily PRN      Eliquis 5 MG tablet tablet  Generic drug: apixaban   5 mg, Oral, 2 Times Daily      escitalopram 20 MG tablet  Commonly known as: LEXAPRO   20 mg, Oral, Daily      famotidine 20 MG tablet  Commonly known as: PEPCID   20 mg, Oral, 2 Times Daily      furosemide 20 MG tablet  Commonly known as: Lasix   20 mg, Oral, 2 Times Daily      HYDROcodone-acetaminophen 5-325 MG per tablet  Commonly known as: NORCO   1-2 tablets, Oral, Every 6 Hours PRN      Jardiance 10 MG tablet tablet  Generic drug: empagliflozin   10 mg, Oral, Daily      levocetirizine 5 MG tablet  Commonly known as: XYZAL   Take one tablet by mouth twice daily for urticaria.      meloxicam 15 MG tablet  Commonly known as: MOBIC   15 mg, Oral, Daily      metoprolol succinate XL 25 MG 24 hr tablet  Commonly known as: TOPROL-XL   25 mg, Oral, Daily      olmesartan 40 MG tablet  Commonly known as: BENICAR   20 mg, Oral, Daily      ondansetron 8 MG tablet  Commonly known as: ZOFRAN   8 mg, Oral, 3 Times Daily PRN      pantoprazole 40 MG EC tablet  Commonly known as: Protonix   40 mg, Oral, Daily              Diet Instructions       Diet: Regular/House Diet, Cardiac Diets; Healthy Heart (2-3 Na+)      Discharge Diet:  Regular/House Diet  Cardiac Diets       Cardiac Diet: Healthy Heart (2-3 Na+)    Texture: Regular Texture (IDDSI 7)    Fluid Consistency: Thin (IDDSI 0)           Activity Instructions       Activity as Tolerated             Additional Instructions for the Follow-ups that You Need to Schedule       Call MD for problems / concerns.   As directed             Contact information for follow-up providers       Ariana Coffman PA-C Follow up in 1 week(s).    Specialties: Physician Assistant, Internal Medicine, Family Medicine  Why: After hospital follow up  Contact information:  [address]  [place] [address]  [phone]                       Contact  information for after-discharge care       Durable Medical Equipment       [facility] [employer] .    Service: Durable Medical Equipment  Contact information:  [address]  [facility] [address]  [phone]                                  Future Appointments   Date Time Provider Department Center   4/23/2025 11:30 AM LAB CHAIR 2 [facility]  [facility]   4/23/2025 12:00 PM Talia Huang MD [facility] CBC [facility]   5/5/2025  9:00 AM Radha Castillo PA [facility] OB  [facility]   6/16/2025  8:00 AM Ariana Coffman PA-C [facility] PC [facility]   6/19/2025  8:00 AM Germain Puga MD [facility] RO [facility] None        [facility] Tristin Del Cid MD  [facility]  04/20/25 10:35 EDT    Discharge time spent greater than 30 minutes.

## 2025-04-20 NOTE — PROGRESS NOTES
SUBJECTIVE:   Feeling better, minimal pain.  Has walked and voided.    OBJECTIVE:  Vitals:    04/20/25 0809   BP: 148/78   Pulse: 82   Resp: 16   Temp: 97.1 °F (36.2 °C)   SpO2: 96%     Physical Exam  Resting in bed, nad  Rr  No dyspnea  Right chest wound c/d/i, vac intact, no fluid collection    PLAN:  Pod1 hematoma evac and wound vac placement  - ok for dc from my perspective  - h/h stable  - my office will contact her for vac change planning, will need weekly vac changes, possible latis flap for coverage if poor secondary healing  - ok for home vac

## 2025-04-20 NOTE — CASE MANAGEMENT/SOCIAL WORK
Start PACC Note    Home Health Referral    Evaluated patient and and spoke to patient on Home Care and services available. Patient offered choice of available HHC and agreeable to SN services with [facility].    Care Types:   Isolation Precautions: No active isolations    Social Determinants of Health:  Tobacco Use: Medium Risk (4/19/2025)    Patient History     Smoking Tobacco Use: Former     Smokeless Tobacco Use: Never     Passive Exposure: Past     Social History     Substance and Sexual Activity   Alcohol Use Yes    Alcohol/week: 2.0 - 4.0 standard drinks of alcohol    Types: 2 - 4 Glasses of wine per week    Comment: weekly     Social History     Substance and Sexual Activity   Drug Use No       Does the patient have any financial resource strain?   Does the patient have any food insecurities?   Does the patient have any housing instabilities?     If any of the above is noted as yes - consider a MSW evaluation once the patient returns home.    START PATIENT REGISTRATION INFORMATION  Order Information  Order Signing Physician: Dr. Colin Bender  Service Ordered RN?: Yes  Service Ordered PT?: No  Service Ordered OT?: No  Service Ordered ST?: No  Service Ordered MSW?: No  Service Ordered HHA?: No  Following Physician: Ariana Coffman PA-C  Following Physician Phone: [phone]  Overseeing Physician: Ariana Coffman PA-C  (Required for Residents Only)  Agreeable to Follow? Yes  Date/Time of Call 04/20/25 15:33 EDT, Spoke with: orders in epic    Care Coordination  Same Day SOC?: No  Primary Care Physician: Ariana Coffman PA-C  Primary Care Physician Phone: [phone]  Primary Care Physician Address: [address] [place] [address]  Visit Instructions: N/A  Service Discharge Location Type: Home  Service Facility Name: N/A  Service Floor Facility: N/A  Service Room No: N/A    Demographics  Patient Last Name: Moises  Patient First Name: Dasha  Language/Communication  Barrier: none  Service Address: [address]   Service City: [place]  Service State: [place]  Service Zip: [address]  Service Home Phone: [phone]  Other Phone Numbers:   Telephone Information:   Mobile [phone]     Emergency Contact:   Extended Emergency Contact Information  Primary Emergency Contact: Paula De Leon S  Address: [address]            [place] [address] [facility]  Home Phone: [phone]  Mobile Phone: [phone]  Relation: Mother    Admission Information  Admit Date: 4/18/2025  Patient Status at Discharge: Observation  Admitting Diagnosis: Chronic anemia [D64.9]  Bleeding from wound [T14.8XXA]  Hematoma, chest wall, right, initial encounter [S20.211A]  Hemorrhage from open wound of right chest wall, initial encounter [S21.101A]    Caregiver Information  Caregiver First Name: Shani   Caregiver Last Name: Moises  Caregiver Relationship to Patient: parent  Caregiver Phone Number: [phone]  Caregiver Notes: N/A    HITECH  [employer] List  HIGHTECH: HI TECH - NON-DISP VAC  Orders: pending final order from surgeon  Orders in case of Wound Vac failure: wet to dry  Comments: N/A  Following Physician: Dr. Bender  Is patient teachable for wet-to-dry dressing change?: Yes  Backup Wet-to-Dry dressing at home?: Yes  Foam Type: black foam  Supplies at home?: Yes  Date of last vac dressing change: placed during surgery 4/19/25  Company: [employer]    Teachable Caregiver  Teachable Caregiver First Name: Shani  Teachable Caregiver Last Name: Moises  Teachable Caregiver Relationship to Patient: parent  Teachable Caregiver Phone Number: [phone]  Teachable Caregiver Notes: N/A  Teachable Caregiver available for Start of Care visit?: Yes  Teachable Caregiver agreeable to provide skilled High Tech care per physician's orders?: Yes      END PATIENT REGISTRATION INFORMATION    Start PACC Summary    Additional Comments: none    END PACC Summary    Discharge Date: Pending    Referral  Source: [facility] [place]    Signed By: Radha Kearns RN, 4/20/2025, 15:33 EDT     Date/Time: 04/20/25 15:33 EDT    End PACC Note

## 2025-04-20 NOTE — CASE MANAGEMENT/SOCIAL WORK
Continued Stay Note  [facility]     Patient Name: Dasha De Leon  MRN: [id number]  Today's Date: 4/20/2025    Admit Date: 4/18/2025    Plan: Home via private auto with Solvent wound vac and [facility]   Discharge Plan       Row Name 04/20/25 1542       Plan    Plan Home via private auto with Solventum wound vac and [facility]    Patient/Family in Agreement with Plan yes    Plan Comments Wound vac approved. Received vac delivery paperwork from Paige/Tommy. Obtained home wound vac from [facility] office. Delivered vac to pt's room. Staff RN switched from hospital vac to home vac. Spoke with Radha/[facility] and [facility] will follow up with surgeon's office on Monday for vac change schedule.............JW                   Discharge Codes    No documentation.                 Expected Discharge Date and Time       Expected Discharge Date Expected Discharge Time    Apr 20, 2025               Julianna Mccarty, RN

## 2025-04-20 NOTE — PROGRESS NOTES
REASON FOR FOLLOWUP/CHIEF COMPLAINT:    Breast cancer    HISTORY OF PRESENT ILLNESS:   Status post debridement of the right skin and subcutaneous tissue on 4/9/2025.  Having some postoperative pain.  Continues on antibiotics.  Remains afebrile.    Patient admitted 4/18/2025 again with previous history of DVT in February, subsequent issues and treatment for breast cancer with a expander that dislodged.  She had debridement and IV antibiotic therapy discharged on Keflex and doxycycline.  Again we had seen her 4/9/25 the patient now admitted with excessive bleeding began at home 4/18/25 show with lightheadedness.    4//18/25  T98. 8 degrees, pulse 90, respirations 18, /77  Discussed history above with patient who had been doing well until this a.m. when drainage became suddenly excessive, soaking her bandages and feeling her drain with bloody discharge  H&H 7.7 and 24.8, white count of 9970, platelet count of 544,000, BUN/creatinine 15 and 0.73, AST 23, ALT 16, total bilirubin less than 0.2, PT/INR of 1.27  CT of chest demonstrated removal of the right-sided tissue expander, mixed density collection within the operative bed thought to be hematoma, soft tissue air.  She had patchy infiltrates in the right lung base improved from previous, no obvious acute pulmonary thromboembolus seen.    4/19/2025-received 2 units of PRBC and hemoglobin has improved to 10.8.  Dr. Bender took her to the OR today for hematoma evacuation and placed a right chest wall wound VAC.  She still somewhat drowsy from the procedure this morning.  She remains off anticoagulation at this time.    4/20/2025-  Patient continues with wound VAC.  Has been using Norco for pain control as needed.  Hemoglobin remained stable at 10.5  Vital signs remained stable.  No other new complaints at this time  Continues off of anticoagulation    Past Medical History, Past Surgical History, Social History, Family History have been reviewed and are  without significant changes except as mentioned.    Review of Systems   Review of Systems  Of systems as mentioned HPI otherwise negative    Medications:  The current medication list was reviewed in the EMR    ALLERGIES:    Allergies   Allergen Reactions    Penicillins Anaphylaxis    Hemp Seed Oil Itching     HEMP IN LOTIONS    Lisinopril Cough              Vitals:    04/19/25 1930 04/19/25 2257 04/20/25 0524 04/20/25 0809   BP: 133/69 137/70 143/68 148/78   BP Location: Left arm Left arm Left arm Left arm   Patient Position: Lying Lying Lying Sitting   Pulse: 93 90 82 82   Resp: 16 16 16 16   Temp: 97.9 °F (36.6 °C) 98.8 °F (37.1 °C) 98.2 °F (36.8 °C) 97.1 °F (36.2 °C)   TempSrc: Oral Oral Oral Oral   SpO2: 93% 92% 93% 96%   Weight:       Height:         Physical Exam    CONSTITUTIONAL:  Vital signs reviewed.  No distress, looks comfortable.  EYES:  Conjunctivae and lids unremarkable.  PERRLA  EARS,NOSE,MOUTH,THROAT:  Ears and nose appear unremarkable.  Lips, teeth, gums appear unremarkable.  RESPIRATORY:  Normal respiratory effort.  Lungs clear to auscultation bilaterally.  CARDIOVASCULAR:  Normal S1, S2.  No murmurs rubs or gallops.  No significant lower extremity edema.  GASTROINTESTINAL: Abdomen appears unremarkable.  Nontender.  No hepatomegaly.  No splenomegaly.  NEURO: cranial nerves 2-12 grossly intact.  No focal deficits.  Appears to have equal strength all 4 extremities.  MUSCULOSKELETAL:  Unremarkable digits/nails.  No cyanosis or clubbing.  SKIN: Right chest wall wound VAC present.  PSYCHIATRIC:  Normal judgment and insight.  Normal mood and affect.     I have reexamined the patient and the results are consistent with the previously documented exam. Talia Huang MD      RECENT LABS:  WBC   Date Value Ref Range Status   04/20/2025 9.48 3.40 - 10.80 10*3/mm3 Final   04/19/2025 11.07 (H) 3.40 - 10.80 10*3/mm3 Final   04/18/2025 9.97 3.40 - 10.80 10*3/mm3 Final   04/18/2025 9.96 3.40 - 10.80 10*3/mm3  Final     Hemoglobin   Date Value Ref Range Status   04/20/2025 10.5 (L) 12.0 - 15.9 g/dL Final   04/19/2025 10.8 (L) 12.0 - 15.9 g/dL Final   04/18/2025 9.8 (L) 12.0 - 15.9 g/dL Final   04/18/2025 7.7 (L) 12.0 - 15.9 g/dL Final   04/18/2025 9.0 (L) 12.0 - 15.9 g/dL Final     Platelets   Date Value Ref Range Status   04/20/2025 453 (H) 140 - 450 10*3/mm3 Final   04/19/2025 508 (H) 140 - 450 10*3/mm3 Final   04/18/2025 544 (H) 140 - 450 10*3/mm3 Final   04/18/2025 593 (H) 140 - 450 10*3/mm3 Final       ASSESSMENT/PLAN:  Dasha De Leon P382/1     *Right breast invasive ductal carcinoma  Status post right mastectomy, sentinel lymph node biopsy with 2 out of 5 lymph nodes positive for metastatic disease  Completed adjuvant radiation to the right chest wall  Continue anastrozole  Hold Verzenio     *Anemia  Hemoglobin 8.1, likely drop secondary to surgery.  Vitamin B12 normal at 573, ferritin 632, iron saturation 22%, TIBC 194  Reassessment 4/9/25-now repeat 4/18/25 H&H 9.0 and 8.1  Readmission 4/18/2025 with bleeding from the wound-H&H is 7.7 and 24.8, platelet count of 544,000-transfusion and iron studies ongoing  4/19/2025-hemoglobin improved to 10.8 after 2 units of PRBC being transfused on 4/18/2025.  Platelets stable at 508,000 and WBC slightly elevated at 11.07.  4/20/2025-hemoglobin stable at 10.5.     *Pulmonary embolism  Secondary to Verzenio  She was on Eliquis as an outpatient just prior to admission 4/18/25.  No clear evidence of pulmonary emboli on repeat assessment-CT of chest 4/18/25, D-dimer planned  D-dimer remains elevated at 0.72.  With there being no clear evidence of pulmonary embolism on the recent scans and also ongoing issues with bleeding continue to hold anticoagulation.  Could consider prophylactic dose of anticoagulation down the line.     *Right chest wall wound and hemorrhage  Evaluated by Dr. Bender in the hospital  Plans noted for surgery.  Expander has fallen out.  The plan was  initially for latissimus flap reconstruction salvage later this month  Now in the hospital and plans noted for debridement and possible closure.  Delights most flap closure will be delayed.  Follow-up CT chest 4/18/25-mucin collection within the operative bed thought to reflect hematoma  4/19/2025-hematoma evacuation of the right chest wall and placement of wound VAC performed Dr. Bender.  4/20/2025-continues with wound VAC     Recommendations  Continue anastrozole  Continue to hold Verzenio  Management of the right chest wall wound -surgical assessment ongoing  Hold anticoagulation.  Monitor H&H.

## 2025-04-21 ENCOUNTER — TRANSITIONAL CARE MANAGEMENT TELEPHONE ENCOUNTER (OUTPATIENT)
Dept: CALL CENTER | Facility: HOSPITAL | Age: 55
End: 2025-04-21
Payer: COMMERCIAL

## 2025-04-21 NOTE — OUTREACH NOTE
Call Center TCM Note      Flowsheet Row Responses   List of hospitals in [place] patient discharged from? [facility]   Does the patient have one of the following disease processes/diagnoses(primary or secondary)? General Surgery   TCM attempt successful? Yes   Call start time 1202   Call end time 1205   Discharge diagnosis Bleeding from wound, EVACUATION HEMATOMA RIGHT BREAST, WOUND VAC PLACEMENT   Meds reviewed with patient/caregiver? Yes   Is the patient having any side effects they believe may be caused by any medication additions or changes? No   Does the patient have all medications related to this admission filled (includes all antibiotics, pain medications, etc.) Yes   Is the patient taking all medications as directed (includes completed medication regime)? Yes   Comments Pt to f/u with surgeon and oncology.   Does the patient have an appointment with their PCP within 7-14 days of discharge? No   Nursing Interventions Patient desires to follow up with specialty only   What DME was ordered? wound vac   Has all DME been delivered? Yes   Psychosocial issues? No   Did the patient receive a copy of their discharge instructions? Yes   Nursing interventions Reviewed instructions with patient   What is the patient's perception of their health status since discharge? Improving   Nursing interventions Nurse provided patient education   Is the patient /caregiver able to teach back basic post-op care? Practice 'cough and deep breath', Continue use of incentive spirometry at least 1 week post discharge, Lifting as instructed by MD in discharge instructions   Is the patient/caregiver able to teach back signs and symptoms of incisional infection? Increased redness, swelling or pain at the incisonal site, Increased drainage or bleeding, Incisional warmth, Pus or odor from incision, Fever   Is the patient/caregiver able to teach back steps to recovery at home? Rest and rebuild strength, gradually increase activity, Set small, achievable  goals for return to baseline health, Eat a well-balance diet   If the patient is a current smoker, are they able to teach back resources for cessation? Not a smoker   Is the patient/caregiver able to teach back the hierarchy of who to call/visit for symptoms/problems? PCP, Specialist, Home health nurse, Urgent Care, ED, 911 Yes   Additional teach back comments Wound vac in place.  She is waiting to hear from  for wound vac dressing changes.  She states he is waiting to hear from surgeon's office and will have f/u either Thurs or Mon.  Has appt with oncology also.   TCM call completed? Yes   Wrap up additional comments No questions or needs at this time.   Call end time 1205            Kadie PONCE - Licensed Nurse    4/21/2025, 12:07 EDT

## 2025-04-21 NOTE — CASE MANAGEMENT/SOCIAL WORK
Case Management Discharge Note      Final Note: Home with [facility].    Provided Post Acute Provider List?: N/A  Provided Post Acute Provider Quality & Resource List?: N/A    Selected Continued Care - Discharged on 4/20/2025 Admission date: 4/18/2025 - Discharge disposition: Home or Self Care      Destination    No services have been selected for the patient.                Durable Medical Equipment Coordination complete.      Service Provider Services Address Phone Fax Patient Preferred    [employer] [facility] [facility] [employer] [id number] [id number] [phone] --              Dialysis/Infusion    No services have been selected for the patient.                Home Medical Care    No services have been selected for the patient.                Therapy    No services have been selected for the patient.                Community Resources    No services have been selected for the patient.                Community & DME    No services have been selected for the patient.                    Selected Continued Care - Episodes Includes continued care and service providers with selected services from the active episodes listed below          Transportation Services  Private: Car    Final Discharge Disposition Code: 06 - home with home health care

## 2025-04-24 ENCOUNTER — PATIENT OUTREACH (OUTPATIENT)
Dept: CASE MANAGEMENT | Facility: OTHER | Age: 55
End: 2025-04-24
Payer: COMMERCIAL

## 2025-04-24 NOTE — OUTREACH NOTE
AMBULATORY CASE MANAGEMENT NOTE    Names and Relationships of Patient/Support Persons: Contact: Moises Dasha Kishan; Relationship: Self -     Patient Outreach  Ms. De Leon was hospitalized at [facility] 04/18/2025-04/  for   hemorrhage from open wound of right chest wall. Hematoma was removed in surgery, wound vac placed. While hospitalized Ms. De Leon received 2 units of blood.   Today Ms. De Leon rates pain at tolerable, currently taking hydrocodone 5/325 mg, 1 every 6 hours. She reports she is eating and hydrating adequately. Denies problems with wound or wound vac.    Casandra GARCIA  Ambulatory Case Management    4/24/2025, 15:58 EDT

## 2025-04-25 DIAGNOSIS — Z17.0 MALIGNANT NEOPLASM OF LOWER-OUTER QUADRANT OF RIGHT BREAST OF FEMALE, ESTROGEN RECEPTOR POSITIVE: ICD-10-CM

## 2025-04-25 DIAGNOSIS — C50.511 MALIGNANT NEOPLASM OF LOWER-OUTER QUADRANT OF RIGHT BREAST OF FEMALE, ESTROGEN RECEPTOR POSITIVE: ICD-10-CM

## 2025-04-25 RX ORDER — HYDROCODONE BITARTRATE AND ACETAMINOPHEN 5; 325 MG/1; MG/1
1-2 TABLET ORAL EVERY 6 HOURS PRN
Qty: 120 TABLET | Refills: 0 | Status: SHIPPED | OUTPATIENT
Start: 2025-04-25

## 2025-04-28 ENCOUNTER — HOSPITAL ENCOUNTER (OUTPATIENT)
Facility: HOSPITAL | Age: 55
Setting detail: HOSPITAL OUTPATIENT SURGERY
Discharge: HOME | End: 2025-04-28
Attending: PLASTIC SURGERY | Admitting: PLASTIC SURGERY
Payer: COMMERCIAL

## 2025-04-28 ENCOUNTER — ANESTHESIA EVENT (OUTPATIENT)
Dept: PERIOP | Facility: HOSPITAL | Age: 55
End: 2025-04-28
Payer: COMMERCIAL

## 2025-04-28 ENCOUNTER — ANESTHESIA (OUTPATIENT)
Dept: PERIOP | Facility: HOSPITAL | Age: 55
End: 2025-04-28
Payer: COMMERCIAL

## 2025-04-28 VITALS
SYSTOLIC BLOOD PRESSURE: 121 MMHG | DIASTOLIC BLOOD PRESSURE: 62 MMHG | OXYGEN SATURATION: 98 % | HEART RATE: 73 BPM | RESPIRATION RATE: 16 BRPM | TEMPERATURE: 98.2 F

## 2025-04-28 LAB
DEPRECATED RDW RBC AUTO: 43.8 FL (ref 37–54)
ERYTHROCYTE [DISTWIDTH] IN BLOOD BY AUTOMATED COUNT: 13.3 % (ref 12.3–15.4)
HCT VFR BLD AUTO: 34.8 % (ref 34–46.6)
HGB BLD-MCNC: 11.2 G/DL (ref 12–15.9)
MCH RBC QN AUTO: 29.2 PG (ref 26.6–33)
MCHC RBC AUTO-ENTMCNC: 32.2 G/DL (ref 31.5–35.7)
MCV RBC AUTO: 90.6 FL (ref 79–97)
PLATELET # BLD AUTO: 401 10*3/MM3 (ref 140–450)
PMV BLD AUTO: 9.2 FL (ref 6–12)
RBC # BLD AUTO: 3.84 10*6/MM3 (ref 3.77–5.28)
WBC NRBC COR # BLD AUTO: 7.17 10*3/MM3 (ref 3.4–10.8)

## 2025-04-28 PROCEDURE — 25010000002 FAMOTIDINE 10 MG/ML SOLUTION: Performed by: ANESTHESIOLOGY

## 2025-04-28 PROCEDURE — 25010000002 DEXAMETHASONE PER 1 MG: Performed by: NURSE ANESTHETIST, CERTIFIED REGISTERED

## 2025-04-28 PROCEDURE — 25810000003 LACTATED RINGERS PER 1000 ML: Performed by: PLASTIC SURGERY

## 2025-04-28 PROCEDURE — 25010000002 LIDOCAINE 2% SOLUTION: Performed by: NURSE ANESTHETIST, CERTIFIED REGISTERED

## 2025-04-28 PROCEDURE — 63710000001 ONDANSETRON ODT 4 MG TABLET DISPERSIBLE: Performed by: PLASTIC SURGERY

## 2025-04-28 PROCEDURE — 85027 COMPLETE CBC AUTOMATED: CPT | Performed by: ANESTHESIOLOGY

## 2025-04-28 PROCEDURE — 25010000002 PROPOFOL 10 MG/ML EMULSION: Performed by: NURSE ANESTHETIST, CERTIFIED REGISTERED

## 2025-04-28 PROCEDURE — 25010000002 ONDANSETRON PER 1 MG: Performed by: NURSE ANESTHETIST, CERTIFIED REGISTERED

## 2025-04-28 PROCEDURE — 25010000002 FENTANYL CITRATE (PF) 50 MCG/ML SOLUTION: Performed by: NURSE ANESTHETIST, CERTIFIED REGISTERED

## 2025-04-28 PROCEDURE — 25010000002 CEFAZOLIN PER 500 MG: Performed by: PLASTIC SURGERY

## 2025-04-28 RX ORDER — MAGNESIUM HYDROXIDE 1200 MG/15ML
LIQUID ORAL AS NEEDED
Status: DISCONTINUED | OUTPATIENT
Start: 2025-04-28 | End: 2025-04-28 | Stop reason: HOSPADM

## 2025-04-28 RX ORDER — LIDOCAINE HYDROCHLORIDE 20 MG/ML
INJECTION, SOLUTION INFILTRATION; PERINEURAL AS NEEDED
Status: DISCONTINUED | OUTPATIENT
Start: 2025-04-28 | End: 2025-04-28 | Stop reason: SURG

## 2025-04-28 RX ORDER — LIDOCAINE HYDROCHLORIDE 10 MG/ML
0.5 INJECTION, SOLUTION INFILTRATION; PERINEURAL ONCE AS NEEDED
Status: DISCONTINUED | OUTPATIENT
Start: 2025-04-28 | End: 2025-04-28 | Stop reason: HOSPADM

## 2025-04-28 RX ORDER — ACETAMINOPHEN 500 MG
1000 TABLET ORAL ONCE
Status: COMPLETED | OUTPATIENT
Start: 2025-04-28 | End: 2025-04-28

## 2025-04-28 RX ORDER — LABETALOL HYDROCHLORIDE 5 MG/ML
5 INJECTION, SOLUTION INTRAVENOUS
Status: DISCONTINUED | OUTPATIENT
Start: 2025-04-28 | End: 2025-04-28 | Stop reason: HOSPADM

## 2025-04-28 RX ORDER — DIPHENHYDRAMINE HYDROCHLORIDE 50 MG/ML
12.5 INJECTION, SOLUTION INTRAMUSCULAR; INTRAVENOUS
Status: DISCONTINUED | OUTPATIENT
Start: 2025-04-28 | End: 2025-04-28 | Stop reason: HOSPADM

## 2025-04-28 RX ORDER — NALOXONE HCL 0.4 MG/ML
0.2 VIAL (ML) INJECTION AS NEEDED
Status: DISCONTINUED | OUTPATIENT
Start: 2025-04-28 | End: 2025-04-28 | Stop reason: HOSPADM

## 2025-04-28 RX ORDER — HYDROCODONE BITARTRATE AND ACETAMINOPHEN 5; 325 MG/1; MG/1
1 TABLET ORAL ONCE AS NEEDED
Status: COMPLETED | OUTPATIENT
Start: 2025-04-28 | End: 2025-04-28

## 2025-04-28 RX ORDER — ONDANSETRON 2 MG/ML
INJECTION INTRAMUSCULAR; INTRAVENOUS AS NEEDED
Status: DISCONTINUED | OUTPATIENT
Start: 2025-04-28 | End: 2025-04-28 | Stop reason: SURG

## 2025-04-28 RX ORDER — ONDANSETRON 2 MG/ML
4 INJECTION INTRAMUSCULAR; INTRAVENOUS ONCE AS NEEDED
Status: DISCONTINUED | OUTPATIENT
Start: 2025-04-28 | End: 2025-04-28 | Stop reason: HOSPADM

## 2025-04-28 RX ORDER — IPRATROPIUM BROMIDE AND ALBUTEROL SULFATE 2.5; .5 MG/3ML; MG/3ML
3 SOLUTION RESPIRATORY (INHALATION) ONCE AS NEEDED
Status: DISCONTINUED | OUTPATIENT
Start: 2025-04-28 | End: 2025-04-28 | Stop reason: HOSPADM

## 2025-04-28 RX ORDER — FAMOTIDINE 10 MG/ML
20 INJECTION, SOLUTION INTRAVENOUS ONCE
Status: COMPLETED | OUTPATIENT
Start: 2025-04-28 | End: 2025-04-28

## 2025-04-28 RX ORDER — HYDRALAZINE HYDROCHLORIDE 20 MG/ML
5 INJECTION INTRAMUSCULAR; INTRAVENOUS
Status: DISCONTINUED | OUTPATIENT
Start: 2025-04-28 | End: 2025-04-28 | Stop reason: HOSPADM

## 2025-04-28 RX ORDER — HYDROCODONE BITARTRATE AND ACETAMINOPHEN 5; 325 MG/1; MG/1
1 TABLET ORAL ONCE AS NEEDED
Status: DISCONTINUED | OUTPATIENT
Start: 2025-04-28 | End: 2025-04-28 | Stop reason: HOSPADM

## 2025-04-28 RX ORDER — SODIUM CHLORIDE, SODIUM LACTATE, POTASSIUM CHLORIDE, CALCIUM CHLORIDE 600; 310; 30; 20 MG/100ML; MG/100ML; MG/100ML; MG/100ML
9 INJECTION, SOLUTION INTRAVENOUS CONTINUOUS
Status: DISCONTINUED | OUTPATIENT
Start: 2025-04-28 | End: 2025-04-28 | Stop reason: HOSPADM

## 2025-04-28 RX ORDER — PROMETHAZINE HYDROCHLORIDE 25 MG/1
25 TABLET ORAL ONCE AS NEEDED
Status: DISCONTINUED | OUTPATIENT
Start: 2025-04-28 | End: 2025-04-28 | Stop reason: HOSPADM

## 2025-04-28 RX ORDER — ACETAMINOPHEN 325 MG/1
325 TABLET ORAL EVERY 4 HOURS PRN
Qty: 30 TABLET | Refills: 1 | Status: ON HOLD | OUTPATIENT
Start: 2025-04-28 | End: 2025-05-05

## 2025-04-28 RX ORDER — PROPOFOL 10 MG/ML
VIAL (ML) INTRAVENOUS AS NEEDED
Status: DISCONTINUED | OUTPATIENT
Start: 2025-04-28 | End: 2025-04-28 | Stop reason: SURG

## 2025-04-28 RX ORDER — PROMETHAZINE HYDROCHLORIDE 25 MG/1
25 SUPPOSITORY RECTAL ONCE AS NEEDED
Status: DISCONTINUED | OUTPATIENT
Start: 2025-04-28 | End: 2025-04-28 | Stop reason: HOSPADM

## 2025-04-28 RX ORDER — HYDROMORPHONE HYDROCHLORIDE 1 MG/ML
0.5 INJECTION, SOLUTION INTRAMUSCULAR; INTRAVENOUS; SUBCUTANEOUS
Status: DISCONTINUED | OUTPATIENT
Start: 2025-04-28 | End: 2025-04-28 | Stop reason: HOSPADM

## 2025-04-28 RX ORDER — FENTANYL CITRATE 50 UG/ML
50 INJECTION, SOLUTION INTRAMUSCULAR; INTRAVENOUS ONCE AS NEEDED
Status: DISCONTINUED | OUTPATIENT
Start: 2025-04-28 | End: 2025-04-28 | Stop reason: HOSPADM

## 2025-04-28 RX ORDER — DEXAMETHASONE SODIUM PHOSPHATE 4 MG/ML
INJECTION, SOLUTION INTRA-ARTICULAR; INTRALESIONAL; INTRAMUSCULAR; INTRAVENOUS; SOFT TISSUE AS NEEDED
Status: DISCONTINUED | OUTPATIENT
Start: 2025-04-28 | End: 2025-04-28 | Stop reason: SURG

## 2025-04-28 RX ORDER — SODIUM CHLORIDE, SODIUM LACTATE, POTASSIUM CHLORIDE, CALCIUM CHLORIDE 600; 310; 30; 20 MG/100ML; MG/100ML; MG/100ML; MG/100ML
125 INJECTION, SOLUTION INTRAVENOUS CONTINUOUS
Status: DISCONTINUED | OUTPATIENT
Start: 2025-04-28 | End: 2025-04-28 | Stop reason: HOSPADM

## 2025-04-28 RX ORDER — FENTANYL CITRATE 50 UG/ML
50 INJECTION, SOLUTION INTRAMUSCULAR; INTRAVENOUS
Status: DISCONTINUED | OUTPATIENT
Start: 2025-04-28 | End: 2025-04-28 | Stop reason: HOSPADM

## 2025-04-28 RX ORDER — OXYCODONE AND ACETAMINOPHEN 7.5; 325 MG/1; MG/1
1 TABLET ORAL EVERY 4 HOURS PRN
Status: DISCONTINUED | OUTPATIENT
Start: 2025-04-28 | End: 2025-04-28 | Stop reason: HOSPADM

## 2025-04-28 RX ORDER — ONDANSETRON 4 MG/1
4 TABLET, ORALLY DISINTEGRATING ORAL EVERY 6 HOURS PRN
Status: DISCONTINUED | OUTPATIENT
Start: 2025-04-28 | End: 2025-04-28 | Stop reason: HOSPADM

## 2025-04-28 RX ORDER — SODIUM CHLORIDE 0.9 % (FLUSH) 0.9 %
3-10 SYRINGE (ML) INJECTION AS NEEDED
Status: DISCONTINUED | OUTPATIENT
Start: 2025-04-28 | End: 2025-04-28 | Stop reason: HOSPADM

## 2025-04-28 RX ORDER — DOCUSATE SODIUM 250 MG
250 CAPSULE ORAL 2 TIMES DAILY PRN
Qty: 15 CAPSULE | Refills: 1 | Status: SHIPPED | OUTPATIENT
Start: 2025-04-28

## 2025-04-28 RX ORDER — EPHEDRINE SULFATE 50 MG/ML
5 INJECTION, SOLUTION INTRAVENOUS ONCE AS NEEDED
Status: DISCONTINUED | OUTPATIENT
Start: 2025-04-28 | End: 2025-04-28 | Stop reason: HOSPADM

## 2025-04-28 RX ORDER — DROPERIDOL 2.5 MG/ML
0.62 INJECTION, SOLUTION INTRAMUSCULAR; INTRAVENOUS
Status: DISCONTINUED | OUTPATIENT
Start: 2025-04-28 | End: 2025-04-28 | Stop reason: HOSPADM

## 2025-04-28 RX ORDER — FENTANYL CITRATE 50 UG/ML
INJECTION, SOLUTION INTRAMUSCULAR; INTRAVENOUS AS NEEDED
Status: DISCONTINUED | OUTPATIENT
Start: 2025-04-28 | End: 2025-04-28 | Stop reason: SURG

## 2025-04-28 RX ORDER — SODIUM CHLORIDE 0.9 % (FLUSH) 0.9 %
3 SYRINGE (ML) INJECTION EVERY 12 HOURS SCHEDULED
Status: DISCONTINUED | OUTPATIENT
Start: 2025-04-28 | End: 2025-04-28 | Stop reason: HOSPADM

## 2025-04-28 RX ORDER — ATROPINE SULFATE 0.4 MG/ML
0.4 INJECTION, SOLUTION INTRAMUSCULAR; INTRAVENOUS; SUBCUTANEOUS ONCE AS NEEDED
Status: DISCONTINUED | OUTPATIENT
Start: 2025-04-28 | End: 2025-04-28 | Stop reason: HOSPADM

## 2025-04-28 RX ORDER — MIDAZOLAM HYDROCHLORIDE 1 MG/ML
1 INJECTION, SOLUTION INTRAMUSCULAR; INTRAVENOUS
Status: DISCONTINUED | OUTPATIENT
Start: 2025-04-28 | End: 2025-04-28 | Stop reason: HOSPADM

## 2025-04-28 RX ORDER — FLUMAZENIL 0.1 MG/ML
0.2 INJECTION INTRAVENOUS AS NEEDED
Status: DISCONTINUED | OUTPATIENT
Start: 2025-04-28 | End: 2025-04-28 | Stop reason: HOSPADM

## 2025-04-28 RX ADMIN — PROPOFOL 100 MG: 10 INJECTION, EMULSION INTRAVENOUS at 07:03

## 2025-04-28 RX ADMIN — FAMOTIDINE 20 MG: 10 INJECTION, SOLUTION INTRAVENOUS at 06:49

## 2025-04-28 RX ADMIN — CEFAZOLIN 2000 MG: 2 INJECTION, POWDER, FOR SOLUTION INTRAMUSCULAR; INTRAVENOUS at 06:53

## 2025-04-28 RX ADMIN — ONDANSETRON 4 MG: 4 TABLET, ORALLY DISINTEGRATING ORAL at 06:47

## 2025-04-28 RX ADMIN — HYDROCODONE BITARTRATE AND ACETAMINOPHEN 1 TABLET: 5; 325 TABLET ORAL at 07:51

## 2025-04-28 RX ADMIN — ONDANSETRON 4 MG: 2 INJECTION, SOLUTION INTRAMUSCULAR; INTRAVENOUS at 07:27

## 2025-04-28 RX ADMIN — LIDOCAINE HYDROCHLORIDE 100 MG: 20 INJECTION, SOLUTION INFILTRATION; PERINEURAL at 07:03

## 2025-04-28 RX ADMIN — ACETAMINOPHEN 1000 MG: 500 TABLET, FILM COATED ORAL at 06:47

## 2025-04-28 RX ADMIN — FENTANYL CITRATE 25 MCG: 50 INJECTION, SOLUTION INTRAMUSCULAR; INTRAVENOUS at 07:08

## 2025-04-28 RX ADMIN — DEXAMETHASONE SODIUM PHOSPHATE 4 MG: 4 INJECTION, SOLUTION INTRA-ARTICULAR; INTRALESIONAL; INTRAMUSCULAR; INTRAVENOUS; SOFT TISSUE at 07:15

## 2025-04-28 RX ADMIN — SODIUM CHLORIDE, POTASSIUM CHLORIDE, SODIUM LACTATE AND CALCIUM CHLORIDE 125 ML/HR: 600; 310; 30; 20 INJECTION, SOLUTION INTRAVENOUS at 06:49

## 2025-04-28 RX ADMIN — FENTANYL CITRATE 50 MCG: 50 INJECTION, SOLUTION INTRAMUSCULAR; INTRAVENOUS at 07:27

## 2025-04-28 RX ADMIN — PROPOFOL 130 MCG/KG/MIN: 10 INJECTION, EMULSION INTRAVENOUS at 07:04

## 2025-04-28 RX ADMIN — FENTANYL CITRATE 25 MCG: 50 INJECTION, SOLUTION INTRAMUSCULAR; INTRAVENOUS at 07:14

## 2025-04-28 NOTE — ANESTHESIA PREPROCEDURE EVALUATION
Anesthesia Evaluation     Patient summary reviewed and Nursing notes reviewed   NPO Solid Status: > 8 hours  NPO Liquid Status: > 2 hours           Airway   Mallampati: II  Dental      Pulmonary    (+) a smoker Former,  Cardiovascular     ECG reviewed  Rhythm: regular  Rate: normal    (+) hypertension, hyperlipidemia      Neuro/Psych  (+) numbness, psychiatric history Anxiety and Depression  GI/Hepatic/Renal/Endo    (+) GERD    Musculoskeletal     Abdominal    Substance History   (+) alcohol use     OB/GYN negative ob/gyn ROS         Other   arthritis,   history of cancer (right breast s/p resection)                    Anesthesia Plan    ASA 3     MAC     intravenous induction     Anesthetic plan, risks, benefits, and alternatives have been provided, discussed and informed consent has been obtained with: patient.      CODE STATUS:           
Warm

## 2025-04-28 NOTE — ANESTHESIA POSTPROCEDURE EVALUATION
Patient: Dasha De Leon    Procedure Summary       Date: 04/28/25 Room / Location: [facility] OR [address] [place] [address] MAIN OR    Anesthesia Start: 0658 Anesthesia Stop: 0735    Procedure: WOUND VAC CHANGE RIGHT BREAST (Right) Diagnosis:     Surgeons: Colin Bender MD Provider: Mariela Bradshaw MD    Anesthesia Type: MAC ASA Status: 3            Anesthesia Type: MAC    Vitals  Vitals Value Taken Time   /68 04/28/25 08:00   Temp 36.8 °C (98.2 °F) 04/28/25 07:40   Pulse 147 04/28/25 08:05   Resp 16 04/28/25 07:50   SpO2 96 % 04/28/25 08:05   Vitals shown include unfiled device data.        Post Anesthesia Care and Evaluation    Patient location during evaluation: bedside  Patient participation: complete - patient participated  Level of consciousness: awake  Pain management: adequate    Airway patency: patent  Anesthetic complications: No anesthetic complications    Cardiovascular status: acceptable  Respiratory status: acceptable  Hydration status: acceptable    Comments: /62   Pulse 73   Temp 36.8 °C (98.2 °F) (Oral)   Resp 16   LMP  (LMP Unknown)   SpO2 98%

## 2025-04-28 NOTE — OP NOTE
Pre-Operative Diagnosis: Right chest wound     Post-Operative Diagnosis: Same    Procedure Performed: right chest wound VAC change    Surgeon: ANGELICA Bender MD    Assistant: None    Anesthesia: General    Estimated Blood Loss: <5cc    Specimens: None    Complications: None    Indications: Presents for vac change after loss of right sided reconstruction and radiation injury.    We discussed risks, benefits and alternatives including but not limited to: bleeding, infection, asymmetry, poor or slow wound healing, need for further surgery, possible recurrence.  The patient elected to proceed.    Description of Procedure: The patient was met in the preoperative holding area.  All questions were answered and informed consent was assured.      Right chest marked.  Transferred to OR.    After induction of appropriate anesthesia, a timeout was performed correctly identifying the patient, operative site, and procedure to be performed.  All present were in agreement.    Previous vac removed.  The site was prepped with betadine and towel draped.  The site was irrigated and there was no bleeding and reasonable granulation tissue developing. Wound measured 92s85cf and 3cm deep.  Skin opening 6x8cm with 10cm of tunneling superiorly.  Gray foam was trimmed appropriately and placed in wound.  The foam was sealed and suctiona applied.    The patient was then aroused from anesthesia with ease and transferred to the postoperative care area in good condition. All sponge, needle, and instrument counts were correct.

## 2025-05-05 ENCOUNTER — HOSPITAL ENCOUNTER (OUTPATIENT)
Age: 55
Setting detail: HOSPITAL OUTPATIENT SURGERY
Discharge: HOME | End: 2025-05-05
Attending: PLASTIC SURGERY | Admitting: PLASTIC SURGERY
Payer: COMMERCIAL

## 2025-05-05 ENCOUNTER — ANESTHESIA (OUTPATIENT)
Age: 55
End: 2025-05-05
Payer: COMMERCIAL

## 2025-05-05 ENCOUNTER — ANESTHESIA EVENT (OUTPATIENT)
Age: 55
End: 2025-05-05
Payer: COMMERCIAL

## 2025-05-05 VITALS
HEIGHT: 66 IN | HEART RATE: 72 BPM | SYSTOLIC BLOOD PRESSURE: 107 MMHG | WEIGHT: 158.8 LBS | OXYGEN SATURATION: 98 % | DIASTOLIC BLOOD PRESSURE: 54 MMHG | TEMPERATURE: 97.2 F | RESPIRATION RATE: 20 BRPM | BODY MASS INDEX: 25.52 KG/M2

## 2025-05-05 PROCEDURE — 25810000003 LACTATED RINGERS PER 1000 ML: Performed by: ANESTHESIOLOGY

## 2025-05-05 PROCEDURE — 25010000002 DROPERIDOL PER 5 MG: Performed by: ANESTHESIOLOGY

## 2025-05-05 PROCEDURE — 25010000002 PROPOFOL 500 MG/50ML EMULSION: Performed by: ANESTHESIOLOGY

## 2025-05-05 PROCEDURE — 25010000002 FENTANYL CITRATE (PF) 50 MCG/ML SOLUTION: Performed by: ANESTHESIOLOGY

## 2025-05-05 PROCEDURE — 25010000002 FAMOTIDINE 10 MG/ML SOLUTION: Performed by: ANESTHESIOLOGY

## 2025-05-05 PROCEDURE — 25010000002 MIDAZOLAM PER 1 MG: Performed by: ANESTHESIOLOGY

## 2025-05-05 PROCEDURE — 25010000002 PROPOFOL 10 MG/ML EMULSION: Performed by: ANESTHESIOLOGY

## 2025-05-05 PROCEDURE — 25010000002 LIDOCAINE 2% SOLUTION: Performed by: ANESTHESIOLOGY

## 2025-05-05 PROCEDURE — 25010000002 DEXAMETHASONE SODIUM PHOSPHATE 20 MG/5ML SOLUTION: Performed by: ANESTHESIOLOGY

## 2025-05-05 RX ORDER — SODIUM CHLORIDE 0.9 % (FLUSH) 0.9 %
3 SYRINGE (ML) INJECTION EVERY 12 HOURS SCHEDULED
Status: DISCONTINUED | OUTPATIENT
Start: 2025-05-05 | End: 2025-05-05 | Stop reason: HOSPADM

## 2025-05-05 RX ORDER — SODIUM CHLORIDE, SODIUM LACTATE, POTASSIUM CHLORIDE, CALCIUM CHLORIDE 600; 310; 30; 20 MG/100ML; MG/100ML; MG/100ML; MG/100ML
9 INJECTION, SOLUTION INTRAVENOUS CONTINUOUS
Status: DISCONTINUED | OUTPATIENT
Start: 2025-05-05 | End: 2025-05-05 | Stop reason: HOSPADM

## 2025-05-05 RX ORDER — DIPHENHYDRAMINE HYDROCHLORIDE 50 MG/ML
12.5 INJECTION, SOLUTION INTRAMUSCULAR; INTRAVENOUS
Status: DISCONTINUED | OUTPATIENT
Start: 2025-05-05 | End: 2025-05-05 | Stop reason: HOSPADM

## 2025-05-05 RX ORDER — DROPERIDOL 2.5 MG/ML
0.62 INJECTION, SOLUTION INTRAMUSCULAR; INTRAVENOUS
Status: DISCONTINUED | OUTPATIENT
Start: 2025-05-05 | End: 2025-05-05 | Stop reason: HOSPADM

## 2025-05-05 RX ORDER — HYDROMORPHONE HYDROCHLORIDE 1 MG/ML
0.25 INJECTION, SOLUTION INTRAMUSCULAR; INTRAVENOUS; SUBCUTANEOUS
Status: DISCONTINUED | OUTPATIENT
Start: 2025-05-05 | End: 2025-05-05 | Stop reason: HOSPADM

## 2025-05-05 RX ORDER — PROMETHAZINE HYDROCHLORIDE 25 MG/1
25 SUPPOSITORY RECTAL ONCE AS NEEDED
Status: DISCONTINUED | OUTPATIENT
Start: 2025-05-05 | End: 2025-05-05 | Stop reason: HOSPADM

## 2025-05-05 RX ORDER — ATROPINE SULFATE 0.4 MG/ML
0.4 INJECTION, SOLUTION INTRAMUSCULAR; INTRAVENOUS; SUBCUTANEOUS ONCE AS NEEDED
Status: DISCONTINUED | OUTPATIENT
Start: 2025-05-05 | End: 2025-05-05 | Stop reason: HOSPADM

## 2025-05-05 RX ORDER — NALOXONE HCL 0.4 MG/ML
0.2 VIAL (ML) INJECTION AS NEEDED
Status: DISCONTINUED | OUTPATIENT
Start: 2025-05-05 | End: 2025-05-05 | Stop reason: HOSPADM

## 2025-05-05 RX ORDER — MIDAZOLAM HYDROCHLORIDE 1 MG/ML
1 INJECTION, SOLUTION INTRAMUSCULAR; INTRAVENOUS
Status: DISCONTINUED | OUTPATIENT
Start: 2025-05-05 | End: 2025-05-05 | Stop reason: HOSPADM

## 2025-05-05 RX ORDER — BUPIVACAINE HCL/0.9 % NACL/PF 0.125 %
PLASTIC BAG, INJECTION (ML) EPIDURAL AS NEEDED
Status: DISCONTINUED | OUTPATIENT
Start: 2025-05-05 | End: 2025-05-05 | Stop reason: SURG

## 2025-05-05 RX ORDER — FENTANYL CITRATE 50 UG/ML
50 INJECTION, SOLUTION INTRAMUSCULAR; INTRAVENOUS ONCE AS NEEDED
Status: DISCONTINUED | OUTPATIENT
Start: 2025-05-05 | End: 2025-05-05 | Stop reason: HOSPADM

## 2025-05-05 RX ORDER — ONDANSETRON 2 MG/ML
4 INJECTION INTRAMUSCULAR; INTRAVENOUS ONCE AS NEEDED
Status: DISCONTINUED | OUTPATIENT
Start: 2025-05-05 | End: 2025-05-05 | Stop reason: HOSPADM

## 2025-05-05 RX ORDER — HYDROCODONE BITARTRATE AND ACETAMINOPHEN 5; 325 MG/1; MG/1
1 TABLET ORAL ONCE AS NEEDED
Status: DISCONTINUED | OUTPATIENT
Start: 2025-05-05 | End: 2025-05-05 | Stop reason: HOSPADM

## 2025-05-05 RX ORDER — IBUPROFEN 600 MG/1
600 TABLET, FILM COATED ORAL EVERY 6 HOURS PRN
Qty: 60 TABLET | Refills: 2 | Status: ON HOLD | OUTPATIENT
Start: 2025-05-05 | End: 2025-05-09 | Stop reason: ALTCHOICE

## 2025-05-05 RX ORDER — SODIUM CHLORIDE 0.9 % (FLUSH) 0.9 %
3-10 SYRINGE (ML) INJECTION AS NEEDED
Status: DISCONTINUED | OUTPATIENT
Start: 2025-05-05 | End: 2025-05-05 | Stop reason: HOSPADM

## 2025-05-05 RX ORDER — FLUMAZENIL 0.1 MG/ML
0.2 INJECTION INTRAVENOUS AS NEEDED
Status: DISCONTINUED | OUTPATIENT
Start: 2025-05-05 | End: 2025-05-05 | Stop reason: HOSPADM

## 2025-05-05 RX ORDER — HYDROCODONE BITARTRATE AND ACETAMINOPHEN 5; 325 MG/1; MG/1
1 TABLET ORAL ONCE AS NEEDED
Status: COMPLETED | OUTPATIENT
Start: 2025-05-05 | End: 2025-05-05

## 2025-05-05 RX ORDER — PROMETHAZINE HYDROCHLORIDE 12.5 MG/1
25 TABLET ORAL ONCE AS NEEDED
Status: DISCONTINUED | OUTPATIENT
Start: 2025-05-05 | End: 2025-05-05 | Stop reason: HOSPADM

## 2025-05-05 RX ORDER — FENTANYL CITRATE 50 UG/ML
INJECTION, SOLUTION INTRAMUSCULAR; INTRAVENOUS AS NEEDED
Status: DISCONTINUED | OUTPATIENT
Start: 2025-05-05 | End: 2025-05-05 | Stop reason: SURG

## 2025-05-05 RX ORDER — PROPOFOL 10 MG/ML
INJECTION, EMULSION INTRAVENOUS AS NEEDED
Status: DISCONTINUED | OUTPATIENT
Start: 2025-05-05 | End: 2025-05-05 | Stop reason: SURG

## 2025-05-05 RX ORDER — FAMOTIDINE 10 MG/ML
20 INJECTION, SOLUTION INTRAVENOUS ONCE
Status: COMPLETED | OUTPATIENT
Start: 2025-05-05 | End: 2025-05-05

## 2025-05-05 RX ORDER — HYDRALAZINE HYDROCHLORIDE 20 MG/ML
5 INJECTION INTRAMUSCULAR; INTRAVENOUS
Status: DISCONTINUED | OUTPATIENT
Start: 2025-05-05 | End: 2025-05-05 | Stop reason: HOSPADM

## 2025-05-05 RX ORDER — DEXAMETHASONE SODIUM PHOSPHATE 4 MG/ML
INJECTION, SOLUTION INTRA-ARTICULAR; INTRALESIONAL; INTRAMUSCULAR; INTRAVENOUS; SOFT TISSUE AS NEEDED
Status: DISCONTINUED | OUTPATIENT
Start: 2025-05-05 | End: 2025-05-05 | Stop reason: SURG

## 2025-05-05 RX ORDER — FENTANYL CITRATE 50 UG/ML
25 INJECTION, SOLUTION INTRAMUSCULAR; INTRAVENOUS
Status: DISCONTINUED | OUTPATIENT
Start: 2025-05-05 | End: 2025-05-05 | Stop reason: HOSPADM

## 2025-05-05 RX ORDER — LIDOCAINE HYDROCHLORIDE 20 MG/ML
INJECTION, SOLUTION INFILTRATION; PERINEURAL AS NEEDED
Status: DISCONTINUED | OUTPATIENT
Start: 2025-05-05 | End: 2025-05-05 | Stop reason: SURG

## 2025-05-05 RX ORDER — ONDANSETRON 4 MG/1
8 TABLET, ORALLY DISINTEGRATING ORAL ONCE AS NEEDED
Status: DISCONTINUED | OUTPATIENT
Start: 2025-05-05 | End: 2025-05-05 | Stop reason: HOSPADM

## 2025-05-05 RX ORDER — HYDROCODONE BITARTRATE AND ACETAMINOPHEN 7.5; 325 MG/1; MG/1
1 TABLET ORAL EVERY 4 HOURS PRN
Status: DISCONTINUED | OUTPATIENT
Start: 2025-05-05 | End: 2025-05-05 | Stop reason: HOSPADM

## 2025-05-05 RX ORDER — LABETALOL HYDROCHLORIDE 5 MG/ML
5 INJECTION, SOLUTION INTRAVENOUS
Status: DISCONTINUED | OUTPATIENT
Start: 2025-05-05 | End: 2025-05-05 | Stop reason: HOSPADM

## 2025-05-05 RX ORDER — ONDANSETRON 8 MG/1
8 TABLET, ORALLY DISINTEGRATING ORAL EVERY 8 HOURS PRN
Qty: 10 TABLET | Refills: 1 | Status: SHIPPED | OUTPATIENT
Start: 2025-05-05

## 2025-05-05 RX ADMIN — HYDROCODONE BITARTRATE AND ACETAMINOPHEN 1 TABLET: 5; 325 TABLET ORAL at 11:27

## 2025-05-05 RX ADMIN — DEXAMETHASONE SODIUM PHOSPHATE 8 MG: 4 INJECTION, SOLUTION INTRAMUSCULAR; INTRAVENOUS at 10:23

## 2025-05-05 RX ADMIN — LIDOCAINE HYDROCHLORIDE 60 MG: 20 INJECTION, SOLUTION INFILTRATION; PERINEURAL at 10:23

## 2025-05-05 RX ADMIN — MIDAZOLAM 1 MG: 1 INJECTION INTRAMUSCULAR; INTRAVENOUS at 09:47

## 2025-05-05 RX ADMIN — DROPERIDOL 1.25 MG: 2.5 INJECTION, SOLUTION INTRAMUSCULAR; INTRAVENOUS at 10:15

## 2025-05-05 RX ADMIN — FENTANYL CITRATE 50 MCG: 50 INJECTION, SOLUTION INTRAMUSCULAR; INTRAVENOUS at 10:47

## 2025-05-05 RX ADMIN — FAMOTIDINE 20 MG: 10 INJECTION, SOLUTION INTRAVENOUS at 09:46

## 2025-05-05 RX ADMIN — PROPOFOL 50 MG: 10 INJECTION, EMULSION INTRAVENOUS at 10:23

## 2025-05-05 RX ADMIN — PROPOFOL 50 MG: 10 INJECTION, EMULSION INTRAVENOUS at 10:28

## 2025-05-05 RX ADMIN — SODIUM CHLORIDE, POTASSIUM CHLORIDE, SODIUM LACTATE AND CALCIUM CHLORIDE 9 ML/HR: 600; 310; 30; 20 INJECTION, SOLUTION INTRAVENOUS at 09:14

## 2025-05-05 RX ADMIN — PROPOFOL 50 MG: 10 INJECTION, EMULSION INTRAVENOUS at 10:39

## 2025-05-05 RX ADMIN — Medication 100 MCG: at 10:49

## 2025-05-05 RX ADMIN — PROPOFOL 100 MCG/KG/MIN: 10 INJECTION, EMULSION INTRAVENOUS at 10:23

## 2025-05-05 NOTE — OP NOTE
Pre-Operative Diagnosis: Right chest wound      Post-Operative Diagnosis: Same     Procedure Performed: right chest wound VAC change     Surgeon: ANGELICA Bender MD     Assistant: None     Anesthesia: General     Estimated Blood Loss: <5cc     Specimens: None     Complications: None     Indications: Presents for vac change after loss of right sided reconstruction and radiation injury.     We discussed risks, benefits and alternatives including but not limited to: bleeding, infection, asymmetry, poor or slow wound healing, need for further surgery, possible recurrence.  The patient elected to proceed.     Description of Procedure: The patient was met in the preoperative holding area.  All questions were answered and informed consent was assured.       Right chest marked.  Transferred to OR.     After induction of appropriate anesthesia, a timeout was performed correctly identifying the patient, operative site, and procedure to be performed.  All present were in agreement.     Previous vac removed.  The site was prepped with betadine and towel draped.  The site was irrigated and there was no bleeding and reasonable granulation tissue developing. Wound measured 57i45uv and 3cm deep.  Skin opening 6x8cm with 9cm of tunneling superiorly.  Gray foam was trimmed appropriately and placed in wound.  The foam was sealed and suctiona applied.     The patient was then aroused from anesthesia with ease and transferred to the postoperative care area in good condition. All sponge, needle, and instrument counts were correct.

## 2025-05-05 NOTE — H&P
Allergies  Reviewed Allergies  PENICILLINS   Medications  Reviewed Medications  ALPRAZolam 0.25 mg tablet  06/17/24   filled surescripts  anastrozole 1 mg tablet  09/05/24   filled surescripts  atorvastatin 40 mg tablet  07/20/23   filled surescripts  buPROPion HCL  mg 24 hr tablet, extended release  10/24/24   filled surescripts  cyclobenzaprine 10 mg tablet  09/25/24   filled surescripts  escitalopram 20 mg tablet  10/23/24   filled surescripts  famotidine 20 mg tablet  09/25/24   filled surescripts  furosemide 20 mg tablet  06/01/24   filled surescripts  levocetirizine 5 mg tablet  05/28/24   filled surescripts  meloxicam 15 mg tablet  09/25/24   filled surescripts  olmesartan 40 mg tablet  09/25/24   filled surescripts  ondansetron 8 mg disintegrating tablet  07/11/24   filled surescripts  ondansetron HCL 8 mg tablet  10/28/24   filled surescripts  oxyCODONE 5 mg tablet  11/18/24   filled surescripts  Problems  Reviewed Problems  Family History  Family History not reviewed (last reviewed 04/15/2025)  Paternal Grandmother - Hypertensive disorder    - Heart disease  Mother - Arthritis    - Hypertensive disorder  Maternal Grandmother - Hypertensive disorder    - Heart disease  Maternal Grandfather - Hypertensive disorder    - Heart disease  Father - Chronic obstructive pulmonary disease    - Arthritis    - Hypertensive disorder    - Emphysema    - Heart disease  Social History  Social History not reviewed (last reviewed 05/30/2024)  Substance Use  Do you or have you ever smoked tobacco?: Former smoker  What is your level of alcohol consumption?: Occasional  Surgical & Procedure History  Surgical & Procedure History not reviewed (last reviewed 05/30/2024)  Back Surgery  Breast Surgery  Orthopedic Surgery  GYN History  (not configured)  Past Medical History  Past Medical History not reviewed (last reviewed 05/30/2024)  Back Pain: Y  Breast Mass/Cyst/Biopsy: Y  Cancer: Y  Depression/Anxiety: Y  High Blood  Pressure: Y  Joint Pain/Stiffness/Swelling: Y  Screening  None recorded.  HPI  She has reviewed is status post evacuation of the right breast hematoma and wound VAC placement. She has been feeling well the last few days with no fever. VAC is holding seal well.  ROS  Patient reports arthralgias/joint pain but reports no muscle aches and no muscle weakness. She reports non-healing areas and change in skin color but reports no abnormal mole. She reports no fever, no night sweats, no significant weight gain, and no significant weight loss. She reports no dry eyes and no vision change. She reports no difficulty hearing. She reports no frequent nosebleeds. She reports no sore throat and no bleeding gums. She reports no chest pain, no arm pain on exertion, and no shortness of breath when walking. She reports no cough, no wheezing, and no shortness of breath. She reports no abdominal pain, no nausea, and no vomiting. She reports no gait dysfunction and no paralysis. She reports no dementia and no delirium.  Physical Exam  Chaperone: Chaperone: present.    Constitutional: General Appearance: healthy-appearing, well-nourished, and well-developed. Level of Distress: NAD. Ambulation: ambulating normally.    Psychiatric: Mental Status: normal mood and affect and active and alert.    Head: Head: normocephalic and atraumatic.    Eyes: Lids and Conjunctivae: non-injected. Pupils: PERRLA. EOM: EOMI.    Cardiovascular: Heart Auscultation: RRR.    Breast: Breast Exam: Right chest with sealed VAC, no significant surrounding erythema.    Musculoskeletal:: Motor Strength and Tone: normal tone and motor strength. Joints, Bones, and Muscles: normal movement of all extremities. Extremities: no cyanosis or edema.    Neurologic: Gait and Station: normal gait and station.    Skin: Inspection and palpation: no rash or lesions.  Assessment / Plan  Discussed that at this point we will proceed with wound VAC change and assess how well the space is  consolidating and then decide whether to continue with wound VAC therapy and allow secondary intention healing or proceed with latissimus flap reconstruction of the chest wall. At this point she understands that volume replacement reconstruction is not an option and that the goal now is just to get a healed wound. Fortunately she will not be placed back on the Eliquis nor the Verzenio given the side effects she was having and the resolution of her PE.

## 2025-05-05 NOTE — ANESTHESIA PREPROCEDURE EVALUATION
Anesthesia Evaluation     Patient summary reviewed   no history of anesthetic complications:   NPO Solid Status: > 8 hours  NPO Liquid Status: > 2 hours           Airway   Mallampati: II  TM distance: >3 FB  Neck ROM: full  No difficulty expected  Dental - normal exam     Pulmonary     breath sounds clear to auscultation  (+) a smoker Former,  (-) shortness of breath, recent URICOPD: STOP BANG 3.  Cardiovascular   Exercise tolerance: good (4-7 METS)    ECG reviewed  Patient on routine beta blocker and Beta blocker given within 24 hours of surgery  Rhythm: regular  Rate: normal    (+) hypertension, hyperlipidemia  (-) past MI, dysrhythmias, angina    ROS comment: 3/2025 ECHO - Interpretation Summary  ·  Left ventricular systolic function is normal. Calculated left ventricular EF = 50% Left ventricular ejection fraction appears to be 46 - 50%.  ·  Left ventricular diastolic function is consistent with (grade I) impaired relaxation.  Average GLS -17%.  ·  Estimated right ventricular systolic pressure from tricuspid regurgitation is normal (<35 mmHg).  ·  No significant valvular abnormalities noted.      Neuro/Psych  (+) numbness, psychiatric history Anxiety and Depression  (-) seizures, CVA  GI/Hepatic/Renal/Endo    (+) GERD  (-)  obesity    Musculoskeletal     (+) back pain  (-) neck stiffness  Abdominal    Substance History   (+) alcohol use     OB/GYN    (-)  Pregnant        Other   arthritis,   history of cancer (right breast s/p resection)                Anesthesia Plan    ASA 3     MAC     (MAC anesthesia discussed with patient and/or patient representative. Risks (including but not limited to intra-op awareness), benefits, and alternatives were discussed. Understanding was voiced with an agreement to proceed with a MAC technique and General as a backup option. )  intravenous induction     Anesthetic plan, risks, benefits, and alternatives have been provided, discussed and informed consent has been obtained  with: patient.    CODE STATUS:

## 2025-05-05 NOTE — ANESTHESIA POSTPROCEDURE EVALUATION
"Patient: Dasha De Leon    Procedure Summary       Date: 05/05/25 Room / Location: [facility] OR 04 / [facility]    Anesthesia Start: 1013 Anesthesia Stop: 1116    Procedure: CHANGE WOUND VAC RIGHT BREAST (Right) Diagnosis:     Surgeons: Colin Bender MD Provider: Jamar Irizarry DO    Anesthesia Type: MAC ASA Status: 3            Anesthesia Type: MAC    Vitals  Vitals Value Taken Time   /68 05/05/25 11:31   Temp 36.4 °C (97.5 °F) 05/05/25 11:40   Pulse 70 05/05/25 11:40   Resp 21 05/05/25 11:30   SpO2 96 % 05/05/25 11:40   Vitals shown include unfiled device data.        Post Anesthesia Care and Evaluation    Patient location during evaluation: bedside  Patient participation: complete - patient participated  Level of consciousness: awake and alert  Pain management: adequate    Airway patency: patent  Anesthetic complications: No anesthetic complications  PONV Status: controlled  Cardiovascular status: acceptable and hemodynamically stable  Respiratory status: acceptable, spontaneous ventilation and nonlabored ventilation  Hydration status: acceptable    Comments: /54 (Patient Position: Lying)   Pulse 72   Temp 36.2 °C (97.2 °F) (Temporal)   Resp 20   Ht 167.6 cm (66\")   Wt 72 kg (158 lb 12.8 oz)   LMP  (LMP Unknown)   SpO2 98%   BMI 25.63 kg/m²       "

## 2025-05-07 NOTE — PROGRESS NOTES
Subjective   Dasha De Leon is a 54 y.o. female.  Referred by Dr. De Anda for right breast invasive ductal carcinoma    History of Present Illness     Patient is a 54-year-old postmenopausal  lady who presented with a palpable mass in her right breast in mid April.  This was right around the time of her screening mammogram and hence she proceeded with a screening mammogram.  She denies any previous breast abnormalities or biopsies.  No family history of breast or ovarian carcinoma.    4/29/2024-bilateral screening mammogram  Breasts are entirely fatty.  Bilateral retropectoral saline implants.  Round mass measuring 16 mm with spiculated margins in the posterior one third region of the right breast at 9:00.  This is new since the prior exams.  No suspicious masses in the left breast.    5/10/2024-right breast diagnostic mammogram  Breast is entirely fatty.  Retropectoral saline implants.  Finding 1.round mass in the right breast at 9:00 previously seen on the screening mammogram, measuring 16 mm with spiculated margin.    Right breast ultrasound  Finding 1.hypoechoic mass with spiculated margins and internal vascularity measuring 15 mm at 8:00, 10 cm from the nipple.  Finding 2.irregular  Palpable hypoechoic mass measuring 4 x 6 x 4 mm at 8:30 position in the right breast, 11 cm from the nipple.    Ultrasound-guided biopsy of both these masses recommended.    5/15/2024-right breast ultrasound-guided biopsy  1.8 o'clock position right breast-invasive ductal carcinoma with apocrine features  Grade 2  ER +91 to 100% strong  MD +91 to 100% strong  HER2 negative, 1+  Ki-67 22%  2.right breast 8:30 position  Invasive ductal carcinoma with apocrine features, grade 2  ER +91 to 100% strong  MD +91 to 100% strong  HER2 negative, Ki-67 20%.    5/25/2024-bilateral breast MRI  1.2 biopsy sites of malignancy at 8:30 position in the posterior one third that measured 2.3 cm and 2.4 cm respectively.  The lesions either  about or extend to the lateral margin of the pectoral fascia that overlies the implant capsule and some extension into the pectoral muscle at this location and/or biologic capsular suspect.  Finding suspicious for axilla lymphadenopathy.  Right axilla lymph node measuring 1.4 cm.  2.no suspicious findings in the left breast.    7/11/2024-right mastectomy and sentinel lymph node biopsy  Invasive ductal carcinoma measuring 45 x 20 x 14 mm  Grade 3  Extensive lymphovascular invasion present.  Invasive carcinoma present extensively at the anterior margin  Distance to the invasive carcinoma from the posterior margin 0.9 mm  1 lymph node with macrometastasis measuring 12 mm, 2 mm extranodal extension  At least 3 sentinel lymph nodes  Total number of [place] and nonsentinel lymph nodes evaluated 5  PT2 N1a MX  Receptors repeated on the lymph node with estrogen receptor +91 to 100% strong, progesterone receptor +91 to 100% strong, HER2 negative, score 0, Ki-67 35%    Given the positive margin additional surgery performed on 7/26/2024 with ultimately negative margins.  Expander placed.    Patient presents today to discuss adjuvant therapy.    Oncotype DX recurrence score 18 with no additional benefit from chemotherapy and 5-year risk of distant metastasis being 4%.      Verzenio started 12/6/2024.    Patient was seen on 1/9/2025 when she complained of right side chest pain which had just started and she was going to call the office back if the symptoms persisted or got worse.    On 2/6/2025 she presented back for routine follow-up while on Verzenio and she complained of ongoing right sided chest pain and also reported worsening dyspnea.    2/6/2025-CT angiogram of the chest with very significant streak artifact from the right chest wall tissue expander and there was heterogeneous admixture of contrast at the pulmonary arteries particularly on the right.  Despite all the limitations there are findings suspicious for right  pulmonary thromboemboli.  Small eccentric thrombus at the proximal right interlobar artery adjacent to the right middle lobe pulmonary artery.  There is also likely a thrombus.  Likely nonocclusive thrombus within the segmental right lower lobe pulmonary arteries.    Dense consolidation in the right apex and some of the lateral peripheral subpleural opacities of the right upper lobe likely represent radiation pneumonitis and fibrosis but there is also mixed density and groundglass opacities at the right lower lobe which are suspicious for sequela of pulmonary thromboemboli.  Tiny right pleural effusion.  No left lung opacities and there is no left pleural effusion or pericardial effusion.    Abdomen and pelvis CT without any evidence of metastatic disease.  Moderately extensive abdominal aortic atherosclerotic changes without aneurysmal dilatation.    Patient was advised to stop Verzenio on 2/6/2025 after diagnosis of the pulmonary embolism.    Interval history:  The patient returns today for follow up. She has a wound vac on her right breast and is scheduled tomorrow to undergo latissimus flap reconstruction of the right breast with Dr. Bender. She unfortunately has been in and out of the hospital due to complications with the right breast, most recently being admitted in April for hematoma evacuation. She continues on anastrozole and is tolerating this well. She denies new concerns today outside of the ongoing surgical complications of the right breast. She continues to hold verzenio and Eliquis is currently on hold as well in anticipation of surgery tomorrow.     The following portions of the patient's history were reviewed and updated as appropriate: allergies, current medications, past family history, past medical history, past social history, past surgical history, and problem list.    Past Medical History:   Diagnosis Date    Abnormal Pap smear of cervix     Anemia in neoplastic disease 4/8/2025    Anxiety      Baker's cyst     RESOLVED    Breast cancer     Right    Bulging lumbar disc     Bursitis     HX    Depression     GERD (gastroesophageal reflux disease)     H/O colposcopy with cervical biopsy     unknown pap result, biopsy was neg per pt, 2013 [facility]    History of 2019 novel coronavirus disease (COVID-19)     X2    History of eczema     HPV (human papilloma virus) infection     Hyperlipemia     Hypertension     Low back pain     Lumbar radiculopathy     Numbness and tingling of left leg     Osteoarthritis     Scoliosis     Seasonal allergies         Past Surgical History:   Procedure Laterality Date    BREAST AUGMENTATION      BREAST BIOPSY Right 9/19/2024    Procedure: AXILLARY LYMPH NODE BIOPSY/EXCISION;  Surgeon: Romina De Anda MD;  Location: [facility] OR;  Service: General;  Laterality: Right;    BREAST RECONSTRUCTION Right 7/11/2024    Procedure: RIGHT SUBPECTORAL PLACEMENT TISSUE EXPANDER AND CORTIVA (ACELLULAR DERMAL MATRIX), COMPLEX CLOSURE RIGHT BREAST 5cm;  Surgeon: Colin Bender MD;  Location: [facility] OR;  Service: Plastics;  Laterality: Right;    BREAST SURGERY Right 7/26/2024    Procedure: ADJACENT TISSUE REARRANGEMENT RIGHT BREAST;  Surgeon: Colin Bender MD;  Location: [facility] OR;  Service: Plastics;  Laterality: Right;    BREAST SURGERY Right 4/8/2025    Procedure: ADJACENT REARRANFEMENT;  Surgeon: Colin Bender MD;  Location: [facility] OR;  Service: Plastics;  Laterality: Right;    BREAST SURGERY Right 4/19/2025    Procedure: EVACUATION HEMATOMA RIGHT BREAST, WOUND VAC PLACEMENT;  Surgeon: Colin Bender MD;  Location: [facility] OR;  Service: Plastics;  Laterality: Right;    CARPAL TUNNEL RELEASE Right 2004    CRYOTHERAPY      1997    D & C HYSTEROSCOPY N/A 9/19/2024    Procedure: INTRAUTERINE DEVICE REMOVAL;  Surgeon: Kaleb Burciaga MD;  Location: [facility] OR;  Service: Obstetrics/Gynecology;  Laterality: N/A;    EPIDURAL BLOCK      HAND  "SURGERY  2004    Pisiformectomy    INCISION AND DRAINAGE TRUNK Right 4/8/2025    Procedure: RIGHT BREAST DEBRIDMENT;  Surgeon: Colin Bender MD;  Location: [facility] MAIN OR;  Service: Plastics;  Laterality: Right;    LUMBAR DISCECTOMY FUSION INSTRUMENTATION N/A 01/05/2024    Procedure: Lumbar 3 to lumbar 4 and lumbar 4 to lumbar 5 laminectomy with fusion and instrumentation;  Surgeon: Rigoberto Botello MD;  Location: [facility] MAIN OR;  Service: Robotics - Neuro;  Laterality: N/A;    MAMMO BILATERAL  2014    MASTECTOMY W/ SENTINEL NODE BIOPSY Right 7/11/2024    Procedure: Right MASTECTOMY WITH SENTINEL NODE BIOPSY;  Surgeon: Romina De Anda MD;  Location: [facility] MAIN OR;  Service: General;  Laterality: Right;    MASTECTOMY WITH IMMEDIATE RECONSTRUCTION Right 7/26/2024    Procedure: right mastectomy, margin excision;  Surgeon: Romina De Anda MD;  Location: [facility] MAIN OR;  Service: General;  Laterality: Right;    PAP SMEAR  [id number]    PLACEMENT OF WOUND VAC Right 4/28/2025    Procedure: WOUND VAC CHANGE RIGHT BREAST;  Surgeon: Colin Bender MD;  Location: [facility] MAIN OR;  Service: Plastics;  Laterality: Right;    SHOULDER ARTHROSCOPY Left 2002,2003    SHOULDER SURGERY      \"MANIPULATION FOR FROZEN SHOULDER\"    US GUIDED LYMPH NODE BIOPSY  8/29/2024        Family History   Problem Relation Age of Onset    Hypertension Mother     Hyperlipidemia Mother     Diverticulitis Mother     Pancreatitis Mother     Kidney disease Father     Skin cancer Father     Hypertension Father     Hyperlipidemia Father     Stroke Father     Atrial fibrillation Father     Transient ischemic attack Father     No Known Problems Sister     Depression Brother     No Known Problems Daughter     No Known Problems Son     Uterine cancer Maternal Aunt 58    Heart disease Maternal Aunt     Lung cancer Maternal Uncle     Heart attack Maternal Uncle     No Known Problems Paternal Aunt     Colon cancer Paternal Uncle 65    Liver disease Maternal " Grandmother     Heart disease Maternal Grandfather     Cancer Maternal Grandfather     Heart attack Paternal Grandmother     No Known Problems Paternal Grandfather     BRCA 1/2 Neg Hx     Breast cancer Neg Hx     Endometrial cancer Neg Hx     Ovarian cancer Neg Hx     Malig Hyperthermia Neg Hx         Social History     Socioeconomic History    Marital status: Single   Tobacco Use    Smoking status: Former     Current packs/day: 0.00     Average packs/day: 1 pack/day for 20.0 years (20.0 ttl pk-yrs)     Types: Cigarettes     Start date:      Quit date:      Years since quittin.3     Passive exposure: Past    Smokeless tobacco: Never   Vaping Use    Vaping status: Never Used   Substance and Sexual Activity    Alcohol use: Yes     Alcohol/week: 2.0 - 4.0 standard drinks of alcohol     Types: 2 - 4 Glasses of wine per week     Comment: weekly    Drug use: No    Sexual activity: Defer        OB History          0    Para   0    Term   0       0    AB   0    Living   0         SAB   0    IAB   0    Ectopic   0    Molar        Multiple   0    Live Births                   Age at menarche-13  Age at first live childbirth-not applicable   0 para 0  0  Age at menopause-49  Oral conceptive pill use for 25 years  No use of hormone replacement therapy    Allergies   Allergen Reactions    Penicillins Anaphylaxis    Hemp Seed Oil Itching     HEMP IN LOTIONS    Lisinopril Cough        Objective   not currently breastfeeding.       Physical Exam  Constitutional:       Appearance: Normal appearance.   Cardiovascular:      Rate and Rhythm: Normal rate.      Heart sounds: Normal heart sounds.   Pulmonary:      Effort: Pulmonary effort is normal.      Breath sounds: Normal breath sounds.   Skin:     General: Skin is warm and dry.   Neurological:      Mental Status: She is oriented to person, place, and time.   Psychiatric:         Mood and Affect: Mood normal.         Behavior: Behavior normal.        Breast Exam:   Right breast- s/p mastectomy with reconstruction complicated by hematoma, now currently with a wound vac in place.       CT Chest With Contrast Diagnostic  Result Date: 4/18/2025   1. The patient has undergone removal of the right-sided tissue expander. There is a mixed density collection within the operative bed, favored to reflect hematoma. Surgical drain is noted within the operative bed. There is also expected soft tissue air. 2. On prior exam, the patient was noted to have patchy infiltrates within the right lung these appear improved when compared to prior exam. However, the patient has some peripheral patchy opacities within the left lung, which are new when compared to prior exam, which may be infectious or inflammatory. Short-term follow-up exam to document resolution is recommended.  Radiation dose reduction techniques were utilized, including automated exposure control and exposure modulation based on body size.   This report was finalized on 4/18/2025 2:19 AM by Dr. Fely Carr M.D on Workstation: [id number]         Assessment & Plan   *Right breast invasive ductal carcinoma  Status post right mastectomy on 7/11/2024-pathology with 45 mm of invasive ductal carcinoma, grade 3, ER/CO strongly positive at 91 to 100%, HER2 1+, Ki-67 22%  1 out of 5 lymph nodes positive for metastatic disease and receptors on lymph node also ER positive and CO positive.  PT2 N1a MX  8/12/2024-CT of the chest abdomen pelvis-single mildly enlarged right axillary lymph node suspicious for sharona metastasis.  No evidence of distant metastatic disease in the patient with recent right mastectomy.  Mild hepatic steatosis.  IUD present.  Duodenal diverticulum present.  9/19/2024-abnormal lymph node excision.  1 lymph node involved by macrometastatic carcinoma measuring 9.5 mm, greater than 2 mm extranodal extension.  Oncotype DX recurrence score 18 with no additional benefit from chemotherapy and 4% risk  of distant metastasis at 5 years  Given the low Oncotype DX recurrence score she will proceed with adjuvant radiation.  Started anastrozole November 2024  Radiation complete 12/3/2024  12/4/2024 fair tolerance to anastrozole.   12/6/2024-started Verzenio.  1/9/2025-patient continues on full dose of Verzenio 150 mg twice daily and labs reviewed and stable to proceed with the same.  2/6/2025-patient reports severe right-sided rib pain as well as dyspnea, tachycardia and severe nausea.  2/6/2025-CT angiogram of the chest with right sided pulmonary emboli.  There is also significant streak artifact and also radiation-induced changes of the right apex.  No evidence of metastatic disease.  2/6/2025-CT abdomen and pelvis without any evidence of metastatic disease  2/18/2025-bone scan without any evidence of metastatic disease.  Verzenio was held since 2/6/2025.  She continues on anastrozole  Today we had a long discussion regarding the increased risk of DVT and PE associated with Verzenio.  However given that the tumor was 45 mm high-grade and 2 lymph nodes were involved with malignancy the risk of recurrence is high enough that I would recommend that she continue with Verzenio and continue anticoagulation for 2 years while she is on Verzenio.  I however did explain to the patient that there is no data about how we should proceed if someone developed a DVT or PE on Verzenio.  She does understand that there is a increased risk of DVT and PE with Verzenio.  2/20/2025 resumed Verzenio at 100 mg twice daily and subsequently increased to 150 mg  3/15/2025-dose of Verzenio decreased to 100 mg twice daily.  3/28/2025-patient tolerating the 100 mg twice daily of Verzenio well with resolution of diarrhea and abdominal pain however she continues to experience severe fatigue.  Verzenio held April 2025.  She underwent debridement of the right skin and subcutaneous tissue on 4/9/2025.  She required hospital admission because she  experienced excessive bleeding at home on 4/18/2025 with lightheadedness.  Hemoglobin was 7.7.  She received 2 units PRBC.  She went back to OR for hematoma evacuation with placement of right chest wall wound vac.  Due to complications Verzenio remained on hold.  5/9/2025: Wound vac remains on her right breast, changed again on 5/5/2025. She is scheduled to undergo latissimus flap reconstruction tomorrow morning. She continues on anastrozole and is tolerating this well. Verzenio remains on hold.     *Severe fatigue  Secondary to endocrine therapy as well as Verzenio.  Patient will be referred to supportive oncology to discuss Ritalin.  Continues Ritalin.    *Right sided rib pain  Acute onset on 1/8/2025  Secondary to pulmonary embolism and infarction.  Notes overall improvement in her right chest wall pain    *Diarrhea  Improved significantly with dose reduction.  Continue Verzenio 100 mg twice daily  Continue Imodium as needed    *Nausea  Severe secondary to Verzenio  Exacerbated by GERD  Improved with Protonix  Continue Protonix    *GERD  Started Protonix 40 mg daily  Improved    *Dizziness  Resolved  Blood pressure normal    *Anxiety and depression  Continue Wellbutrin, Lexapro  Well-controlled at this time    *Hypertension  Blood pressure BP: 110/74    Continue current medications    *Hyperlipidemia  Continue Crestor    *Back issues/pain  Status with spinal fusion  Continue Flexeril  Encouraged to contact Dr. Botello regarding intermittent popping and pain in her back    *Bone health  Baseline DEXA scan 8/29/2024 noted normal bone density  Discussed Zometa.  Dental clearance form provided to patient today  Awaiting dental clearance     *Pulmonary embolism  Started on Eliquis 10 mg twice daily and currently on Eliquis 5 mg twice daily  Plan to continue anticoagulation for 2 years for as long as she is on the Verzenio  Likely secondary to Verzenio  Thrombophilia workup negative  5/9/2025: Continue Eliquis 5mg BID.  Tolerating well.     *Right chest wall hemorrhage  Patient had severe bleeding on 3/27/2025 and hemoglobin has dropped to 9.8.  Recommend that she contact Dr. Bender's office regarding the same.  She is also scheduled for reconstructive surgery 4/28/2025  She would have to hold the Verzenio 1 week before and 1 week after  Also recommend that she be on Lovenox while she is holding Eliquis.  Recommend that she discuss this with Dr. Cosme as well.  She underwent debridement of the right skin and subcutaneous tissue on 4/9/2025.  She required hospital admission because she experienced excessive bleeding at home on 4/18/2025 with lightheadedness.  Hemoglobin was 7.7.  She received 2 units PRBC.  She went back to OR for hematoma evacuation with placement of right chest wall wound vac.   5/9/2025: She is scheduled to undergo lattisimus flap reconstruction of the right breast tomorrow.     PLAN:   The patient has completed adjuvant radiation 12/3/2024  Continue anastrozole 1 mg daily.  Initiated November 2024.  Continue to hold Verzenio 100 mg twice daily.  Initiated December 2024.   Continue Protonix 40 mg daily. Refill sent today.   Continue Pepcid as needed  Continue follow up with supportive oncology.  Continue Eliquis 5mg BID for the duration of verzenio. Eliquis currently on hold in anticipation of surgical procedure tomorrow.   Continue Zofran  Imodium as needed, currently no diarrhea  Return to clinic on 4/30/2025 for MD visit with CBC and CMP.     Patient is on medications requiring close monitoring for toxicities, pulmonary embolism secondary to Verzenio.  Review of hospital records.    Rachel Green, CARITO  05/08/2025

## 2025-05-08 ENCOUNTER — OFFICE VISIT (OUTPATIENT)
Dept: ONCOLOGY | Facility: CLINIC | Age: 55
End: 2025-05-08
Payer: COMMERCIAL

## 2025-05-08 ENCOUNTER — SPECIALTY PHARMACY (OUTPATIENT)
Dept: PHARMACY | Facility: HOSPITAL | Age: 55
End: 2025-05-08
Payer: COMMERCIAL

## 2025-05-08 ENCOUNTER — LAB (OUTPATIENT)
Dept: LAB | Facility: HOSPITAL | Age: 55
End: 2025-05-08
Payer: COMMERCIAL

## 2025-05-08 VITALS
TEMPERATURE: 97.3 F | WEIGHT: 160 LBS | DIASTOLIC BLOOD PRESSURE: 74 MMHG | BODY MASS INDEX: 25.71 KG/M2 | OXYGEN SATURATION: 95 % | HEART RATE: 72 BPM | RESPIRATION RATE: 14 BRPM | SYSTOLIC BLOOD PRESSURE: 110 MMHG | HEIGHT: 66 IN

## 2025-05-08 DIAGNOSIS — C50.511 MALIGNANT NEOPLASM OF LOWER-OUTER QUADRANT OF RIGHT BREAST OF FEMALE, ESTROGEN RECEPTOR POSITIVE: ICD-10-CM

## 2025-05-08 DIAGNOSIS — C50.511 MALIGNANT NEOPLASM OF LOWER-OUTER QUADRANT OF RIGHT BREAST OF FEMALE, ESTROGEN RECEPTOR POSITIVE: Primary | ICD-10-CM

## 2025-05-08 DIAGNOSIS — Z17.0 MALIGNANT NEOPLASM OF LOWER-OUTER QUADRANT OF RIGHT BREAST OF FEMALE, ESTROGEN RECEPTOR POSITIVE: ICD-10-CM

## 2025-05-08 DIAGNOSIS — Z17.0 MALIGNANT NEOPLASM OF LOWER-OUTER QUADRANT OF RIGHT BREAST OF FEMALE, ESTROGEN RECEPTOR POSITIVE: Primary | ICD-10-CM

## 2025-05-08 LAB
ALBUMIN SERPL-MCNC: 3.9 G/DL (ref 3.5–5.2)
ALBUMIN/GLOB SERPL: 1.6 G/DL
ALP SERPL-CCNC: 103 U/L (ref 39–117)
ALT SERPL W P-5'-P-CCNC: 32 U/L (ref 1–33)
ANION GAP SERPL CALCULATED.3IONS-SCNC: 9.7 MMOL/L (ref 5–15)
AST SERPL-CCNC: 32 U/L (ref 1–32)
BASOPHILS # BLD AUTO: 0.08 10*3/MM3 (ref 0–0.2)
BASOPHILS NFR BLD AUTO: 1 % (ref 0–1.5)
BILIRUB SERPL-MCNC: 0.2 MG/DL (ref 0–1.2)
BUN SERPL-MCNC: 14 MG/DL (ref 6–20)
BUN/CREAT SERPL: 17.1 (ref 7–25)
CALCIUM SPEC-SCNC: 9.2 MG/DL (ref 8.6–10.5)
CHLORIDE SERPL-SCNC: 104 MMOL/L (ref 98–107)
CO2 SERPL-SCNC: 27.3 MMOL/L (ref 22–29)
CREAT SERPL-MCNC: 0.82 MG/DL (ref 0.57–1)
DEPRECATED RDW RBC AUTO: 48.5 FL (ref 37–54)
EGFRCR SERPLBLD CKD-EPI 2021: 85.1 ML/MIN/1.73
EOSINOPHIL # BLD AUTO: 0.53 10*3/MM3 (ref 0–0.4)
EOSINOPHIL NFR BLD AUTO: 6.4 % (ref 0.3–6.2)
ERYTHROCYTE [DISTWIDTH] IN BLOOD BY AUTOMATED COUNT: 13.8 % (ref 12.3–15.4)
GLOBULIN UR ELPH-MCNC: 2.5 GM/DL
GLUCOSE SERPL-MCNC: 83 MG/DL (ref 65–99)
HCT VFR BLD AUTO: 38.1 % (ref 34–46.6)
HGB BLD-MCNC: 11.7 G/DL (ref 12–15.9)
IMM GRANULOCYTES # BLD AUTO: 0.26 10*3/MM3 (ref 0–0.05)
IMM GRANULOCYTES NFR BLD AUTO: 3.1 % (ref 0–0.5)
LYMPHOCYTES # BLD AUTO: 1.82 10*3/MM3 (ref 0.7–3.1)
LYMPHOCYTES NFR BLD AUTO: 22 % (ref 19.6–45.3)
MCH RBC QN AUTO: 29 PG (ref 26.6–33)
MCHC RBC AUTO-ENTMCNC: 30.7 G/DL (ref 31.5–35.7)
MCV RBC AUTO: 94.3 FL (ref 79–97)
MONOCYTES # BLD AUTO: 1.23 10*3/MM3 (ref 0.1–0.9)
MONOCYTES NFR BLD AUTO: 14.8 % (ref 5–12)
NEUTROPHILS NFR BLD AUTO: 4.37 10*3/MM3 (ref 1.7–7)
NEUTROPHILS NFR BLD AUTO: 52.7 % (ref 42.7–76)
NRBC BLD AUTO-RTO: 0 /100 WBC (ref 0–0.2)
PLATELET # BLD AUTO: 320 10*3/MM3 (ref 140–450)
PMV BLD AUTO: 9.2 FL (ref 6–12)
POTASSIUM SERPL-SCNC: 4 MMOL/L (ref 3.5–5.2)
PROT SERPL-MCNC: 6.4 G/DL (ref 6–8.5)
RBC # BLD AUTO: 4.04 10*6/MM3 (ref 3.77–5.28)
SODIUM SERPL-SCNC: 141 MMOL/L (ref 136–145)
WBC NRBC COR # BLD AUTO: 8.29 10*3/MM3 (ref 3.4–10.8)

## 2025-05-08 PROCEDURE — 36415 COLL VENOUS BLD VENIPUNCTURE: CPT

## 2025-05-08 PROCEDURE — 80053 COMPREHEN METABOLIC PANEL: CPT

## 2025-05-08 PROCEDURE — 85025 COMPLETE CBC W/AUTO DIFF WBC: CPT

## 2025-05-08 RX ORDER — PANTOPRAZOLE SODIUM 40 MG/1
40 TABLET, DELAYED RELEASE ORAL DAILY
Qty: 90 TABLET | Refills: 3 | Status: SHIPPED | OUTPATIENT
Start: 2025-05-08

## 2025-05-08 NOTE — PROGRESS NOTES
Specialty Pharmacy Patient Management Program       I have been following pt for Verzenio restart. She was seen this morning by Rachel KEATING NP-per Rachel's note pt will continue to hold Verzenio.    Pt is to return to see Dr Huang on 5/30/2025. I will continue to follow.    Arminda Baumann, Pharmacy Technician  05/08/25  09:08 EDT

## 2025-05-09 ENCOUNTER — HOSPITAL ENCOUNTER (INPATIENT)
Facility: HOSPITAL | Age: 55
LOS: 1 days | Discharge: HOME | End: 2025-05-10
Attending: PLASTIC SURGERY | Admitting: PLASTIC SURGERY
Payer: COMMERCIAL

## 2025-05-09 ENCOUNTER — ANESTHESIA EVENT (OUTPATIENT)
Dept: PERIOP | Facility: HOSPITAL | Age: 55
End: 2025-05-09
Payer: COMMERCIAL

## 2025-05-09 ENCOUNTER — ANESTHESIA (OUTPATIENT)
Dept: PERIOP | Facility: HOSPITAL | Age: 55
End: 2025-05-09
Payer: COMMERCIAL

## 2025-05-09 DIAGNOSIS — C50.511 MALIGNANT NEOPLASM OF LOWER-OUTER QUADRANT OF RIGHT BREAST OF FEMALE, ESTROGEN RECEPTOR POSITIVE: Primary | ICD-10-CM

## 2025-05-09 DIAGNOSIS — Z85.3 HX: BREAST CANCER: ICD-10-CM

## 2025-05-09 DIAGNOSIS — Z17.0 MALIGNANT NEOPLASM OF LOWER-OUTER QUADRANT OF RIGHT BREAST OF FEMALE, ESTROGEN RECEPTOR POSITIVE: Primary | ICD-10-CM

## 2025-05-09 PROCEDURE — 25010000002 FENTANYL CITRATE (PF) 50 MCG/ML SOLUTION

## 2025-05-09 PROCEDURE — 25010000002 MIDAZOLAM PER 1 MG: Performed by: ANESTHESIOLOGY

## 2025-05-09 PROCEDURE — 25010000002 GENTAMICIN PER 80 MG: Performed by: PLASTIC SURGERY

## 2025-05-09 PROCEDURE — 25810000003 LACTATED RINGERS PER 1000 ML: Performed by: ANESTHESIOLOGY

## 2025-05-09 PROCEDURE — 0KXF0Z5 TRANSFER RIGHT TRUNK MUSCLE, LATISSIMUS DORSI MYOCUTANEOUS FLAP, OPEN APPROACH: ICD-10-PCS | Performed by: PLASTIC SURGERY

## 2025-05-09 PROCEDURE — 25010000002 PROPOFOL 10 MG/ML EMULSION

## 2025-05-09 PROCEDURE — 25010000002 MAGNESIUM SULFATE PER 500 MG OF MAGNESIUM

## 2025-05-09 PROCEDURE — 25010000002 CLINDAMYCIN 300 MG/50ML SOLUTION: Performed by: PLASTIC SURGERY

## 2025-05-09 PROCEDURE — 88302 TISSUE EXAM BY PATHOLOGIST: CPT | Performed by: PLASTIC SURGERY

## 2025-05-09 PROCEDURE — 25010000002 HYDROMORPHONE PER 4 MG

## 2025-05-09 PROCEDURE — 25010000002 LIDOCAINE 1% - EPINEPHRINE 1:100000 1 %-1:100000 SOLUTION 30 ML VIAL: Performed by: PLASTIC SURGERY

## 2025-05-09 PROCEDURE — 0HB5XZZ EXCISION OF CHEST SKIN, EXTERNAL APPROACH: ICD-10-PCS | Performed by: PLASTIC SURGERY

## 2025-05-09 PROCEDURE — 25010000002 LIDOCAINE 2% SOLUTION

## 2025-05-09 PROCEDURE — 25010000002 DEXAMETHASONE PER 1 MG

## 2025-05-09 PROCEDURE — 63710000001 ONDANSETRON PER 8 MG: Performed by: PLASTIC SURGERY

## 2025-05-09 PROCEDURE — 25010000002 FAMOTIDINE 10 MG/ML SOLUTION: Performed by: ANESTHESIOLOGY

## 2025-05-09 PROCEDURE — 25010000002 SUGAMMADEX 200 MG/2ML SOLUTION

## 2025-05-09 PROCEDURE — 25010000002 CLINDAMYCIN 900 MG/50ML SOLUTION: Performed by: PLASTIC SURGERY

## 2025-05-09 DEVICE — KNOTLESS TISSUE CONTROL DEVICE, UNDYED UNIDIRECTIONAL (ANTIBACTERIAL) SYNTHETIC ABSORBABLE DEVICE
Type: IMPLANTABLE DEVICE | Site: BREAST | Status: FUNCTIONAL
Brand: STRATAFIX

## 2025-05-09 RX ORDER — PANTOPRAZOLE SODIUM 40 MG/1
40 TABLET, DELAYED RELEASE ORAL DAILY
Status: DISCONTINUED | OUTPATIENT
Start: 2025-05-09 | End: 2025-05-10 | Stop reason: HOSPADM

## 2025-05-09 RX ORDER — HYDROMORPHONE HYDROCHLORIDE 1 MG/ML
0.25 INJECTION, SOLUTION INTRAMUSCULAR; INTRAVENOUS; SUBCUTANEOUS
Status: DISCONTINUED | OUTPATIENT
Start: 2025-05-09 | End: 2025-05-10 | Stop reason: HOSPADM

## 2025-05-09 RX ORDER — SODIUM CHLORIDE 0.9 % (FLUSH) 0.9 %
3-10 SYRINGE (ML) INJECTION AS NEEDED
Status: DISCONTINUED | OUTPATIENT
Start: 2025-05-09 | End: 2025-05-10 | Stop reason: HOSPADM

## 2025-05-09 RX ORDER — DROPERIDOL 2.5 MG/ML
0.62 INJECTION, SOLUTION INTRAMUSCULAR; INTRAVENOUS
Status: DISCONTINUED | OUTPATIENT
Start: 2025-05-09 | End: 2025-05-09 | Stop reason: HOSPADM

## 2025-05-09 RX ORDER — SODIUM CHLORIDE 0.9 % (FLUSH) 0.9 %
3 SYRINGE (ML) INJECTION EVERY 12 HOURS SCHEDULED
Status: DISCONTINUED | OUTPATIENT
Start: 2025-05-09 | End: 2025-05-10 | Stop reason: HOSPADM

## 2025-05-09 RX ORDER — ONDANSETRON 2 MG/ML
4 INJECTION INTRAMUSCULAR; INTRAVENOUS EVERY 6 HOURS PRN
Status: DISCONTINUED | OUTPATIENT
Start: 2025-05-09 | End: 2025-05-10 | Stop reason: HOSPADM

## 2025-05-09 RX ORDER — FAMOTIDINE 20 MG/1
20 TABLET, FILM COATED ORAL 2 TIMES DAILY
Status: DISCONTINUED | OUTPATIENT
Start: 2025-05-09 | End: 2025-05-10 | Stop reason: HOSPADM

## 2025-05-09 RX ORDER — ATORVASTATIN CALCIUM 20 MG/1
80 TABLET, FILM COATED ORAL NIGHTLY
Status: DISCONTINUED | OUTPATIENT
Start: 2025-05-09 | End: 2025-05-10 | Stop reason: HOSPADM

## 2025-05-09 RX ORDER — FENTANYL CITRATE 50 UG/ML
INJECTION, SOLUTION INTRAMUSCULAR; INTRAVENOUS AS NEEDED
Status: DISCONTINUED | OUTPATIENT
Start: 2025-05-09 | End: 2025-05-09 | Stop reason: SURG

## 2025-05-09 RX ORDER — IPRATROPIUM BROMIDE AND ALBUTEROL SULFATE 2.5; .5 MG/3ML; MG/3ML
3 SOLUTION RESPIRATORY (INHALATION) ONCE AS NEEDED
Status: DISCONTINUED | OUTPATIENT
Start: 2025-05-09 | End: 2025-05-09 | Stop reason: HOSPADM

## 2025-05-09 RX ORDER — DIPHENHYDRAMINE HYDROCHLORIDE 50 MG/ML
12.5 INJECTION, SOLUTION INTRAMUSCULAR; INTRAVENOUS
Status: DISCONTINUED | OUTPATIENT
Start: 2025-05-09 | End: 2025-05-09 | Stop reason: HOSPADM

## 2025-05-09 RX ORDER — ACETAMINOPHEN 650 MG
TABLET, EXTENDED RELEASE ORAL AS NEEDED
Status: DISCONTINUED | OUTPATIENT
Start: 2025-05-09 | End: 2025-05-09 | Stop reason: HOSPADM

## 2025-05-09 RX ORDER — ONDANSETRON 2 MG/ML
4 INJECTION INTRAMUSCULAR; INTRAVENOUS ONCE AS NEEDED
Status: DISCONTINUED | OUTPATIENT
Start: 2025-05-09 | End: 2025-05-09 | Stop reason: HOSPADM

## 2025-05-09 RX ORDER — LIDOCAINE HYDROCHLORIDE 20 MG/ML
INJECTION, SOLUTION INFILTRATION; PERINEURAL AS NEEDED
Status: DISCONTINUED | OUTPATIENT
Start: 2025-05-09 | End: 2025-05-09 | Stop reason: SURG

## 2025-05-09 RX ORDER — METOPROLOL SUCCINATE 25 MG/1
25 TABLET, EXTENDED RELEASE ORAL EVERY EVENING
Status: DISCONTINUED | OUTPATIENT
Start: 2025-05-09 | End: 2025-05-10 | Stop reason: HOSPADM

## 2025-05-09 RX ORDER — ACETAMINOPHEN 325 MG/1
650 TABLET ORAL EVERY 4 HOURS PRN
Status: DISCONTINUED | OUTPATIENT
Start: 2025-05-09 | End: 2025-05-10 | Stop reason: HOSPADM

## 2025-05-09 RX ORDER — SODIUM CHLORIDE, SODIUM LACTATE, POTASSIUM CHLORIDE, CALCIUM CHLORIDE 600; 310; 30; 20 MG/100ML; MG/100ML; MG/100ML; MG/100ML
9 INJECTION, SOLUTION INTRAVENOUS CONTINUOUS
Status: ACTIVE | OUTPATIENT
Start: 2025-05-09 | End: 2025-05-10

## 2025-05-09 RX ORDER — CYCLOBENZAPRINE HCL 10 MG
10 TABLET ORAL 3 TIMES DAILY PRN
Status: DISCONTINUED | OUTPATIENT
Start: 2025-05-09 | End: 2025-05-10 | Stop reason: HOSPADM

## 2025-05-09 RX ORDER — LOSARTAN POTASSIUM 50 MG/1
50 TABLET ORAL
Status: DISCONTINUED | OUTPATIENT
Start: 2025-05-09 | End: 2025-05-10 | Stop reason: HOSPADM

## 2025-05-09 RX ORDER — EPHEDRINE SULFATE 50 MG/ML
5 INJECTION, SOLUTION INTRAVENOUS ONCE AS NEEDED
Status: DISCONTINUED | OUTPATIENT
Start: 2025-05-09 | End: 2025-05-09 | Stop reason: HOSPADM

## 2025-05-09 RX ORDER — CLINDAMYCIN IN PERCENT DEXTROSE 6 MG/ML
300 INJECTION, SOLUTION INTRAVENOUS EVERY 8 HOURS
Status: DISCONTINUED | OUTPATIENT
Start: 2025-05-09 | End: 2025-05-10 | Stop reason: HOSPADM

## 2025-05-09 RX ORDER — SODIUM CHLORIDE 9 MG/ML
40 INJECTION, SOLUTION INTRAVENOUS AS NEEDED
Status: DISCONTINUED | OUTPATIENT
Start: 2025-05-09 | End: 2025-05-10 | Stop reason: HOSPADM

## 2025-05-09 RX ORDER — ONDANSETRON 4 MG/1
4 TABLET, ORALLY DISINTEGRATING ORAL EVERY 6 HOURS PRN
Status: DISCONTINUED | OUTPATIENT
Start: 2025-05-09 | End: 2025-05-10 | Stop reason: HOSPADM

## 2025-05-09 RX ORDER — CLINDAMYCIN IN PERCENT DEXTROSE 6 MG/ML
300 INJECTION, SOLUTION INTRAVENOUS ONCE
Status: DISCONTINUED | OUTPATIENT
Start: 2025-05-09 | End: 2025-05-09

## 2025-05-09 RX ORDER — OXYCODONE AND ACETAMINOPHEN 7.5; 325 MG/1; MG/1
1 TABLET ORAL EVERY 4 HOURS PRN
Status: DISCONTINUED | OUTPATIENT
Start: 2025-05-09 | End: 2025-05-09 | Stop reason: HOSPADM

## 2025-05-09 RX ORDER — FLUMAZENIL 0.1 MG/ML
0.2 INJECTION INTRAVENOUS AS NEEDED
Status: DISCONTINUED | OUTPATIENT
Start: 2025-05-09 | End: 2025-05-09 | Stop reason: HOSPADM

## 2025-05-09 RX ORDER — PROMETHAZINE HYDROCHLORIDE 25 MG/1
25 TABLET ORAL ONCE AS NEEDED
Status: DISCONTINUED | OUTPATIENT
Start: 2025-05-09 | End: 2025-05-09 | Stop reason: HOSPADM

## 2025-05-09 RX ORDER — ONDANSETRON 8 MG/1
8 TABLET, ORALLY DISINTEGRATING ORAL ONCE
Status: COMPLETED | OUTPATIENT
Start: 2025-05-09 | End: 2025-05-09

## 2025-05-09 RX ORDER — HYDROCODONE BITARTRATE AND ACETAMINOPHEN 7.5; 325 MG/1; MG/1
1 TABLET ORAL ONCE AS NEEDED
Status: DISCONTINUED | OUTPATIENT
Start: 2025-05-09 | End: 2025-05-09 | Stop reason: HOSPADM

## 2025-05-09 RX ORDER — ROCURONIUM BROMIDE 10 MG/ML
INJECTION, SOLUTION INTRAVENOUS AS NEEDED
Status: DISCONTINUED | OUTPATIENT
Start: 2025-05-09 | End: 2025-05-09 | Stop reason: SURG

## 2025-05-09 RX ORDER — GABAPENTIN 300 MG/1
300 CAPSULE ORAL ONCE
Status: COMPLETED | OUTPATIENT
Start: 2025-05-09 | End: 2025-05-09

## 2025-05-09 RX ORDER — CELECOXIB 200 MG/1
200 CAPSULE ORAL ONCE
Status: COMPLETED | OUTPATIENT
Start: 2025-05-09 | End: 2025-05-09

## 2025-05-09 RX ORDER — MIDAZOLAM HYDROCHLORIDE 1 MG/ML
1 INJECTION, SOLUTION INTRAMUSCULAR; INTRAVENOUS
Status: COMPLETED | OUTPATIENT
Start: 2025-05-09 | End: 2025-05-09

## 2025-05-09 RX ORDER — CLINDAMYCIN PHOSPHATE 900 MG/50ML
900 INJECTION, SOLUTION INTRAVENOUS ONCE
Status: COMPLETED | OUTPATIENT
Start: 2025-05-09 | End: 2025-05-09

## 2025-05-09 RX ORDER — PROPOFOL 10 MG/ML
VIAL (ML) INTRAVENOUS AS NEEDED
Status: DISCONTINUED | OUTPATIENT
Start: 2025-05-09 | End: 2025-05-09 | Stop reason: SURG

## 2025-05-09 RX ORDER — FAMOTIDINE 10 MG/ML
20 INJECTION, SOLUTION INTRAVENOUS ONCE
Status: COMPLETED | OUTPATIENT
Start: 2025-05-09 | End: 2025-05-09

## 2025-05-09 RX ORDER — NALOXONE HCL 0.4 MG/ML
0.2 VIAL (ML) INJECTION AS NEEDED
Status: DISCONTINUED | OUTPATIENT
Start: 2025-05-09 | End: 2025-05-09 | Stop reason: HOSPADM

## 2025-05-09 RX ORDER — HYDROMORPHONE HYDROCHLORIDE 1 MG/ML
0.5 INJECTION, SOLUTION INTRAMUSCULAR; INTRAVENOUS; SUBCUTANEOUS
Status: DISCONTINUED | OUTPATIENT
Start: 2025-05-09 | End: 2025-05-09 | Stop reason: HOSPADM

## 2025-05-09 RX ORDER — SODIUM CHLORIDE 0.9 % (FLUSH) 0.9 %
3 SYRINGE (ML) INJECTION EVERY 12 HOURS SCHEDULED
Status: DISCONTINUED | OUTPATIENT
Start: 2025-05-09 | End: 2025-05-09 | Stop reason: HOSPADM

## 2025-05-09 RX ORDER — LABETALOL HYDROCHLORIDE 5 MG/ML
5 INJECTION, SOLUTION INTRAVENOUS
Status: DISCONTINUED | OUTPATIENT
Start: 2025-05-09 | End: 2025-05-09 | Stop reason: HOSPADM

## 2025-05-09 RX ORDER — FENTANYL CITRATE 50 UG/ML
50 INJECTION, SOLUTION INTRAMUSCULAR; INTRAVENOUS ONCE AS NEEDED
Status: DISCONTINUED | OUTPATIENT
Start: 2025-05-09 | End: 2025-05-09 | Stop reason: HOSPADM

## 2025-05-09 RX ORDER — HYDRALAZINE HYDROCHLORIDE 20 MG/ML
5 INJECTION INTRAMUSCULAR; INTRAVENOUS
Status: DISCONTINUED | OUTPATIENT
Start: 2025-05-09 | End: 2025-05-09 | Stop reason: HOSPADM

## 2025-05-09 RX ORDER — ESCITALOPRAM OXALATE 20 MG/1
20 TABLET ORAL NIGHTLY
Status: DISCONTINUED | OUTPATIENT
Start: 2025-05-09 | End: 2025-05-10 | Stop reason: HOSPADM

## 2025-05-09 RX ORDER — FENTANYL CITRATE 50 UG/ML
50 INJECTION, SOLUTION INTRAMUSCULAR; INTRAVENOUS
Status: DISCONTINUED | OUTPATIENT
Start: 2025-05-09 | End: 2025-05-09 | Stop reason: HOSPADM

## 2025-05-09 RX ORDER — SODIUM CHLORIDE 0.9 % (FLUSH) 0.9 %
3-10 SYRINGE (ML) INJECTION AS NEEDED
Status: DISCONTINUED | OUTPATIENT
Start: 2025-05-09 | End: 2025-05-09 | Stop reason: HOSPADM

## 2025-05-09 RX ORDER — ONDANSETRON 4 MG/1
8 TABLET, ORALLY DISINTEGRATING ORAL EVERY 6 HOURS PRN
Status: DISCONTINUED | OUTPATIENT
Start: 2025-05-09 | End: 2025-05-10 | Stop reason: HOSPADM

## 2025-05-09 RX ORDER — DEXAMETHASONE SODIUM PHOSPHATE 4 MG/ML
INJECTION, SOLUTION INTRA-ARTICULAR; INTRALESIONAL; INTRAMUSCULAR; INTRAVENOUS; SOFT TISSUE AS NEEDED
Status: DISCONTINUED | OUTPATIENT
Start: 2025-05-09 | End: 2025-05-09 | Stop reason: SURG

## 2025-05-09 RX ORDER — PROMETHAZINE HYDROCHLORIDE 25 MG/1
25 SUPPOSITORY RECTAL ONCE AS NEEDED
Status: DISCONTINUED | OUTPATIENT
Start: 2025-05-09 | End: 2025-05-09 | Stop reason: HOSPADM

## 2025-05-09 RX ORDER — NALOXONE HCL 0.4 MG/ML
0.1 VIAL (ML) INJECTION
Status: DISCONTINUED | OUTPATIENT
Start: 2025-05-09 | End: 2025-05-10 | Stop reason: HOSPADM

## 2025-05-09 RX ORDER — MAGNESIUM SULFATE HEPTAHYDRATE 500 MG/ML
INJECTION, SOLUTION INTRAMUSCULAR; INTRAVENOUS AS NEEDED
Status: DISCONTINUED | OUTPATIENT
Start: 2025-05-09 | End: 2025-05-09 | Stop reason: SURG

## 2025-05-09 RX ORDER — ACETAMINOPHEN 500 MG
1000 TABLET ORAL ONCE
Status: COMPLETED | OUTPATIENT
Start: 2025-05-09 | End: 2025-05-09

## 2025-05-09 RX ORDER — LIDOCAINE HYDROCHLORIDE 10 MG/ML
0.5 INJECTION, SOLUTION INFILTRATION; PERINEURAL ONCE AS NEEDED
Status: DISCONTINUED | OUTPATIENT
Start: 2025-05-09 | End: 2025-05-09 | Stop reason: HOSPADM

## 2025-05-09 RX ORDER — OXYCODONE HYDROCHLORIDE 5 MG/1
5 TABLET ORAL EVERY 4 HOURS PRN
Status: DISCONTINUED | OUTPATIENT
Start: 2025-05-09 | End: 2025-05-10 | Stop reason: HOSPADM

## 2025-05-09 RX ADMIN — FENTANYL CITRATE 25 MCG: 50 INJECTION, SOLUTION INTRAMUSCULAR; INTRAVENOUS at 09:00

## 2025-05-09 RX ADMIN — FENTANYL CITRATE 25 MCG: 50 INJECTION, SOLUTION INTRAMUSCULAR; INTRAVENOUS at 08:14

## 2025-05-09 RX ADMIN — FAMOTIDINE 20 MG: 20 TABLET, FILM COATED ORAL at 21:07

## 2025-05-09 RX ADMIN — FENTANYL CITRATE 25 MCG: 50 INJECTION, SOLUTION INTRAMUSCULAR; INTRAVENOUS at 08:51

## 2025-05-09 RX ADMIN — ONDANSETRON 8 MG: 8 TABLET, ORALLY DISINTEGRATING ORAL at 06:57

## 2025-05-09 RX ADMIN — OXYCODONE HYDROCHLORIDE 5 MG: 5 TABLET ORAL at 21:09

## 2025-05-09 RX ADMIN — HYDROMORPHONE HYDROCHLORIDE 0.5 MG: 1 INJECTION, SOLUTION INTRAMUSCULAR; INTRAVENOUS; SUBCUTANEOUS at 11:50

## 2025-05-09 RX ADMIN — PANTOPRAZOLE SODIUM 40 MG: 40 TABLET, DELAYED RELEASE ORAL at 16:42

## 2025-05-09 RX ADMIN — FAMOTIDINE 20 MG: 10 INJECTION, SOLUTION INTRAVENOUS at 06:48

## 2025-05-09 RX ADMIN — ROCURONIUM BROMIDE 40 MG: 10 INJECTION, SOLUTION INTRAVENOUS at 07:36

## 2025-05-09 RX ADMIN — FENTANYL CITRATE 25 MCG: 50 INJECTION, SOLUTION INTRAMUSCULAR; INTRAVENOUS at 08:32

## 2025-05-09 RX ADMIN — MIDAZOLAM 1 MG: 1 INJECTION INTRAMUSCULAR; INTRAVENOUS at 06:50

## 2025-05-09 RX ADMIN — CLINDAMYCIN PHOSPHATE 300 MG: 300 INJECTION, SOLUTION INTRAVENOUS at 23:34

## 2025-05-09 RX ADMIN — GABAPENTIN 300 MG: 300 CAPSULE ORAL at 06:57

## 2025-05-09 RX ADMIN — ATORVASTATIN CALCIUM 80 MG: 20 TABLET, FILM COATED ORAL at 21:07

## 2025-05-09 RX ADMIN — DEXAMETHASONE SODIUM PHOSPHATE 8 MG: 4 INJECTION, SOLUTION INTRA-ARTICULAR; INTRALESIONAL; INTRAMUSCULAR; INTRAVENOUS; SOFT TISSUE at 07:50

## 2025-05-09 RX ADMIN — ROCURONIUM BROMIDE 10 MG: 10 INJECTION, SOLUTION INTRAVENOUS at 08:51

## 2025-05-09 RX ADMIN — CELECOXIB 200 MG: 200 CAPSULE ORAL at 06:57

## 2025-05-09 RX ADMIN — HYDROMORPHONE HYDROCHLORIDE 0.5 MG: 1 INJECTION, SOLUTION INTRAMUSCULAR; INTRAVENOUS; SUBCUTANEOUS at 13:01

## 2025-05-09 RX ADMIN — OXYCODONE HYDROCHLORIDE 5 MG: 5 TABLET ORAL at 17:03

## 2025-05-09 RX ADMIN — MIDAZOLAM 1 MG: 1 INJECTION INTRAMUSCULAR; INTRAVENOUS at 07:04

## 2025-05-09 RX ADMIN — Medication 3 ML: at 21:18

## 2025-05-09 RX ADMIN — PROPOFOL 50 MG: 10 INJECTION, EMULSION INTRAVENOUS at 09:59

## 2025-05-09 RX ADMIN — PROPOFOL 30 MCG/KG/MIN: 10 INJECTION, EMULSION INTRAVENOUS at 07:45

## 2025-05-09 RX ADMIN — HYDROMORPHONE HYDROCHLORIDE 0.5 MG: 1 INJECTION, SOLUTION INTRAMUSCULAR; INTRAVENOUS; SUBCUTANEOUS at 14:19

## 2025-05-09 RX ADMIN — CLINDAMYCIN PHOSPHATE 900 MG: 900 INJECTION, SOLUTION INTRAVENOUS at 07:23

## 2025-05-09 RX ADMIN — CYCLOBENZAPRINE HYDROCHLORIDE 10 MG: 10 TABLET, FILM COATED ORAL at 18:29

## 2025-05-09 RX ADMIN — CLINDAMYCIN PHOSPHATE 300 MG: 300 INJECTION, SOLUTION INTRAVENOUS at 16:41

## 2025-05-09 RX ADMIN — ESCITALOPRAM 20 MG: 20 TABLET, FILM COATED ORAL at 21:06

## 2025-05-09 RX ADMIN — SUGAMMADEX 100 MG: 100 INJECTION, SOLUTION INTRAVENOUS at 10:42

## 2025-05-09 RX ADMIN — OXYCODONE HYDROCHLORIDE 5 MG: 5 TABLET ORAL at 12:28

## 2025-05-09 RX ADMIN — LIDOCAINE HYDROCHLORIDE 80 MG: 20 INJECTION, SOLUTION INFILTRATION; PERINEURAL at 07:36

## 2025-05-09 RX ADMIN — MAGNESIUM SULFATE HEPTAHYDRATE 2 G: 500 INJECTION, SOLUTION INTRAMUSCULAR; INTRAVENOUS at 07:58

## 2025-05-09 RX ADMIN — PROPOFOL 180 MG: 10 INJECTION, EMULSION INTRAVENOUS at 07:36

## 2025-05-09 RX ADMIN — ACETAMINOPHEN 1000 MG: 500 TABLET, FILM COATED ORAL at 06:57

## 2025-05-09 RX ADMIN — SODIUM CHLORIDE, POTASSIUM CHLORIDE, SODIUM LACTATE AND CALCIUM CHLORIDE 9 ML/HR: 600; 310; 30; 20 INJECTION, SOLUTION INTRAVENOUS at 06:58

## 2025-05-09 RX ADMIN — FENTANYL CITRATE 50 MCG: 50 INJECTION, SOLUTION INTRAMUSCULAR; INTRAVENOUS at 11:11

## 2025-05-09 RX ADMIN — SUGAMMADEX 100 MG: 100 INJECTION, SOLUTION INTRAVENOUS at 10:43

## 2025-05-09 NOTE — ANESTHESIA PREPROCEDURE EVALUATION
Anesthesia Evaluation     Patient summary reviewed and Nursing notes reviewed   NPO Solid Status: > 8 hours  NPO Liquid Status: > 2 hours           Airway   Mallampati: II  Anterior and Possible difficult intubation  Dental      Pulmonary    (+) a smoker Former,  Cardiovascular     ECG reviewed  Patient on routine beta blocker and Beta blocker given within 24 hours of surgery  Rhythm: regular  Rate: normal    (+) hypertension 2 medications or greater, hyperlipidemia      Neuro/Psych  (+) numbness, psychiatric history Anxiety and Depression  GI/Hepatic/Renal/Endo    (+) GERD    Musculoskeletal     Abdominal    Substance History   (+) alcohol use     OB/GYN negative ob/gyn ROS         Other   arthritis,   history of cancer                    Anesthesia Plan    ASA 3     MAC     intravenous induction     Anesthetic plan, risks, benefits, and alternatives have been provided, discussed and informed consent has been obtained with: patient.      CODE STATUS:

## 2025-05-09 NOTE — ANESTHESIA PROCEDURE NOTES
Airway  Reason: elective    Date/Time: 5/9/2025 7:36 AM  Airway not difficult    General Information and Staff    Patient location during procedure: OR  Anesthesiologist: Diamond Rubio MD  CRNA/CAA: Romy Clemens CRNA    Indications and Patient Condition  Indications for airway management: airway protection    Preoxygenated: yes  MILS not maintained throughout    Mask difficulty assessment: 1 - vent by mask    Final Airway Details    Final airway type: endotracheal airway      Successful airway: ETT  Cuffed: yes   Successful intubation technique: direct laryngoscopy  Adjuncts used in placement: intubating stylet  Endotracheal tube insertion site: oral  Blade: [place]  Blade size: 3  ETT size (mm): 7.0  Cormack-Lehane Classification: grade I - full view of glottis  Placement verified by: chest auscultation and capnometry   Cuff volume (mL): 8  Measured from: lips  ETT/EBT  to lips (cm): 21  Number of attempts at approach: 1  Assessment: lips, teeth, and gum same as pre-op and atraumatic intubation

## 2025-05-09 NOTE — H&P
[facility]   HISTORY AND PHYSICAL    Patient Name:Dasha De Leon  : 1970  MRN: [id number]  Primary Care Physician: Ariana Coffman PA-C  Date of admission: 2025    Subjective   Subjective     History of Present Illness   Dasha De Leon is a 54 y.o. female with loss of rigth breast reconstruction.  She has had right breast wound treated with wound vac and now ready for definitive coverage with latis flap    Review of Systems      Personal History     Past Medical History:   Diagnosis Date    Abnormal Pap smear of cervix     Anemia in neoplastic disease 2025    Anxiety     Baker's cyst     RESOLVED    Breast cancer     Right    Bulging lumbar disc     Bursitis     HX    Depression     GERD (gastroesophageal reflux disease)     H/O colposcopy with cervical biopsy     unknown pap result, biopsy was neg per pt, 2013 [facility]    History of 2019 novel coronavirus disease (COVID-19)     X2    History of eczema     HPV (human papilloma virus) infection     Hyperlipemia     Hypertension     Low back pain     Lumbar radiculopathy     Numbness and tingling of left leg     Osteoarthritis     Scoliosis     Seasonal allergies        Past Surgical History:   Procedure Laterality Date    BREAST AUGMENTATION      BREAST BIOPSY Right 2024    Procedure: AXILLARY LYMPH NODE BIOPSY/EXCISION;  Surgeon: Romina De Anda MD;  Location: [facility];  Service: General;  Laterality: Right;    BREAST RECONSTRUCTION Right 2024    Procedure: RIGHT SUBPECTORAL PLACEMENT TISSUE EXPANDER AND CORTIVA (ACELLULAR DERMAL MATRIX), COMPLEX CLOSURE RIGHT BREAST 5cm;  Surgeon: Colin Bender MD;  Location: [facility];  Service: Plastics;  Laterality: Right;    BREAST SURGERY Right 2024    Procedure: ADJACENT TISSUE REARRANGEMENT RIGHT BREAST;  Surgeon: Colin Bender MD;  Location: [facility] OR;  Service: Plastics;  Laterality: Right;    BREAST SURGERY Right 2025    Procedure:  "ADJACENT REARRANFEMENT;  Surgeon: Colin Bender MD;  Location: [facility] MAIN OR;  Service: Plastics;  Laterality: Right;    BREAST SURGERY Right 4/19/2025    Procedure: EVACUATION HEMATOMA RIGHT BREAST, WOUND VAC PLACEMENT;  Surgeon: Colin Bender MD;  Location: [facility] MAIN OR;  Service: Plastics;  Laterality: Right;    CARPAL TUNNEL RELEASE Right 2004    CRYOTHERAPY      1997    D & C HYSTEROSCOPY N/A 9/19/2024    Procedure: INTRAUTERINE DEVICE REMOVAL;  Surgeon: Kaleb Burciaga MD;  Location: [facility] MAIN OR;  Service: Obstetrics/Gynecology;  Laterality: N/A;    EPIDURAL BLOCK      HAND SURGERY  2004    Pisiformectomy    INCISION AND DRAINAGE TRUNK Right 4/8/2025    Procedure: RIGHT BREAST DEBRIDMENT;  Surgeon: Colin Bender MD;  Location: [facility] OR;  Service: Plastics;  Laterality: Right;    LUMBAR DISCECTOMY FUSION INSTRUMENTATION N/A 01/05/2024    Procedure: Lumbar 3 to lumbar 4 and lumbar 4 to lumbar 5 laminectomy with fusion and instrumentation;  Surgeon: Rigoberto Botello MD;  Location: [facility] OR;  Service: Robotics - Neuro;  Laterality: N/A;    MAMMO BILATERAL  2014    MASTECTOMY W/ SENTINEL NODE BIOPSY Right 7/11/2024    Procedure: Right MASTECTOMY WITH SENTINEL NODE BIOPSY;  Surgeon: Romina De Anda MD;  Location: [facility] OR;  Service: General;  Laterality: Right;    MASTECTOMY WITH IMMEDIATE RECONSTRUCTION Right 7/26/2024    Procedure: right mastectomy, margin excision;  Surgeon: Romina De Anda MD;  Location: [facility] OR;  Service: General;  Laterality: Right;    PAP SMEAR  20116    PLACEMENT OF WOUND VAC Right 4/28/2025    Procedure: WOUND VAC CHANGE RIGHT BREAST;  Surgeon: Colin Bender MD;  Location: [facility] MAIN OR;  Service: Plastics;  Laterality: Right;    SHOULDER ARTHROSCOPY Left 2002,2003    SHOULDER SURGERY      \"MANIPULATION FOR FROZEN SHOULDER\"    US GUIDED LYMPH NODE BIOPSY  8/29/2024       Family History: Her family history includes Atrial " fibrillation in her father; Cancer in her maternal grandfather; Colon cancer (age of onset: 65) in her paternal uncle; Depression in her brother; Diverticulitis in her mother; Heart attack in her maternal uncle and paternal grandmother; Heart disease in her maternal aunt and maternal grandfather; Hyperlipidemia in her father and mother; Hypertension in her father and mother; Kidney disease in her father; Liver disease in her maternal grandmother; Lung cancer in her maternal uncle; No Known Problems in her daughter, paternal aunt, paternal grandfather, sister, and son; Pancreatitis in her mother; Skin cancer in her father; Stroke in her father; Transient ischemic attack in her father; Uterine cancer (age of onset: 58) in her maternal aunt.     Social History: She  reports that she quit smoking about 19 years ago. Her smoking use included cigarettes. She started smoking about 39 years ago. She has a 20 pack-year smoking history. She has been exposed to tobacco smoke. She has never used smokeless tobacco. She reports current alcohol use of about 2.0 - 4.0 standard drinks of alcohol per week. She reports that she does not use drugs.    Home Medications:  ALPRAZolam, HYDROcodone-acetaminophen, anastrozole, atorvastatin, buPROPion XL, cyclobenzaprine, docusate sodium, empagliflozin, escitalopram, famotidine, furosemide, levocetirizine, meloxicam, metoprolol succinate XL, olmesartan, ondansetron, ondansetron ODT, and pantoprazole    Allergies:  She is allergic to penicillins, hemp seed oil, and lisinopril.    Objective    Objective     Vitals:    Temp:  [97.3 °F (36.3 °C)-98.9 °F (37.2 °C)] 98.9 °F (37.2 °C)  Heart Rate:  [72-82] 82  Resp:  [14-16] 16  BP: (110-132)/(74-76) 132/76    Physical Exam  Constitutional:       Appearance: Normal appearance.   HENT:      Head: Normocephalic.      Mouth/Throat:      Mouth: Mucous membranes are moist.   Eyes:      Pupils: Pupils are equal, round, and reactive to light.    Cardiovascular:      Rate and Rhythm: Normal rate and regular rhythm.   Pulmonary:      Effort: Pulmonary effort is normal. No respiratory distress.   Abdominal:      General: Abdomen is flat.      Palpations: Abdomen is soft.   Musculoskeletal:      Cervical back: Normal range of motion.   Skin:     General: Skin is warm and dry.      Comments: Right breast wound vac in place, no fluid collection   Neurological:      General: No focal deficit present.      Mental Status: She is alert.          Result Review    Result Review:  I have personally reviewed the results from the time of this admission to 5/9/2025 07:18 EDT and agree with these findings:  []  Laboratory list / accordion  []  Microbiology  []  Radiology  []  EKG/Telemetry   []  Cardiology/Vascular   []  Pathology  []  Old records  []  Other:  Most notable findings include:       Assessment & Plan   Assessment / Plan     Brief Patient Summary:  Dasha De Leon is a 54 y.o. female with right breast wound and radiation injury leading to loss of breast reconstruction.    Active Hospital Problems:  There are no active hospital problems to display for this patient.    Plan:   Will poceed with right breast lastissimus flap reconstruction.  Has discussed risks of bleeding, infection, asymmetry, functional loss.    VTE Prophylaxis:  No VTE prophylaxis order currently exists.        Colin Bender MD

## 2025-05-09 NOTE — PLAN OF CARE
Goal Outcome Evaluation:              Outcome Evaluation: VSS. Flap pink, warm good cap refill. Patient instructed and needs frequent reminders to keep rt arm at side and to not use. Flexeril and oxy for pain. Voiding, tolerating reg diet.

## 2025-05-10 ENCOUNTER — READMISSION MANAGEMENT (OUTPATIENT)
Dept: CALL CENTER | Facility: HOSPITAL | Age: 55
End: 2025-05-10
Payer: COMMERCIAL

## 2025-05-10 VITALS
HEART RATE: 87 BPM | TEMPERATURE: 97.7 F | SYSTOLIC BLOOD PRESSURE: 125 MMHG | OXYGEN SATURATION: 95 % | BODY MASS INDEX: 27.14 KG/M2 | RESPIRATION RATE: 18 BRPM | WEIGHT: 168.87 LBS | HEIGHT: 66 IN | DIASTOLIC BLOOD PRESSURE: 65 MMHG

## 2025-05-10 PROBLEM — C50.919 BREAST CANCER: Status: ACTIVE | Noted: 2025-05-10

## 2025-05-10 LAB
CYTO UR: NORMAL
LAB AP CASE REPORT: NORMAL
PATH REPORT.FINAL DX SPEC: NORMAL
PATH REPORT.GROSS SPEC: NORMAL

## 2025-05-10 PROCEDURE — 25010000002 CLINDAMYCIN 300 MG/50ML SOLUTION: Performed by: PLASTIC SURGERY

## 2025-05-10 RX ORDER — ACETAMINOPHEN 325 MG/1
650 TABLET ORAL EVERY 4 HOURS PRN
Qty: 60 TABLET | Refills: 0 | Status: SHIPPED | OUTPATIENT
Start: 2025-05-10

## 2025-05-10 RX ORDER — DOCUSATE SODIUM 250 MG
250 CAPSULE ORAL 2 TIMES DAILY PRN
Qty: 60 CAPSULE | Refills: 1 | Status: SHIPPED | OUTPATIENT
Start: 2025-05-10

## 2025-05-10 RX ORDER — AMOXICILLIN 250 MG
2 CAPSULE ORAL DAILY PRN
Qty: 30 TABLET | Refills: 1 | Status: SHIPPED | OUTPATIENT
Start: 2025-05-10 | End: 2026-05-10

## 2025-05-10 RX ORDER — HYDROCODONE BITARTRATE AND ACETAMINOPHEN 5; 325 MG/1; MG/1
1 TABLET ORAL EVERY 4 HOURS PRN
Qty: 20 TABLET | Refills: 0 | Status: SHIPPED | OUTPATIENT
Start: 2025-05-10

## 2025-05-10 RX ORDER — ONDANSETRON 8 MG/1
8 TABLET, ORALLY DISINTEGRATING ORAL EVERY 8 HOURS PRN
Qty: 10 TABLET | Refills: 1 | Status: SHIPPED | OUTPATIENT
Start: 2025-05-10

## 2025-05-10 RX ADMIN — OXYCODONE HYDROCHLORIDE 5 MG: 5 TABLET ORAL at 02:18

## 2025-05-10 RX ADMIN — FAMOTIDINE 20 MG: 20 TABLET, FILM COATED ORAL at 08:15

## 2025-05-10 RX ADMIN — PANTOPRAZOLE SODIUM 40 MG: 40 TABLET, DELAYED RELEASE ORAL at 08:15

## 2025-05-10 RX ADMIN — Medication 3 ML: at 08:15

## 2025-05-10 RX ADMIN — LOSARTAN POTASSIUM 50 MG: 50 TABLET, FILM COATED ORAL at 08:15

## 2025-05-10 RX ADMIN — CLINDAMYCIN PHOSPHATE 300 MG: 300 INJECTION, SOLUTION INTRAVENOUS at 08:15

## 2025-05-10 RX ADMIN — OXYCODONE HYDROCHLORIDE 5 MG: 5 TABLET ORAL at 08:15

## 2025-05-10 NOTE — DISCHARGE SUMMARY
SUBJECTIVE:   Did well overnight, she has walked and voided.  Tolerating PO intake.  Pain controlled.    OBJECTIVE:  Vitals:    05/10/25 0448   BP: 120/64   Pulse: 79   Resp: 16   Temp: 97.2 °F (36.2 °C)   SpO2: 100%     Physical Exam  Resting in bed, nad  Rrr  No dyspnea  Right chest latis flap is pink and warm, 2s cap refill, no congestion or fluid collection  Back donor site c/d/i, no fluid collectin    PLAN:  POD1 right latis flap to right chest  - doing well, ok for dc today  - f/u with me 5/20  - minimal activity R arm  - drain care teaching      Dc Dx: breast cancer  Dc Condition: good

## 2025-05-10 NOTE — PLAN OF CARE
Problem: Adult Inpatient Plan of Care  Goal: Plan of Care Review  Outcome: Progressing  Flowsheets (Taken 5/10/2025 0331)  Progress: improving  Outcome Evaluation: doing well, adequate pain controlon Oxicidone 5 mg x2, complained mostly of achy, sharp pain Rt chest, Rt axillary area and Rt lateral back, stained dressing  back incision, flap remained pink, warm, soft with good cap refill, Rt arm kept elevated, limb precaution maintained. ambulated with assist, voiding, slept between care, Bp soft after pain meds, IS up to 1500 still on 2L of O2  Plan of Care Reviewed With: patient   Goal Outcome Evaluation:  Plan of Care Reviewed With: patient        Progress: improving  Outcome Evaluation: doing well, adequate pain controlon Oxicidone 5 mg x2, complained mostly of achy, sharp pain Rt chest, Rt axillary area and Rt lateral back, stained dressing  back incision, flap remained pink, warm, soft with good cap refill, Rt arm kept elevated, limb precaution maintained. ambulated with assist, voiding, slept between care, Bp soft after pain meds, IS up to 1500 still on 2L of O2

## 2025-05-10 NOTE — OUTREACH NOTE
Prep Survey      Flowsheet Row Responses   [facility] patient discharged from? [place]   Is LACE score < 7 ? No   Eligibility [facility]   Date of Admission 05/09/25   Date of Discharge 05/10/25   Discharge Disposition Home or Self Care   Discharge diagnosis Breast surgery   Does the patient have one of the following disease processes/diagnoses(primary or secondary)? General Surgery   Does the patient have Home health ordered? No   Is there a DME ordered? No   Medication alerts for this patient see AVS   Prep survey completed? Yes            Lakeisha JORDAN - Registered Nurse              Lakeisha JORDAN - Registered Nurse

## 2025-05-10 NOTE — ANESTHESIA POSTPROCEDURE EVALUATION
Patient: Dasha De Leon    Procedure Summary       Date: 05/09/25 Room / Location: [facility] OR [address] [place] [address] [facility]    Anesthesia Start: 0729 Anesthesia Stop: 1104    Procedure: LATISSIMUS FLAP RECONSTRUCTION RIGHT BREAST (Right: Breast) Diagnosis:     Surgeons: Colin Bender MD Provider: Diamond Rubio MD    Anesthesia Type: MAC ASA Status: 3            Anesthesia Type: MAC    Vitals  Vitals Value Taken Time   /78 05/09/25 13:45   Temp 36.4 °C (97.5 °F) 05/09/25 14:30   Pulse 85 05/09/25 14:36   Resp 16 05/09/25 14:30   SpO2 97 % 05/09/25 14:36   Vitals shown include unfiled device data.        Post Anesthesia Care and Evaluation      Comments: No anesthesia complications were reported to me

## 2025-05-10 NOTE — NURSING NOTE
Pt discharged to home. IV removed. Went over discharge and follow-up instructions. Prescriptions delivered to bedside. Pt walked out on her own. Did not wait for transport. Mother providing transportation.

## 2025-05-11 DIAGNOSIS — F39 MOOD DISORDER: ICD-10-CM

## 2025-05-12 ENCOUNTER — TRANSITIONAL CARE MANAGEMENT TELEPHONE ENCOUNTER (OUTPATIENT)
Dept: CALL CENTER | Facility: HOSPITAL | Age: 55
End: 2025-05-12
Payer: COMMERCIAL

## 2025-05-12 RX ORDER — LEVOCETIRIZINE DIHYDROCHLORIDE 5 MG/1
5 TABLET, FILM COATED ORAL 2 TIMES DAILY
Qty: 180 TABLET | Refills: 3 | Status: SHIPPED | OUTPATIENT
Start: 2025-05-12

## 2025-05-12 RX ORDER — ESCITALOPRAM OXALATE 20 MG/1
20 TABLET ORAL DAILY
Qty: 90 TABLET | Refills: 3 | Status: SHIPPED | OUTPATIENT
Start: 2025-05-12

## 2025-05-12 NOTE — PROGRESS NOTES
Case Management Discharge Note      Final Note: Discharged home. Kirstin Fritz RN         Selected Continued Care - Discharged on 5/10/2025 Admission date: 5/9/2025 - Discharge disposition: Home or Self Care         Transportation Services  Private: Car    Final Discharge Disposition Code: 01 - home or self-care

## 2025-05-12 NOTE — OUTREACH NOTE
Call Center TCM Note      Flowsheet Row Responses   [facility] [place] [facility]   Does the patient have one of the following disease processes/diagnoses(primary or secondary)? General Surgery   TCM attempt successful? Yes   Call start time 1220   Call end time 1225   Discharge diagnosis Breast surgery   Person spoke with today (if not patient) and relationship Patient   Medication alerts for this patient Tylenol, Pericolace, Norco, Zofran, Colace   Meds reviewed with patient/caregiver? Yes   Is the patient having any side effects they believe may be caused by any medication additions or changes? No   Does the patient have all medications related to this admission filled (includes all antibiotics, pain medications, etc.) Yes   Prescription comments No questions or concerns. Patient had [place] left over at home for pain.   Is the patient taking all medications as directed (includes completed medication regime)? Yes   Does the patient have an appointment with their PCP within 7-14 days of discharge? No   Nursing Interventions Patient declined scheduling/rescheduling appointment at this time, Patient desires to follow up with specialty only   Has home health visited the patient within 72 hours of discharge? N/A   Psychosocial issues? No   Comments Patient has appt next Tuesday for f/u with Surgeon, Dr. Colin Bender.   Did the patient receive a copy of their discharge instructions? Yes   Nursing interventions Reviewed instructions with patient   What is the patient's perception of their health status since discharge? Improving   Is the patient /caregiver able to teach back basic post-op care? Take showers only when approved by MD-sponge bathe until then, Lifting as instructed by MD in discharge instructions   Is the patient/caregiver able to teach back signs and symptoms of incisional infection? Increased redness, swelling or pain at the incisonal site, Fever, Increased drainage or bleeding,  Incisional warmth, Pus or odor from incision   Is the patient/caregiver able to teach back steps to recovery at home? Rest and rebuild strength, gradually increase activity   If the patient is a current smoker, are they able to teach back resources for cessation? Not a smoker   Is the patient/caregiver able to teach back the hierarchy of who to call/visit for symptoms/problems? PCP, Specialist, Home health nurse, Urgent Care, ED, 911 Yes   TCM call completed? Yes   Wrap up additional comments Patient denies any needs or concerns. Patient has f/u with PCP next month, declined a HOSPITAL f/u appt at this time.   Call end time 1225   Would this patient benefit from a Referral to [facility] Social Work? No   Is the patient interested in additional calls from an ambulatory ? No            Lakeisha JORDAN - [place] Nurse    5/12/2025, 12:25 EDT            Lakeisha Lyon

## 2025-05-16 NOTE — PAYOR COMM NOTE
"Dasha Vivas (54 y.o. Female)    PLEASE SEE ATTACHED FOR DC NOTICE   REF#[id number]   THANK YOU  YUNIER SANCHEZ RN/ DEPT   [facility]  PH: [phone]  FAX:  [phone]     Date of Birth   1970    Social Security Number       Address   [address] [place] [address]    Home Phone   [phone]    MRN   [id number]       Conf[employer]ianist   [facility] [place]    Marital Status   Single                            Admission Date   5/9/2025    Admission Type   Elective    Admitting Provider   Colin Bender MD    Attending Provider       Department, Room/Bed   [address], 90/1       Discharge Date   5/10/2025    Discharge Disposition   Home or Self Care    Discharge Destination                                 Attending Provider: (none)   Allergies: Penicillins, Hemp Seed Oil, Lisinopril    Isolation: None   Infection: None   Code Status: Prior    Ht: 167.6 cm (66\")   Wt: 76.6 kg (168 lb 14 oz)    Admission Cmt: None   Principal Problem: None                  Active Insurance as of 5/9/2025       Primary Coverage       Payor Plan Insurance Group Employer/Plan Group    [employer] [facility] [employer] [id number]       Payor Plan Address Payor Plan Phone Number Payor Plan Fax Number Effective Dates    [address] [phone]  1/1/2018 - None Entered    [place] [address]         Subscriber Name Subscriber Birth Date Member ID       DASHA VIVAS 1970 [id number]                     Emergency Contacts        (Rel.) Home Phone Work Phone Mobile Phone    Paula Vivas (Mother) [phone] -- [phone]              [place]: [facility] [id number]  Tax ID [id number]     Discharge Summary        Colin Bender MD at 05/10/25 0713          SUBJECTIVE:   Did well overnight, she has walked and voided.  Tolerating PO intake.  Pain controlled.    OBJECTIVE:  Vitals:    05/10/25 0448   BP: 120/64   Pulse: 79   Resp: 16 "   Temp: 97.2 °F (36.2 °C)   SpO2: 100%     Physical Exam  Resting in bed, nad  Rrr  No dyspnea  Right chest latis flap is pink and warm, 2s cap refill, no congestion or fluid collection  Back donor site c/d/i, no fluid collectin    PLAN:  POD1 right latis flap to right chest  - doing well, ok for dc today  - f/u with me 5/20  - minimal activity R arm  - drain care teaching      Dc Dx: breast cancer  Dc Condition: good      Electronically signed by Colin Bender MD at 05/10/25 0754

## 2025-05-21 ENCOUNTER — READMISSION MANAGEMENT (OUTPATIENT)
Dept: CALL CENTER | Facility: HOSPITAL | Age: 55
End: 2025-05-21
Payer: COMMERCIAL

## 2025-05-21 NOTE — OUTREACH NOTE
General Surgery Week 2 Survey      Flowsheet Row Responses   [facility] patient discharged from? [facility]   Does the patient have one of the following disease processes/diagnoses(primary or secondary)? General Surgery   Week 2 attempt successful? Yes   Call start time 1519   Call end time 1522   Discharge diagnosis Breast surgery   Is the patient taking all medications as directed (includes completed medication regime)? Yes   Does the patient have a follow up appointment scheduled with their surgeon? Yes   Comments Patient saw surgeon yesterday   Psychosocial issues? No   What is the patient's perception of their health status since discharge? Improving   Nursing interventions Nurse provided patient education   Is the patient/caregiver able to teach back signs and symptoms of incisional infection? Increased redness, swelling or pain at the incisonal site, Increased drainage or bleeding, Incisional warmth, Pus or odor from incision, Fever  [Patient saw surgeon yesterday and reports incision is healinng well]   Is the patient/caregiver able to teach back the hierarchy of who to call/visit for symptoms/problems? PCP, Specialist, Home health nurse, Urgent Care, ED, 911 Yes   Week 2 call completed? Yes   Graduated Yes   Is the patient interested in additional calls from an ambulatory ? No   Would this patient benefit from a Referral to Amb Social Work? No   Wrap up additional comments Patient reports doing okay. No questions or concerns at this time.   Call end time 1522            JONA CODY - Registered Nurse

## 2025-05-22 ENCOUNTER — PATIENT OUTREACH (OUTPATIENT)
Dept: CASE MANAGEMENT | Facility: OTHER | Age: 55
End: 2025-05-22
Payer: COMMERCIAL

## 2025-05-22 NOTE — PLAN OF CARE
Problem: Cancer Treatment Phase  Goal: Manage Fatigue (Tiredness)  Outcome: Adequate for Care Transition  Intervention: My Fatigue Management To Do List  Description: Why is this important?Cancer treatment and its side effects can drain your energy. It can keep you from doing things you would like to do.There are many things that you can do to manage fatigue.  Goal: Keep Pain Under Control  Outcome: Adequate for Care Transition  Intervention: My Pain Control To Do List  Description: Why is this important?Day-to-day life can be hard when you have pain.Even a small change in emotion or a physical problem can make pain better or worse.Coping with pain depends on how the mind and body reacts to pain.Pain medicine is just one piece of the treatment puzzle.There are many tools to help manage pain. A combination of them can be used to best meet your needs.  Goal: Optimal Care Coordination of Patient With Cancer: Treatment Phase  Outcome: Adequate for Care Transition  Intervention: Develop Infection Prevention or Management Plan

## 2025-05-22 NOTE — OUTREACH NOTE
AMBULATORY CASE MANAGEMENT NOTE    Names and Relationships of Patient/Support Persons: Contact: MoisesDasha; Relationship: Self -     Patient Outreach    Ms. De Leon is post op day 12, latissimus flap reconstruction of right breast. She denies any signs or symptoms of infection or complication. Pathology negative for carcinoma. Patient agreeable to transition to monitoring phase.    Casandra GARCIA  Ambulatory Case Management    5/22/2025, 15:47 EDT

## 2025-05-30 ENCOUNTER — LAB (OUTPATIENT)
Dept: LAB | Facility: HOSPITAL | Age: 55
End: 2025-05-30
Payer: COMMERCIAL

## 2025-05-30 ENCOUNTER — SPECIALTY PHARMACY (OUTPATIENT)
Dept: PHARMACY | Facility: HOSPITAL | Age: 55
End: 2025-05-30
Payer: COMMERCIAL

## 2025-05-30 ENCOUNTER — OFFICE VISIT (OUTPATIENT)
Dept: ONCOLOGY | Facility: CLINIC | Age: 55
End: 2025-05-30
Payer: COMMERCIAL

## 2025-05-30 VITALS
BODY MASS INDEX: 26.12 KG/M2 | HEIGHT: 66 IN | TEMPERATURE: 98 F | OXYGEN SATURATION: 98 % | WEIGHT: 162.5 LBS | HEART RATE: 94 BPM | DIASTOLIC BLOOD PRESSURE: 73 MMHG | SYSTOLIC BLOOD PRESSURE: 117 MMHG

## 2025-05-30 DIAGNOSIS — Z12.31 OTHER SCREENING MAMMOGRAM: ICD-10-CM

## 2025-05-30 DIAGNOSIS — Z17.0 MALIGNANT NEOPLASM OF LOWER-OUTER QUADRANT OF RIGHT BREAST OF FEMALE, ESTROGEN RECEPTOR POSITIVE: Primary | ICD-10-CM

## 2025-05-30 DIAGNOSIS — Z17.0 MALIGNANT NEOPLASM OF LOWER-OUTER QUADRANT OF RIGHT BREAST OF FEMALE, ESTROGEN RECEPTOR POSITIVE: ICD-10-CM

## 2025-05-30 DIAGNOSIS — C50.511 MALIGNANT NEOPLASM OF LOWER-OUTER QUADRANT OF RIGHT BREAST OF FEMALE, ESTROGEN RECEPTOR POSITIVE: Primary | ICD-10-CM

## 2025-05-30 DIAGNOSIS — C50.511 MALIGNANT NEOPLASM OF LOWER-OUTER QUADRANT OF RIGHT BREAST OF FEMALE, ESTROGEN RECEPTOR POSITIVE: ICD-10-CM

## 2025-05-30 LAB
ALBUMIN SERPL-MCNC: 3.9 G/DL (ref 3.5–5.2)
ALBUMIN/GLOB SERPL: 1.4 G/DL
ALP SERPL-CCNC: 126 U/L (ref 39–117)
ALT SERPL W P-5'-P-CCNC: 21 U/L (ref 1–33)
ANION GAP SERPL CALCULATED.3IONS-SCNC: 10.6 MMOL/L (ref 5–15)
AST SERPL-CCNC: 24 U/L (ref 1–32)
BASOPHILS # BLD AUTO: 0.05 10*3/MM3 (ref 0–0.2)
BASOPHILS NFR BLD AUTO: 0.7 % (ref 0–1.5)
BILIRUB SERPL-MCNC: 0.3 MG/DL (ref 0–1.2)
BUN SERPL-MCNC: 14.5 MG/DL (ref 6–20)
BUN/CREAT SERPL: 17.3 (ref 7–25)
CALCIUM SPEC-SCNC: 9.4 MG/DL (ref 8.6–10.5)
CHLORIDE SERPL-SCNC: 103 MMOL/L (ref 98–107)
CHOLEST SERPL-MCNC: 216 MG/DL (ref 0–200)
CO2 SERPL-SCNC: 26.4 MMOL/L (ref 22–29)
CREAT SERPL-MCNC: 0.84 MG/DL (ref 0.57–1)
DEPRECATED RDW RBC AUTO: 46.1 FL (ref 37–54)
EGFRCR SERPLBLD CKD-EPI 2021: 82.7 ML/MIN/1.73
EOSINOPHIL # BLD AUTO: 0.67 10*3/MM3 (ref 0–0.4)
EOSINOPHIL NFR BLD AUTO: 8.9 % (ref 0.3–6.2)
ERYTHROCYTE [DISTWIDTH] IN BLOOD BY AUTOMATED COUNT: 13.4 % (ref 12.3–15.4)
GLOBULIN UR ELPH-MCNC: 2.8 GM/DL
GLUCOSE SERPL-MCNC: 106 MG/DL (ref 65–99)
HCT VFR BLD AUTO: 39.1 % (ref 34–46.6)
HDLC SERPL-MCNC: 51 MG/DL (ref 40–60)
HGB BLD-MCNC: 11.8 G/DL (ref 12–15.9)
IMM GRANULOCYTES # BLD AUTO: 0.07 10*3/MM3 (ref 0–0.05)
IMM GRANULOCYTES NFR BLD AUTO: 0.9 % (ref 0–0.5)
LDLC SERPL CALC-MCNC: 136 MG/DL (ref 0–100)
LDLC/HDLC SERPL: 2.6 {RATIO}
LYMPHOCYTES # BLD AUTO: 0.88 10*3/MM3 (ref 0.7–3.1)
LYMPHOCYTES NFR BLD AUTO: 11.7 % (ref 19.6–45.3)
MCH RBC QN AUTO: 28.3 PG (ref 26.6–33)
MCHC RBC AUTO-ENTMCNC: 30.2 G/DL (ref 31.5–35.7)
MCV RBC AUTO: 93.8 FL (ref 79–97)
MONOCYTES # BLD AUTO: 1.06 10*3/MM3 (ref 0.1–0.9)
MONOCYTES NFR BLD AUTO: 14.1 % (ref 5–12)
NEUTROPHILS NFR BLD AUTO: 4.78 10*3/MM3 (ref 1.7–7)
NEUTROPHILS NFR BLD AUTO: 63.7 % (ref 42.7–76)
NRBC BLD AUTO-RTO: 0 /100 WBC (ref 0–0.2)
PLATELET # BLD AUTO: 375 10*3/MM3 (ref 140–450)
PMV BLD AUTO: 9.4 FL (ref 6–12)
POTASSIUM SERPL-SCNC: 4.4 MMOL/L (ref 3.5–5.2)
PROT SERPL-MCNC: 6.7 G/DL (ref 6–8.5)
RBC # BLD AUTO: 4.17 10*6/MM3 (ref 3.77–5.28)
SODIUM SERPL-SCNC: 140 MMOL/L (ref 136–145)
TRIGL SERPL-MCNC: 162 MG/DL (ref 0–150)
VLDLC SERPL-MCNC: 29 MG/DL (ref 5–40)
WBC NRBC COR # BLD AUTO: 7.51 10*3/MM3 (ref 3.4–10.8)

## 2025-05-30 PROCEDURE — 80053 COMPREHEN METABOLIC PANEL: CPT

## 2025-05-30 PROCEDURE — 36415 COLL VENOUS BLD VENIPUNCTURE: CPT

## 2025-05-30 PROCEDURE — 80061 LIPID PANEL: CPT | Performed by: INTERNAL MEDICINE

## 2025-05-30 PROCEDURE — 85025 COMPLETE CBC W/AUTO DIFF WBC: CPT

## 2025-05-30 NOTE — PROGRESS NOTES
Subjective   Dasha De Leon is a 54 y.o. female.  Referred by Dr. De Anda for right breast invasive ductal carcinoma    History of Present Illness     Patient is a 54-year-old postmenopausal  lady who presented with a palpable mass in her right breast in mid April.  This was right around the time of her screening mammogram and hence she proceeded with a screening mammogram.  She denies any previous breast abnormalities or biopsies.  No family history of breast or ovarian carcinoma.    4/29/2024-bilateral screening mammogram  Breasts are entirely fatty.  Bilateral retropectoral saline implants.  Round mass measuring 16 mm with spiculated margins in the posterior one third region of the right breast at 9:00.  This is new since the prior exams.  No suspicious masses in the left breast.    5/10/2024-right breast diagnostic mammogram  Breast is entirely fatty.  Retropectoral saline implants.  Finding 1.round mass in the right breast at 9:00 previously seen on the screening mammogram, measuring 16 mm with spiculated margin.    Right breast ultrasound  Finding 1.hypoechoic mass with spiculated margins and internal vascularity measuring 15 mm at 8:00, 10 cm from the nipple.  Finding 2.irregular  Palpable hypoechoic mass measuring 4 x 6 x 4 mm at 8:30 position in the right breast, 11 cm from the nipple.    Ultrasound-guided biopsy of both these masses recommended.    5/15/2024-right breast ultrasound-guided biopsy  1.8 o'clock position right breast-invasive ductal carcinoma with apocrine features  Grade 2  ER +91 to 100% strong  TX +91 to 100% strong  HER2 negative, 1+  Ki-67 22%  2.right breast 8:30 position  Invasive ductal carcinoma with apocrine features, grade 2  ER +91 to 100% strong  TX +91 to 100% strong  HER2 negative, Ki-67 20%.    5/25/2024-bilateral breast MRI  1.2 biopsy sites of malignancy at 8:30 position in the posterior one third that measured 2.3 cm and 2.4 cm respectively.  The lesions either  about or extend to the lateral margin of the pectoral fascia that overlies the implant capsule and some extension into the pectoral muscle at this location and/or biologic capsular suspect.  Finding suspicious for axilla lymphadenopathy.  Right axilla lymph node measuring 1.4 cm.  2.no suspicious findings in the left breast.    7/11/2024-right mastectomy and sentinel lymph node biopsy  Invasive ductal carcinoma measuring 45 x 20 x 14 mm  Grade 3  Extensive lymphovascular invasion present.  Invasive carcinoma present extensively at the anterior margin  Distance to the invasive carcinoma from the posterior margin 0.9 mm  1 lymph node with macrometastasis measuring 12 mm, 2 mm extranodal extension  At least 3 sentinel lymph nodes  Total number of [place] and nonsentinel lymph nodes evaluated 5  PT2 N1a MX  Receptors repeated on the lymph node with estrogen receptor +91 to 100% strong, progesterone receptor +91 to 100% strong, HER2 negative, score 0, Ki-67 35%    Given the positive margin additional surgery performed on 7/26/2024 with ultimately negative margins.  Expander placed.    Patient presents today to discuss adjuvant therapy.    Oncotype DX recurrence score 18 with no additional benefit from chemotherapy and 5-year risk of distant metastasis being 4%.      Verzenio started 12/6/2024.    Patient was seen on 1/9/2025 when she complained of right side chest pain which had just started and she was going to call the office back if the symptoms persisted or got worse.    On 2/6/2025 she presented back for routine follow-up while on Verzenio and she complained of ongoing right sided chest pain and also reported worsening dyspnea.    2/6/2025-CT angiogram of the chest with very significant streak artifact from the right chest wall tissue expander and there was heterogeneous admixture of contrast at the pulmonary arteries particularly on the right.  Despite all the limitations there are findings suspicious for right  pulmonary thromboemboli.  Small eccentric thrombus at the proximal right interlobar artery adjacent to the right middle lobe pulmonary artery.  There is also likely a thrombus.  Likely nonocclusive thrombus within the segmental right lower lobe pulmonary arteries.    Dense consolidation in the right apex and some of the lateral peripheral subpleural opacities of the right upper lobe likely represent radiation pneumonitis and fibrosis but there is also mixed density and groundglass opacities at the right lower lobe which are suspicious for sequela of pulmonary thromboemboli.  Tiny right pleural effusion.  No left lung opacities and there is no left pleural effusion or pericardial effusion.    Abdomen and pelvis CT without any evidence of metastatic disease.  Moderately extensive abdominal aortic atherosclerotic changes without aneurysmal dilatation.    Patient was advised to stop Verzenio on 2/6/2025 after diagnosis of the pulmonary embolism.    Interval history:  Dasha returns today for follow-up.  She had latissimus flap performed on 5/9/2025.  She still has a drain in place.  She is reporting some back pain and knee pain which are chronic and unchanged.  Reporting the right-sided back pain from the latissimus flap surgery.  Also reports some dyspnea.  She is currently on anastrozole but has not resumed the Eliquis.    The following portions of the patient's history were reviewed and updated as appropriate: allergies, current medications, past family history, past medical history, past social history, past surgical history, and problem list.    Past Medical History:   Diagnosis Date    Abnormal Pap smear of cervix     Anemia in neoplastic disease 4/8/2025    Anxiety     Baker's cyst     RESOLVED    Breast cancer     Right    Bulging lumbar disc     Bursitis     HX    Depression     GERD (gastroesophageal reflux disease)     H/O colposcopy with cervical biopsy     unknown pap result, biopsy was neg per pt, 2013 Total  Women    History of 2019 novel coronavirus disease (COVID-19)     X2    History of eczema     HPV (human papilloma virus) infection     Hyperlipemia     Hypertension     Low back pain     Lumbar radiculopathy     Numbness and tingling of left leg     Osteoarthritis     Scoliosis     Seasonal allergies         Past Surgical History:   Procedure Laterality Date    BREAST AUGMENTATION      BREAST BIOPSY Right 9/19/2024    Procedure: AXILLARY LYMPH NODE BIOPSY/EXCISION;  Surgeon: Romina De Anda MD;  Location: [facility] MAIN OR;  Service: General;  Laterality: Right;    BREAST RECONSTRUCTION Right 7/11/2024    Procedure: RIGHT SUBPECTORAL PLACEMENT TISSUE EXPANDER AND CORTIVA (ACELLULAR DERMAL MATRIX), COMPLEX CLOSURE RIGHT BREAST 5cm;  Surgeon: Colin Bender MD;  Location: [facility] MAIN OR;  Service: Plastics;  Laterality: Right;    BREAST RECONSTRUCTION WITH LATISSIMUS MUSCLE FLAP AND IMPLANT INSERTION Right 5/9/2025    Procedure: LATISSIMUS FLAP RECONSTRUCTION RIGHT BREAST;  Surgeon: Colin Bender MD;  Location: [facility] MAIN OR;  Service: Plastics;  Laterality: Right;    BREAST SURGERY Right 7/26/2024    Procedure: ADJACENT TISSUE REARRANGEMENT RIGHT BREAST;  Surgeon: Colin Bender MD;  Location: [facility] MAIN OR;  Service: Plastics;  Laterality: Right;    BREAST SURGERY Right 4/8/2025    Procedure: ADJACENT REARRANFEMENT;  Surgeon: Colin Bender MD;  Location: [facility] MAIN OR;  Service: Plastics;  Laterality: Right;    BREAST SURGERY Right 4/19/2025    Procedure: EVACUATION HEMATOMA RIGHT BREAST, WOUND VAC PLACEMENT;  Surgeon: Colin Bender MD;  Location: [facility] MAIN OR;  Service: Plastics;  Laterality: Right;    CARPAL TUNNEL RELEASE Right 2004    CRYOTHERAPY      1997    D & C HYSTEROSCOPY N/A 9/19/2024    Procedure: INTRAUTERINE DEVICE REMOVAL;  Surgeon: Kaleb Burciaga MD;  Location: [facility] MAIN OR;  Service: Obstetrics/Gynecology;  Laterality: N/A;    EPIDURAL BLOCK      HAND  "SURGERY  2004    Pisiformectomy    INCISION AND DRAINAGE TRUNK Right 4/8/2025    Procedure: RIGHT BREAST DEBRIDMENT;  Surgeon: Colin Bender MD;  Location: [facility];  Service: Plastics;  Laterality: Right;    LUMBAR DISCECTOMY FUSION INSTRUMENTATION N/A 01/05/2024    Procedure: Lumbar 3 to lumbar 4 and lumbar 4 to lumbar 5 laminectomy with fusion and instrumentation;  Surgeon: Rigoberto Botello MD;  Location: [facility];  Service: Robotics - Neuro;  Laterality: N/A;    MAMMO BILATERAL  2014    MASTECTOMY W/ SENTINEL NODE BIOPSY Right 7/11/2024    Procedure: Right MASTECTOMY WITH SENTINEL NODE BIOPSY;  Surgeon: Romina De Anda MD;  Location: [facility];  Service: General;  Laterality: Right;    MASTECTOMY WITH IMMEDIATE RECONSTRUCTION Right 7/26/2024    Procedure: right mastectomy, margin excision;  Surgeon: Romina De Anda MD;  Location: [facility];  Service: General;  Laterality: Right;    PAP SMEAR  [id number]    PLACEMENT OF WOUND VAC Right 4/28/2025    Procedure: WOUND VAC CHANGE RIGHT BREAST;  Surgeon: Colin Bender MD;  Location: [facility] OR;  Service: Plastics;  Laterality: Right;    PLACEMENT OF WOUND VAC Right 5/5/2025    Procedure: CHANGE WOUND VAC RIGHT BREAST;  Surgeon: Colin Bender MD;  Location: [facility] MAIN OR;  Service: Plastics;  Laterality: Right;    SHOULDER ARTHROSCOPY Left 2002,2003    SHOULDER SURGERY      \"MANIPULATION FOR FROZEN SHOULDER\"    US GUIDED LYMPH NODE BIOPSY  8/29/2024        Family History   Problem Relation Age of Onset    Hypertension Mother     Hyperlipidemia Mother     Diverticulitis Mother     Pancreatitis Mother     Kidney disease Father     Skin cancer Father     Hypertension Father     Hyperlipidemia Father     Stroke Father     Atrial fibrillation Father     Transient ischemic attack Father     No Known Problems Sister     Depression Brother     No Known Problems Daughter     No Known Problems Son     Uterine cancer Maternal Aunt 58    " "Heart disease Maternal Aunt     Lung cancer Maternal Uncle     Heart attack Maternal Uncle     No Known Problems Paternal Aunt     Colon cancer Paternal Uncle 65    Liver disease Maternal Grandmother     Heart disease Maternal Grandfather     Cancer Maternal Grandfather     Heart attack Paternal Grandmother     No Known Problems Paternal Grandfather     BRCA 1/2 Neg Hx     Breast cancer Neg Hx     Endometrial cancer Neg Hx     Ovarian cancer Neg Hx     Malig Hyperthermia Neg Hx         Social History     Socioeconomic History    Marital status: Single   Tobacco Use    Smoking status: Former     Current packs/day: 0.00     Average packs/day: 1 pack/day for 20.0 years (20.0 ttl pk-yrs)     Types: Cigarettes     Start date:      Quit date:      Years since quittin.4     Passive exposure: Past    Smokeless tobacco: Never   Vaping Use    Vaping status: Never Used   Substance and Sexual Activity    Alcohol use: Yes     Alcohol/week: 2.0 - 4.0 standard drinks of alcohol     Types: 2 - 4 Glasses of wine per week     Comment: weekly    Drug use: No    Sexual activity: Defer        OB History          0    Para   0    Term   0       0    AB   0    Living   0         SAB   0    IAB   0    Ectopic   0    Molar        Multiple   0    Live Births                   Age at menarche-13  Age at first live childbirth-not applicable   0 para 0  0  Age at menopause-49  Oral conceptive pill use for 25 years  No use of hormone replacement therapy    Allergies   Allergen Reactions    Penicillins Anaphylaxis    Hemp Seed Oil Itching     HEMP IN LOTIONS    Lisinopril Cough        Objective   Blood pressure 117/73, pulse 94, temperature 98 °F (36.7 °C), temperature source Oral, height 167.6 cm (65.98\"), weight 73.7 kg (162 lb 8 oz), SpO2 98%, not currently breastfeeding.       Physical Exam  Constitutional:       Appearance: Normal appearance.   Cardiovascular:      Rate and Rhythm: Normal rate.      " Heart sounds: Normal heart sounds.   Pulmonary:      Effort: Pulmonary effort is normal.      Breath sounds: Normal breath sounds.   Skin:     General: Skin is warm and dry.   Neurological:      Mental Status: She is oriented to person, place, and time.   Psychiatric:         Mood and Affect: Mood normal.         Behavior: Behavior normal.       Breast Exam:   Right breast- s/p mastectomy with LAD Baltazar flap.  Drain still present  Left breast not examined today.      No radiology results for the last 30 days.       Assessment & Plan   *Right breast invasive ductal carcinoma  Status post right mastectomy on 7/11/2024-pathology with 45 mm of invasive ductal carcinoma, grade 3, ER/MD strongly positive at 91 to 100%, HER2 1+, Ki-67 22%  1 out of 5 lymph nodes positive for metastatic disease and receptors on lymph node also ER positive and MD positive.  PT2 N1a MX  8/12/2024-CT of the chest abdomen pelvis-single mildly enlarged right axillary lymph node suspicious for sharona metastasis.  No evidence of distant metastatic disease in the patient with recent right mastectomy.  Mild hepatic steatosis.  IUD present.  Duodenal diverticulum present.  9/19/2024-abnormal lymph node excision.  1 lymph node involved by macrometastatic carcinoma measuring 9.5 mm, greater than 2 mm extranodal extension.  Oncotype DX recurrence score 18 with no additional benefit from chemotherapy and 4% risk of distant metastasis at 5 years  Given the low Oncotype DX recurrence score she will proceed with adjuvant radiation.  Started anastrozole November 2024  Radiation complete 12/3/2024  12/4/2024 fair tolerance to anastrozole.   12/6/2024-started Verzenio.  1/9/2025-patient continues on full dose of Verzenio 150 mg twice daily and labs reviewed and stable to proceed with the same.  2/6/2025-patient reports severe right-sided rib pain as well as dyspnea, tachycardia and severe nausea.  2/6/2025-CT angiogram of the chest with right sided pulmonary  emboli.  There is also significant streak artifact and also radiation-induced changes of the right apex.  No evidence of metastatic disease.  2/6/2025-CT abdomen and pelvis without any evidence of metastatic disease  2/18/2025-bone scan without any evidence of metastatic disease.  Verzenio was held since 2/6/2025.  She continues on anastrozole  Today we had a long discussion regarding the increased risk of DVT and PE associated with Verzenio.  However given that the tumor was 45 mm high-grade and 2 lymph nodes were involved with malignancy the risk of recurrence is high enough that I would recommend that she continue with Verzenio and continue anticoagulation for 2 years while she is on Verzenio.  I however did explain to the patient that there is no data about how we should proceed if someone developed a DVT or PE on Verzenio.  She does understand that there is a increased risk of DVT and PE with Verzenio.  2/20/2025 resumed Verzenio at 100 mg twice daily and subsequently increased to 150 mg  3/15/2025-dose of Verzenio decreased to 100 mg twice daily.  3/28/2025-patient tolerating the 100 mg twice daily of Verzenio well with resolution of diarrhea and abdominal pain however she continues to experience severe fatigue.  Verzenio held April 2025.  She underwent debridement of the right skin and subcutaneous tissue on 4/9/2025.  She required hospital admission because she experienced excessive bleeding at home on 4/18/2025 with lightheadedness.  Hemoglobin was 7.7.  She received 2 units PRBC.  She went back to OR for hematoma evacuation with placement of right chest wall wound vac.  Due to complications Verzenio remained on hold.  5/9/2025-micaela flap surgery performed.  5/30/2025-patient still has right chest wall drain in place however there is no bleeding.  Discussed with Dr. Bender and he has cleared the patient to resume Eliquis.  Continues on anastrozole and tolerating that well overall.  No evidence of  recurrent disease    *Severe fatigue  Secondary to endocrine therapy as well as Verzenio.  Patient will be referred to supportive oncology to discuss Ritalin.  Continues Ritalin.    *Right sided rib pain  Acute onset on 1/8/2025  Secondary to pulmonary embolism and infarction.  Improved    *Diarrhea  Resolved since discontinuing Verzenio    *Nausea  Improved    *GERD  Started Protonix 40 mg daily  Improved    *Dizziness  Resolved  Blood pressure normal    *Anxiety and depression  Continue Wellbutrin, Lexapro  Well-controlled at this time    *Hypertension  Blood pressure BP: 117/73    Continue current medications    *Hyperlipidemia  Continue Crestor    *Back issues/pain  Status with spinal fusion  Continue Flexeril  Encouraged to contact Dr. Botello regarding intermittent popping and pain in her back  Unchanged    *Bone health  Baseline DEXA scan 8/29/2024 noted normal bone density    *Pulmonary embolism  Started on Eliquis 10 mg twice daily and currently on Eliquis 5 mg twice daily  Plan to continue anticoagulation for 2 years for as long as she is on the Verzenio  Likely secondary to Verzenio  Thrombophilia workup negative  Anticoagulation was held due to right chest wall bleeding.  5/30/2025-resume Eliquis.  Cleared by Dr. Bender.    *Right chest wall hemorrhage  Patient had severe bleeding on 3/27/2025 and hemoglobin has dropped to 9.8.  Recommend that she contact Dr. Bender's office regarding the same.  She is also scheduled for reconstructive surgery 4/28/2025  She would have to hold the Verzenio 1 week before and 1 week after  Also recommend that she be on Lovenox while she is holding Eliquis.  Recommend that she discuss this with Dr. Cosme as well.  She underwent debridement of the right skin and subcutaneous tissue on 4/9/2025.  She required hospital admission because she experienced excessive bleeding at home on 4/18/2025 with lightheadedness.  Hemoglobin was 7.7.  She received 2 units PRBC.  She went  back to OR for hematoma evacuation with placement of right chest wall wound vac.   Resolved.    PLAN:   The patient has completed adjuvant radiation 12/3/2024  Continue anastrozole 1 mg daily.  Initiated November 2024.  Continue to hold Verzenio 100 mg twice daily.  Initiated December 2024.  Held since February 2025  Continue Protonix 40 mg daily.   Continue follow-up with supportive on-call  There is no clear data on resuming Verzenio and continuing Verzenio after episode of DVT and PE.  Patient also tolerated Verzenio fairly poorly.  For now we will resume anticoagulation and hold Verzenio.  Reassess again at next visit.    Patient is on medications requiring close monitoring for toxicities.  Discussed with Dr. Bender regarding anticoagulation.    Talia Huang MD  05/30/2025

## 2025-06-03 ENCOUNTER — APPOINTMENT (OUTPATIENT)
Dept: WOMENS IMAGING | Facility: HOSPITAL | Age: 55
End: 2025-06-03
Payer: COMMERCIAL

## 2025-06-03 PROCEDURE — 77063 BREAST TOMOSYNTHESIS BI: CPT | Performed by: RADIOLOGY

## 2025-06-03 PROCEDURE — 77067 SCR MAMMO BI INCL CAD: CPT | Performed by: RADIOLOGY

## 2025-06-04 DIAGNOSIS — C50.511 MALIGNANT NEOPLASM OF LOWER-OUTER QUADRANT OF RIGHT BREAST OF FEMALE, ESTROGEN RECEPTOR POSITIVE: ICD-10-CM

## 2025-06-04 DIAGNOSIS — Z17.0 MALIGNANT NEOPLASM OF LOWER-OUTER QUADRANT OF RIGHT BREAST OF FEMALE, ESTROGEN RECEPTOR POSITIVE: ICD-10-CM

## 2025-06-04 RX ORDER — HYDROCODONE BITARTRATE AND ACETAMINOPHEN 5; 325 MG/1; MG/1
1 TABLET ORAL EVERY 4 HOURS PRN
Qty: 120 TABLET | Refills: 0 | Status: SHIPPED | OUTPATIENT
Start: 2025-06-04

## 2025-06-16 ENCOUNTER — OFFICE VISIT (OUTPATIENT)
Dept: INTERNAL MEDICINE | Facility: CLINIC | Age: 55
End: 2025-06-16
Payer: COMMERCIAL

## 2025-06-16 VITALS
HEIGHT: 66 IN | SYSTOLIC BLOOD PRESSURE: 96 MMHG | TEMPERATURE: 98 F | WEIGHT: 163 LBS | DIASTOLIC BLOOD PRESSURE: 58 MMHG | BODY MASS INDEX: 26.2 KG/M2

## 2025-06-16 DIAGNOSIS — F41.9 ANXIETY: Primary | ICD-10-CM

## 2025-06-16 DIAGNOSIS — E78.2 MIXED HYPERLIPIDEMIA: ICD-10-CM

## 2025-06-16 DIAGNOSIS — Z00.00 ANNUAL PHYSICAL EXAM: ICD-10-CM

## 2025-06-16 DIAGNOSIS — C50.911 MALIGNANT NEOPLASM OF RIGHT FEMALE BREAST, UNSPECIFIED ESTROGEN RECEPTOR STATUS, UNSPECIFIED SITE OF BREAST: ICD-10-CM

## 2025-06-16 PROCEDURE — 99396 PREV VISIT EST AGE 40-64: CPT | Performed by: PHYSICIAN ASSISTANT

## 2025-06-16 RX ORDER — METOPROLOL SUCCINATE 25 MG/1
25 TABLET, EXTENDED RELEASE ORAL DAILY
Start: 2025-06-16

## 2025-06-16 RX ORDER — ALPRAZOLAM 0.5 MG
0.5 TABLET ORAL 2 TIMES DAILY PRN
Qty: 60 TABLET | Refills: 0 | Status: SHIPPED | OUTPATIENT
Start: 2025-06-16

## 2025-06-16 NOTE — PROGRESS NOTES
Subjective   Chief Complaint   Patient presents with    Annual Exam       History of Present Illness      Pt is here today for CPE. Has had a stressful and complicated last few months. She is finally doing better. She had a PE from Verzenio that she started in December.  Right sided PE on CTA in February. Is to be on anticoag as long as she is on Verzenio. She had right heart strain on echo after PE and Dr. Greenberg put her on Jardiance, Metoprolol. She has had low BP is taking the Olmesartan 20 mg every other day. Has no dizziness or lightheadedness.     Anxiety has continued and been worse through all her recent hospitalizations. Would like to increase dosage of Xanax.   Patient Active Problem List   Diagnosis    Essential hypertension    Mood disorder    Allergic rhinitis    Hyperlipidemia    Knee pain, right    Lumbar radiculopathy    Vitamin D deficiency    Lumbar spinal stenosis    Follow-up examination following surgery    Abnormality of right breast on screening mammogram    Malignant neoplasm of lower-outer quadrant of right breast of female, estrogen receptor positive    Anxiety    ASCUS of cervix with negative high risk HPV    Open chest wound, right, initial encounter    Open wound of chest wall, right, initial encounter    Anemia in neoplastic disease    Bleeding from wound    H/O chest wound    Acute posthemorrhagic anemia    Breast cancer       Allergies   Allergen Reactions    Penicillins Anaphylaxis    Hemp Seed Oil Itching     HEMP IN LOTIONS    Lisinopril Cough       Current Outpatient Medications on File Prior to Visit   Medication Sig Dispense Refill    anastrozole (ARIMIDEX) 1 MG tablet Take 1 tablet by mouth Daily. (Patient taking differently: Take 1 tablet by mouth Every Night.) 90 tablet 3    apixaban (Eliquis) 5 MG tablet tablet Take 1 tablet by mouth 2 (Two) Times a Day.      atorvastatin (LIPITOR) 40 MG tablet Take 2 tablets by mouth Daily. (Patient taking differently: Take 2 tablets by mouth  Every Night.) 180 tablet 3    buPROPion XL (Wellbutrin XL) 300 MG 24 hr tablet Take 1 tablet by mouth Daily. (Patient taking differently: Take 1 tablet by mouth Every Night.) 90 tablet 1    cyclobenzaprine (FLEXERIL) 10 MG tablet Take 1 tablet by mouth 3 (Three) Times a Day As Needed for Muscle Spasms. 90 tablet 3    empagliflozin (Jardiance) 10 MG tablet tablet Take 1 tablet by mouth Daily. (Patient taking differently: Take 1 tablet by mouth Every Night.) 90 tablet 3    escitalopram (LEXAPRO) 20 MG tablet Take 1 tablet by mouth Daily. 90 tablet 3    famotidine (PEPCID) 20 MG tablet Take 1 tablet by mouth 2 (Two) Times a Day. 180 tablet 3    HYDROcodone-acetaminophen (NORCO) 5-325 MG per tablet Take 1 tablet by mouth Every 4 (Four) Hours As Needed (Pain). 120 tablet 0    levocetirizine (XYZAL) 5 MG tablet Take one tablet by mouth twice daily for urticaria. 180 tablet 3    olmesartan (BENICAR) 40 MG tablet Take 0.5 tablets by mouth Daily. (Patient taking differently: Take 0.5 tablets by mouth Every Other Day.) 45 tablet 1    ondansetron (ZOFRAN) 8 MG tablet Take 1 tablet by mouth 3 (Three) Times a Day As Needed for Nausea or Vomiting. 90 tablet 5    pantoprazole (Protonix) 40 MG EC tablet Take 1 tablet by mouth Daily. 90 tablet 3    [DISCONTINUED] ALPRAZolam (Xanax) 0.25 MG tablet Take 1 tablet by mouth 2 (Two) Times a Day As Needed for Anxiety. 30 tablet 0    [DISCONTINUED] furosemide (Lasix) 20 MG tablet Take 1 tablet by mouth 2 (Two) Times a Day. (Patient taking differently: Take 1 tablet by mouth As Needed.) 90 tablet 1    [DISCONTINUED] metoprolol succinate XL (TOPROL-XL) 25 MG 24 hr tablet Take 1 tablet by mouth Daily. (Patient taking differently: Take 1 tablet by mouth Every Evening. Every other day) 90 tablet 3    ondansetron ODT (ZOFRAN-ODT) 8 MG disintegrating tablet Place 1 tablet on the tongue Every 8 (Eight) Hours As Needed for Nausea or Vomiting. 10 tablet 1     No current facility-administered  medications on file prior to visit.       Past Medical History:   Diagnosis Date    Abnormal ECG 12/27/23    Dr. Olivia cleared    Abnormal Pap smear of cervix     Anemia in neoplastic disease 04/08/2025    Anxiety     Arthritis     Miller's cyst     RESOLVED    Breast cancer     Right    Bulging lumbar disc     Bursitis     HX    Cholesterol blood reduced     CTS (carpal tunnel syndrome)     currently resolved    Depression     GERD (gastroesophageal reflux disease)     H/O colposcopy with cervical biopsy     unknown pap result, biopsy was neg per pt, 2013 [facility]    History of 2019 novel coronavirus disease (COVID-19)     X2    History of eczema     HPV (human papilloma virus) infection     Hyperlipemia     Hypertension     Low back pain     Lumbar radiculopathy     Lumbosacral disc disease 3/2020,8/2023    Discovered by MRI    Numbness and tingling of left leg     Osteoarthritis     PONV (postoperative nausea and vomiting)     Rh incompatibility     Scoliosis     Seasonal allergies     Urinary tract infection     Varicella        Family History   Problem Relation Age of Onset    Hypertension Mother     Hyperlipidemia Mother     Diverticulitis Mother     Pancreatitis Mother     Kidney disease Father     Skin cancer Father     Hypertension Father     Hyperlipidemia Father     Stroke Father     Atrial fibrillation Father     Transient ischemic attack Father     COPD Father     Cancer Father     Heart disease Father         CAD 2 stents    Coronary artery disease Father     No Known Problems Sister     Depression Brother     No Known Problems Daughter     No Known Problems Son     Uterine cancer Maternal Aunt 58    Depression Maternal Aunt     Liver disease Maternal Aunt     Heart disease Maternal Aunt         CHF    Uterine cancer Maternal Aunt     Lung cancer Maternal Uncle     Heart attack Maternal Uncle     No Known Problems Paternal Aunt     Colon cancer Paternal Uncle 65    Liver disease Maternal Grandmother      Heart disease Maternal Grandfather     Cancer Maternal Grandfather     Heart attack Paternal Grandmother     Heart failure Paternal Grandmother     No Known Problems Paternal Grandfather     BRCA 1/2 Neg Hx     Breast cancer Neg Hx     Endometrial cancer Neg Hx     Ovarian cancer Neg Hx     Malig Hyperthermia Neg Hx        Social History     Socioeconomic History    Marital status: Single   Tobacco Use    Smoking status: Former     Current packs/day: 0.00     Average packs/day: 1 pack/day for 20.0 years (20.0 ttl pk-yrs)     Types: Cigarettes     Start date:      Quit date:      Years since quittin.4     Passive exposure: Past    Smokeless tobacco: Never   Vaping Use    Vaping status: Never Used   Substance and Sexual Activity    Alcohol use: Yes     Alcohol/week: 2.0 - 4.0 standard drinks of alcohol     Types: 2 - 4 Glasses of wine per week     Comment: weekly    Drug use: No    Sexual activity: Defer       Past Surgical History:   Procedure Laterality Date    BACK SURGERY      BREAST AUGMENTATION      BREAST BIOPSY Right 2024    Procedure: AXILLARY LYMPH NODE BIOPSY/EXCISION;  Surgeon: Romina De Anda MD;  Location: [facility];  Service: General;  Laterality: Right;    BREAST RECONSTRUCTION Right 2024    Procedure: RIGHT SUBPECTORAL PLACEMENT TISSUE EXPANDER AND CORTIVA (ACELLULAR DERMAL MATRIX), COMPLEX CLOSURE RIGHT BREAST 5cm;  Surgeon: Colin Bender MD;  Location: [facility];  Service: Plastics;  Laterality: Right;    BREAST RECONSTRUCTION WITH LATISSIMUS MUSCLE FLAP AND IMPLANT INSERTION Right 2025    Procedure: LATISSIMUS FLAP RECONSTRUCTION RIGHT BREAST;  Surgeon: Colin Bender MD;  Location: [facility];  Service: Plastics;  Laterality: Right;    BREAST SURGERY Right 2024    Procedure: ADJACENT TISSUE REARRANGEMENT RIGHT BREAST;  Surgeon: Colin Bender MD;  Location: [facility];  Service: Plastics;  Laterality: Right;     BREAST SURGERY Right 04/08/2025    Procedure: ADJACENT REARRANFEMENT;  Surgeon: Colin Bender MD;  Location: [facility] MAIN OR;  Service: Plastics;  Laterality: Right;    BREAST SURGERY Right 04/19/2025    Procedure: EVACUATION HEMATOMA RIGHT BREAST, WOUND VAC PLACEMENT;  Surgeon: Colin Bender MD;  Location: [facility] MAIN OR;  Service: Plastics;  Laterality: Right;    CARPAL TUNNEL RELEASE Right 2004    CRYOTHERAPY      1997    D & C HYSTEROSCOPY N/A 09/19/2024    Procedure: INTRAUTERINE DEVICE REMOVAL;  Surgeon: Kaleb Burciaga MD;  Location: [facility] MAIN OR;  Service: Obstetrics/Gynecology;  Laterality: N/A;    EPIDURAL BLOCK      HAND SURGERY  2004    Pisiformectomy    INCISION AND DRAINAGE TRUNK Right 04/08/2025    Procedure: RIGHT BREAST DEBRIDMENT;  Surgeon: Colin Bender MD;  Location: [facility] MAIN OR;  Service: Plastics;  Laterality: Right;    LUMBAR DISCECTOMY FUSION INSTRUMENTATION N/A 01/05/2024    Procedure: Lumbar 3 to lumbar 4 and lumbar 4 to lumbar 5 laminectomy with fusion and instrumentation;  Surgeon: Rigoberto Botello MD;  Location: [facility] MAIN OR;  Service: Robotics - Neuro;  Laterality: N/A;    MAMMO BILATERAL  2014    MASTECTOMY W/ SENTINEL NODE BIOPSY Right 07/11/2024    Procedure: Right MASTECTOMY WITH SENTINEL NODE BIOPSY;  Surgeon: Romina De Anda MD;  Location: [facility] MAIN OR;  Service: General;  Laterality: Right;    MASTECTOMY WITH IMMEDIATE RECONSTRUCTION Right 07/26/2024    Procedure: right mastectomy, margin excision;  Surgeon: Romina De Anda MD;  Location: [facility] MAIN OR;  Service: General;  Laterality: Right;    PAP SMEAR  [id number]    PLACEMENT OF WOUND VAC Right 04/28/2025    Procedure: WOUND VAC CHANGE RIGHT BREAST;  Surgeon: Colin Bender MD;  Location: [facility] MAIN OR;  Service: Plastics;  Laterality: Right;    PLACEMENT OF WOUND VAC Right 05/05/2025    Procedure: CHANGE WOUND VAC RIGHT BREAST;  Surgeon: Colin Bender MD;  Location: [facility] MAIN OR;   "Service: Plastics;  Laterality: Right;    SHOULDER ARTHROSCOPY Left 2002,2003    SHOULDER SURGERY      \"MANIPULATION FOR FROZEN SHOULDER\"    SPINAL FUSION  2024    US GUIDED LYMPH NODE BIOPSY  08/29/2024    WISDOM TOOTH EXTRACTION           The following portions of the patient's history were reviewed and updated as appropriate: problem list, allergies, current medications, past medical history, past family history, past social history, and past surgical history.    ROS    See HPI    Immunization History   Administered Date(s) Administered    COVID-19 (PFIZER) Purple Cap Monovalent 02/23/2021, 03/16/2021    Flublock Quad =>18yrs 10/16/2024    Fluzone (or Fluarix & Flulaval for VFC) >6mos 11/05/2020, 10/28/2021, 10/24/2023    Fluzone Quad >6mos (Multi-dose) 10/29/2018, 10/28/2019    Tdap 09/13/2021       Objective   Vitals:    06/16/25 0753   BP: 96/58   Temp: 98 °F (36.7 °C)   Weight: 73.9 kg (163 lb)   Height: 167.6 cm (65.98\")     Body mass index is 26.32 kg/m².  Physical Exam  Vitals and nursing note reviewed.   Constitutional:       Appearance: Normal appearance.   HENT:      Head: Normocephalic and atraumatic.      Nose: Nose normal.   Eyes:      General: No scleral icterus.     Extraocular Movements: Extraocular movements intact.      Conjunctiva/sclera: Conjunctivae normal.      Pupils: Pupils are equal, round, and reactive to light.   Neck:      Thyroid: No thyromegaly.      Vascular: No carotid bruit.   Cardiovascular:      Rate and Rhythm: Normal rate and regular rhythm.      Heart sounds: Normal heart sounds. No murmur heard.  Pulmonary:      Effort: Pulmonary effort is normal.      Breath sounds: Normal breath sounds.   Abdominal:      General: Abdomen is flat. Bowel sounds are normal.      Palpations: Abdomen is soft. There is no hepatomegaly or splenomegaly.      Tenderness: There is no abdominal tenderness. There is no guarding.   Musculoskeletal:      Cervical back: Neck supple.   Lymphadenopathy:    "   Cervical: No cervical adenopathy.   Skin:     General: Skin is warm and dry.   Neurological:      General: No focal deficit present.      Mental Status: She is alert and oriented to person, place, and time. Mental status is at baseline.      Gait: Gait normal.   Psychiatric:         Mood and Affect: Mood normal.         Behavior: Behavior normal.         Thought Content: Thought content normal.         Judgment: Judgment normal.         Assessment & Plan   Diagnoses and all orders for this visit:    1. Anxiety (Primary)  -     ALPRAZolam (Xanax) 0.5 MG tablet; Take 1 tablet by mouth 2 (Two) Times a Day As Needed for Anxiety.  Dispense: 60 tablet; Refill: 0    2. Annual physical exam    3. Mixed hyperlipidemia    4. Malignant neoplasm of right female breast, unspecified estrogen receptor status, unspecified site of breast    Other orders  -     metoprolol succinate XL (TOPROL-XL) 25 MG 24 hr tablet; Take 1 tablet by mouth Daily.     Lipids are excellent. BP is low, not symptomatic. Will discuss with Dr. Nguyen. Recommended healthy diet, and 150 min of aerobic exercise per week. Okay with increasing Xanax to 0.5 mg.     Return in about 6 months (around 12/16/2025).

## 2025-06-17 NOTE — PROGRESS NOTES
[facility] RADIATION ONCOLOGY  FOLLOW-UP    Name: Dasha De Leon  YOB: 1970  MRN #: [id number]  Date of Service: 6/21/2025    Chief Complaint:    Follow-up for breast cancer    Diagnosis:   Encounter Diagnosis   Name Primary?    Malignant neoplasm of lower-outer quadrant of right breast of female, estrogen receptor positive Yes          Radiation Treatment Course     Treating Physician: Dr. Germain Puga     Start: 10/21/2024     End: 11/22/2024   Site: Rt Breast    Total Dose: 5000 cGy    Dose/Fraction: 200 cGy    Total Fractions: 25 Daily or BID: Daily  Modality: Photon  Technique: 3DCRT  Bolus: No     Start: 11/25/2024     End: 12/3/2024   Site: Rt Chest wall    Total Dose: 1000 cGy    Dose/Fraction: 200 cGy    Total Fractions: 5 Daily or BID: Daily  Modality: Electron  Technique: 3DCRT  Bolus: No         Interval History       Dasha De Leon is a 54 y.o. female who presents with history of stage IIA (pT2, pN1a(sn), cM0, G3, ER+, IL+, HER2-) right breast invasive ductal carcinoma. She is sp mastectomy and SLNB on 7/11/2024. She underwent re-resection for a positive margin on 7/26/2024.  She underwent reresection of a biopsy-proven positive lymph node on 9/19/2024.  She completed adjuvant radiation on 12/3/2024 and presents today for routine follow up.     2/6/2025 Patient was complaining of ongoing right sided chest pain and increased shortness of breath. CT angiogram was performed and imaging was worrisome for nonocclusive thrombus within the segmental right lower lobe pulmonary arteries. Patient was advised to temporarily stop her adjuvant Verzenio and was started on anticoagulation for her blood clots.  Patient restarted on Verzenio on 2/20/2025.    After this blood clot, the patient started to notice some breakdown around her mastectomy site wound.     On 3/27/2025 The patient had a severe hemorrhage from the right mastectomy site. She was still two weeks out from  her follow-up with plastic surgery and about a month out from another reconstructive surgery.     3/28/2025 Patient was seen by Dr. Huang.  On physical exam the right breast had radiation dermatitis with erythema of the chest wall.  There is no evidence of excoriation or infection.    4/6/2025 patient presented to the emergency department.  She reported that her expander fell out. She stated she recently had swelling and then a wound developed leading to the expander falling out.     4/8/2025 Patient underwent scar excision which showed full-thickness ischemia with necrotizing acute soft tissue inflammation edema and hemorrhage.     Patient was discharged home on 4/9/2025.    Patient returned to the emergency department on 4/18/2025 with bleeding in her GT drain and from the incision site. Patient was on anticoagulation for her blood clots which likely contributed to the bleeding.  She required 2 units of blood infusion.  She was discharged home with a wound VAC.    5/9/2025 patient underwent a right breast latissimus flap reconstruction and removal of implant to try and facilitate healing.    5/30/2025 The patient was seen by Dr. Huang in follow-up after reconstruction. Her drains were still in place.      The patient presents today for follow-up. Her drain is now removed. She is closely following with Dr. Bender. She will not be a candidate for additional implant reconstruction for at least the next few years.       Imaging       Outside mammogram reportedly recently performed on the left breast. We do not have record of this but patient reports it was normal.       CT Chest With Contrast Diagnostic  Result Date: 4/18/2025   1. The patient has undergone removal of the right-sided tissue expander. There is a mixed density collection within the operative bed, favored to reflect hematoma. Surgical drain is noted within the operative bed. There is also expected soft tissue air. 2. On prior exam, the patient was  noted to have patchy infiltrates within the right lung these appear improved when compared to prior exam. However, the patient has some peripheral patchy opacities within the left lung, which are new when compared to prior exam, which may be infectious or inflammatory. Short-term follow-up exam to document resolution is recommended.  Radiation dose reduction techniques were utilized, including automated exposure control and exposure modulation based on body size.   This report was finalized on 4/18/2025 2:19 AM by Dr. Fely Carr M.D on Workstation: [id number]      NM Bone Scan Whole Body  Result Date: 2/19/2025  No scintigraphic evidence of osseous metastatic disease.       Pathology       5/9/2025   Final Diagnosis   1.  Skin, right breast mastectomy, excision:               A.  Skin with ulceration, necrosis, and degenerative changes.               B.  Negative for carcinoma.       4/8/2025  Final Diagnosis   1.  Skin, right breast mastectomy scar, excision:               A.  Full-thickness ischemia with necrotizing acute soft tissue inflammation edema and hemorrhage.       Labs       Hematology:  WBC   Date Value Ref Range Status   05/30/2025 7.51 3.40 - 10.80 10*3/mm3 Final   01/26/2021 8.75 3.40 - 10.80 10*3/mm3 Final     RBC   Date Value Ref Range Status   05/30/2025 4.17 3.77 - 5.28 10*6/mm3 Final   01/26/2021 4.36 3.77 - 5.28 10*6/mm3 Final     Hemoglobin   Date Value Ref Range Status   05/30/2025 11.8 (L) 12.0 - 15.9 g/dL Final     Hematocrit   Date Value Ref Range Status   05/30/2025 39.1 34.0 - 46.6 % Final     Platelets   Date Value Ref Range Status   05/30/2025 375 140 - 450 10*3/mm3 Final     Chemistry:  Lab Results   Component Value Date    GLUCOSE 106 (H) 05/30/2025    BUN 14.5 05/30/2025    CREATININE 0.84 05/30/2025    EGFRIFNONA 87 09/09/2021    EGFRIFAFRI 106 09/09/2021    BCR 17.3 05/30/2025    K 4.4 05/30/2025    CO2 26.4 05/30/2025    CALCIUM 9.4 05/30/2025    ALBUMIN 3.9 05/30/2025     AST 24 05/30/2025    ALT 21 05/30/2025         Problem List       Patient Active Problem List   Diagnosis    Essential hypertension    Mood disorder    Allergic rhinitis    Hyperlipidemia    Knee pain, right    Lumbar radiculopathy    Vitamin D deficiency    Lumbar spinal stenosis    Follow-up examination following surgery    Abnormality of right breast on screening mammogram    Malignant neoplasm of lower-outer quadrant of right breast of female, estrogen receptor positive    Anxiety    ASCUS of cervix with negative high risk HPV    Open chest wound, right, initial encounter    Open wound of chest wall, right, initial encounter    Anemia in neoplastic disease    Bleeding from wound    H/O chest wound    Acute posthemorrhagic anemia    Breast cancer          Current Medications       Current Outpatient Medications   Medication Instructions    ALPRAZolam (XANAX) 0.5 mg, Oral, 2 Times Daily PRN    anastrozole (ARIMIDEX) 1 mg, Oral, Daily    apixaban (ELIQUIS) 5 mg, 2 Times Daily    atorvastatin (LIPITOR) 80 mg, Oral, Daily    buPROPion XL (WELLBUTRIN XL) 300 mg, Oral, Daily    cyclobenzaprine (FLEXERIL) 10 mg, Oral, 3 Times Daily PRN    escitalopram (LEXAPRO) 20 mg, Oral, Daily    famotidine (PEPCID) 20 mg, Oral, 2 Times Daily    HYDROcodone-acetaminophen (NORCO) 5-325 MG per tablet 1 tablet, Oral, Every 4 Hours PRN    Jardiance 10 mg, Oral, Daily    levocetirizine (XYZAL) 5 MG tablet Take one tablet by mouth twice daily for urticaria.    metoprolol succinate XL (TOPROL-XL) 25 mg, Oral, Daily    olmesartan (BENICAR) 20 mg, Oral, Daily    ondansetron (ZOFRAN) 8 mg, Oral, 3 Times Daily PRN    ondansetron ODT (ZOFRAN-ODT) 8 mg, Translingual, Every 8 Hours PRN    pantoprazole (PROTONIX) 40 mg, Oral, Daily          Allergies       Allergies   Allergen Reactions    Penicillins Anaphylaxis    Hemp Seed Oil Itching     HEMP IN LOTIONS    Lisinopril Cough           Review of Systems       Vitals:    06/19/25 0802   BP:  108/69   Pulse: 87   Resp: 18   SpO2: 96%      Physical Exam  Exam conducted with a chaperone present.   Constitutional:       General: She is not in acute distress.  HENT:      Head: Normocephalic and atraumatic.   Pulmonary:      Effort: Pulmonary effort is normal. No respiratory distress.   Chest:      Comments: Right chest wall visualized. Expander has been removed and Latissimus flap is appropriately healing with no evidence of recent infection, bleeding, or wound dehiscence. No palpable mass in the right chest wall or right axilla.   Neurological:      Mental Status: She is alert and oriented to person, place, and time. Mental status is at baseline.   Psychiatric:         Mood and Affect: Mood normal.         Behavior: Behavior normal.            ECOG:  Restricted in physically strenuous activity but ambulatory and able to carry out work of a light or sedentary nature, e.g., light house work, office work = 1          Assessment and Plan       Assessment:        Dasha De Leon is a 54 y.o. female who presents with history of stage IIA (pT2, pN1a(sn), cM0, G3, ER+, DE+, HER2-) right breast invasive ductal carcinoma. She is sp mastectomy and SLNB on 7/11/2024. She underwent re-resection for a positive margin on 7/26/2024.  She underwent reresection of a biopsy-proven positive lymph node on 9/19/2024.  She completed adjuvant radiation on 12/3/2024. She developed necrosis and ischemia of the mastectomy scar and required scar resection followed by latissimus flap reconstruction. She presents today for routine follow up.     Diagnoses and all orders for this visit:    1. Malignant neoplasm of lower-outer quadrant of right breast of female, estrogen receptor positive (Primary)       Plan:      Orders:  - Follow-up in 6 months or sooner to monitor healing after recent latissimus flap reconstruction  - Continue follow-up and management with Med Onc and Plastic Surgery    We discussed the patient's postradiation  course.  It has been complicated by a pulmonary embolus requiring anticoagulation. She then experienced wound dehiscence, necrosis, and ischemia of the right mastectomy scar.  This resulted in her implant falling out of an open wound and the eventual need for lat flap reconstruction.  We discussed the potential factors leading to this.  The patient had initial mastectomy followed by reconstruction.  She required a second surgery for positive margin excision.  The patient had thin skin in the area of the mastectomy scar.  The patient then inflated the expander which further stretched this tissue.  The patient received subsequent radiation with a boost to the mastectomy scar given the extensive positive margin at time of initial surgery and her grade 3 disease.  The radiation had effects of scarring and likely decreasing blood flow and inducing potential necrosis and ischemia over the chest wall scar in combination with her other preceding risk factors. At this time, the patient appears well-healed after the latissimus flap reconstruction but will be unable to get additional reconstruction of the right chest wall for a long period of time per her Plastic Surgeon.  She is understandably frustrated by the situation.  Unfortunately, implant failure after mastectomy and chest wall radiation is an uncommon but significant side effect that can be seen in these situations.  The extent of wound dehiscence is on the more extreme and of side effects that I personally have seen.  I am pleased to see the patient is now healing appropriately with no ongoing evidence of ischemia or dehiscence.    The patient appears to have no clinical or radiographic evidence of disease recurrence.  We discussed plans for continued and close surveillance.  The patient has multiple upcoming appointments with medical oncology and her plastic surgeon.  We discussed plans to see the patient back in 6 months to monitor her wound healing and additional  side effects from radiation.  If the patient has additional complications we discussed potential options including treatment for radiation fibrosis.  The patient is encouraged to reach out with questions or concerns that may arise prior to her next appointment.    I spent 30 minutes caring for Dasha on this date of service. This time includes time spent by me in the following activities:preparing for the visit, reviewing tests, obtaining and/or reviewing a separately obtained history, performing a medically appropriate examination and/or evaluation , counseling and educating the patient/family/caregiver, ordering medications, tests, or procedures, documenting information in the medical record, and independently interpreting results and communicating that information with the patient/family/caregiver        Follow-Up       No follow-ups on file.       CC: Ariana Coffman PA-C

## 2025-06-19 ENCOUNTER — OFFICE VISIT (OUTPATIENT)
Dept: RADIATION ONCOLOGY | Facility: HOSPITAL | Age: 55
End: 2025-06-19
Payer: COMMERCIAL

## 2025-06-19 VITALS
BODY MASS INDEX: 26.32 KG/M2 | WEIGHT: 163 LBS | HEART RATE: 87 BPM | OXYGEN SATURATION: 96 % | SYSTOLIC BLOOD PRESSURE: 108 MMHG | DIASTOLIC BLOOD PRESSURE: 69 MMHG | RESPIRATION RATE: 18 BRPM

## 2025-06-19 DIAGNOSIS — C50.511 MALIGNANT NEOPLASM OF LOWER-OUTER QUADRANT OF RIGHT BREAST OF FEMALE, ESTROGEN RECEPTOR POSITIVE: Primary | ICD-10-CM

## 2025-06-19 DIAGNOSIS — Z17.0 MALIGNANT NEOPLASM OF LOWER-OUTER QUADRANT OF RIGHT BREAST OF FEMALE, ESTROGEN RECEPTOR POSITIVE: Primary | ICD-10-CM

## 2025-06-19 PROCEDURE — G0463 HOSPITAL OUTPT CLINIC VISIT: HCPCS | Performed by: INTERNAL MEDICINE

## 2025-07-09 DIAGNOSIS — I50.32 CHRONIC HEART FAILURE WITH PRESERVED EJECTION FRACTION (HFPEF): Primary | ICD-10-CM

## 2025-07-14 DIAGNOSIS — M19.90 ARTHRITIS: Primary | ICD-10-CM

## 2025-07-14 DIAGNOSIS — C50.511 MALIGNANT NEOPLASM OF LOWER-OUTER QUADRANT OF RIGHT BREAST OF FEMALE, ESTROGEN RECEPTOR POSITIVE: ICD-10-CM

## 2025-07-14 DIAGNOSIS — Z17.0 MALIGNANT NEOPLASM OF LOWER-OUTER QUADRANT OF RIGHT BREAST OF FEMALE, ESTROGEN RECEPTOR POSITIVE: ICD-10-CM

## 2025-07-14 RX ORDER — HYDROCODONE BITARTRATE AND ACETAMINOPHEN 5; 325 MG/1; MG/1
1 TABLET ORAL EVERY 4 HOURS PRN
Qty: 120 TABLET | Refills: 0 | Status: SHIPPED | OUTPATIENT
Start: 2025-07-14

## 2025-07-14 RX ORDER — MELOXICAM 15 MG/1
15 TABLET ORAL DAILY
Qty: 90 TABLET | Refills: 1 | Status: SHIPPED | OUTPATIENT
Start: 2025-07-14

## 2025-07-14 NOTE — PROGRESS NOTES
[facility] RADIATION ONCOLOGY  ON-TREATMENT VISIT NOTE    NAME: Dasha De Leon  YOB: 1970  MRN #: [id number]  DATE OF SERVICE: 10/28/2024  PRESCRIBING PHYSICIAN: Fifi Mclean MD     DIAGNOSIS:      ICD-10-CM ICD-9-CM   1. Malignant neoplasm of lower-outer quadrant of right breast of female, estrogen receptor positive  C50.511 174.5    Z17.0 V86.0      RADIATION THERAPY VISIT:  Continue radiation therapy, Dosimetry plan remains acceptable, Films reviewed and remains acceptable, Pain assessed, Pain management planned, Radiation dose schedule reviewed and remains acceptable, Radiation technique remains acceptable, and Symptoms within expected range    Radiation Treatments       Active   Plans   RtSCAxChWall   Most recent treatment: Dose planned: 200 cGy (fraction 6 on 10/28/2024)   Total: Dose planned: 5,000 cGy (25 fractions)   Elapsed Days: 7      Reference Points   RtChWall   Most recent treatment: Dose given: 200 cGy (on 10/28/2024)   Total: Dose given: 1,200 cGy   Elapsed Days: 7                    [] Concurrent Chemo   Regimen:  n/a     PHYSICAL ASSESSMENT         Vitals:    10/28/24 0802   BP: 149/85   Pulse: 106   Resp: 20   Temp: 97.4 °F (36.3 °C)   SpO2: 97%      Wt Readings from Last 3 Encounters:   10/28/24 77.1 kg (170 lb)   10/21/24 76.6 kg (168 lb 12.8 oz)   10/07/24 77.1 kg (170 lb)     Restricted in physically strenuous activity but ambulatory and able to carry out work of a light or sedentary nature, e.g., light house work, office work = 1    We examined the relevant areas: yes  Findings are within the expected range for this stage of treatment: yes    ACTION ITEMS     Patient tolerating treatment well and as expected for this stage in their treatment and Continue radiation therapy as planned    Estimated Completion Date: 12/3/2024    Anticipatory guidance provided.    Reviewed appropriate skin care.    Provided topical lidocaine to mix with Aquaphor in  ----- Message from Ashley sent at 7/14/2025  8:44 AM CDT -----  Regarding: Med Refill  Contact: Pt  Med Refill:     1. What is the name of the medication you are requesting? Epclusa  2. What is the dose? n/a  3. How do you take the medication? Orally, topically, etc? n/a  4. How often do you take this medication? n/a  5. Do you need a 30 day or 90 day supply? n/a  6. How many refills are you requesting? n/a  7. What is your preferred pharmacy and location of the pharmacy? Mail order  8. Who can we contact with further questions? The pt at [phone]   one-to-one fashion to provide pain relief of skin irritation.

## 2025-07-24 ENCOUNTER — HOSPITAL ENCOUNTER (OUTPATIENT)
Dept: CARDIOLOGY | Facility: HOSPITAL | Age: 55
Discharge: HOME | End: 2025-07-24
Admitting: INTERNAL MEDICINE
Payer: COMMERCIAL

## 2025-07-24 VITALS
HEIGHT: 66 IN | SYSTOLIC BLOOD PRESSURE: 114 MMHG | HEART RATE: 83 BPM | DIASTOLIC BLOOD PRESSURE: 71 MMHG | WEIGHT: 162.92 LBS | BODY MASS INDEX: 26.18 KG/M2

## 2025-07-24 LAB
AORTIC DIMENSIONLESS INDEX: 0.98 (DI)
AV MEAN PRESS GRAD SYS DOP V1V2: 1.95 MMHG
AV VMAX SYS DOP: 89.2 CM/SEC
BH CV ECHO MEAS - ACS: 1.66 CM
BH CV ECHO MEAS - AO MAX PG: 3.2 MMHG
BH CV ECHO MEAS - AO ROOT DIAM: 3.3 CM
BH CV ECHO MEAS - AO V2 VTI: 20.2 CM
BH CV ECHO MEAS - AVA(I,D): 3 CM2
BH CV ECHO MEAS - EDV(CUBED): 69.4 ML
BH CV ECHO MEAS - EDV(MOD-SP2): 29.3 ML
BH CV ECHO MEAS - EDV(MOD-SP4): 39.9 ML
BH CV ECHO MEAS - EF(MOD-SP2): 50.2 %
BH CV ECHO MEAS - EF(MOD-SP4): 48.5 %
BH CV ECHO MEAS - ESV(CUBED): 35.1 ML
BH CV ECHO MEAS - ESV(MOD-SP2): 14.6 ML
BH CV ECHO MEAS - ESV(MOD-SP4): 20.5 ML
BH CV ECHO MEAS - FS: 20.3 %
BH CV ECHO MEAS - IVS/LVPW: 0.97 CM
BH CV ECHO MEAS - IVSD: 0.87 CM
BH CV ECHO MEAS - LA DIMENSION: 3.7 CM
BH CV ECHO MEAS - LAT PEAK E' VEL: 10.7 CM/SEC
BH CV ECHO MEAS - LV DIASTOLIC VOL/BSA (35-75): 21.7 CM2
BH CV ECHO MEAS - LV MASS(C)D: 112.1 GRAMS
BH CV ECHO MEAS - LV MAX PG: 3.1 MMHG
BH CV ECHO MEAS - LV MEAN PG: 2.17 MMHG
BH CV ECHO MEAS - LV SYSTOLIC VOL/BSA (12-30): 11.2 CM2
BH CV ECHO MEAS - LV V1 MAX: 88.6 CM/SEC
BH CV ECHO MEAS - LV V1 VTI: 19.9 CM
BH CV ECHO MEAS - LVIDD: 4.1 CM
BH CV ECHO MEAS - LVIDS: 3.3 CM
BH CV ECHO MEAS - LVOT AREA: 3.1 CM2
BH CV ECHO MEAS - LVOT DIAM: 1.98 CM
BH CV ECHO MEAS - LVPWD: 0.9 CM
BH CV ECHO MEAS - MED PEAK E' VEL: 7.4 CM/SEC
BH CV ECHO MEAS - MV A MAX VEL: 71.7 CM/SEC
BH CV ECHO MEAS - MV DEC SLOPE: 308.4 CM/SEC2
BH CV ECHO MEAS - MV DEC TIME: 0.21 SEC
BH CV ECHO MEAS - MV E MAX VEL: 62.7 CM/SEC
BH CV ECHO MEAS - MV E/A: 0.87
BH CV ECHO MEAS - MV MAX PG: 3.4 MMHG
BH CV ECHO MEAS - MV MEAN PG: 1.21 MMHG
BH CV ECHO MEAS - MV V2 VTI: 25.3 CM
BH CV ECHO MEAS - MVA(VTI): 2.4 CM2
BH CV ECHO MEAS - PA ACC TIME: 0.13 SEC
BH CV ECHO MEAS - PA V2 MAX: 108.9 CM/SEC
BH CV ECHO MEAS - RV MAX PG: 1.97 MMHG
BH CV ECHO MEAS - RV V1 MAX: 70.1 CM/SEC
BH CV ECHO MEAS - RV V1 VTI: 17.2 CM
BH CV ECHO MEAS - RVDD: 3.2 CM
BH CV ECHO MEAS - SV(LVOT): 60.9 ML
BH CV ECHO MEAS - SV(MOD-SP2): 14.7 ML
BH CV ECHO MEAS - SV(MOD-SP4): 19.3 ML
BH CV ECHO MEAS - SVI(LVOT): 33.2 ML/M2
BH CV ECHO MEAS - SVI(MOD-SP2): 8 ML/M2
BH CV ECHO MEAS - SVI(MOD-SP4): 10.5 ML/M2
BH CV ECHO MEAS - TAPSE (>1.6): 2.01 CM
BH CV ECHO MEAS - TR MAX PG: 9 MMHG
BH CV ECHO MEAS - TR MAX VEL: 150 CM/SEC
BH CV ECHO MEASUREMENTS AVERAGE E/E' RATIO: 6.93
LV EF BIPLANE MOD: 50 %

## 2025-07-24 PROCEDURE — 93306 TTE W/DOPPLER COMPLETE: CPT

## 2025-07-24 PROCEDURE — 93306 TTE W/DOPPLER COMPLETE: CPT | Performed by: INTERNAL MEDICINE

## 2025-07-30 DIAGNOSIS — F41.9 ANXIETY: ICD-10-CM

## 2025-07-30 RX ORDER — ALPRAZOLAM 0.5 MG
0.5 TABLET ORAL 2 TIMES DAILY PRN
Qty: 60 TABLET | Refills: 0 | Status: SHIPPED | OUTPATIENT
Start: 2025-07-30

## 2025-08-05 DIAGNOSIS — F39 MOOD DISORDER: ICD-10-CM

## 2025-08-05 RX ORDER — BUPROPION HYDROCHLORIDE 300 MG/1
300 TABLET ORAL DAILY
Qty: 90 TABLET | Refills: 1 | Status: SHIPPED | OUTPATIENT
Start: 2025-08-05

## 2025-08-19 ENCOUNTER — TELEMEDICINE (OUTPATIENT)
Dept: PSYCHIATRY | Facility: HOSPITAL | Age: 55
End: 2025-08-19
Payer: COMMERCIAL

## 2025-08-19 DIAGNOSIS — F33.9 RECURRENT MAJOR DEPRESSIVE DISORDER, REMISSION STATUS UNSPECIFIED: ICD-10-CM

## 2025-08-19 DIAGNOSIS — F41.1 GENERALIZED ANXIETY DISORDER: Primary | ICD-10-CM

## 2025-08-19 DIAGNOSIS — Z17.0 MALIGNANT NEOPLASM OF LOWER-OUTER QUADRANT OF RIGHT BREAST OF FEMALE, ESTROGEN RECEPTOR POSITIVE: ICD-10-CM

## 2025-08-19 DIAGNOSIS — R41.841 COGNITIVE COMMUNICATION DEFICIT: ICD-10-CM

## 2025-08-19 DIAGNOSIS — R53.83 FATIGUE, UNSPECIFIED TYPE: ICD-10-CM

## 2025-08-19 DIAGNOSIS — C50.511 MALIGNANT NEOPLASM OF LOWER-OUTER QUADRANT OF RIGHT BREAST OF FEMALE, ESTROGEN RECEPTOR POSITIVE: ICD-10-CM

## 2025-08-22 ENCOUNTER — OFFICE VISIT (OUTPATIENT)
Dept: ONCOLOGY | Facility: CLINIC | Age: 55
End: 2025-08-22
Payer: COMMERCIAL

## 2025-08-22 ENCOUNTER — DOCUMENTATION (OUTPATIENT)
Dept: ONCOLOGY | Facility: CLINIC | Age: 55
End: 2025-08-22

## 2025-08-22 ENCOUNTER — LAB (OUTPATIENT)
Dept: LAB | Facility: HOSPITAL | Age: 55
End: 2025-08-22
Payer: COMMERCIAL

## 2025-08-22 VITALS
RESPIRATION RATE: 16 BRPM | DIASTOLIC BLOOD PRESSURE: 85 MMHG | HEIGHT: 66 IN | SYSTOLIC BLOOD PRESSURE: 157 MMHG | BODY MASS INDEX: 26.6 KG/M2 | HEART RATE: 105 BPM | TEMPERATURE: 98.4 F | OXYGEN SATURATION: 98 % | WEIGHT: 165.5 LBS

## 2025-08-22 DIAGNOSIS — C50.511 MALIGNANT NEOPLASM OF LOWER-OUTER QUADRANT OF RIGHT BREAST OF FEMALE, ESTROGEN RECEPTOR POSITIVE: Primary | ICD-10-CM

## 2025-08-22 DIAGNOSIS — Z17.0 MALIGNANT NEOPLASM OF LOWER-OUTER QUADRANT OF RIGHT BREAST OF FEMALE, ESTROGEN RECEPTOR POSITIVE: Primary | ICD-10-CM

## 2025-08-22 DIAGNOSIS — D64.9 CHRONIC ANEMIA: ICD-10-CM

## 2025-08-22 DIAGNOSIS — I26.99 ACUTE PULMONARY EMBOLISM, UNSPECIFIED PULMONARY EMBOLISM TYPE, UNSPECIFIED WHETHER ACUTE COR PULMONALE PRESENT: ICD-10-CM

## 2025-08-22 LAB
D DIMER PPP FEU-MCNC: 1.34 MCGFEU/ML (ref 0–0.54)
FERRITIN SERPL-MCNC: 128 NG/ML (ref 13–150)
FOLATE SERPL-MCNC: 18.4 NG/ML (ref 4.78–24.2)
IRON 24H UR-MRATE: 28 MCG/DL (ref 37–145)
IRON SATN MFR SERPL: 9 % (ref 20–50)
TIBC SERPL-MCNC: 314 MCG/DL (ref 298–536)
TRANSFERRIN SERPL-MCNC: 211 MG/DL (ref 200–360)
VIT B12 BLD-MCNC: 453 PG/ML (ref 211–946)

## 2025-08-22 PROCEDURE — 83540 ASSAY OF IRON: CPT | Performed by: NURSE PRACTITIONER

## 2025-08-22 PROCEDURE — 82728 ASSAY OF FERRITIN: CPT | Performed by: NURSE PRACTITIONER

## 2025-08-22 PROCEDURE — 84466 ASSAY OF TRANSFERRIN: CPT | Performed by: NURSE PRACTITIONER

## 2025-08-22 RX ORDER — FERROUS SULFATE 325(65) MG
325 TABLET ORAL
Qty: 30 TABLET | Refills: 3 | Status: SHIPPED | OUTPATIENT
Start: 2025-08-22

## 2025-08-22 RX ORDER — METOPROLOL SUCCINATE 25 MG/1
25 TABLET, EXTENDED RELEASE ORAL DAILY
Start: 2025-08-22 | End: 2025-08-23 | Stop reason: SDUPTHER

## 2025-08-25 ENCOUNTER — TELEPHONE (OUTPATIENT)
Dept: ONCOLOGY | Facility: CLINIC | Age: 55
End: 2025-08-25
Payer: COMMERCIAL

## 2025-08-25 ENCOUNTER — SPECIALTY PHARMACY (OUTPATIENT)
Dept: PHARMACY | Facility: HOSPITAL | Age: 55
End: 2025-08-25
Payer: COMMERCIAL

## 2025-08-25 ENCOUNTER — HOSPITAL ENCOUNTER (OUTPATIENT)
Dept: CARDIOLOGY | Facility: HOSPITAL | Age: 55
Discharge: HOME | End: 2025-08-25
Admitting: INTERNAL MEDICINE
Payer: COMMERCIAL

## 2025-08-25 DIAGNOSIS — C50.511 MALIGNANT NEOPLASM OF LOWER-OUTER QUADRANT OF RIGHT BREAST OF FEMALE, ESTROGEN RECEPTOR POSITIVE: Primary | ICD-10-CM

## 2025-08-25 DIAGNOSIS — I26.99 ACUTE PULMONARY EMBOLISM, UNSPECIFIED PULMONARY EMBOLISM TYPE, UNSPECIFIED WHETHER ACUTE COR PULMONALE PRESENT: ICD-10-CM

## 2025-08-25 DIAGNOSIS — R79.89 D-DIMER, ELEVATED: ICD-10-CM

## 2025-08-25 DIAGNOSIS — Z17.0 MALIGNANT NEOPLASM OF LOWER-OUTER QUADRANT OF RIGHT BREAST OF FEMALE, ESTROGEN RECEPTOR POSITIVE: Primary | ICD-10-CM

## 2025-08-25 LAB

## 2025-08-25 PROCEDURE — 93970 EXTREMITY STUDY: CPT

## 2025-08-25 RX ORDER — METOPROLOL SUCCINATE 25 MG/1
25 TABLET, EXTENDED RELEASE ORAL DAILY
Qty: 90 TABLET | Refills: 3 | Status: SHIPPED | OUTPATIENT
Start: 2025-08-25

## 2025-08-25 RX ORDER — METOPROLOL SUCCINATE 25 MG/1
25 TABLET, EXTENDED RELEASE ORAL DAILY
Start: 2025-08-25 | End: 2025-08-25 | Stop reason: SDUPTHER

## 2025-08-25 RX ORDER — METOPROLOL SUCCINATE 25 MG/1
25 TABLET, EXTENDED RELEASE ORAL DAILY
Qty: 90 TABLET | Refills: 3 | Status: CANCELLED | OUTPATIENT
Start: 2025-08-25

## 2025-08-26 ENCOUNTER — RESULTS FOLLOW-UP (OUTPATIENT)
Dept: ONCOLOGY | Facility: CLINIC | Age: 55
End: 2025-08-26
Payer: COMMERCIAL

## 2025-08-26 ENCOUNTER — HOSPITAL ENCOUNTER (OUTPATIENT)
Dept: CT IMAGING | Facility: HOSPITAL | Age: 55
Discharge: HOME | End: 2025-08-26
Admitting: INTERNAL MEDICINE
Payer: COMMERCIAL

## 2025-08-26 DIAGNOSIS — I26.99 ACUTE PULMONARY EMBOLISM, UNSPECIFIED PULMONARY EMBOLISM TYPE, UNSPECIFIED WHETHER ACUTE COR PULMONALE PRESENT: ICD-10-CM

## 2025-08-26 DIAGNOSIS — R79.89 D-DIMER, ELEVATED: ICD-10-CM

## 2025-08-26 PROCEDURE — 71275 CT ANGIOGRAPHY CHEST: CPT

## 2025-08-26 PROCEDURE — 25510000001 IOPAMIDOL PER 1 ML: Performed by: INTERNAL MEDICINE

## 2025-08-26 RX ORDER — IOPAMIDOL 755 MG/ML
100 INJECTION, SOLUTION INTRAVASCULAR
Status: COMPLETED | OUTPATIENT
Start: 2025-08-26 | End: 2025-08-26

## 2025-08-26 RX ADMIN — IOPAMIDOL 95 ML: 755 INJECTION, SOLUTION INTRAVENOUS at 07:24

## (undated) DEVICE — DRSNG WND VAC GRANUFOAM SVR SENSATRAC MD

## (undated) DEVICE — ELECTRD BLD EZ CLN STD 2.5IN

## (undated) DEVICE — TOTAL TRAY, 16FR 10ML SIL FOLEY, URN: Brand: MEDLINE

## (undated) DEVICE — Device

## (undated) DEVICE — INTENDED FOR TISSUE SEPARATION, AND OTHER PROCEDURES THAT REQUIRE A SHARP SURGICAL BLADE TO PUNCTURE OR CUT.: Brand: BARD-PARKER ® CARBON RIB-BACK BLADES

## (undated) DEVICE — LEGGINGS, PAIR, 31X48, STERILE: Brand: MEDLINE

## (undated) DEVICE — DRSNG WND BORDR/ADHS NONADHR/GZ LF 4X4IN STRL

## (undated) DEVICE — DRSNG WND VAC GRANUFOAM SVR SENSATRAC LG

## (undated) DEVICE — JACKSON-PRATT 100CC BULB RESERVOIR: Brand: CARDINAL HEALTH

## (undated) DEVICE — ELECTRD BLD EZ CLN MOD XLNG 2.75IN

## (undated) DEVICE — TOWEL,OR,DSP,ST,BLUE,STD,4/PK,20PK/CS: Brand: MEDLINE

## (undated) DEVICE — PK UNIV COMPL 40

## (undated) DEVICE — SYR LUERLOK 5CC

## (undated) DEVICE — SKIN PREP TRAY 4 COMPARTM TRAY: Brand: MEDLINE INDUSTRIES, INC.

## (undated) DEVICE — PENCL SMOKE/EVAC MEGADYNE TELESCP 10FT

## (undated) DEVICE — KT DRP SPY/PHI W/ICG 25MG STRL

## (undated) DEVICE — SPONGE,LAP,12"X12",XR,ST,5/PK,40PK/CS: Brand: MEDLINE

## (undated) DEVICE — BNDG,ELSTC,MATRIX,STRL,6"X5YD,LF,HOOK&LP: Brand: MEDLINE

## (undated) DEVICE — SUT MNCRYL PLS ANTIB UD 4/0 PS2 18IN

## (undated) DEVICE — TRAP FLD MINIVAC MEGADYNE 100ML

## (undated) DEVICE — SYR LL W/SCALE/MARK 3ML STRL

## (undated) DEVICE — SYRINGE, LUER LOCK, 60ML: Brand: MEDLINE

## (undated) DEVICE — SOL IRR NACL 0.9PCT 3000ML

## (undated) DEVICE — NDL HYPO PRECISIONGLIDE/REG 30G 1/2 BRN

## (undated) DEVICE — DRAPE,TOWEL,LARGE,INVISISHIELD: Brand: MEDLINE

## (undated) DEVICE — Device: Brand: FABCO

## (undated) DEVICE — STPLR SKIN VISISTAT WD 35CT

## (undated) DEVICE — GOWN,SIRUS,NON REINFRCD,LARGE,SET IN SL: Brand: MEDLINE

## (undated) DEVICE — NDL HYPO PRECISIONGLIDE REG 25G 1 1/2

## (undated) DEVICE — SYR LL TP 10ML STRL

## (undated) DEVICE — DRP MICROSCP LEICA 137X381CM

## (undated) DEVICE — GLV SURG BIOGEL LTX PF 8 1/2

## (undated) DEVICE — DISPOSABLE IRRIGATION BIPOLAR CORD, M1000 TYPE: Brand: KIRWAN

## (undated) DEVICE — APPL CHLORAPREP HI/LITE 26ML ORNG

## (undated) DEVICE — MARKER,SKIN,WI/RULER AND LABELS: Brand: MEDLINE

## (undated) DEVICE — STRIP CLS WND SUTURESTRIP/PLS 0.5X5IN TP1105

## (undated) DEVICE — NEEDLE, QUINCKE, 22GX3.5": Brand: MEDLINE

## (undated) DEVICE — DRP C/ARM 41X74IN

## (undated) DEVICE — BANDAGE,GAUZE,BULKEE II,4.5"X4.1YD,STRL: Brand: MEDLINE

## (undated) DEVICE — SUT MNCRYL 3/0 PS2 18IN MCP497G

## (undated) DEVICE — IRRIGATOR BULB ASEPTO 60CC STRL

## (undated) DEVICE — PAD,ABDOMINAL,8"X10",ST,LF: Brand: MEDLINE

## (undated) DEVICE — CONN TBG Y 5 IN 1 LF STRL

## (undated) DEVICE — SMOKE EVACUATION TUBING WITH 7/8 IN TO 1/4 IN REDUCER: Brand: BUFFALO FILTER

## (undated) DEVICE — MEDI-VAC YANKAUER SUCTION HANDLE W/BULBOUS TIP: Brand: CARDINAL HEALTH

## (undated) DEVICE — GLV SURG BIOGEL LTX PF 7

## (undated) DEVICE — SYR LUERLOK 30CC

## (undated) DEVICE — PREVENA PLUS INCISION MANAGEMENT SYSTEM: Brand: PREVENA PLUS™

## (undated) DEVICE — ELECTRD BLD EZ CLN MOD 2.5IN

## (undated) DEVICE — ANTIBACTERIAL UNDYED BRAIDED (POLYGLACTIN 910), SYNTHETIC ABSORBABLE SUTURE: Brand: COATED VICRYL

## (undated) DEVICE — 3M™ STERI-DRAPE™ INCISE DRAPE 1050 (60CM X 45CM): Brand: STERI-DRAPE™

## (undated) DEVICE — TOOL MR8-15BA50T MR8 15CM BAL SYMTRI 5MM: Brand: MIDAS REX MR8

## (undated) DEVICE — KT SPINE MAZORX DISP

## (undated) DEVICE — NEEDLE, QUINCKE, 20GX3.5": Brand: MEDLINE

## (undated) DEVICE — ADHS SKIN SURG TISS VISC PREMIERPRO EXOFIN HI/VISC FAST/DRY

## (undated) DEVICE — SPNG LAP 18X18IN LF STRL PK/5

## (undated) DEVICE — STPLR SKIN SUBCUTICULAR INSORB 2030

## (undated) DEVICE — CVR TRANSD CIV FLX TPR 11.9 TO 3.8X61CM

## (undated) DEVICE — SEALANT WND FIBRIN TISSEEL PREFIL/SYR/PRIMAFZ 4ML

## (undated) DEVICE — PATIENT RETURN ELECTRODE, SINGLE-USE, CONTACT QUALITY MONITORING, ADULT, WITH 9FT CORD, FOR PATIENTS WEIGING OVER 33LBS. (15KG): Brand: MEGADYNE

## (undated) DEVICE — STOCKINETTE,IMPERVIOUS,12X48,STERILE: Brand: MEDLINE

## (undated) DEVICE — DRP SLUSH WARMR MACH CIR 44X44IN

## (undated) DEVICE — DRSNG SURESITE WNDW 4X4.5

## (undated) DEVICE — DRAPE,UNDERBUTTOCKS,PCH,STERILE: Brand: MEDLINE

## (undated) DEVICE — GLV SURG SENSICARE PI PF LF 7 GRN STRL

## (undated) DEVICE — SUT PDS 2/0 CT1 27IN DYED Z339H

## (undated) DEVICE — HYPODERMIC SAFETY NEEDLE: Brand: MONOJECT

## (undated) DEVICE — SPONGE,LAP,18"X18",DLX,XR,ST,5/PK,40/PK: Brand: MEDLINE

## (undated) DEVICE — SPNG GZ WOVN 4X4IN 12PLY 10/BX STRL

## (undated) DEVICE — SYR CONTRL PRESS/LO FIX/M/LL W/THMB/RNG 10ML

## (undated) DEVICE — SPONGE,NEURO,.75"X.75",XR,STRL,LF,10/PK: Brand: MEDLINE

## (undated) DEVICE — KT CANSTR VAC WND W/ISOLYSER SENSATRAC 500CC 5CS

## (undated) DEVICE — SUT PDS 2/0 SH 27IN Z317H

## (undated) DEVICE — ADHS LIQ MASTISOL 2/3ML

## (undated) DEVICE — PK NEURO SPINE 40

## (undated) DEVICE — PK CHST BRST 40

## (undated) DEVICE — GLV SURG SENSICARE PI LF PF 7.5 GRN STRL

## (undated) DEVICE — TBG PENCL TELESCP MEGADYNE SMOKE EVAC 10FT

## (undated) DEVICE — STRIP,CLOSURE,WOUND,MEDI-STRIP,1/2X4: Brand: MEDLINE

## (undated) DEVICE — NDL BIOP BONE MARRW JAMSHIDI 11G 101MM

## (undated) DEVICE — DRAPE,REIN 53X77,STERILE: Brand: MEDLINE

## (undated) DEVICE — SUT ETHLN 3/0 PS1 18IN 1663H

## (undated) DEVICE — NEEDLE, QUINCKE 22GX3.5": Brand: MEDLINE INDUSTRIES, INC.

## (undated) DEVICE — SUT ANTIBAC VICRYL/PLS ABS TIE COAT SZ3/0 12X18IN UD

## (undated) DEVICE — PK ATS CUST W CARDIOTOMY RESEVOIR

## (undated) DEVICE — THE STERILE LIGHT HANDLE COVER IS USED WITH STERIS SURGICAL LIGHTING AND VISUALIZATION SYSTEMS.

## (undated) DEVICE — PREP SOL POVIDONE/IODINE BT 4OZ

## (undated) DEVICE — TOOL MR8-31TD3030 MR8 TWST DRIL 3MMX30MM: Brand: MIDAS REX

## (undated) DEVICE — LOU MINOR PROCEDURE: Brand: MEDLINE INDUSTRIES, INC.

## (undated) DEVICE — DRSNG WND BORDR/ADHS NONADHR/GZ LF 4X14IN STRL

## (undated) DEVICE — SEAL HYSTERSCOPE/OUTFLOW CHANNEL MYOSURE

## (undated) DEVICE — GLV SURG BIOGEL LTX PF 7 1/2

## (undated) DEVICE — INTENDED FOR TISSUE SEPARATION, AND OTHER PROCEDURES THAT REQUIRE A SHARP SURGICAL BLADE TO PUNCTURE OR CUT.: Brand: BARD-PARKER ® STAINLESS STEEL BLADES

## (undated) DEVICE — DRN WND JP RND W TROC SIL 10F 1/8IN

## (undated) DEVICE — ANTIBACTERIAL VIOLET BRAIDED (POLYGLACTIN 910), SYNTHETIC ABSORBABLE SUTURE: Brand: COATED VICRYL

## (undated) DEVICE — STRAP STIRUP WO/ RNG

## (undated) DEVICE — RESERVOIR,SUCTION,100CC,SILICONE: Brand: MEDLINE

## (undated) DEVICE — BG TRANSF W/COUPLER SPK 600ML

## (undated) DEVICE — 3M™ IOBAN™ 2 ANTIMICROBIAL INCISE DRAPE 6650EZ: Brand: IOBAN™ 2

## (undated) DEVICE — SUT SILK 2/0 FS BLK 18IN 685G

## (undated) DEVICE — SUT SILK 2/0 SH 30IN K833H

## (undated) DEVICE — PROXIMATE RH ROTATING HEAD SKIN STAPLERS (35 WIDE) CONTAINS 35 STAINLESS STEEL STAPLES: Brand: PROXIMATE

## (undated) DEVICE — GLV SURG SENSICARE W/ALOE PF LF 7.5 STRL

## (undated) DEVICE — EXTRCT STPL SKIN STRL BX/12

## (undated) DEVICE — 3M™ STERI-STRIP™ ANTIMICROBIAL SKIN CLOSURES 1/2 INCH X 4 INCHES 50/CARTON 4 CARTONS/CASE A1847: Brand: 3M™ STERI-STRIP™

## (undated) DEVICE — SUT PDS 3/0 PS2 18IN CLR Z497G

## (undated) DEVICE — HDRST POSTN SLOTTED A/

## (undated) DEVICE — LOU D & C HYSTEROSCOPY: Brand: MEDLINE INDUSTRIES, INC.

## (undated) DEVICE — COVER,MAYO STAND,STERILE: Brand: MEDLINE

## (undated) DEVICE — LABEL SHEET CUSTOM 2X2 YELLOW: Brand: MEDLINE INDUSTRIES, INC.

## (undated) DEVICE — DRAIN,WOUND,15FR,3/16,FULL-FLUTED: Brand: MEDLINE

## (undated) DEVICE — GLV SURG SENSICARE POLYISPRN W/ALOE PF LF 6.5 GRN STRL

## (undated) DEVICE — TOOL MR8-15MH30 MR8 15CM MATCH 3MM: Brand: MIDAS REX MR8

## (undated) DEVICE — GLV SURG SENSICARE PI MIC PF SZ6.5 LF STRL

## (undated) DEVICE — SPHR MARKR STEALTH STATION

## (undated) DEVICE — UNIVERSAL PACK: Brand: CARDINAL HEALTH